# Patient Record
Sex: FEMALE | Race: WHITE | NOT HISPANIC OR LATINO | Employment: OTHER | URBAN - METROPOLITAN AREA
[De-identification: names, ages, dates, MRNs, and addresses within clinical notes are randomized per-mention and may not be internally consistent; named-entity substitution may affect disease eponyms.]

---

## 2017-08-31 ENCOUNTER — TRANSFERRED RECORDS (OUTPATIENT)
Dept: HEALTH INFORMATION MANAGEMENT | Facility: CLINIC | Age: 52
End: 2017-08-31

## 2019-01-17 ENCOUNTER — OFFICE VISIT (OUTPATIENT)
Dept: FAMILY MEDICINE | Facility: CLINIC | Age: 54
End: 2019-01-17
Payer: COMMERCIAL

## 2019-01-17 VITALS
BODY MASS INDEX: 24.96 KG/M2 | OXYGEN SATURATION: 98 % | DIASTOLIC BLOOD PRESSURE: 86 MMHG | SYSTOLIC BLOOD PRESSURE: 152 MMHG | HEART RATE: 77 BPM | HEIGHT: 63 IN | TEMPERATURE: 97.7 F | WEIGHT: 140.87 LBS | RESPIRATION RATE: 16 BRPM

## 2019-01-17 DIAGNOSIS — R03.0 ELEVATED BLOOD PRESSURE READING WITHOUT DIAGNOSIS OF HYPERTENSION: ICD-10-CM

## 2019-01-17 DIAGNOSIS — Z71.6 ENCOUNTER FOR TOBACCO USE CESSATION COUNSELING: ICD-10-CM

## 2019-01-17 DIAGNOSIS — J01.10 ACUTE NON-RECURRENT FRONTAL SINUSITIS: ICD-10-CM

## 2019-01-17 DIAGNOSIS — J45.20 MILD INTERMITTENT ASTHMA WITHOUT COMPLICATION: ICD-10-CM

## 2019-01-17 DIAGNOSIS — R91.8 PULMONARY NODULES: ICD-10-CM

## 2019-01-17 DIAGNOSIS — J45.909 UNCOMPLICATED ASTHMA, UNSPECIFIED ASTHMA SEVERITY, UNSPECIFIED WHETHER PERSISTENT: Primary | ICD-10-CM

## 2019-01-17 DIAGNOSIS — Z72.0 TOBACCO USE: ICD-10-CM

## 2019-01-17 DIAGNOSIS — F33.9 RECURRENT MAJOR DEPRESSIVE DISORDER, REMISSION STATUS UNSPECIFIED (H): ICD-10-CM

## 2019-01-17 PROBLEM — F33.40 RECURRENT MAJOR DEPRESSIVE DISORDER, IN REMISSION (H): Status: ACTIVE | Noted: 2019-01-17

## 2019-01-17 PROCEDURE — 99203 OFFICE O/P NEW LOW 30 MIN: CPT | Performed by: NURSE PRACTITIONER

## 2019-01-17 RX ORDER — AZITHROMYCIN 250 MG/1
TABLET, FILM COATED ORAL
Qty: 6 TABLET | Refills: 0 | Status: SHIPPED | OUTPATIENT
Start: 2019-01-17 | End: 2019-02-14

## 2019-01-17 ASSESSMENT — MIFFLIN-ST. JEOR: SCORE: 1199.14

## 2019-01-17 NOTE — PATIENT INSTRUCTIONS
1. Schedule RN visit to recheck blood pressure             Thank you for choosing [facility].  You may be receiving a survey in the mail from John Palencia regarding your visit today.  Please take a few minutes to complete and return the survey to let us know how we are doing.      If you have questions or concerns, please contact us via [employer] or you can contact your care team at [phone].    Our Clinic hours are:  Monday 6:40 am  to 7:00 pm  Tuesday -Friday 6:40 am to 5:00 pm    The [place] outpatient lab hours are:  Monday - Friday 6:10 am to 4:45 pm  Saturdays 7:00 am to 11:00 am  Appointments are required, call [phone]    If you have clinical questions after hours or would like to schedule an appointment,  call the clinic at [phone].

## 2019-01-17 NOTE — PROGRESS NOTES
SUBJECTIVE:   Carolina Hernandez is a 53 year old female who presents to clinic today for the following health issues:    Medical chart reviewed with patient via care Everywhere- patient is new to clinic. History of asthma- insurance needs pre auth for Advair- patient states has been working well for her. History of tobacco use- smoking 20 yrs. History of depression controlled on medication.     ENT Symptoms             Symptoms: cc Present Absent Comment   Fever/Chills  x  Couple days ago   Fatigue  x     Muscle Aches   x    Eye Irritation  x     Sneezing  x     Nasal Mgaan/Drg  x  green   Sinus Pressure/Pain x x     Loss of smell  x     Dental pain   x    Sore Throat  x     Swollen Glands   x    Ear Pain/Fullness  x  fullness   Cough  x     Wheeze  x     Chest Pain   x    Shortness of breath  x     Rash       Other  x  headache     Symptom duration:  8 days ago   Symptom severity:  varies moderate- severe   Treatments tried:  sudafed, trying a nasal rinse, ibuprofen, drinking lots of water, steaming, benadryl.   Contacts:  no     elevated blood pressure- patient states that she took sudafed today.     -------------------------------------    Problem list and histories reviewed & adjusted, as indicated.  Additional history: as documented    Patient Active Problem List   Diagnosis     CARDIOVASCULAR SCREENING; LDL GOAL LESS THAN 160     Recurrent major depressive disorder, in remission (H)     Encounter for tobacco use cessation counseling     Mild intermittent asthma without complication     History reviewed. No pertinent surgical history.    Social History     Tobacco Use     Smoking status: Current Every Day Smoker     Packs/day: 0.50     Smokeless tobacco: Never Used   Substance Use Topics     Alcohol use: Yes     Comment: beer     History reviewed. No pertinent family history.        Reviewed and updated as needed this visit by clinical staff       Reviewed and updated as needed this visit by Provider      "    ROS:  Constitutional, HEENT, cardiovascular, pulmonary, GI, , musculoskeletal, neuro, skin, endocrine and psych systems are negative, except as otherwise noted.    OBJECTIVE:     /86   Pulse 77   Temp 97.7  F (36.5  C) (Tympanic)   Resp 16   Ht 1.594 m (5' 2.75\")   Wt 63.9 kg (140 lb 13.9 oz)   SpO2 98%   BMI 25.15 kg/m    Body mass index is 25.15 kg/m .  GENERAL: healthy, alert and no distress  HENT: normal cephalic/atraumatic, ear canals and TM's normal, nose and mouth without ulcers or lesions, oropharynx clear, oral mucous membranes moist and sinuses: frontal tenderness on bilateral  NECK: no adenopathy, no asymmetry, masses, or scars and thyroid normal to palpation  RESP: lungs clear to auscultation - no rales, rhonchi or wheezes  CV: regular rate and rhythm, normal S1 S2, no S3 or S4, no murmur, click or rub, no peripheral edema and peripheral pulses strong  MS: no gross musculoskeletal defects noted, no edema    Diagnostic Test Results:  none     ASSESSMENT/PLAN:       1. Uncomplicated asthma, unspecified asthma severity, unspecified whether persistent  - patient states insurance will no longer cover Advair however it is controlling asthma well- can do pre auth.   - fluticasone-salmeterol (ADVAIR DISKUS) 500-50 MCG/DOSE inhaler; Inhale 1 puff into the lungs every 12 hours  Dispense: 1 Inhaler; Refill: 0    2. Tobacco use  Encouraged to quit smoking    3. Recurrent major depressive disorder, in status unspecified.       4. Encounter for tobacco use cessation counseling      5. Elevated blood pressure reading without diagnosis of hypertension  ? Due to smoking and sudafed use  Schedule RN visit to recheck if still high will need patient to follow up in clinic appointment     6. Acute non-recurrent frontal sinusitis    - azithromycin (ZITHROMAX Z-GORDO) 250 MG tablet; Take 2 tablets on day 1 and then 1 tablet on days 2-5.  Dispense: 6 tablet; Refill: 0    7. Pulmonary nodules  - patient states " that history of nodules- had CT scan in the past- stable.    Patient new to clinic- encouraged her to make follow up physical.     VALERIA Akins CNP  [facility]

## 2019-02-14 ENCOUNTER — OFFICE VISIT (OUTPATIENT)
Dept: FAMILY MEDICINE | Facility: CLINIC | Age: 54
End: 2019-02-14
Payer: COMMERCIAL

## 2019-02-14 VITALS
WEIGHT: 143.6 LBS | SYSTOLIC BLOOD PRESSURE: 156 MMHG | DIASTOLIC BLOOD PRESSURE: 90 MMHG | OXYGEN SATURATION: 99 % | BODY MASS INDEX: 25.45 KG/M2 | TEMPERATURE: 97.7 F | HEIGHT: 63 IN | HEART RATE: 70 BPM | RESPIRATION RATE: 16 BRPM

## 2019-02-14 DIAGNOSIS — M25.561 CHRONIC PAIN OF BOTH KNEES: ICD-10-CM

## 2019-02-14 DIAGNOSIS — R68.82 LOW LIBIDO: ICD-10-CM

## 2019-02-14 DIAGNOSIS — Z00.00 ENCOUNTER FOR ROUTINE ADULT HEALTH EXAMINATION WITHOUT ABNORMAL FINDINGS: Primary | ICD-10-CM

## 2019-02-14 DIAGNOSIS — Z12.4 SCREENING FOR CERVICAL CANCER: ICD-10-CM

## 2019-02-14 DIAGNOSIS — G89.29 CHRONIC PAIN OF BOTH KNEES: ICD-10-CM

## 2019-02-14 DIAGNOSIS — R03.0 ELEVATED BLOOD PRESSURE READING WITHOUT DIAGNOSIS OF HYPERTENSION: ICD-10-CM

## 2019-02-14 DIAGNOSIS — K90.9 INTESTINAL MALABSORPTION, UNSPECIFIED TYPE: ICD-10-CM

## 2019-02-14 DIAGNOSIS — M25.562 CHRONIC PAIN OF BOTH KNEES: ICD-10-CM

## 2019-02-14 PROCEDURE — G0145 SCR C/V CYTO,THINLAYER,RESCR: HCPCS | Performed by: FAMILY MEDICINE

## 2019-02-14 PROCEDURE — G0476 HPV COMBO ASSAY CA SCREEN: HCPCS | Performed by: FAMILY MEDICINE

## 2019-02-14 PROCEDURE — 99396 PREV VISIT EST AGE 40-64: CPT | Performed by: FAMILY MEDICINE

## 2019-02-14 PROCEDURE — 99213 OFFICE O/P EST LOW 20 MIN: CPT | Mod: 25 | Performed by: FAMILY MEDICINE

## 2019-02-14 RX ORDER — IBUPROFEN 200 MG
800 TABLET ORAL EVERY 8 HOURS PRN
COMMUNITY
End: 2019-10-05

## 2019-02-14 RX ORDER — FLUOXETINE 40 MG/1
40 CAPSULE ORAL DAILY
COMMUNITY
End: 2019-10-03

## 2019-02-14 ASSESSMENT — ANXIETY QUESTIONNAIRES
1. FEELING NERVOUS, ANXIOUS, OR ON EDGE: SEVERAL DAYS
3. WORRYING TOO MUCH ABOUT DIFFERENT THINGS: SEVERAL DAYS
5. BEING SO RESTLESS THAT IT IS HARD TO SIT STILL: SEVERAL DAYS
IF YOU CHECKED OFF ANY PROBLEMS ON THIS QUESTIONNAIRE, HOW DIFFICULT HAVE THESE PROBLEMS MADE IT FOR YOU TO DO YOUR WORK, TAKE CARE OF THINGS AT HOME, OR GET ALONG WITH OTHER PEOPLE: NOT DIFFICULT AT ALL
6. BECOMING EASILY ANNOYED OR IRRITABLE: SEVERAL DAYS
7. FEELING AFRAID AS IF SOMETHING AWFUL MIGHT HAPPEN: SEVERAL DAYS
2. NOT BEING ABLE TO STOP OR CONTROL WORRYING: SEVERAL DAYS
GAD7 TOTAL SCORE: 8

## 2019-02-14 ASSESSMENT — MIFFLIN-ST. JEOR: SCORE: 1211.53

## 2019-02-14 ASSESSMENT — PATIENT HEALTH QUESTIONNAIRE - PHQ9
SUM OF ALL RESPONSES TO PHQ QUESTIONS 1-9: 11
5. POOR APPETITE OR OVEREATING: MORE THAN HALF THE DAYS

## 2019-02-14 NOTE — LETTER
February 22, 2019    Carolina Hernandez  [address] [place] [id number]    Dear ,  This letter is regarding your recent Pap smear (cervical cancer screening) and Human Papillomavirus (HPV) test.  We are happy to inform you that your Pap smear result is normal. Cervical cancer is closely linked with certain types of HPV. Your results showed no evidence of high-risk HPV.  Therefore we recommend you return in 3 years for your next pap smear.  You will still need to return to the clinic every year for an annual exam and other preventive tests.  If you have additional questions regarding this result, please call our registered nurse, Viridiana at [phone].  Sincerely,    Julia Oneill MD/cb

## 2019-02-14 NOTE — PROGRESS NOTES
SUBJECTIVE:   CC: Carolina Hernandez is an 53 year old woman who presents for preventive health visit.       Patient is a 53 yr old female here for her annual physical . She has a past medical history significant for severe depression. She is taking Prozac for this. She reports that she has been struggling with low sex drive for years and has no interest . She went into menopause at 48 and says even before then she had very low sex drive. She reports that her moods may have something to do with it. She denies any pain with sex.       Patient has not been on hormone replacement before.     Other concerns today is GI symptoms mainly loose stools. She reports that sometimes she will see her food undigested in her stools. She had a colonoscopy last year that showed no abnormalities except diverticula.     Lastly she says she has been told that she has severe arthritis in both knees. She will like to see orthopedics for consideration for joint replacement.      Healthy Habits:    Do you get at least three servings of calcium containing foods daily (dairy, green leafy vegetables, etc.)? yes    Amount of exercise or daily activities, outside of work: physical job, lots of walking and lifting    Problems taking medications regularly No    Medication side effects: No    Have you had an eye exam in the past two years? no    Do you see a dentist twice per year? no    Do you have sleep apnea, excessive snoring or daytime drowsiness?no      Colonoscopy done on this date: 8/31/17 (approximately), by this group: [facility], results were diverticulits.   Mammogram done on this date: 8/24/17 (approximately), by this group: [facility], results were normal.     Arthritis in both knees, would like to have referral to see ortho.  Discuss sex drive.  Has decreased over the past couple of years.    Today's PHQ-2 Score: No flowsheet data found.  Will complete the phq/jil.    Abuse: Current or Past(Physical, Sexual or Emotional)-  No  Do you feel safe in your environment? Yes    Social History     Tobacco Use     Smoking status: Current Every Day Smoker     Packs/day: 0.50     Smokeless tobacco: Never Used   Substance Use Topics     Alcohol use: Yes     Comment: 12 pack weekly     If you drink alcohol do you typically have >3 drinks per day or >7 drinks per week? Yes - AUDIT SCORE:  6  AUDIT - Alcohol Use Disorders Identification Test - Reproduced from the World Health Organization Audit 2001 (Second Edition) 2/14/2019   1.  How often do you have a drink containing alcohol? 4 or more times a week   2.  How many drinks containing alcohol do you have on a typical day when you are drinking? 3 or 4   3.  How often do you have five or more drinks on one occasion? Less than monthly   4.  How often during the last year have you found that you were not able to stop drinking once you had started? Never   5.  How often during the last year have you failed to do what was normally expected of you because of drinking? Never   6.  How often during the last year have you needed a first drink in the morning to get yourself going after a heavy drinking session? Never   7.  How often during the last year have you had a feeling of guilt or remorse after drinking? Never   8.  How often during the last year have you been unable to remember what happened the night before because of your drinking? Never   9.  Have you or someone else been injured because of your drinking? No   10. Has a relative, friend, doctor or other health care worker been concerned about your drinking or suggested you cut down? No   TOTAL SCORE 6                        Reviewed orders with patient.  Reviewed health maintenance and updated orders accordingly - Yes  Labs reviewed in EPIC  BP Readings from Last 3 Encounters:   02/14/19 156/90   01/17/19 152/86   03/10/12 140/79    Wt Readings from Last 3 Encounters:   02/14/19 65.1 kg (143 lb 9.6 oz)   01/17/19 63.9 kg (140 lb 13.9 oz)                   Patient Active Problem List   Diagnosis     CARDIOVASCULAR SCREENING; LDL GOAL LESS THAN 160     Recurrent major depressive disorder, in remission (H)     Encounter for tobacco use cessation counseling     Mild intermittent asthma without complication     History reviewed. No pertinent surgical history.    Social History     Tobacco Use     Smoking status: Current Every Day Smoker     Packs/day: 0.50     Smokeless tobacco: Never Used   Substance Use Topics     Alcohol use: Yes     Comment: 12 pack weekly     Family History   Problem Relation Age of Onset     Glaucoma Father      Coronary Artery Disease Father         stents     Cancer Maternal Grandfather      Coronary Artery Disease Paternal Grandfather      Schizophrenia Daughter      Diabetes Daughter      Cancer Daughter          Current Outpatient Medications   Medication Sig Dispense Refill     ALBUTEROL SULFATE (2.5 MG/3ML) 0.083% IN NEBU 1 NEB BY INHALATION EVERY 4 HOURS       FLUoxetine (PROZAC) 40 MG capsule Take 40 mg by mouth daily       ibuprofen (ADVIL/MOTRIN) 200 MG tablet Take 800 mg by mouth       mometasone-formoterol (DULERA) 200-5 MCG/ACT inhaler Inhale 2 puffs into the lungs 2 times daily 1 Inhaler 1     OMEPRAZOLE 20 MG OR TBEC 1 TABLET ORALLY DAILY       PROAIR  (90 BASE) MCG/ACT IN AERS INHALE 2 PUFFS BY INHALATION 4 TIMES DAILY AS NEEDED       PROZAC 20 MG OR CAPS 1 CAPSULE ORALLY EVERY MORNING       SINGULAIR 10 MG OR TABS 1 TABLET ORALLY EVERY EVENING       TYLENOL 325 MG OR TABS 1-2 TABLETS ORALLY EVERY 4 HOURS AS NEEDED FOR MILD PAIN OR TEMP OVER 101       Allergies   Allergen Reactions     Penicillins        Mammogram Screening: Patient over age 50, mutual decision to screen reflected in health maintenance.    Pertinent mammograms are reviewed under the imaging tab.  History of abnormal Pap smear: NO - age 30- 65 PAP every 3 years recommended     Reviewed and updated as needed this visit by clinical staff  Tobacco   "Allergies  Meds  Med Hx  Surg Hx  Fam Hx  Soc Hx        Reviewed and updated as needed this visit by Provider        History reviewed. No pertinent past medical history.   History reviewed. No pertinent surgical history.    ROS:  CONSTITUTIONAL: NEGATIVE for fever, chills, change in weight  INTEGUMENTARY/SKIN: NEGATIVE for worrisome rashes, moles or lesions  EYES: NEGATIVE for vision changes or irritation  ENT: NEGATIVE for ear, mouth and throat problems  RESP: NEGATIVE for significant cough or SOB  BREAST: NEGATIVE for masses, tenderness or discharge  CV: NEGATIVE for chest pain, palpitations or peripheral edema  GI: NEGATIVE for nausea, abdominal pain, heartburn, or change in bowel habits  : NEGATIVE for unusual urinary or vaginal symptoms. No vaginal bleeding.  MUSCULOSKELETAL: NEGATIVE for significant arthralgias or myalgia  NEURO: NEGATIVE for weakness, dizziness or paresthesias  PSYCHIATRIC: NEGATIVE for changes in mood or affect     OBJECTIVE:   /90   Pulse 70   Temp 97.7  F (36.5  C) (Tympanic)   Resp 16   Ht 1.594 m (5' 2.75\")   Wt 65.1 kg (143 lb 9.6 oz)   SpO2 99%   BMI 25.64 kg/m    EXAM:  GENERAL: healthy, alert and no distress  EYES: Eyes grossly normal to inspection, PERRL and conjunctivae and sclerae normal  HENT: ear canals and TM's normal, nose and mouth without ulcers or lesions  NECK: no adenopathy, no asymmetry, masses, or scars and thyroid normal to palpation  RESP: lungs clear to auscultation - no rales, rhonchi or wheezes  BREAST: normal without masses, tenderness or nipple discharge and no palpable axillary masses or adenopathy  CV: regular rate and rhythm, normal S1 S2, no S3 or S4, no murmur, click or rub, no peripheral edema and peripheral pulses strong  ABDOMEN: soft, nontender, no hepatosplenomegaly, no masses and bowel sounds normal   (female): normal female external genitalia, normal urethral meatus , vaginal mucosa pink, moist, well rugated and normal cervix, " "adnexae, and uterus without masses.pap smear done  MS: no gross musculoskeletal defects noted, no edema  SKIN: no suspicious lesions or rashes  PSYCH: mentation appears normal, affect normal/bright    Diagnostic Test Results:  none     ASSESSMENT/PLAN:   (Z00.00) Encounter for routine adult health examination without abnormal findings  (primary encounter diagnosis)  Comment: patient will be notified of results   Plan: GLUCOSE, ORTHO  REFERRAL            (M25.561,  M25.562,  G89.29) Chronic pain of both knees  Comment: Ortho referral placed  Plan: ORTHO  REFERRAL            (K90.9) Intestinal malabsorption, unspecified type  Comment: Patient referred to GI   Plan: GASTROENTEROLOGY ADULT REF CONSULT ONLY            (Z12.4) Screening for cervical cancer  Comment: .Patient will be notified of results  Plan: Pap imaged thin layer screen with HPV -         recommended age 30 - 65 years (select HPV order        below)        (R03.0) Elevated blood pressure reading without diagnosis of hypertension  Comment: BP is noted to be high , asked that she come back for a recheck , if still high will recommend medication start .  Plan: As above .    (R68.82) Low libido  Comment: discussed at length with patient , since she went into menopause 5 yrs ago I am not sure she will be a good candidate for HRT. She may have to have a consult with Gyn.   Plan:  As above.      COUNSELING:   Reviewed preventive health counseling, as reflected in patient instructions       Regular exercise       Healthy diet/nutrition       Vision screening    BP Readings from Last 1 Encounters:   02/14/19 156/90     Estimated body mass index is 25.64 kg/m  as calculated from the following:    Height as of this encounter: 1.594 m (5' 2.75\").    Weight as of this encounter: 65.1 kg (143 lb 9.6 oz).    BP Screening:   Last 3 BP Readings:    BP Readings from Last 3 Encounters:   02/14/19 156/90   01/17/19 152/86   03/10/12 140/79       The " following was recommended to the patient:  Anti-hypertensive phramacology therapy       reports that she has been smoking.  She has been smoking about 0.50 packs per day. she has never used smokeless tobacco.  Tobacco Cessation Action Plan: Information offered: Patient not interested at this time    Counseling Resources:  ATP IV Guidelines  Pooled Cohorts Equation Calculator  Breast Cancer Risk Calculator  FRAX Risk Assessment  ICSI Preventive Guidelines  Dietary Guidelines for Americans, 2010  USDA's MyPlate  ASA Prophylaxis  Lung CA Screening    Julia Oneill MD  [facility]

## 2019-02-15 ASSESSMENT — ANXIETY QUESTIONNAIRES: GAD7 TOTAL SCORE: 8

## 2019-02-15 ASSESSMENT — ASTHMA QUESTIONNAIRES: ACT_TOTALSCORE: 10

## 2019-02-19 LAB
COPATH REPORT: NORMAL
PAP: NORMAL

## 2019-02-21 ENCOUNTER — ANCILLARY PROCEDURE (OUTPATIENT)
Dept: GENERAL RADIOLOGY | Facility: CLINIC | Age: 54
End: 2019-02-21
Attending: FAMILY MEDICINE
Payer: COMMERCIAL

## 2019-02-21 ENCOUNTER — OFFICE VISIT (OUTPATIENT)
Dept: ORTHOPEDICS | Facility: CLINIC | Age: 54
End: 2019-02-21
Payer: COMMERCIAL

## 2019-02-21 VITALS
DIASTOLIC BLOOD PRESSURE: 88 MMHG | HEIGHT: 63 IN | WEIGHT: 143 LBS | BODY MASS INDEX: 25.34 KG/M2 | SYSTOLIC BLOOD PRESSURE: 136 MMHG

## 2019-02-21 DIAGNOSIS — G89.29 CHRONIC PAIN OF BOTH KNEES: ICD-10-CM

## 2019-02-21 DIAGNOSIS — M17.0 BILATERAL PRIMARY OSTEOARTHRITIS OF KNEE: ICD-10-CM

## 2019-02-21 DIAGNOSIS — M25.562 CHRONIC PAIN OF BOTH KNEES: Primary | ICD-10-CM

## 2019-02-21 DIAGNOSIS — M25.561 CHRONIC PAIN OF BOTH KNEES: ICD-10-CM

## 2019-02-21 DIAGNOSIS — M25.561 CHRONIC PAIN OF BOTH KNEES: Primary | ICD-10-CM

## 2019-02-21 DIAGNOSIS — G89.29 CHRONIC PAIN OF BOTH KNEES: Primary | ICD-10-CM

## 2019-02-21 DIAGNOSIS — M25.562 CHRONIC PAIN OF BOTH KNEES: ICD-10-CM

## 2019-02-21 LAB
FINAL DIAGNOSIS: NORMAL
HPV HR 12 DNA CVX QL NAA+PROBE: NEGATIVE
HPV16 DNA SPEC QL NAA+PROBE: NEGATIVE
HPV18 DNA SPEC QL NAA+PROBE: NEGATIVE
SPECIMEN DESCRIPTION: NORMAL
SPECIMEN SOURCE CVX/VAG CYTO: NORMAL

## 2019-02-21 PROCEDURE — 20611 DRAIN/INJ JOINT/BURSA W/US: CPT | Mod: 50 | Performed by: FAMILY MEDICINE

## 2019-02-21 PROCEDURE — 99243 OFF/OP CNSLTJ NEW/EST LOW 30: CPT | Mod: 25 | Performed by: FAMILY MEDICINE

## 2019-02-21 PROCEDURE — 73562 X-RAY EXAM OF KNEE 3: CPT

## 2019-02-21 RX ORDER — TRIAMCINOLONE ACETONIDE 40 MG/ML
40 INJECTION, SUSPENSION INTRA-ARTICULAR; INTRAMUSCULAR
Status: DISCONTINUED | OUTPATIENT
Start: 2019-02-21 | End: 2019-08-06

## 2019-02-21 RX ORDER — ROPIVACAINE HYDROCHLORIDE 5 MG/ML
3 INJECTION, SOLUTION EPIDURAL; INFILTRATION; PERINEURAL
Status: DISCONTINUED | OUTPATIENT
Start: 2019-02-21 | End: 2019-08-06

## 2019-02-21 RX ADMIN — ROPIVACAINE HYDROCHLORIDE 3 ML: 5 INJECTION, SOLUTION EPIDURAL; INFILTRATION; PERINEURAL at 11:40

## 2019-02-21 RX ADMIN — TRIAMCINOLONE ACETONIDE 40 MG: 40 INJECTION, SUSPENSION INTRA-ARTICULAR; INTRAMUSCULAR at 11:40

## 2019-02-21 ASSESSMENT — MIFFLIN-ST. JEOR: SCORE: 1208.32

## 2019-02-21 NOTE — PROGRESS NOTES
"Carolina Hernandez  :  1965  DOS: 2019  MRN: [id number]    Sports Medicine Clinic Visit    PCP: Sade Hung    Carolina Hernandez is a 53 year old female who is seen in consultation at the request of  Julia Oneill M.D. presenting with chronic bilateral knee pain.    Injury: Gradual onset of chronic bilateral knee pain over last 2+ years, pain not improving over last 6 months after retiring.  Pain located over bilateral deep medial knee, nonradiating.  Additional Features:  Positive: swelling and grinding.  Symptoms are better with Rest and steroid injection.  Symptoms are worse with: descending stairs, kneeling, walking.  Other evaluation and/or treatments so far consists of: Tylenol, Ibuprofen, Rest, PCP.  Recent imaging completed: No recent imaging completed.  Prior History of related problems: Patient was last seen in 2017 by Ortho Surgeon - Dr Cao at [facility].  Per patient she was told she has moderate-severe OA at that time, steroid injection completed provided good relief for ~ 3 months.    Social History: retired    Review of Systems  Musculoskeletal: as above  Remainder of review of systems is negative including constitutional, CV, pulmonary, GI, Skin and Neurologic except as noted in HPI or medical history.    No past medical history on file.  No past surgical history on file.    Objective  /88   Ht 1.593 m (5' 2.72\")   Wt 64.9 kg (143 lb)   BMI 25.56 kg/m      General: healthy, alert and in no distress    HEENT: no scleral icterus or conjunctival erythema   Skin: no suspicious lesions or rash. No jaundice.   CV: regular rhythm by palpation, 2+ distal pulses, no pedal edema    Resp: normal respiratory effort without conversational dyspnea   Psych: normal mood and affect    Gait: antalgic, appropriate coordination and balance   Neuro: normal light touch sensory exam of the extremities. Motor strength as noted below     Bilateral Knee exam    ROM:        Flexion 110 " degrees       Extension -2 degrees       Range of motion limited by pain, mechanical    Inspection:       no visible ecchymosis        effusion noted trace    Skin:       no visible deformities       well perfused       capillary refill brisk    Patellar Motion:        Crepitus noted in the patellofemoral joint    Tender:        medial patellar border <       lateral patellar border,       medial joint line >>       lateral joint line    Non Tender:         remainder of knee area        along MCL        distal IT Band        infrapatellar tendon        tibial tubercle       pes anserine bursa    Special Tests:        neg (-) varus at 0 deg and 30 deg       neg (-) valgus at 0 deg and 30 deg    Evaluation of ipsilateral kinetic chain       B/l decreased quad tone and core deconditioning      Radiology  Recent Results (from the past 744 hour(s))   XR Knee Standing Bilateral 3 Views    Narrative    KNEE STANDING BILATERAL THREE VIEWS  2/21/2019 11:08 AM     HISTORY: Chronic pain of both knees.    COMPARISON: None.      Impression    IMPRESSION:   1. Right knee: Advanced medial joint space narrowing with only minimal  bony spurring. No acute bony abnormality or joint space effusion.  2. Left knee: Advanced medial joint space narrowing with moderate bony  spurring. No acute bony abnormality or joint space effusion.       Large Joint Injection/Arthocentesis: bilateral knee  Date/Time: 2/21/2019 11:40 AM  Performed by: Ab Smith DO  Authorized by: Ab Smith DO     Indications:  Osteoarthritis and pain  Needle Size:  22 G  Guidance: ultrasound    Approach:  Superolateral  Location:  Knee  Laterality:  Bilateral  Site:  Bilateral knee  Medications (Right):  40 mg triamcinolone 40 MG/ML; 3 mL ropivacaine 5 MG/ML  Medications (Left):  40 mg triamcinolone 40 MG/ML; 3 mL ropivacaine 5 MG/ML  Outcome:  Tolerated well, no immediate complications  Procedure discussed: discussed risks, benefits, and  alternatives    Consent Given by:  Patient  Prep: patient was prepped and draped in usual sterile fashion            Assessment:  1. Chronic pain of both knees    2. Bilateral primary osteoarthritis of knee        Plan:  Discussed the assessment with the patient.  Follow up: prn  XR images independently visualized and reviewed with patient today in clinic  Unicompartmental but advanced OA, medial compartment  Reviewed importance of strength and stabilization of knee, hip and core  Reviewed low impact activity options and strategies, as well as PT options if desired  CSI performed today with good initial relief from anesthetic effect  Reviewed wt loss, activity modification and progressive increase in activity as tolerated and guided by pain  Reviewed options for potential steroid vs viscosupplementation injections and the possibility for future orthopedic referral prn  Reviewed safe and appropriate OTC medication choices, try tylenol first  Up to 3000mg daily of tylenol is generally safe, NSAID dosing and duration limitations reviewed  Discussed nature of degenerative arthrosis of the knee.   Discussed symptom treatment with ice or heat, topical treatments, and rest if needed.   May be a candidate for partial knee replacement in the future, will defer that discussion to ortho  Expectations and goals of CSI reviewed  Often 2-3 days for steroid effect, and can take up to two weeks for maximum effect  We discussed modified progressive pain-free activity as tolerated  Do not overuse in first two weeks if feeling better due to concern for vulnerability while steroid is working  Supportive care reviewed  All questions were answered today  Contact us with additional questions or concerns  Signs and sx of concern reviewed    Thanks very much for sending this nice lady to us, I will keep you updated with her progress      Ab Smith DO, CAQ  Primary Care Sports Medicine  [facility]                Disclaimer: This note consists of symbols derived from keyboarding, dictation and/or voice recognition software. As a result, there may be errors in the script that have gone undetected. Please consider this when interpreting information found in this chart.

## 2019-02-21 NOTE — LETTER
"    2019         RE: Carolina Hernandez  [address] [place] [id number]        Dear Colleague,    Thank you for referring your patient, Carolina Hernandez, to the [facility]. Please see a copy of my visit note below.    Carolina Hernandez  :  1965  DOS: 2019  MRN: [id number]    Sports Medicine Clinic Visit    PCP: Sade Hung    Carolina Hernandez is a 53 year old female who is seen in consultation at the request of  Julia Oneill M.D. presenting with chronic bilateral knee pain.    Injury: Gradual onset of chronic bilateral knee pain over last 2+ years, pain not improving over last 6 months after retiring.  Pain located over bilateral deep medial knee, nonradiating.  Additional Features:  Positive: swelling and grinding.  Symptoms are better with Rest and steroid injection.  Symptoms are worse with: descending stairs, kneeling, walking.  Other evaluation and/or treatments so far consists of: Tylenol, Ibuprofen, Rest, PCP.  Recent imaging completed: No recent imaging completed.  Prior History of related problems: Patient was last seen in 2017 by Ortho Surgeon - Dr Cao at [facility].  Per patient she was told she has moderate-severe OA at that time, steroid injection completed provided good relief for ~ 3 months.    Social History: retired    Review of Systems  Musculoskeletal: as above  Remainder of review of systems is negative including constitutional, CV, pulmonary, GI, Skin and Neurologic except as noted in HPI or medical history.    No past medical history on file.  No past surgical history on file.    Objective  /88   Ht 1.593 m (5' 2.72\")   Wt 64.9 kg (143 lb)   BMI 25.56 kg/m       General: healthy, alert and in no distress    HEENT: no scleral icterus or conjunctival erythema   Skin: no suspicious lesions or rash. No jaundice.   CV: regular rhythm by palpation, 2+ distal pulses, no pedal edema    Resp: normal " respiratory effort without conversational dyspnea   Psych: normal mood and affect    Gait: antalgic, appropriate coordination and balance   Neuro: normal light touch sensory exam of the extremities. Motor strength as noted below     Bilateral Knee exam    ROM:        Flexion 110 degrees       Extension -2 degrees       Range of motion limited by pain, mechanical    Inspection:       no visible ecchymosis        effusion noted trace    Skin:       no visible deformities       well perfused       capillary refill brisk    Patellar Motion:        Crepitus noted in the patellofemoral joint    Tender:        medial patellar border <       lateral patellar border,       medial joint line >>       lateral joint line    Non Tender:         remainder of knee area        along MCL        distal IT Band        infrapatellar tendon        tibial tubercle       pes anserine bursa    Special Tests:        neg (-) varus at 0 deg and 30 deg       neg (-) valgus at 0 deg and 30 deg    Evaluation of ipsilateral kinetic chain       B/l decreased quad tone and core deconditioning      Radiology  Recent Results (from the past 744 hour(s))   XR Knee Standing Bilateral 3 Views    Narrative    KNEE STANDING BILATERAL THREE VIEWS  2/21/2019 11:08 AM     HISTORY: Chronic pain of both knees.    COMPARISON: None.      Impression    IMPRESSION:   1. Right knee: Advanced medial joint space narrowing with only minimal  bony spurring. No acute bony abnormality or joint space effusion.  2. Left knee: Advanced medial joint space narrowing with moderate bony  spurring. No acute bony abnormality or joint space effusion.       Large Joint Injection/Arthocentesis: bilateral knee  Date/Time: 2/21/2019 11:40 AM  Performed by: Ab Smith DO  Authorized by: Ab Smith DO     Indications:  Osteoarthritis and pain  Needle Size:  22 G  Guidance: ultrasound    Approach:  Superolateral  Location:  Knee  Laterality:  Bilateral  Site:   Bilateral knee  Medications (Right):  40 mg triamcinolone 40 MG/ML; 3 mL ropivacaine 5 MG/ML  Medications (Left):  40 mg triamcinolone 40 MG/ML; 3 mL ropivacaine 5 MG/ML  Outcome:  Tolerated well, no immediate complications  Procedure discussed: discussed risks, benefits, and alternatives    Consent Given by:  Patient  Prep: patient was prepped and draped in usual sterile fashion            Assessment:  1. Chronic pain of both knees    2. Bilateral primary osteoarthritis of knee        Plan:  Discussed the assessment with the patient.  Follow up: prn  XR images independently visualized and reviewed with patient today in clinic  Unicompartmental but advanced OA, medial compartment  Reviewed importance of strength and stabilization of knee, hip and core  Reviewed low impact activity options and strategies, as well as PT options if desired  CSI performed today with good initial relief from anesthetic effect  Reviewed wt loss, activity modification and progressive increase in activity as tolerated and guided by pain  Reviewed options for potential steroid vs viscosupplementation injections and the possibility for future orthopedic referral prn  Reviewed safe and appropriate OTC medication choices, try tylenol first  Up to 3000mg daily of tylenol is generally safe, NSAID dosing and duration limitations reviewed  Discussed nature of degenerative arthrosis of the knee.   Discussed symptom treatment with ice or heat, topical treatments, and rest if needed.   May be a candidate for partial knee replacement in the future, will defer that discussion to ortho  Expectations and goals of CSI reviewed  Often 2-3 days for steroid effect, and can take up to two weeks for maximum effect  We discussed modified progressive pain-free activity as tolerated  Do not overuse in first two weeks if feeling better due to concern for vulnerability while steroid is working  Supportive care reviewed  All questions were answered today  Contact us  with additional questions or concerns  Signs and sx of concern reviewed    Thanks very much for sending this nice lady to us, I will keep you updated with her progress      Ab Smith DO, SONAL  Primary Care Sports Medicine  [facility]               Disclaimer: This note consists of symbols derived from keyboarding, dictation and/or voice recognition software. As a result, there may be errors in the script that have gone undetected. Please consider this when interpreting information found in this chart.    Again, thank you for allowing me to participate in the care of your patient.        Sincerely,        Ab Smith DO

## 2019-02-21 NOTE — LETTER
February 21, 2019      Carolina was seen in my office today for bilateral knee pain.  She has advanced osteoarthritis of both knees.  This affects her ability to perform her ADL's and exercise to maintain strength.  She is working with me on conservative care options to manage this issue.  I have discussed with her that strength and saf exercise are the best ways to manage knee arthritis pain for her long term, and to help avoid costly major surgery like knee replacement, especially given her age.  I believe that she would benefit tremendously from a low-impact activity routine, as well as a community setting to work on strength and stability of her knees.  Pool exercises, swimming, pilates, stationary or recumbent stationary bike and elliptical machine are some of the options we discussed, which are available at her local [facility].  She would benefit tremendously from access to these resources.        Ab Ramsey DO, CAQ  Primary Care Sports Medicine  [facility]

## 2019-02-22 ENCOUNTER — TELEPHONE (OUTPATIENT)
Dept: FAMILY MEDICINE | Facility: CLINIC | Age: 54
End: 2019-02-22

## 2019-02-22 DIAGNOSIS — J45.20 MILD INTERMITTENT ASTHMA WITHOUT COMPLICATION: Primary | ICD-10-CM

## 2019-02-22 NOTE — TELEPHONE ENCOUNTER
Ok for PA , this will have to go through Sade Castro who helps us with the prior authorization. I faxed in Breo for her . She can try that in the meantime

## 2019-02-22 NOTE — TELEPHONE ENCOUNTER
Patient notified of Breo. And notified that we will begin PA.    Routing PA to pool/Sade to begin.    Thanks,  Zarina HERRERA RN

## 2019-02-22 NOTE — TELEPHONE ENCOUNTER
Reason for Call:  Other prescription    Detailed comments: Pt is calling re her Dulera, starting taking since 2/14/19  And is not working.    Was on Advair for years but due to insurance the Dulera was covered.    Is there another option? Or Medication?   Please call    Phone Number Patient can be reached at: Home number on file [phone] (home)    Best Time: any    Can we leave a detailed message on this number? YES    Call taken on 2/22/2019 at 1:01 PM by Ana Rivas

## 2019-02-22 NOTE — TELEPHONE ENCOUNTER
Dr. Oneill,    Patient states the Dulera is not working for her and she would really like to get a prior auth for the Advair, as that worked for her in the past.    Okay for PA on Advair?     Patient is also asking if there is any other medication you can prescribe her in the mean time for her to try/ get her by until the Advair PA goes through?    Thanks,  Zarina HERRERA RN

## 2019-02-23 NOTE — TELEPHONE ENCOUNTER
Prior Authorization Retail Medication Request    Medication/Dose: Advair  ICD code (if different than what is on RX):    Previously Tried and Failed:  Dulera - is not working for patient; Breo; proair; singulair; albuterol sulfate   Rationale:  Has used Advair in the past with success    Insurance Name:  [facility]  Insurance ID:  [id number]       Pharmacy Information (if different than what is on RX)  Name:    Phone:

## 2019-02-27 ENCOUNTER — TELEPHONE (OUTPATIENT)
Dept: FAMILY MEDICINE | Facility: CLINIC | Age: 54
End: 2019-02-27

## 2019-02-27 DIAGNOSIS — J45.20 MILD INTERMITTENT ASTHMA WITHOUT COMPLICATION: Primary | ICD-10-CM

## 2019-02-27 DIAGNOSIS — F33.40 RECURRENT MAJOR DEPRESSIVE DISORDER, IN REMISSION (H): ICD-10-CM

## 2019-02-27 NOTE — TELEPHONE ENCOUNTER
Last Written Prescription Date:  Singular  historical  Last Fill Quantity: ?,  # refills: ?   Last office visit: 2/14/2019 with prescribing provider:  Nino Allen Office Visit:      Last Written Prescription Date:  Prozac 20 mg  historical  Last Fill Quantity: ?,  # refills: ?   Last office visit: 2/14/2019 with prescribing provider:  Nino Allen Office Visit:      Last Written Prescription Date:  Albuterol Inhaler historical  Last Fill Quantity: ?,  # refills: ?   Last office visit: 2/14/2019 with prescribing provider:  Nino Allen Office Visit:      Last Written Prescription Date:  Albuterol Neb Solution  Last Fill Quantity: ?,  # refills: ?   Last office visit: 2/14/2019 with prescribing provider:  Nino Allen Office Visit:      Due to insurance, patient had to switch MD's. She was seen by Dr. Oneill to whom she wants for her new MD, but it appears they did not talk about medication.  She uses [facility] in [place].  Kourtney Burdick  Clinic Station  Flex

## 2019-02-27 NOTE — TELEPHONE ENCOUNTER
Routing refill request to provider for review/approval because:  Medication is reported/historical    Patient needs refills on these medications. LOV 02/14/19.   Pended refills for 1 year.      TYLOR DiazN, RN

## 2019-02-28 RX ORDER — MONTELUKAST SODIUM 10 MG/1
10 TABLET ORAL AT BEDTIME
Qty: 30 TABLET | Refills: 11 | Status: SHIPPED | OUTPATIENT
Start: 2019-02-28 | End: 2019-10-03

## 2019-02-28 RX ORDER — ALBUTEROL SULFATE 0.83 MG/ML
2.5 SOLUTION RESPIRATORY (INHALATION) EVERY 4 HOURS PRN
Qty: 1 BOX | Refills: 11 | Status: SHIPPED | OUTPATIENT
Start: 2019-02-28 | End: 2019-10-03

## 2019-03-01 NOTE — TELEPHONE ENCOUNTER
Prior Authorization Approval    Authorization Effective Date: 1/30/2019  Authorization Expiration Date: 2/29/2020  Medication: Advair-APPROVED  Approved Dose/Quantity:   Reference #:     Insurance Company: [employer] - Phone [phone] Fax [phone]  Expected CoPay:       CoPay Card Available:      Foundation Assistance Needed:    Which Pharmacy is filling the prescription (Not needed for infusion/clinic administered): [id number] - [place], [place] - [address] [place] [address]  Pharmacy Notified: Yes  Patient Notified: No    Pharmacy will notify patient when medication is ready.

## 2019-03-01 NOTE — TELEPHONE ENCOUNTER
Central Prior Authorization Team   Phone: [phone]      PA Initiation    Medication: Advair-Initiated  Insurance Company: [employer] - Phone [phone] Fax [phone]  Pharmacy Filling the Rx: [id number] - [place], [place] - [address] [place] [address]  Filling Pharmacy Phone: [phone]  Filling Pharmacy Fax:    Start Date: 3/1/2019

## 2019-03-05 ENCOUNTER — TELEPHONE (OUTPATIENT)
Dept: FAMILY MEDICINE | Facility: CLINIC | Age: 54
End: 2019-03-05

## 2019-03-05 DIAGNOSIS — J45.20 MILD INTERMITTENT ASTHMA WITHOUT COMPLICATION: Primary | ICD-10-CM

## 2019-03-05 RX ORDER — ALBUTEROL SULFATE 90 UG/1
2 AEROSOL, METERED RESPIRATORY (INHALATION) EVERY 6 HOURS PRN
Qty: 1 INHALER | Refills: 0 | Status: SHIPPED | OUTPATIENT
Start: 2019-03-05 | End: 2019-04-03

## 2019-03-05 NOTE — PATIENT INSTRUCTIONS
Patient Education     Acute Sinusitis    Acute sinusitis is irritation and swelling of the sinuses. It is usually caused by a viral infection after a common cold. Your doctor can help you find relief.  What is acute sinusitis?  Sinuses are air-filled spaces in the skull behind the face. They are kept moist and clean by a lining of mucosa. Things such as pollen, smoke, and chemical fumes can irritate the mucosa. It can then swell up. As a response to irritation, the mucosa makes more mucus and other fluids. Tiny hairlike cilia cover the mucosa. Cilia help carry mucus toward the opening of the sinus. Too much mucus may cause the cilia to stop working. This blocks the sinus opening. A buildup of fluid in the sinuses then causes pain and pressure. It can also encourage bacteria to grow in the sinuses.  Common symptoms of acute sinusitis  You may have:    Facial soreness pain    Headache    Fever    Fluid draining in the back of the throat (postnasal drip)    Congestion    Drainage that is thick and colored, instead of clear    Cough  Diagnosing acute sinusitis  Your doctor will ask about your symptoms and health history. He or she will look at your ear, nose, and throat. You usually won't need to have X-rays taken.    The doctor may take a sample of mucus to check for bacteria. If you have sinusitis that keeps coming back, you may need imaging tests such as X-rays or CAT scans. This will help your doctor check for a structural problem that may be causing the infection.  Treating acute sinusitis  Treatment is aimed at unblocking the sinus opening and helping the cilia work again. You may need to take antihistamine and decongestant medicine. These can reduce inflammation and decrease the amount of fluid your sinuses make. If you have a bacterial infection, you will need to take antibiotic medicine for 10 to 14 days. Take this medicine until it is gone, even if you feel better.  Date Last Reviewed: 10/1/2016    [id number]  The [employer]. [address] [place] [address], [place] [address]. All rights reserved. This information is not intended as a substitute for professional medical care. Always follow your healthcare professional's instructions.

## 2019-03-05 NOTE — TELEPHONE ENCOUNTER
Reason for Call:  Other med question    Detailed comments: Patient is asking to speak to a nurse. She wants to know what kind of decongestant she can use with high blood pressure.  She is also asking for a refill on Albuterol inhaler.  It looks like she should have plenty of refills as it was just filled on 2/28/19.    Phone Number Patient can be reached at: Home number on file [phone] (home)    Best Time: any    Can we leave a detailed message on this number? YES    Call taken on 3/5/2019 at 1:34 PM by Sruthi Al

## 2019-03-05 NOTE — TELEPHONE ENCOUNTER
Routed to out of office provider pool since Dr Oneill has left for the day.     I called pt to clarify her request since I do not find that we have filled albuterol inhaler for her before.    I do find that we have prescribed albuterol nebs for her.  Pt was last seen 1/17/19 for asthma flare.    Pt explains that sinus symptoms seemed to have begun after being seen for asthma flare 1/17/19.  Sinus congestion is increasing and it is causing her asthma to flare.   Pt has sinus congestion for 2-3 weeks now.    Denies fever.  Denies headache or vision changes.    Pt made appt to evaluate for sinus infection on 3/7.    1.  Pt asks for inhaler in the meantime?  She has an old albuterol inhaler and it is nearly out of doses.  2.  Pt asks for recommendation of a decongestant that she can safely take with hypertension?    Heike Morales RN

## 2019-03-06 NOTE — TELEPHONE ENCOUNTER
Covering for provider- albuterol refilled.  Would recommend trying Coricidin HBP products for congestion, specifically made for people with high blood pressure.  Thanks.    Diony Ochoa MD

## 2019-03-07 ENCOUNTER — OFFICE VISIT (OUTPATIENT)
Dept: FAMILY MEDICINE | Facility: CLINIC | Age: 54
End: 2019-03-07
Payer: COMMERCIAL

## 2019-03-07 VITALS
OXYGEN SATURATION: 97 % | DIASTOLIC BLOOD PRESSURE: 84 MMHG | WEIGHT: 142 LBS | HEART RATE: 82 BPM | BODY MASS INDEX: 25.38 KG/M2 | RESPIRATION RATE: 16 BRPM | SYSTOLIC BLOOD PRESSURE: 152 MMHG | TEMPERATURE: 97.8 F

## 2019-03-07 DIAGNOSIS — J45.20 MILD INTERMITTENT ASTHMA WITHOUT COMPLICATION: Primary | ICD-10-CM

## 2019-03-07 DIAGNOSIS — J40 BRONCHITIS: ICD-10-CM

## 2019-03-07 DIAGNOSIS — R03.0 ELEVATED BLOOD PRESSURE READING WITHOUT DIAGNOSIS OF HYPERTENSION: ICD-10-CM

## 2019-03-07 DIAGNOSIS — Z72.0 TOBACCO USE: ICD-10-CM

## 2019-03-07 DIAGNOSIS — J01.90 ACUTE NON-RECURRENT SINUSITIS, UNSPECIFIED LOCATION: ICD-10-CM

## 2019-03-07 PROCEDURE — 99214 OFFICE O/P EST MOD 30 MIN: CPT | Performed by: NURSE PRACTITIONER

## 2019-03-07 RX ORDER — AZITHROMYCIN 250 MG/1
TABLET, FILM COATED ORAL
Qty: 6 TABLET | Refills: 0 | Status: SHIPPED | OUTPATIENT
Start: 2019-03-07 | End: 2019-07-25

## 2019-03-07 RX ORDER — CODEINE PHOSPHATE/GUAIFENESIN 10-100MG/5
5 LIQUID (ML) ORAL EVERY 4 HOURS PRN
Qty: 180 ML | Refills: 0 | Status: SHIPPED | OUTPATIENT
Start: 2019-03-07 | End: 2019-07-25

## 2019-03-07 NOTE — PATIENT INSTRUCTIONS
Thank you for choosing [facility].  You may be receiving a survey in the mail from John Palencia regarding your visit today.  Please take a few minutes to complete and return the survey to let us know how we are doing.      If you have questions or concerns, please contact us via [employer] or you can contact your care team at [phone].    Our Clinic hours are:  Monday 6:40 am  to 7:00 pm  Tuesday -Friday 6:40 am to 5:00 pm    The [place] outpatient lab hours are:  Monday - Friday 6:10 am to 4:45 pm  Saturdays 7:00 am to 11:00 am  Appointments are required, call [phone]    If you have clinical questions after hours or would like to schedule an appointment,  call the clinic at [phone].

## 2019-03-07 NOTE — NURSING NOTE
"Initial /84   Pulse 82   Temp 97.8  F (36.6  C) (Tympanic)   Resp 16   Wt 64.4 kg (142 lb)   SpO2 97%   BMI 25.38 kg/m   Estimated body mass index is 25.38 kg/m  as calculated from the following:    Height as of 2/21/19: 1.593 m (5' 2.72\").    Weight as of this encounter: 64.4 kg (142 lb). .    Darlene Rodriguez    "

## 2019-03-07 NOTE — PROGRESS NOTES
SUBJECTIVE:   Carolina Hernandez is a 53 year old female who presents to clinic today for the following health issues:      ENT Symptoms             Symptoms: cc Present Absent Comment   Fever/Chills  x  Chills and sweats   Fatigue   x    Muscle Aches   x    Eye Irritation   x    Sneezing   x    Nasal Magan/Drg  x     Sinus Pressure/Pain  x     Loss of smell  x     Dental pain  x     Sore Throat  x  From coughing and drainage   Swollen Glands   x    Ear Pain/Fullness   x    Cough  x  Productive cough   Wheeze  x     Chest Pain   x    Shortness of breath  x     Rash   x    Other   x      Symptom duration:  3 days   Symptom severity:  moderate   Treatments tried:  OTC   Contacts:  none     Patient with history of asthma and tobacco use.   Sinus feel plugged-   Blood pressure noted to be elevated- patient concerned about rising blood pressure.      -------------------------------------    Problem list and histories reviewed & adjusted, as indicated.  Additional history: as documented    Patient Active Problem List   Diagnosis     CARDIOVASCULAR SCREENING; LDL GOAL LESS THAN 160     Recurrent major depressive disorder, in remission (H)     Encounter for tobacco use cessation counseling     Mild intermittent asthma without complication     No past surgical history on file.    Social History     Tobacco Use     Smoking status: Current Every Day Smoker     Packs/day: 0.50     Smokeless tobacco: Never Used   Substance Use Topics     Alcohol use: Yes     Comment: 12 pack weekly     Family History   Problem Relation Age of Onset     Glaucoma Father      Coronary Artery Disease Father         stents     Cancer Maternal Grandfather      Coronary Artery Disease Paternal Grandfather      Schizophrenia Daughter      Diabetes Daughter      Cancer Daughter            Reviewed and updated as needed this visit by clinical staff       Reviewed and updated as needed this visit by Provider         ROS:  Constitutional, HEENT,  cardiovascular, pulmonary, GI, , musculoskeletal, neuro, skin, endocrine and psych systems are negative, except as otherwise noted.    OBJECTIVE:     /84   Pulse 82   Temp 97.8  F (36.6  C) (Tympanic)   Resp 16   Wt 64.4 kg (142 lb)   SpO2 97%   BMI 25.38 kg/m    Body mass index is 25.38 kg/m .  GENERAL: healthy, alert, no distress and fatigued  HENT: normal cephalic/atraumatic, ear canals and TM's normal, nose and mouth without ulcers or lesions, oropharynx clear, oral mucous membranes moist and sinuses: maxillary, frontal tenderness on bilateral  NECK: no adenopathy, no asymmetry, masses, or scars and thyroid normal to palpation  RESP: lungs clear to auscultation - no rales, rhonchi or wheezes  CV: regular rate and rhythm, normal S1 S2, no S3 or S4, no murmur, click or rub, no peripheral edema and peripheral pulses strong  MS: no gross musculoskeletal defects noted, no edema    Diagnostic Test Results:  none     ASSESSMENT/PLAN:       1. Acute non-recurrent sinusitis, unspecified location  - patient is a smoker increasing risk of pulmonary infections  - azithromycin (ZITHROMAX) 250 MG tablet; Two tablets first day, then one tablet daily for four days.  Dispense: 6 tablet; Refill: 0    2. Bronchitis  patient is a smoker increasing risk of pulmonary infections  - guaiFENesin-codeine (GUAIFENESIN AC) 100-10 MG/5ML syrup; Take 5 mLs by mouth every 4 hours as needed for congestion  Dispense: 180 mL; Refill: 0  - azithromycin (ZITHROMAX) 250 MG tablet; Two tablets first day, then one tablet daily for four days.  Dispense: 6 tablet; Refill: 0    3. Mild intermittent asthma without complication  Continue to use Advair and albuterol     4. Tobacco use  Encouraged patient to quit smoking    5. Elevated blood pressure reading without diagnosis of hypertension  Schedule RN visit once you are feeling better to recheck blood pressure - avoid smoking will reduce blood pressure           Christine Kc, APRN  [facility]

## 2019-03-14 ENCOUNTER — TELEPHONE (OUTPATIENT)
Dept: FAMILY MEDICINE | Facility: CLINIC | Age: 54
End: 2019-03-14

## 2019-03-15 NOTE — TELEPHONE ENCOUNTER
[facility] for bronchitis completed and routed to CHANDRIKA Kc for review and signature.

## 2019-04-01 NOTE — TELEPHONE ENCOUNTER
Patient dropped off [employer] RTW and FMLA form.  I have completed Return to work form and attached it to our copy of recently completed FMLA form.  These have been routed to CHANDRIKA Kc for review and signature.

## 2019-04-03 DIAGNOSIS — J45.20 MILD INTERMITTENT ASTHMA WITHOUT COMPLICATION: ICD-10-CM

## 2019-04-03 NOTE — TELEPHONE ENCOUNTER
"Requested Prescriptions   Pending Prescriptions Disp Refills     VENTOLIN  (90 Base) MCG/ACT inhaler [Pharmacy Med Name: Ventolin HFA Inhalation Aerosol Solution 108 (90 Base) MCG/ACT] 18 g 0     Sig: Inhale 2 puffs into the lungs every 6 hours as needed for shortness of breath / dyspnea or wheezing    Asthma Maintenance Inhalers - Anticholinergics Failed - 4/3/2019 11:23 AM       Failed - Asthma control assessment score within normal limits in last 6 months    Please review ACT score.          Passed - Patient is age 12 years or older       Passed - Medication is active on med list       Passed - Recent (6 mo) or future (30 days) visit within the authorizing provider's specialty    Patient had office visit in the last 6 months or has a visit in the next 30 days with authorizing provider or within the authorizing provider's specialty.  See \"Patient Info\" tab in inbasket, or \"Choose Columns\" in Meds & Orders section of the refill encounter.            Last Written Prescription Date:  3/5/19  Last Fill Quantity: 1,  # refills: 0   Last office visit: 3/7/2019 with prescribing provider:  Nino   Future Office Visit:      "

## 2019-04-05 RX ORDER — ALBUTEROL SULFATE 90 UG/1
AEROSOL, METERED RESPIRATORY (INHALATION)
Qty: 18 G | Refills: 0 | Status: SHIPPED | OUTPATIENT
Start: 2019-04-05 | End: 2019-05-07

## 2019-04-08 DIAGNOSIS — J45.20 MILD INTERMITTENT ASTHMA WITHOUT COMPLICATION: Primary | ICD-10-CM

## 2019-04-08 NOTE — TELEPHONE ENCOUNTER
"Requested Prescriptions   Pending Prescriptions Disp Refills     fluticasone-salmeterol (ADVAIR) 500-50 MCG/DOSE inhaler       Sig: Inhale 1 puff into the lungs every 12 hours   Last Written Prescription Date:  historical  Last Fill Quantity: 0,  # refills: 0   Last office visit: 3/7/2019 with prescribing provider:  SYLVIE Kc   Future Office Visit:        Inhaled Steroids Protocol Failed - 4/8/2019 10:04 AM        Failed - Asthma control assessment score within normal limits in last 6 months     Please review ACT score.           Passed - Patient is age 12 or older        Passed - Medication is active on med list        Passed - Recent (6 mo) or future (30 days) visit within the authorizing provider's specialty     Patient had office visit in the last 6 months or has a visit in the next 30 days with authorizing provider or within the authorizing provider's specialty.  See \"Patient Info\" tab in inbasket, or \"Choose Columns\" in Meds & Orders section of the refill encounter.              "

## 2019-04-08 NOTE — TELEPHONE ENCOUNTER
Routing refill request to provider for review/approval because:  Medication is reported/historical    Tamika Waggoner RN

## 2019-04-12 NOTE — TELEPHONE ENCOUNTER
Patient calling stating is needing her Advair refilled, she previously had Dr. Sade Hung prescribing but has now established care with Dr. Oneill and is requesting a refill.   Patient reports she is out and does need it.     Per 3/7/19 dictation from Christine Kc:  3. Mild intermittent asthma without complication  Continue to use Advair and albuterol    Medication pended.    Provider please review and advise. Thank you.

## 2019-05-07 DIAGNOSIS — J45.20 MILD INTERMITTENT ASTHMA WITHOUT COMPLICATION: ICD-10-CM

## 2019-05-07 NOTE — TELEPHONE ENCOUNTER
"Requested Prescriptions   Pending Prescriptions Disp Refills     VENTOLIN  (90 Base) MCG/ACT inhaler [Pharmacy Med Name: Ventolin HFA Inhalation Aerosol Solution 108 (90 Base) MCG/ACT] 18 g 0     Sig: Inhale 2 puffs into the lungs every 6 hours as needed for shortness of breath / dyspnea or wheezing   Last Written Prescription Date:  4/5/19  Last Fill Quantity: 18g,  # refills: 0   Last office visit: 3/7/2019 with prescribing provider:  SYLVIE Kc   Future Office Visit:        Asthma Maintenance Inhalers - Anticholinergics Failed - 5/7/2019  9:59 AM        Failed - Asthma control assessment score within normal limits in last 6 months     Please review ACT score.           Passed - Patient is age 12 years or older        Passed - Medication is active on med list        Passed - Recent (6 mo) or future (30 days) visit within the authorizing provider's specialty     Patient had office visit in the last 6 months or has a visit in the next 30 days with authorizing provider or within the authorizing provider's specialty.  See \"Patient Info\" tab in inbasket, or \"Choose Columns\" in Meds & Orders section of the refill encounter.              "

## 2019-05-08 RX ORDER — ALBUTEROL SULFATE 90 UG/1
AEROSOL, METERED RESPIRATORY (INHALATION)
Qty: 18 G | Refills: 1 | Status: SHIPPED | OUTPATIENT
Start: 2019-05-08 | End: 2019-07-02

## 2019-07-02 DIAGNOSIS — J45.20 MILD INTERMITTENT ASTHMA WITHOUT COMPLICATION: ICD-10-CM

## 2019-07-02 NOTE — TELEPHONE ENCOUNTER
"Requested Prescriptions   Pending Prescriptions Disp Refills     VENTOLIN  (90 Base) MCG/ACT inhaler [Pharmacy Med Name: Ventolin HFA Inhalation Aerosol Solution 108 (90 Base) MCG/ACT] 18 g 0     Sig: Inhale 2 puffs into the lungs every 6 hours as needed for shortness of breath / dyspnea or wheezing.   Last Written Prescription Date:  historical  Last Fill Quantity: 0,  # refills: 0   Last office visit: 3/7/2019 with prescribing provider:  SYLVIE Kc   Future Office Visit:        Asthma Maintenance Inhalers - Anticholinergics Failed - 7/2/2019  9:56 AM        Failed - Asthma control assessment score within normal limits in last 6 months     Please review ACT score.           Passed - Patient is age 12 years or older        Passed - Medication is active on med list        Passed - Recent (6 mo) or future (30 days) visit within the authorizing provider's specialty     Patient had office visit in the last 6 months or has a visit in the next 30 days with authorizing provider or within the authorizing provider's specialty.  See \"Patient Info\" tab in inbasket, or \"Choose Columns\" in Meds & Orders section of the refill encounter.              "

## 2019-07-05 NOTE — TELEPHONE ENCOUNTER
Routing refill request to provider for review/approval because:  ACT not at goal.     ACT Total Scores 2/14/2019   ACT TOTAL SCORE (Goal Greater than or Equal to 20) 10   In the past 12 months, how many times did you visit the emergency room for your asthma without being admitted to the hospital? 0   In the past 12 months, how many times were you hospitalized overnight because of your asthma? 0

## 2019-07-06 RX ORDER — ALBUTEROL SULFATE 90 UG/1
AEROSOL, METERED RESPIRATORY (INHALATION)
Qty: 18 G | Refills: 6 | Status: SHIPPED | OUTPATIENT
Start: 2019-07-06 | End: 2019-10-03

## 2019-07-25 ENCOUNTER — TELEPHONE (OUTPATIENT)
Dept: FAMILY MEDICINE | Facility: CLINIC | Age: 54
End: 2019-07-25

## 2019-07-25 ENCOUNTER — ALLIED HEALTH/NURSE VISIT (OUTPATIENT)
Dept: FAMILY MEDICINE | Facility: CLINIC | Age: 54
End: 2019-07-25
Payer: MEDICAID

## 2019-07-25 VITALS — SYSTOLIC BLOOD PRESSURE: 154 MMHG | DIASTOLIC BLOOD PRESSURE: 88 MMHG | HEART RATE: 80 BPM

## 2019-07-25 DIAGNOSIS — Z01.30 BP CHECK: Primary | ICD-10-CM

## 2019-07-25 PROCEDURE — 99207 ZZC NO CHARGE NURSE ONLY: CPT

## 2019-07-25 NOTE — NURSING NOTE
Carolina Hernandez is a 54 year old year old patient who comes in today for a Blood Pressure check because of ongoing blood pressure monitoring.  Vital Signs as repeated by RN Twice    Current complaints: cough, chronic due to smoking, productive, and headaches for the last 2 weeks, daily but not constant, treating with Ibuprofen 800 mg and will lessen headaches but not resolve headaches.     Vital Signs 7/25/2019 7/25/2019   Systolic 162 154   Diastolic 94 88   Pulse 80        Disposition:  Routed to provider in a telephone encounter today.

## 2019-07-25 NOTE — TELEPHONE ENCOUNTER
S-(situation): BP check, presented today due to headaches x 2 weeks and Hx of HTN    B-(background): last Office visit 3/7/19, BP: 152/84    A-(assessment): Carolina Hernandez is a 54 year old year old patient who comes in today for a Blood Pressure check because of ongoing blood pressure monitoring.  Vital Signs as repeated by RN Twice    Current complaints: cough, chronic patient reports due to smoking, productive, and headaches for the last 2 weeks, daily but not constant, treating with Ibuprofen 800 mg and will lessen headaches but not resolve headaches.     Vital Signs 7/25/2019 7/25/2019   Systolic 162 154   Diastolic 94 88   Pulse 80      [facility]     [phone]    R-(recommendations): Provider please review and advise. Thank you.

## 2019-07-25 NOTE — TELEPHONE ENCOUNTER
Patient informed of message below from provider.  Scheduled Office visit with Dr. Licona tomorrow at 1000 to discuss.

## 2019-07-26 ENCOUNTER — OFFICE VISIT (OUTPATIENT)
Dept: FAMILY MEDICINE | Facility: CLINIC | Age: 54
End: 2019-07-26
Payer: MEDICAID

## 2019-07-26 VITALS
WEIGHT: 146.4 LBS | TEMPERATURE: 97.9 F | BODY MASS INDEX: 26.17 KG/M2 | DIASTOLIC BLOOD PRESSURE: 88 MMHG | SYSTOLIC BLOOD PRESSURE: 156 MMHG | HEART RATE: 80 BPM | RESPIRATION RATE: 16 BRPM

## 2019-07-26 DIAGNOSIS — F33.40 RECURRENT MAJOR DEPRESSIVE DISORDER, IN REMISSION (H): ICD-10-CM

## 2019-07-26 DIAGNOSIS — Z71.6 ENCOUNTER FOR TOBACCO USE CESSATION COUNSELING: ICD-10-CM

## 2019-07-26 DIAGNOSIS — J45.41 MODERATE PERSISTENT ASTHMA WITH EXACERBATION: ICD-10-CM

## 2019-07-26 DIAGNOSIS — I10 ESSENTIAL HYPERTENSION: Primary | ICD-10-CM

## 2019-07-26 DIAGNOSIS — H53.8 BLURRED VISION: ICD-10-CM

## 2019-07-26 LAB
ANION GAP SERPL CALCULATED.3IONS-SCNC: 7 MMOL/L (ref 3–14)
BUN SERPL-MCNC: 17 MG/DL (ref 7–30)
CALCIUM SERPL-MCNC: 9.3 MG/DL (ref 8.5–10.1)
CHLORIDE SERPL-SCNC: 101 MMOL/L (ref 94–109)
CO2 SERPL-SCNC: 25 MMOL/L (ref 20–32)
CREAT SERPL-MCNC: 0.73 MG/DL (ref 0.52–1.04)
ERYTHROCYTE [DISTWIDTH] IN BLOOD BY AUTOMATED COUNT: 13 % (ref 10–15)
GFR SERPL CREATININE-BSD FRML MDRD: >90 ML/MIN/{1.73_M2}
GLUCOSE SERPL-MCNC: 84 MG/DL (ref 70–99)
HCT VFR BLD AUTO: 42.2 % (ref 35–47)
HGB BLD-MCNC: 14.5 G/DL (ref 11.7–15.7)
MCH RBC QN AUTO: 32 PG (ref 26.5–33)
MCHC RBC AUTO-ENTMCNC: 34.4 G/DL (ref 31.5–36.5)
MCV RBC AUTO: 93 FL (ref 78–100)
PLATELET # BLD AUTO: 329 10E9/L (ref 150–450)
POTASSIUM SERPL-SCNC: 3.9 MMOL/L (ref 3.4–5.3)
RBC # BLD AUTO: 4.53 10E12/L (ref 3.8–5.2)
SODIUM SERPL-SCNC: 133 MMOL/L (ref 133–144)
WBC # BLD AUTO: 7.4 10E9/L (ref 4–11)

## 2019-07-26 PROCEDURE — 85027 COMPLETE CBC AUTOMATED: CPT | Performed by: INTERNAL MEDICINE

## 2019-07-26 PROCEDURE — 99214 OFFICE O/P EST MOD 30 MIN: CPT | Performed by: INTERNAL MEDICINE

## 2019-07-26 PROCEDURE — 80048 BASIC METABOLIC PNL TOTAL CA: CPT | Performed by: INTERNAL MEDICINE

## 2019-07-26 PROCEDURE — 36415 COLL VENOUS BLD VENIPUNCTURE: CPT | Performed by: INTERNAL MEDICINE

## 2019-07-26 RX ORDER — LOSARTAN POTASSIUM AND HYDROCHLOROTHIAZIDE 12.5; 5 MG/1; MG/1
1 TABLET ORAL DAILY
Qty: 90 TABLET | Refills: 3 | Status: SHIPPED | OUTPATIENT
Start: 2019-07-26 | End: 2019-08-06

## 2019-07-26 RX ORDER — PREDNISONE 20 MG/1
20 TABLET ORAL DAILY
Qty: 5 TABLET | Refills: 0 | Status: SHIPPED | OUTPATIENT
Start: 2019-07-26 | End: 2019-08-06

## 2019-07-26 ASSESSMENT — ANXIETY QUESTIONNAIRES
7. FEELING AFRAID AS IF SOMETHING AWFUL MIGHT HAPPEN: SEVERAL DAYS
6. BECOMING EASILY ANNOYED OR IRRITABLE: SEVERAL DAYS
1. FEELING NERVOUS, ANXIOUS, OR ON EDGE: SEVERAL DAYS
2. NOT BEING ABLE TO STOP OR CONTROL WORRYING: SEVERAL DAYS
5. BEING SO RESTLESS THAT IT IS HARD TO SIT STILL: SEVERAL DAYS
GAD7 TOTAL SCORE: 7
3. WORRYING TOO MUCH ABOUT DIFFERENT THINGS: SEVERAL DAYS

## 2019-07-26 ASSESSMENT — PAIN SCALES - GENERAL: PAINLEVEL: NO PAIN (0)

## 2019-07-26 ASSESSMENT — PATIENT HEALTH QUESTIONNAIRE - PHQ9
5. POOR APPETITE OR OVEREATING: SEVERAL DAYS
SUM OF ALL RESPONSES TO PHQ QUESTIONS 1-9: 4

## 2019-07-26 NOTE — LETTER
My Asthma Action Plan  Name: Carolina Hernandez   YOB: 1965  Date: 7/26/2019   My doctor: Christine Licona, DO   My clinic: [facility] [place]        My Control Medicine: Advair  My Rescue Medicine: Albuterol   My Asthma Severity: mild intermittent  Avoid your asthma triggers:                GREEN ZONE   Good Control    I feel good    No cough or wheeze    Can work, sleep and play without asthma symptoms       Take your asthma control medicine every day.     1. If exercise triggers your asthma, take your rescue medication    15 minutes before exercise or sports, and    During exercise if you have asthma symptoms  2. Spacer to use with inhaler: If you have a spacer, make sure to use it with your inhaler             YELLOW ZONE Getting Worse  I have ANY of these:    I do not feel good    Cough or wheeze    Chest feels tight    Wake up at night   1. Keep taking your Green Zone medications  2. Start taking your rescue medicine:    every 20 minutes for up to 1 hour. Then every 4 hours for 24-48 hours.  3. If you stay in the Yellow Zone for more than 12-24 hours, contact your doctor.  4. If you do not return to the Green Zone in 12-24 hours or you get worse, start taking your oral steroid medicine if prescribed by your provider.           RED ZONE Medical Alert - Get Help  I have ANY of these:    I feel awful    Medicine is not helping    Breathing getting harder    Trouble walking or talking    Nose opens wide to breathe       1. Take your rescue medicine NOW  2. If your provider has prescribed an oral steroid medicine, start taking it NOW  3. Call your doctor NOW  4. If you are still in the Red Zone after 20 minutes and you have not reached your doctor:    Take your rescue medicine again and    Call 911 or go to the emergency room right away    See your regular doctor within 2 weeks of an Emergency Room or Urgent Care visit for follow-up treatment.          Annual Reminders:   Meet with Asthma Educator,  Flu Shot in the Fall, consider Pneumonia Vaccination for patients with asthma (aged 19 and older).    Pharmacy: [id number] - [place], [place] - [address] [place] [address]                      Asthma Triggers  How To Control Things That Make Your Asthma Worse    Triggers are things that make your asthma worse.  Look at the list below to help you find your triggers and what you can do about them.  You can help prevent asthma flare-ups by staying away from your triggers.      Trigger                                                          What you can do   Cigarette Smoke  Tobacco smoke can make asthma worse. Do not allow smoking in your home, car or around you.  Be sure no one smokes at a child s day care or school.  If you smoke, ask your health care provider for ways to help you quit.  Ask family members to quit too.  Ask your health care provider for a referral to Quit Plan to help you quit smoking, or call 5-886-931-PLAN.     Colds, Flu, Bronchitis  These are common triggers of asthma. Wash your hands often.  Don t touch your eyes, nose or mouth.  Get a flu shot every year.     Dust Mites  These are tiny bugs that live in cloth or carpet. They are too small to see. Wash sheets and blankets in hot water every week.   Encase pillows and mattress in dust mite proof covers.  Avoid having carpet if you can. If you have carpet, vacuum weekly.   Use a dust mask and HEPA vacuum.   Pollen and Outdoor Mold  Some people are allergic to trees, grass, or weed pollen, or molds. Try to keep your windows closed.  Limit time out doors when pollen count is high.   Ask you health care provider about taking medicine during allergy season.     Animal Dander  Some people are allergic to skin flakes, urine or saliva from pets with fur or feathers. Keep pets with fur or feathers out of your home.    If you can t keep the pet outdoors, then keep the pet out of your bedroom.  Keep the bedroom door  closed.  Keep pets off cloth furniture and away from stuffed toys.     Mice, Rats, and Cockroaches  Some people are allergic to the waste from these pests.   Cover food and garbage.  Clean up spills and food crumbs.  Store grease in the refrigerator.   Keep food out of the bedroom.   Indoor Mold  This can be a trigger if your home has high moisture. Fix leaking faucets, pipes, or other sources of water.   Clean moldy surfaces.  Dehumidify basement if it is damp and smelly.   Smoke, Strong Odors, and Sprays  These can reduce air quality. Stay away from strong odors and sprays, such as perfume, powder, hair spray, paints, smoke incense, paint, cleaning products, candles and new carpet.   Exercise or Sports  Some people with asthma have this trigger. Be active!  Ask your doctor about taking medicine before sports or exercise to prevent symptoms.    Warm up for 5-10 minutes before and after sports or exercise.     Other Triggers of Asthma  Cold air:  Cover your nose and mouth with a scarf.  Sometimes laughing or crying can be a trigger.  Some medicines and food can trigger asthma.

## 2019-07-26 NOTE — LETTER
July 30, 2019      Carolina Hernandez  [address] [place] [id number]        Dear ,    We are writing to inform you of your test results.    Kidney function, blood sugar, electrolytes and blood count are normal.     Resulted Orders   Basic metabolic panel   Result Value Ref Range    Sodium 133 133 - 144 mmol/L    Potassium 3.9 3.4 - 5.3 mmol/L    Chloride 101 94 - 109 mmol/L    Carbon Dioxide 25 20 - 32 mmol/L    Anion Gap 7 3 - 14 mmol/L    Glucose 84 70 - 99 mg/dL    Urea Nitrogen 17 7 - 30 mg/dL    Creatinine 0.73 0.52 - 1.04 mg/dL    GFR Estimate >90 >60 mL/min/[1.73_m2]      Comment:      Non  GFR Calc  Starting 12/18/2018, serum creatinine based estimated GFR (eGFR) will be   calculated using the Chronic Kidney Disease Epidemiology Collaboration   (CKD-EPI) equation.      GFR Estimate If Black >90 >60 mL/min/[1.73_m2]      Comment:       GFR Calc  Starting 12/18/2018, serum creatinine based estimated GFR (eGFR) will be   calculated using the Chronic Kidney Disease Epidemiology Collaboration   (CKD-EPI) equation.      Calcium 9.3 8.5 - 10.1 mg/dL   CBC with platelets   Result Value Ref Range    WBC 7.4 4.0 - 11.0 10e9/L    RBC Count 4.53 3.8 - 5.2 10e12/L    Hemoglobin 14.5 11.7 - 15.7 g/dL    Hematocrit 42.2 35.0 - 47.0 %    MCV 93 78 - 100 fl    MCH 32.0 26.5 - 33.0 pg    MCHC 34.4 31.5 - 36.5 g/dL    RDW 13.0 10.0 - 15.0 %    Platelet Count 329 150 - 450 10e9/L       If you have any questions or concerns, please call the clinic at the number listed above.       Sincerely,        Christine Licona, DO

## 2019-07-26 NOTE — LETTER
My Depression Action Plan  Name: Carolina Hernnadez   Date of Birth 1965  Date: 7/26/2019    My doctor: Julia Oneill   [place]: [facility] [place]  [address] [facility] [id number]  [phone]          GREEN    ZONE   Good Control    What it looks like:     Things are going generally well. You have normal up s and down s. You may even feel depressed from time to time, but bad moods usually last less than a day.   What you need to do:  1. Continue to care for yourself (see self care plan)  2. Check your depression survival kit and update it as needed  3. Follow your physician s recommendations including any medication.  4. Do not stop taking medication unless you consult with your physician first.           YELLOW         ZONE Getting Worse    What it looks like:     Depression is starting to interfere with your life.     It may be hard to get out of bed; you may be starting to isolate yourself from others.    Symptoms of depression are starting to last most all day and this has happened for several days.     You may have suicidal thoughts but they are not constant.   What you need to do:     1. Call your care team, your response to treatment will improve if you keep your care team informed of your progress. Yellow periods are signs an adjustment may need to be made.     2. Continue your self-care, even if you have to fake it!    3. Talk to someone in your support network    4. Open up your depression survival kit           RED    ZONE Medical Alert - Get Help    What it looks like:     Depression is seriously interfering with your life.     You may experience these or other symptoms: You can t get out of bed most days, can t work or engage in other necessary activities, you have trouble taking care of basic hygiene, or basic responsibilities, thoughts of suicide or death that will not go away, self-injurious behavior.     What you need to  do:  1. Call your care team and request a same-day appointment. If they are not available (weekends or after hours) call your local crisis line, emergency room or 911.            Depression Self Care Plan / Survival Kit    Self-Care for Depression  Here s the deal. Your body and mind are really not as separate as most people think.  What you do and think affects how you feel and how you feel influences what you do and think. This means if you do things that people who feel good do, it will help you feel better.  Sometimes this is all it takes.  There is also a place for medication and therapy depending on how severe your depression is, so be sure to consult with your medical provider and/ or Behavioral Health Consultant if your symptoms are worsening or not improving.     In order to better manage my stress, I will:    Exercise  Get some form of exercise, every day. This will help reduce pain and release endorphins, the  feel good  chemicals in your brain. This is almost as good as taking antidepressants!  This is not the same as joining a gym and then never going! (they count on that by the way ) It can be as simple as just going for a walk or doing some gardening, anything that will get you moving.      Hygiene   Maintain good hygiene (Get out of bed in the morning, Make your bed, Brush your teeth, Take a shower, and Get dressed like you were going to work, even if you are unemployed).  If your clothes don't fit try to get ones that do.    Diet  I will strive to eat foods that are good for me, drink plenty of water, and avoid excessive sugar, caffeine, alcohol, and other mood-altering substances.  Some foods that are helpful in depression are: complex carbohydrates, B vitamins, flaxseed, fish or fish oil, fresh fruits and vegetables.    Psychotherapy  I agree to participate in Individual Therapy (if recommended).    Medication  If prescribed medications, I agree to take them.  Missing doses can result in serious  side effects.  I understand that drinking alcohol, or other illicit drug use, may cause potential side effects.  I will not stop my medication abruptly without first discussing it with my provider.    Staying Connected With Others  I will stay in touch with my friends, family members, and my primary care provider/team.    Use your imagination  Be creative.  We all have a creative side; it doesn t matter if it s oil painting, sand castles, or mud pies! This will also kick up the endorphins.    Witness Beauty  (AKA stop and smell the roses) Take a look outside, even in mid-winter. Notice colors, textures. Watch the squirrels and birds.     Service to others  Be of service to others.  There is always someone else in need.  By helping others we can  get out of ourselves  and remember the really important things.  This also provides opportunities for practicing all the other parts of the program.    Humor  Laugh and be silly!  Adjust your TV habits for less news and crime-drama and more comedy.    Control your stress  Try breathing deep, massage therapy, biofeedback, and meditation. Find time to relax each day.     My support system    Clinic Contact:  Phone number:    Contact 1:  Phone number:    Contact 2:  Phone number:    Islam/:  Phone number:    Therapist:  Phone number:    Local crisis center:    Phone number:    Other community support:  Phone number:

## 2019-07-26 NOTE — PATIENT INSTRUCTIONS
"1. Recommend eye evaluation  2. Because of the wheezing, continue to use the albuterol nebulizer and inhaler.  If symptoms worsen, or are not nearly gone by early next week, start the prednisone.  3. Reduce caffeine, alcohol, tobacco use.  This will lower blood pressure from 5-10 pts.  Increasing exercise, low salt diet will also help blood pressure.  4. Blood work today  5. RN blood pressure check in 2 weeks along with non-fasting blood work to check electrolytes and kidney function  6. Start losartan once daily in AM.  If any side effects let Dr. Licona or Dr. Oneill know.      I am referring you to [facility] [place] for hypnosis; please make your initial appointment for 1 hour.  The phone number is [phone] or you can schedule at the  on your way out.      How to quit smoking:    Know your reasons for quitting! Remind yourself of these reasons when you struggle with the urge to smoking. Post these reasons in a visible place such as a post-it note on your mirror in the bathroom or on the dash of your car.    Pick a quit date. Set yourself up for success by planning how to be successful. Have a strategy, including having others hold you accountable to your plans to quit.    Tell others you are quitting. Have people hold you accountable -- ask them to challenge you to quit smoking if they see you picking up a cigarette. Don't have them turn a \"blind eye\". The best people to hold you accountable are those who genuinely care about you and are around you frequently.    Remove triggers for smoking. Don't hang out with other smokers, or alternatively band them with you in attempt to quit. If you always smoke while in the car, have a strategy in place to deal with the smoking urge that is likely triggered by being in a car.     Throw away all smoking gear -- cigs, lighters, cristobal trays. Don't put them in the basement for use \"in case you fail to quit smoking\". No -- make " it harder for yourself to start smoking again.    Set up a reward for yourself. This should be a non-food reward. Set aside the money that you are saving by not buying cigarettes, and have fun with it if you have not smoked for 3 months (or whatever your goal is -- it might be longer).     When you feel urge to smoke, set yourself up for success. Remove access to cigarettes -- go for walk without wallet, call a support person, delay acting on urge for 15-30 minutes or more, remind yourself of your motivations for quitting. Ok to use nicotine gum or other nicotine replacement.     Have a replacement activity available for your hands or mouth. You might chew gum (nicotine free or otherwise) or chew toothpick. You might draw or do other activity with hands.     Be gentle with yourself -- if you start smoking, just pick yourself up again and quit again. If you smoke a few cigarettes some day, it doesn't mean the day is a failure (or that you should just smoke all you want on that day). Each cigarette that you don't smoke is a success, and so fight for each success.     If you don't manage to quit smoking on this try, try again!                 Patient Education     Low-Salt Diet  This diet removes foods that are high in salt. It also limits the amount of salt you use when cooking. It is most often used for people with high blood pressure, edema (fluid retention), and kidney, liver, or heart disease.  Table salt contains the mineral sodium. Your body needs sodium to work normally. But too much sodium can make your health problems worse. Your healthcare provider is recommending a low-salt (also called low-sodium) diet for you. Your total daily allowance of salt is 1,500 to 2,300 milligrams (mg). It is less than 1 teaspoon of table salt. This means you can have only about 500 to 700 mg of sodium at each meal. People with certain health problems should limit salt intake to the lower end of the recommended range.    When you  cook, don t add much salt. If you can cook without using salt, even better. Don t add salt to your food at the table.  When shopping, read food labels. Salt is often called sodium on the label. Choose foods that are salt-free, low salt, or very low salt. Note that foods with reduced salt may not lower your salt intake enough.    Beans, potatoes, and pasta  Ok: Dry beans, split peas, lentils, potatoes, rice, macaroni, pasta, spaghetti without added salt  Avoid: Potato chips, tortilla chips, and similar products  Breads and cereals  Ok: Low-sodium breads, rolls, cereals, and cakes; low-salt crackers, matzo crackers  Avoid: Salted crackers, pretzels, popcorn, Chilean toast, pancakes, muffins  Dairy  Ok: Milk, chocolate milk, hot chocolate mix, low-salt cheeses, and yogurt  Avoid: Processed cheese and cheese spreads; Roquefort, Camembert, and cottage cheese; buttermilk, instant breakfast drink  Desserts  Ok: Ice cream, frozen yogurt, juice bars, gelatin, cookies and pies, sugar, honey, jelly, hard candy  Avoid: Most pies, cakes and cookies prepared or processed with salt; instant pudding  Drinks  Ok: Tea, coffee, fizzy (carbonated) drinks, juices  Avoid: Flavored coffees, electrolyte replacement drinks, sports drinks  Meats  Ok: All fresh meat, fish, poultry, low-salt tuna, eggs, egg substitute  Avoid: Smoked, pickled, brine-cured, or salted meats and fish. This includes de souza, chipped beef, corned beef, hot dogs, deli meats, ham, kosher meats, salt pork, sausage, canned tuna, salted codfish, smoked salmon, herring, sardines, or anchovies.  Seasonings and spices  Ok: Most seasonings are okay. Good substitutes for salt include: fresh herb blends, hot sauce, lemon, garlic, velasco, vinegar, dry mustard, parsley, cilantro, horseradish, tomato paste, regular margarine, mayonnaise, unsalted butter, cream cheese, vegetable oil, cream, low-salt salad dressing and gravy.  Avoid: Regular ketchup, relishes, pickles, soy sauce,  poolki sauce, [facility] sauce, BBQ sauce, tartar sauce, meat tenderizer, chili sauce, regular gravy, regular salad dressing, salted butter  Soups  Ok: Low-salt soups and broths made with allowed foods  Avoid: Bouillon cubes, soups with smoked or salted meats, regular soup and broth  Vegetables  Ok: Most vegetables are okay; also low-salt tomato and vegetable juices  Avoid: Sauerkraut and other brine-soaked vegetables; pickles and other pickled vegetables; tomato juice, olives  Date Last Reviewed: 8/1/2016 2000-2018 [employer]. [address] [place] [address], [place] [address]. All rights reserved. This information is not intended as a substitute for professional medical care. Always follow your healthcare professional's instructions.

## 2019-07-26 NOTE — PROGRESS NOTES
Subjective     Carolina Hernandez is a 54 year old female who presents to clinic today for the following health issues:    HPI     Hypertension Follow-up      Do you check your blood pressure regularly outside of the clinic? Yes     Are you following a low salt diet? No    Are your blood pressures ever more than 140 on the top number (systolic) OR more   than 90 on the bottom number (diastolic), for example 140/90? No    Amount of exercise or physical activity: minimal    Problems taking medications regularly: No    Medication side effects: none    Diet: regular (no restrictions)    No formal diagnosis of hypertension.    Is noting changes with vision, no eye exam in 5+ years.  Wearing reading glasses helps some.     No family history of hypertension.     No daily use of NSAID.  Caffeine - 1/2 pot coffee/day.  Smokes 0.5-0.75 ppd, since age 33.  3-4 beers/day    Does not snore    Is not prone to headache.  Having headache for a few weeks.     Was on hydrochlorothiazide in the past.     BP Readings from Last 6 Encounters:   07/26/19 160/90   07/25/19 154/88   03/07/19 152/84   02/21/19 136/88   02/14/19 156/90   01/17/19 152/86               Current Outpatient Medications   Medication Sig Dispense Refill     albuterol (PROVENTIL) (2.5 MG/3ML) 0.083% neb solution Take 1 vial (2.5 mg) by nebulization every 4 hours as needed for shortness of breath / dyspnea or wheezing 1 Box 11     FLUoxetine (PROZAC) 20 MG capsule Take 1 capsule (20 mg) by mouth daily 30 capsule 11     FLUoxetine (PROZAC) 40 MG capsule Take 40 mg by mouth daily       fluticasone-salmeterol (ADVAIR) 500-50 MCG/DOSE inhaler Inhale 1 puff into the lungs every 12 hours 1 Inhaler 3     ibuprofen (ADVIL/MOTRIN) 200 MG tablet Take 800 mg by mouth       montelukast (SINGULAIR) 10 MG tablet Take 1 tablet (10 mg) by mouth At Bedtime 30 tablet 11     OMEPRAZOLE 20 MG OR TBEC 1 TABLET ORALLY DAILY       VENTOLIN  (90 Base) MCG/ACT inhaler Inhale 2 puffs  into the lungs every 6 hours as needed for shortness of breath / dyspnea or wheezing. 18 g 6       Reviewed and updated as needed this visit by Provider  Problems  Fam Hx         Review of Systems   ROS COMP: Constitutional, HEENT, cardiovascular, pulmonary, gi and gu systems are negative, except as otherwise noted.      Objective    /90 (BP Location: Right arm, Patient Position: Sitting, Cuff Size: Adult Regular)   Pulse 80   Temp 97.9  F (36.6  C) (Tympanic)   Resp 16   Wt 66.4 kg (146 lb 6.4 oz)   BMI 26.17 kg/m    Body mass index is 26.17 kg/m .  Physical Exam   GENERAL APPEARANCE: healthy, alert and no distress  RESP: Wheezing in all lung fields  CV: regular rates and rhythm, normal S1 S2, no S3 or S4 and no murmur, click or rub  NEURO: Normal strength and tone, mentation intact, speech normal, DTR symmetrically normal in lower extremities, gait normal including heel/toe/tandem walking and cranial nerves 2-12 intact    Diagnostic Test Results:  Labs reviewed in Epic        Assessment & Plan     1. Essential hypertension -new diagnosis.  Start med as below.  Educated about lifestyle changes, reducing alcohol, tobacco, caffeine.  Discussed low-salt diet and increasing exercise.  Because she is symptomatic, start med.  RN BP check and BMP in 2 weeks.  - Basic metabolic panel  - **Basic metabolic panel FUTURE 14d; Future  - CBC with platelets  - losartan-hydrochlorothiazide (HYZAAR) 50-12.5 MG tablet; Take 1 tablet by mouth daily  Dispense: 90 tablet; Refill: 3    2. Moderate persistent asthma with exacerbation -she reports her asthma symptoms are improving.  Advised to give her days and if symptoms are not improved and certainly if they are worse, start the prednisone  - predniSONE (DELTASONE) 20 MG tablet; Take 1 tablet (20 mg) by mouth daily  Dispense: 5 tablet; Refill: 0    3. Blurred vision -overdue for eye exam.  Expect the blurred vision will improve some with treating her high blood  pressure.  - OPHTHALMOLOGY ADULT REFERRAL    4. Recurrent major depressive disorder, in remission (H)  - DEPRESSION ACTION PLAN (DAP)     5.  Encounter for tobacco use cessation counseling: Gave information for quit plan.  Other tips given and paperwork.  Discussed nicotine replacement or oral medications and she declines at this time.       1. Recommend eye evaluation  2. Because of the wheezing, continue to use the albuterol nebulizer and inhaler.  If symptoms worsen, or are not nearly gone by early next week, start the prednisone.  3. Reduce caffeine, alcohol, tobacco use.  This will lower blood pressure from 5-10 pts.  Increasing exercise, low salt diet will also help blood pressure.  4. Blood work today  5. RN blood pressure check in 2 weeks along with non-fasting blood work to check electrolytes and kidney function  6. Start losartan once daily in AM.  If any side effects let Dr. Licona or Dr. Oneill know.      Return in about 2 weeks (around 8/9/2019) for RN BP Recheck, Lab Work.    Dr. Christine Licona,   [facility]

## 2019-07-27 ASSESSMENT — ASTHMA QUESTIONNAIRES: ACT_TOTALSCORE: 11

## 2019-07-27 ASSESSMENT — ANXIETY QUESTIONNAIRES: GAD7 TOTAL SCORE: 7

## 2019-08-05 ENCOUNTER — NURSE TRIAGE (OUTPATIENT)
Dept: FAMILY MEDICINE | Facility: CLINIC | Age: 54
End: 2019-08-05

## 2019-08-05 NOTE — TELEPHONE ENCOUNTER
S:  Patient calling with status update    B:  Patient had OV 7/26/19  Vital Signs 7/26/2019   Systolic 156   Diastolic 88   Patient started on losartan-hydrochlorothiazide 7/26/19    A:  Patient reports she has had headache for previous 3+ weeks.  Patient states the losartan-hydrochlorothiazide made her sick with nausea, heartburn, dizziness and she stopped taking them 6 days ago.  Patient reports now all symptoms have subsided with exception of the headache which now feels much like it did during the weeks prior to her 7/26/19 appointment.    Denies weakness  Denies tingling/numbness  States she is able to walk around fine  Tracks conversation well    R:  Writer instructed patient to go to  to have her BP assessed and to be evaluated for her headache.    Patient stated understanding and said she would do this when her  got home from work in an hour to two.    No further action necessary.  Encounter closed.    Song Ovalle RN

## 2019-08-05 NOTE — TELEPHONE ENCOUNTER
"  Additional Information    Negative: Difficult to awaken or acting confused (e.g., disoriented, slurred speech)    Negative: Weakness of the face, arm or leg on one side of the body and new onset    Negative: Numbness of the face, arm or leg on one side of the body and new onset    Negative: Loss of speech or garbled speech and new onset    Negative: Passed out (i.e., fainted, collapsed and was not responding)    Negative: Sounds like a life-threatening emergency to the triager    Negative: Followed a head injury within last 3 days    Negative: Traumatic Brain Injury (TBI) is suspected    Negative: Sinus pain of forehead and yellow or green nasal discharge    Negative: Pregnant    Negative: Unable to walk without falling    Negative: Stiff neck (can't touch chin to chest)    Negative: Possibility of carbon monoxide exposure    SEVERE headache, states 'worst headache' of life    Answer Assessment - Initial Assessment Questions  1. LOCATION: \"Where does it hurt?\"       Temples and forehead  2. ONSET: \"When did the headache start?\" (Minutes, hours or days)       Started over three weeks ago  3. PATTERN: \"Does the pain come and go, or has it been constant since it started?\"      Pretty constant, pressure behind eyes too  4. SEVERITY: \"How bad is the pain?\" and \"What does it keep you from doing?\"  (e.g., Scale 1-10; mild, moderate, or severe)    - MILD (1-3): doesn't interfere with normal activities     - MODERATE (4-7): interferes with normal activities or awakens from sleep     - SEVERE (8-10): excruciating pain, unable to do any normal activities         Moderate  5. RECURRENT SYMPTOM: \"Have you ever had headaches before?\" If so, ask: \"When was the last time?\" and \"What happened that time?\"       No  6. CAUSE: \"What do you think is causing the headache?\"      Patient does not know  7. MIGRAINE: \"Have you been diagnosed with migraine headaches?\" If so, ask: \"Is this headache similar?\"       No  8. HEAD INJURY: \"Has " "there been any recent injury to the head?\"       no  9. OTHER SYMPTOMS: \"Do you have any other symptoms?\" (fever, stiff neck, eye pain, sore throat, cold symptoms)      Bad taste in mouth, right now    Protocols used: HEADACHE-A-OH    "

## 2019-08-05 NOTE — TELEPHONE ENCOUNTER
Reason for call:  Patient reporting a symptom    Symptom or request: Patient was put on bp med on 7/26. Took bp med for 6 days. Patient stopped taking blood pressure medication 8/1  due to heartburn, headache, nausea, dizziness and bad taste in mouth Transferred call to Clinic RN as high priority    Duration (how long have symptoms been present):Ongoing     Have you been treated for this before? No      Phone Number patient can be reached at:  Home number on file [phone] (home)    Best Time:  Any    Can we leave a detailed message on this number:  YES    Call taken on 8/5/2019 at 2:19 PM by Kourtney Howard

## 2019-08-06 ENCOUNTER — OFFICE VISIT (OUTPATIENT)
Dept: FAMILY MEDICINE | Facility: CLINIC | Age: 54
End: 2019-08-06
Payer: COMMERCIAL

## 2019-08-06 VITALS
TEMPERATURE: 98.4 F | WEIGHT: 147.8 LBS | BODY MASS INDEX: 26.19 KG/M2 | SYSTOLIC BLOOD PRESSURE: 160 MMHG | RESPIRATION RATE: 12 BRPM | HEART RATE: 71 BPM | HEIGHT: 63 IN | OXYGEN SATURATION: 100 % | DIASTOLIC BLOOD PRESSURE: 92 MMHG

## 2019-08-06 DIAGNOSIS — R51.9 ACUTE NONINTRACTABLE HEADACHE, UNSPECIFIED HEADACHE TYPE: ICD-10-CM

## 2019-08-06 DIAGNOSIS — I10 ESSENTIAL HYPERTENSION: Primary | ICD-10-CM

## 2019-08-06 PROCEDURE — 99214 OFFICE O/P EST MOD 30 MIN: CPT | Performed by: NURSE PRACTITIONER

## 2019-08-06 RX ORDER — HYDROCHLOROTHIAZIDE 12.5 MG/1
12.5 TABLET ORAL DAILY
Qty: 30 TABLET | Refills: 0 | Status: SHIPPED | OUTPATIENT
Start: 2019-08-06 | End: 2019-08-20

## 2019-08-06 ASSESSMENT — ASTHMA QUESTIONNAIRES
QUESTION_1 LAST FOUR WEEKS HOW MUCH OF THE TIME DID YOUR ASTHMA KEEP YOU FROM GETTING AS MUCH DONE AT WORK, SCHOOL OR AT HOME: SOME OF THE TIME
QUESTION_2 LAST FOUR WEEKS HOW OFTEN HAVE YOU HAD SHORTNESS OF BREATH: MORE THAN ONCE A DAY
ACT_TOTALSCORE: 11
QUESTION_3 LAST FOUR WEEKS HOW OFTEN DID YOUR ASTHMA SYMPTOMS (WHEEZING, COUGHING, SHORTNESS OF BREATH, CHEST TIGHTNESS OR PAIN) WAKE YOU UP AT NIGHT OR EARLIER THAN USUAL IN THE MORNING: ONCE A WEEK
QUESTION_5 LAST FOUR WEEKS HOW WOULD YOU RATE YOUR ASTHMA CONTROL: SOMEWHAT CONTROLLED
QUESTION_4 LAST FOUR WEEKS HOW OFTEN HAVE YOU USED YOUR RESCUE INHALER OR NEBULIZER MEDICATION (SUCH AS ALBUTEROL): THREE OR MORE TIMES PER DAY

## 2019-08-06 ASSESSMENT — PAIN SCALES - GENERAL: PAINLEVEL: MILD PAIN (2)

## 2019-08-06 ASSESSMENT — MIFFLIN-ST. JEOR: SCORE: 1225.58

## 2019-08-06 NOTE — PATIENT INSTRUCTIONS
Start hydrochlorothiazide 12.5 mg daily.  Follow up in 2 weeks for blood pressure recheck and lab recheck.        Thank you for choosing [facility].  You may be receiving an email and/or telephone survey request from [facility] [employer] regarding your visit today.  Please take a few minutes to respond to the survey to let us know how we are doing.      If you have questions or concerns, please contact us via [employer] or you can contact your care team at [phone].    Our Clinic hours are:  Monday 6:40 am  to 7:00 pm  Tuesday -Friday 6:40 am to 5:00 pm    The [place] outpatient lab hours are:  Monday - Friday 6:10 am to 4:45 pm  Saturdays 7:00 am to 11:00 am  Appointments are required, call [phone]    If you have clinical questions after hours or would like to schedule an appointment,  call the clinic at [phone].

## 2019-08-07 ASSESSMENT — ASTHMA QUESTIONNAIRES: ACT_TOTALSCORE: 11

## 2019-08-20 ENCOUNTER — OFFICE VISIT (OUTPATIENT)
Dept: FAMILY MEDICINE | Facility: CLINIC | Age: 54
End: 2019-08-20
Payer: COMMERCIAL

## 2019-08-20 VITALS
OXYGEN SATURATION: 98 % | RESPIRATION RATE: 12 BRPM | DIASTOLIC BLOOD PRESSURE: 80 MMHG | HEART RATE: 86 BPM | SYSTOLIC BLOOD PRESSURE: 150 MMHG | TEMPERATURE: 98.6 F | BODY MASS INDEX: 25.89 KG/M2 | WEIGHT: 145 LBS

## 2019-08-20 DIAGNOSIS — I10 ESSENTIAL HYPERTENSION: Primary | ICD-10-CM

## 2019-08-20 LAB
ANION GAP SERPL CALCULATED.3IONS-SCNC: 4 MMOL/L (ref 3–14)
BUN SERPL-MCNC: 17 MG/DL (ref 7–30)
CALCIUM SERPL-MCNC: 9.4 MG/DL (ref 8.5–10.1)
CHLORIDE SERPL-SCNC: 102 MMOL/L (ref 94–109)
CO2 SERPL-SCNC: 29 MMOL/L (ref 20–32)
CREAT SERPL-MCNC: 0.73 MG/DL (ref 0.52–1.04)
GFR SERPL CREATININE-BSD FRML MDRD: >90 ML/MIN/{1.73_M2}
GLUCOSE SERPL-MCNC: 92 MG/DL (ref 70–99)
POTASSIUM SERPL-SCNC: 3.5 MMOL/L (ref 3.4–5.3)
SODIUM SERPL-SCNC: 135 MMOL/L (ref 133–144)

## 2019-08-20 PROCEDURE — 80048 BASIC METABOLIC PNL TOTAL CA: CPT | Performed by: INTERNAL MEDICINE

## 2019-08-20 PROCEDURE — 99214 OFFICE O/P EST MOD 30 MIN: CPT | Performed by: INTERNAL MEDICINE

## 2019-08-20 PROCEDURE — 36415 COLL VENOUS BLD VENIPUNCTURE: CPT | Performed by: INTERNAL MEDICINE

## 2019-08-20 RX ORDER — HYDROCHLOROTHIAZIDE 25 MG/1
25 TABLET ORAL DAILY
Qty: 30 TABLET | Refills: 3 | Status: SHIPPED | OUTPATIENT
Start: 2019-08-20 | End: 2019-10-03

## 2019-08-20 RX ORDER — VIT C/E/ZN/COPPR/LUTEIN/ZEAXAN 60 MG-6 MG
1 CAPSULE ORAL DAILY
COMMUNITY
End: 2021-10-18

## 2019-08-20 NOTE — PROGRESS NOTES
Subjective     Carolina Hernandez is a 54 year old female who presents to clinic today for the following health issues:    HPI   Hypertension Follow-up      Do you check your blood pressure regularly outside of the clinic? No     Are you following a low salt diet? Yes    Are your blood pressures ever more than 140 on the top number (systolic) OR more than 90 on the bottom number (diastolic), for example 140/90? No  YES - 180/100    How many servings of fruits and vegetables do you eat daily?  0-1    On average, how many sweetened beverages do you drink each day (soda, juice, sweet tea, etc)?   0    How many days per week do you miss taking your medication? 0    The losartan/HCTZ caused nausea, dizziness, GERD, headache.    The losartan was stopped and just continued on hydrochlorothiazide.  Is tolerating well so far, but blood pressure is still high.    All the side effects have resolved but the headache is still present.    Did get new glasses today.          Current Outpatient Medications   Medication Sig Dispense Refill     albuterol (PROVENTIL) (2.5 MG/3ML) 0.083% neb solution Take 1 vial (2.5 mg) by nebulization every 4 hours as needed for shortness of breath / dyspnea or wheezing 1 Box 11     FLUoxetine (PROZAC) 20 MG capsule Take 1 capsule (20 mg) by mouth daily 30 capsule 11     FLUoxetine (PROZAC) 40 MG capsule Take 40 mg by mouth daily       fluticasone-salmeterol (ADVAIR) 500-50 MCG/DOSE inhaler Inhale 1 puff into the lungs every 12 hours 1 Inhaler 3     hydrochlorothiazide (HYDRODIURIL) 12.5 MG tablet Take 1 tablet (12.5 mg) by mouth daily 30 tablet 0     ibuprofen (ADVIL/MOTRIN) 200 MG tablet Take 800 mg by mouth every 8 hours as needed for fever or pain        montelukast (SINGULAIR) 10 MG tablet Take 1 tablet (10 mg) by mouth At Bedtime 30 tablet 11     multivitamin  with lutein (OCUVITE WITH LTEIN) CAPS per capsule Take 1 capsule by mouth daily       OMEPRAZOLE 20 MG OR TBEC 1 TABLET ORALLY DAILY        VENTOLIN  (90 Base) MCG/ACT inhaler Inhale 2 puffs into the lungs every 6 hours as needed for shortness of breath / dyspnea or wheezing. 18 g 6         Reviewed and updated as needed this visit by Provider         Review of Systems   ROS COMP: Constitutional, HEENT, cardiovascular, pulmonary, gi and gu systems are negative, except as otherwise noted.      Objective    BP (!) 156/86   Pulse 86   Temp 98.6  F (37  C) (Tympanic)   Resp 12   Wt 65.8 kg (145 lb)   SpO2 98%   Breastfeeding? No   BMI 25.89 kg/m    Body mass index is 25.89 kg/m .  Physical Exam   GENERAL APPEARANCE: healthy, alert and no distress  RESP: lungs clear to auscultation - no rales, rhonchi or wheezes  CV: regular rates and rhythm, normal S1 S2, no S3 or S4 and no murmur, click or rub          Assessment & Plan     1. Essential hypertension -uncontrolled.  Had side effects to losartan.  Increase HCTZ from 12.5-25 mg once daily.  RN BP check in 2 to 4 weeks along with BMP.  BMP today.  - Basic metabolic panel  - **Basic metabolic panel FUTURE 14d; Future  - hydrochlorothiazide (HYDRODIURIL) 25 MG tablet; Take 1 tablet (25 mg) by mouth daily  Dispense: 30 tablet; Refill: 3         Patient Instructions   1. Increase hydrochlorothiazide to 25 mg once daily.  2. RN BP check in 2-4 weeks.    3. If having side effects, follow-up with Dr. Licona in clinic  4. Check potassium now and in 2 weeks - get blood work before RN/Doctor visit in 2 weeks.       Return in about 1 month (around 9/20/2019) for RN BP Recheck, Lab Work.    Dr. Christine Licona,   [facility]

## 2019-08-20 NOTE — PATIENT INSTRUCTIONS
1. Increase hydrochlorothiazide to 25 mg once daily.  2. RN BP check in 2-4 weeks.    3. If having side effects, follow-up with Dr. Licona in clinic  4. Check potassium now and in 2 weeks - get blood work before RN/Doctor visit in 2 weeks.

## 2019-08-20 NOTE — LETTER
August 21, 2019      Carolina Hernandez  [address] [place] [id number]        Dear ,    We are writing to inform you of your test results.    Kidney function, blood sugar, electrolytes are normal.     Resulted Orders   Basic metabolic panel   Result Value Ref Range    Sodium 135 133 - 144 mmol/L    Potassium 3.5 3.4 - 5.3 mmol/L    Chloride 102 94 - 109 mmol/L    Carbon Dioxide 29 20 - 32 mmol/L    Anion Gap 4 3 - 14 mmol/L    Glucose 92 70 - 99 mg/dL      Comment:      Non Fasting    Urea Nitrogen 17 7 - 30 mg/dL    Creatinine 0.73 0.52 - 1.04 mg/dL    GFR Estimate >90 >60 mL/min/[1.73_m2]      Comment:      Non  GFR Calc  Starting 12/18/2018, serum creatinine based estimated GFR (eGFR) will be   calculated using the Chronic Kidney Disease Epidemiology Collaboration   (CKD-EPI) equation.      GFR Estimate If Black >90 >60 mL/min/[1.73_m2]      Comment:       GFR Calc  Starting 12/18/2018, serum creatinine based estimated GFR (eGFR) will be   calculated using the Chronic Kidney Disease Epidemiology Collaboration   (CKD-EPI) equation.      Calcium 9.4 8.5 - 10.1 mg/dL       If you have any questions or concerns, please call the clinic at the number listed above.       Sincerely,        Christine Licona, DO

## 2019-08-21 ASSESSMENT — ASTHMA QUESTIONNAIRES: ACT_TOTALSCORE: 7

## 2019-08-26 DIAGNOSIS — J45.20 MILD INTERMITTENT ASTHMA WITHOUT COMPLICATION: ICD-10-CM

## 2019-08-26 NOTE — TELEPHONE ENCOUNTER
"Requested Prescriptions   Pending Prescriptions Disp Refills     ADVAIR DISKUS 500-50 MCG/DOSE inhaler [Pharmacy Med Name: Advair Diskus Inhalation Aerosol Powder Breath Activated 500-50 MCG/DOSE]  2     Sig: Inhale 1 puff into the lungs every 12 hours       Inhaled Steroids Protocol Failed - 8/26/2019 11:40 AM        Failed - Asthma control assessment score within normal limits in last 6 months     Please review ACT score.           Passed - Patient is age 12 or older        Passed - Medication is active on med list        Passed - Recent (6 mo) or future (30 days) visit within the authorizing provider's specialty     Patient had office visit in the last 6 months or has a visit in the next 30 days with authorizing provider or within the authorizing provider's specialty.  See \"Patient Info\" tab in inbasket, or \"Choose Columns\" in Meds & Orders section of the refill encounter.            Last Written Prescription Date:  4/12/2019  Last Fill Quantity: 1,  # refills: 3   Last office visit: 8/20/2019 with prescribing provider:  Livan    Future Office Visit:   Next 5 appointments (look out 90 days)    Sep 03, 2019 11:00 AM CDT  Nurse Only with DIAMOND HARPER FP/IM RN  [facility] ([facility]) [address] [address] [phone]           "

## 2019-08-27 NOTE — TELEPHONE ENCOUNTER
Dr. Oneill,    I called and updated the ACT for this patient for refill of advair inhaler. Ana ALEXANDER RN  Asthma Control Test 8/27/2019   ACT Total Score (Goal Greater than or Equal to 20) 11

## 2019-08-27 NOTE — TELEPHONE ENCOUNTER
Routing refill request to provider for review/approval because:  Last ACT not at goal.     ACT Total Scores 7/26/2019 8/6/2019 8/20/2019   ACT TOTAL SCORE (Goal Greater than or Equal to 20) 11 11 7   In the past 12 months, how many times did you visit the emergency room for your asthma without being admitted to the hospital? 0 0 0   In the past 12 months, how many times were you hospitalized overnight because of your asthma? 0 0 0      TYLOR DiazN, RN

## 2019-08-28 ASSESSMENT — ASTHMA QUESTIONNAIRES: ACT_TOTALSCORE: 11

## 2019-08-28 NOTE — TELEPHONE ENCOUNTER
Spoke to the patient and told her of refill.  Resp care out patient number [phone] to schedule her education. Ana ALEXANDER RN

## 2019-08-28 NOTE — TELEPHONE ENCOUNTER
Reviewed ACT score. It does not appear that her asthma is under control . Will recommend asthma education . I will place referral . I have faxed the Advair

## 2019-09-03 ENCOUNTER — TELEPHONE (OUTPATIENT)
Dept: FAMILY MEDICINE | Facility: CLINIC | Age: 54
End: 2019-09-03

## 2019-09-03 ENCOUNTER — ALLIED HEALTH/NURSE VISIT (OUTPATIENT)
Dept: FAMILY MEDICINE | Facility: CLINIC | Age: 54
End: 2019-09-03
Payer: COMMERCIAL

## 2019-09-03 VITALS — DIASTOLIC BLOOD PRESSURE: 80 MMHG | HEART RATE: 88 BPM | SYSTOLIC BLOOD PRESSURE: 144 MMHG | OXYGEN SATURATION: 98 %

## 2019-09-03 DIAGNOSIS — I10 BENIGN ESSENTIAL HYPERTENSION: Primary | ICD-10-CM

## 2019-09-03 DIAGNOSIS — I10 ESSENTIAL HYPERTENSION: ICD-10-CM

## 2019-09-03 DIAGNOSIS — I10 ESSENTIAL HYPERTENSION: Primary | ICD-10-CM

## 2019-09-03 LAB
ANION GAP SERPL CALCULATED.3IONS-SCNC: 6 MMOL/L (ref 3–14)
BUN SERPL-MCNC: 21 MG/DL (ref 7–30)
CALCIUM SERPL-MCNC: 9.3 MG/DL (ref 8.5–10.1)
CHLORIDE SERPL-SCNC: 100 MMOL/L (ref 94–109)
CO2 SERPL-SCNC: 28 MMOL/L (ref 20–32)
CREAT SERPL-MCNC: 0.71 MG/DL (ref 0.52–1.04)
GFR SERPL CREATININE-BSD FRML MDRD: >90 ML/MIN/{1.73_M2}
GLUCOSE SERPL-MCNC: 139 MG/DL (ref 70–99)
POTASSIUM SERPL-SCNC: 3.5 MMOL/L (ref 3.4–5.3)
SODIUM SERPL-SCNC: 134 MMOL/L (ref 133–144)

## 2019-09-03 PROCEDURE — 36415 COLL VENOUS BLD VENIPUNCTURE: CPT | Performed by: INTERNAL MEDICINE

## 2019-09-03 PROCEDURE — 80048 BASIC METABOLIC PNL TOTAL CA: CPT | Performed by: INTERNAL MEDICINE

## 2019-09-03 PROCEDURE — 99207 ZZC NO CHARGE NURSE ONLY: CPT

## 2019-09-03 RX ORDER — AMLODIPINE BESYLATE 2.5 MG/1
2.5 TABLET ORAL DAILY
Qty: 90 TABLET | Refills: 3 | Status: SHIPPED | OUTPATIENT
Start: 2019-09-03 | End: 2019-10-03

## 2019-09-03 NOTE — TELEPHONE ENCOUNTER
Pt notified.  She will return in 1-2 weeks for BP recheck due to addition of amlodipine.  Heike Morales RN

## 2019-09-03 NOTE — TELEPHONE ENCOUNTER
"S:  RN visit for BP check    B:  LOV 8/20/19:  \"  Patient Instructions   1. Increase hydrochlorothiazide to 25 mg once daily.  2. RN BP check in 2-4 weeks.    3. If having side effects, follow-up with Dr. Licona in clinic  4. Check potassium now and in 2 weeks - get blood work before RN/Doctor visit in 2 weeks.   Return in about 1 month (around 9/20/2019) for RN BP Recheck, Lab Work.  Dr. Christine Licona, DO\"    A:    Vital Signs 9/3/2019   Systolic 144   Diastolic 80   Pulse 88   Temperature    Respirations    Weight (LB)    Height    BMI (Calculated)    Pain    O2 98     Patient would like to use [facility] in [place]    R:  Copied into tele enc and routed to Dr Livan Ovalle RN    "

## 2019-09-03 NOTE — PROGRESS NOTES
"S:  RN visit for BP check    B:  LOV 8/20/19:  \"  Patient Instructions   1. Increase hydrochlorothiazide to 25 mg once daily.  2. RN BP check in 2-4 weeks.    3. If having side effects, follow-up with Dr. Licona in clinic  4. Check potassium now and in 2 weeks - get blood work before RN/Doctor visit in 2 weeks.   Return in about 1 month (around 9/20/2019) for RN BP Recheck, Lab Work.  Dr. Christine Licona, DO\"    A:    Vital Signs 9/3/2019   Systolic 144   Diastolic 80   Pulse 88   Temperature    Respirations    Weight (LB)    Height    BMI (Calculated)    Pain    O2 98     Patient would like to use [facility] in [place]    R:  Copied into tele enc and routed to Dr Livan Ovalle RN      "

## 2019-09-10 ENCOUNTER — TELEPHONE (OUTPATIENT)
Dept: FAMILY MEDICINE | Facility: CLINIC | Age: 54
End: 2019-09-10

## 2019-09-10 ENCOUNTER — OFFICE VISIT (OUTPATIENT)
Dept: FAMILY MEDICINE | Facility: CLINIC | Age: 54
End: 2019-09-10
Payer: COMMERCIAL

## 2019-09-10 VITALS — HEART RATE: 82 BPM | SYSTOLIC BLOOD PRESSURE: 118 MMHG | OXYGEN SATURATION: 98 % | DIASTOLIC BLOOD PRESSURE: 88 MMHG

## 2019-09-10 DIAGNOSIS — I10 BENIGN ESSENTIAL HYPERTENSION: Primary | ICD-10-CM

## 2019-09-10 PROCEDURE — 99207 ZZC NO CHARGE NURSE ONLY: CPT

## 2019-09-10 RX ORDER — CETIRIZINE HYDROCHLORIDE 10 MG/1
10 TABLET ORAL DAILY
COMMUNITY
Start: 2017-09-10

## 2019-09-10 NOTE — TELEPHONE ENCOUNTER
S:  RN visit for BP check    B:  On 9/3/19 Dr Licona stared patient on 2.5mg amlodipine daily and patient was asked to return for RN visit in 1-2 weeks for BP check    A:  Patient reports this morning 126/88 at patient's home after consuming a cup of coffee and a ciggarette    Today in clinic:    Vital Signs 9/10/2019   Systolic 118   Diastolic 88   Pulse 82   Temperature    Respirations    Weight (LB)    Height    BMI (Calculated)    Pain    O2 98       R:  Copied into tele encounter and routed to Dr Licona    No further action necessary.  Encounter closed.    Song Ovalle RN

## 2019-09-10 NOTE — TELEPHONE ENCOUNTER
Blood pressure much better controlled.  No changes to medication at this time.  The diastolic is still a bit high, so we will monitor this for now

## 2019-09-27 ENCOUNTER — TELEPHONE (OUTPATIENT)
Dept: FAMILY MEDICINE | Facility: CLINIC | Age: 54
End: 2019-09-27

## 2019-09-27 RX ORDER — FLUOXETINE 40 MG/1
40 CAPSULE ORAL DAILY
Status: CANCELLED | OUTPATIENT
Start: 2019-09-27

## 2019-09-27 NOTE — TELEPHONE ENCOUNTER
Reason for Call:  Medication or medication refill:    Do you use a [facility]?  Name of the pharmacy and phone number for the current request:  [facility] [phone]    Name of the medication requested: Prozac    Can we leave a detailed message on this number? YES    Phone number patient can be reached at: Home number on file [phone] (home)    Best Time: any    Call taken on 9/27/2019 at 2:38 PM by Juliann Reyes

## 2019-09-27 NOTE — TELEPHONE ENCOUNTER
Patient reports:  She takes 60 mg Prozac.  She was getting 40 mg from her old provider Dr. Hung, then had 20 mg added to that from Dr. Oneill 2/28/19.  LOV for depression 2/28/19.  Patient is now out of her 40 mg tablets, she needs a new Rx.  Please advise on small refill and follow up in clinic, refills until her annual appt is due in February?    Please advise  LOV 8/20/19    Routing to provider.    Venice WRAY Rn

## 2019-10-01 DIAGNOSIS — F33.40 RECURRENT MAJOR DEPRESSIVE DISORDER, IN REMISSION (H): Primary | ICD-10-CM

## 2019-10-01 RX ORDER — FLUOXETINE 40 MG/1
40 CAPSULE ORAL DAILY
Qty: 90 CAPSULE | Refills: 1 | Status: SHIPPED | OUTPATIENT
Start: 2019-10-01 | End: 2019-10-03

## 2019-10-03 ENCOUNTER — OFFICE VISIT (OUTPATIENT)
Dept: FAMILY MEDICINE | Facility: CLINIC | Age: 54
End: 2019-10-03
Payer: COMMERCIAL

## 2019-10-03 VITALS
BODY MASS INDEX: 26.22 KG/M2 | DIASTOLIC BLOOD PRESSURE: 88 MMHG | SYSTOLIC BLOOD PRESSURE: 146 MMHG | WEIGHT: 148 LBS | HEART RATE: 73 BPM | TEMPERATURE: 98.8 F | RESPIRATION RATE: 16 BRPM | HEIGHT: 63 IN | OXYGEN SATURATION: 96 %

## 2019-10-03 DIAGNOSIS — F33.40 RECURRENT MAJOR DEPRESSIVE DISORDER, IN REMISSION (H): ICD-10-CM

## 2019-10-03 DIAGNOSIS — K21.9 GASTROESOPHAGEAL REFLUX DISEASE WITHOUT ESOPHAGITIS: Primary | ICD-10-CM

## 2019-10-03 DIAGNOSIS — I10 ESSENTIAL HYPERTENSION: ICD-10-CM

## 2019-10-03 DIAGNOSIS — Z23 NEED FOR PROPHYLACTIC VACCINATION AND INOCULATION AGAINST INFLUENZA: ICD-10-CM

## 2019-10-03 DIAGNOSIS — J45.20 MILD INTERMITTENT ASTHMA WITHOUT COMPLICATION: ICD-10-CM

## 2019-10-03 PROCEDURE — 99214 OFFICE O/P EST MOD 30 MIN: CPT | Mod: 25 | Performed by: FAMILY MEDICINE

## 2019-10-03 PROCEDURE — 90686 IIV4 VACC NO PRSV 0.5 ML IM: CPT | Performed by: FAMILY MEDICINE

## 2019-10-03 PROCEDURE — 90471 IMMUNIZATION ADMIN: CPT | Performed by: FAMILY MEDICINE

## 2019-10-03 RX ORDER — FLUOXETINE 40 MG/1
40 CAPSULE ORAL DAILY
Qty: 90 CAPSULE | Refills: 3 | Status: SHIPPED | OUTPATIENT
Start: 2019-10-03 | End: 2020-02-27

## 2019-10-03 RX ORDER — MONTELUKAST SODIUM 10 MG/1
10 TABLET ORAL AT BEDTIME
Qty: 30 TABLET | Refills: 11 | Status: SHIPPED | OUTPATIENT
Start: 2019-10-03 | End: 2020-03-30

## 2019-10-03 RX ORDER — NICOTINE POLACRILEX 4 MG/1
20 GUM, CHEWING ORAL DAILY
Qty: 90 TABLET | Refills: 3 | Status: SHIPPED | OUTPATIENT
Start: 2019-10-03 | End: 2022-10-27

## 2019-10-03 RX ORDER — ALBUTEROL SULFATE 0.83 MG/ML
2.5 SOLUTION RESPIRATORY (INHALATION) EVERY 4 HOURS PRN
Qty: 3 BOX | Refills: 3 | Status: SHIPPED | OUTPATIENT
Start: 2019-10-03 | End: 2020-02-13

## 2019-10-03 RX ORDER — ALBUTEROL SULFATE 90 UG/1
AEROSOL, METERED RESPIRATORY (INHALATION)
Qty: 3 INHALER | Refills: 3 | Status: SHIPPED | OUTPATIENT
Start: 2019-10-03 | End: 2020-02-27

## 2019-10-03 RX ORDER — AMLODIPINE BESYLATE 2.5 MG/1
2.5 TABLET ORAL DAILY
Qty: 90 TABLET | Refills: 3 | Status: SHIPPED | OUTPATIENT
Start: 2019-10-03 | End: 2019-12-19

## 2019-10-03 RX ORDER — CETIRIZINE HYDROCHLORIDE 10 MG/1
10 TABLET ORAL DAILY
Status: CANCELLED | OUTPATIENT
Start: 2019-10-03

## 2019-10-03 RX ORDER — HYDROCHLOROTHIAZIDE 25 MG/1
25 TABLET ORAL DAILY
Qty: 90 TABLET | Refills: 3 | Status: SHIPPED | OUTPATIENT
Start: 2019-10-03 | End: 2019-12-23

## 2019-10-03 ASSESSMENT — ANXIETY QUESTIONNAIRES
1. FEELING NERVOUS, ANXIOUS, OR ON EDGE: SEVERAL DAYS
GAD7 TOTAL SCORE: 6
5. BEING SO RESTLESS THAT IT IS HARD TO SIT STILL: SEVERAL DAYS
3. WORRYING TOO MUCH ABOUT DIFFERENT THINGS: SEVERAL DAYS
2. NOT BEING ABLE TO STOP OR CONTROL WORRYING: NOT AT ALL
6. BECOMING EASILY ANNOYED OR IRRITABLE: SEVERAL DAYS
7. FEELING AFRAID AS IF SOMETHING AWFUL MIGHT HAPPEN: SEVERAL DAYS

## 2019-10-03 ASSESSMENT — PATIENT HEALTH QUESTIONNAIRE - PHQ9
5. POOR APPETITE OR OVEREATING: SEVERAL DAYS
SUM OF ALL RESPONSES TO PHQ QUESTIONS 1-9: 7

## 2019-10-03 ASSESSMENT — MIFFLIN-ST. JEOR: SCORE: 1226.48

## 2019-10-03 NOTE — NURSING NOTE
Nine Rx's: hydrodiuril, albuterol inhaler, omeprazole,fluoxetine,amlodipine, fluoxetine,advair diskus,,proventil, singulair all faxed to [facility] at [phone]

## 2019-10-03 NOTE — PROGRESS NOTES
Subjective     Carolina Hernandez is a 54 year old female who presents to clinic today for the following health issues:     Patient comes in for refills for her blood pressure medication and antidepressant. She reports no side effects and medication has been effective .    HPI   Hypertension Follow-up      Do you check your blood pressure regularly outside of the clinic? No     Are you following a low salt diet? Yes    Are your blood pressures ever more than 140 on the top number (systolic) OR more   than 90 on the bottom number (diastolic), for example 140/90? Yes  Depression Followup    How are you doing with your depression since your last visit? No change    Are you having other symptoms that might be associated with depression? No    Have you had a significant life event?  No     Are you feeling anxious or having panic attacks?   No    Do you have any concerns with your use of alcohol or other drugs? No    Social History     Tobacco Use     Smoking status: Current Every Day Smoker     Packs/day: 0.50     Smokeless tobacco: Never Used   Substance Use Topics     Alcohol use: Yes     Comment: 12 pack weekly     Drug use: No     PHQ 2/14/2019 7/26/2019 10/3/2019   PHQ-9 Total Score 11 4 7   Q9: Thoughts of better off dead/self-harm past 2 weeks Several days Not at all Not at all     JAK-7 SCORE 2/14/2019 7/26/2019 10/3/2019   Total Score 8 7 6     No flowsheet data found.  In the past two weeks have you had thoughts of suicide or self-harm?  No.    Do you have concerns about your personal safety or the safety of others?   No    Suicide Assessment Five-step Evaluation and Treatment (SAFE-T)  Asthma Follow-Up    Was ACT completed today?    Yes    ACT Total Scores 10/3/2019   ACT TOTAL SCORE (Goal Greater than or Equal to 20) 15   In the past 12 months, how many times did you visit the emergency room for your asthma without being admitted to the hospital? 0   In the past 12 months, how many times were you hospitalized  overnight because of your asthma? 0       How many days per week do you miss taking your asthma controller medication?  1    Please describe any recent triggers for your asthma: upper respiratory infections, dust mites, animal dander, mold, strong odors and fumes and occupational exposure    Have you had any Emergency Room Visits, Urgent Care Visits, or Hospital Admissions since your last office visit?  No          How many servings of fruits and vegetables do you eat daily?  2-3    On average, how many sweetened beverages do you drink each day (soda, juice, sweet tea, etc)?   0    How many days per week do you miss taking your medication? 0        Medication Followup of Patient would [facility] she will need a PI on ventolin inhaler     Taking Medication as prescribed: yes    Side Effects:  None    Medication Helping Symptoms:  yes           Patient Active Problem List   Diagnosis     CARDIOVASCULAR SCREENING; LDL GOAL LESS THAN 160     Recurrent major depressive disorder, in remission (H)     Encounter for tobacco use cessation counseling     Mild intermittent asthma without complication     Essential hypertension     No past surgical history on file.    Social History     Tobacco Use     Smoking status: Current Every Day Smoker     Packs/day: 0.50     Smokeless tobacco: Never Used   Substance Use Topics     Alcohol use: Yes     Comment: 12 pack weekly     Family History   Problem Relation Age of Onset     Glaucoma Father      Coronary Artery Disease Father         stents     Cancer Maternal Grandfather      Coronary Artery Disease Paternal Grandfather      Schizophrenia Daughter      Diabetes Daughter      Cancer Daughter          Current Outpatient Medications   Medication Sig Dispense Refill     albuterol (PROVENTIL) (2.5 MG/3ML) 0.083% neb solution Take 1 vial (2.5 mg) by nebulization every 4 hours as needed for shortness of breath / dyspnea or wheezing 3 Box 3      "albuterol (VENTOLIN HFA) 108 (90 Base) MCG/ACT inhaler Inhale 2 puffs into the lungs every 6 hours as needed for shortness of breath / dyspnea or wheezing. 3 Inhaler 3     amLODIPine (NORVASC) 2.5 MG tablet Take 1 tablet (2.5 mg) by mouth daily 90 tablet 3     cetirizine (ZYRTEC) 10 MG tablet Take 10 mg by mouth daily       FLUoxetine (PROZAC) 20 MG capsule Take 1 capsule (20 mg) by mouth daily 90 capsule 3     FLUoxetine (PROZAC) 40 MG capsule Take 1 capsule (40 mg) by mouth daily 90 capsule 3     fluticasone-salmeterol (ADVAIR DISKUS) 500-50 MCG/DOSE inhaler Inhale 1 puff into the lungs every 12 hours 3 Inhaler 3     hydrochlorothiazide (HYDRODIURIL) 25 MG tablet Take 1 tablet (25 mg) by mouth daily 90 tablet 3     montelukast (SINGULAIR) 10 MG tablet Take 1 tablet (10 mg) by mouth At Bedtime 30 tablet 11     multivitamin  with lutein (OCUVITE WITH LTEIN) CAPS per capsule Take 1 capsule by mouth daily       omeprazole 20 MG tablet Take 1 tablet (20 mg) by mouth daily 90 tablet 3     clindamycin (CLEOCIN) 300 MG capsule Take 1 capsule (300 mg) by mouth 4 times daily for 10 days 40 capsule 0     sulfamethoxazole-trimethoprim (BACTRIM DS/SEPTRA DS) 800-160 MG tablet Take 1 tablet by mouth 2 times daily for 10 days 20 tablet 0     Allergies   Allergen Reactions     Penicillins Itching     BP Readings from Last 3 Encounters:   10/05/19 (!) 154/92   10/03/19 (!) 146/88   09/10/19 118/88    Wt Readings from Last 3 Encounters:   10/05/19 67.1 kg (148 lb)   10/03/19 67.1 kg (148 lb)   08/20/19 65.8 kg (145 lb)                    Reviewed and updated as needed this visit by Provider  Meds  Problems         Review of Systems   ROS COMP: Constitutional, HEENT, cardiovascular, pulmonary, gi and gu systems are negative, except as otherwise noted.      Objective    BP (!) 146/88   Pulse 73   Temp 98.8  F (37.1  C) (Tympanic)   Resp 16   Ht 1.594 m (5' 2.75\")   Wt 67.1 kg (148 lb)   SpO2 96%   BMI 26.43 kg/m    Body mass " index is 26.43 kg/m .  Physical Exam   GENERAL: healthy, alert and no distress  EYES: Eyes grossly normal to inspection, PERRL and conjunctivae and sclerae normal  HENT: ear canals and TM's normal, nose and mouth without ulcers or lesions  NECK: no adenopathy, no asymmetry, masses, or scars and thyroid normal to palpation  RESP: lungs clear to auscultation - no rales, rhonchi or wheezes  CV: regular rate and rhythm, normal S1 S2, no S3 or S4, no murmur, click or rub, no peripheral edema and peripheral pulses strong  ABDOMEN: soft, nontender, no hepatosplenomegaly, no masses and bowel sounds normal  MS: no gross musculoskeletal defects noted, no edema    Diagnostic Test Results:  none         Assessment & Plan     1. Mild intermittent asthma without complication  Medication refilled.  - fluticasone-salmeterol (ADVAIR DISKUS) 500-50 MCG/DOSE inhaler; Inhale 1 puff into the lungs every 12 hours  Dispense: 3 Inhaler; Refill: 3  - albuterol (PROVENTIL) (2.5 MG/3ML) 0.083% neb solution; Take 1 vial (2.5 mg) by nebulization every 4 hours as needed for shortness of breath / dyspnea or wheezing  Dispense: 3 Box; Refill: 3  - montelukast (SINGULAIR) 10 MG tablet; Take 1 tablet (10 mg) by mouth At Bedtime  Dispense: 30 tablet; Refill: 11  - albuterol (VENTOLIN HFA) 108 (90 Base) MCG/ACT inhaler; Inhale 2 puffs into the lungs every 6 hours as needed for shortness of breath / dyspnea or wheezing.  Dispense: 3 Inhaler; Refill: 3    2. Essential hypertension  Blood pressure is a bit high . Asked to come back in a couple of weeks for blood pressure recheck.  - amLODIPine (NORVASC) 2.5 MG tablet; Take 1 tablet (2.5 mg) by mouth daily  Dispense: 90 tablet; Refill: 3  - hydrochlorothiazide (HYDRODIURIL) 25 MG tablet; Take 1 tablet (25 mg) by mouth daily  Dispense: 90 tablet; Refill: 3  - Lipid panel reflex to direct LDL Fasting; Future    3. Recurrent major depressive disorder, in remission (H)  Medication is refilled  - FLUoxetine  "(PROZAC) 20 MG capsule; Take 1 capsule (20 mg) by mouth daily  Dispense: 90 capsule; Refill: 3  - FLUoxetine (PROZAC) 40 MG capsule; Take 1 capsule (40 mg) by mouth daily  Dispense: 90 capsule; Refill: 3    4. Gastroesophageal reflux disease without esophagitis  Symptoms stable   - omeprazole 20 MG tablet; Take 1 tablet (20 mg) by mouth daily  Dispense: 90 tablet; Refill: 3    5. Need for prophylactic vaccination and inoculation against influenza  - INFLUENZA VACCINE IM > 6 MONTHS VALENT IIV4 [20448]  - Vaccine Administration, Initial [55178]     Tobacco Cessation:   reports that she has been smoking. She has been smoking about 0.50 packs per day. She has never used smokeless tobacco.  Tobacco Cessation Action Plan: Self help information given to patient      BMI:   Estimated body mass index is 26.43 kg/m  as calculated from the following:    Height as of this encounter: 1.594 m (5' 2.75\").    Weight as of this encounter: 67.1 kg (148 lb).   Weight management plan: Discussed healthy diet and exercise guidelines        FUTURE APPOINTMENTS:       - Follow-up visit in 1-2 weeks for blood pressure recheck or sooner as needed.  There are no Patient Instructions on file for this visit.    No follow-ups on file.    Julia Oneill MD  [facility]      "

## 2019-10-04 ASSESSMENT — ASTHMA QUESTIONNAIRES: ACT_TOTALSCORE: 15

## 2019-10-04 ASSESSMENT — ANXIETY QUESTIONNAIRES: GAD7 TOTAL SCORE: 6

## 2019-10-05 ENCOUNTER — HOSPITAL ENCOUNTER (EMERGENCY)
Facility: CLINIC | Age: 54
Discharge: HOME | End: 2019-10-05
Attending: FAMILY MEDICINE | Admitting: FAMILY MEDICINE
Payer: COMMERCIAL

## 2019-10-05 ENCOUNTER — APPOINTMENT (OUTPATIENT)
Dept: GENERAL RADIOLOGY | Facility: CLINIC | Age: 54
End: 2019-10-05
Attending: FAMILY MEDICINE
Payer: COMMERCIAL

## 2019-10-05 VITALS
OXYGEN SATURATION: 99 % | HEIGHT: 63 IN | SYSTOLIC BLOOD PRESSURE: 154 MMHG | TEMPERATURE: 98.4 F | BODY MASS INDEX: 26.22 KG/M2 | HEART RATE: 83 BPM | DIASTOLIC BLOOD PRESSURE: 92 MMHG | WEIGHT: 148 LBS | RESPIRATION RATE: 18 BRPM

## 2019-10-05 DIAGNOSIS — S81.811A LACERATION OF CALF, RIGHT, INITIAL ENCOUNTER: ICD-10-CM

## 2019-10-05 PROCEDURE — 13121 CMPLX RPR S/A/L 2.6-7.5 CM: CPT | Performed by: FAMILY MEDICINE

## 2019-10-05 PROCEDURE — 99283 EMERGENCY DEPT VISIT LOW MDM: CPT | Mod: 25 | Performed by: FAMILY MEDICINE

## 2019-10-05 PROCEDURE — 13121 CMPLX RPR S/A/L 2.6-7.5 CM: CPT | Mod: Z6 | Performed by: FAMILY MEDICINE

## 2019-10-05 PROCEDURE — 73590 X-RAY EXAM OF LOWER LEG: CPT | Mod: RT

## 2019-10-05 PROCEDURE — 99284 EMERGENCY DEPT VISIT MOD MDM: CPT | Mod: 25 | Performed by: FAMILY MEDICINE

## 2019-10-05 PROCEDURE — 99201 ZZC OFFICE/OUTPT VISIT, NEW, LEVEL I: CPT | Performed by: SURGERY

## 2019-10-05 RX ORDER — SULFAMETHOXAZOLE/TRIMETHOPRIM 800-160 MG
1 TABLET ORAL 2 TIMES DAILY
Qty: 20 TABLET | Refills: 0 | Status: SHIPPED | OUTPATIENT
Start: 2019-10-05 | End: 2019-12-10

## 2019-10-05 RX ORDER — CLINDAMYCIN HCL 300 MG
300 CAPSULE ORAL 4 TIMES DAILY
Qty: 40 CAPSULE | Refills: 0 | Status: SHIPPED | OUTPATIENT
Start: 2019-10-05 | End: 2019-12-10

## 2019-10-05 ASSESSMENT — MIFFLIN-ST. JEOR: SCORE: 1226.48

## 2019-10-05 NOTE — ED NOTES
Tetanus UTD, pt has poorly approximated open wound R calf with exposed adipose tissue. Bruising on calf around injury site with swelling.

## 2019-10-05 NOTE — ED NOTES
Dr. Denis at bedside for surg consult. Plan for lac repair with penrose drain and follow up in clinic with surgery.

## 2019-10-05 NOTE — DISCHARGE INSTRUCTIONS
ICD-10-CM    1. Laceration of calf, right, initial encounter S81.811A     recheck in surgery clinic by wednesday. return immed for fever, redness, drainage, worsening. start clinidamycin, septra.  small gas was seen in the region of the wound. and therefore a penrose drain was sutured in place.  keep clean and dry. sutures out in 14 days.

## 2019-10-05 NOTE — ED PROVIDER NOTES
History     Chief Complaint   Patient presents with     Laceration     complex laceration R posterior calf, pt was punctured by the horns on her pet pigmy goat.     HPI  Carolina Hernandez is a 54 year old female who presents with a history of hypertension, tobacco abuse, asthma mild intermittent who was gored by her go into the right calf that occurred last evening.  This is occurred previously to her by the same goat who will flick his head and various horn into extremities.  She was awake much of the night due to the pain in the region of the injury.  She presents here with a 4 cm laceration/flap to the posterior medial aspect of the right calf.  Tetanus was last in 2012.  She has had no discharge significant bleeding from the area.  No other injuries were sustained.  Able to ambulate.  Notes no obvious weakness or paresthesias sensory changes into the lower legs.  No coolness or pallor to the feet.      Allergies:  Allergies   Allergen Reactions     Penicillins        Problem List:    Patient Active Problem List    Diagnosis Date Noted     Essential hypertension 07/26/2019     Priority: Medium     New diagnosis 7/26/2019         Recurrent major depressive disorder, in remission (H) 01/17/2019     Priority: Medium     Encounter for tobacco use cessation counseling 01/17/2019     Priority: Medium     Mild intermittent asthma without complication 01/17/2019     Priority: Medium     CARDIOVASCULAR SCREENING; LDL GOAL LESS THAN 160 10/31/2010     Priority: Medium        Past Medical History:    No past medical history on file.    Past Surgical History:    No past surgical history on file.    Family History:    Family History   Problem Relation Age of Onset     Glaucoma Father      Coronary Artery Disease Father         stents     Cancer Maternal Grandfather      Coronary Artery Disease Paternal Grandfather      Schizophrenia Daughter      Diabetes Daughter      Cancer Daughter        Social History:  Marital Status:  "  [2]  Social History     Tobacco Use     Smoking status: Current Every Day Smoker     Packs/day: 0.50     Smokeless tobacco: Never Used   Substance Use Topics     Alcohol use: Yes     Comment: 12 pack weekly     Drug use: No        Medications:    albuterol (PROVENTIL) (2.5 MG/3ML) 0.083% neb solution  albuterol (VENTOLIN HFA) 108 (90 Base) MCG/ACT inhaler  amLODIPine (NORVASC) 2.5 MG tablet  cetirizine (ZYRTEC) 10 MG tablet  FLUoxetine (PROZAC) 20 MG capsule  FLUoxetine (PROZAC) 40 MG capsule  fluticasone-salmeterol (ADVAIR DISKUS) 500-50 MCG/DOSE inhaler  hydrochlorothiazide (HYDRODIURIL) 25 MG tablet  ibuprofen (ADVIL/MOTRIN) 200 MG tablet  montelukast (SINGULAIR) 10 MG tablet  multivitamin  with lutein (OCUVITE WITH LTEIN) CAPS per capsule  omeprazole 20 MG tablet          Review of Systems  5 point ROS negative except as noted above in HPI, including Gen., Resp., CV, GI &  system review.    Physical Exam   BP: (!) 154/92  Pulse: 83  Temp: 98.4  F (36.9  C)  Resp: 18  Height: 159.4 cm (5' 2.75\")  Weight: 67.1 kg (148 lb)  SpO2: 99 %      Physical Exam  Constitutional:       General: She is in acute distress.      Appearance: She is not diaphoretic.   Neurological:      Mental Status: She is alert.        Normal distal pulses dorsalis pedis, posterior tibial.  Normal coloration.  Normal motor function distally.  Focal tenderness to palpation.                    ED Course        [facility]    Laceration repair  Date/Time: 10/5/2019 1:21 PM  Performed by: Ab Sol MD  Authorized by: Ab Sol MD     ED EVALUATION:      I have performed an Emergency Department Evaluation including taking a history and physical examination, this evaluation will be documented in the electronic medical record for this ED encounter.      Difficult Airway?: No    ASA Class: Class 1- healthy patient    NPO Status: appropriately NPO for procedure  UNIVERSAL PROTOCOL   Site Marked: " NA  Prior Images Obtained and Reviewed:  NA  Required items: Required blood products, implants, devices and special equipment available    Patient identity confirmed:  Verbally with patient, arm band, provided demographic data and hospital-assigned identification number  Patient was reevaluated immediately before administering moderate or deep sedation or anesthesia  Confirmation Checklist:  Patient's identity using two indicators, relevant allergies, procedure was appropriate and matched the consent or emergent situation and correct equipment/implants were available  Time out: Immediately prior to the procedure a time out was called    Preparation: Patient was prepped and draped in usual sterile fashion      ANESTHESIA (see MAR for exact dosages):     Anesthesia method:  Local infiltration    Local anesthetic:  Lidocaine 1% WITH epi    SEDATION    Patient Sedated: No    LACERATION DETAILS     Location:  Leg    Leg location:  R lower leg    Length (cm):  5    Depth (mm):  10    REPAIR TYPE:     Repair type:  Complex      EXPLORATION:     Wound exploration: entire depth of wound probed and visualized      Wound extent: fascia not violated, no foreign body, no signs of injury, no underlying fracture and no vascular damage      Contaminated: no      TREATMENT:     Area cleansed with:  Hibiclens    Amount of cleaning:  Extensive    Irrigation solution:  Sterile saline    Irrigation volume:  1000    Irrigation method:  Syringe    Debridement:  Minimal    Undermining:  None    Scar revision: no      SKIN REPAIR     Repair method:  Sutures    Suture size:  4-0    Suture material:  Nylon    Suture technique:  Running, retention suture and horizontal mattress    Number of sutures:  8    APPROXIMATION     Approximation:  Close    POST-PROCEDURE DETAILS     Dressing:  Antibiotic ointment and sterile dressing      PROCEDURE   Patient Tolerance:  Patient tolerated the procedure well with no immediate complications  Describe  Procedure: Penrose drain inserted.    Time of Sedation in Minutes by Physician:  0                    Critical Care time:  none               Results for orders placed or performed during the hospital encounter of 10/05/19 (from the past 24 hour(s))   XR Tibia & Fibula Right 2 Views    Narrative    XR TIBIA & FIBULA RT 2 VW 10/5/2019 12:19 PM     HISTORY: gored by goat at posterior right calf with result 4 cm lac  and moderate depth.  Eval for foreign body    COMPARISON: None.      Impression    IMPRESSION: Subcutaneous emphysema superficially in the medial and  posterior calf region. No radiopaque foreign body. No fractures are  evident.    FEROZ VALLEJO MD       Medications - No data to display    Assessments & Plan (with Medical Decision Making)     MDM:  Carolina Hernandez is a 54 year old female presented with a goat injury to the right calf.  She sustained a laceration to this area.  She presents 12 hours after the initial injury.  She has no underlying diabetes immunocompromise state.  She has gas in her tissues around the wound which is likely from the wound itself but cannot exclude formation of infection although I see no overlying erythema no pain out of proportion no drainage from the site.  There is no ascending lymphangitis.  She is afebrile and nontoxic in appearance.  I reviewed the x-rays with Dr. Denis in general surgery who agreed that close interval follow-up was appropriate and they will see her in general surgery this coming week Monday to Wednesday.  He also recommended that a suture and a Penrose drain for the wound to allow for drainage but the wound would not do well late being left open given the large flap size and therefore pen right blue was a compromise discussed with the patient.  Her tetanus is up-to-date.  She will be sent home on Clinda plus Septra.  She is penicillin allergic.  I have given her strict precautions for return including pain out of proportion drainage redness fever  ascending lymphangitis.  Fraser will need to be out approximately 14 days.  The Penrose will be removed at the time of her surgical follow-up.  This Penrose was sutured in with 2 simple interrupted sutures.  Dr. Denis also recommended warm soaks to the area frequently and he relayed this to the patient.    I have reviewed the nursing notes.    I have reviewed the findings, diagnosis, plan and need for follow up with the patient.       New Prescriptions    No medications on file       Final diagnoses:   Laceration of calf, right, initial encounter - recheck in surgery clinic by wednesday. return immed for fever, redness, drainage, worsening. start clinidamycin, septra.  small gas was seen in the region of the wound. and therefore a penrose drain was sutured in place.  keep clean and dry. sutures out in 14 days.       10/5/2019   [facility] EMERGENCY DEPARTMENT     Ab Sol MD  10/05/19 [id number]

## 2019-10-05 NOTE — CONSULTS
Asked by Dr. Sol to see patient regarding her laceration.    54-year-old female was impaled by the horn of a goat last night into her right posterior calf.  Presented to the emergency room complaining of localized pain and discomfort.  The wound was irrigated with normal saline in the emergency department.    Exam:  Stellate laceration of the right posterior calf measuring approximately 5 cm.  Exposed subcutaneous tissue all the way down to muscle.  The fascia of the muscle was compromised.  The wound is clean.  Edges of the skin flap somewhat dusky area      Assessment and plan: Complex dirty puncture and laceration to the right posterior calf.  Recommend loose closure with interrupted nylon sutures over a 1/2 inch Penrose drain.  Advised patient to soak this daily in warm soapy water and then cover with dry gauze allowing it to drain.  Follow-up with general surgery next week.  Dr. Lane has clinic on Mondays and I have clinic on Wednesdays.  We both have openings.  Oral antibiotics of Keflex and clindamycin appropriate.  Pain medication as needed.    David Denis MD

## 2019-10-09 ENCOUNTER — OFFICE VISIT (OUTPATIENT)
Dept: SURGERY | Facility: CLINIC | Age: 54
End: 2019-10-09
Payer: COMMERCIAL

## 2019-10-09 VITALS
WEIGHT: 148 LBS | SYSTOLIC BLOOD PRESSURE: 147 MMHG | HEART RATE: 83 BPM | HEIGHT: 63 IN | BODY MASS INDEX: 26.22 KG/M2 | TEMPERATURE: 99.2 F | DIASTOLIC BLOOD PRESSURE: 90 MMHG

## 2019-10-09 DIAGNOSIS — T14.8XXA PUNCTURE WOUND: Primary | ICD-10-CM

## 2019-10-09 PROCEDURE — 99212 OFFICE O/P EST SF 10 MIN: CPT | Performed by: SURGERY

## 2019-10-09 ASSESSMENT — MIFFLIN-ST. JEOR: SCORE: 1226.48

## 2019-10-09 NOTE — PROGRESS NOTES
54-year-old female here for follow-up from a traumatic wound to her right lower extremity.  Patient reports the pain is minimal.  The wound is draining through the Penrose drain.  She is finishing up her antibiotics.    Exam:  Wound is clean with some serous drainage over the Penrose.  No erythema.  Stitches intact.      Assessment and plan: Puncture wound to right lower extremity.  Penrose drain removed today and stitches left in place for another 7 days.  Patient advised to wash daily with soap and water and cover with dry gauze and tape.    David Denis MD

## 2019-10-09 NOTE — NURSING NOTE
"Initial BP (!) 155/106 (BP Location: Right arm, Patient Position: Sitting, Cuff Size: Adult Regular)   Pulse 84   Temp 99.2  F (37.3  C) (Tympanic)   Ht 1.594 m (5' 2.75\")   Wt 67.1 kg (148 lb)   BMI 26.43 kg/m   Estimated body mass index is 26.43 kg/m  as calculated from the following:    Height as of this encounter: 1.594 m (5' 2.75\").    Weight as of this encounter: 67.1 kg (148 lb). .    Monserrat Pop MA    "

## 2019-10-09 NOTE — LETTER
10/9/2019         RE: Carolina Hernandez  [address] [place] [id number]        Dear Colleague,    Thank you for referring your patient, Carolina Hernandez, to the [facility]. Please see a copy of my visit note below.    54-year-old female here for follow-up from a traumatic wound to her right lower extremity.  Patient reports the pain is minimal.  The wound is draining through the Penrose drain.  She is finishing up her antibiotics.    Exam:  Wound is clean with some serous drainage over the Penrose.  No erythema.  Stitches intact.      Assessment and plan: Puncture wound to right lower extremity.  Penrose drain removed today and stitches left in place for another 7 days.  Patient advised to wash daily with soap and water and cover with dry gauze and tape.    David Denis MD     Again, thank you for allowing me to participate in the care of your patient.        Sincerely,        David Denis MD

## 2019-10-16 ENCOUNTER — OFFICE VISIT (OUTPATIENT)
Dept: SURGERY | Facility: CLINIC | Age: 54
End: 2019-10-16
Payer: COMMERCIAL

## 2019-10-16 VITALS
DIASTOLIC BLOOD PRESSURE: 91 MMHG | BODY MASS INDEX: 26.22 KG/M2 | HEART RATE: 84 BPM | SYSTOLIC BLOOD PRESSURE: 160 MMHG | TEMPERATURE: 98.2 F | WEIGHT: 148 LBS | HEIGHT: 63 IN

## 2019-10-16 DIAGNOSIS — Z09 POSTOP CHECK: Primary | ICD-10-CM

## 2019-10-16 PROCEDURE — 99024 POSTOP FOLLOW-UP VISIT: CPT | Performed by: SURGERY

## 2019-10-16 ASSESSMENT — MIFFLIN-ST. JEOR: SCORE: 1226.48

## 2019-10-16 NOTE — LETTER
10/16/2019         RE: Carolina Hernandez  [address] [place] [id number]        Dear Colleague,    Thank you for referring your patient, Carolina Hernandez, to the [facility]. Please see a copy of my visit note below.    Patient here for suture removal.  Patient reports some small serous drainage from the wound.  She is almost done with her antibiotics.    Exam:    Wound without erythema.  Nylon sutures removed.  The very edge of the flap is slightly necrotic but the flap is holding in place.      Assessment and plan: Status post removal of sutures.  Patient advised to wash wound daily with soap and water.  Small edges of the flap may necrosis and come off.  Dry dressing changes is all that is required.  Return to clinic as necessary.    David Denis MD     Again, thank you for allowing me to participate in the care of your patient.        Sincerely,        David Denis MD

## 2019-10-16 NOTE — NURSING NOTE
"Initial BP (!) 160/91 (BP Location: Right arm, Patient Position: Sitting, Cuff Size: Adult Regular)   Pulse 84   Temp 98.2  F (36.8  C) (Tympanic)   Ht 1.594 m (5' 2.75\")   Wt 67.1 kg (148 lb)   BMI 26.43 kg/m   Estimated body mass index is 26.43 kg/m  as calculated from the following:    Height as of this encounter: 1.594 m (5' 2.75\").    Weight as of this encounter: 67.1 kg (148 lb). .    Monserrat Pop MA    "

## 2019-10-16 NOTE — PROGRESS NOTES
Patient here for suture removal.  Patient reports some small serous drainage from the wound.  She is almost done with her antibiotics.    Exam:    Wound without erythema.  Nylon sutures removed.  The very edge of the flap is slightly necrotic but the flap is holding in place.      Assessment and plan: Status post removal of sutures.  Patient advised to wash wound daily with soap and water.  Small edges of the flap may necrosis and come off.  Dry dressing changes is all that is required.  Return to clinic as necessary.    David Denis MD

## 2019-10-22 ENCOUNTER — TELEPHONE (OUTPATIENT)
Dept: SURGERY | Facility: CLINIC | Age: 54
End: 2019-10-22

## 2019-10-22 ENCOUNTER — HOSPITAL ENCOUNTER (EMERGENCY)
Facility: CLINIC | Age: 54
Discharge: HOME | End: 2019-10-22
Attending: NURSE PRACTITIONER | Admitting: NURSE PRACTITIONER
Payer: COMMERCIAL

## 2019-10-22 VITALS
OXYGEN SATURATION: 98 % | RESPIRATION RATE: 16 BRPM | SYSTOLIC BLOOD PRESSURE: 143 MMHG | HEART RATE: 74 BPM | DIASTOLIC BLOOD PRESSURE: 91 MMHG | TEMPERATURE: 98 F

## 2019-10-22 DIAGNOSIS — R19.7 DIARRHEA: ICD-10-CM

## 2019-10-22 DIAGNOSIS — R68.83 CHILLS: ICD-10-CM

## 2019-10-22 DIAGNOSIS — R51.9 HEADACHE: ICD-10-CM

## 2019-10-22 LAB
ALBUMIN SERPL-MCNC: 3.8 G/DL (ref 3.4–5)
ALP SERPL-CCNC: 62 U/L (ref 40–150)
ALT SERPL W P-5'-P-CCNC: 25 U/L (ref 0–50)
ANION GAP SERPL CALCULATED.3IONS-SCNC: 7 MMOL/L (ref 3–14)
AST SERPL W P-5'-P-CCNC: 20 U/L (ref 0–45)
BASOPHILS # BLD AUTO: 0.1 10E9/L (ref 0–0.2)
BASOPHILS NFR BLD AUTO: 0.4 %
BILIRUB SERPL-MCNC: 0.5 MG/DL (ref 0.2–1.3)
BUN SERPL-MCNC: 14 MG/DL (ref 7–30)
C DIFF TOX B STL QL: POSITIVE
CALCIUM SERPL-MCNC: 9.2 MG/DL (ref 8.5–10.1)
CHLORIDE SERPL-SCNC: 104 MMOL/L (ref 94–109)
CO2 SERPL-SCNC: 25 MMOL/L (ref 20–32)
CREAT SERPL-MCNC: 0.71 MG/DL (ref 0.52–1.04)
DIFFERENTIAL METHOD BLD: ABNORMAL
EOSINOPHIL # BLD AUTO: 0.4 10E9/L (ref 0–0.7)
EOSINOPHIL NFR BLD AUTO: 2.7 %
ERYTHROCYTE [DISTWIDTH] IN BLOOD BY AUTOMATED COUNT: 12.4 % (ref 10–15)
GFR SERPL CREATININE-BSD FRML MDRD: >90 ML/MIN/{1.73_M2}
GLUCOSE SERPL-MCNC: 97 MG/DL (ref 70–99)
HCT VFR BLD AUTO: 40.1 % (ref 35–47)
HGB BLD-MCNC: 13.5 G/DL (ref 11.7–15.7)
IMM GRANULOCYTES # BLD: 0.1 10E9/L (ref 0–0.4)
IMM GRANULOCYTES NFR BLD: 0.4 %
LACTATE BLD-SCNC: 1.3 MMOL/L (ref 0.7–2)
LYMPHOCYTES # BLD AUTO: 2.1 10E9/L (ref 0.8–5.3)
LYMPHOCYTES NFR BLD AUTO: 14.5 %
MCH RBC QN AUTO: 31.3 PG (ref 26.5–33)
MCHC RBC AUTO-ENTMCNC: 33.7 G/DL (ref 31.5–36.5)
MCV RBC AUTO: 93 FL (ref 78–100)
MONOCYTES # BLD AUTO: 0.9 10E9/L (ref 0–1.3)
MONOCYTES NFR BLD AUTO: 6 %
NEUTROPHILS # BLD AUTO: 10.8 10E9/L (ref 1.6–8.3)
NEUTROPHILS NFR BLD AUTO: 76 %
NRBC # BLD AUTO: 0 10*3/UL
NRBC BLD AUTO-RTO: 0 /100
PLATELET # BLD AUTO: 371 10E9/L (ref 150–450)
POTASSIUM SERPL-SCNC: 3.3 MMOL/L (ref 3.4–5.3)
PROT SERPL-MCNC: 7.7 G/DL (ref 6.8–8.8)
RBC # BLD AUTO: 4.32 10E12/L (ref 3.8–5.2)
SODIUM SERPL-SCNC: 136 MMOL/L (ref 133–144)
SPECIMEN SOURCE: ABNORMAL
WBC # BLD AUTO: 14.3 10E9/L (ref 4–11)

## 2019-10-22 PROCEDURE — 87070 CULTURE OTHR SPECIMN AEROBIC: CPT | Performed by: NURSE PRACTITIONER

## 2019-10-22 PROCEDURE — 87493 C DIFF AMPLIFIED PROBE: CPT | Performed by: NURSE PRACTITIONER

## 2019-10-22 PROCEDURE — 96361 HYDRATE IV INFUSION ADD-ON: CPT | Performed by: NURSE PRACTITIONER

## 2019-10-22 PROCEDURE — 99284 EMERGENCY DEPT VISIT MOD MDM: CPT | Mod: 25 | Performed by: NURSE PRACTITIONER

## 2019-10-22 PROCEDURE — 96360 HYDRATION IV INFUSION INIT: CPT | Performed by: NURSE PRACTITIONER

## 2019-10-22 PROCEDURE — 85025 COMPLETE CBC W/AUTO DIFF WBC: CPT | Performed by: NURSE PRACTITIONER

## 2019-10-22 PROCEDURE — 76882 US LMTD JT/FCL EVL NVASC XTR: CPT | Mod: 26 | Performed by: NURSE PRACTITIONER

## 2019-10-22 PROCEDURE — 80053 COMPREHEN METABOLIC PANEL: CPT | Performed by: NURSE PRACTITIONER

## 2019-10-22 PROCEDURE — 25800030 ZZH RX IP 258 OP 636: Performed by: NURSE PRACTITIONER

## 2019-10-22 PROCEDURE — 83605 ASSAY OF LACTIC ACID: CPT | Performed by: NURSE PRACTITIONER

## 2019-10-22 PROCEDURE — 76882 US LMTD JT/FCL EVL NVASC XTR: CPT | Performed by: NURSE PRACTITIONER

## 2019-10-22 RX ADMIN — SODIUM CHLORIDE, POTASSIUM CHLORIDE, SODIUM LACTATE AND CALCIUM CHLORIDE 1000 ML: 600; 310; 30; 20 INJECTION, SOLUTION INTRAVENOUS at 15:41

## 2019-10-22 NOTE — ED AVS SNAPSHOT
[facility]  [address] [address]  Phone:  [phone]  Fax:  [phone]                                    Carolina Hernandez   MRN: [id number]    Department:  [facility]   Date of Visit:  10/22/2019           After Visit Summary Signature Page    I have received my discharge instructions, and my questions have been answered. I have discussed any challenges I see with this plan with the nurse or doctor.    ..........................................................................................................................................  Patient/Patient Representative Signature      ..........................................................................................................................................  Patient Representative Print Name and Relationship to Patient    ..................................................               ................................................  Date                                   Time    ..........................................................................................................................................  Reviewed by Signature/Title    ...................................................              ..............................................  Date                                               Time          22EPIC Rev 08/18

## 2019-10-22 NOTE — TELEPHONE ENCOUNTER
Pt reports that she thinks she may have an infection.  She does not have a fever but has the chills, body aches, headache and stiff neck.  She reports that she was on Bactrim and changed to Cleocin, she admits that she only took the Cleocin twice a day instead for 4 times daily because it gave her a funny taste in her mouth.    Wound looks infected.  Has a large hole with yellowish discharge.  Area is red and warm to the touch and tender.    Pt was advised to be seen in UC/ER for further evaluation.    Pt agrees with this plan and reports that she has had a number of people tell her that this does not look good and she needs to be seen.    Nikky Borja  [facility] RN

## 2019-10-22 NOTE — DISCHARGE INSTRUCTIONS
Continue washing the leg as you currently are doing.  Stop the triple antibiotic ointment and just apply a gauze over the wound.  Follow-up with with Dr. daugherty in 2 days for wound recheck and to follow-up on urine culture and wound culture to determine if antibiotics are necessary and also to follow-up on C. difficile culture.

## 2019-10-22 NOTE — TELEPHONE ENCOUNTER
Reason for Call:  Other call back    Detailed comments: pt calling stating she is experiencing chills, diarrhea, bad headache. She thinks she may have an infection.     Phone Number Patient can be reached at: Home number on file [phone] (home)    Best Time: any     Can we leave a detailed message on this number? YES    Call taken on 10/22/2019 at 1:34 PM by Anisha William

## 2019-10-23 ENCOUNTER — TELEPHONE (OUTPATIENT)
Dept: EMERGENCY MEDICINE | Facility: CLINIC | Age: 54
End: 2019-10-23

## 2019-10-23 DIAGNOSIS — A04.72 COLITIS DUE TO CLOSTRIDIUM DIFFICILE: ICD-10-CM

## 2019-10-23 RX ORDER — VANCOMYCIN HYDROCHLORIDE 125 MG/1
125 CAPSULE ORAL 4 TIMES DAILY
Qty: 56 CAPSULE | Refills: 0 | Status: SHIPPED | OUTPATIENT
Start: 2019-10-23 | End: 2019-11-06

## 2019-10-23 NOTE — TELEPHONE ENCOUNTER
[facility] Emergency Department Lab result notification [Adult-Female]    [place] ED lab result protocol used  Clostridium difficile     Reason for call  Notify of lab results, assess symptoms,  review ED providers recommendations/discharge instructions (if necessary) and advise per ED lab result f/u protocol    Lab Result (including Rx patient on, if applicable)  Final Clostridium difficile toxin B PCR is POSITIVE.  Toxin producing Clostridium difficile target DNA sequences detected, presumed positive for Clostridium difficile toxin B.   Rx (Flagyl or Vancomycin) prescribed upon discharge from the [place] ED/UC:  No  If No Rx, advise and/or treat per [place] ED Lab Result protocol.  Information table from ED Provider visit on 10/22/19  Symptoms reported at ED visit (Chief complaint, HPI) Wound Infection        RT CALF-GORED BY GOAT 2 WEEKS AGO   HA AND SORE NECK   CHILLS AND DIARRHEA TODAY      HPI  Carolina Hernandez is a 54 year old female who presents to ED with neckache, headache with onset of symptoms this past Saturday.  Pt reports onset of chills and diarrhea 10-15 episodes of yellow watery/food particles today.  Pt reports recently gored by a goat 10/5/2019 and was prescribed bactrim and cleocin and did not take as prescribed.  PT denies being exposed to someone with similar illness.  Pt received influenza vaccine around 10/5/2019.  Last dose of antibiotics one week ago.     Significant Medical hx, if applicable (i.e. CKD, diabetes) None   Allergies Allergies   Allergen Reactions     Penicillins Itching      Weight, if applicable Wt Readings from Last 2 Encounters:   10/16/19 67.1 kg (148 lb)   10/09/19 67.1 kg (148 lb)      Coumadin/Warfarin [Yes /No] No   Creatinine Level (mg/dl) Creatinine   Date Value Ref Range Status   10/22/2019 0.71 0.52 - 1.04 mg/dL Final      Creatinine clearance (ml/min), if applicable Serum creatinine: 0.71 mg/dL 10/22/19 1537  Estimated creatinine clearance: 80.9  mL/min   Pregnant (Yes/No/NA) No   Breastfeeding (Yes/No/NA) no   ED providers Impression and Plan (applicable information) Provider note is incomplete   ED diagnosis Headache   Chills      ED provider Christine Rankin APRN CNP         RN Assessment (Patient s current Symptoms), include time called.  [Insert Left message here if message left]  10:17AM: Spoke with patient. She states that she is still having watery yellow diarrhea stools. Has passed 5-6 stools since midnight, no blood noted. Has been taking in fluids well and urinating normal amounts. States that her headache and neck pain are better today. Has no chills, does not think she has a fever. States that her calf wound looks the same.     RN Recommendations/Instructions per [facility] ED lab result protocol  Patient notified of lab result and treatment recommendations.  Rx for Vancomycin (VANCOCIN HCL) 125 MG capsule, Take 1 capsule (125 mg) by mouth 4 times daily for 14 days sent to [Pharmacy - [facility] pharmacy in [place]].  RN reviewed information about C-diff from patient education/instructions and Epic reference.   Verified with Patient that this is her first episode of c-diff.   Advised to follow up with PCP tomorrow as directed by the ED provider.   Patient is comfortable with the plan of care and has no further questions.      Please Contact your PCP clinic or return to the Emergency department if your:    Symptoms worsen or other concerning symptom's.    PCP follow-up Questions asked: YES       [RN Name]  Jannette Goldberg RN  [employer] [facility] RN  Lung Nodule and ED Lab Result RN  Epic pool (ED late result f/u RN): [id number]  [facility] INCIDENTAL RADIOLOGY F/U NURSES: P [id number]  # [phone]    Copy of Lab result   Clostridium difficile toxin B PCR [[id number]] (Order [id number])   Order Requisition     Clostridium difficile toxin B PCR (Order #[id number]) on 10/22/19   Exam Information     Exam Date Exam Time Accession # Results    10/22/19   4:20 PM [id number]    Specimen Information: Feces        Component Value Flag Ref Range Units Status Collected Lab   Specimen Description Feces     Final 10/22/2019  4:20 PM 59   C Diff Toxin B PCR Positive  Abnormal   NEG^Negative  Final 10/22/2019  4:20 PM 51   Comment:   Positive: Toxin producing C. difficile target DNA sequences detected, presumed    positive for C. difficile toxin B. C. difficile (Requires Enteric Isolation).       Clostridium difficile (Requires Enteric Isolation)   FDA approved assay performed using [employer] GeneXpert real-time PCR.

## 2019-10-24 LAB
BACTERIA SPEC CULT: NO GROWTH
Lab: NORMAL
SPECIMEN SOURCE: NORMAL

## 2019-10-24 NOTE — RESULT ENCOUNTER NOTE
Final wound culture report is NEGATIVE.    No treatment or change in treatment per [place] ED Lab Result protocol.

## 2019-10-25 ENCOUNTER — OFFICE VISIT (OUTPATIENT)
Dept: FAMILY MEDICINE | Facility: CLINIC | Age: 54
End: 2019-10-25
Payer: COMMERCIAL

## 2019-10-25 VITALS
BODY MASS INDEX: 26.64 KG/M2 | HEART RATE: 59 BPM | WEIGHT: 149.2 LBS | OXYGEN SATURATION: 98 % | TEMPERATURE: 97.4 F | RESPIRATION RATE: 13 BRPM | DIASTOLIC BLOOD PRESSURE: 82 MMHG | SYSTOLIC BLOOD PRESSURE: 138 MMHG

## 2019-10-25 DIAGNOSIS — A04.72 CLOSTRIDIUM DIFFICILE COLITIS: ICD-10-CM

## 2019-10-25 DIAGNOSIS — Z09 FOLLOW UP: Primary | ICD-10-CM

## 2019-10-25 LAB
ALBUMIN SERPL-MCNC: 3.9 G/DL (ref 3.4–5)
ALP SERPL-CCNC: 64 U/L (ref 40–150)
ALT SERPL W P-5'-P-CCNC: 26 U/L (ref 0–50)
ANION GAP SERPL CALCULATED.3IONS-SCNC: 8 MMOL/L (ref 3–14)
AST SERPL W P-5'-P-CCNC: 21 U/L (ref 0–45)
BASOPHILS # BLD AUTO: 0 10E9/L (ref 0–0.2)
BASOPHILS NFR BLD AUTO: 0.6 %
BILIRUB SERPL-MCNC: 0.3 MG/DL (ref 0.2–1.3)
BUN SERPL-MCNC: 13 MG/DL (ref 7–30)
CALCIUM SERPL-MCNC: 9.9 MG/DL (ref 8.5–10.1)
CHLORIDE SERPL-SCNC: 101 MMOL/L (ref 94–109)
CO2 SERPL-SCNC: 27 MMOL/L (ref 20–32)
CREAT SERPL-MCNC: 0.66 MG/DL (ref 0.52–1.04)
DIFFERENTIAL METHOD BLD: NORMAL
EOSINOPHIL # BLD AUTO: 0.3 10E9/L (ref 0–0.7)
EOSINOPHIL NFR BLD AUTO: 4.9 %
ERYTHROCYTE [DISTWIDTH] IN BLOOD BY AUTOMATED COUNT: 12.2 % (ref 10–15)
GFR SERPL CREATININE-BSD FRML MDRD: >90 ML/MIN/{1.73_M2}
GLUCOSE SERPL-MCNC: 86 MG/DL (ref 70–99)
HCT VFR BLD AUTO: 41.2 % (ref 35–47)
HGB BLD-MCNC: 14 G/DL (ref 11.7–15.7)
LYMPHOCYTES # BLD AUTO: 1.6 10E9/L (ref 0.8–5.3)
LYMPHOCYTES NFR BLD AUTO: 24.6 %
MCH RBC QN AUTO: 31.8 PG (ref 26.5–33)
MCHC RBC AUTO-ENTMCNC: 34 G/DL (ref 31.5–36.5)
MCV RBC AUTO: 94 FL (ref 78–100)
MONOCYTES # BLD AUTO: 0.9 10E9/L (ref 0–1.3)
MONOCYTES NFR BLD AUTO: 13.8 %
NEUTROPHILS # BLD AUTO: 3.5 10E9/L (ref 1.6–8.3)
NEUTROPHILS NFR BLD AUTO: 56.1 %
PLATELET # BLD AUTO: 348 10E9/L (ref 150–450)
POTASSIUM SERPL-SCNC: 3.8 MMOL/L (ref 3.4–5.3)
PROT SERPL-MCNC: 8.2 G/DL (ref 6.8–8.8)
RBC # BLD AUTO: 4.4 10E12/L (ref 3.8–5.2)
SODIUM SERPL-SCNC: 136 MMOL/L (ref 133–144)
WBC # BLD AUTO: 6.3 10E9/L (ref 4–11)

## 2019-10-25 PROCEDURE — 36415 COLL VENOUS BLD VENIPUNCTURE: CPT | Performed by: FAMILY MEDICINE

## 2019-10-25 PROCEDURE — 99214 OFFICE O/P EST MOD 30 MIN: CPT | Performed by: FAMILY MEDICINE

## 2019-10-25 PROCEDURE — 80053 COMPREHEN METABOLIC PANEL: CPT | Performed by: FAMILY MEDICINE

## 2019-10-25 PROCEDURE — 85025 COMPLETE CBC W/AUTO DIFF WBC: CPT | Performed by: FAMILY MEDICINE

## 2019-10-25 NOTE — LETTER
October 28, 2019      Carolina Hernandez  [address] [place] [address]        Dear ,    We are writing to inform you of your test results.    Your test results fall within the expected range(s) or remain unchanged from previous results.  Please continue with current treatment plan.    Resulted Orders   Comprehensive metabolic panel   Result Value Ref Range    Sodium 136 133 - 144 mmol/L    Potassium 3.8 3.4 - 5.3 mmol/L    Chloride 101 94 - 109 mmol/L    Carbon Dioxide 27 20 - 32 mmol/L    Anion Gap 8 3 - 14 mmol/L    Glucose 86 70 - 99 mg/dL    Urea Nitrogen 13 7 - 30 mg/dL    Creatinine 0.66 0.52 - 1.04 mg/dL    GFR Estimate >90 >60 mL/min/[1.73_m2]      Comment:      Non  GFR Calc  Starting 12/18/2018, serum creatinine based estimated GFR (eGFR) will be   calculated using the Chronic Kidney Disease Epidemiology Collaboration   (CKD-EPI) equation.      GFR Estimate If Black >90 >60 mL/min/[1.73_m2]      Comment:       GFR Calc  Starting 12/18/2018, serum creatinine based estimated GFR (eGFR) will be   calculated using the Chronic Kidney Disease Epidemiology Collaboration   (CKD-EPI) equation.      Calcium 9.9 8.5 - 10.1 mg/dL    Bilirubin Total 0.3 0.2 - 1.3 mg/dL    Albumin 3.9 3.4 - 5.0 g/dL    Protein Total 8.2 6.8 - 8.8 g/dL    Alkaline Phosphatase 64 40 - 150 U/L    ALT 26 0 - 50 U/L    AST 21 0 - 45 U/L   CBC with platelets differential   Result Value Ref Range    WBC 6.3 4.0 - 11.0 10e9/L    RBC Count 4.40 3.8 - 5.2 10e12/L    Hemoglobin 14.0 11.7 - 15.7 g/dL    Hematocrit 41.2 35.0 - 47.0 %    MCV 94 78 - 100 fl    MCH 31.8 26.5 - 33.0 pg    MCHC 34.0 31.5 - 36.5 g/dL    RDW 12.2 10.0 - 15.0 %    Platelet Count 348 150 - 450 10e9/L    % Neutrophils 56.1 %    % Lymphocytes 24.6 %    % Monocytes 13.8 %    % Eosinophils 4.9 %    % Basophils 0.6 %    Absolute Neutrophil 3.5 1.6 - 8.3 10e9/L    Absolute Lymphocytes 1.6 0.8 - 5.3 10e9/L    Absolute  Monocytes 0.9 0.0 - 1.3 10e9/L    Absolute Eosinophils 0.3 0.0 - 0.7 10e9/L    Absolute Basophils 0.0 0.0 - 0.2 10e9/L    Diff Method Automated Method        If you have any questions or concerns, please call the clinic at the number listed above.       Sincerely,        Julia Oneill MD/bmc

## 2019-10-25 NOTE — PROGRESS NOTES
Subjective     Carolina Hernandez is a 54 year old female who presents to clinic today for the following health issues:    Patient comes in for a follow up from the ED. She was seen for a cellulitis of her lower leg a couple of weeks ago and was prescribed clindamycin and bactrim. She took the medication irregularly. She subsequently developed diarrhea headaches and chills recently and was seen in the ED. She was diagnosed with c-diff and is presently on vancomycin.she c/o of abdominal cramping today. Stools are still quite loose. Denies any fevers or chills.    HPI   ED/UC Followup:    Facility:  [facility]  Date of visit: 10/22/19  Reason for visit: C diff, headache abdominal pain, open wound right calf  Current Status: still having pain           Patient Active Problem List   Diagnosis     CARDIOVASCULAR SCREENING; LDL GOAL LESS THAN 160     Recurrent major depressive disorder, in remission (H)     Encounter for tobacco use cessation counseling     Mild intermittent asthma without complication     Essential hypertension     No past surgical history on file.    Social History     Tobacco Use     Smoking status: Current Every Day Smoker     Packs/day: 0.50     Types: Cigarettes     Smokeless tobacco: Never Used   Substance Use Topics     Alcohol use: Yes     Comment: 12 pack weekly     Family History   Problem Relation Age of Onset     Glaucoma Father      Coronary Artery Disease Father         stents     Cancer Maternal Grandfather      Coronary Artery Disease Paternal Grandfather      Schizophrenia Daughter      Diabetes Daughter      Cancer Daughter          Current Outpatient Medications   Medication Sig Dispense Refill     albuterol (PROVENTIL) (2.5 MG/3ML) 0.083% neb solution Take 1 vial (2.5 mg) by nebulization every 4 hours as needed for shortness of breath / dyspnea or wheezing 3 Box 3     albuterol (VENTOLIN HFA) 108 (90 Base) MCG/ACT inhaler Inhale 2 puffs into the lungs every 6 hours as needed for shortness  of breath / dyspnea or wheezing. 3 Inhaler 3     amLODIPine (NORVASC) 2.5 MG tablet Take 1 tablet (2.5 mg) by mouth daily 90 tablet 3     cetirizine (ZYRTEC) 10 MG tablet Take 10 mg by mouth daily       FLUoxetine (PROZAC) 20 MG capsule Take 1 capsule (20 mg) by mouth daily 90 capsule 3     FLUoxetine (PROZAC) 40 MG capsule Take 1 capsule (40 mg) by mouth daily 90 capsule 3     fluticasone-salmeterol (ADVAIR DISKUS) 500-50 MCG/DOSE inhaler Inhale 1 puff into the lungs every 12 hours 3 Inhaler 3     hydrochlorothiazide (HYDRODIURIL) 25 MG tablet Take 1 tablet (25 mg) by mouth daily 90 tablet 3     montelukast (SINGULAIR) 10 MG tablet Take 1 tablet (10 mg) by mouth At Bedtime 30 tablet 11     multivitamin  with lutein (OCUVITE WITH LTEIN) CAPS per capsule Take 1 capsule by mouth daily       omeprazole 20 MG tablet Take 1 tablet (20 mg) by mouth daily 90 tablet 3     vancomycin (VANCOCIN) 125 MG capsule Take 1 capsule (125 mg) by mouth 4 times daily for 14 days 56 capsule 0     Allergies   Allergen Reactions     Penicillins Itching     BP Readings from Last 3 Encounters:   10/25/19 138/82   10/22/19 (!) 143/91   10/16/19 (!) 160/91    Wt Readings from Last 3 Encounters:   10/25/19 67.7 kg (149 lb 3.2 oz)   10/16/19 67.1 kg (148 lb)   10/09/19 67.1 kg (148 lb)                      Reviewed and updated as needed this visit by Provider         Review of Systems   ROS COMP: Constitutional, HEENT, cardiovascular, pulmonary, gi and gu systems are negative, except as otherwise noted.      Objective    /82   Pulse 59   Temp 97.4  F (36.3  C) (Tympanic)   Resp 13   Wt 67.7 kg (149 lb 3.2 oz)   SpO2 98%   BMI 26.64 kg/m    Body mass index is 26.64 kg/m .  Physical Exam   GENERAL: healthy, alert and no distress  EYES: Eyes grossly normal to inspection, PERRL and conjunctivae and sclerae normal  HENT: ear canals and TM's normal, nose and mouth without ulcers or lesions  NECK: no adenopathy, no asymmetry, masses, or  scars and thyroid normal to palpation  RESP: lungs clear to auscultation - no rales, rhonchi or wheezes  CV: regular rate and rhythm, normal S1 S2, no S3 or S4, no murmur, click or rub, no peripheral edema and peripheral pulses strong  ABDOMEN: soft, nontender, no hepatosplenomegaly, no masses and bowel sounds normal  MS: no gross musculoskeletal defects noted, no edema    Diagnostic Test Results:  Results for orders placed or performed in visit on 10/25/19   Comprehensive metabolic panel   Result Value Ref Range    Sodium 136 133 - 144 mmol/L    Potassium 3.8 3.4 - 5.3 mmol/L    Chloride 101 94 - 109 mmol/L    Carbon Dioxide 27 20 - 32 mmol/L    Anion Gap 8 3 - 14 mmol/L    Glucose 86 70 - 99 mg/dL    Urea Nitrogen 13 7 - 30 mg/dL    Creatinine 0.66 0.52 - 1.04 mg/dL    GFR Estimate >90 >60 mL/min/[1.73_m2]    GFR Estimate If Black >90 >60 mL/min/[1.73_m2]    Calcium 9.9 8.5 - 10.1 mg/dL    Bilirubin Total 0.3 0.2 - 1.3 mg/dL    Albumin 3.9 3.4 - 5.0 g/dL    Protein Total 8.2 6.8 - 8.8 g/dL    Alkaline Phosphatase 64 40 - 150 U/L    ALT 26 0 - 50 U/L    AST 21 0 - 45 U/L   CBC with platelets differential   Result Value Ref Range    WBC 6.3 4.0 - 11.0 10e9/L    RBC Count 4.40 3.8 - 5.2 10e12/L    Hemoglobin 14.0 11.7 - 15.7 g/dL    Hematocrit 41.2 35.0 - 47.0 %    MCV 94 78 - 100 fl    MCH 31.8 26.5 - 33.0 pg    MCHC 34.0 31.5 - 36.5 g/dL    RDW 12.2 10.0 - 15.0 %    Platelet Count 348 150 - 450 10e9/L    % Neutrophils 56.1 %    % Lymphocytes 24.6 %    % Monocytes 13.8 %    % Eosinophils 4.9 %    % Basophils 0.6 %    Absolute Neutrophil 3.5 1.6 - 8.3 10e9/L    Absolute Lymphocytes 1.6 0.8 - 5.3 10e9/L    Absolute Monocytes 0.9 0.0 - 1.3 10e9/L    Absolute Eosinophils 0.3 0.0 - 0.7 10e9/L    Absolute Basophils 0.0 0.0 - 0.2 10e9/L    Diff Method Automated Method            Assessment & Plan     1. Follow up  Patient is a 54 yr old female here for an ED follow up . She was diagnosed with c-diff. Reviewed her labs from  the ED.Her potassium was low and white count was high. We will recheck those labs today.    2. Clostridium difficile colitis  Patient will be notified of results.Emphasized completion of her medication. She is to let us know if the diarrhea abates after she is done with the antibiotics.  - Comprehensive metabolic panel  - CBC with platelets differential       FUTURE APPOINTMENTS:       - Follow-up visit in one month or sooner as needed    Return in about 4 weeks (around 11/22/2019) for Routine Visit.    Julia Oneill MD  [facility]

## 2019-10-27 NOTE — RESULT ENCOUNTER NOTE
Please inform patient that test result was within normal parameters.   Thank you.     Julia Oneill M.D.

## 2019-12-10 ENCOUNTER — APPOINTMENT (OUTPATIENT)
Dept: CT IMAGING | Facility: CLINIC | Age: 54
End: 2019-12-10
Attending: EMERGENCY MEDICINE
Payer: COMMERCIAL

## 2019-12-10 ENCOUNTER — HOSPITAL ENCOUNTER (EMERGENCY)
Facility: CLINIC | Age: 54
Discharge: ANOTHER HOSPITAL | End: 2019-12-10
Attending: EMERGENCY MEDICINE | Admitting: EMERGENCY MEDICINE
Payer: COMMERCIAL

## 2019-12-10 VITALS
SYSTOLIC BLOOD PRESSURE: 142 MMHG | WEIGHT: 150 LBS | HEIGHT: 63 IN | DIASTOLIC BLOOD PRESSURE: 90 MMHG | TEMPERATURE: 98.4 F | RESPIRATION RATE: 16 BRPM | HEART RATE: 77 BPM | OXYGEN SATURATION: 97 % | BODY MASS INDEX: 26.58 KG/M2

## 2019-12-10 DIAGNOSIS — K11.20 SIALOADENITIS OF SUBMANDIBULAR GLAND: ICD-10-CM

## 2019-12-10 LAB
ANION GAP SERPL CALCULATED.3IONS-SCNC: 3 MMOL/L (ref 3–14)
BASOPHILS # BLD AUTO: 0 10E9/L (ref 0–0.2)
BASOPHILS NFR BLD AUTO: 0.2 %
BUN SERPL-MCNC: 9 MG/DL (ref 7–30)
CALCIUM SERPL-MCNC: 9.5 MG/DL (ref 8.5–10.1)
CHLORIDE SERPL-SCNC: 101 MMOL/L (ref 94–109)
CO2 SERPL-SCNC: 32 MMOL/L (ref 20–32)
CREAT SERPL-MCNC: 0.63 MG/DL (ref 0.52–1.04)
DIFFERENTIAL METHOD BLD: ABNORMAL
EOSINOPHIL # BLD AUTO: 0 10E9/L (ref 0–0.7)
EOSINOPHIL NFR BLD AUTO: 0.2 %
ERYTHROCYTE [DISTWIDTH] IN BLOOD BY AUTOMATED COUNT: 12.8 % (ref 10–15)
GFR SERPL CREATININE-BSD FRML MDRD: >90 ML/MIN/{1.73_M2}
GLUCOSE SERPL-MCNC: 112 MG/DL (ref 70–99)
HCT VFR BLD AUTO: 41.2 % (ref 35–47)
HGB BLD-MCNC: 14.2 G/DL (ref 11.7–15.7)
IMM GRANULOCYTES # BLD: 0.1 10E9/L (ref 0–0.4)
IMM GRANULOCYTES NFR BLD: 0.3 %
LYMPHOCYTES # BLD AUTO: 1.9 10E9/L (ref 0.8–5.3)
LYMPHOCYTES NFR BLD AUTO: 10.8 %
MCH RBC QN AUTO: 31.6 PG (ref 26.5–33)
MCHC RBC AUTO-ENTMCNC: 34.5 G/DL (ref 31.5–36.5)
MCV RBC AUTO: 92 FL (ref 78–100)
MONOCYTES # BLD AUTO: 1.4 10E9/L (ref 0–1.3)
MONOCYTES NFR BLD AUTO: 8 %
NEUTROPHILS # BLD AUTO: 14 10E9/L (ref 1.6–8.3)
NEUTROPHILS NFR BLD AUTO: 80.5 %
NRBC # BLD AUTO: 0 10*3/UL
NRBC BLD AUTO-RTO: 0 /100
PLATELET # BLD AUTO: 384 10E9/L (ref 150–450)
POTASSIUM SERPL-SCNC: 3.3 MMOL/L (ref 3.4–5.3)
RBC # BLD AUTO: 4.5 10E12/L (ref 3.8–5.2)
SODIUM SERPL-SCNC: 136 MMOL/L (ref 133–144)
WBC # BLD AUTO: 17.4 10E9/L (ref 4–11)

## 2019-12-10 PROCEDURE — 25800030 ZZH RX IP 258 OP 636: Performed by: EMERGENCY MEDICINE

## 2019-12-10 PROCEDURE — 85025 COMPLETE CBC W/AUTO DIFF WBC: CPT | Performed by: EMERGENCY MEDICINE

## 2019-12-10 PROCEDURE — 25000125 ZZHC RX 250: Performed by: EMERGENCY MEDICINE

## 2019-12-10 PROCEDURE — 96361 HYDRATE IV INFUSION ADD-ON: CPT

## 2019-12-10 PROCEDURE — 96375 TX/PRO/DX INJ NEW DRUG ADDON: CPT

## 2019-12-10 PROCEDURE — 80048 BASIC METABOLIC PNL TOTAL CA: CPT | Performed by: EMERGENCY MEDICINE

## 2019-12-10 PROCEDURE — 96367 TX/PROPH/DG ADDL SEQ IV INF: CPT

## 2019-12-10 PROCEDURE — 96376 TX/PRO/DX INJ SAME DRUG ADON: CPT

## 2019-12-10 PROCEDURE — 99285 EMERGENCY DEPT VISIT HI MDM: CPT | Mod: 25

## 2019-12-10 PROCEDURE — 99285 EMERGENCY DEPT VISIT HI MDM: CPT | Mod: Z6 | Performed by: EMERGENCY MEDICINE

## 2019-12-10 PROCEDURE — 70491 CT SOFT TISSUE NECK W/DYE: CPT

## 2019-12-10 PROCEDURE — 96365 THER/PROPH/DIAG IV INF INIT: CPT

## 2019-12-10 PROCEDURE — 25000128 H RX IP 250 OP 636: Performed by: EMERGENCY MEDICINE

## 2019-12-10 RX ORDER — METHYLPREDNISOLONE SODIUM SUCCINATE 125 MG/2ML
125 INJECTION, POWDER, LYOPHILIZED, FOR SOLUTION INTRAMUSCULAR; INTRAVENOUS ONCE
Status: COMPLETED | OUTPATIENT
Start: 2019-12-10 | End: 2019-12-10

## 2019-12-10 RX ORDER — HYDROMORPHONE HYDROCHLORIDE 1 MG/ML
0.5 INJECTION, SOLUTION INTRAMUSCULAR; INTRAVENOUS; SUBCUTANEOUS ONCE
Status: COMPLETED | OUTPATIENT
Start: 2019-12-10 | End: 2019-12-10

## 2019-12-10 RX ORDER — SODIUM CHLORIDE 9 MG/ML
1000 INJECTION, SOLUTION INTRAVENOUS CONTINUOUS
Status: DISCONTINUED | OUTPATIENT
Start: 2019-12-10 | End: 2019-12-10 | Stop reason: HOSPADM

## 2019-12-10 RX ORDER — CEFTRIAXONE SODIUM 2 G/50ML
2 INJECTION, SOLUTION INTRAVENOUS ONCE
Status: COMPLETED | OUTPATIENT
Start: 2019-12-10 | End: 2019-12-10

## 2019-12-10 RX ORDER — CLINDAMYCIN PHOSPHATE 900 MG/50ML
900 INJECTION, SOLUTION INTRAVENOUS ONCE
Status: COMPLETED | OUTPATIENT
Start: 2019-12-10 | End: 2019-12-10

## 2019-12-10 RX ORDER — IOPAMIDOL 755 MG/ML
80 INJECTION, SOLUTION INTRAVASCULAR ONCE
Status: COMPLETED | OUTPATIENT
Start: 2019-12-10 | End: 2019-12-10

## 2019-12-10 RX ORDER — DIPHENHYDRAMINE HYDROCHLORIDE 50 MG/ML
25 INJECTION INTRAMUSCULAR; INTRAVENOUS ONCE
Status: COMPLETED | OUTPATIENT
Start: 2019-12-10 | End: 2019-12-10

## 2019-12-10 RX ADMIN — CEFTRIAXONE SODIUM 2 G: 2 INJECTION, SOLUTION INTRAVENOUS at 14:47

## 2019-12-10 RX ADMIN — HYDROMORPHONE HYDROCHLORIDE 0.5 MG: 1 INJECTION, SOLUTION INTRAMUSCULAR; INTRAVENOUS; SUBCUTANEOUS at 12:08

## 2019-12-10 RX ADMIN — SODIUM CHLORIDE 80 ML: 9 INJECTION, SOLUTION INTRAVENOUS at 12:34

## 2019-12-10 RX ADMIN — SODIUM CHLORIDE 1000 ML: 9 INJECTION, SOLUTION INTRAVENOUS at 16:04

## 2019-12-10 RX ADMIN — HYDROMORPHONE HYDROCHLORIDE 0.5 MG: 1 INJECTION, SOLUTION INTRAMUSCULAR; INTRAVENOUS; SUBCUTANEOUS at 15:20

## 2019-12-10 RX ADMIN — METHYLPREDNISOLONE SODIUM SUCCINATE 125 MG: 125 INJECTION, POWDER, FOR SOLUTION INTRAMUSCULAR; INTRAVENOUS at 14:47

## 2019-12-10 RX ADMIN — IOPAMIDOL 80 ML: 755 INJECTION, SOLUTION INTRAVENOUS at 12:34

## 2019-12-10 RX ADMIN — SODIUM CHLORIDE 1000 ML: 9 INJECTION, SOLUTION INTRAVENOUS at 12:52

## 2019-12-10 RX ADMIN — CLINDAMYCIN PHOSPHATE 900 MG: 900 INJECTION, SOLUTION INTRAVENOUS at 12:10

## 2019-12-10 RX ADMIN — DIPHENHYDRAMINE HYDROCHLORIDE 25 MG: 50 INJECTION, SOLUTION INTRAMUSCULAR; INTRAVENOUS at 14:46

## 2019-12-10 RX ADMIN — SODIUM CHLORIDE 1000 ML: 9 INJECTION, SOLUTION INTRAVENOUS at 15:20

## 2019-12-10 ASSESSMENT — MIFFLIN-ST. JEOR: SCORE: 1239.53

## 2019-12-10 NOTE — ED NOTES
"Dental pain since Sunday  Left lower jaw is swollen and tender, hurts to open mouth, chew, and swallow  \"there's a big bubble in my mouth under my tongue\"  Has an appointment with dentist today at 1300 but needs pain control   "

## 2019-12-10 NOTE — ED NOTES
Back from CT, rates pain 8/10 which is better, she remains a/o x 4. monitor ice pack given for comfort

## 2019-12-10 NOTE — ED PROVIDER NOTES
History     Chief Complaint   Patient presents with     Dental Pain     concerned about dental abcess that started Sun; obvious swelling on left side of face     HPI  Carolina Hernandez is a 54 year old female who developed left lower cheek/mandibular and dental pain approximately 2 days ago and this morning awoke with significant left facial and cheek swelling, difficulty opening her mouth fully and difficulty swallowing due to severe neck pain.  She has no abnormal breath sounds or stridorous breath sounds, drooling, voice change or respiratory difficulty.  Made an appointment in dental clinic, this afternoon, but symptoms are too painful and she came to the ED prior to this.  She is felt chilled, but has had no fever.  No prior history of similar symptoms and no recent clear dental issue or problem in this area.    Allergies:  Allergies   Allergen Reactions     Penicillins Itching       Problem List:    Patient Active Problem List    Diagnosis Date Noted     Essential hypertension 07/26/2019     Priority: Medium     New diagnosis 7/26/2019         Recurrent major depressive disorder, in remission (H) 01/17/2019     Priority: Medium     Encounter for tobacco use cessation counseling 01/17/2019     Priority: Medium     Mild intermittent asthma without complication 01/17/2019     Priority: Medium     CARDIOVASCULAR SCREENING; LDL GOAL LESS THAN 160 10/31/2010     Priority: Medium        Past Medical History:    No past medical history on file.    Past Surgical History:    No past surgical history on file.    Family History:    Family History   Problem Relation Age of Onset     Glaucoma Father      Coronary Artery Disease Father         stents     Cancer Maternal Grandfather      Coronary Artery Disease Paternal Grandfather      Schizophrenia Daughter      Diabetes Daughter      Cancer Daughter        Social History:  Marital Status:   [2]  Social History     Tobacco Use     Smoking status: Current Every Day  "Smoker     Packs/day: 0.50     Types: Cigarettes     Smokeless tobacco: Never Used   Substance Use Topics     Alcohol use: Yes     Comment: 12 pack weekly     Drug use: No        Medications:    albuterol (PROVENTIL) (2.5 MG/3ML) 0.083% neb solution  albuterol (VENTOLIN HFA) 108 (90 Base) MCG/ACT inhaler  amLODIPine (NORVASC) 2.5 MG tablet  cetirizine (ZYRTEC) 10 MG tablet  FLUoxetine (PROZAC) 20 MG capsule  FLUoxetine (PROZAC) 40 MG capsule  fluticasone-salmeterol (ADVAIR DISKUS) 500-50 MCG/DOSE inhaler  hydrochlorothiazide (HYDRODIURIL) 25 MG tablet  montelukast (SINGULAIR) 10 MG tablet  multivitamin  with lutein (OCUVITE WITH LTEIN) CAPS per capsule  omeprazole 20 MG tablet      Review of Systems  As mentioned above in the history present illness.  All other systems were reviewed and are negative.      Physical Exam   BP: (!) 145/62  Pulse: 75  Heart Rate: 84  Temp: 98.4  F (36.9  C)  Resp: 16  Height: 160 cm (5' 3\")  Weight: 68 kg (150 lb)  SpO2: 95 %      Physical Exam  Vitals signs and nursing note reviewed.   Constitutional:       General: She is in acute distress.      Appearance: Normal appearance. She is well-developed. She is ill-appearing. She is not diaphoretic.   HENT:      Head: Normocephalic and atraumatic.      Right Ear: External ear normal.      Left Ear: External ear normal.      Nose: Nose normal.      Mouth/Throat:      Mouth: She has moderate trismus due to pain.  Mucous membranes are dry.  Left sublingual angioedematous swelling as photographed.  Tooth #17 is tender, but otherwise appears normal.  No dental abscess or other oropharyngeal swelling.  Uvula posterior pharynx appears normal and without edema.  Eyes:      General: No scleral icterus.     Extraocular Movements: Extraocular movements intact.      Conjunctiva/sclera: Conjunctivae normal.   Neck:   Left submandibular soft tissue swelling with induration and pain, but no fluctuance or crepitus.  Mild warmth but no bright red or hot " violaceous cellulitis.  As photograph.     Musculoskeletal: Normal range of motion and neck supple.      Trachea: No tracheal deviation.   Cardiovascular:      Rate and Rhythm: Normal rate and regular rhythm.      Heart sounds: Normal heart sounds. No murmur. No friction rub. No gallop.    Pulmonary:      Effort: Pulmonary effort is normal. No respiratory distress.      Breath sounds: Normal breath sounds. No wheezing, rhonchi or rales.  No stridor.  Abdominal:      General: There is no distension.      Palpations: Abdomen is soft.      Tenderness: There is no abdominal tenderness.   Musculoskeletal: Normal range of motion.         General: No tenderness.      Right lower leg: No edema.      Left lower leg: No edema.   Skin:     General: Skin is warm and dry.      Coloration: Skin is not pale.      Findings: No erythema or rash.   Neurological:      General: No focal deficit present.      Mental Status: She is alert and oriented to person, place, and time.      Coordination: Coordination normal.   Psychiatric:         Mood and Affect: Anxious affect.        Behavior: Behavior normal.                   ED Course        Procedures                 Results for orders placed or performed during the hospital encounter of 12/10/19 (from the past 24 hour(s))   CBC with platelets differential   Result Value Ref Range    WBC 17.4 (H) 4.0 - 11.0 10e9/L    RBC Count 4.50 3.8 - 5.2 10e12/L    Hemoglobin 14.2 11.7 - 15.7 g/dL    Hematocrit 41.2 35.0 - 47.0 %    MCV 92 78 - 100 fl    MCH 31.6 26.5 - 33.0 pg    MCHC 34.5 31.5 - 36.5 g/dL    RDW 12.8 10.0 - 15.0 %    Platelet Count 384 150 - 450 10e9/L    Diff Method Automated Method     % Neutrophils 80.5 %    % Lymphocytes 10.8 %    % Monocytes 8.0 %    % Eosinophils 0.2 %    % Basophils 0.2 %    % Immature Granulocytes 0.3 %    Nucleated RBCs 0 0 /100    Absolute Neutrophil 14.0 (H) 1.6 - 8.3 10e9/L    Absolute Lymphocytes 1.9 0.8 - 5.3 10e9/L    Absolute Monocytes 1.4 (H) 0.0 -  1.3 10e9/L    Absolute Eosinophils 0.0 0.0 - 0.7 10e9/L    Absolute Basophils 0.0 0.0 - 0.2 10e9/L    Abs Immature Granulocytes 0.1 0 - 0.4 10e9/L    Absolute Nucleated RBC 0.0    Basic metabolic panel   Result Value Ref Range    Sodium 136 133 - 144 mmol/L    Potassium 3.3 (L) 3.4 - 5.3 mmol/L    Chloride 101 94 - 109 mmol/L    Carbon Dioxide 32 20 - 32 mmol/L    Anion Gap 3 3 - 14 mmol/L    Glucose 112 (H) 70 - 99 mg/dL    Urea Nitrogen 9 7 - 30 mg/dL    Creatinine 0.63 0.52 - 1.04 mg/dL    GFR Estimate >90 >60 mL/min/[1.73_m2]    GFR Estimate If Black >90 >60 mL/min/[1.73_m2]    Calcium 9.5 8.5 - 10.1 mg/dL   CT Soft Tissue Neck w Contrast    Narrative    CT SCAN OF THE NECK WITH CONTRAST December 10, 2019 12:44 PM     HISTORY: Left lower posterior molar pain with abscess and  submandibular and sublingual STS.    TECHNIQUE: Axial images and coronal reformations. Radiation dose for  this scan was reduced using automated exposure control, adjustment of  the mA and/or kV according to patient size, or iterative  reconstruction technique. 80mL Isovue-370 IV.      COMPARISON: None.    FINDINGS:  Visualized sinuses, nasopharynx and orbits: Normal.      Oropharynx, tongue and oral cavity: Normal.      Hypopharynx: Normal.      Larynx and trachea: Normal.      Thyroid: Normal.    Submandibular glands: There is marked enlargement of left  submandibular gland with heterogeneous enhancement with surrounding  soft tissue swelling and internal edema indicating acute  sialoadenitis. There is mild dilatation of the left submandibular  gland ductules  but there is no apparent dilatation of [place]'s duct.  No stone is seen. Right submandibular gland appears normal.      Parotid glands: Normal.        Lymph nodes: Inflammatory lymph nodes in the left submandibular region  and in the upper left neck without necrosis.      Vasculature: Normal.      Bones: Cervical spine degenerative changes.      Impression    IMPRESSION: Left  submandibular gland sialoadenitis and ipsilateral  inflammatory adenopathy. Surrounding edema.      SACHIN ROSAS MD       Medications   sodium chloride 0.9% infusion (has no administration in time range)   0.9% sodium chloride BOLUS (1,000 mLs Intravenous New Bag 12/10/19 1520)   0.9% sodium chloride BOLUS (has no administration in time range)   clindamycin (CLEOCIN) infusion 900 mg (0 mg Intravenous Stopped 12/10/19 1251)   0.9% sodium chloride BOLUS (0 mLs Intravenous Stopped 12/10/19 1448)   HYDROmorphone (PF) (DILAUDID) injection 0.5 mg (0.5 mg Intravenous Given 12/10/19 1208)   iopamidol (ISOVUE-370) solution 80 mL (80 mLs Intravenous Given 12/10/19 1234)   sodium chloride 0.9 % bag 500mL for CT scan flush use (80 mLs As instructed Given 12/10/19 1234)   methylPREDNISolone sodium succinate (solu-MEDROL) injection 125 mg (125 mg Intravenous Given 12/10/19 1447)   diphenhydrAMINE (BENADRYL) injection 25 mg (25 mg Intravenous Given 12/10/19 1446)   cefTRIAXone IN D5W (ROCEPHIN) intermittent infusion 2 g (0 g Intravenous Stopped 12/10/19 1519)   HYDROmorphone (PF) (DILAUDID) injection 0.5 mg (0.5 mg Intravenous Given 12/10/19 1520)       1:29 PM - Reviewed case with Dr. Garcia, ENT.  We reviewed her soft tissue neck CT together.  Possible occult ductal stone not seen on CT versus infectious etiology.  He felt she be followed as an outpatient.  However, she is very uncomfortable and will need admission for pain control, monitoring and I will initiate IV antibiotic therapy    Reviewed the case with the patient and she agrees that she feels too ill and uncomfortable to be discharged and she requests transfer [facility] for admission, where inpatient and emergent ENT services are available.  She now reports that she has a history of C. difficile enteritis after antibiotic therapy recently.  She did not report this plan told her that we would be administering IV clindamycin due to penicillin allergy.  Review of  EMR shows that she was diagnosed with C. difficile enteritis in October of this year and this was treated with vancomycin.  We will switch to cephalosporin antibiotic therapy.    Reviewed the case and consulted with the hospitalist on-call at [facility], Dr. Piña.  She accepted care of the patient in transfer there.    Assessments & Plan (with Medical Decision Making)   Left submandibular sialoadenitis with trismus and left sublingual angioedematous swelling.  Possible obstructed salivary duct, but no stone seen on CT imaging.  No abscess seen on CT.  ENT physician on-call was consulted and we discussed differential diagnosis and antibiotic therapy.  She was given clindamycin, switched to cephalosporin/ceftriaxone after she expressed that she had previously had a history of C. difficile colitis/enteritis, after clindamycin in October of this year.  Unfortunately she did not inform me of this until after clindamycin was given.  In addition she was given Solu-Medrol and Benadryl.  Steroid therapy may help, but doubt that this is infectious etiology or that Benadryl offer any benefit.  She has significant pain and required repeated doses of IV Dilaudid, and complains of significant difficulty swallowing which appears secondary to pain.  CT did not show any significant airway involvement or compromise, but due to potential need for emergent ENT service as she will be transferred to [facility] where this is available.    I have reviewed the nursing notes.    I have reviewed the findings, diagnosis, plan and need for follow up with the patient.    New Prescriptions    No medications on file       Final diagnoses:   Sialoadenitis of submandibular gland       12/10/2019   [facility] EMERGENCY DEPARTMENT     Mack Rowe MD  12/10/19 [id number]

## 2019-12-10 NOTE — ED NOTES
"States \" I am not going to make it there because I cant drive there due to pain\" pain started Sunday and has been using tylenol last taken yesterday. She is a/o x 4, denies fever/chills. Also has tried benadryl which helps some  "

## 2019-12-19 ENCOUNTER — OFFICE VISIT (OUTPATIENT)
Dept: FAMILY MEDICINE | Facility: CLINIC | Age: 54
End: 2019-12-19
Payer: COMMERCIAL

## 2019-12-19 VITALS
HEART RATE: 80 BPM | OXYGEN SATURATION: 99 % | BODY MASS INDEX: 26.93 KG/M2 | DIASTOLIC BLOOD PRESSURE: 90 MMHG | SYSTOLIC BLOOD PRESSURE: 150 MMHG | WEIGHT: 152 LBS | RESPIRATION RATE: 18 BRPM | TEMPERATURE: 97.9 F | HEIGHT: 63 IN

## 2019-12-19 DIAGNOSIS — I10 ESSENTIAL HYPERTENSION: ICD-10-CM

## 2019-12-19 DIAGNOSIS — K11.20 SIALOADENITIS OF SUBMANDIBULAR GLAND: ICD-10-CM

## 2019-12-19 DIAGNOSIS — Z09 HOSPITAL DISCHARGE FOLLOW-UP: Primary | ICD-10-CM

## 2019-12-19 PROCEDURE — 99214 OFFICE O/P EST MOD 30 MIN: CPT | Performed by: FAMILY MEDICINE

## 2019-12-19 RX ORDER — AMLODIPINE BESYLATE 2.5 MG/1
5 TABLET ORAL DAILY
Qty: 180 TABLET | Refills: 3 | Status: SHIPPED | OUTPATIENT
Start: 2019-12-19 | End: 2020-11-20

## 2019-12-19 ASSESSMENT — MIFFLIN-ST. JEOR: SCORE: 1248.6

## 2019-12-19 NOTE — PROGRESS NOTES
Subjective     Carolina Hernandez is a 54 year old female who presents to clinic today for the following health issues:     54 yr old female here for a hospital follow up. She was seen for Sialoadenitis , she is still on antibiotics. Says she is much better.     Patient reports that her blood pressure has been elevated. She is taking her medication consistently. She also reports some occipital headaches.No blurry vision or lightheaded feeling.     [facility]       Hospital Follow-up Visit:    Hospital/Nursing Home/ Rehab Facility: [facility]   Date of Admission: 12/10  Date of Discharge: 12/13  Reason(s) for Admission: infection             Problems taking medications regularly:  None       Medication changes since discharge: Patient was sent home with ceftine toradol and flagyl will finish tomorrow        Problems adhering to non-medication therapy:  Patient was to see dentist has appt today     Summary of hospitalization:  [facility] discharge summary reviewed  Diagnostic Tests/Treatments reviewed.  Follow up needed: none  Other Healthcare Providers Involved in Patient s Care:         None  Update since discharge: improved.     Post Discharge Medication Reconciliation: discharge medications reconciled, continue medications without change.  Plan of care communicated with patient     Coding guidelines for this visit:  Type of Medical   Decision Making Face-to-Face Visit       within 7 Days of discharge Face-to-Face Visit        within 14 days of discharge   Moderate Complexity [id number]   High Complexity [id number]                Patient Active Problem List   Diagnosis     CARDIOVASCULAR SCREENING; LDL GOAL LESS THAN 160     Recurrent major depressive disorder, in remission (H)     Encounter for tobacco use cessation counseling     Mild intermittent asthma without complication     Essential hypertension     History reviewed. No pertinent surgical history.    Social History     Tobacco Use     Smoking status:  Current Every Day Smoker     Packs/day: 0.50     Types: Cigarettes     Smokeless tobacco: Never Used   Substance Use Topics     Alcohol use: Yes     Comment: 12 pack weekly     Family History   Problem Relation Age of Onset     Glaucoma Father      Coronary Artery Disease Father         stents     Cancer Maternal Grandfather      Coronary Artery Disease Paternal Grandfather      Schizophrenia Daughter      Diabetes Daughter      Cancer Daughter          Current Outpatient Medications   Medication Sig Dispense Refill     albuterol (PROVENTIL) (2.5 MG/3ML) 0.083% neb solution Take 1 vial (2.5 mg) by nebulization every 4 hours as needed for shortness of breath / dyspnea or wheezing 3 Box 3     albuterol (VENTOLIN HFA) 108 (90 Base) MCG/ACT inhaler Inhale 2 puffs into the lungs every 6 hours as needed for shortness of breath / dyspnea or wheezing. 3 Inhaler 3     amLODIPine (NORVASC) 2.5 MG tablet Take 2 tablets (5 mg) by mouth daily Please fill at patient request 180 tablet 3     cetirizine (ZYRTEC) 10 MG tablet Take 10 mg by mouth daily       FLUoxetine (PROZAC) 20 MG capsule Take 1 capsule (20 mg) by mouth daily 90 capsule 3     FLUoxetine (PROZAC) 40 MG capsule Take 1 capsule (40 mg) by mouth daily 90 capsule 3     fluticasone-salmeterol (ADVAIR DISKUS) 500-50 MCG/DOSE inhaler Inhale 1 puff into the lungs every 12 hours 3 Inhaler 3     hydrochlorothiazide (HYDRODIURIL) 25 MG tablet Take 1 tablet (25 mg) by mouth daily 90 tablet 3     montelukast (SINGULAIR) 10 MG tablet Take 1 tablet (10 mg) by mouth At Bedtime 30 tablet 11     multivitamin  with lutein (OCUVITE WITH LTEIN) CAPS per capsule Take 1 capsule by mouth daily       omeprazole 20 MG tablet Take 1 tablet (20 mg) by mouth daily 90 tablet 3     order for DME Equipment being ordered: Blood pressure monitoring unit 1 Units 0     Allergies   Allergen Reactions     Penicillins Itching     BP Readings from Last 3 Encounters:   12/19/19 (!) 150/90   12/10/19 (!)  "142/90   10/25/19 138/82    Wt Readings from Last 3 Encounters:   12/19/19 68.9 kg (152 lb)   12/10/19 68 kg (150 lb)   10/25/19 67.7 kg (149 lb 3.2 oz)                      Reviewed and updated as needed this visit by Provider  Tobacco  Allergies  Meds  Problems  Med Hx  Surg Hx  Fam Hx         Review of Systems   ROS COMP: Constitutional, HEENT, cardiovascular, pulmonary, gi and gu systems are negative, except as otherwise noted.      Objective    BP (!) 150/90   Pulse 80   Temp 97.9  F (36.6  C) (Tympanic)   Resp 18   Ht 1.6 m (5' 3\")   Wt 68.9 kg (152 lb)   SpO2 99%   BMI 26.93 kg/m    Body mass index is 26.93 kg/m .  Physical Exam   GENERAL: healthy, alert and no distress  EYES: Eyes grossly normal to inspection, PERRL and conjunctivae and sclerae normal  HENT: ear canals and TM's normal, nose and mouth without ulcers or lesions  NECK: no adenopathy, no asymmetry, masses, or scars and thyroid normal to palpation  RESP: lungs clear to auscultation - no rales, rhonchi or wheezes  CV: regular rate and rhythm, normal S1 S2, no S3 or S4, no murmur, click or rub, no peripheral edema and peripheral pulses strong  MS: no gross musculoskeletal defects noted, no edema    Diagnostic Test Results:  none         Assessment & Plan     1. Hospital discharge follow-up  Patient is a 54 yr old female here for a hospital follow up. She is feeling better and still on antibiotics. Encouraged that she complete her antibiotics.     2. Essential hypertension  Blood pressure is still not under good control. I increased her amlodipine and asked that she come back in two -three weeks for a recheck of her Blood pressure  - amLODIPine (NORVASC) 2.5 MG tablet; Take 2 tablets (5 mg) by mouth daily Please fill at patient request  Dispense: 180 tablet; Refill: 3  - order for DME; Equipment being ordered: Blood pressure monitoring unit  Dispense: 1 Units; Refill: 0    3. Sialoadenitis of submandibular gland  Doing better-      "     FUTURE APPOINTMENTS:       - Follow-up visit in 2-3 weeks     Return in about 2 weeks (around 1/2/2020) for Routine Visit.    Julia Oneill MD  [facility]

## 2019-12-19 NOTE — PATIENT INSTRUCTIONS
Thank you for choosing [facility].  You may be receiving an email and/or telephone survey request from [facility] Customer [employer] regarding your visit today.  Please take a few minutes to respond to the survey to let us know how we are doing.      If you have questions or concerns, please contact us via [employer] or you can contact your care team at [phone].    Our Clinic hours are:  Monday 6:40 am  to 7:00 pm  Tuesday -Friday 6:40 am to 5:00 pm    The [place] outpatient lab hours are:  Monday - Friday 6:10 am to 4:45 pm  Saturdays 7:00 am to 11:00 am  Appointments are required, call [phone]    If you have clinical questions after hours or would like to schedule an appointment,  call the clinic at [phone].  Patient Education     Established High Blood Pressure    High blood pressure (hypertension) is a chronic disease. Often, healthcare providers don t know what causes it. But it can be caused by certain health conditions and medicines.  If you have high blood pressure, you may not have any symptoms. If you do have symptoms, they may include headache, dizziness, changes in your vision, chest pain, and shortness of breath. But even without symptoms, high blood pressure that s not treated raises your risk for heart attack, heart failure, and stroke. High blood pressure is a serious health risk and shouldn t be ignored.  Blood pressure measurements are given as 2 numbers. Systolic blood pressure is the upper number. This is the pressure when the heart contracts. Diastolic blood pressure is the lower number. This is the pressure when the heart relaxes between beats. You will see your blood pressure readings written together. For example, a person with a systolic pressure of 118 and a diastolic pressure of 78 will have 118/78 written in the medical record.  Blood pressure is categorized as normal, elevated, or stage 1 or stage 2 high blood pressure:    Normal blood pressure is systolic of  less than 120 and diastolic of less than 80 (120/80)    Elevated blood pressure is systolic of 120 to 129 and diastolic less than 80    Stage 1 high blood pressure is systolic is 130 to 139 or diastolic between 80 to 89    Stage 2 high blood pressure is when systolic is 140 or higher or the diastolic is 90 or higher  Home care  If you have high blood pressure, follow these home care guidelines to help lower your blood pressure. If you are taking medicines for high blood pressure, these methods may reduce or end your need for medicines in the future.    Start a weight-loss program if you are overweight.    Cut back on how much salt you get in your diet. Here s how to do this:  ? Don t eat foods that have a lot of salt. These include olives, pickles, smoked meats, and salted potato chips.  ? Don t add salt to your food at the table.  ? Use only small amounts of salt when cooking.    Start an exercise program. Talk with your healthcare provider about the type of exercise program that would be best for you. It doesn't have to be hard. Even brisk walking for 20 minutes 3 times a week is a good form of exercise.    Don t take medicines that stimulate the heart. This includes many over-the-counter cold and sinus decongestant pills and sprays, as well as diet pills. Check the warnings about high blood pressure on the label. Before buying any over-the-counter medicines or supplements, always ask the pharmacist about the product's potential interaction with your high blood pressure and your high blood pressure medicines.    Stimulants such as amphetamine or cocaine could be deadly for someone with high blood pressure. Never take these.    Limit how much caffeine you get in your diet. Switch to caffeine-free products.    Stop smoking. If you are a long-time smoker, this can be hard. Talk to your healthcare provider about medicines and nicotine replacement options to help you. Also, enroll in a stop-smoking program to make it  more likely that you will quit for good.    Learn how to handle stress. This is an important part of any program to lower blood pressure. Learn about relaxation methods like meditation, yoga, or biofeedback.    If your provider prescribed medicines, take them exactly as directed. Missing doses may cause your blood pressure get out of control.    If you miss a dose or doses, check with your healthcare provider or pharmacist about what to do.    Consider buying an automatic blood pressure machine to check your blood pressure at home. Ask your provider for a recommendation. You can get one of these at most pharmacies.     The American Heart Association recommends the following guidelines for home blood pressure monitoring:    Don't smoke or drink coffee for 30 minutes before taking your blood pressure.    Go to the bathroom before the test.    Relax for 5 minutes before taking the measurement.    Sit with your back supported (don't sit on a couch or soft chair); keep your feet on the floor uncrossed. Place your arm on a solid flat surface (like a table) with the upper part of the arm at heart level. Place the middle of the cuff directly above the bend of the elbow. Check the monitor's instruction manual for an illustration.    Take multiple readings. When you measure, take 2 to 3 readings one minute apart and record all of the results.    Take your blood pressure at the same time every day, or as your healthcare provider recommends.    Record the date, time, and blood pressure reading.    Take the record with you to your next medical appointment. If your blood pressure monitor has a built-in memory, simply take the monitor with you to your next appointment.    Call your provider if you have several high readings. Don't be frightened by a single high blood pressure reading, but if you get several high readings, check in with your healthcare provider.    Note: When blood pressure reaches a systolic (top number) of 180 or  "higher OR diastolic (bottom number) of 110 or higher, seek emergency medical treatment.  Follow-up care  You will need to see your healthcare provider regularly. This is to check your blood pressure and to make changes to your medicines. Make a follow-up appointment as directed. Bring the record of your home blood pressure readings to the appointment.  When to seek medical advice  Call your healthcare provider right away if any of these occur:    Blood pressure reaches a systolic (upper number) of 180 or higher OR a diastolic (bottom number) of 110 or higher    Chest pain or shortness of breath    Severe headache    Throbbing or rushing sound in the ears    Nosebleed    Sudden severe pain in your belly (abdomen)    Extreme drowsiness, confusion, or fainting    Dizziness or spinning sensation (vertigo)    Weakness of an arm or leg or one side of the face    You have problems speaking or seeing   Date Last Reviewed: 12/1/2016 2000-2018 [employer]. [address] [place] [address]. All rights reserved. This information is not intended as a substitute for professional medical care. Always follow your healthcare professional's instructions.           Patient Education     Salivary Gland Infection  Salivary glands make saliva. Saliva is mostly water. It also has minerals and proteins that help break down food and keep the mouth and teeth healthy. There are 3 pairs of salivary glands:    Parotid glands (in front of the ear)    Submandibular glands (below the jaw)    Sublingual glands (below the tongue)  A salivary gland can become infected by bacteria (germs). Things that make this more likely include dehydration and taking medicines that affect saliva flow. Infection is also more likely when the tube (duct) that carries saliva from the gland to the mouth is blocked. It may be blocked by a salivary gland \"stone.\" This is a collection of minerals that forms in the salivary gland.  Signs of " infection include fever, severe pain in the gland, and redness and swelling over the gland. It may hurt to open the mouth. Symptoms may be worse when the flow of saliva is stimulated, such as by the smell of food.   Antibiotics are used to treat the infection. Drainage of the infection with a simple surgery may be needed. If you have a salivary gland stone, a procedure may be done to remove it.  Home Care:    Take antibiotics as directed until they are finished. Do this even if you start to feel better after only a few days.    Unless another medicine was prescribed, take over-the-counter medicines, such as acetaminophen or ibuprofen, to help relieve pain.    Moist heat can also help relieve swelling and pain. Wet a cloth with warm water and put it over the sore gland for 10-15 minutes several times a day.    Gently massage the gland a few times a day.     Suck on lemon or other tart hard candies to cause flow of saliva.  To help prevent stones and infections:    Drink 6-8 glasses of fluid per day (such as water, tea, and clear soup) to keep well hydrated.    If you smoke, ask your healthcare provider for help to quit. Smoking makes salivary gland stones more likely.    Keep good dental hygiene. Brush and floss your teeth daily. See your dentist for regular cleanings.  Follow-up care  Follow up with your healthcare provider or as advised.   When to seek medical advice  Call your healthcare provider if any of the following occur:    Fever over 100.4 F (38 C) after 2 days of taking antibiotics    Symptoms that get worse or don't get better in a few days    Trouble breathing or swallowing  Date Last Reviewed: 10/1/2017    [id number] [employer]. [address] [place] [address], [place] [address]. All rights reserved. This information is not intended as a substitute for professional medical care. Always follow your healthcare professional's instructions.

## 2019-12-23 RX ORDER — HYDROCHLOROTHIAZIDE 25 MG/1
TABLET ORAL
Qty: 90 TABLET | Refills: 1 | Status: SHIPPED | OUTPATIENT
Start: 2019-12-23 | End: 2020-06-18

## 2019-12-23 NOTE — TELEPHONE ENCOUNTER
Prescription approved per [facility] Refill Protocol.    Latrice RODRIGUEZ RN

## 2020-02-10 DIAGNOSIS — J45.20 MILD INTERMITTENT ASTHMA WITHOUT COMPLICATION: ICD-10-CM

## 2020-02-10 NOTE — TELEPHONE ENCOUNTER
"Requested Prescriptions   Pending Prescriptions Disp Refills     albuterol (PROVENTIL) (2.5 MG/3ML) 0.083% neb solution [Pharmacy Med Name: Albuterol Sulfate Inhalation Nebulization Solution (2.5 MG/3ML) 0.083%]  Last Written Prescription Date:  10/3/19  Last Fill Quantity: 3,  # refills: 3   Last Office Visit with [facility] or [facility] prescribing provider:  12/19/19   Future Office Visit:      75 mL 10     Sig: Inhale contents of 1 vial (2.5 mg) by nebulization every 4 hours as needed for shortness of breath / dyspnea or wheezing       Asthma Maintenance Inhalers - Anticholinergics Failed - 2/10/2020 11:27 AM        Failed - Asthma control assessment score within normal limits in last 6 months     Please review ACT score.           Passed - Patient is age 12 years or older        Passed - Medication is active on med list        Passed - Recent (6 mo) or future (30 days) visit within the authorizing provider's specialty     Patient had office visit in the last 6 months or has a visit in the next 30 days with authorizing provider or within the authorizing provider's specialty.  See \"Patient Info\" tab in inbasket, or \"Choose Columns\" in Meds & Orders section of the refill encounter.              "

## 2020-02-13 RX ORDER — ALBUTEROL SULFATE 0.83 MG/ML
SOLUTION RESPIRATORY (INHALATION)
Qty: 75 ML | Refills: 10 | Status: SHIPPED | OUTPATIENT
Start: 2020-02-13 | End: 2020-10-07

## 2020-02-13 NOTE — TELEPHONE ENCOUNTER
Routing refill request to provider for review/approval because:  Labs out of range:  ACT less than 20    ACT Total Scores 8/20/2019 8/27/2019 10/3/2019   ACT TOTAL SCORE (Goal Greater than or Equal to 20) 7 11 15   In the past 12 months, how many times did you visit the emergency room for your asthma without being admitted to the hospital? 0 0 0   In the past 12 months, how many times were you hospitalized overnight because of your asthma? 0 0 0     Zenaida WRAY RN

## 2020-02-13 NOTE — TELEPHONE ENCOUNTER
Reason for Call:  Medication or medication refill:    Do you use a [facility]?  Name of the pharmacy and phone number for the current request:  [facility] [place] [phone]    Name of the medication requested: Albuterol Neb Solution - Pt needs refill TODAY - She is out of med.  Please call patient and advise.      Albuterol Nebs  Last Written Prescription Date:  10/3/19  Last Fill Quantity: 3,  # refills: 3   Last office visit: 12/19/2019 with prescribing provider:     Future Office Visit:      Other request:     Can we leave a detailed message on this number? YES    Phone number patient can be reached at: Home number on file [phone] (home)    Best Time: any    Call taken on 2/13/2020 at 1:13 PM by Yin Triplett

## 2020-02-24 ENCOUNTER — TELEPHONE (OUTPATIENT)
Dept: FAMILY MEDICINE | Facility: CLINIC | Age: 55
End: 2020-02-24

## 2020-02-24 DIAGNOSIS — J45.20 MILD INTERMITTENT ASTHMA WITHOUT COMPLICATION: ICD-10-CM

## 2020-02-24 DIAGNOSIS — F33.40 RECURRENT MAJOR DEPRESSIVE DISORDER, IN REMISSION (H): ICD-10-CM

## 2020-02-24 NOTE — TELEPHONE ENCOUNTER
"Requested Prescriptions   Pending Prescriptions Disp Refills     albuterol (PROAIR HFA/PROVENTIL HFA/VENTOLIN HFA) 108 (90 Base) MCG/ACT inhaler [Pharmacy Med Name: Albuterol Sulfate HFA Inhalation Aerosol Solution 108 (90 Base) MCG/ACT] 18 g 5     Sig: Inhale 2 puffs into the lungs every 6 hours as needed for shortness of breath / dyspnea or wheezing.  Last Written Prescription Date:  10/3/2019  Last Fill Quantity: 3,  # refills: 3   Last office visit: 12/19/2019 with prescribing provider:  Nino Allen Office Visit:         Asthma Maintenance Inhalers - Anticholinergics Failed - 2/24/2020 11:17 AM        Failed - Asthma control assessment score within normal limits in last 6 months     Please review ACT score.   ACT Total Scores 8/20/2019 8/27/2019 10/3/2019   ACT TOTAL SCORE (Goal Greater than or Equal to 20) 7 11 15   In the past 12 months, how many times did you visit the emergency room for your asthma without being admitted to the hospital? 0 0 0   In the past 12 months, how many times were you hospitalized overnight because of your asthma? 0 0 0             Passed - Patient is age 12 years or older        Passed - Medication is active on med list        Passed - Recent (6 mo) or future (30 days) visit within the authorizing provider's specialty     Patient had office visit in the last 6 months or has a visit in the next 30 days with authorizing provider or within the authorizing provider's specialty.  See \"Patient Info\" tab in inbasket, or \"Choose Columns\" in Meds & Orders section of the refill encounter.            FLUoxetine (PROZAC) 20 MG capsule [Pharmacy Med Name: FLUoxetine HCl Oral Capsule 20 MG] 30 capsule 10     Sig: Take 1 capsule (20 mg) by mouth daily  Last Written Prescription Date:  10/3/2019  Last Fill Quantity: 90,  # refills: 3   Last office visit: 12/19/2019 with prescribing provider:  Nino Allen Office Visit:           SSRIs Protocol Failed - 2/24/2020 11:17 AM  JAK-7 SCORE " "2/14/2019 7/26/2019 10/3/2019   Total Score 8 7 6             Failed - PHQ-9 score less than 5 in past 6 months     Please review last PHQ-9 score.   PHQ 2/14/2019 7/26/2019 10/3/2019   PHQ-9 Total Score 11 4 7   Q9: Thoughts of better off dead/self-harm past 2 weeks Several days Not at all Not at all             Passed - Medication is active on med list        Passed - Patient is age 18 or older        Passed - No active pregnancy on record        Passed - No positive pregnancy test in last 12 months        Passed - Recent (6 mo) or future (30 days) visit within the authorizing provider's specialty     Patient had office visit in the last 6 months or has a visit in the next 30 days with authorizing provider or within the authorizing provider's specialty.  See \"Patient Info\" tab in inbasket, or \"Choose Columns\" in Meds & Orders section of the refill encounter.              "

## 2020-02-27 RX ORDER — FLUOXETINE 40 MG/1
40 CAPSULE ORAL DAILY
Qty: 90 CAPSULE | Refills: 2 | Status: SHIPPED | OUTPATIENT
Start: 2020-02-27 | End: 2020-11-20

## 2020-02-27 RX ORDER — ALBUTEROL SULFATE 90 UG/1
AEROSOL, METERED RESPIRATORY (INHALATION)
Qty: 3 INHALER | Refills: 2 | Status: SHIPPED | OUTPATIENT
Start: 2020-02-27 | End: 2020-10-30

## 2020-02-27 NOTE — TELEPHONE ENCOUNTER
Is patient taking prozac 20 mg or 40 mg or both for 60 mg?     Left message for patient to return call to clinic.     Louisa Wills, BSN, RN

## 2020-02-28 ENCOUNTER — OFFICE VISIT (OUTPATIENT)
Dept: FAMILY MEDICINE | Facility: CLINIC | Age: 55
End: 2020-02-28
Payer: COMMERCIAL

## 2020-02-28 VITALS
RESPIRATION RATE: 12 BRPM | BODY MASS INDEX: 30.72 KG/M2 | TEMPERATURE: 98.5 F | SYSTOLIC BLOOD PRESSURE: 144 MMHG | DIASTOLIC BLOOD PRESSURE: 84 MMHG | HEIGHT: 59 IN | OXYGEN SATURATION: 98 % | WEIGHT: 152.4 LBS | HEART RATE: 84 BPM

## 2020-02-28 DIAGNOSIS — J01.01 ACUTE RECURRENT MAXILLARY SINUSITIS: Primary | ICD-10-CM

## 2020-02-28 DIAGNOSIS — I10 ESSENTIAL HYPERTENSION: ICD-10-CM

## 2020-02-28 PROCEDURE — 99214 OFFICE O/P EST MOD 30 MIN: CPT | Performed by: FAMILY MEDICINE

## 2020-02-28 RX ORDER — AZITHROMYCIN 250 MG/1
TABLET, FILM COATED ORAL
Qty: 6 TABLET | Refills: 0 | Status: SHIPPED | OUTPATIENT
Start: 2020-02-28 | End: 2020-03-04

## 2020-02-28 ASSESSMENT — MIFFLIN-ST. JEOR: SCORE: 1189.65

## 2020-02-28 NOTE — PROGRESS NOTES
Subjective     Carolina Hernandez is a 54 year old female who presents to clinic today for the following health issues:  Chief Complaint   Patient presents with     Sinus Problem     x 2 weeks, upper and lower teeth extractions on left side and symptoms have gotten worse since then     out of amlodipine     has not been out of amlodipine for 2 days, has prescription waiting to be picked up. Her BP is usually about 128/93.      HPI   ENT Symptoms             Symptoms: cc Present Absent Comment   Fever/Chills   x    Fatigue  x  Trouble sleeping last night   Muscle Aches   x    Eye Irritation  x     Sneezing  x     Nasal Magan/Drg x x     Sinus Pressure/Pain  x  A lot of pressure behind and above eyes.    Loss of smell  x     Dental pain  x     Sore Throat   x    Swollen Glands   x    Ear Pain/Fullness  x  Left ear has some drainage.    Cough   x    Wheeze   x    Chest Pain   x    Shortness of breath  x     Rash       Other         Symptom duration:  x 2 weeks, but worsening since 3/26/20 w/ teeth extractions.    Symptom severity:  severe   Treatments tried:  extra strength Tylenol, benadryl    Contacts:  none known          Current Outpatient Medications:      albuterol (PROAIR HFA/PROVENTIL HFA/VENTOLIN HFA) 108 (90 Base) MCG/ACT inhaler, Inhale 2 puffs into the lungs every 6 hours as needed for shortness of breath / dyspnea or wheezing., Disp: 3 Inhaler, Rfl: 2     cetirizine (ZYRTEC) 10 MG tablet, Take 10 mg by mouth daily, Disp: , Rfl:      FLUoxetine (PROZAC) 20 MG capsule, Take 1 capsule (20 mg) by mouth daily, Disp: 90 capsule, Rfl: 2     FLUoxetine (PROZAC) 40 MG capsule, Take 1 capsule (40 mg) by mouth daily Take with 20 mg tab for a total of 60 mg daily, Disp: 90 capsule, Rfl: 2     fluticasone-salmeterol (ADVAIR DISKUS) 500-50 MCG/DOSE inhaler, Inhale 1 puff into the lungs every 12 hours, Disp: 3 Inhaler, Rfl: 3     hydrochlorothiazide (HYDRODIURIL) 25 MG tablet, Take 1 tablet (25 mg) by mouth once daily,  "Disp: 90 tablet, Rfl: 1     montelukast (SINGULAIR) 10 MG tablet, Take 1 tablet (10 mg) by mouth At Bedtime, Disp: 30 tablet, Rfl: 11     multivitamin  with lutein (OCUVITE WITH LTEIN) CAPS per capsule, Take 1 capsule by mouth daily, Disp: , Rfl:      omeprazole 20 MG tablet, Take 1 tablet (20 mg) by mouth daily, Disp: 90 tablet, Rfl: 3     albuterol (PROVENTIL) (2.5 MG/3ML) 0.083% neb solution, Inhale contents of 1 vial (2.5 mg) by nebulization every 4 hours as needed for shortness of breath / dyspnea or wheezing, Disp: 75 mL, Rfl: 10     amLODIPine (NORVASC) 2.5 MG tablet, Take 2 tablets (5 mg) by mouth daily Please fill at patient request, Disp: 180 tablet, Rfl: 3     order for DME, Equipment being ordered: Blood pressure monitoring unit, Disp: 1 Units, Rfl: 0    Patient Active Problem List   Diagnosis     CARDIOVASCULAR SCREENING; LDL GOAL LESS THAN 160     Recurrent major depressive disorder, in remission (H)     Encounter for tobacco use cessation counseling     Mild intermittent asthma without complication     Essential hypertension       Blood pressure (!) 144/84, pulse 84, temperature 98.5  F (36.9  C), temperature source Tympanic, resp. rate 12, height 1.503 m (4' 11.17\"), weight 69.1 kg (152 lb 6.4 oz), SpO2 98 %, not currently breastfeeding.    Exam:  GENERAL APPEARANCE: healthy, alert and no distress  EYES: EOMI,  PERRL  HENT: ear canals and TM's normal and maxillary sinus tenderness bilateral  NECK: no adenopathy, no asymmetry, masses, or scars and thyroid normal to palpation  RESP: lungs clear to auscultation - no rales, rhonchi or wheezes  CV: regular rates and rhythm, normal S1 S2, no S3 or S4 and no murmur, click or rub -  PSYCH: mentation appears normal and affect normal/bright    (J01.01) Acute recurrent maxillary sinusitis  (primary encounter diagnosis)  Comment:   Plan: Lipid panel reflex to direct LDL Fasting,         azithromycin (ZITHROMAX) 250 MG tablet        Use the med for 5 days, but " this lasts for 10 days. Use fluids, and mucus thinners and hot packs, and elevate the head of the bed.     (I10) Essential hypertension  Comment:   Plan: For the blood pressure, calibrate the home machine annually. Monitor and record the BP at rest at different times of the day,   And the goal for the average is under 130/80. Use the non drug therapies.     If you are interested in allergy testing for a possible Penicillin allergy, then call [phone] for the allergy appt.       Joe Higuera MD

## 2020-02-28 NOTE — PATIENT INSTRUCTIONS
For the future lab, fast for 8 hours. Call [phone] for the lab appt.   We will call the results.     (J01.01) Acute recurrent maxillary sinusitis  (primary encounter diagnosis)  Comment:   Plan: Lipid panel reflex to direct LDL Fasting,         azithromycin (ZITHROMAX) 250 MG tablet        Use the med for 5 days, but this lasts for 10 days. Use fluids, and mucus thinners and hot packs, and elevate the head of the bed.     (I10) Essential hypertension  Comment:   Plan: For the blood pressure, calibrate the home machine annually. Monitor and record the BP at rest at different times of the day,   And the goal for the average is under 130/80. Use the non drug therapies.     If you are interested in allergy testing for a possible Penicillin allergy, then call [phone] for the allergy appt.

## 2020-03-25 ENCOUNTER — TELEPHONE (OUTPATIENT)
Dept: FAMILY MEDICINE | Facility: CLINIC | Age: 55
End: 2020-03-25

## 2020-03-25 NOTE — LETTER
March 25, 2020      Carolina Hernandez  [address] [place] [id number]        Dear Carolina,     In order to ensure we are providing the best quality care, we have reviewed your chart and see that you are due for:  1.  An update on the asthma and depression questionnaires.  These tools help your provider to monitor and manage your symptoms and treatment plan.  Please complete the enclosed forms and return to the clinic in the provided envelope.    2.  A mammogram for breast cancer screening.  You can call [phone] to schedule this appointment.  If you have had this done at another facility please contact the clinic and we will update our records.    Please call the clinic with any questions or concerns.    Thank you for trusting us with your health care.  Sincerely,    Your [facility] Team/edi

## 2020-03-25 NOTE — TELEPHONE ENCOUNTER
Patient Quality Outreach      Summary:    Patient is due/failing the following:   ACT needed, Breast Cancer Screening - Mammogram and PHQ-9 Needed    Type of outreach:    Sent letter.    Questions for provider review:    None                                                                                   **Start Working phrase here:**       Patient has the following on her problem list/HM:     Depression / Dysthymia review    12 Month Remission: 10-14 month window range: 12/16/19-4/14/20       PHQ-9 SCORE 2/14/2019 7/26/2019 10/3/2019   PHQ-9 Total Score 11 4 7       If PHQ-9 recheck is 5 or more, route to provider for next steps.    Asthma review       ACT Total Scores 10/3/2019   ACT TOTAL SCORE (Goal Greater than or Equal to 20) 15   In the past 12 months, how many times did you visit the emergency room for your asthma without being admitted to the hospital? 0   In the past 12 months, how many times were you hospitalized overnight because of your asthma? 0                                                      ANJANA Castillo MA

## 2020-03-26 ENCOUNTER — TELEPHONE (OUTPATIENT)
Dept: FAMILY MEDICINE | Facility: CLINIC | Age: 55
End: 2020-03-26

## 2020-03-26 NOTE — TELEPHONE ENCOUNTER
Reason for call:  Patient reporting a symptom    Symptom or request: Patient has had sinus pressure, nasal drainage, slight cough, pounding headache for 1 month.  Patient was seen on 02/28 for this and states that it never went away.  She did take her Z-Jefe as instructed.  Pharmacy - [facility] [place]    Have you been treated for this before? Yes - 02/28    Additional comments: Patient states she can not use [employer]    Phone Number patient can be reached at:  Home number on file [phone] (home)    Best Time:  Any    Can we leave a detailed message on this number:  YES    Call taken on 3/26/2020 at 1:25 PM by Gloria Powers

## 2020-03-26 NOTE — TELEPHONE ENCOUNTER
Patient was seen dx end of Feb for sinus inf.  Did course of abx and not any better.  Patient denies coughing although she coughs on the phone with this RN.  I have asked her about [employer] and she states she has a old lap top and can not log on..  I have given her the number to call but she is denying coughing.  Telephone appt scheduled.  Advised quarantine but patient refusing. Ana ALEXANDER RN

## 2020-03-27 ENCOUNTER — VIRTUAL VISIT (OUTPATIENT)
Dept: FAMILY MEDICINE | Facility: CLINIC | Age: 55
End: 2020-03-27
Payer: COMMERCIAL

## 2020-03-27 DIAGNOSIS — J01.01 ACUTE RECURRENT MAXILLARY SINUSITIS: Primary | ICD-10-CM

## 2020-03-27 PROCEDURE — 99441 ZZC PHYSICIAN TELEPHONE EVALUATION 5-10 MIN: CPT | Performed by: FAMILY MEDICINE

## 2020-03-27 RX ORDER — CEFDINIR 300 MG/1
300 CAPSULE ORAL 2 TIMES DAILY
Qty: 20 CAPSULE | Refills: 0 | Status: SHIPPED | OUTPATIENT
Start: 2020-03-27 | End: 2020-04-06

## 2020-03-27 RX ORDER — OXYMETAZOLINE HYDROCHLORIDE 0.05 G/100ML
2 SPRAY NASAL 2 TIMES DAILY
Qty: 1 BOTTLE | Refills: 0 | Status: SHIPPED | OUTPATIENT
Start: 2020-03-27 | End: 2020-09-09

## 2020-03-27 NOTE — PROGRESS NOTES
"Carolina Hernandez is a 55 year old female who is being evaluated via a billable telephone visit.      The patient has been notified of followin yr old female here for  Virtual visit. Patient is experiencing some sinus pressure behind her eyes , temple area and also draining greenish colored from her nose. She was seen in clinic about a month ago and prescribed a Z-sergio. She says it helped mildly. Patient reports that she has had no fevers or chills. She denies any headaches or dizziness. She has tried nasal rinses without much relief. She denies any cough.     \"This telephone visit will be conducted via a call between you and your physician/provider. We have found that certain health care needs can be provided without the need for a physical exam.  This service lets us provide the care you need with a short phone conversation.  If a prescription is necessary we can send it directly to your pharmacy.  If lab work is needed we can place an order for that and you can then stop by our lab to have the test done at a later time.    If during the course of the call the physician/provider feels a telephone visit is not appropriate, you will not be charged for this service.\"     Carolina Hernandez complains of   Chief Complaint   Patient presents with     Sinus Problem       I have reviewed and updated the patient's Past Medical History, Social History, Family History and Medication List.    ALLERGIES  Penicillins    ENT Symptoms- treated in February when she improved and now getting worse again             Symptoms: cc Present Absent Comment   Fever/Chills   x    Fatigue   x    Muscle Aches   x    Eye Irritation   x    Sneezing   x    Nasal Magan/Drg   x    Sinus Pressure/Pain x x     Loss of smell   x    Dental pain   x    Sore Throat   x    Swollen Glands   x    Ear Pain/Fullness   x    Cough   x    Wheeze   x    Chest Pain   x    Shortness of breath   x    Rash   x    Other   x      Symptom duration:  one month, " worsened over 4 days   Symptom severity:  severe   Treatments tried:  took antibiotics in feb   Contacts:  none         Assessment/Plan:  1. Acute recurrent maxillary sinusitis  Patient reassured. Antibiotics faxed, if symptoms persist she is asked to call the clinic.   - cefdinir (OMNICEF) 300 MG capsule; Take 1 capsule (300 mg) by mouth 2 times daily for 10 days  Dispense: 20 capsule; Refill: 0  - oxymetazoline (AFRIN) 0.05 % nasal spray; Spray 2 sprays into both nostrils 2 times daily  Dispense: 1 Bottle; Refill: 0    Phone call duration: 5-10  minutes    Julia Oneill MD

## 2020-04-05 ENCOUNTER — TELEPHONE (OUTPATIENT)
Dept: FAMILY MEDICINE | Facility: CLINIC | Age: 55
End: 2020-04-05

## 2020-04-05 DIAGNOSIS — J45.20 MILD INTERMITTENT ASTHMA WITHOUT COMPLICATION: Primary | ICD-10-CM

## 2020-04-05 NOTE — TELEPHONE ENCOUNTER
Prior Authorization Retail Medication Request    Medication/Dose: flutic-salme  ICD code (if different than what is on RX):    Previously Tried and Failed:  Albuterol; breo ellipta; dulera; singulair; proair; ventolin;   Rationale:  Patient has used since 1/2019    Insurance Name:  [employer] [phone]  Insurance ID:  [id number]      Pharmacy Information (if different than what is on RX)  Name:    Phone:

## 2020-04-06 NOTE — TELEPHONE ENCOUNTER
Central Prior Authorization Team   Phone: [phone]      PA NOT NEEDED    Medication: flutic/salme-PA NOT NEEDED  Insurance Company:    Pharmacy Filling the Rx:    Filling Pharmacy Phone:    Filling Pharmacy Fax:    Start Date: 4/6/2020    Called pharmacy to see if they tried running Wixela, they did not.  When Wixela was ran they got a paid claim.  They have to order the medication in.  Pharmacy will notify patient when medication is ready.

## 2020-04-08 NOTE — TELEPHONE ENCOUNTER
"Notified her. \"I have already tried Dulera and it didn't work for me, \"    Dr Oneill, I asked her and she says she has not tried SYMBICORT, and would be willing to try that one. \"I have dulera here already and it doesn't work, \"        Ina Albrecht RNC    "

## 2020-04-08 NOTE — TELEPHONE ENCOUNTER
Pharmacy is asking for another medication as the Wixela form of Advair is covered, however it is on backorder.      I believe Dulera and Symbicort are covered alternatives on patient's plan.

## 2020-04-09 NOTE — TELEPHONE ENCOUNTER
Reason for call:  Patient reporting a symptom    Symptom or request: Pt calling back again today to see if Dr. Oneill has sent a new Rx to pharmacy for her, in place of the Dulera, which she states does not work.  Please call patient and advise.    Cub [place]    Duration (how long have symptoms been present): ongoing    Have you been treated for this before? Yes    Additional comments:     Phone Number patient can be reached at:  Home number on file [phone] (home)    Best Time:  any    Can we leave a detailed message on this number:  YES    Call taken on 4/9/2020 at 11:12 AM by Yin Triplett

## 2020-04-09 NOTE — TELEPHONE ENCOUNTER
Dr. Oneill,    Patient wanting the generic for advair.  Pharmacies having a hard time getting advair in but was told that she could get the Wixela brand . Ana ALEXANDER RN

## 2020-07-08 ENCOUNTER — OFFICE VISIT (OUTPATIENT)
Dept: FAMILY MEDICINE | Facility: CLINIC | Age: 55
End: 2020-07-08
Payer: COMMERCIAL

## 2020-07-08 VITALS
HEART RATE: 80 BPM | RESPIRATION RATE: 16 BRPM | HEIGHT: 63 IN | SYSTOLIC BLOOD PRESSURE: 130 MMHG | OXYGEN SATURATION: 98 % | TEMPERATURE: 97.7 F | WEIGHT: 143 LBS | DIASTOLIC BLOOD PRESSURE: 80 MMHG | BODY MASS INDEX: 25.34 KG/M2

## 2020-07-08 DIAGNOSIS — J45.20 MILD INTERMITTENT ASTHMA WITHOUT COMPLICATION: ICD-10-CM

## 2020-07-08 DIAGNOSIS — K92.1 HEMATOCHEZIA: Primary | ICD-10-CM

## 2020-07-08 PROCEDURE — 99214 OFFICE O/P EST MOD 30 MIN: CPT | Performed by: FAMILY MEDICINE

## 2020-07-08 RX ORDER — FLUTICASONE PROPIONATE 50 MCG
1 SPRAY, SUSPENSION (ML) NASAL DAILY
COMMUNITY
End: 2021-01-29

## 2020-07-08 ASSESSMENT — MIFFLIN-ST. JEOR: SCORE: 1202.77

## 2020-07-08 NOTE — PROGRESS NOTES
Subjective     Carolina Hernandez is a 55 year old female who presents to clinic today for the following health issues:    HPI   Hemorrhoids  Onset: x off and blood in stool x 3 years - getting worse  - watery diarrhea - yesterday had a stool with only bright red blood    Description:   Pain: YES  Itching: no     Accompanying Signs & Symptoms:  Blood streaked toilet paper: YES  Blood in stool: YES  Changes in stool pattern: YES    History:   Any previous GI studies done:Colonoscopy in August, 2017.   She had diverticula and hemorrhoids. No polyps.     Asthma Follow-Up    Was ACT completed today?    Yes    ACT Total Scores 7/8/2020   ACT TOTAL SCORE (Goal Greater than or Equal to 20) 10   In the past 12 months, how many times did you visit the emergency room for your asthma without being admitted to the hospital? 0   In the past 12 months, how many times were you hospitalized overnight because of your asthma? 0       How many days per week do you miss taking your asthma controller medication?  0    Please describe any recent triggers for your asthma: humidity    Have you had any Emergency Room Visits, Urgent Care Visits, or Hospital Admissions since your last office visit?  No     The hot and humid weather without air conditioning.     Reviewed and updated as needed this visit by Provider       Current Outpatient Medications   Medication Instructions     albuterol (PROAIR HFA/PROVENTIL HFA/VENTOLIN HFA) 108 (90 Base) MCG/ACT inhaler Inhale 2 puffs into the lungs every 6 hours as needed for shortness of breath / dyspnea or wheezing.     albuterol (PROVENTIL) (2.5 MG/3ML) 0.083% neb solution Inhale contents of 1 vial (2.5 mg) by nebulization every 4 hours as needed for shortness of breath / dyspnea or wheezing     amLODIPine (NORVASC) 5 mg, Oral, DAILY, Please fill at patient request     cetirizine (ZYRTEC) 10 mg, Oral, DAILY     FLUoxetine (PROZAC) 20 mg, Oral, DAILY     FLUoxetine (PROZAC) 40 mg, Oral, DAILY, Take  "with 20 mg tab for a total of 60 mg daily     fluticasone (FLONASE) 50 MCG/ACT nasal spray 1 spray, Both Nostrils, DAILY     fluticasone-salmeterol (ADVAIR DISKUS) 500-50 MCG/DOSE inhaler Inhale 1 puff into the lungs every 12 hours     fluticasone-salmeterol (ADVAIR) 500-50 MCG/DOSE inhaler 1 puff, Inhalation, EVERY 12 HOURS, Please prescribe the generic ( Wixela )     hydrochlorothiazide (HYDRODIURIL) 25 MG tablet Take 1 tablet (25 mg) by mouth once daily     mometasone-formoterol (DULERA) 200-5 MCG/ACT inhaler 2 puffs, Inhalation, 2 TIMES DAILY     montelukast (SINGULAIR) 10 mg, Oral, AT BEDTIME     multivitamin  with lutein (OCUVITE WITH LTEIN) CAPS per capsule 1 capsule, Oral, DAILY     omeprazole 20 mg, Oral, DAILY     order for DME Equipment being ordered: Blood pressure monitoring unit     oxymetazoline (AFRIN) 0.05 % nasal spray 2 sprays, Both Nostrils, 2 TIMES DAILY       Patient Active Problem List   Diagnosis     CARDIOVASCULAR SCREENING; LDL GOAL LESS THAN 160     Recurrent major depressive disorder, in remission (H)     Encounter for tobacco use cessation counseling     Mild intermittent asthma without complication     Essential hypertension       Blood pressure 130/80, pulse 80, temperature 97.7  F (36.5  C), temperature source Tympanic, resp. rate 16, height 1.6 m (5' 3\"), weight 64.9 kg (143 lb), SpO2 98 %, not currently breastfeeding.    Exam:  GENERAL APPEARANCE: healthy, alert and no distress  EYES: EOMI,  PERRL  NECK: no adenopathy, no asymmetry, masses, or scars and thyroid normal to palpation  RESP: lungs clear to auscultation - no rales, rhonchi or wheezes  CV: regular rates and rhythm, normal S1 S2, no S3 or S4 and no murmur, click or rub -  ABD: not tender  PSYCH: mentation appears normal and affect normal/bright      (K92.1) Hematochezia  (primary encounter diagnosis)  Comment:   Plan: we reviewed the 2017 colonoscopy. The most likely causes of the blood are diverticula and hemorrhoids. " Avoid straining and heavy lifting and aspirin.   Use Tylenol before Advil, as the latter is a blood thinner. If you do not resolve the blood then call our clinic RN at [phone] and the colonoscopy will be ordered.   Avoid constipation.      (J45.20) Mild intermittent asthma without complication  Comment:   Plan: For the lungs, we discussed the proper use of the inhalers. Stay well hydrated. Avoid contagious exposures, and identify triggers to avoid.   Monitor the Air Quality index (AQI). Get a home peak flow meter. Refills are available.       Joe Higuera MD

## 2020-07-08 NOTE — PATIENT INSTRUCTIONS
(K92.1) Hematochezia  (primary encounter diagnosis)  Comment:   Plan: we reviewed the 2017 colonoscopy. The most likely causes of the blood are diverticula and hemorrhoids. Avoid straining and heavy lifting and aspirin.   Use Tylenol before Advil, as the latter is a blood thinner. If you do not resolve the blood then call our clinic RN at [phone] and the colonoscopy will be ordered.   Avoid constipation.      (J45.20) Mild intermittent asthma without complication  Comment:   Plan: For the lungs, we discussed the proper use of the inhalers. Stay well hydrated. Avoid contagious exposures, and identify triggers to avoid.   Monitor the Air Quality index (AQI). Get a home peak flow meter. Refills are available.

## 2020-07-09 ASSESSMENT — ASTHMA QUESTIONNAIRES: ACT_TOTALSCORE: 10

## 2020-09-08 NOTE — PROGRESS NOTES
"Carolina Hernandez is a 55 year old female who is being evaluated via a billable telephone visit.      55 yr old female here for possible eye infection said it has been ongoing for a few days. Reports redness in both eyes. Says it burns. Denies any blurry vision. Has had some discharge from the eye.    Also has a likely sinus infection. Pain in the face- tooth hurts, greenish drainage from her nose. No fevers or chills. Her asthma has been acting up.     The patient has been notified of following:     \"This telephone visit will be conducted via a call between you and your physician/provider. We have found that certain health care needs can be provided without the need for a physical exam.  This service lets us provide the care you need with a short phone conversation.  If a prescription is necessary we can send it directly to your pharmacy.  If lab work is needed we can place an order for that and you can then stop by our lab to have the test done at a later time.    Telephone visits are billed at different rates depending on your insurance coverage. During this emergency period, for some insurers they may be billed the same as an in-person visit.  Please reach out to your insurance provider with any questions.    If during the course of the call the physician/provider feels a telephone visit is not appropriate, you will not be charged for this service.\"    Patient has given verbal consent for Telephone visit?  Yes        How would you like to obtain your AVS? Mail a copy    Subjective     Carolina Hernandez is a 55 year old female who presents via phone visit today for the following health issues:    HPI     Sinus Symptoms    Symptoms starting Saturday.     States left lower eye lid pain/ swelling. Notes a small bump in eye lid.  Redness in eye and eye lid.  Mucous like discharge oozing from left eye while sleeping.  Mattering noted in eye when waking.  Fluid heard in bilateral ears, left is worse.  Nasal drainage, " green in color.  Sinus pressure headaches.    Has tried sinus rinse bottle, sinus steamer, benadryl. States not helpful.  States that her asthma/wheezing has been worse in the last month. Nebulizer and inhalers not helping despite using them more frequently.    States that she was prescribed cefdinir with last sinus infection, has not yet taken, is afraid she will have a reaction to it.               Review of Systems   Constitutional, HEENT, cardiovascular, pulmonary, gi and gu systems are negative, except as otherwise noted.       Objective          Vitals:  No vitals were obtained today due to virtual visit.    healthy, alert and no distress  PSYCH: Alert and oriented times 3; coherent speech, normal   rate and volume, able to articulate logical thoughts, able   to abstract reason, no tangential thoughts, no hallucinations   or delusions  Her affect is normal  RESP: No cough, no audible wheezing, able to talk in full sentences  Remainder of exam unable to be completed due to telephone visits    No results found for this or any previous visit (from the past 24 hour(s)).        Assessment/Plan:    Assessment & Plan     Acute conjunctivitis of both eyes, unspecified acute conjunctivitis type  Antibiotic eye drops faxed   - tobramycin (TOBREX) 0.3 % ophthalmic solution; Place 1-2 drops into both eyes every 2 hours for 5 days    Moderate persistent asthma with exacerbation  Medication faxed  Has some antibiotics from last visit - asked to take this for possible sinus infection.   - predniSONE (DELTASONE) 20 MG tablet; Take 3 tabs by mouth daily x 3 days, then 2 tabs daily x 3 days, then 1 tab daily x 3 days, then 1/2 tab daily x 3 days.                   FUTURE APPOINTMENTS:       - Follow-up visit in one month or sooner as needed.    Return in about 4 weeks (around 10/7/2020), or if symptoms worsen or fail to improve, for In-Clinic Visit.    Julia Oneill MD  [facility]    Phone call  duration:  7  minutes

## 2020-09-09 ENCOUNTER — VIRTUAL VISIT (OUTPATIENT)
Dept: FAMILY MEDICINE | Facility: CLINIC | Age: 55
End: 2020-09-09
Payer: COMMERCIAL

## 2020-09-09 DIAGNOSIS — J45.41 MODERATE PERSISTENT ASTHMA WITH EXACERBATION: ICD-10-CM

## 2020-09-09 DIAGNOSIS — H10.33 ACUTE CONJUNCTIVITIS OF BOTH EYES, UNSPECIFIED ACUTE CONJUNCTIVITIS TYPE: Primary | ICD-10-CM

## 2020-09-09 PROCEDURE — 99214 OFFICE O/P EST MOD 30 MIN: CPT | Mod: 95 | Performed by: FAMILY MEDICINE

## 2020-09-09 RX ORDER — TOBRAMYCIN 3 MG/ML
1-2 SOLUTION/ DROPS OPHTHALMIC
Qty: 1 BOTTLE | Refills: 1 | Status: SHIPPED | OUTPATIENT
Start: 2020-09-09 | End: 2020-09-14

## 2020-09-09 RX ORDER — PREDNISONE 20 MG/1
TABLET ORAL
Qty: 20 TABLET | Refills: 0 | Status: SHIPPED | OUTPATIENT
Start: 2020-09-09 | End: 2020-11-09

## 2020-09-15 ENCOUNTER — TELEPHONE (OUTPATIENT)
Dept: FAMILY MEDICINE | Facility: CLINIC | Age: 55
End: 2020-09-15

## 2020-09-15 NOTE — TELEPHONE ENCOUNTER
S-(situation): blood in stool    B-(background): started yesterday.  Noted bright red blood. has rectal pain, not abd pain.  Has blood in the stool and with wiping.  Does not have to wear a pad.   Has had diarrhea for a couple years now.  3 yrs ago had a colonoscopy for blood in stool.  Has diverticulosis. Saw Dr. Adair for the bleeding and was told to f/u with PCP.  No stool testing has been done per patient.  Patient states she just had a virtual visit with Dr. Oneill last week.  Did not mention this then to the provider.    A-(assessment): rectal bleeding and diarrhea    R-(recommendations): appt made for 9/17/2020 offered something sooner with someone else but patient only wants to see her PCP. Ana ALEXANDER RN

## 2020-09-15 NOTE — TELEPHONE ENCOUNTER
Reason for call:  Patient reporting a symptom    Symptom or request: Blood in stool sore in rectum     Duration (how long have symptoms been present): 24 hours    Have you been treated for this before? No      Phone Number patient can be reached at:  Home number on file [phone] (home)    Best Time:  any    Can we leave a detailed message on this number:  YES    Call taken on 9/15/2020 at 8:17 AM by Juliann Reyes

## 2020-09-16 NOTE — PROGRESS NOTES
"Subjective     Carolina Hernandez is a 55 year old female who presents to clinic today for the following health issues:    HPI     GI Issues  Patient comes in with intermittent blood in her stool. She says this symptom has been ongoing for years. In the last few days , the bleeding has increased in intensity. She has bright red blood in her stools. She also c/o of rectal pain and it hurts to sit. Her stool caliber varies from firm to soft. She had a colonoscopy in 2017 and was told that she had diverticulosis and also hemorrhoids. She also reports some lower abdominal pain and cramping.        Symptoms 3 years intermittently, worse since this last Sunday.     States that she may have a hemorrhoid, bleeding bright red blood since Sunday, rectal pain making it hard to sit comfortably.    States low abdominal cramping, upper abdominal bloating.    States frequent loose stools (too many in a day to count).    States that she tries not to bare down, but feels like the stool is not making its way out as normal.     States that rectum feels like it is coming out.    Has tried staying away from tomatoes, raspberries (anything with small seeds). States not helpful.    States that she had a tomato on Sunday.    Is taking prednisone for asthma, has 3 more days on this medication.  \"Ana Mei RN 9/15/20 8:28 AM Note      S-(situation): blood in stool     B-(background): started yesterday.  Noted bright red blood. has rectal pain, not abd pain.  Has blood in the stool and with wiping.  Does not have to wear a pad.   Has had diarrhea for a couple years now.  3 yrs ago had a colonoscopy for blood in stool.  Has diverticulosis. Saw Dr. Adair for the bleeding and was told to f/u with PCP.  No stool testing has been done per patient.  Patient states she just had a virtual visit with Dr. Oneill last week.  Did not mention this then to the provider.     A-(assessment): rectal bleeding and diarrhea     R-(recommendations): " "appt made for 9/17/2020 offered something sooner with someone else but patient only wants to see her PCP. Ana ALEXANDER RN\"           Below are notes from 07/08/2020 evaluation with Dr. Higuera.    \"(K92.1) Hematochezia  (primary encounter diagnosis)  Comment:   Plan: we reviewed the 2017 colonoscopy. The most likely causes of the blood are diverticula and hemorrhoids. Avoid straining and heavy lifting and aspirin.   Use Tylenol before Advil, as the latter is a blood thinner. If you do not resolve the blood then call our clinic RN at [phone] and the colonoscopy will be ordered.   Avoid constipation.    Joe Higuera MD\"              Review of Systems   Constitutional, HEENT, cardiovascular, pulmonary, gi and gu systems are negative, except as otherwise noted.      Objective    /80 (BP Location: Left arm)   Pulse 86   Temp 98.2  F (36.8  C) (Tympanic)   Resp 12   Wt 70.1 kg (154 lb 9.6 oz)   SpO2 98%   BMI 27.39 kg/m    Body mass index is 27.39 kg/m .  Physical Exam   GENERAL: healthy, alert and no distress  NECK: no adenopathy, no asymmetry, masses, or scars and thyroid normal to palpation  RESP: lungs clear to auscultation - no rales, rhonchi or wheezes  CV: regular rate and rhythm, normal S1 S2, no S3 or S4, no murmur, click or rub, no peripheral edema and peripheral pulses strong  ABDOMEN: soft, nontender, no hepatosplenomegaly, no masses and bowel sounds normal  RECTAL (female): normal sphincter tone, no rectal masses, external  hemorrhoid and anal fissure   MS: no gross musculoskeletal defects noted, no edema    No results found for this or any previous visit (from the past 24 hour(s)).        Assessment & Plan     Internal and external bleeding hemorrhoids  Referral to general surgery   - GENERAL SURG ADULT REFERRAL; Future         FUTURE APPOINTMENTS:       - Follow-up visit in one month or sooner as needed.    Return in about 4 weeks (around 10/15/2020).    Julia Oneill, " MD  [facility]

## 2020-09-17 ENCOUNTER — OFFICE VISIT (OUTPATIENT)
Dept: FAMILY MEDICINE | Facility: CLINIC | Age: 55
End: 2020-09-17
Payer: COMMERCIAL

## 2020-09-17 VITALS
OXYGEN SATURATION: 98 % | BODY MASS INDEX: 27.39 KG/M2 | WEIGHT: 154.6 LBS | HEART RATE: 86 BPM | TEMPERATURE: 98.2 F | DIASTOLIC BLOOD PRESSURE: 80 MMHG | SYSTOLIC BLOOD PRESSURE: 134 MMHG | RESPIRATION RATE: 12 BRPM

## 2020-09-17 DIAGNOSIS — K64.8 INTERNAL AND EXTERNAL BLEEDING HEMORRHOIDS: Primary | ICD-10-CM

## 2020-09-17 DIAGNOSIS — K64.4 INTERNAL AND EXTERNAL BLEEDING HEMORRHOIDS: Primary | ICD-10-CM

## 2020-09-17 PROCEDURE — 99213 OFFICE O/P EST LOW 20 MIN: CPT | Performed by: FAMILY MEDICINE

## 2020-09-17 NOTE — PATIENT INSTRUCTIONS
Patient Education     Treating Hemorrhoids: Self-Care    Follow your healthcare provider s advice about caring for your hemorrhoids at home. Some treatments help relieve symptoms right away. Others involve making changes in your diet and exercise habits. These can help ease constipation and prevent hemorrhoid symptoms from coming back.  Relieving symptoms  Your healthcare provider may prescribe anti-inflammatory medicine to help ease your symptoms. The following tips will also help relieve pain and swelling.    Take sitz baths. Taking a sitz bath means sitting in a few inches of warm bath water. Soaking for 10 minutes twice a day can provide welcome relief from painful hemorrhoids. It can also help the area stay clean.    Develop good bowel habits. Use the bathroom when you need to. Don t ignore the urge to move your bowels. This can lead to constipation, hard stools, and straining. Also, don t read while on the toilet. Sit only as long as needed. Wipe gently with soft, unscented toilet tissue or baby wipes.    Use ice packs. Placing an ice pack on a thrombosed external hemorrhoid can help relieve pain right away. It will also help reduce the blood clot. Use the ice for 15 to 20 minutes at a time. Keep a cloth between the ice and your skin to prevent skin damage.    Use other measures. Laxatives and enemas can help ease constipation. But use them only on your healthcare provider s advice. For symptom relief, try using cotton pads soaked in witch hazel. These are available at most drugstores. Over-the-counter hemorrhoid ointments and petroleum jelly can also provide relief.  Add fiber to your diet  Adding fiber to your diet can help relieve constipation by making stools softer and easier to pass. To increase your fiber intake, your healthcare provider may recommend a bulking agent, such as psyllium. This is a high-fiber supplement available at most grocery stores and drugstores. Eating more fiber-rich foods will  also help. There are two types of fiber:    Insoluble fiber is the main ingredient in bulking agents. It s also found in foods such as wheat bran, whole-grain breads, fresh fruits, and vegetables.    Soluble fiber is found in foods such as oat bran. Although soluble fiber is good for you, it may not ease constipation as much as foods high in insoluble fiber.  Drink more water  Along with a high-fiber diet, drinking more water can help ease constipation. This is because insoluble fiber absorbs water, making stools soft and bulky. Be sure to drink plenty of water throughout the day. Drinking fruit juices, such as prune juice or apple juice, can also help prevent constipation.  Get more exercise  Regular exercise aids digestion and helps prevent constipation. It s also great for your health. So talk with your healthcare provider about starting an exercise program. Low-impact activities, such as swimming or walking, are good places to start. Take it easy at first. And remember to drink plenty of water when you exercise.  High-fiber foods  High-fiber foods offer many benefits. By making your stools softer, they help heal and prevent swollen hemorrhoids. They may also help reduce the risk of colon and rectal cancer. Best of all, they re usually low in calories and taste great. Here are some examples of fiber-rich foods.    Whole grains, such as wheat bran, corn bran, and brown rice.    Vegetables, especially carrots, broccoli, cabbage, and peas.    Fruits, such as apples, bananas, raisins, peaches, and pears.    Nuts and legumes, especially peanuts, lentils, and kidney beans.  Easy ways to add fiber  The tips below offer some simple ways to add more high-fiber foods to your meals.    Start your day with a high-fiber breakfast. Eat a wheat bran cereal along with a sliced banana. Or, try peanut butter on whole-wheat toast.    Eat carrot sticks for snacks. They re easy to prepare, taste great, and are low in calories.    Use  whole-grain breads instead of white bread for sandwiches.    Eat fruits for treats. Try an apple and some raisins instead of a candy bar.   Date Last Reviewed: 7/1/2016 2000-2019 [employer]. [address] [place] [address], [place] [address]. All rights reserved. This information is not intended as a substitute for professional medical care. Always follow your healthcare professional's instructions.

## 2020-09-17 NOTE — NURSING NOTE
"Initial BP (!) 150/78   Pulse 86   Temp 98.2  F (36.8  C) (Tympanic)   Resp 12   Wt 70.1 kg (154 lb 9.6 oz)   SpO2 98%   BMI 27.39 kg/m   Estimated body mass index is 27.39 kg/m  as calculated from the following:    Height as of 7/8/20: 1.6 m (5' 3\").    Weight as of this encounter: 70.1 kg (154 lb 9.6 oz). .      "

## 2020-09-21 ENCOUNTER — OFFICE VISIT (OUTPATIENT)
Dept: SURGERY | Facility: CLINIC | Age: 55
End: 2020-09-21
Attending: FAMILY MEDICINE
Payer: COMMERCIAL

## 2020-09-21 VITALS
HEART RATE: 85 BPM | TEMPERATURE: 98.6 F | HEIGHT: 63 IN | BODY MASS INDEX: 27.29 KG/M2 | SYSTOLIC BLOOD PRESSURE: 148 MMHG | WEIGHT: 154 LBS | DIASTOLIC BLOOD PRESSURE: 81 MMHG

## 2020-09-21 DIAGNOSIS — K64.4 INTERNAL AND EXTERNAL BLEEDING HEMORRHOIDS: ICD-10-CM

## 2020-09-21 DIAGNOSIS — K64.8 INTERNAL AND EXTERNAL BLEEDING HEMORRHOIDS: ICD-10-CM

## 2020-09-21 PROCEDURE — 99213 OFFICE O/P EST LOW 20 MIN: CPT | Mod: 25 | Performed by: SURGERY

## 2020-09-21 PROCEDURE — 46600 DIAGNOSTIC ANOSCOPY SPX: CPT | Performed by: SURGERY

## 2020-09-21 ASSESSMENT — MIFFLIN-ST. JEOR: SCORE: 1252.67

## 2020-09-21 NOTE — PROGRESS NOTES
PCP:  Julia Oneill    Chief complaint: Hemorrhoids    History of Present Illness: Carolina is a 55-year-old female who presents with trouble with hemorrhoids.  It is difficult for her to remember exactly when this started but it has been and off problem probably for several years.  Her symptoms are sometimes, although it does not hurt to have a bowel.  She feels pressure after she is done having a bowel movement and sometimes feels like something is poking out.  She has had bloating for about 1 to 2 months.  She has not had any surgical procedures done to her hemorrhoids.  She reports that her stools are liquid and she really never has to.*9 she feels like there is something still in her rectum after a bowel movement.    A colonoscopy in 2017 was reportedly remarkable for hemorrhoids and diverticulosis.    She has bleeding intermittently with bowel movements.    Histories:  History reviewed. No pertinent past medical history.    History reviewed. No pertinent surgical history.    Family History   Problem Relation Age of Onset     Glaucoma Father      Coronary Artery Disease Father         stents     Cancer Maternal Grandfather      Coronary Artery Disease Paternal Grandfather      Schizophrenia Daughter      Diabetes Daughter      Cancer Daughter        Social History     Tobacco Use     Smoking status: Current Every Day Smoker     Packs/day: 0.50     Types: Cigarettes     Smokeless tobacco: Never Used   Substance Use Topics     Alcohol use: Yes     Comment: 12 pack weekly       Current Outpatient Medications   Medication Sig Dispense Refill     albuterol (PROAIR HFA/PROVENTIL HFA/VENTOLIN HFA) 108 (90 Base) MCG/ACT inhaler Inhale 2 puffs into the lungs every 6 hours as needed for shortness of breath / dyspnea or wheezing. 3 Inhaler 2     albuterol (PROVENTIL) (2.5 MG/3ML) 0.083% neb solution Inhale contents of 1 vial (2.5 mg) by nebulization every 4 hours as needed for shortness of breath / dyspnea or  "wheezing 75 mL 10     cetirizine (ZYRTEC) 10 MG tablet Take 10 mg by mouth daily       FLUoxetine (PROZAC) 20 MG capsule Take 1 capsule (20 mg) by mouth daily 90 capsule 2     FLUoxetine (PROZAC) 40 MG capsule Take 1 capsule (40 mg) by mouth daily Take with 20 mg tab for a total of 60 mg daily 90 capsule 2     fluticasone (FLONASE) 50 MCG/ACT nasal spray Spray 1 spray into both nostrils daily       fluticasone-salmeterol (ADVAIR) 500-50 MCG/DOSE inhaler Inhale 1 puff into the lungs every 12 hours Please prescribe the generic ( Wixela ) 1 Inhaler 11     hydrochlorothiazide (HYDRODIURIL) 25 MG tablet Take 1 tablet (25 mg) by mouth once daily 90 tablet 3     montelukast (SINGULAIR) 10 MG tablet Take 1 tablet (10 mg) by mouth At Bedtime 30 tablet 10     multivitamin  with lutein (OCUVITE WITH LTEIN) CAPS per capsule Take 1 capsule by mouth daily       omeprazole 20 MG tablet Take 1 tablet (20 mg) by mouth daily 90 tablet 3     predniSONE (DELTASONE) 20 MG tablet Take 3 tabs by mouth daily x 3 days, then 2 tabs daily x 3 days, then 1 tab daily x 3 days, then 1/2 tab daily x 3 days. 20 tablet 0     amLODIPine (NORVASC) 2.5 MG tablet Take 2 tablets (5 mg) by mouth daily Please fill at patient request 180 tablet 3       Allergies   Allergen Reactions     Penicillins Itching       Images:  No results found for this or any previous visit (from the past 744 hour(s)).    Labs:  No results found for any visits on 09/21/20.    ROS:  Problem focused review of systems as above    BP (!) 148/81 (BP Location: Right arm, Patient Position: Sitting, Cuff Size: Adult Regular)   Pulse 85   Temp 98.6  F (37  C) (Tympanic)   Ht 1.6 m (5' 3\")   Wt 69.9 kg (154 lb)   BMI 27.28 kg/m      Exam:  General - Alert and Oriented X4, NAD, well nourished  HEENT - Normocephalic, atraumatic  Lungs -respirations unlabored, chest wall excursion normal   CV - Heart RRR  Abdomen - Soft    Rectal -perianal exam remarkable for just minor skin tags, no " thrombosed hemorrhoids, no skin changes or fistulas  Digital exam while uncomfortable is otherwise unremarkable  Anoscopic exam reveals moderately enlarged hemorrhoids with contact bleeding.  One in particular on the right anterior area I felt was worthy of a rubber band ligation.  I placed 2 rubber bands at the base of the hemorrhoid, and she tolerated this well.    Neuro -intact  Extremities - No cyanosis, clubbing or edema.      Assessment and Plan: I think most of her problems are related to internal hemorrhoids.  I am hoping that by treating the largest MRI today helped her.  I see nothing else to be concerned about.  I recommended that she increase the fiber in her diet.      I have asked her to return in 1 month if she is continues to have problems otherwise as necessary.  Advised her also to call me if there is any excess pain or bleeding.      Joe Lane MD FACS

## 2020-09-21 NOTE — LETTER
9/21/2020         RE: Carolina Hernandez  [address] [place] [id number]        Dear Colleague,    Thank you for referring your patient, Carolina Hernandez, to the [facility]. Please see a copy of my visit note below.    PCP:  Julia Oneill    Chief complaint: Hemorrhoids    History of Present Illness: Carolina is a 55-year-old female who presents with trouble with hemorrhoids.  It is difficult for her to remember exactly when this started but it has been and off problem probably for several years.  Her symptoms are sometimes, although it does not hurt to have a bowel.  She feels pressure after she is done having a bowel movement and sometimes feels like something is poking out.  She has had bloating for about 1 to 2 months.  She has not had any surgical procedures done to her hemorrhoids.  She reports that her stools are liquid and she really never has to.*9 she feels like there is something still in her rectum after a bowel movement.    A colonoscopy in 2017 was reportedly remarkable for hemorrhoids and diverticulosis.    She has bleeding intermittently with bowel movements.    Histories:  History reviewed. No pertinent past medical history.    History reviewed. No pertinent surgical history.    Family History   Problem Relation Age of Onset     Glaucoma Father      Coronary Artery Disease Father         stents     Cancer Maternal Grandfather      Coronary Artery Disease Paternal Grandfather      Schizophrenia Daughter      Diabetes Daughter      Cancer Daughter        Social History     Tobacco Use     Smoking status: Current Every Day Smoker     Packs/day: 0.50     Types: Cigarettes     Smokeless tobacco: Never Used   Substance Use Topics     Alcohol use: Yes     Comment: 12 pack weekly       Current Outpatient Medications   Medication Sig Dispense Refill     albuterol (PROAIR HFA/PROVENTIL HFA/VENTOLIN HFA) 108 (90 Base) MCG/ACT inhaler Inhale 2 puffs into the lungs every  "6 hours as needed for shortness of breath / dyspnea or wheezing. 3 Inhaler 2     albuterol (PROVENTIL) (2.5 MG/3ML) 0.083% neb solution Inhale contents of 1 vial (2.5 mg) by nebulization every 4 hours as needed for shortness of breath / dyspnea or wheezing 75 mL 10     cetirizine (ZYRTEC) 10 MG tablet Take 10 mg by mouth daily       FLUoxetine (PROZAC) 20 MG capsule Take 1 capsule (20 mg) by mouth daily 90 capsule 2     FLUoxetine (PROZAC) 40 MG capsule Take 1 capsule (40 mg) by mouth daily Take with 20 mg tab for a total of 60 mg daily 90 capsule 2     fluticasone (FLONASE) 50 MCG/ACT nasal spray Spray 1 spray into both nostrils daily       fluticasone-salmeterol (ADVAIR) 500-50 MCG/DOSE inhaler Inhale 1 puff into the lungs every 12 hours Please prescribe the generic ( Wixela ) 1 Inhaler 11     hydrochlorothiazide (HYDRODIURIL) 25 MG tablet Take 1 tablet (25 mg) by mouth once daily 90 tablet 3     montelukast (SINGULAIR) 10 MG tablet Take 1 tablet (10 mg) by mouth At Bedtime 30 tablet 10     multivitamin  with lutein (OCUVITE WITH LTEIN) CAPS per capsule Take 1 capsule by mouth daily       omeprazole 20 MG tablet Take 1 tablet (20 mg) by mouth daily 90 tablet 3     predniSONE (DELTASONE) 20 MG tablet Take 3 tabs by mouth daily x 3 days, then 2 tabs daily x 3 days, then 1 tab daily x 3 days, then 1/2 tab daily x 3 days. 20 tablet 0     amLODIPine (NORVASC) 2.5 MG tablet Take 2 tablets (5 mg) by mouth daily Please fill at patient request 180 tablet 3       Allergies   Allergen Reactions     Penicillins Itching       Images:  No results found for this or any previous visit (from the past 744 hour(s)).    Labs:  No results found for any visits on 09/21/20.    ROS:  Problem focused review of systems as above    BP (!) 148/81 (BP Location: Right arm, Patient Position: Sitting, Cuff Size: Adult Regular)   Pulse 85   Temp 98.6  F (37  C) (Tympanic)   Ht 1.6 m (5' 3\")   Wt 69.9 kg (154 lb)   BMI 27.28 kg/m  "     Exam:  General - Alert and Oriented X4, NAD, well nourished  HEENT - Normocephalic, atraumatic  Lungs -respirations unlabored, chest wall excursion normal   CV - Heart RRR  Abdomen - Soft    Rectal -perianal exam remarkable for just minor skin tags, no thrombosed hemorrhoids, no skin changes or fistulas  Digital exam while uncomfortable is otherwise unremarkable  Anoscopic exam reveals moderately enlarged hemorrhoids with contact bleeding.  One in particular on the right anterior area I felt was worthy of a rubber band ligation.  I placed 2 rubber bands at the base of the hemorrhoid, and she tolerated this well.    Neuro -intact  Extremities - No cyanosis, clubbing or edema.      Assessment and Plan: I think most of her problems are related to internal hemorrhoids.  I am hoping that by treating the largest MRI today helped her.  I see nothing else to be concerned about.  I recommended that she increase the fiber in her diet.      I have asked her to return in 1 month if she is continues to have problems otherwise as necessary.  Advised her also to call me if there is any excess pain or bleeding.      Joe Lane MD FACS          Again, thank you for allowing me to participate in the care of your patient.        Sincerely,        Joe Lane MD

## 2020-09-21 NOTE — NURSING NOTE
"Initial BP (!) 148/81 (BP Location: Right arm, Patient Position: Sitting, Cuff Size: Adult Regular)   Pulse 85   Temp 98.6  F (37  C) (Tympanic)   Ht 1.6 m (5' 3\")   Wt 69.9 kg (154 lb)   BMI 27.28 kg/m   Estimated body mass index is 27.28 kg/m  as calculated from the following:    Height as of this encounter: 1.6 m (5' 3\").    Weight as of this encounter: 69.9 kg (154 lb). .    Elaine Sands CMA    "

## 2020-09-21 NOTE — PATIENT INSTRUCTIONS
Per physician instructions      If you have questions or concerns on any instructions given to you by your provider today or if you need to schedule an appointment, you can reach us at [phone].

## 2020-10-05 DIAGNOSIS — J45.20 MILD INTERMITTENT ASTHMA WITHOUT COMPLICATION: ICD-10-CM

## 2020-10-05 NOTE — TELEPHONE ENCOUNTER
"Requested Prescriptions   Pending Prescriptions Disp Refills     albuterol (PROVENTIL) (2.5 MG/3ML) 0.083% neb solution [Pharmacy Med Name: Albuterol Sulfate Inhalation Nebulization Solution (2.5 MG/3ML) 0.083%] 75 mL 0     Sig: Inhale contents of 1 vial (2.5 mg) by nebulization every 4 hours as needed for shortness of breath / dyspnea or wheezing       Asthma Maintenance Inhalers - Anticholinergics Failed - 10/5/2020 10:05 AM        Failed - Asthma control assessment score within normal limits in last 6 months     Please review ACT score.           Passed - Patient is age 12 years or older        Passed - Medication is active on med list        Passed - Recent (6 mo) or future (30 days) visit within the authorizing provider's specialty     Patient had office visit in the last 6 months or has a visit in the next 30 days with authorizing provider or within the authorizing provider's specialty.  See \"Patient Info\" tab in inbasket, or \"Choose Columns\" in Meds & Orders section of the refill encounter.           Short-Acting Beta Agonist Inhalers Protocol  Failed - 10/5/2020 10:05 AM        Failed - Asthma control assessment score within normal limits in last 6 months     Please review ACT score.           Passed - Patient is age 12 or older        Passed - Medication is active on med list        Passed - Recent (6 mo) or future (30 days) visit within the authorizing provider's specialty     Patient had office visit in the last 6 months or has a visit in the next 30 days with authorizing provider or within the authorizing provider's specialty.  See \"Patient Info\" tab in inbasket, or \"Choose Columns\" in Meds & Orders section of the refill encounter.                 "

## 2020-10-06 NOTE — TELEPHONE ENCOUNTER
Routing refill request to provider for review/approval because:  ACT score:  Asthma Control Test (Copyright [employer], 2011; Used with Permission) 7/8/2020   1.  In the past 4 weeks, how much of the time did your asthma keep you from getting as much done at work, school or at home? 3   2.  During the past 4 weeks, how often have you had shortness of breath? 1   3.  During the past 4 weeks, how often did your asthma symptoms (wheezing, coughing, shortness of breath, chest tightness or pain) wake you up at night or earlier than usual in the morning? 2   4.  During the past 4 weeks, how often have you used your rescue inhaler or nebulizer medication (such as albuterol)? 1   5.  How would you rate your asthma control during the past 4 weeks? 3   ACT Total Score (Goal >/= 20) 10   In the past 12 months, how many times did you visit the emergency room for your asthma without being admitted to the hospital? 0   In the past 12 months, how many times were you hospitalized overnight because of your asthma? 0     Barbara Porter RN

## 2020-10-07 RX ORDER — ALBUTEROL SULFATE 0.83 MG/ML
SOLUTION RESPIRATORY (INHALATION)
Qty: 75 ML | Refills: 0 | Status: SHIPPED | OUTPATIENT
Start: 2020-10-07 | End: 2020-10-26

## 2020-10-07 NOTE — TELEPHONE ENCOUNTER
Routing refill request to provider for review/approval because:  Labs out of range:  ACT    ACT Total Scores 8/27/2019 10/3/2019 7/8/2020   ACT TOTAL SCORE (Goal Greater than or Equal to 20) 11 15 10   In the past 12 months, how many times did you visit the emergency room for your asthma without being admitted to the hospital? 0 0 0   In the past 12 months, how many times were you hospitalized overnight because of your asthma? 0 0 0     Zenaida WRAY RN BSN

## 2020-10-07 NOTE — TELEPHONE ENCOUNTER
Patient is out of medication and needs refill asap. Kourtney Howard on 10/7/2020 at 1:37 PM.Kourtney Howard on 10/7/2020 at 1:38 PM

## 2020-10-23 DIAGNOSIS — J45.20 MILD INTERMITTENT ASTHMA WITHOUT COMPLICATION: ICD-10-CM

## 2020-10-23 RX ORDER — ALBUTEROL SULFATE 0.83 MG/ML
SOLUTION RESPIRATORY (INHALATION)
Qty: 75 ML | Refills: 0 | OUTPATIENT
Start: 2020-10-23

## 2020-10-23 NOTE — TELEPHONE ENCOUNTER
"Requested Prescriptions   Pending Prescriptions Disp Refills     albuterol (PROVENTIL) (2.5 MG/3ML) 0.083% neb solution [Pharmacy Med Name: Albuterol Sulfate Inhalation Nebulization Solution (2.5 MG/3ML) 0.083%] 75 mL 0     Sig: Inhale contents of 1 vial (2.5 mg) by nebulization every 4 hours as needed for shortness of breath / dyspnea or wheezing       Asthma Maintenance Inhalers - Anticholinergics Failed - 10/23/2020  9:17 AM        Failed - Asthma control assessment score within normal limits in last 6 months     Please review ACT score.           Passed - Patient is age 12 years or older        Passed - Medication is active on med list        Passed - Recent (6 mo) or future (30 days) visit within the authorizing provider's specialty     Patient had office visit in the last 6 months or has a visit in the next 30 days with authorizing provider or within the authorizing provider's specialty.  See \"Patient Info\" tab in inbasket, or \"Choose Columns\" in Meds & Orders section of the refill encounter.           Short-Acting Beta Agonist Inhalers Protocol  Failed - 10/23/2020  9:17 AM        Failed - Asthma control assessment score within normal limits in last 6 months     Please review ACT score.           Passed - Patient is age 12 or older        Passed - Medication is active on med list        Passed - Recent (6 mo) or future (30 days) visit within the authorizing provider's specialty     Patient had office visit in the last 6 months or has a visit in the next 30 days with authorizing provider or within the authorizing provider's specialty.  See \"Patient Info\" tab in inbasket, or \"Choose Columns\" in Meds & Orders section of the refill encounter.               ACT Total Scores 8/27/2019 10/3/2019 7/8/2020   ACT TOTAL SCORE (Goal Greater than or Equal to 20) 11 15 10   In the past 12 months, how many times did you visit the emergency room for your asthma without being admitted to the hospital? 0 0 0   In the past 12 " months, how many times were you hospitalized overnight because of your asthma? 0 0 0

## 2020-10-26 DIAGNOSIS — J45.20 MILD INTERMITTENT ASTHMA WITHOUT COMPLICATION: ICD-10-CM

## 2020-10-26 NOTE — TELEPHONE ENCOUNTER
.Reason for Call:  Medication or medication refill:    Do you use a [facility]?  Name of the pharmacy and phone number for the current request:  [facility] [phone]    Name of the medication requested: Pending Prescriptions:                       Disp   Refills    albuterol (PROVENTIL) (2.5 MG/3ML) 0.083%*75 mL  0        Patient is asking for a larger prescription.     Can we leave a detailed message on this number? YES    Phone number patient can be reached at: Home number on file [phone] (home)    Best Time: any    Call taken on 10/26/2020 at 10:01 AM by Juliann Reyes

## 2020-10-28 NOTE — TELEPHONE ENCOUNTER
Routing refill request to provider for review/approval because:  Last ACT not at goal.   ACT Total Scores 8/27/2019 10/3/2019 7/8/2020   ACT TOTAL SCORE (Goal Greater than or Equal to 20) 11 15 10   In the past 12 months, how many times did you visit the emergency room for your asthma without being admitted to the hospital? 0 0 0   In the past 12 months, how many times were you hospitalized overnight because of your asthma? 0 0 0      TYLOR DiazN, RN

## 2020-10-30 ENCOUNTER — TELEPHONE (OUTPATIENT)
Dept: FAMILY MEDICINE | Facility: CLINIC | Age: 55
End: 2020-10-30

## 2020-10-30 DIAGNOSIS — J45.20 MILD INTERMITTENT ASTHMA WITHOUT COMPLICATION: ICD-10-CM

## 2020-10-30 RX ORDER — ALBUTEROL SULFATE 90 UG/1
AEROSOL, METERED RESPIRATORY (INHALATION)
Qty: 18 G | Refills: 0 | Status: SHIPPED | OUTPATIENT
Start: 2020-10-30 | End: 2020-11-23

## 2020-10-30 RX ORDER — ALBUTEROL SULFATE 0.83 MG/ML
SOLUTION RESPIRATORY (INHALATION)
Qty: 75 ML | Refills: 0 | Status: SHIPPED | OUTPATIENT
Start: 2020-10-30 | End: 2020-11-23

## 2020-10-30 NOTE — TELEPHONE ENCOUNTER
Called and spoke with her, she will schedule an appt, and one more refill sent in per protocol.   Ina Albrecht RNC

## 2020-10-30 NOTE — TELEPHONE ENCOUNTER
"Requested Prescriptions   Pending Prescriptions Disp Refills     albuterol (PROAIR HFA/PROVENTIL HFA/VENTOLIN HFA) 108 (90 Base) MCG/ACT inhaler [Pharmacy Med Name: Albuterol Sulfate HFA Inhalation Aerosol Solution 108 (90 Base) MCG/ACT] 54 g 0     Sig: Inhale 2 puffs into the lungs every 6 hours as needed for shortness of breath / dyspnea or wheezing.       Asthma Maintenance Inhalers - Anticholinergics Failed - 10/30/2020  8:32 AM        Failed - Asthma control assessment score within normal limits in last 6 months     Please review ACT score.           Passed - Patient is age 12 years or older        Passed - Medication is active on med list        Passed - Recent (6 mo) or future (30 days) visit within the authorizing provider's specialty     Patient had office visit in the last 6 months or has a visit in the next 30 days with authorizing provider or within the authorizing provider's specialty.  See \"Patient Info\" tab in inIRIS-RFIDsket, or \"Choose Columns\" in Meds & Orders section of the refill encounter.           Short-Acting Beta Agonist Inhalers Protocol  Failed - 10/30/2020  8:32 AM        Failed - Asthma control assessment score within normal limits in last 6 months     Please review ACT score.           Passed - Patient is age 12 or older        Passed - Medication is active on med list        Passed - Recent (6 mo) or future (30 days) visit within the authorizing provider's specialty     Patient had office visit in the last 6 months or has a visit in the next 30 days with authorizing provider or within the authorizing provider's specialty.  See \"Patient Info\" tab in inbasket, or \"Choose Columns\" in Meds & Orders section of the refill encounter.                 "

## 2020-10-30 NOTE — TELEPHONE ENCOUNTER
"Called her, advised an appt, and she agreed, \"my asthma has been getting worse the last couple of months, \"  And one more refill sent in per protocol.   Ina Albrecht RNC    "

## 2020-11-09 ENCOUNTER — OFFICE VISIT (OUTPATIENT)
Dept: SURGERY | Facility: CLINIC | Age: 55
End: 2020-11-09
Payer: COMMERCIAL

## 2020-11-09 VITALS
DIASTOLIC BLOOD PRESSURE: 89 MMHG | SYSTOLIC BLOOD PRESSURE: 146 MMHG | WEIGHT: 154 LBS | BODY MASS INDEX: 27.29 KG/M2 | HEIGHT: 63 IN | TEMPERATURE: 98.1 F

## 2020-11-09 DIAGNOSIS — K64.4 RESIDUAL HEMORRHOIDAL SKIN TAGS: Primary | ICD-10-CM

## 2020-11-09 PROCEDURE — 99213 OFFICE O/P EST LOW 20 MIN: CPT | Performed by: SURGERY

## 2020-11-09 ASSESSMENT — MIFFLIN-ST. JEOR: SCORE: 1252.67

## 2020-11-09 NOTE — PATIENT INSTRUCTIONS
Per physician instructions      If you have questions or concerns on any instructions given to you by your provider today or if you need to schedule an appointment, you can reach us at [phone].

## 2020-11-09 NOTE — PROGRESS NOTES
Carolina returns for a recheck.  The last time I saw her she was having bleeding from hemorrhoids.  I placed some rubber bands, and that problem seems to have resolved.  She does not have pain with defecation.  There is no bleeding.  She reports that she has developed a hemorrhoid in the last 10 days or so that has not gone away.  It is nonpainful.    She also has chronic diarrhea which we talked about previously.  This has been going on for 3 years.  A referral had previously been made to gastroenterology from her primary care provider, but was not followed up on.    On exam: Her perianal area looks relatively normal with exception of some skin tags.  There are normal external hemorrhoids noted, no prolapsing internal hemorrhoids, and no thrombosed external hemorrhoids.  Digital exam was not performed today.    Impression: Bleeding internal hemorrhoid-resolved.  Ongoing problems with diarrhea-recommend gastroenterology evaluation.  Her last colonoscopy was in 2007, so she is not necessarily due for that yet.  I think a GI work-up would be important prior to putting her through that again.     Recommendations: As above. No need to follow-up with me, unless she becomes more symptomatic, starts to bleed again, or needs endoscopic procedures, which I am happy to perform.    Joe Lane MD FACS

## 2020-11-09 NOTE — NURSING NOTE
"Initial BP (!) 146/89 (BP Location: Right arm, Patient Position: Sitting, Cuff Size: Adult Regular)   Temp 98.1  F (36.7  C) (Tympanic)   Ht 1.6 m (5' 3\")   Wt 69.9 kg (154 lb)   BMI 27.28 kg/m   Estimated body mass index is 27.28 kg/m  as calculated from the following:    Height as of this encounter: 1.6 m (5' 3\").    Weight as of this encounter: 69.9 kg (154 lb). .    Elaine Sands CMA    "

## 2020-11-09 NOTE — LETTER
11/9/2020         RE: Carolina Hernandez  [address] [place] [phone]        Dear Colleague,    Thank you for referring your patient, Carolina Hernandez, to the [facility]. Please see a copy of my visit note below.    Carolina returns for a recheck.  The last time I saw her she was having bleeding from hemorrhoids.  I placed some rubber bands, and that problem seems to have resolved.  She does not have pain with defecation.  There is no bleeding.  She reports that she has developed a hemorrhoid in the last 10 days or so that has not gone away.  It is nonpainful.    She also has chronic diarrhea which we talked about previously.  This has been going on for 3 years.  A referral had previously been made to gastroenterology from her primary care provider, but was not followed up on.    On exam: Her perianal area looks relatively normal with exception of some skin tags.  There are normal external hemorrhoids noted, no prolapsing internal hemorrhoids, and no thrombosed external hemorrhoids.  Digital exam was not performed today.    Impression: Bleeding internal hemorrhoid-resolved.  Ongoing problems with diarrhea-recommend gastroenterology evaluation.  Her last colonoscopy was in 2007, so she is not necessarily due for that yet.  I think a GI work-up would be important prior to putting her through that again.     Recommendations: As above. No need to follow-up with me, unless she becomes more symptomatic, starts to bleed again, or needs endoscopic procedures, which I am happy to perform.    Joe Lane MD FACS      Again, thank you for allowing me to participate in the care of your patient.        Sincerely,        Joe Lane MD

## 2020-11-20 ENCOUNTER — TELEPHONE (OUTPATIENT)
Dept: SURGERY | Facility: CLINIC | Age: 55
End: 2020-11-20

## 2020-11-20 ENCOUNTER — OFFICE VISIT (OUTPATIENT)
Dept: FAMILY MEDICINE | Facility: CLINIC | Age: 55
End: 2020-11-20
Payer: COMMERCIAL

## 2020-11-20 VITALS
HEART RATE: 75 BPM | SYSTOLIC BLOOD PRESSURE: 128 MMHG | HEIGHT: 63 IN | TEMPERATURE: 98.3 F | RESPIRATION RATE: 14 BRPM | DIASTOLIC BLOOD PRESSURE: 84 MMHG | OXYGEN SATURATION: 97 % | WEIGHT: 151 LBS | BODY MASS INDEX: 26.75 KG/M2

## 2020-11-20 DIAGNOSIS — R19.8 CHANGE IN BOWEL MOVEMENT: ICD-10-CM

## 2020-11-20 DIAGNOSIS — J45.20 MILD INTERMITTENT ASTHMA WITHOUT COMPLICATION: ICD-10-CM

## 2020-11-20 DIAGNOSIS — F33.40 RECURRENT MAJOR DEPRESSIVE DISORDER, IN REMISSION (H): ICD-10-CM

## 2020-11-20 DIAGNOSIS — K64.4 EXTERNAL HEMORRHOIDS: Primary | ICD-10-CM

## 2020-11-20 DIAGNOSIS — Z23 NEED FOR PROPHYLACTIC VACCINATION AND INOCULATION AGAINST INFLUENZA: ICD-10-CM

## 2020-11-20 DIAGNOSIS — I10 ESSENTIAL HYPERTENSION: ICD-10-CM

## 2020-11-20 LAB
ANION GAP SERPL CALCULATED.3IONS-SCNC: 6 MMOL/L (ref 3–14)
BUN SERPL-MCNC: 15 MG/DL (ref 7–30)
CALCIUM SERPL-MCNC: 9.8 MG/DL (ref 8.5–10.1)
CHLORIDE SERPL-SCNC: 98 MMOL/L (ref 94–109)
CHOLEST SERPL-MCNC: 232 MG/DL
CO2 SERPL-SCNC: 28 MMOL/L (ref 20–32)
CREAT SERPL-MCNC: 0.81 MG/DL (ref 0.52–1.04)
GFR SERPL CREATININE-BSD FRML MDRD: 81 ML/MIN/{1.73_M2}
GLUCOSE SERPL-MCNC: 98 MG/DL (ref 70–99)
HDLC SERPL-MCNC: 98 MG/DL
LDLC SERPL CALC-MCNC: 88 MG/DL
NONHDLC SERPL-MCNC: 134 MG/DL
POTASSIUM SERPL-SCNC: 3.3 MMOL/L (ref 3.4–5.3)
SODIUM SERPL-SCNC: 132 MMOL/L (ref 133–144)
TRIGL SERPL-MCNC: 232 MG/DL

## 2020-11-20 PROCEDURE — 36415 COLL VENOUS BLD VENIPUNCTURE: CPT | Performed by: FAMILY MEDICINE

## 2020-11-20 PROCEDURE — 99214 OFFICE O/P EST MOD 30 MIN: CPT | Mod: 25 | Performed by: FAMILY MEDICINE

## 2020-11-20 PROCEDURE — 80061 LIPID PANEL: CPT | Performed by: FAMILY MEDICINE

## 2020-11-20 PROCEDURE — 80048 BASIC METABOLIC PNL TOTAL CA: CPT | Performed by: FAMILY MEDICINE

## 2020-11-20 PROCEDURE — 90682 RIV4 VACC RECOMBINANT DNA IM: CPT | Performed by: FAMILY MEDICINE

## 2020-11-20 PROCEDURE — 90471 IMMUNIZATION ADMIN: CPT | Performed by: FAMILY MEDICINE

## 2020-11-20 RX ORDER — FLUOXETINE 40 MG/1
40 CAPSULE ORAL DAILY
Qty: 90 CAPSULE | Refills: 3 | Status: SHIPPED | OUTPATIENT
Start: 2020-11-20 | End: 2021-11-26

## 2020-11-20 RX ORDER — AMLODIPINE BESYLATE 2.5 MG/1
5 TABLET ORAL DAILY
Qty: 180 TABLET | Refills: 3 | Status: SHIPPED | OUTPATIENT
Start: 2020-11-20 | End: 2021-12-12

## 2020-11-20 ASSESSMENT — ANXIETY QUESTIONNAIRES
3. WORRYING TOO MUCH ABOUT DIFFERENT THINGS: MORE THAN HALF THE DAYS
7. FEELING AFRAID AS IF SOMETHING AWFUL MIGHT HAPPEN: MORE THAN HALF THE DAYS
5. BEING SO RESTLESS THAT IT IS HARD TO SIT STILL: MORE THAN HALF THE DAYS
6. BECOMING EASILY ANNOYED OR IRRITABLE: MORE THAN HALF THE DAYS
2. NOT BEING ABLE TO STOP OR CONTROL WORRYING: MORE THAN HALF THE DAYS
1. FEELING NERVOUS, ANXIOUS, OR ON EDGE: SEVERAL DAYS
GAD7 TOTAL SCORE: 14

## 2020-11-20 ASSESSMENT — PATIENT HEALTH QUESTIONNAIRE - PHQ9
SUM OF ALL RESPONSES TO PHQ QUESTIONS 1-9: 11
5. POOR APPETITE OR OVEREATING: NEARLY EVERY DAY

## 2020-11-20 ASSESSMENT — MIFFLIN-ST. JEOR: SCORE: 1239.06

## 2020-11-20 NOTE — PROGRESS NOTES
Subjective     Carolina Hernandez is a 55 year old female who presents to clinic today for the following health issues:    HPI   Patient is a 55 yr old female here for asthma follow up. Patient reports that since she started using the generic of the Advair she has noticed that her asthma has been more difficult to manage. She reports that she is having daily symptoms and needing to use her rescue inhaler more often . She has not had any ER visits in the last few months neither has she been hospitalized.    Her other request is a referral to Gastroenterologist, she has had a change in bowel movements with loose to soft stools in the last few months. She also has complicated external hemorrhoids.she was seen by general surgery and it was suggested that she see a GI specialist. She also c/o of abdominal bloating.Her last colonoscopy was in 2017.   She is also requesting refills on her blood pressure medication.  Asthma Follow-Up    Chief Complaint   Patient presents with     Asthma     Discuss asthma control, thinking a new controller medication. Using nebulizer a lot more then usual (running out early). Neb and inhalers.     Referral     gastroenterologist due to dairrha. Valerio seeing Dr. Lane for hemmorhiods and receomends she gets a refferral to the gastroenterologist.       Medication Refill     amolodopine and Prozac.     Imm/Inj     Flu Shot     Was ACT completed today?    Yes    ACT Total Scores 11/20/2020   ACT TOTAL SCORE (Goal Greater than or Equal to 20) 7   In the past 12 months, how many times did you visit the emergency room for your asthma without being admitted to the hospital? -   In the past 12 months, how many times were you hospitalized overnight because of your asthma? -         How many days per week do you miss taking your asthma controller medication?  0    Please describe any recent triggers for your asthma: smoke, upper respiratory infections, dust mites, pollens, animal dander, mold,  humidity, strong odors and fumes, exercise or sports and cold air    Have you had any Emergency Room Visits, Urgent Care Visits, or Hospital Admissions since your last office visit?  No      How many servings of fruits and vegetables do you eat daily?  0-1-2    On average, how many sweetened beverages do you drink each day (Examples: soda, juice, sweet tea, etc.  Do NOT count diet or artificially sweetened beverages)?   0    How many days per week do you exercise enough to make your heart beat faster? 7    How many minutes a day do you exercise enough to make your heart beat faster? 60 or more    How many days per week do you miss taking your medication? 0    Hypertension Follow-up      Do you check your blood pressure regularly outside of the clinic? Yes     Are you following a low salt diet? No    Are your blood pressures ever more than 140 on the top number (systolic) OR more   than 90 on the bottom number (diastolic), for example 140/90? Yes    Depression Followup    How are you doing with your depression since your last visit? No change    Are you having other symptoms that might be associated with depression? No  Hard time sleeping due to asthma and due to stress     Have you had a significant life event?  Family issues      Are you feeling anxious or having panic attacks?   Yes:  feeling anxious from living situations    Do you have any concerns with your use of alcohol or other drugs? No    Social History     Tobacco Use     Smoking status: Current Every Day Smoker     Packs/day: 0.25     Types: Cigarettes     Smokeless tobacco: Never Used     Tobacco comment: 6 cig a day    Substance Use Topics     Alcohol use: Yes     Comment: 12 pack weekly     Drug use: No     PHQ 7/26/2019 10/3/2019 11/20/2020   PHQ-9 Total Score 4 7 11   Q9: Thoughts of better off dead/self-harm past 2 weeks Not at all Not at all Several days     JAK-7 SCORE 7/26/2019 10/3/2019 11/20/2020   Total Score 7 6 14     Last PHQ-9 11/20/2020    1.  Little interest or pleasure in doing things 1   2.  Feeling down, depressed, or hopeless 1   3.  Trouble falling or staying asleep, or sleeping too much 3   4.  Feeling tired or having little energy 1   5.  Poor appetite or overeating 1   6.  Feeling bad about yourself 1   7.  Trouble concentrating 1   8.  Moving slowly or restless 1   Q9: Thoughts of better off dead/self-harm past 2 weeks 1   PHQ-9 Total Score 11     JAK-7  11/20/2020   1. Feeling nervous, anxious, or on edge 1   2. Not being able to stop or control worrying 2   3. Worrying too much about different things 2   4. Trouble relaxing 3   5. Being so restless that it is hard to sit still 2   6. Becoming easily annoyed or irritable 2   7. Feeling afraid, as if something awful might happen 2   JAK-7 Total Score 14   If you checked any problems, how difficult have they made it for you to do your work, take care of things at home, or get along with other people? -     In the past two weeks have you had thoughts of suicide or self-harm?  No.    Do you have concerns about your personal safety or the safety of others?   No    Suicide Assessment Five-step Evaluation and Treatment (SAFE-T)      How many servings of fruits and vegetables do you eat daily?  0-1    On average, how many sweetened beverages do you drink each day (Examples: soda, juice, sweet tea, etc.  Do NOT count diet or artificially sweetened beverages)?   2    How many days per week do you exercise enough to make your heart beat faster? 7    How many minutes a day do you exercise enough to make your heart beat faster? 10 - 19    How many days per week do you miss taking your medication? 0      Review of Systems   Constitutional, HEENT, cardiovascular, pulmonary, gi and gu systems are negative, except as otherwise noted.      Objective    /84 (BP Location: Right arm, Patient Position: Sitting, Cuff Size: Adult Large)   Pulse 75   Temp 98.3  F (36.8  C) (Tympanic)   Resp 14   Ht 1.6  "m (5' 3\")   Wt 68.5 kg (151 lb)   SpO2 97%   BMI 26.75 kg/m    Body mass index is 26.75 kg/m .  Physical Exam   GENERAL: healthy, alert and no distress  EYES: Eyes grossly normal to inspection, PERRL and conjunctivae and sclerae normal  HENT: ear canals and TM's normal, nose and mouth without ulcers or lesions  NECK: no adenopathy, no asymmetry, masses, or scars and thyroid normal to palpation  RESP: lungs clear to auscultation - no rales, rhonchi or wheezes  CV: regular rate and rhythm, normal S1 S2, no S3 or S4, no murmur, click or rub, no peripheral edema and peripheral pulses strong  ABDOMEN: soft, nontender, no hepatosplenomegaly, no masses and bowel sounds normal  MS: no gross musculoskeletal defects noted, no edema  PSYCH: mentation appears normal, affect normal/bright    No results found for this or any previous visit (from the past 24 hour(s)).        Assessment & Plan   Carolina was seen today for asthma, referral, medication refill and imm/inj.    Diagnoses and all orders for this visit:    External hemorrhoids  -     Cancel: GASTROENTEROLOGY ADULT REF CONSULT ONLY; Future  -     GASTROENTEROLOGY ADULT REF CONSULT ONLY; Future        -      Referral placed.    Change in bowel movement  -     GASTROENTEROLOGY ADULT REF CONSULT ONLY; Future    Essential hypertension  -     amLODIPine (NORVASC) 2.5 MG tablet; Take 2 tablets (5 mg) by mouth daily Please fill at patient request  -     Lipid panel reflex to direct LDL Fasting  -     Basic metabolic panel  (Ca, Cl, CO2, Creat, Gluc, K, Na, BUN)    Recurrent major depressive disorder, in remission (H)  -     FLUoxetine (PROZAC) 20 MG capsule; Take 1 capsule (20 mg) by mouth daily  -     FLUoxetine (PROZAC) 40 MG capsule; Take 1 capsule (40 mg) by mouth daily Take with 20 mg tab for a total of 60 mg daily    Need for prophylactic vaccination and inoculation against influenza  -     INFLUENZA QUAD, RECOMBINANT, P-FREE (RIV4) (FLUBLOCK) [26686]    Moderate " intermittent asthma without complication  -     fluticasone-salmeterol (ADVAIR) 500-50 MCG/DOSE inhaler; Inhale 1 puff into the lungs every 12 hours Patient has tried the generic and this appears not to be working. May likely need a prior authorization.        - Medication faxed.                    Depression Screening Follow Up    PHQ 11/20/2020   PHQ-9 Total Score 11   Q9: Thoughts of better off dead/self-harm past 2 weeks Several days     Last PHQ-9 11/20/2020   1.  Little interest or pleasure in doing things 1   2.  Feeling down, depressed, or hopeless 1   3.  Trouble falling or staying asleep, or sleeping too much 3   4.  Feeling tired or having little energy 1   5.  Poor appetite or overeating 1   6.  Feeling bad about yourself 1   7.  Trouble concentrating 1   8.  Moving slowly or restless 1   Q9: Thoughts of better off dead/self-harm past 2 weeks 1   PHQ-9 Total Score 11                Follow Up      Follow Up Actions Taken  Crisis resource information provided in the After Visit Summary    Discussed the following ways the patient can remain in a safe environment:  remove alcohol and remove drugs      FUTURE APPOINTMENTS:       - Follow-up visit in one month or sooner as needed.    Return in about 4 weeks (around 12/18/2020).    Julia Oneill MD  [facility] [place]

## 2020-11-20 NOTE — LETTER
"November 23, 2020      Carolina Hernandez  [address] [place] [phone]        Dear ,    We are writing to inform you of your test results.    \"Please inform patient that test result showed high cholesterol   Recommend diet and exercise for control of the cholesterol.  Sodium was a bit low and also potassium. Potassium has fluctuated over the years. Recommend potassium rich diet. For sodium we can recheck this in about a month.   Thank you.     Julia Oneill M.D.\"    Resulted Orders   Lipid panel reflex to direct LDL Fasting   Result Value Ref Range    Cholesterol 232 (H) <200 mg/dL      Comment:      Desirable:       <200 mg/dl    Triglycerides 232 (H) <150 mg/dL      Comment:      Borderline high:  150-199 mg/dl  High:             200-499 mg/dl  Very high:       >499 mg/dl  Fasting specimen      HDL Cholesterol 98 >49 mg/dL    LDL Cholesterol Calculated 88 <100 mg/dL      Comment:      Desirable:       <100 mg/dl    Non HDL Cholesterol 134 (H) <130 mg/dL      Comment:      Above Desirable:  130-159 mg/dl  Borderline high:  160-189 mg/dl  High:             190-219 mg/dl  Very high:       >219 mg/dl     Basic metabolic panel  (Ca, Cl, CO2, Creat, Gluc, K, Na, BUN)   Result Value Ref Range    Sodium 132 (L) 133 - 144 mmol/L    Potassium 3.3 (L) 3.4 - 5.3 mmol/L    Chloride 98 94 - 109 mmol/L    Carbon Dioxide 28 20 - 32 mmol/L    Anion Gap 6 3 - 14 mmol/L    Glucose 98 70 - 99 mg/dL      Comment:      Fasting specimen    Urea Nitrogen 15 7 - 30 mg/dL    Creatinine 0.81 0.52 - 1.04 mg/dL    GFR Estimate 81 >60 mL/min/[1.73_m2]      Comment:      Non  GFR Calc  Starting 12/18/2018, serum creatinine based estimated GFR (eGFR) will be   calculated using the Chronic Kidney Disease Epidemiology Collaboration   (CKD-EPI) equation.      GFR Estimate If Black >90 >60 mL/min/[1.73_m2]      Comment:       GFR Calc  Starting 12/18/2018, serum creatinine based " estimated GFR (eGFR) will be   calculated using the Chronic Kidney Disease Epidemiology Collaboration   (CKD-EPI) equation.      Calcium 9.8 8.5 - 10.1 mg/dL       If you have any questions or concerns, please call the clinic at the number listed above.       Sincerely,        Julia Oneill MD

## 2020-11-20 NOTE — TELEPHONE ENCOUNTER
[facility] Call Center    Phone Message    May a detailed message be left on voicemail: no     Reason for Call: Other: NP referred to [facility] for External hemorrhoids  Change in bowel movement, Referred by Julia Oneill MD  . Needs clinic review per protocols     Action Taken: Message routed to:  [facility]    Travel Screening: Not Applicable                                                                      
95

## 2020-11-20 NOTE — TELEPHONE ENCOUNTER
"Requested Prescriptions   Pending Prescriptions Disp Refills     albuterol (PROVENTIL) (2.5 MG/3ML) 0.083% neb solution [Pharmacy Med Name: Albuterol Sulfate Inhalation Nebulization Solution (2.5 MG/3ML) 0.083%] 75 mL 0     Sig: Inhale contents of 1 vial (2.5 mg) by nebulization every 4 hours as needed for shortness of breath / dyspnea or wheezing       Asthma Maintenance Inhalers - Anticholinergics Failed - 11/20/2020  1:50 PM        Failed - Asthma control assessment score within normal limits in last 6 months     Please review ACT score.           Passed - Patient is age 12 years or older        Passed - Medication is active on med list        Passed - Recent (6 mo) or future (30 days) visit within the authorizing provider's specialty     Patient had office visit in the last 6 months or has a visit in the next 30 days with authorizing provider or within the authorizing provider's specialty.  See \"Patient Info\" tab in inbasket, or \"Choose Columns\" in Meds & Orders section of the refill encounter.           Short-Acting Beta Agonist Inhalers Protocol  Failed - 11/20/2020  1:50 PM        Failed - Asthma control assessment score within normal limits in last 6 months     Please review ACT score.           Passed - Patient is age 12 or older        Passed - Medication is active on med list        Passed - Recent (6 mo) or future (30 days) visit within the authorizing provider's specialty     Patient had office visit in the last 6 months or has a visit in the next 30 days with authorizing provider or within the authorizing provider's specialty.  See \"Patient Info\" tab in inbasket, or \"Choose Columns\" in Meds & Orders section of the refill encounter.                 "

## 2020-11-21 ASSESSMENT — ANXIETY QUESTIONNAIRES: GAD7 TOTAL SCORE: 14

## 2020-11-22 DIAGNOSIS — J45.20 MILD INTERMITTENT ASTHMA WITHOUT COMPLICATION: ICD-10-CM

## 2020-11-23 RX ORDER — ALBUTEROL SULFATE 0.83 MG/ML
SOLUTION RESPIRATORY (INHALATION)
Qty: 75 ML | Refills: 0 | Status: SHIPPED | OUTPATIENT
Start: 2020-11-23 | End: 2020-12-16

## 2020-11-23 RX ORDER — ALBUTEROL SULFATE 90 UG/1
AEROSOL, METERED RESPIRATORY (INHALATION)
Qty: 18 G | Refills: 0 | Status: SHIPPED | OUTPATIENT
Start: 2020-11-23 | End: 2020-12-15

## 2020-11-23 RX ORDER — ALBUTEROL SULFATE 0.83 MG/ML
SOLUTION RESPIRATORY (INHALATION)
Qty: 75 ML | Refills: 0 | OUTPATIENT
Start: 2020-11-23

## 2020-11-23 NOTE — TELEPHONE ENCOUNTER
Pt was seen 11/20 by Dr. Oneill and needs BOTH albuterol inhaler and neb Rxs ASAP - Pt REFUSED to speak to Clinic RN for triage

## 2020-11-23 NOTE — TELEPHONE ENCOUNTER
"Requested Prescriptions   Pending Prescriptions Disp Refills     albuterol (PROAIR HFA/PROVENTIL HFA/VENTOLIN HFA) 108 (90 Base) MCG/ACT inhaler [Pharmacy Med Name: Albuterol Sulfate HFA Inhalation Aerosol Solution 108 (90 Base) MCG/ACT] 18 g 0     Sig: Inhale 2 puffs into the lungs every 6 hours as needed for shortness of breath / dyspnea or wheezing.       Asthma Maintenance Inhalers - Anticholinergics Failed - 11/22/2020  2:30 PM        Failed - Asthma control assessment score within normal limits in last 6 months     Please review ACT score.           Passed - Patient is age 12 years or older        Passed - Medication is active on med list        Passed - Recent (6 mo) or future (30 days) visit within the authorizing provider's specialty     Patient had office visit in the last 6 months or has a visit in the next 30 days with authorizing provider or within the authorizing provider's specialty.  See \"Patient Info\" tab in inPicosunsket, or \"Choose Columns\" in Meds & Orders section of the refill encounter.           Short-Acting Beta Agonist Inhalers Protocol  Failed - 11/22/2020  2:30 PM        Failed - Asthma control assessment score within normal limits in last 6 months     Please review ACT score.           Passed - Patient is age 12 or older        Passed - Medication is active on med list        Passed - Recent (6 mo) or future (30 days) visit within the authorizing provider's specialty     Patient had office visit in the last 6 months or has a visit in the next 30 days with authorizing provider or within the authorizing provider's specialty.  See \"Patient Info\" tab in inbasket, or \"Choose Columns\" in Meds & Orders section of the refill encounter.                 "

## 2020-11-23 NOTE — RESULT ENCOUNTER NOTE
Please inform patient that test result showed high cholesterol  Recommend diet and exercise for control of the cholesterol.  Sodium was a bit low and also potassium. Potassium has fluctuated over the years. Recommend potassium rich diet. For sodium we can recheck this in about a month.  Thank you.     Julia Oneill M.D.

## 2020-12-07 NOTE — TELEPHONE ENCOUNTER
REFERRAL INFORMATION:    Referring Provider:  Dr. Julia Oneill     Referring Clinic:  [facility]     Reason for Visit/Diagnosis: Diarrhea      FUTURE VISIT INFORMATION:    Appointment Date: 12/30/2020    Appointment Time: 10 AM      NOTES STATUS DETAILS   OFFICE NOTE from Referring Provider Internal 11/20/2020, 9/17/2020, 10/25/19 Office visit with Dr. Oneill    OFFICE NOTE from Other Specialist Care Everywhere 8/23/17 Office visit with VALERIA Gotti CNP ([facility])    HOSPITAL DISCHARGE SUMMARY/  ED VISITS Internal 10/22/19 ([facility])    OPERATIVE REPORT N/A    MEDICATION LIST Internal         ENDOSCOPY  N/A    COLONOSCOPY Care Everywhere 8/31/17 ([facility])    ERCP N/A    EUS N/A    STOOL TESTING Internal 10/22/19   PERTINENT LABS Internal    PATHOLOGY REPORTS (RELATED) N/A    IMAGING (CT, MRI, EGD, MRCP, Small Bowel Follow Through/SBT, MR/CT Enterography) N/A

## 2020-12-15 DIAGNOSIS — J45.20 MILD INTERMITTENT ASTHMA WITHOUT COMPLICATION: ICD-10-CM

## 2020-12-15 NOTE — TELEPHONE ENCOUNTER
Patient is calling and needing these 3 medications right away. Not much left on any of these medications. Patient is wondering why she only got a months worth of refills. She would like more, than a months worth.  Kourtney Angel  [facility] [place]

## 2020-12-15 NOTE — TELEPHONE ENCOUNTER
"Requested Prescriptions   Pending Prescriptions Disp Refills     albuterol (PROVENTIL) (2.5 MG/3ML) 0.083% neb solution 75 mL 0     Sig: Inhale contents of 1 vial (2.5 mg) by nebulization every 4 hours as needed for shortness of breath / dyspnea or wheezing       Asthma Maintenance Inhalers - Anticholinergics Failed - 12/15/2020 11:35 AM        Failed - Asthma control assessment score within normal limits in last 6 months     Please review ACT score.           Passed - Patient is age 12 years or older        Passed - Medication is active on med list        Passed - Recent (6 mo) or future (30 days) visit within the authorizing provider's specialty     Patient had office visit in the last 6 months or has a visit in the next 30 days with authorizing provider or within the authorizing provider's specialty.  See \"Patient Info\" tab in inbasket, or \"Choose Columns\" in Meds & Orders section of the refill encounter.           Short-Acting Beta Agonist Inhalers Protocol  Failed - 12/15/2020 11:35 AM        Failed - Asthma control assessment score within normal limits in last 6 months     Please review ACT score.           Passed - Patient is age 12 or older        Passed - Medication is active on med list        Passed - Recent (6 mo) or future (30 days) visit within the authorizing provider's specialty     Patient had office visit in the last 6 months or has a visit in the next 30 days with authorizing provider or within the authorizing provider's specialty.  See \"Patient Info\" tab in inbasket, or \"Choose Columns\" in Meds & Orders section of the refill encounter.               albuterol (PROAIR HFA/PROVENTIL HFA/VENTOLIN HFA) 108 (90 Base) MCG/ACT inhaler 18 g 0       Asthma Maintenance Inhalers - Anticholinergics Failed - 12/15/2020 11:35 AM        Failed - Asthma control assessment score within normal limits in last 6 months     Please review ACT score.           Passed - Patient is age 12 years or older        Passed - " "Medication is active on med list        Passed - Recent (6 mo) or future (30 days) visit within the authorizing provider's specialty     Patient had office visit in the last 6 months or has a visit in the next 30 days with authorizing provider or within the authorizing provider's specialty.  See \"Patient Info\" tab in inbasket, or \"Choose Columns\" in Meds & Orders section of the refill encounter.           Short-Acting Beta Agonist Inhalers Protocol  Failed - 12/15/2020 11:35 AM        Failed - Asthma control assessment score within normal limits in last 6 months     Please review ACT score.           Passed - Patient is age 12 or older        Passed - Medication is active on med list        Passed - Recent (6 mo) or future (30 days) visit within the authorizing provider's specialty     Patient had office visit in the last 6 months or has a visit in the next 30 days with authorizing provider or within the authorizing provider's specialty.  See \"Patient Info\" tab in inbasket, or \"Choose Columns\" in Meds & Orders section of the refill encounter.               fluticasone-salmeterol (ADVAIR) 500-50 MCG/DOSE inhaler 1 Inhaler 11     Sig: Inhale 1 puff into the lungs every 12 hours Please prescribe the generic ( Wixela )       Inhaled Steroids Protocol Failed - 12/15/2020 11:35 AM        Failed - Asthma control assessment score within normal limits in last 6 months     Please review ACT score.           Passed - Patient is age 12 or older        Passed - Medication is active on med list        Passed - Recent (6 mo) or future (30 days) visit within the authorizing provider's specialty     Patient had office visit in the last 6 months or has a visit in the next 30 days with authorizing provider or within the authorizing provider's specialty.  See \"Patient Info\" tab in inbasket, or \"Choose Columns\" in Meds & Orders section of the refill encounter.           Long-Acting Beta Agonist Inhalers Protocol  Failed - 12/15/2020 " "11:35 AM        Failed - Asthma control assessment score within normal limits in last 6 months     Please review ACT score.           Failed - Order for Serevent, Striverdi, or Foradil and pt has steroid inhaler        Passed - Patient is age 12 or older        Passed - Medication is active on med list        Passed - Recent (6 mo) or future (30 days) visit within the authorizing provider's specialty     Patient had office visit in the last 6 months or has a visit in the next 30 days with authorizing provider or within the authorizing provider's specialty.  See \"Patient Info\" tab in inbasket, or \"Choose Columns\" in Meds & Orders section of the refill encounter.               Asthma Control Test (Copyright [employer], 2011; Used with Permission) 11/20/2020   1.  In the past 4 weeks, how much of the time did your asthma keep you from getting as much done at work, school or at home? 2   2.  During the past 4 weeks, how often have you had shortness of breath? 1   3.  During the past 4 weeks, how often did your asthma symptoms (wheezing, coughing, shortness of breath, chest tightness or pain) wake you up at night or earlier than usual in the morning? 1   4.  During the past 4 weeks, how often have you used your rescue inhaler or nebulizer medication (such as albuterol)? 1   5.  How would you rate your asthma control during the past 4 weeks? 2   ACT Total Score (Goal >/= 20) 7   In the past 12 months, how many times did you visit the emergency room for your asthma without being admitted to the hospital?    In the past 12 months, how many times were you hospitalized overnight because of your asthma?        Barbara Porter RN        "

## 2020-12-16 ENCOUNTER — TELEPHONE (OUTPATIENT)
Dept: FAMILY MEDICINE | Facility: CLINIC | Age: 55
End: 2020-12-16

## 2020-12-16 DIAGNOSIS — J45.20 MILD INTERMITTENT ASTHMA WITHOUT COMPLICATION: ICD-10-CM

## 2020-12-16 RX ORDER — ALBUTEROL SULFATE 0.83 MG/ML
SOLUTION RESPIRATORY (INHALATION)
Qty: 75 ML | Refills: 4 | Status: SHIPPED | OUTPATIENT
Start: 2020-12-16 | End: 2021-04-27

## 2020-12-16 RX ORDER — ALBUTEROL SULFATE 0.83 MG/ML
SOLUTION RESPIRATORY (INHALATION)
Qty: 75 ML | Refills: 0 | Status: SHIPPED | OUTPATIENT
Start: 2020-12-16 | End: 2020-12-16

## 2020-12-16 RX ORDER — ALBUTEROL SULFATE 90 UG/1
AEROSOL, METERED RESPIRATORY (INHALATION)
Qty: 18 G | Refills: 3 | Status: SHIPPED | OUTPATIENT
Start: 2020-12-16 | End: 2020-12-17

## 2020-12-16 NOTE — TELEPHONE ENCOUNTER
The directions are missing for the RX for ALBUTEROL SULFATE  BASE AEROSOL SOLUTION, please resend!

## 2020-12-17 ENCOUNTER — DOCUMENTATION ONLY (OUTPATIENT)
Dept: FAMILY MEDICINE | Facility: CLINIC | Age: 55
End: 2020-12-17

## 2020-12-17 RX ORDER — ALBUTEROL SULFATE 90 UG/1
1-2 AEROSOL, METERED RESPIRATORY (INHALATION) EVERY 4 HOURS PRN
Qty: 18 G | Refills: 11 | Status: SHIPPED | OUTPATIENT
Start: 2020-12-17 | End: 2021-10-26

## 2020-12-17 NOTE — TELEPHONE ENCOUNTER
Dr. Oneill,    The albuterol inhaler is missing the sig as well, can you please correct and send?    Thank you!  Zenaida WRAY MSN, RN

## 2020-12-22 DIAGNOSIS — I10 ESSENTIAL HYPERTENSION: Primary | ICD-10-CM

## 2020-12-30 ENCOUNTER — VIRTUAL VISIT (OUTPATIENT)
Dept: GASTROENTEROLOGY | Facility: CLINIC | Age: 55
End: 2020-12-30
Attending: FAMILY MEDICINE
Payer: COMMERCIAL

## 2020-12-30 ENCOUNTER — PRE VISIT (OUTPATIENT)
Dept: GASTROENTEROLOGY | Facility: CLINIC | Age: 55
End: 2020-12-30

## 2020-12-30 VITALS — HEIGHT: 63 IN | BODY MASS INDEX: 26.75 KG/M2 | WEIGHT: 151 LBS

## 2020-12-30 DIAGNOSIS — R19.7 DIARRHEA, UNSPECIFIED TYPE: Primary | ICD-10-CM

## 2020-12-30 DIAGNOSIS — K64.4 EXTERNAL HEMORRHOIDS: ICD-10-CM

## 2020-12-30 DIAGNOSIS — R19.8 CHANGE IN BOWEL MOVEMENT: ICD-10-CM

## 2020-12-30 PROCEDURE — 99203 OFFICE O/P NEW LOW 30 MIN: CPT | Mod: 95 | Performed by: PHYSICIAN ASSISTANT

## 2020-12-30 RX ORDER — LOPERAMIDE HYDROCHLORIDE 2 MG/1
TABLET ORAL
Qty: 90 TABLET | Refills: 3 | Status: SHIPPED | OUTPATIENT
Start: 2020-12-30 | End: 2021-06-23

## 2020-12-30 ASSESSMENT — PAIN SCALES - GENERAL: PAINLEVEL: NO PAIN (0)

## 2020-12-30 ASSESSMENT — MIFFLIN-ST. JEOR: SCORE: 1239.06

## 2020-12-30 NOTE — NURSING NOTE
"Chief Complaint   Patient presents with     New Patient     diarrhea       Vitals:    12/30/20 0921   Weight: 68.5 kg (151 lb)   Height: 1.6 m (5' 3\")       Body mass index is 26.75 kg/m .      Martinez Cisneros LPN                        "

## 2020-12-30 NOTE — PATIENT INSTRUCTIONS
It was a pleasure taking care of you today.  I've included a brief summary of our discussion and care plan from today's visit below.  Please review this information with your primary care provider.  ______________________________________________________________________    My recommendations are summarized as follows:    -- breath testing for lactose intolerance   --Continue fiber on a daily basis  --Labs for celiac disease  --Start Imodium on a daily basis.  You can start with 1 tablet a day.  If you continue to have loose stools, you can increase up to 2 tablets a day.  Maximum dose is 8 tablets a day     Return to GI Clinic in 2 months to review your progress.    ______________________________________________________________________    How do I schedule labs, imaging studies, or procedures that were ordered in clinic today?   Labs: To schedule lab appointment at the [facility], use my chart or call [phone]. If you have a [place] lab closer to home where you are regularly seen you can give them a call.     Procedures: If a colonoscopy, upper endoscopy, breath test, esophageal manometry, or pH impedence was ordered today, our endoscopy team will call you to schedule this. If you have not heard from our endoscopy team within a week, please call [phone] to schedule.     Imaging Studies: If you were scheduled for a CT scan, X-ray, MRI, ultrasound, HIDA scan or other imaging study, please call [phone] to have this scheduled.     Referral: If a referral to another specialty was ordered, expect a phone call or follow instructions above. If you have not heard from anyone regarding your referral in a week, please call our clinic to check the status.     Who do I call with any questions after my visit?  Please be in touch if there are any further questions that arise following today's visit.  There are multiple ways to contact your gastroenterology care team.        During business hours, you  may reach a Gastroenterology nurse at [phone]      To schedule or reschedule an appointment, please call [phone].       You can always send a secure message through [employer].  [employer] messages are answered by your nurse or doctor typically within 24 hours.  Please allow extra time on weekends and holidays.        For urgent/emergent questions after business hours, you may reach the on-call GI Fellow by contacting the [facility]  at [phone].     How will I get the results of any tests ordered?    You will receive all of your results.  If you have signed up for [facility], any tests ordered at your visit will be available to you after your physician reviews them.  Typically this takes 1-2 weeks.  If there are urgent results that require a change in your care plan, your physician or nurse will call you to discuss the next steps.      What is [employer]?  [employer] is a secure way for you to access all of your healthcare records from the [facility].  It is a web based computer program, so you can sign on to it from any location.  It also allows you to send secure messages to your care team.  I recommend signing up for [employer] access if you have not already done so and are comfortable with using a computer.      How to I schedule a follow-up visit?  If you did not schedule a follow-up visit today, please call [phone] to schedule a follow-up office visit.      Sincerely,    Aimee Nava PA-C  Division of Gastroenterology, Hepatology & Nutrition  [facility]

## 2020-12-30 NOTE — LETTER
"  12/30/2020       RE: Carolina Hernandez  [address] [place] [id number]      Dear Colleague,    Thank you for referring your patient, Carolina Hernandez, to the [facility] [place]. Please see a copy of my visit note below.    Carolina Hernandez is a 55 year old female who is being evaluated via a billable video visit.      The patient has been notified of following:     \"This video visit will be conducted via a call between you and your physician/provider. We have found that certain health care needs can be provided without the need for an in-person physical exam.  This service lets us provide the care you need with a video conversation.  If a prescription is necessary we can send it directly to your pharmacy.  If lab work is needed we can place an order for that and you can then stop by our lab to have the test done at a later time.    Video visits are billed at different rates depending on your insurance coverage.  Please reach out to your insurance provider with any questions.    If during the course of the call the physician/provider feels a video visit is not appropriate, you will not be charged for this service.\"    Patient has given verbal consent for Video visit? Yes,  Please send invite to email: [email or web address]  How would you like to obtain your AVS? Mail a copy  If you are dropped from the video visit, the video invite should be resent to: Send to e-mail at: [email or web address] or mobile phone [phone]  Will anyone else be joining your video visit? No      Video-Visit Details    Type of service:  Video Visit    Video Start Time: 10:09 AM  Video End Time: 10:49 AM    Originating Location (pt. Location): Home    Distant Location (provider location):  [facility] (provider's home)     Platform used for Video Visit: [facility]      GI CLINIC VISIT    CC/REFERRING MD:  Julia Oneill  REASON FOR " CONSULTATION: loose stools     ASSESSMENT/PLAN:  Carolina Hernandez is a 55 year old woman with a past medical history of tobacco abuse, asthma, anxiety, depression presenting for evaluation of chronic diarrhea.     1.  Chronic diarrhea  Patient reports about 3 years of diarrhea without any clear trigger.  Has had unremarkable colonoscopy with random biopsies for microscopic colitis.  Has also had negative chronic infectious stool studies, negative C. difficile, unremarkable basic labs.  Has not been tested for celiac disease, order placed at this time.  Patient does report he eats a significant amount of dairy, discussed recommendation to do either 2-week trial off of dairy versus breath test.  Patient would prefer a breath test.  Order placed.  Other possible sources of symptoms include medication side effect (with hydrochlorothiazide, fluoxetine), functional loose stools, small intestinal bacterial overgrowth.  Would recommend continuing fiber supplementation at this time.  Can also try low-dose scheduled Imodium.  Does not have any red flag symptoms to warrant repeat colonoscopy at this time, can consider colonoscopy or upper endoscopy pending clinical picture.  Future considerations include low FODMAP diet or trial of rifaximin for IBS-D.    We also discussed how exercise and tobacco cessation can improve her overall health.  Would recommend that she works on these goals.  -- breath testing for lactose intolerance   --Continue fiber on a daily basis  --Labs for celiac disease  --Start Imodium on a daily basis.  You can start with 1 tablet a day.  If you continue to have loose stools, you can increase up to 2 tablets a day.  Maximum dose is 8 tablets a day     Colorectal cancer screenin    [facility] 2 months    Thank you for this consultation.  It was a pleasure to participate in the care of this patient; please contact us with any further questions.     I spent a total of 40minutes face-to-face (video) with  "Carolina Hernandez during today's office visit.  Over 50% of which was counseling/coordinating care regarding the above delineated issues. Please see note for details.     Note completed using voice recognition software. Some word and grammatical errors may occur.    Aimee Nava PA-C  Division of Gastroenterology, Hepatology & Nutrition  [facility]      LEW Hernandez is a 55 year old woman with a past medical history of tobacco abuse, asthma, anxiety, depression presenting for evaluation of chronic diarrhea. She is new to [facility] and this is my first encounter with the patient.     Today the patient reports a three year history of watery diarrhea. Has not been able to identify any specific triggers for her symptoms.  She describes 10+ bowel movements a day that are clustered the first couple of hours that she is awake.Sometimes will continue throughout the day. Consistency is variable.  Symptoms seem to worsen when she has fluids such as water and coffee. Will have urgency with bowel movements.  Can have blood with hemorrhoids. No nocturnal bowel movements. Has had incontinence with bowel movements. Sometimes abdominal pain (gas pain, rare cramping) but not usually.     Within the last 6 months, she has been having more issues with hemmorhoids. She does sometimes strain to have a bowel movement (when she feels a \"pressure from within\"). Was seen by colorectal surgery by Dr. Lane and was told to take metamucil which has helped but she continues to have symptoms (2 rounded tablespoons a day). Since starting fiber they float and and look like \"algae\". Most of the time it is liquid. She also reports increased gas and bloating with this.     She has tried adding more fiber to her diet with spinach, bananas, whole grains and vegetables. She is allergic to a lot of fresh fruits. Raw onions can worsen symptoms, peas and legumes can worsen symptoms.     Previous workup includes:  2019:  - c " diff negative   2017:   -colonoscopy- unremarkable, negative biopsies for MC  -Stool studies- negative for giardia/crypto, enteric panel, Ova and parasite  -labs: normal TSH, CBC, hepatic panel     ROS:    No fevers or chills  No weight loss (has been gaining weight, heavier than she normally is)  No blurry vision, double vision or change in vision  No sore throat  No lymphadenopathy  No headache, paraesthesias, or weakness in a limb  + shortness of breath or wheezing- asthma has been getting worse in the past 6 months   No chest pain or pressure  + arthralgias or myalgias- osteoarthritis in both of her knees, lower lumbar of back (worsened with gardening)  + BRBPR- with hemorrhoids   No hot/cold intolerance or polyria  + anxiety or depression    PROBLEM LIST  Patient Active Problem List    Diagnosis Date Noted     Essential hypertension 07/26/2019     Priority: Medium     New diagnosis 7/26/2019         Recurrent major depressive disorder, in remission (H) 01/17/2019     Priority: Medium     Encounter for tobacco use cessation counseling 01/17/2019     Priority: Medium     Mild intermittent asthma without complication 01/17/2019     Priority: Medium     CARDIOVASCULAR SCREENING; LDL GOAL LESS THAN 160 10/31/2010     Priority: Medium       PERTINENT PAST MEDICAL HISTORY:  Depression  Anxiety  Osteoarthritis   Asthma     PREVIOUS SURGERIES:  No GI surgery     PREVIOUS ENDOSCOPY:  Colonoscopy in 2017 through care everywhere:  Impression:          - Diverticulosis in the sigmoid colon.                       - Non-bleeding internal hemorrhoids.                       - The examined portion of the ileum was normal.                       - The entire examined colon is normal. Biopsied.                       - The examination was otherwise normal on direct and                        retroflexion views.    Final Pathologic Diagnosis   Colon, random, biopsy -- No significant pathologic change     ALLERGIES:  Allergies    Allergen Reactions     Penicillins Itching       PERTINENT MEDICATIONS:    Current Outpatient Medications:      albuterol (PROAIR HFA/PROVENTIL HFA/VENTOLIN HFA) 108 (90 Base) MCG/ACT inhaler, Inhale 1-2 puffs into the lungs every 4 hours as needed for shortness of breath / dyspnea or wheezing For shortness of breath, Disp: 18 g, Rfl: 11     albuterol (PROVENTIL) (2.5 MG/3ML) 0.083% neb solution, Inhale contents of 1 vial (2.5 mg) by nebulization every 4 hours as needed for shortness of breath / dyspnea or wheezing, Disp: 75 mL, Rfl: 4     amLODIPine (NORVASC) 2.5 MG tablet, Take 2 tablets (5 mg) by mouth daily Please fill at patient request, Disp: 180 tablet, Rfl: 3     cetirizine (ZYRTEC) 10 MG tablet, Take 10 mg by mouth daily, Disp: , Rfl:      FLUoxetine (PROZAC) 20 MG capsule, Take 1 capsule (20 mg) by mouth daily, Disp: 90 capsule, Rfl: 3     FLUoxetine (PROZAC) 40 MG capsule, Take 1 capsule (40 mg) by mouth daily Take with 20 mg tab for a total of 60 mg daily, Disp: 90 capsule, Rfl: 3     fluticasone-salmeterol (ADVAIR) 500-50 MCG/DOSE inhaler, Inhale 1 puff into the lungs every 12 hours Patient has tried the generic and this appears not to be working. May likely need a prior authorization., Disp: 1 Inhaler, Rfl: 0     hydrochlorothiazide (HYDRODIURIL) 25 MG tablet, Take 1 tablet (25 mg) by mouth once daily, Disp: 90 tablet, Rfl: 3     montelukast (SINGULAIR) 10 MG tablet, Take 1 tablet (10 mg) by mouth At Bedtime, Disp: 30 tablet, Rfl: 10     multivitamin  with lutein (OCUVITE WITH LTEIN) CAPS per capsule, Take 1 capsule by mouth daily, Disp: , Rfl:      omeprazole 20 MG tablet, Take 1 tablet (20 mg) by mouth daily, Disp: 90 tablet, Rfl: 3     fluticasone (FLONASE) 50 MCG/ACT nasal spray, Spray 1 spray into both nostrils daily, Disp: , Rfl:      fluticasone-salmeterol (ADVAIR) 500-50 MCG/DOSE inhaler, Inhale 1 puff into the lungs every 12 hours Please prescribe the generic ( Wixela ) (Patient not taking:  Reported on 12/30/2020), Disp: 1 Inhaler, Rfl: 11   No NSAIDs    SOCIAL HISTORY:  Will drink beer (4-5 beers a 3 times a week), symptoms are the same if she avoids drinking   Smokes cigarettes (8-10 a day, has decreased from before)  Social History     Socioeconomic History     Marital status:      Spouse name: Not on file     Number of children: Not on file     Years of education: Not on file     Highest education level: Not on file   Occupational History     Not on file   Social Needs     Financial resource strain: Not on file     Food insecurity     Worry: Not on file     Inability: Not on file     Transportation needs     Medical: Not on file     Non-medical: Not on file   Tobacco Use     Smoking status: Current Every Day Smoker     Packs/day: 0.25     Types: Cigarettes     Smokeless tobacco: Never Used     Tobacco comment: 6 cig a day    Substance and Sexual Activity     Alcohol use: Yes     Comment: 12 pack weekly     Drug use: No     Sexual activity: Not Currently   Lifestyle     Physical activity     Days per week: Not on file     Minutes per session: Not on file     Stress: Not on file   Relationships     Social connections     Talks on phone: Not on file     Gets together: Not on file     Attends Rastafarian service: Not on file     Active member of club or organization: Not on file     Attends meetings of clubs or organizations: Not on file     Relationship status: Not on file     Intimate partner violence     Fear of current or ex partner: Not on file     Emotionally abused: Not on file     Physically abused: Not on file     Forced sexual activity: Not on file   Other Topics Concern     Not on file   Social History Narrative     Not on file       FAMILY HISTORY:  Family History   Problem Relation Age of Onset     Glaucoma Father      Coronary Artery Disease Father         stents     Cancer Maternal Grandfather      Coronary Artery Disease Paternal Grandfather      Schizophrenia Daughter       "Diabetes Daughter      Cancer Daughter      Crohn's Disease No family hx of      Ulcerative Colitis No family hx of      GERD No family hx of      Stomach Cancer No family hx of        Past/family/social history reviewed and no changes    PHYSICAL EXAMINATION:  Vitals reviewed: Ht 1.6 m (5' 3\")   Wt 68.5 kg (151 lb)   BMI 26.75 kg/m    Wt:   Wt Readings from Last 2 Encounters:   12/30/20 68.5 kg (151 lb)   11/20/20 68.5 kg (151 lb)   General appearance: Healthy appearing adult, in no acute distress  Eyes: Sclera anicteric  Ears, nose, mouth and throat: No obvious external lesions of ears and nose, Hearing intact  Neck: Symmetric, No obvious external lesions  Respiratory: Normal respiration, no use of accessory muscles   Skin: No rashes or jaundice   Psychiatric: Oriented to person, place and time, Appropriate mood and affect.    PERTINENT STUDIES:    Office Visit on 11/20/2020   Component Date Value Ref Range Status     Cholesterol 11/20/2020 232* <200 mg/dL Final     Triglycerides 11/20/2020 232* <150 mg/dL Final     HDL Cholesterol 11/20/2020 98  >49 mg/dL Final     LDL Cholesterol Calculated 11/20/2020 88  <100 mg/dL Final     Non HDL Cholesterol 11/20/2020 134* <130 mg/dL Final     Sodium 11/20/2020 132* 133 - 144 mmol/L Final     Potassium 11/20/2020 3.3* 3.4 - 5.3 mmol/L Final     Chloride 11/20/2020 98  94 - 109 mmol/L Final     Carbon Dioxide 11/20/2020 28  20 - 32 mmol/L Final     Anion Gap 11/20/2020 6  3 - 14 mmol/L Final     Glucose 11/20/2020 98  70 - 99 mg/dL Final     Urea Nitrogen 11/20/2020 15  7 - 30 mg/dL Final     Creatinine 11/20/2020 0.81  0.52 - 1.04 mg/dL Final     GFR Estimate 11/20/2020 81  >60 mL/min/[1.73_m2] Final     GFR Estimate If Black 11/20/2020 >90  >60 mL/min/[1.73_m2] Final     Calcium 11/20/2020 9.8  8.5 - 10.1 mg/dL Final       Again, thank you for allowing me to participate in the care of your patient.      Sincerely,    Aimee Nava PA-C    "

## 2020-12-31 ENCOUNTER — TELEPHONE (OUTPATIENT)
Dept: GASTROENTEROLOGY | Facility: CLINIC | Age: 55
End: 2020-12-31

## 2021-01-05 ENCOUNTER — TELEPHONE (OUTPATIENT)
Dept: GASTROENTEROLOGY | Facility: CLINIC | Age: 56
End: 2021-01-05

## 2021-01-05 DIAGNOSIS — Z11.59 ENCOUNTER FOR SCREENING FOR OTHER VIRAL DISEASES: Primary | ICD-10-CM

## 2021-01-05 NOTE — TELEPHONE ENCOUNTER
Patient is scheduled for HBT with GI nursing Staff    Spoke with: Carolina Hernandez    Date of Procedure: 1/29/2021    Location: [facility]    Sedation Type n/s    Pre-op for [facility] MAC n/s    (if yes advise patient they will need a pre-op prior to procedure)      Is patient on blood thinners? -n/s (If yes- inform patient to follow up with PCP or provider for follow up instructions)     Informed patient they will need an adult  n/a    Informed Patient of COVID Test Requirement yes and transferred her to [facility] for Pre Prescription on chart    Confirmed Nurse will call to complete assessment n/a    Additional comments: no

## 2021-01-21 ENCOUNTER — PATIENT OUTREACH (OUTPATIENT)
Dept: GASTROENTEROLOGY | Facility: CLINIC | Age: 56
End: 2021-01-21

## 2021-01-21 NOTE — PROGRESS NOTES
Left message for pt with some instructions regarding upcoming HBT. Left in message that instructions for the test would be mailed to her and to call with any questions.

## 2021-01-26 DIAGNOSIS — Z11.59 ENCOUNTER FOR SCREENING FOR OTHER VIRAL DISEASES: ICD-10-CM

## 2021-01-26 DIAGNOSIS — R19.7 DIARRHEA, UNSPECIFIED TYPE: ICD-10-CM

## 2021-01-26 DIAGNOSIS — I10 ESSENTIAL HYPERTENSION: ICD-10-CM

## 2021-01-26 LAB
ANION GAP SERPL CALCULATED.3IONS-SCNC: 4 MMOL/L (ref 3–14)
BUN SERPL-MCNC: 14 MG/DL (ref 7–30)
CALCIUM SERPL-MCNC: 9.1 MG/DL (ref 8.5–10.1)
CHLORIDE SERPL-SCNC: 104 MMOL/L (ref 94–109)
CO2 SERPL-SCNC: 27 MMOL/L (ref 20–32)
CREAT SERPL-MCNC: 0.72 MG/DL (ref 0.52–1.04)
GFR SERPL CREATININE-BSD FRML MDRD: >90 ML/MIN/{1.73_M2}
GLUCOSE SERPL-MCNC: 119 MG/DL (ref 70–99)
LABORATORY COMMENT REPORT: NORMAL
POTASSIUM SERPL-SCNC: 4.1 MMOL/L (ref 3.4–5.3)
SARS-COV-2 RNA RESP QL NAA+PROBE: NEGATIVE
SARS-COV-2 RNA RESP QL NAA+PROBE: NORMAL
SODIUM SERPL-SCNC: 135 MMOL/L (ref 133–144)
SPECIMEN SOURCE: NORMAL
SPECIMEN SOURCE: NORMAL

## 2021-01-26 PROCEDURE — 83516 IMMUNOASSAY NONANTIBODY: CPT | Performed by: PHYSICIAN ASSISTANT

## 2021-01-26 PROCEDURE — 80048 BASIC METABOLIC PNL TOTAL CA: CPT | Performed by: FAMILY MEDICINE

## 2021-01-26 PROCEDURE — 83516 IMMUNOASSAY NONANTIBODY: CPT | Mod: 59 | Performed by: PHYSICIAN ASSISTANT

## 2021-01-26 PROCEDURE — U0005 INFEC AGEN DETEC AMPLI PROBE: HCPCS | Performed by: PHYSICIAN ASSISTANT

## 2021-01-26 PROCEDURE — 36415 COLL VENOUS BLD VENIPUNCTURE: CPT | Performed by: PHYSICIAN ASSISTANT

## 2021-01-26 PROCEDURE — U0003 INFECTIOUS AGENT DETECTION BY NUCLEIC ACID (DNA OR RNA); SEVERE ACUTE RESPIRATORY SYNDROME CORONAVIRUS 2 (SARS-COV-2) (CORONAVIRUS DISEASE [COVID-19]), AMPLIFIED PROBE TECHNIQUE, MAKING USE OF HIGH THROUGHPUT TECHNOLOGIES AS DESCRIBED BY CMS-2020-01-R: HCPCS | Performed by: PHYSICIAN ASSISTANT

## 2021-01-26 NOTE — LETTER
January 27, 2021      Craolina Hernandez  [address] [place] [id number]        Dear ,    We are writing to inform you of your test results.    Please inform patient that test result was within normal parameters except that the blood glucose was slightly high. Recommend working on diet and exercise.   Thank you.     Julia Oneill M.D.     Resulted Orders   **Basic metabolic panel FUTURE anytime   Result Value Ref Range    Sodium 135 133 - 144 mmol/L    Potassium 4.1 3.4 - 5.3 mmol/L    Chloride 104 94 - 109 mmol/L    Carbon Dioxide 27 20 - 32 mmol/L    Anion Gap 4 3 - 14 mmol/L    Glucose 119 (H) 70 - 99 mg/dL    Urea Nitrogen 14 7 - 30 mg/dL    Creatinine 0.72 0.52 - 1.04 mg/dL    GFR Estimate >90 >60 mL/min/[1.73_m2]      Comment:      Non  GFR Calc  Starting 12/18/2018, serum creatinine based estimated GFR (eGFR) will be   calculated using the Chronic Kidney Disease Epidemiology Collaboration   (CKD-EPI) equation.      GFR Estimate If Black >90 >60 mL/min/[1.73_m2]      Comment:       GFR Calc  Starting 12/18/2018, serum creatinine based estimated GFR (eGFR) will be   calculated using the Chronic Kidney Disease Epidemiology Collaboration   (CKD-EPI) equation.      Calcium 9.1 8.5 - 10.1 mg/dL       If you have any questions or concerns, please call the clinic at the number listed above.       Sincerely,      Julia Oneill MD

## 2021-01-27 LAB
GLIADIN IGA SER-ACNC: 2 U/ML
GLIADIN IGG SER-ACNC: 1 U/ML
TTG IGA SER-ACNC: 1 U/ML
TTG IGG SER-ACNC: <1 U/ML

## 2021-01-27 NOTE — RESULT ENCOUNTER NOTE
Please inform patient that test result was within normal parameters except that the blood glucose was slightly high. Recommend working on diet and exercise.   Thank you.     Julia Oneill M.D.

## 2021-01-29 ENCOUNTER — OFFICE VISIT (OUTPATIENT)
Dept: GASTROENTEROLOGY | Facility: CLINIC | Age: 56
End: 2021-01-29
Payer: COMMERCIAL

## 2021-01-29 VITALS
DIASTOLIC BLOOD PRESSURE: 86 MMHG | HEIGHT: 63 IN | WEIGHT: 157 LBS | SYSTOLIC BLOOD PRESSURE: 135 MMHG | RESPIRATION RATE: 16 BRPM | BODY MASS INDEX: 27.82 KG/M2 | HEART RATE: 70 BPM | TEMPERATURE: 97.7 F | OXYGEN SATURATION: 97 %

## 2021-01-29 DIAGNOSIS — R19.7 DIARRHEA, UNSPECIFIED TYPE: ICD-10-CM

## 2021-01-29 PROCEDURE — 91065 BREATH HYDROGEN/METHANE TEST: CPT

## 2021-01-29 ASSESSMENT — MIFFLIN-ST. JEOR: SCORE: 1266.28

## 2021-01-29 ASSESSMENT — PAIN SCALES - GENERAL: PAINLEVEL: NO PAIN (0)

## 2021-01-29 NOTE — PATIENT INSTRUCTIONS
Hydrogen Breath Test  1. You may experience diarrhea for the next 12-24 hours.  2. Resume a regular diet.  3. Follow-up with your referring doctor in clinic.  4. If you have questions call [phone] from 7:00am-5:00pm.

## 2021-01-29 NOTE — PROGRESS NOTES
"Non-Invasive GI Procedure Visit    Carolina Hernandez is a 55 year old female with history of Diarrhea, unspecified type.   Patient stated reason for procedure: Diarrhea  COVID-19 Test Performed within 48-72hrs of the procedure:  Yes. COVID-19 result was NEGATIVE.    COVID-19 PCR Results    COVID-19 PCR Results 1/26/21 1/26/21    1059 1059   COVID-19 Virus PCR to [facility] - Result Test received-See reflex to IDDL test SARS CoV2 (COVID-19) Virus RT-PCR    COVID-19 Virus PCR to [facility] - Source Nasopharyngeal    SARS-CoV-2 Virus Specimen Source  Nasopharyngeal   SARS-CoV-2 PCR Result  NEGATIVE      Comments are available for some flowsheets but are not being displayed.         COVID-19 Antibody Results, Testing for Immunity    COVID-19 Antibody Results, Testing for Immunity   No data to display.             Pre-Procedure Assessment  Patient presents to clinic today for Hydrogen Breath Test    Referring Provider: Aimee Nava    Previous HBT: No  Is patient a smoker: Yes, When was your last cigarette? 8am    Does patient report having a colonoscopy, barium study, barium enema or taking antibiotics within the last 2 weeks? Yes / No: No.  Does patient report taking a stool softener, fiber supplement, laxative or anti-diarrheal in the last 3 - 4 days? Yes / No: No.  Does the patient report taking a probiotic in the last 1 - 2 days? Yes / No: No.  Does the patient report taking any medications today? No    Does the patient report following the pre-procedure bland diet? Yes  Does patient report being NPO for a minimum of 12 hours before the test? Yes.     Patient reported symptoms:Diarrhea  How long has patient had these symptoms? 3+ years        .    Patient Hx  Patient's history, medications and allergies were reviewed.       Height: 5' 3\"   Weight: 157 lbs 0 oz    Patient Active Problem List    Diagnosis Date Noted     Essential hypertension 07/26/2019     Priority: Medium     New diagnosis 7/26/2019         Recurrent " major depressive disorder, in remission (H) 01/17/2019     Priority: Medium     Encounter for tobacco use cessation counseling 01/17/2019     Priority: Medium     Mild intermittent asthma without complication 01/17/2019     Priority: Medium     CARDIOVASCULAR SCREENING; LDL GOAL LESS THAN 160 10/31/2010     Priority: Medium      Prior to Admission medications    Medication Sig Start Date End Date Taking? Authorizing Provider   albuterol (PROAIR HFA/PROVENTIL HFA/VENTOLIN HFA) 108 (90 Base) MCG/ACT inhaler Inhale 1-2 puffs into the lungs every 4 hours as needed for shortness of breath / dyspnea or wheezing For shortness of breath 12/17/20   Julia Oneill MD   albuterol (PROVENTIL) (2.5 MG/3ML) 0.083% neb solution Inhale contents of 1 vial (2.5 mg) by nebulization every 4 hours as needed for shortness of breath / dyspnea or wheezing 12/16/20   Julia Oneill MD   amLODIPine (NORVASC) 2.5 MG tablet Take 2 tablets (5 mg) by mouth daily Please fill at patient request 11/20/20   Julia Oneill MD   cetirizine (ZYRTEC) 10 MG tablet Take 10 mg by mouth daily 9/10/17   Reported, Patient   FLUoxetine (PROZAC) 20 MG capsule Take 1 capsule (20 mg) by mouth daily 11/20/20   Julia Oneill MD   FLUoxetine (PROZAC) 40 MG capsule Take 1 capsule (40 mg) by mouth daily Take with 20 mg tab for a total of 60 mg daily 11/20/20   Julia Oneill MD   fluticasone-salmeterol (ADVAIR) 500-50 MCG/DOSE inhaler Inhale 1 puff into the lungs every 12 hours Please prescribe the generic ( Wixela )  Patient not taking: Reported on 12/30/2020 12/16/20   Julia Oneill MD   fluticasone-salmeterol (ADVAIR) 500-50 MCG/DOSE inhaler Inhale 1 puff into the lungs every 12 hours Patient has tried the generic and this appears not to be working. May likely need a prior authorization. 11/20/20   Julia Oneill MD   hydrochlorothiazide (HYDRODIURIL) 25 MG tablet Take 1 tablet  (25 mg) by mouth once daily 7/24/20   Julia Oneill MD   loperamide (IMODIUM A-D) 2 MG tablet Start with 1 tabs (2 mg) daily. Increase as needed. Do not use more than  8 tabs (16 mg) per day. 12/30/20   Aimee Nava PA-C   montelukast (SINGULAIR) 10 MG tablet Take 1 tablet (10 mg) by mouth At Bedtime 3/30/20   Julia Oneill MD   multivitamin  with lutein (OCUVITE WITH LTEIN) CAPS per capsule Take 1 capsule by mouth daily    Reported, Patient   omeprazole 20 MG tablet Take 1 tablet (20 mg) by mouth daily 10/3/19   Julia Oneill MD     Allergies   Allergen Reactions     Penicillins Itching     History reviewed. No pertinent past medical history.  History reviewed. No pertinent surgical history.  Family History   Problem Relation Age of Onset     Glaucoma Father      Coronary Artery Disease Father         stents     Cancer Maternal Grandfather      Coronary Artery Disease Paternal Grandfather      Schizophrenia Daughter      Diabetes Daughter      Cancer Daughter      Crohn's Disease No family hx of      Ulcerative Colitis No family hx of      GERD No family hx of      Stomach Cancer No family hx of      Social History     Tobacco Use     Smoking status: Current Every Day Smoker     Packs/day: 0.25     Types: Cigarettes     Smokeless tobacco: Never Used     Tobacco comment: 6 cig a day    Substance Use Topics     Alcohol use: Yes     Comment: 12 pack weekly        Pre-Procedure Education & Consent  Procedure education was provided to: Patient  Teaching method: Explanation  Barriers to learning: No Barrier    Patient indicated understanding of pre-procedure instruction and appropriate consent was obtained and documented.    ____________________________________________________________________    Post-Procedure Documentation: Hydrogen Breath Test     Baseline breath obtained prior to patient drinking Lactose.     Patient tolerated test with no complaints.    HBT  Results    Sample Clock Times Ppm H2 Ppm CH4 CO2% Comments   Baseline 0959 4 0 3.7     Challenge Dose Given 1000 - - - -   #1 1100 4 0 3.7     #2 1200 3 0 3.4     #3 1300 8 0 3.5     #4             #5             #6             #7             #8             #9               #  B   Patient given discharge instructions.    Notification of pending test results sent to provider for interpretation. Please reference scanned document for final interpretation of results. Patient will follow up with referring provider for test results.    Moises Guan RN on 1/29/2021 at 9:52 AM

## 2021-01-31 ENCOUNTER — HEALTH MAINTENANCE LETTER (OUTPATIENT)
Age: 56
End: 2021-01-31

## 2021-02-05 ENCOUNTER — TELEPHONE (OUTPATIENT)
Dept: GASTROENTEROLOGY | Facility: CLINIC | Age: 56
End: 2021-02-05

## 2021-02-05 NOTE — TELEPHONE ENCOUNTER
[facility]    Phone Message    May a detailed message be left on voicemail: yes     Reason for Call: Requesting Results   Name/type of test: Hydrogen Breath Test  Date of test: 1-29  Was test done at a location other than [facility] (Please fill in the location if not [facility])?: No      Action Taken: Message routed to:  [facility] ([facility] GI    Travel Screening: Not Applicable

## 2021-02-08 NOTE — CONFIDENTIAL NOTE
Patient called in response to phone-call. Discussed lactose breath testing was negative, celiac serologies negative.     Imodium is helping- on average is having 2-3 bowel movements a day, if she is having diarrhea 10 times a day.     Discussed that she can increase dose of imodium to prevent loose stools (starting with 2 a day). Can check-in and let us know how she is doing.     Aimee Nava PA-C  Division of Gastroenterology, Hepatology & Nutrition  [facility]

## 2021-03-30 ENCOUNTER — TELEPHONE (OUTPATIENT)
Dept: FAMILY MEDICINE | Facility: CLINIC | Age: 56
End: 2021-03-30

## 2021-03-30 NOTE — TELEPHONE ENCOUNTER
Reason for Call:  Other call back    Detailed comments: Patient is requesting a call back as she believes she is eligible for covid vaccine, I do not see anything on patient's problem list that would qualify her, please advise.    Phone Number Patient can be reached at: Home number on file [phone] (home)    Best Time: any    Can we leave a detailed message on this number? YES    Call taken on 3/30/2021 at 9:36 AM by Susie Cabrera

## 2021-03-30 NOTE — TELEPHONE ENCOUNTER
Patient is called and the decision tree is gone over with patient and she does not qualify at this time.  She is told to ck the MD site, or web site in the future or local  Pharmacies. Ana ALEXANDER RN

## 2021-04-02 ENCOUNTER — VIRTUAL VISIT (OUTPATIENT)
Dept: GASTROENTEROLOGY | Facility: CLINIC | Age: 56
End: 2021-04-02
Payer: COMMERCIAL

## 2021-04-02 VITALS — HEIGHT: 63 IN | WEIGHT: 155 LBS | BODY MASS INDEX: 27.46 KG/M2

## 2021-04-02 DIAGNOSIS — K58.0 IRRITABLE BOWEL SYNDROME WITH DIARRHEA: Primary | ICD-10-CM

## 2021-04-02 PROCEDURE — 99214 OFFICE O/P EST MOD 30 MIN: CPT | Mod: 95 | Performed by: PHYSICIAN ASSISTANT

## 2021-04-02 ASSESSMENT — MIFFLIN-ST. JEOR: SCORE: 1252.21

## 2021-04-02 NOTE — LETTER
4/2/2021         RE: Carolina Hernandez  [address] [place] [id number]        Dear Colleague,    Thank you for referring your patient, Carolina Hernandez, to the [facility] [place]. Please see a copy of my visit note below.    GI CLINIC VISIT    CC/REFERRING MD:  Julia Oneill  REASON FOR CONSULTATION: loose stools     ASSESSMENT/PLAN:  Carolina Hernandez is a 55 year old woman with a past medical history of tobacco abuse, asthma, anxiety, depression presenting for evaluation of chronic diarrhea.     1.  Chronic diarrhea  Patient reports about 3 years of diarrhea without any clear trigger.  Has had unremarkable colonoscopy with random biopsies for microscopic colitis.  She has had C. difficile associated with previous antibiotic use, though repeat stool studies have been negative for C. difficile.  Has also had negative chronic infectious stool studies, unremarkable basic labs, celiac serologies, lactose breath test.      Possible sources of symptoms include medication side effect (with hydrochlorothiazide, fluoxetine), functional loose stools, small intestinal bacterial overgrowth.  Would recommend continuing fiber supplementation at this time.  Does not have any red flag symptoms to warrant repeat colonoscopy at this time, can consider colonoscopy or upper endoscopy pending clinical picture.  Most bothersome symptom today is bloating.  We discussed option of pursuing trial of rifaximin.  We discussed risks and benefits, especially given her previous C. difficile infection.  Given debilitating bloating symptoms and loose stools, patient would like to pursue trial of rifaximin for IBS-D.    We also discussed how exercise and tobacco cessation can improve her overall health.  I encouraged her to try core strengthening as this may help with abdominal bloating.  We discussed potential exercises include yoga.    Future considerations include trial of TCA,  "cholestyramine, meeting with GI dietitian to discuss low FODMAP diet.  --Continue fiber on a daily basis  --Continue Imodium  --If approved by her insurance, start rifaximin 3 times a day for 2 weeks.  Let us know how this goes  --Try simethicone or Gas-X to help with bloating symptoms  --Work on reducing number of cigarettes a day  --Work on core strengthening exercises     Colorectal cancer screenin    RTC 2 months    Thank you for this consultation.  It was a pleasure to participate in the care of this patient; please contact us with any further questions.     38 Minutes was spent on the date of the encounter during chart review, history and exam, documentation, and further activities as noted     Note completed using voice recognition software. Some word and grammatical errors may occur.      Aimee Nava PA-C  Division of Gastroenterology, Hepatology & Nutrition  [facility]  Carolina Hernandez is a 55 year old woman with a past medical history of tobacco abuse, asthma, anxiety, depression presenting for evaluation of chronic diarrhea. She is new to [facility] GI clinic and this is my first encounter with the patient.     Today the patient reports a three year history of watery diarrhea. Has not been able to identify any specific triggers for her symptoms.  She describes 10+ bowel movements a day that are clustered the first couple of hours that she is awake.Sometimes will continue throughout the day. Consistency is variable.  Symptoms seem to worsen when she has fluids such as water and coffee. Will have urgency with bowel movements.  Can have blood with hemorrhoids. No nocturnal bowel movements. Has had incontinence with bowel movements. Sometimes abdominal pain (gas pain, rare cramping) but not usually.     Within the last 6 months, she has been having more issues with hemmorhoids. She does sometimes strain to have a bowel movement (when she feels a \"pressure from within\"). Was " "seen by colorectal surgery by Dr. Lane and was told to take metamucil which has helped but she continues to have symptoms (2 rounded tablespoons a day). Since starting fiber they float and and look like \"algae\". Most of the time it is liquid. She also reports increased gas and bloating with this.     She has tried adding more fiber to her diet with spinach, bananas, whole grains and vegetables. She is allergic to a lot of fresh fruits. Raw onions can worsen symptoms, peas and legumes can worsen symptoms.     Previous workup includes:  2019:  - c diff negative   2017:   -colonoscopy- unremarkable, negative biopsies for MC  -Stool studies- negative for giardia/crypto, enteric panel, Ova and parasite  -labs: normal TSH, CBC, hepatic panel, celiac serologies    -negative lactose breath testing     Interval history 4/2/2021:   Patient reports bloating below her rib cage. She describes that this feels like it is pushing on lungs and worsens asthma. This has been occurring every day, before eating food. Has been passing more gas than normal. Does get visibly distended.     Diarrhea off and on. Is taking 2 imodium and 1 tablespoon of fiber. Is eating more fiber with diet (kale, greens, whole grain breads). She does not eat much fruit because she has an allergy to this. Still having diarrhea every day. The past week or so has been bad. Six bowel movements a day, will be partially solid every day. More morning and early afternoon. She notes that she drinks a lot of coffee and water (this seems to make diarrhea worse).     ROS:    + arthritis has been worsened     PROBLEM LIST  Patient Active Problem List    Diagnosis Date Noted     Essential hypertension 07/26/2019     Priority: Medium     New diagnosis 7/26/2019         Recurrent major depressive disorder, in remission (H) 01/17/2019     Priority: Medium     Encounter for tobacco use cessation counseling 01/17/2019     Priority: Medium     Mild intermittent asthma without " complication 01/17/2019     Priority: Medium     CARDIOVASCULAR SCREENING; LDL GOAL LESS THAN 160 10/31/2010     Priority: Medium       PERTINENT PAST MEDICAL HISTORY:  Depression  Anxiety  Osteoarthritis   Asthma     PREVIOUS SURGERIES:  No GI surgery     PREVIOUS ENDOSCOPY:  Colonoscopy in 2017 through care everywhere:  Impression:          - Diverticulosis in the sigmoid colon.                       - Non-bleeding internal hemorrhoids.                       - The examined portion of the ileum was normal.                       - The entire examined colon is normal. Biopsied.                       - The examination was otherwise normal on direct and                        retroflexion views.    Final Pathologic Diagnosis   Colon, random, biopsy -- No significant pathologic change     ALLERGIES:  Allergies   Allergen Reactions     Penicillins Itching       PERTINENT MEDICATIONS:    Current Outpatient Medications:      albuterol (PROAIR HFA/PROVENTIL HFA/VENTOLIN HFA) 108 (90 Base) MCG/ACT inhaler, Inhale 1-2 puffs into the lungs every 4 hours as needed for shortness of breath / dyspnea or wheezing For shortness of breath, Disp: 18 g, Rfl: 11     albuterol (PROVENTIL) (2.5 MG/3ML) 0.083% neb solution, Inhale contents of 1 vial (2.5 mg) by nebulization every 4 hours as needed for shortness of breath / dyspnea or wheezing, Disp: 75 mL, Rfl: 4     amLODIPine (NORVASC) 2.5 MG tablet, Take 2 tablets (5 mg) by mouth daily Please fill at patient request, Disp: 180 tablet, Rfl: 3     cetirizine (ZYRTEC) 10 MG tablet, Take 10 mg by mouth daily, Disp: , Rfl:      FLUoxetine (PROZAC) 20 MG capsule, Take 1 capsule (20 mg) by mouth daily, Disp: 90 capsule, Rfl: 3     FLUoxetine (PROZAC) 40 MG capsule, Take 1 capsule (40 mg) by mouth daily Take with 20 mg tab for a total of 60 mg daily, Disp: 90 capsule, Rfl: 3     fluticasone-salmeterol (ADVAIR) 500-50 MCG/DOSE inhaler, Inhale 1 puff into the lungs every 12 hours Please prescribe  the generic ( Wixela ) (Patient not taking: Reported on 12/30/2020), Disp: 1 Inhaler, Rfl: 11     fluticasone-salmeterol (ADVAIR) 500-50 MCG/DOSE inhaler, Inhale 1 puff into the lungs every 12 hours Patient has tried the generic and this appears not to be working. May likely need a prior authorization., Disp: 1 Inhaler, Rfl: 0     hydrochlorothiazide (HYDRODIURIL) 25 MG tablet, Take 1 tablet (25 mg) by mouth once daily, Disp: 90 tablet, Rfl: 3     loperamide (IMODIUM A-D) 2 MG tablet, Start with 1 tabs (2 mg) daily. Increase as needed. Do not use more than  8 tabs (16 mg) per day., Disp: 90 tablet, Rfl: 3     montelukast (SINGULAIR) 10 MG tablet, Take 1 tablet (10 mg) by mouth at bedtime., Disp: 90 tablet, Rfl: 1     multivitamin  with lutein (OCUVITE WITH LTEIN) CAPS per capsule, Take 1 capsule by mouth daily, Disp: , Rfl:      omeprazole 20 MG tablet, Take 1 tablet (20 mg) by mouth daily, Disp: 90 tablet, Rfl: 3   No NSAIDs    SOCIAL HISTORY:  Will drink beer (4-5 beers a 3 times a week), symptoms are the same if she avoids drinking   Smokes cigarettes (8-10 a day, has decreased from before)  Social History     Socioeconomic History     Marital status:      Spouse name: Not on file     Number of children: Not on file     Years of education: Not on file     Highest education level: Not on file   Occupational History     Not on file   Social Needs     Financial resource strain: Not on file     Food insecurity     Worry: Not on file     Inability: Not on file     Transportation needs     Medical: Not on file     Non-medical: Not on file   Tobacco Use     Smoking status: Current Every Day Smoker     Packs/day: 0.25     Types: Cigarettes     Smokeless tobacco: Never Used     Tobacco comment: 6 cig a day    Substance and Sexual Activity     Alcohol use: Yes     Comment: 12 pack weekly     Drug use: No     Sexual activity: Not Currently   Lifestyle     Physical activity     Days per week: Not on file     Minutes  per session: Not on file     Stress: Not on file   Relationships     Social connections     Talks on phone: Not on file     Gets together: Not on file     Attends Tenriism service: Not on file     Active member of club or organization: Not on file     Attends meetings of clubs or organizations: Not on file     Relationship status: Not on file     Intimate partner violence     Fear of current or ex partner: Not on file     Emotionally abused: Not on file     Physically abused: Not on file     Forced sexual activity: Not on file   Other Topics Concern     Not on file   Social History Narrative     Not on file       FAMILY HISTORY:  Family History   Problem Relation Age of Onset     Glaucoma Father      Coronary Artery Disease Father         stents     Cancer Maternal Grandfather      Coronary Artery Disease Paternal Grandfather      Schizophrenia Daughter      Diabetes Daughter      Cancer Daughter      Crohn's Disease No family hx of      Ulcerative Colitis No family hx of      GERD No family hx of      Stomach Cancer No family hx of        Past/family/social history reviewed and no changes    PHYSICAL EXAMINATION:  Vitals reviewed: There were no vitals taken for this visit.  Wt:   Wt Readings from Last 2 Encounters:   01/29/21 71.2 kg (157 lb)   12/30/20 68.5 kg (151 lb)   General appearance: Healthy appearing adult, in no acute distress  Eyes: Sclera anicteric  Ears, nose, mouth and throat: No obvious external lesions of ears and nose, Hearing intact  Neck: Symmetric, No obvious external lesions  Respiratory: Normal respiration, no use of accessory muscles   Skin: No rashes or jaundice   Psychiatric: Oriented to person, place and time, Appropriate mood and affect.    PERTINENT STUDIES:    Office Visit on 11/20/2020   Component Date Value Ref Range Status     Cholesterol 11/20/2020 232* <200 mg/dL Final     Triglycerides 11/20/2020 232* <150 mg/dL Final     HDL Cholesterol 11/20/2020 98  >49 mg/dL Final     LDL  "Cholesterol Calculated 11/20/2020 88  <100 mg/dL Final     Non HDL Cholesterol 11/20/2020 134* <130 mg/dL Final     Sodium 11/20/2020 132* 133 - 144 mmol/L Final     Potassium 11/20/2020 3.3* 3.4 - 5.3 mmol/L Final     Chloride 11/20/2020 98  94 - 109 mmol/L Final     Carbon Dioxide 11/20/2020 28  20 - 32 mmol/L Final     Anion Gap 11/20/2020 6  3 - 14 mmol/L Final     Glucose 11/20/2020 98  70 - 99 mg/dL Final     Urea Nitrogen 11/20/2020 15  7 - 30 mg/dL Final     Creatinine 11/20/2020 0.81  0.52 - 1.04 mg/dL Final     GFR Estimate 11/20/2020 81  >60 mL/min/[1.73_m2] Final     GFR Estimate If Black 11/20/2020 >90  >60 mL/min/[1.73_m2] Final     Calcium 11/20/2020 9.8  8.5 - 10.1 mg/dL Final           Carolina Hernandez is a 56 year old female who is being evaluated via a billable video visit.      Please send invite link to email:  [email or web address]    The patient has been notified of following:     \"This video visit will be conducted via a call between you and your physician/provider. We have found that certain health care needs can be provided without the need for an in-person physical exam.  This service lets us provide the care you need with a video conversation.  If a prescription is necessary we can send it directly to your pharmacy.  If lab work is needed we can place an order for that and you can then stop by our lab to have the test done at a later time.    If during the course of the call the physician/provider feels a video visit is not appropriate, you will not be charged for this service.\"     Patient confirmed that they are in [place] for today's visit yes.    Video-Visit Details  Type of service:  Video Visit    Video Start Time: 1:13 PM  Video End Time:  1:40 PM    Originating Location (pt. Location): Home    Distant Location (provider location):  [facility] GASTROENTEROLOGY [facility]     Platform used: Doximity      Again, thank you for allowing me to participate in the " care of your patient.      Sincerely,    Aimee Nava PA-C

## 2021-04-02 NOTE — NURSING NOTE
"Chief Complaint   Patient presents with     Follow Up       Vitals:    04/02/21 1231   Weight: 70.3 kg (155 lb)   Height: 1.6 m (5' 3\")       Body mass index is 27.46 kg/m .    Tamra Sherman CMA    "

## 2021-04-02 NOTE — PROGRESS NOTES
GI CLINIC VISIT    CC/REFERRING MD:  Julia Oneill  REASON FOR CONSULTATION: loose stools     ASSESSMENT/PLAN:  Carolina Hernandez is a 55 year old woman with a past medical history of tobacco abuse, asthma, anxiety, depression presenting for evaluation of chronic diarrhea.     1.  Chronic diarrhea  Patient reports about 3 years of diarrhea without any clear trigger.  Has had unremarkable colonoscopy with random biopsies for microscopic colitis.  She has had C. difficile associated with previous antibiotic use, though repeat stool studies have been negative for C. difficile.  Has also had negative chronic infectious stool studies, unremarkable basic labs, celiac serologies, lactose breath test.      Possible sources of symptoms include medication side effect (with hydrochlorothiazide, fluoxetine), functional loose stools, small intestinal bacterial overgrowth.  Would recommend continuing fiber supplementation at this time.  Does not have any red flag symptoms to warrant repeat colonoscopy at this time, can consider colonoscopy or upper endoscopy pending clinical picture.  Most bothersome symptom today is bloating.  We discussed option of pursuing trial of rifaximin.  We discussed risks and benefits, especially given her previous C. difficile infection.  Given debilitating bloating symptoms and loose stools, patient would like to pursue trial of rifaximin for IBS-D.    We also discussed how exercise and tobacco cessation can improve her overall health.  I encouraged her to try core strengthening as this may help with abdominal bloating.  We discussed potential exercises include yoga.    Future considerations include trial of TCA, cholestyramine, meeting with GI dietitian to discuss low FODMAP diet.  --Continue fiber on a daily basis  --Continue Imodium  --If approved by her insurance, start rifaximin 3 times a day for 2 weeks.  Let us know how this goes  --Try simethicone or Gas-X to help with bloating  "symptoms  --Work on reducing number of cigarettes a day  --Work on core strengthening exercises     Colorectal cancer screenin    RTC 2 months    Thank you for this consultation.  It was a pleasure to participate in the care of this patient; please contact us with any further questions.     38 Minutes was spent on the date of the encounter during chart review, history and exam, documentation, and further activities as noted     Note completed using voice recognition software. Some word and grammatical errors may occur.      Aimee Nava PA-C  Division of Gastroenterology, Hepatology & Nutrition  [facility]  Carolinarani Hernandez is a 55 year old woman with a past medical history of tobacco abuse, asthma, anxiety, depression presenting for evaluation of chronic diarrhea. She is new to [facility] and this is my first encounter with the patient.     Today the patient reports a three year history of watery diarrhea. Has not been able to identify any specific triggers for her symptoms.  She describes 10+ bowel movements a day that are clustered the first couple of hours that she is awake.Sometimes will continue throughout the day. Consistency is variable.  Symptoms seem to worsen when she has fluids such as water and coffee. Will have urgency with bowel movements.  Can have blood with hemorrhoids. No nocturnal bowel movements. Has had incontinence with bowel movements. Sometimes abdominal pain (gas pain, rare cramping) but not usually.     Within the last 6 months, she has been having more issues with hemmorhoids. She does sometimes strain to have a bowel movement (when she feels a \"pressure from within\"). Was seen by colorectal surgery by Dr. Lane and was told to take metamucil which has helped but she continues to have symptoms (2 rounded tablespoons a day). Since starting fiber they float and and look like \"algae\". Most of the time it is liquid. She also reports increased gas and " bloating with this.     She has tried adding more fiber to her diet with spinach, bananas, whole grains and vegetables. She is allergic to a lot of fresh fruits. Raw onions can worsen symptoms, peas and legumes can worsen symptoms.     Previous workup includes:  2019:  - c diff negative   2017:   -colonoscopy- unremarkable, negative biopsies for MC  -Stool studies- negative for giardia/crypto, enteric panel, Ova and parasite  -labs: normal TSH, CBC, hepatic panel, celiac serologies    -negative lactose breath testing     Interval history 4/2/2021:   Patient reports bloating below her rib cage. She describes that this feels like it is pushing on lungs and worsens asthma. This has been occurring every day, before eating food. Has been passing more gas than normal. Does get visibly distended.     Diarrhea off and on. Is taking 2 imodium and 1 tablespoon of fiber. Is eating more fiber with diet (kale, greens, whole grain breads). She does not eat much fruit because she has an allergy to this. Still having diarrhea every day. The past week or so has been bad. Six bowel movements a day, will be partially solid every day. More morning and early afternoon. She notes that she drinks a lot of coffee and water (this seems to make diarrhea worse).     ROS:    + arthritis has been worsened     PROBLEM LIST  Patient Active Problem List    Diagnosis Date Noted     Essential hypertension 07/26/2019     Priority: Medium     New diagnosis 7/26/2019         Recurrent major depressive disorder, in remission (H) 01/17/2019     Priority: Medium     Encounter for tobacco use cessation counseling 01/17/2019     Priority: Medium     Mild intermittent asthma without complication 01/17/2019     Priority: Medium     CARDIOVASCULAR SCREENING; LDL GOAL LESS THAN 160 10/31/2010     Priority: Medium       PERTINENT PAST MEDICAL HISTORY:  Depression  Anxiety  Osteoarthritis   Asthma     PREVIOUS SURGERIES:  No GI surgery     PREVIOUS  ENDOSCOPY:  Colonoscopy in 2017 through care everywhere:  Impression:          - Diverticulosis in the sigmoid colon.                       - Non-bleeding internal hemorrhoids.                       - The examined portion of the ileum was normal.                       - The entire examined colon is normal. Biopsied.                       - The examination was otherwise normal on direct and                        retroflexion views.    Final Pathologic Diagnosis   Colon, random, biopsy -- No significant pathologic change     ALLERGIES:  Allergies   Allergen Reactions     Penicillins Itching       PERTINENT MEDICATIONS:    Current Outpatient Medications:      albuterol (PROAIR HFA/PROVENTIL HFA/VENTOLIN HFA) 108 (90 Base) MCG/ACT inhaler, Inhale 1-2 puffs into the lungs every 4 hours as needed for shortness of breath / dyspnea or wheezing For shortness of breath, Disp: 18 g, Rfl: 11     albuterol (PROVENTIL) (2.5 MG/3ML) 0.083% neb solution, Inhale contents of 1 vial (2.5 mg) by nebulization every 4 hours as needed for shortness of breath / dyspnea or wheezing, Disp: 75 mL, Rfl: 4     amLODIPine (NORVASC) 2.5 MG tablet, Take 2 tablets (5 mg) by mouth daily Please fill at patient request, Disp: 180 tablet, Rfl: 3     cetirizine (ZYRTEC) 10 MG tablet, Take 10 mg by mouth daily, Disp: , Rfl:      FLUoxetine (PROZAC) 20 MG capsule, Take 1 capsule (20 mg) by mouth daily, Disp: 90 capsule, Rfl: 3     FLUoxetine (PROZAC) 40 MG capsule, Take 1 capsule (40 mg) by mouth daily Take with 20 mg tab for a total of 60 mg daily, Disp: 90 capsule, Rfl: 3     fluticasone-salmeterol (ADVAIR) 500-50 MCG/DOSE inhaler, Inhale 1 puff into the lungs every 12 hours Please prescribe the generic ( Wixela ) (Patient not taking: Reported on 12/30/2020), Disp: 1 Inhaler, Rfl: 11     fluticasone-salmeterol (ADVAIR) 500-50 MCG/DOSE inhaler, Inhale 1 puff into the lungs every 12 hours Patient has tried the generic and this appears not to be working.  May likely need a prior authorization., Disp: 1 Inhaler, Rfl: 0     hydrochlorothiazide (HYDRODIURIL) 25 MG tablet, Take 1 tablet (25 mg) by mouth once daily, Disp: 90 tablet, Rfl: 3     loperamide (IMODIUM A-D) 2 MG tablet, Start with 1 tabs (2 mg) daily. Increase as needed. Do not use more than  8 tabs (16 mg) per day., Disp: 90 tablet, Rfl: 3     montelukast (SINGULAIR) 10 MG tablet, Take 1 tablet (10 mg) by mouth at bedtime., Disp: 90 tablet, Rfl: 1     multivitamin  with lutein (OCUVITE WITH LTEIN) CAPS per capsule, Take 1 capsule by mouth daily, Disp: , Rfl:      omeprazole 20 MG tablet, Take 1 tablet (20 mg) by mouth daily, Disp: 90 tablet, Rfl: 3   No NSAIDs    SOCIAL HISTORY:  Will drink beer (4-5 beers a 3 times a week), symptoms are the same if she avoids drinking   Smokes cigarettes (8-10 a day, has decreased from before)  Social History     Socioeconomic History     Marital status:      Spouse name: Not on file     Number of children: Not on file     Years of education: Not on file     Highest education level: Not on file   Occupational History     Not on file   Social Needs     Financial resource strain: Not on file     Food insecurity     Worry: Not on file     Inability: Not on file     Transportation needs     Medical: Not on file     Non-medical: Not on file   Tobacco Use     Smoking status: Current Every Day Smoker     Packs/day: 0.25     Types: Cigarettes     Smokeless tobacco: Never Used     Tobacco comment: 6 cig a day    Substance and Sexual Activity     Alcohol use: Yes     Comment: 12 pack weekly     Drug use: No     Sexual activity: Not Currently   Lifestyle     Physical activity     Days per week: Not on file     Minutes per session: Not on file     Stress: Not on file   Relationships     Social connections     Talks on phone: Not on file     Gets together: Not on file     Attends [employer] service: Not on file     Active member of club or organization: Not on file     Attends  meetings of clubs or organizations: Not on file     Relationship status: Not on file     Intimate partner violence     Fear of current or ex partner: Not on file     Emotionally abused: Not on file     Physically abused: Not on file     Forced sexual activity: Not on file   Other Topics Concern     Not on file   Social History Narrative     Not on file       FAMILY HISTORY:  Family History   Problem Relation Age of Onset     Glaucoma Father      Coronary Artery Disease Father         stents     Cancer Maternal Grandfather      Coronary Artery Disease Paternal Grandfather      Schizophrenia Daughter      Diabetes Daughter      Cancer Daughter      Crohn's Disease No family hx of      Ulcerative Colitis No family hx of      GERD No family hx of      Stomach Cancer No family hx of        Past/family/social history reviewed and no changes    PHYSICAL EXAMINATION:  Vitals reviewed: There were no vitals taken for this visit.  Wt:   Wt Readings from Last 2 Encounters:   01/29/21 71.2 kg (157 lb)   12/30/20 68.5 kg (151 lb)   General appearance: Healthy appearing adult, in no acute distress  Eyes: Sclera anicteric  Ears, nose, mouth and throat: No obvious external lesions of ears and nose, Hearing intact  Neck: Symmetric, No obvious external lesions  Respiratory: Normal respiration, no use of accessory muscles   Skin: No rashes or jaundice   Psychiatric: Oriented to person, place and time, Appropriate mood and affect.    PERTINENT STUDIES:    Office Visit on 11/20/2020   Component Date Value Ref Range Status     Cholesterol 11/20/2020 232* <200 mg/dL Final     Triglycerides 11/20/2020 232* <150 mg/dL Final     HDL Cholesterol 11/20/2020 98  >49 mg/dL Final     LDL Cholesterol Calculated 11/20/2020 88  <100 mg/dL Final     Non HDL Cholesterol 11/20/2020 134* <130 mg/dL Final     Sodium 11/20/2020 132* 133 - 144 mmol/L Final     Potassium 11/20/2020 3.3* 3.4 - 5.3 mmol/L Final     Chloride 11/20/2020 98  94 - 109 mmol/L Final      Carbon Dioxide 11/20/2020 28  20 - 32 mmol/L Final     Anion Gap 11/20/2020 6  3 - 14 mmol/L Final     Glucose 11/20/2020 98  70 - 99 mg/dL Final     Urea Nitrogen 11/20/2020 15  7 - 30 mg/dL Final     Creatinine 11/20/2020 0.81  0.52 - 1.04 mg/dL Final     GFR Estimate 11/20/2020 81  >60 mL/min/[1.73_m2] Final     GFR Estimate If Black 11/20/2020 >90  >60 mL/min/[1.73_m2] Final     Calcium 11/20/2020 9.8  8.5 - 10.1 mg/dL Final

## 2021-04-02 NOTE — PATIENT INSTRUCTIONS
It was a pleasure taking care of you today.  I've included a brief summary of our discussion and care plan from today's visit below.  Please review this information with your primary care provider.  ______________________________________________________________________    My recommendations are summarized as follows:    --Continue fiber on a daily basis  --Continue Imodium  --If approved by her insurance, start rifaximin 3 times a day for 2 weeks.  Let us know how this goes  --Try simethicone or Gas-X to help with bloating symptoms  --Work on reducing number of cigarettes a day  --Work on core strengthening exercises     Return to GI Clinic in 2 months to review your progress.    ______________________________________________________________________    How do I schedule labs, imaging studies, or procedures that were ordered in clinic today?   Labs: To schedule lab appointment at the [facility], use my chart or call [phone]. If you have a [facility] lab closer to home where you are regularly seen you can give them a call.     Procedures: If a colonoscopy, upper endoscopy, breath test, esophageal manometry, or pH impedence was ordered today, our endoscopy team will call you to schedule this. If you have not heard from our endoscopy team within a week, please call [phone] to schedule.     Imaging Studies: If you were scheduled for a CT scan, X-ray, MRI, ultrasound, HIDA scan or other imaging study, please call [phone] to have this scheduled.     Referral: If a referral to another specialty was ordered, expect a phone call or follow instructions above. If you have not heard from anyone regarding your referral in a week, please call our clinic to check the status.     Who do I call with any questions after my visit?  Please be in touch if there are any further questions that arise following today's visit.  There are multiple ways to contact your gastroenterology care team.        During  business hours, you may reach a Gastroenterology nurse at [phone]      To schedule or reschedule an appointment, please call [phone].       You can always send a secure message through [employer].  [employer] messages are answered by your nurse or doctor typically within 24 hours.  Please allow extra time on weekends and holidays.        For urgent/emergent questions after business hours, you may reach the on-call GI Fellow by contacting the [facility] at [phone].     How will I get the results of any tests ordered?    You will receive all of your results.  If you have signed up for [employer], any tests ordered at your visit will be available to you after your physician reviews them.  Typically this takes 1-2 weeks.  If there are urgent results that require a change in your care plan, your physician or nurse will call you to discuss the next steps.      What is [employer]?  [employer] is a secure way for you to access all of your healthcare records from the [facility].  It is a web based computer program, so you can sign on to it from any location.  It also allows you to send secure messages to your care team.  I recommend signing up for [employer] access if you have not already done so and are comfortable with using a computer.      How to I schedule a follow-up visit?  If you did not schedule a follow-up visit today, please call [phone] to schedule a follow-up office visit.      Sincerely,    Aimee Nava PA-C  Division of Gastroenterology, Hepatology & Nutrition  [facility]

## 2021-04-02 NOTE — PROGRESS NOTES
"Carolina Hernandez is a 56 year old female who is being evaluated via a billable video visit.      Please send invite link to email:  [email or web address]    The patient has been notified of following:     \"This video visit will be conducted via a call between you and your physician/provider. We have found that certain health care needs can be provided without the need for an in-person physical exam.  This service lets us provide the care you need with a video conversation.  If a prescription is necessary we can send it directly to your pharmacy.  If lab work is needed we can place an order for that and you can then stop by our lab to have the test done at a later time.    If during the course of the call the physician/provider feels a video visit is not appropriate, you will not be charged for this service.\"     Patient confirmed that they are in [place] for today's visit yes.    Video-Visit Details  Type of service:  Video Visit    Video Start Time: 1:13 PM  Video End Time:  1:40 PM    Originating Location (pt. Location): Home    Distant Location (provider location):  [facility] [place]     Platform used: "[employer]maria isabel            "

## 2021-04-22 ENCOUNTER — IMMUNIZATION (OUTPATIENT)
Dept: FAMILY MEDICINE | Facility: CLINIC | Age: 56
End: 2021-04-22
Payer: COMMERCIAL

## 2021-04-22 PROCEDURE — 0011A PR COVID VAC MODERNA 100 MCG/0.5 ML IM: CPT

## 2021-04-22 PROCEDURE — 91301 PR COVID VAC MODERNA 100 MCG/0.5 ML IM: CPT

## 2021-04-24 DIAGNOSIS — J45.20 MILD INTERMITTENT ASTHMA WITHOUT COMPLICATION: ICD-10-CM

## 2021-04-26 NOTE — TELEPHONE ENCOUNTER
Patient is calling stating that she is using her nebulizer everyday 2-3 times a day. She said she is asking for a bigger Qty. With refills.    Pending Prescriptions:                       Disp   Refills    albuterol (PROVENTIL) (2.5 MG/3ML) 0.083%*75 mL  0            Sig: Inhale contents of 1 vial (2.5 mg) by           nebulization every 4 hours as needed for           shortness of breath / dyspnea or wheezing.      [id number] - [place], [place] - [address]  [address] [address]  Phone: [phone] Fax: [phone]

## 2021-04-26 NOTE — TELEPHONE ENCOUNTER
Routing refill request to provider for review/approval because: Needs Asthma Control Assessment Score.  Also patient requesting larger quantities/refills.

## 2021-04-27 RX ORDER — ALBUTEROL SULFATE 0.83 MG/ML
SOLUTION RESPIRATORY (INHALATION)
Qty: 75 ML | Refills: 0 | Status: SHIPPED | OUTPATIENT
Start: 2021-04-27 | End: 2021-05-04

## 2021-04-28 ENCOUNTER — PATIENT OUTREACH (OUTPATIENT)
Dept: GASTROENTEROLOGY | Facility: CLINIC | Age: 56
End: 2021-04-28

## 2021-04-28 NOTE — PROGRESS NOTES
Prior Authorization Retail Medication Request    Medication/Dose:   rifaximin (XIFAXAN) 550 MG TABS tablet 42 tablet 0 4/2/2021 4/16/2021 --   Sig - Route: Take 1 tablet (550 mg) by mouth 3 times daily for 14 days - Oral       ICD code (if different than what is on RX):    Previously Tried and Failed:    Rationale:      Insurance Name:    Insurance ID:        Pharmacy Information (if different than what is on RX)  Name:    Phone:

## 2021-04-29 ENCOUNTER — TELEPHONE (OUTPATIENT)
Dept: GASTROENTEROLOGY | Facility: CLINIC | Age: 56
End: 2021-04-29

## 2021-04-29 NOTE — TELEPHONE ENCOUNTER
Central Prior Authorization Team   Phone: [phone]    Prior Authorization Approval    Authorization Effective Date: 3/30/2021  Authorization Expiration Date: 4/29/2022  Medication: rifaximin (XIFAXAN) 550 MG TABS tablet  Approved Dose/Quantity:   Reference #:     Insurance Company: [employer] - Phone [phone] Fax [phone]  Expected CoPay:       CoPay Card Available:      Foundation Assistance Needed:    Which Pharmacy is filling the prescription (Not needed for infusion/clinic administered): [id number] - [place], [place] - [address] [place] [address]  Pharmacy Notified: Yes-Pharmacy will notify patient when ready.  Patient Notified: No

## 2021-04-29 NOTE — TELEPHONE ENCOUNTER
Prior Authorization Retail Medication Request    Medication/Dose: rifaximin (XIFAXAN) 550 MG TABS tablet  ICD code (if different than what is on RX):    Previously Tried and Failed:    Rationale:      Insurance Name:  [employer]  Insurance ID:  [id number]      Pharmacy Information (if different than what is on RX)  Name:  [id number] - [place], [place] - [address] [place] [address]  Phone:  [phone]

## 2021-05-04 ENCOUNTER — E-CONSULT (OUTPATIENT)
Dept: PULMONOLOGY | Facility: CLINIC | Age: 56
End: 2021-05-04

## 2021-05-04 ENCOUNTER — OFFICE VISIT (OUTPATIENT)
Dept: FAMILY MEDICINE | Facility: CLINIC | Age: 56
End: 2021-05-04
Payer: COMMERCIAL

## 2021-05-04 ENCOUNTER — TELEPHONE (OUTPATIENT)
Dept: FAMILY MEDICINE | Facility: CLINIC | Age: 56
End: 2021-05-04

## 2021-05-04 VITALS
BODY MASS INDEX: 28.66 KG/M2 | OXYGEN SATURATION: 98 % | RESPIRATION RATE: 18 BRPM | SYSTOLIC BLOOD PRESSURE: 130 MMHG | HEART RATE: 85 BPM | TEMPERATURE: 98.3 F | WEIGHT: 161.8 LBS | DIASTOLIC BLOOD PRESSURE: 82 MMHG

## 2021-05-04 DIAGNOSIS — J45.40 UNCONTROLLED MODERATE PERSISTENT ASTHMA: ICD-10-CM

## 2021-05-04 DIAGNOSIS — B30.9 ACUTE VIRAL CONJUNCTIVITIS OF BOTH EYES: ICD-10-CM

## 2021-05-04 DIAGNOSIS — F33.40 RECURRENT MAJOR DEPRESSIVE DISORDER, IN REMISSION (H): ICD-10-CM

## 2021-05-04 DIAGNOSIS — H10.13 ALLERGIC CONJUNCTIVITIS OF BOTH EYES: Primary | ICD-10-CM

## 2021-05-04 PROCEDURE — 99214 OFFICE O/P EST MOD 30 MIN: CPT | Performed by: FAMILY MEDICINE

## 2021-05-04 RX ORDER — ALBUTEROL SULFATE 0.83 MG/ML
2.5 SOLUTION RESPIRATORY (INHALATION) EVERY 4 HOURS PRN
Qty: 75 ML | Refills: 11 | Status: SHIPPED | OUTPATIENT
Start: 2021-05-04 | End: 2022-01-20

## 2021-05-04 RX ORDER — OLOPATADINE HYDROCHLORIDE 1 MG/ML
1 SOLUTION/ DROPS OPHTHALMIC 2 TIMES DAILY
Qty: 20 ML | Refills: 1 | Status: SHIPPED | OUTPATIENT
Start: 2021-05-04 | End: 2021-05-09

## 2021-05-04 NOTE — PROGRESS NOTES
Assessment & Plan     Allergic conjunctivitis of both eyes  There is an element of allergy in patient's eye symptoms. Eye drops faxed.  - olopatadine (PATANOL) 0.1 % ophthalmic solution; Place 1 drop into both eyes 2 times daily for 5 days    Uncontrolled moderate persistent asthma  Patient with uncontrolled asthma despite medication adherence.   - E-CONSULT TO PULMONOLOGY (ADULT OUTPT PROVIDER TO SPECIALIST WRITTEN QUESTION & RESPONSE)  - albuterol (PROVENTIL) (2.5 MG/3ML) 0.083% neb solution; Take 1 vial (2.5 mg) by nebulization every 4 hours as needed for shortness of breath / dyspnea or wheezing    Acute viral conjunctivitis of both eyes  May need steroid eye drops. Recommend seeing eye specialist.  - EYE ADULT REFERRAL; Future      Recurrent major depressive disorder, in remission (H)  Stable no new events.    [id number]}     0956}  FUTURE APPOINTMENTS:       - Follow-up visit in one month or sooner as needed.    Return in about 4 weeks (around 6/1/2021) for Follow up.    Julia Oneill MD  [facility]    Thor Figueroa is a 56 year old who presents for the following health issues  accompanied by her :    HPI     Patient is a 56-year-old female presenting today for eye symptoms that have been ongoing for about 6 weeks.  She reports that both eyes have been red and puffy and she has had clear jelly like discharge from it.  She reports no mattering or yellowish or greenish discharge.  She has had a of tearing with mild itching, nothing excessive.  She does suffer from seasonal allergies and she thinks she may be having a flare of her allergies right now.        Other concerns today is her asthma.  She says she has had very uncontrolled asthma the last few months.  She is going through 2 to 3 boxes of albuterol in a month.  She is also on Advair which she claims she is taking diligently.  We talked about a  Pulmonology consult. She may benefit from a pulmonology consult which she  is open to.  Denies any other acute complaints today.  No chest pain.  No palpitations.    Asthma Follow-Up    Was ACT completed today?    Yes    ACT Total Scores 5/4/2021   ACT TOTAL SCORE (Goal Greater than or Equal to 20) 6   In the past 12 months, how many times did you visit the emergency room for your asthma without being admitted to the hospital? 0   In the past 12 months, how many times were you hospitalized overnight because of your asthma? 0         How many days per week do you miss taking your asthma controller medication?  0    Please describe any recent triggers for your asthma: upper respiratory infections, dust mites, pollens, animal dander and strong odors and fumes    Have you had any Emergency Room Visits, Urgent Care Visits, or Hospital Admissions since your last office visit?  No      How many servings of fruits and vegetables do you eat daily?  2-3    On average, how many sweetened beverages do you drink each day (Examples: soda, juice, sweet tea, etc.  Do NOT count diet or artificially sweetened beverages)?   0    How many days per week do you exercise enough to make your heart beat faster? 3 or less    How many minutes a day do you exercise enough to make your heart beat faster? 9 or less    How many days per week do you miss taking your medication? 0    Eye(s) Problem  Onset/Duration: off/on since Sept 2020 ad had a refill of Tobramycin which she has used up and is still having some redness, irritation and drainage  Description:   Location: bilateral elyes  Pain: no  Redness: YES  Accompanying Signs & Symptoms:  Discharge/mattering: YES  Swelling: YES  Visual changes: no  Fever: no  Nasal Congestion: has allergies  Bothered by bright lights: no  History:  Trauma: no  Foreign body exposure: no  Wearing contacts: no  Precipitating or alleviating factors:   Therapies tried and outcome:         Review of Systems   Constitutional, HEENT, cardiovascular, pulmonary, gi and gu systems are negative,  except as otherwise noted.      Objective    /82   Pulse 85   Temp 98.3  F (36.8  C) (Tympanic)   Resp 18   Wt 73.4 kg (161 lb 12.8 oz)   SpO2 98%   BMI 28.66 kg/m    Body mass index is 28.66 kg/m .  Physical Exam   GENERAL: healthy, alert and no distress  EYES: Eyes grossly normal to inspection and conjunctiva/corneas- conjunctival injection marika  HENT: ear canals and TM's normal, nose and mouth without ulcers or lesions  NECK: no adenopathy, no asymmetry, masses, or scars and thyroid normal to palpation  RESP: lungs clear to auscultation - no rales, rhonchi or wheezes  CV: regular rate and rhythm, normal S1 S2, no S3 or S4, no murmur, click or rub, no peripheral edema and peripheral pulses strong  ABDOMEN: soft, nontender, no hepatosplenomegaly, no masses and bowel sounds normal  MS: no gross musculoskeletal defects noted, no edema

## 2021-05-04 NOTE — PROGRESS NOTES
"5/4/2021     E-Consult has been denied due to: Complexity of question, needs in-person referral.    Interprofessional consultation requested by:  Julia Oneill MD      Clinical Question/Purpose: Evaluate uncontrolled asthma     Patient assessment and information reviewed: PCP chart note from 5/4/21.  It appears that the provider wishes patient to have an evaluation in Pulmonary Med/Asthma clinic.     Recommendations: Please enter order for  \"pulmonary referral\" instead of eConsult. Thank you.      The recommendations provided in this E-Consult are based on the clinical data available to me at this time, and are furnished without the benefit of a comprehensive in-person or virtual patient evaluation, Any new clinical issues or changes in patient status since the filing of this E-Consult will need to be taken into account when assessing these recommendations. Please contact me if you have further questions.    My total time spent reviewing clinical information and formulating assessment was 5 minutes.    Report sent automatically to requesting provider once signed.         Twin Guzman MD  "

## 2021-05-04 NOTE — TELEPHONE ENCOUNTER
Reason for Call:  Patient is calling she was seen by Dr. Oneill today and was told she needs a E-consult Asthma Pulmonary referral. Stated its not showing in her MyChart she wants to know where the referral is and the phone number.      Phone Number Patient can be reached at: Home number on file [phone] (home)    Best Time: any    Can we leave a detailed message on this number? YES    Call taken on 5/4/2021 at 1:35 PM by Hilda Singer

## 2021-05-05 ASSESSMENT — ASTHMA QUESTIONNAIRES: ACT_TOTALSCORE: 6

## 2021-05-05 NOTE — TELEPHONE ENCOUNTER
"E-Consult has been denied due to: Complexity of question, needs in-person referral.     Interprofessional consultation requested by:  Julia Oneill MD      Clinical Question/Purpose: Evaluate uncontrolled asthma      Patient assessment and information reviewed: PCP chart note from 5/4/21.  It appears that the provider wishes patient to have an evaluation in Pulmonary Med/Asthma clinic.      Recommendations: Please enter order for  \"pulmonary referral\" instead of eConsult. Thank you.   Order placed and patient has spoke to them.   "

## 2021-05-07 ENCOUNTER — TELEPHONE (OUTPATIENT)
Dept: PULMONOLOGY | Facility: CLINIC | Age: 56
End: 2021-05-07

## 2021-05-07 NOTE — TELEPHONE ENCOUNTER
RANIM with direct call back to discuss pt's availability to schedule a CXR and FPFT at [place], per her request prior to appt with Dr. Turner.

## 2021-05-11 ENCOUNTER — TELEPHONE (OUTPATIENT)
Dept: PULMONOLOGY | Facility: CLINIC | Age: 56
End: 2021-05-11

## 2021-05-11 DIAGNOSIS — J45.909 ASTHMA: Primary | ICD-10-CM

## 2021-05-11 NOTE — TELEPHONE ENCOUNTER
Pt returned call and provided her availability. Informed her I would call her back as soon as I heard back from [place] and was able to schedule both FPFT and CXR over there. Patient verbalized understanding.

## 2021-05-11 NOTE — TELEPHONE ENCOUNTER
RECORDS RECEIVED FROM: internal    DATE RECEIVED: 6.14.21    NOTES STATUS DETAILS   OFFICE NOTE from referring provider internal  Julia Oneill   OFFICE NOTE from other specialist na    DISCHARGE SUMMARY from hospital na    DISCHARGE REPORT from the ER na    MEDICATION LIST internal     IMAGING  (NEED IMAGES AND REPORTS)     CT SCAN In process     CHEST XRAY (CXR) In process     TESTS     PULMONARY FUNCTION TESTING (PFT) In process         Action 5.11.21 sv    Action Taken Message sent to CC pool to help schedule pt CXR and PFT orders

## 2021-05-12 ENCOUNTER — TELEPHONE (OUTPATIENT)
Dept: PULMONOLOGY | Facility: CLINIC | Age: 56
End: 2021-05-12

## 2021-05-12 NOTE — TELEPHONE ENCOUNTER
LVM with direct call back to inform pt that I was able to arrange a CXR and FPFT for next Monday at [place]. Details were provided and informed her to call back if she had questions. Will send [employer] message with appt details.

## 2021-05-17 ENCOUNTER — HOSPITAL ENCOUNTER (OUTPATIENT)
Dept: GENERAL RADIOLOGY | Facility: CLINIC | Age: 56
End: 2021-05-17
Payer: COMMERCIAL

## 2021-05-17 ENCOUNTER — HOSPITAL ENCOUNTER (OUTPATIENT)
Dept: RESPIRATORY THERAPY | Facility: CLINIC | Age: 56
End: 2021-05-17
Attending: INTERNAL MEDICINE
Payer: COMMERCIAL

## 2021-05-17 DIAGNOSIS — J45.909 ASTHMA: ICD-10-CM

## 2021-05-17 PROCEDURE — 94729 DIFFUSING CAPACITY: CPT | Mod: 26 | Performed by: INTERNAL MEDICINE

## 2021-05-17 PROCEDURE — 94060 EVALUATION OF WHEEZING: CPT

## 2021-05-17 PROCEDURE — 94060 EVALUATION OF WHEEZING: CPT | Mod: 26 | Performed by: INTERNAL MEDICINE

## 2021-05-17 PROCEDURE — 71046 X-RAY EXAM CHEST 2 VIEWS: CPT

## 2021-05-17 PROCEDURE — 94726 PLETHYSMOGRAPHY LUNG VOLUMES: CPT

## 2021-05-17 PROCEDURE — 94640 AIRWAY INHALATION TREATMENT: CPT

## 2021-05-17 PROCEDURE — 94726 PLETHYSMOGRAPHY LUNG VOLUMES: CPT | Mod: 26 | Performed by: INTERNAL MEDICINE

## 2021-05-17 PROCEDURE — 94729 DIFFUSING CAPACITY: CPT

## 2021-05-17 PROCEDURE — 250N000009 HC RX 250

## 2021-05-17 PROCEDURE — 999N000105 HC STATISTIC NO DOCUMENTATION TO SUPPORT CHARGE

## 2021-05-17 RX ORDER — ALBUTEROL SULFATE 0.83 MG/ML
2.5 SOLUTION RESPIRATORY (INHALATION) ONCE
Status: COMPLETED | OUTPATIENT
Start: 2021-05-17 | End: 2021-05-17

## 2021-05-17 RX ADMIN — ALBUTEROL SULFATE 2.5 MG: 2.5 SOLUTION RESPIRATORY (INHALATION) at 11:30

## 2021-05-18 LAB
DLCOUNC-%PRED-PRE: 94 %
DLCOUNC-PRE: 18.64 ML/MIN/MMHG
DLCOUNC-PRED: 19.7 ML/MIN/MMHG
ERV-%PRED-PRE: 70 %
ERV-PRE: 0.47 L
ERV-PRED: 0.67 L
EXPTIME-PRE: 9.9 SEC
FEF2575-%PRED-POST: 30 %
FEF2575-%PRED-PRE: 18 %
FEF2575-POST: 0.7 L/SEC
FEF2575-PRE: 0.44 L/SEC
FEF2575-PRED: 2.31 L/SEC
FEFMAX-%PRED-PRE: 53 %
FEFMAX-PRE: 3.37 L/SEC
FEFMAX-PRED: 6.25 L/SEC
FEV1-%PRED-PRE: 48 %
FEV1-PRE: 1.21 L
FEV1FEV6-PRE: 56 %
FEV1FEV6-PRED: 81 %
FEV1FVC-PRE: 50 %
FEV1FVC-PRED: 80 %
FEV1SVC-PRE: 44 %
FEV1SVC-PRED: 78 %
FIFMAX-PRE: 2.48 L/SEC
FRCPLETH-%PRED-PRE: 165 %
FRCPLETH-PRE: 4.38 L
FRCPLETH-PRED: 2.64 L
FVC-%PRED-PRE: 78 %
FVC-PRE: 2.43 L
FVC-PRED: 3.12 L
IC-%PRED-PRE: 88 %
IC-PRE: 2.22 L
IC-PRED: 2.51 L
RVPLETH-%PRED-PRE: 210 %
RVPLETH-PRE: 3.83 L
RVPLETH-PRED: 1.82 L
TLCPLETH-%PRED-PRE: 138 %
TLCPLETH-PRE: 6.6 L
TLCPLETH-PRED: 4.77 L
VA-%PRED-PRE: 105 %
VA-PRE: 4.87 L
VC-%PRED-PRE: 86 %
VC-PRE: 2.77 L
VC-PRED: 3.19 L

## 2021-05-20 ENCOUNTER — IMMUNIZATION (OUTPATIENT)
Dept: FAMILY MEDICINE | Facility: CLINIC | Age: 56
End: 2021-05-20
Attending: FAMILY MEDICINE
Payer: COMMERCIAL

## 2021-05-20 PROCEDURE — 91301 PR COVID VAC MODERNA 100 MCG/0.5 ML IM: CPT

## 2021-05-20 PROCEDURE — 0012A PR COVID VAC MODERNA 100 MCG/0.5 ML IM: CPT

## 2021-06-14 ENCOUNTER — VIRTUAL VISIT (OUTPATIENT)
Dept: PULMONOLOGY | Facility: CLINIC | Age: 56
End: 2021-06-14
Attending: FAMILY MEDICINE
Payer: COMMERCIAL

## 2021-06-14 ENCOUNTER — PRE VISIT (OUTPATIENT)
Dept: PULMONOLOGY | Facility: CLINIC | Age: 56
End: 2021-06-14

## 2021-06-14 DIAGNOSIS — J44.9 STAGE 2 MODERATE COPD BY GOLD CLASSIFICATION (H): ICD-10-CM

## 2021-06-14 DIAGNOSIS — R06.02 SHORTNESS OF BREATH: Primary | ICD-10-CM

## 2021-06-14 DIAGNOSIS — R91.1 LUNG NODULE: ICD-10-CM

## 2021-06-14 DIAGNOSIS — F17.200 SMOKING: ICD-10-CM

## 2021-06-14 DIAGNOSIS — J45.50 SEVERE PERSISTENT ASTHMA WITHOUT COMPLICATION (H): ICD-10-CM

## 2021-06-14 PROCEDURE — 99205 OFFICE O/P NEW HI 60 MIN: CPT | Mod: 95

## 2021-06-14 RX ORDER — NICOTINE 21 MG/24HR
1 PATCH, TRANSDERMAL 24 HOURS TRANSDERMAL EVERY 24 HOURS
Qty: 30 PATCH | Refills: 3 | Status: SHIPPED | OUTPATIENT
Start: 2021-06-14 | End: 2021-07-13

## 2021-06-14 NOTE — LETTER
6/14/2021      RE: Carolina Hernandez  [address] [place] [id number]       Carolina is a 56 year old who is being evaluated via a billable video visit.      How would you like to obtain your AVS? Spencer  If the video visit is dropped, the invitation should be resent by: Other e-mail: [email or web address]  Will anyone else be joining your video visit? No      Video Start Time: 2:04 pm  Video-Visit Details    Type of service:  Video Visit    Video End Time:2:45    Originating Location (pt. Location): Home    Distant Location (provider location):  [facility]     Platform used for Video Visit: [facility]  Pulmonary Medicine  Visit Clinic Note  June 14, 2021         ASSESSMENT & PLAN       Moderate COPD  Severe persistent asthma  Shortness of breath  Current smoker  Pulmonary nodule    Most likely her symptoms are related to a combination of asthma and COPD in the setting of current smoking. However, she does have potential for alternative diagnoses given her exposure history. Namely, hypersensitivity pneumonitis is on my mind. She gets acute shortness of breath every time she is in the barn around hay. She also has a lot of sinus symptoms which raise the possibility of eosinophilic granulomatosis with polyangiitis. I will get a chest CT scan looking for any evidence of hypersensitivity pneumonitis. I will also check blood work including an HP panel, ANCA, CBC with differential and IgE level. The chest CT scan will also be used to monitor the right upper lobe nodule that was noted on her chest x-ray and had been previously present on CT scan in 2017 and 2018. If there is no evidence for an alternative diagnosis, then we can focus on treating her asthma and COPD. She may benefit from a long-acting muscarinic antagonist, and possibly asthma biologic medication. Clearly, she needs to quit smoking and we talked about this at length  today. She has a lot of barriers to smoking including stress and the fact that her  is an active smoker. She says she is ready to quit and wants to try nicotine patches again, so I prescribed her 21 mg patches.      I will follow up with her after I receive all of her blood work and chest CT scan result. I will call her by phone.    Johnny Turner MD          Today's visit note:     Chief Complaint: Carolina Hernandez is a 56 year old year old female who is being seen for shortness of breath    HISTORY OF PRESENT ILLNESS:    She has a long history of asthma, and states that this was under good control until March of last year. All of a sudden she noticed that she need her nebulizer more frequently. This has gradually become worse and worse. Now she finds her self running to get her nebulizer to help out with her shortness of breath. 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 When she is in the barn, and around a lot of hay she has worsening of her symptoms. Does not necessarily seem to be related to the animals, but more so the hay. She also has sinus congestion, which has been for years. She uses Flonase to help out with that. Currently, she is taking Advair twice a day as well as albuterol inhalers and nebulizers. She has a wheeze that is relatively frequent, and has a productive cough usually of yellow or white sputum. Other than hay, other things bother her asthma are strong smells, cold weather, pollen, dust, mold. She takes Zyrtec and Singulair every day.    She lives in the basement of her in-laws house. It is kind of a stressful situation for her at the moment. She moved in 4 years ago to help take care of her father-in-law, who passed away in July of last year. Now both she and her  do not have another place to go and live, and they are currently trying to figure out future living situations. 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 She is waiting until she is 57-1/2 years old to start receiving her long-term benefits.    She currently smokes a half a pack of cigarettes a day. Last attempted to quit about 10 years ago when she was hospitalized. She use the nicotine patch at that time, and temporarily quit for a couple months. Her  smokes cigarettes, and so this makes it difficult for her to quit even though she wants to at the moment.             Past Medical and Surgical History:     Past Medical History:   Diagnosis Date     Asthma      C. difficile colitis      Depression      Osteoarthritis      Sinusitis, chronic      No past surgical history on file.        Family History:     Family History   Problem Relation Age of Onset     Glaucoma Father      Coronary Artery Disease Father         stents     Cancer Maternal Grandfather      Coronary Artery Disease Paternal Grandfather      Schizophrenia Daughter      Diabetes Daughter      Cancer Daughter      Crohn's Disease No family hx of      Ulcerative Colitis No family hx of      GERD No family hx of      Stomach Cancer No family hx of               Social History:     Social History     Socioeconomic History     Marital status:      Spouse name: Not on file     Number of children: Not on file     Years of education: Not on file     Highest education level: Not on file   Occupational History     Not on file   Social Needs     Financial resource strain: Not on file     Food insecurity     Worry: Not on file     Inability: Not on file     Transportation needs     Medical: Not on file     Non-medical: Not on file   Tobacco Use     Smoking status: Current Every Day Smoker     Packs/day: 0.25     Types: Cigarettes     Smokeless tobacco: Never Used     Tobacco comment: 6 cig a day    Substance and Sexual Activity     Alcohol use: Yes     Comment: 12 pack weekly     Drug use: No     Sexual activity: Not Currently   Lifestyle     Physical  activity     Days per week: Not on file     Minutes per session: Not on file     Stress: Not on file   Relationships     Social connections     Talks on phone: Not on file     Gets together: Not on file     Attends [employer] service: Not on file     Active member of club or organization: Not on file     Attends meetings of clubs or organizations: Not on file     Relationship status: Not on file     Intimate partner violence     Fear of current or ex partner: Not on file     Emotionally abused: Not on file     Physically abused: Not on file     Forced sexual activity: Not on file   Other Topics Concern     Not on file   Social History Narrative     Not on file            Medications:     Current Outpatient Medications   Medication     albuterol (PROAIR HFA/PROVENTIL HFA/VENTOLIN HFA) 108 (90 Base) MCG/ACT inhaler     albuterol (PROVENTIL) (2.5 MG/3ML) 0.083% neb solution     amLODIPine (NORVASC) 2.5 MG tablet     cetirizine (ZYRTEC) 10 MG tablet     FLUoxetine (PROZAC) 20 MG capsule     FLUoxetine (PROZAC) 40 MG capsule     fluticasone-salmeterol (ADVAIR) 500-50 MCG/DOSE inhaler     fluticasone-salmeterol (ADVAIR) 500-50 MCG/DOSE inhaler     hydrochlorothiazide (HYDRODIURIL) 25 MG tablet     loperamide (IMODIUM A-D) 2 MG tablet     montelukast (SINGULAIR) 10 MG tablet     multivitamin  with lutein (OCUVITE WITH LTEIN) CAPS per capsule     omeprazole 20 MG tablet     rifaximin (XIFAXAN) 550 MG TABS tablet     No current facility-administered medications for this visit.             Review of Systems:       A complete review of systems was otherwise negative except as noted in the HPI.      PHYSICAL EXAM:  There were no vitals taken for this visit.     Video visit, so no exam.         Data:   All laboratory and imaging data reviewed.      PFT:   FEV1/FVC ratio 0.50. FVC is 2.43 L which is 78% predicted and improves to 2.61 L which is 83% predicted after administration of albuterol. FEV1 is 1.21 L which is 48% predicted  and improves to 1.37 L which is 55% predicted after administration of albuterol this is a 13% increase. Total lung capacity is 138% predicted and the residual volume is 210% predicted. Diffusion capacity is 94% predicted.       PFT Interpretation:  Moderate airflow obstruction. There is a bronchodilator response. There is gas trapping and hyperinflation.  Valid Maneuver    CXR: I have reviewed the chest x-ray images and agree with the radiologist interpretation below:  IMPRESSION: 2 views show a 9 mm irregular nodular opacity projecting  laterally in the right apical area. Chest CT is recommended in further  assessment. Heart size and mediastinal contours are normal.     Chest CT: Chest CT scan report from [facility] everywhere reviewed. 2018  1.  Stellate lesion in the right upper lobe as well as all the remaining nodules are unchanged since 08/24/2017.  LUNGS AND PLEURA: There is a focal nodule in the right upper lobe on series 3: Image 36-37. The nodular component measures 1.1 x 0.7 cm, previously 1.4 x 0.8 cm and slightly decreased in size. There is extension to the pleura laterally. More superiorly there is an additional nodule which measures 9 mm x 6 mm. This is in the location of a prior air cyst and is likely related to fluid and/or debris accumulation in this cyst. Several small micronodules are seen just inferior to the larger nodule density, unchanged.    There is a second nodule at the most inferior portion of this process measuring 9 mm x 5 mm on series 3: Image 50, unchanged.    3 mm nodule in the right upper lobe along the fissure on series 3: Image 84. 3 mm nodule in the right middle lobe on series 3: Image 90. These are both unchanged. Scattered calcified granulomas are visualized.    3 mm x 2 mm nodule in the left lower lobe on image 86 is unchanged. 2.5 mm nodule in the left lower lobe on image 122 is unchanged.    No pleural effusion.    No results found for this or any previous visit (from the past  168 hour(s)).             Maximino Turner MD

## 2021-06-14 NOTE — PROGRESS NOTES
Carolina is a 56 year old who is being evaluated via a billable video visit.      How would you like to obtain your AVS? Spencer  If the video visit is dropped, the invitation should be resent by: Other e-mail: [email or web address]  Will anyone else be joining your video visit? No      Video Start Time: 2:04 pm  Video-Visit Details    Type of service:  Video Visit    Video End Time:2:45    Originating Location (pt. Location): Home    Distant Location (provider location):  [facility]     Platform used for Video Visit: [facility]  Pulmonary Medicine  Visit Clinic Note  June 14, 2021         ASSESSMENT & PLAN       Moderate COPD  Severe persistent asthma  Shortness of breath  Current smoker  Pulmonary nodule    Most likely her symptoms are related to a combination of asthma and COPD in the setting of current smoking. However, she does have potential for alternative diagnoses given her exposure history. Namely, hypersensitivity pneumonitis is on my mind. She gets acute shortness of breath every time she is in the barn around hay. She also has a lot of sinus symptoms which raise the possibility of eosinophilic granulomatosis with polyangiitis. I will get a chest CT scan looking for any evidence of hypersensitivity pneumonitis. I will also check blood work including an HP panel, ANCA, CBC with differential and IgE level. The chest CT scan will also be used to monitor the right upper lobe nodule that was noted on her chest x-ray and had been previously present on CT scan in 2017 and 2018. If there is no evidence for an alternative diagnosis, then we can focus on treating her asthma and COPD. She may benefit from a long-acting muscarinic antagonist, and possibly asthma biologic medication. Clearly, she needs to quit smoking and we talked about this at length today. She has a lot of barriers to smoking including stress and the fact that her  is  an active smoker. She says she is ready to quit and wants to try nicotine patches again, so I prescribed her 21 mg patches.      I will follow up with her after I receive all of her blood work and chest CT scan result. I will call her by phone.    Johnny Turner MD          Today's visit note:     Chief Complaint: Carolina Hernandez is a 56 year old year old female who is being seen for shortness of breath    HISTORY OF PRESENT ILLNESS:    She has a long history of asthma, and states that this was under good control until March of last year. All of a sudden she noticed that she need her nebulizer more frequently. This has gradually become worse and worse. Now she finds her self running to get her nebulizer to help out with her shortness of breath. 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 When she is in the barn, and around a lot of hay she has worsening of her symptoms. Does not necessarily seem to be related to the animals, but more so the hay. She also has sinus congestion, which has been for years. She uses Flonase to help out with that. Currently, she is taking Advair twice a day as well as albuterol inhalers and nebulizers. She has a wheeze that is relatively frequent, and has a productive cough usually of yellow or white sputum. Other than hay, other things bother her asthma are strong smells, cold weather, pollen, dust, mold. She takes Zyrtec and Singulair every day.    She lives in the basement of her in-laws house. It is kind of a stressful situation for her at the moment. She moved in 4 years ago to help take care of her father-in-law, who passed away in July of last year. Now both she and her  do not have another place to go and live, and they are currently trying to figure out future living situations. 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    She currently smokes a half a pack of cigarettes a day. Last attempted to quit about 10 years ago when she was hospitalized. She use the nicotine patch at that time, and temporarily quit for a couple months. Her  smokes cigarettes, and so this makes it difficult for her to quit even though she wants to at the moment.             Past Medical and Surgical History:     Past Medical History:   Diagnosis Date     Asthma      C. difficile colitis      Depression      Osteoarthritis      Sinusitis, chronic      No past surgical history on file.        Family History:     Family History   Problem Relation Age of Onset     Glaucoma Father      Coronary Artery Disease Father         stents     Cancer Maternal Grandfather      Coronary Artery Disease Paternal Grandfather      Schizophrenia Daughter      Diabetes Daughter      Cancer Daughter      Crohn's Disease No family hx of      Ulcerative Colitis No family hx of      GERD No family hx of      Stomach Cancer No family hx of               Social History:     Social History     Socioeconomic History     Marital status:      Spouse name: Not on file     Number of children: Not on file     Years of education: Not on file     Highest education level: Not on file   Occupational History     Not on file   Social Needs     Financial resource strain: Not on file     Food insecurity     Worry: Not on file     Inability: Not on file     Transportation needs     Medical: Not on file     Non-medical: Not on file   Tobacco Use     Smoking status: Current Every Day Smoker     Packs/day: 0.25     Types: Cigarettes     Smokeless tobacco: Never Used     Tobacco comment: 6 cig a day    Substance and Sexual Activity     Alcohol use: Yes     Comment: 12 pack weekly     Drug use: No     Sexual activity: Not Currently   Lifestyle     Physical activity     Days per week: Not on file     Minutes per session: Not on file     Stress: Not  on file   Relationships     Social connections     Talks on phone: Not on file     Gets together: Not on file     Attends Denominational service: Not on file     Active member of club or organization: Not on file     Attends meetings of clubs or organizations: Not on file     Relationship status: Not on file     Intimate partner violence     Fear of current or ex partner: Not on file     Emotionally abused: Not on file     Physically abused: Not on file     Forced sexual activity: Not on file   Other Topics Concern     Not on file   Social History Narrative     Not on file            Medications:     Current Outpatient Medications   Medication     albuterol (PROAIR HFA/PROVENTIL HFA/VENTOLIN HFA) 108 (90 Base) MCG/ACT inhaler     albuterol (PROVENTIL) (2.5 MG/3ML) 0.083% neb solution     amLODIPine (NORVASC) 2.5 MG tablet     cetirizine (ZYRTEC) 10 MG tablet     FLUoxetine (PROZAC) 20 MG capsule     FLUoxetine (PROZAC) 40 MG capsule     fluticasone-salmeterol (ADVAIR) 500-50 MCG/DOSE inhaler     fluticasone-salmeterol (ADVAIR) 500-50 MCG/DOSE inhaler     hydrochlorothiazide (HYDRODIURIL) 25 MG tablet     loperamide (IMODIUM A-D) 2 MG tablet     montelukast (SINGULAIR) 10 MG tablet     multivitamin  with lutein (OCUVITE WITH LTEIN) CAPS per capsule     omeprazole 20 MG tablet     rifaximin (XIFAXAN) 550 MG TABS tablet     No current facility-administered medications for this visit.             Review of Systems:       A complete review of systems was otherwise negative except as noted in the HPI.      PHYSICAL EXAM:  There were no vitals taken for this visit.     Video visit, so no exam.         Data:   All laboratory and imaging data reviewed.      PFT:   FEV1/FVC ratio 0.50. FVC is 2.43 L which is 78% predicted and improves to 2.61 L which is 83% predicted after administration of albuterol. FEV1 is 1.21 L which is 48% predicted and improves to 1.37 L which is 55% predicted after administration of albuterol this is a 13%  increase. Total lung capacity is 138% predicted and the residual volume is 210% predicted. Diffusion capacity is 94% predicted.       PFT Interpretation:  Moderate airflow obstruction. There is a bronchodilator response. There is gas trapping and hyperinflation.  Valid Maneuver    CXR: I have reviewed the chest x-ray images and agree with the radiologist interpretation below:  IMPRESSION: 2 views show a 9 mm irregular nodular opacity projecting  laterally in the right apical area. Chest CT is recommended in further  assessment. Heart size and mediastinal contours are normal.     Chest CT: Chest CT scan report from care everywhere reviewed. 2018  1.  Stellate lesion in the right upper lobe as well as all the remaining nodules are unchanged since 08/24/2017.  LUNGS AND PLEURA: There is a focal nodule in the right upper lobe on series 3: Image 36-37. The nodular component measures 1.1 x 0.7 cm, previously 1.4 x 0.8 cm and slightly decreased in size. There is extension to the pleura laterally. More superiorly there is an additional nodule which measures 9 mm x 6 mm. This is in the location of a prior air cyst and is likely related to fluid and/or debris accumulation in this cyst. Several small micronodules are seen just inferior to the larger nodule density, unchanged.    There is a second nodule at the most inferior portion of this process measuring 9 mm x 5 mm on series 3: Image 50, unchanged.    3 mm nodule in the right upper lobe along the fissure on series 3: Image 84. 3 mm nodule in the right middle lobe on series 3: Image 90. These are both unchanged. Scattered calcified granulomas are visualized.    3 mm x 2 mm nodule in the left lower lobe on image 86 is unchanged. 2.5 mm nodule in the left lower lobe on image 122 is unchanged.    No pleural effusion.    No results found for this or any previous visit (from the past 168 hour(s)).

## 2021-06-14 NOTE — LETTER
6/14/2021         RE: Carolina Hernandez  [address] [place] [id number]        Dear Colleague,    Thank you for referring your patient, Carolina Hernandez, to the [facility]. Please see a copy of my visit note below.    Carolina is a 56 year old who is being evaluated via a billable video visit.      How would you like to obtain your AVS? [email or web address]  If the video visit is dropped, the invitation should be resent by: Other e-mail: [email or web address]  Will anyone else be joining your video visit? No      Video Start Time: 2:04 pm  Video-Visit Details    Type of service:  Video Visit    Video End Time:2:45    Originating Location (pt. Location): Home    Distant Location (provider location):  [facility]     Platform used for Video Visit: [facility]  Pulmonary Medicine  Visit Clinic Note  June 14, 2021         ASSESSMENT & PLAN       Moderate COPD  Severe persistent asthma  Shortness of breath  Current smoker  Pulmonary nodule    Most likely her symptoms are related to a combination of asthma and COPD in the setting of current smoking. However, she does have potential for alternative diagnoses given her exposure history. Namely, hypersensitivity pneumonitis is on my mind. She gets acute shortness of breath every time she is in the barn around hay. She also has a lot of sinus symptoms which raise the possibility of eosinophilic granulomatosis with polyangiitis. I will get a chest CT scan looking for any evidence of hypersensitivity pneumonitis. I will also check blood work including an HP panel, ANCA, CBC with differential and IgE level. The chest CT scan will also be used to monitor the right upper lobe nodule that was noted on her chest x-ray and had been previously present on CT scan in 2017 and 2018. If there is no evidence for an alternative diagnosis, then we can  focus on treating her asthma and COPD. She may benefit from a long-acting muscarinic antagonist, and possibly asthma biologic medication. Clearly, she needs to quit smoking and we talked about this at length today. She has a lot of barriers to smoking including stress and the fact that her  is an active smoker. She says she is ready to quit and wants to try nicotine patches again, so I prescribed her 21 mg patches.      I will follow up with her after I receive all of her blood work and chest CT scan result. I will call her by phone.    Johnny Turner MD          Today's visit note:     Chief Complaint: Carolina Hernandez is a 56 year old year old female who is being seen for shortness of breath    HISTORY OF PRESENT ILLNESS:    She has a long history of asthma, and states that this was under good control until March of last year. All of a sudden she noticed that she need her nebulizer more frequently. This has gradually become worse and worse. Now she finds her self running to get her nebulizer to help out with her shortness of breath. 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 When she is in the barn, and around a lot of hay she has worsening of her symptoms. Does not necessarily seem to be related to the animals, but more so the hay. She also has sinus congestion, which has been for years. She uses Flonase to help out with that. Currently, she is taking Advair twice a day as well as albuterol inhalers and nebulizers. She has a wheeze that is relatively frequent, and has a productive cough usually of yellow or white sputum. Other than hay, other things bother her asthma are strong smells, cold weather, pollen, dust, mold. She takes Zyrtec and Singulair every day.    She lives in the basement of her in-laws house. It is kind of a stressful situation for her at the moment. She moved in 4 years ago to help take care of her father-in-law, who passed away in July of last year. Now both  she and her  do not have another place to go and live, and they are currently trying to figure out future living situations. 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 She is waiting until she is 57-1/2 years old to start receiving her group home benefits.    She currently smokes a half a pack of cigarettes a day. Last attempted to quit about 10 years ago when she was hospitalized. She use the nicotine patch at that time, and temporarily quit for a couple months. Her  smokes cigarettes, and so this makes it difficult for her to quit even though she wants to at the moment.             Past Medical and Surgical History:     Past Medical History:   Diagnosis Date     Asthma      C. difficile colitis      Depression      Osteoarthritis      Sinusitis, chronic      No past surgical history on file.        Family History:     Family History   Problem Relation Age of Onset     Glaucoma Father      Coronary Artery Disease Father         stents     Cancer Maternal Grandfather      Coronary Artery Disease Paternal Grandfather      Schizophrenia Daughter      Diabetes Daughter      Cancer Daughter      Crohn's Disease No family hx of      Ulcerative Colitis No family hx of      GERD No family hx of      Stomach Cancer No family hx of               Social History:     Social History     Socioeconomic History     Marital status:      Spouse name: Not on file     Number of children: Not on file     Years of education: Not on file     Highest education level: Not on file   Occupational History     Not on file   Social Needs     Financial resource strain: Not on file     Food insecurity     Worry: Not on file     Inability: Not on file     Transportation needs     Medical: Not on file     Non-medical: Not on file   Tobacco Use     Smoking status: Current Every Day Smoker     Packs/day: 0.25     Types: Cigarettes     Smokeless  tobacco: Never Used     Tobacco comment: 6 cig a day    Substance and Sexual Activity     Alcohol use: Yes     Comment: 12 pack weekly     Drug use: No     Sexual activity: Not Currently   Lifestyle     Physical activity     Days per week: Not on file     Minutes per session: Not on file     Stress: Not on file   Relationships     Social connections     Talks on phone: Not on file     Gets together: Not on file     Attends [employer] service: Not on file     Active member of club or organization: Not on file     Attends meetings of clubs or organizations: Not on file     Relationship status: Not on file     Intimate partner violence     Fear of current or ex partner: Not on file     Emotionally abused: Not on file     Physically abused: Not on file     Forced sexual activity: Not on file   Other Topics Concern     Not on file   Social History Narrative     Not on file            Medications:     Current Outpatient Medications   Medication     albuterol (PROAIR HFA/PROVENTIL HFA/VENTOLIN HFA) 108 (90 Base) MCG/ACT inhaler     albuterol (PROVENTIL) (2.5 MG/3ML) 0.083% neb solution     amLODIPine (NORVASC) 2.5 MG tablet     cetirizine (ZYRTEC) 10 MG tablet     FLUoxetine (PROZAC) 20 MG capsule     FLUoxetine (PROZAC) 40 MG capsule     fluticasone-salmeterol (ADVAIR) 500-50 MCG/DOSE inhaler     fluticasone-salmeterol (ADVAIR) 500-50 MCG/DOSE inhaler     hydrochlorothiazide (HYDRODIURIL) 25 MG tablet     loperamide (IMODIUM A-D) 2 MG tablet     montelukast (SINGULAIR) 10 MG tablet     multivitamin  with lutein (OCUVITE WITH LTEIN) CAPS per capsule     omeprazole 20 MG tablet     rifaximin (XIFAXAN) 550 MG TABS tablet     No current facility-administered medications for this visit.             Review of Systems:       A complete review of systems was otherwise negative except as noted in the HPI.      PHYSICAL EXAM:  There were no vitals taken for this visit.     Video visit, so no exam.         Data:   All laboratory and  imaging data reviewed.      PFT:   FEV1/FVC ratio 0.50. FVC is 2.43 L which is 78% predicted and improves to 2.61 L which is 83% predicted after administration of albuterol. FEV1 is 1.21 L which is 48% predicted and improves to 1.37 L which is 55% predicted after administration of albuterol this is a 13% increase. Total lung capacity is 138% predicted and the residual volume is 210% predicted. Diffusion capacity is 94% predicted.       PFT Interpretation:  Moderate airflow obstruction. There is a bronchodilator response. There is gas trapping and hyperinflation.  Valid Maneuver    CXR: I have reviewed the chest x-ray images and agree with the radiologist interpretation below:  IMPRESSION: 2 views show a 9 mm irregular nodular opacity projecting  laterally in the right apical area. Chest CT is recommended in further  assessment. Heart size and mediastinal contours are normal.     Chest CT: Chest CT scan report from care everywhere reviewed. 2018  1.  Stellate lesion in the right upper lobe as well as all the remaining nodules are unchanged since 08/24/2017.  LUNGS AND PLEURA: There is a focal nodule in the right upper lobe on series 3: Image 36-37. The nodular component measures 1.1 x 0.7 cm, previously 1.4 x 0.8 cm and slightly decreased in size. There is extension to the pleura laterally. More superiorly there is an additional nodule which measures 9 mm x 6 mm. This is in the location of a prior air cyst and is likely related to fluid and/or debris accumulation in this cyst. Several small micronodules are seen just inferior to the larger nodule density, unchanged.    There is a second nodule at the most inferior portion of this process measuring 9 mm x 5 mm on series 3: Image 50, unchanged.    3 mm nodule in the right upper lobe along the fissure on series 3: Image 84. 3 mm nodule in the right middle lobe on series 3: Image 90. These are both unchanged. Scattered calcified granulomas are visualized.    3 mm x 2 mm  nodule in the left lower lobe on image 86 is unchanged. 2.5 mm nodule in the left lower lobe on image 122 is unchanged.    No pleural effusion.    No results found for this or any previous visit (from the past 168 hour(s)).                                          Again, thank you for allowing me to participate in the care of your patient.        Sincerely,        Maximino Turner MD

## 2021-06-22 DIAGNOSIS — R19.7 DIARRHEA, UNSPECIFIED TYPE: ICD-10-CM

## 2021-06-23 RX ORDER — LOPERAMIDE HYDROCHLORIDE 2 MG/1
TABLET ORAL
Qty: 90 TABLET | Refills: 1 | Status: SHIPPED | OUTPATIENT
Start: 2021-06-23 | End: 2021-08-24

## 2021-07-05 ENCOUNTER — ANCILLARY PROCEDURE (OUTPATIENT)
Dept: CT IMAGING | Facility: CLINIC | Age: 56
End: 2021-07-05
Payer: COMMERCIAL

## 2021-07-05 DIAGNOSIS — J45.50 SEVERE PERSISTENT ASTHMA WITHOUT COMPLICATION (H): ICD-10-CM

## 2021-07-05 DIAGNOSIS — J44.9 STAGE 2 MODERATE COPD BY GOLD CLASSIFICATION (H): ICD-10-CM

## 2021-07-05 DIAGNOSIS — R06.02 SHORTNESS OF BREATH: ICD-10-CM

## 2021-07-05 LAB
BASOPHILS # BLD AUTO: 0.1 10E9/L (ref 0–0.2)
BASOPHILS NFR BLD AUTO: 0.7 %
DIFFERENTIAL METHOD BLD: NORMAL
EOSINOPHIL # BLD AUTO: 0.1 10E9/L (ref 0–0.7)
EOSINOPHIL NFR BLD AUTO: 1.7 %
ERYTHROCYTE [DISTWIDTH] IN BLOOD BY AUTOMATED COUNT: 12.5 % (ref 10–15)
HCT VFR BLD AUTO: 38.1 % (ref 35–47)
HGB BLD-MCNC: 13.3 G/DL (ref 11.7–15.7)
IMM GRANULOCYTES # BLD: 0 10E9/L (ref 0–0.4)
IMM GRANULOCYTES NFR BLD: 0.4 %
LYMPHOCYTES # BLD AUTO: 2.2 10E9/L (ref 0.8–5.3)
LYMPHOCYTES NFR BLD AUTO: 26.8 %
MCH RBC QN AUTO: 30.8 PG (ref 26.5–33)
MCHC RBC AUTO-ENTMCNC: 34.9 G/DL (ref 31.5–36.5)
MCV RBC AUTO: 88 FL (ref 78–100)
MONOCYTES # BLD AUTO: 0.9 10E9/L (ref 0–1.3)
MONOCYTES NFR BLD AUTO: 11.2 %
NEUTROPHILS # BLD AUTO: 4.9 10E9/L (ref 1.6–8.3)
NEUTROPHILS NFR BLD AUTO: 59.2 %
NRBC # BLD AUTO: 0 10*3/UL
NRBC BLD AUTO-RTO: 0 /100
PLATELET # BLD AUTO: 349 10E9/L (ref 150–450)
RBC # BLD AUTO: 4.32 10E12/L (ref 3.8–5.2)
WBC # BLD AUTO: 8.3 10E9/L (ref 4–11)

## 2021-07-05 PROCEDURE — 86331 IMMUNODIFFUSION OUCHTERLONY: CPT | Mod: 90 | Performed by: PATHOLOGY

## 2021-07-05 PROCEDURE — 86606 ASPERGILLUS ANTIBODY: CPT | Mod: 90 | Performed by: PATHOLOGY

## 2021-07-05 PROCEDURE — 85025 COMPLETE CBC W/AUTO DIFF WBC: CPT | Performed by: PATHOLOGY

## 2021-07-05 PROCEDURE — 86255 FLUORESCENT ANTIBODY SCREEN: CPT | Mod: 90 | Performed by: PATHOLOGY

## 2021-07-05 PROCEDURE — 36415 COLL VENOUS BLD VENIPUNCTURE: CPT | Performed by: PATHOLOGY

## 2021-07-05 PROCEDURE — 71250 CT THORAX DX C-: CPT | Mod: GC | Performed by: RADIOLOGY

## 2021-07-05 PROCEDURE — 99000 SPECIMEN HANDLING OFFICE-LAB: CPT | Performed by: PATHOLOGY

## 2021-07-05 PROCEDURE — 82785 ASSAY OF IGE: CPT | Mod: 90 | Performed by: PATHOLOGY

## 2021-07-05 PROCEDURE — 86038 ANTINUCLEAR ANTIBODIES: CPT | Mod: 90 | Performed by: PATHOLOGY

## 2021-07-06 LAB
ANA SER QL IF: NEGATIVE
ANCA AB PATTERN SER IF-IMP: NORMAL
C-ANCA TITR SER IF: NORMAL {TITER}
IGE SERPL-ACNC: 130 KIU/L (ref 0–114)

## 2021-07-10 LAB
A FLAVUS AB SER QL ID: NORMAL
A FUMIGATUS1 AB SER QL ID: NORMAL
A FUMIGATUS2 AB SER QL ID: NORMAL
A FUMIGATUS3 AB SER QL ID: NORMAL
A FUMIGATUS6 AB SER QL ID: NORMAL
A PULLULANS AB SER QL ID: NORMAL
PIGEON DROP IGE QN: NORMAL
S RECTIVIRGULA AB SER QL ID: NORMAL
S VIRIDIS AB SER QL ID: NORMAL
T CANDIDUS AB SER QL: NORMAL
T VULGARIS AB SER QL ID: NORMAL

## 2021-07-13 ENCOUNTER — TELEPHONE (OUTPATIENT)
Dept: PULMONOLOGY | Facility: CLINIC | Age: 56
End: 2021-07-13

## 2021-07-13 ENCOUNTER — TELEPHONE (OUTPATIENT)
Facility: CLINIC | Age: 56
End: 2021-07-13

## 2021-07-13 DIAGNOSIS — Z71.6 ENCOUNTER FOR TOBACCO USE CESSATION COUNSELING: ICD-10-CM

## 2021-07-13 DIAGNOSIS — R91.8 PULMONARY NODULES: Primary | ICD-10-CM

## 2021-07-13 NOTE — TELEPHONE ENCOUNTER
I reviewed Ms Hernandez's blood work and chest CT with her over the phone.      I think her lung disease is a combination of asthma and COPD.      She is currently on high dose inhaled steroids, which is appropriate.      She continues to smoke 3 - 6 cigarettes a day.  The 21 mg patch was giving her nausea. I will prescribe her a 7 mg patch.      Her chest CT showed a 3 mm nodule in the LLL.  She needs a follow up in 6 months.  She can follow up with me at that time as well.     Johnny Turner MD

## 2021-07-13 NOTE — TELEPHONE ENCOUNTER
Spoke with pt about scheduling repeat CT scan in 6 months and follow up with Dr. Turner. Pt states she is currently busy and requests a call back tomorrow.

## 2021-07-14 ENCOUNTER — VIRTUAL VISIT (OUTPATIENT)
Dept: FAMILY MEDICINE | Facility: CLINIC | Age: 56
End: 2021-07-14
Payer: COMMERCIAL

## 2021-07-14 ENCOUNTER — TELEPHONE (OUTPATIENT)
Dept: PULMONOLOGY | Facility: CLINIC | Age: 56
End: 2021-07-14

## 2021-07-14 DIAGNOSIS — F32.A DEPRESSION, UNSPECIFIED DEPRESSION TYPE: ICD-10-CM

## 2021-07-14 DIAGNOSIS — J44.1 COPD EXACERBATION (H): Primary | ICD-10-CM

## 2021-07-14 PROCEDURE — 99214 OFFICE O/P EST MOD 30 MIN: CPT | Mod: 95 | Performed by: FAMILY MEDICINE

## 2021-07-14 RX ORDER — DOXYCYCLINE HYCLATE 100 MG
100 TABLET ORAL 2 TIMES DAILY
Qty: 20 TABLET | Refills: 0 | Status: SHIPPED | OUTPATIENT
Start: 2021-07-14 | End: 2021-08-20

## 2021-07-14 RX ORDER — PREDNISONE 20 MG/1
TABLET ORAL
Qty: 20 TABLET | Refills: 0 | Status: SHIPPED | OUTPATIENT
Start: 2021-07-14 | End: 2021-08-20

## 2021-07-14 ASSESSMENT — ANXIETY QUESTIONNAIRES
7. FEELING AFRAID AS IF SOMETHING AWFUL MIGHT HAPPEN: NEARLY EVERY DAY
5. BEING SO RESTLESS THAT IT IS HARD TO SIT STILL: NEARLY EVERY DAY
2. NOT BEING ABLE TO STOP OR CONTROL WORRYING: NEARLY EVERY DAY
1. FEELING NERVOUS, ANXIOUS, OR ON EDGE: NEARLY EVERY DAY
6. BECOMING EASILY ANNOYED OR IRRITABLE: NEARLY EVERY DAY
3. WORRYING TOO MUCH ABOUT DIFFERENT THINGS: NEARLY EVERY DAY
GAD7 TOTAL SCORE: 21

## 2021-07-14 ASSESSMENT — PATIENT HEALTH QUESTIONNAIRE - PHQ9
SUM OF ALL RESPONSES TO PHQ QUESTIONS 1-9: 16
5. POOR APPETITE OR OVEREATING: NEARLY EVERY DAY

## 2021-07-14 NOTE — TELEPHONE ENCOUNTER
Spoke with pt about scheduling repeat CT and appt with Dr. Turner in 6 months (jan 2022). Appt scheduled and details confirmed with pt.

## 2021-07-14 NOTE — PATIENT INSTRUCTIONS
Thank you for choosing [facility].  You may be receiving an email and/or telephone survey request from [facility] [employer] regarding your visit today.  Please take a few minutes to respond to the survey to let us know how we are doing.      If you have questions or concerns, please contact us via [employer] or you can contact your care team at [phone] option [phone].    Our Clinic hours are:  Monday - Thursday 7am-6pm  Friday 7am-5pm    The [place] outpatient lab hours are:  Monday - Friday 7am-4:30pm    Appointments are required, call [phone]    If you have clinical questions after hours or would like to schedule an appointment,  call the clinic at [phone].

## 2021-07-14 NOTE — PROGRESS NOTES
Carolina is a 56 year old who is being evaluated via a billable telephone visit.      What phone number would you like to be contacted at? [phone].  Cell service starts out poor but gets better as the phone call goes on.  How would you like to obtain your AVS? MyChart    Assessment & Plan     COPD exacerbation (H)  Medication faxed. Recommend using albuterol nebs 3-4 times a day.  - doxycycline hyclate (VIBRA-TABS) 100 MG tablet; Take 1 tablet (100 mg) by mouth 2 times daily  - predniSONE (DELTASONE) 20 MG tablet; Take 3 tabs by mouth daily x 3 days, then 2 tabs daily x 3 days, then 1 tab daily x 3 days, then 1/2 tab daily x 3 days.      Depression, unspecified depression type  More depressed but says it is circumstances around her that her making her more depressed. She has suicidal ideations but says she does not have any plans to carry this out . She is on antidepressants.   [id number]}     Tobacco Cessation:   reports that she has been smoking cigarettes. She has a 11.50 pack-year smoking history. She has never used smokeless tobacco.  Tobacco Cessation Action Plan: Self help information given to patient      Depression Screening Follow Up    PHQ 7/14/2021   PHQ-9 Total Score 16   Q9: Thoughts of better off dead/self-harm past 2 weeks More than half the days     Last PHQ-9 7/14/2021   1.  Little interest or pleasure in doing things 0   2.  Feeling down, depressed, or hopeless 2   3.  Trouble falling or staying asleep, or sleeping too much 3   4.  Feeling tired or having little energy 2   5.  Poor appetite or overeating 0   6.  Feeling bad about yourself 3   7.  Trouble concentrating 2   8.  Moving slowly or restless 2   Q9: Thoughts of better off dead/self-harm past 2 weeks 2   PHQ-9 Total Score 16         No flowsheet data found.      Follow Up      Follow Up Actions Taken  Crisis resource information provided in the After Visit Summary    Discussed the following ways the patient can remain in a safe environment:   remove alcohol, remove drugs and be around others  FUTURE APPOINTMENTS:       - Follow-up visit in one week or sooner as needed.    Return in about 1 week (around 7/21/2021) for Follow up.    Julia Oneill MD  [facility] is a 56 year old who presents for the following health issues  accompanied by herself:    HPI     Acute Illness  Acute illness concerns: Sinus infection symptoms.  Onset/Duration: 5 days ago.  Symptoms:  Fever: no  Chills/Sweats: no  Headache (location?): Headaches-severe pressure  Sinus Pressure: YES- Bilateral.  Conjunctivitis:  YES- Watery and mattery eyes.  Ear Pain: YES- Ears have felt plugged up-bilateral.  Rhinorrhea: Yes-yellow and green nasal drainage.  Congestion: YES  Sore Throat: YES  Cough: YES-productive of green sputum  Wheeze: YES  Decreased Appetite: YES  Nausea: YES  Vomiting: no  Diarrhea: YES- Sometimes. Has been taking 3 Imodium daily.  Dysuria/Freq.: no  Dysuria or Hematuria: no  Fatigue/Achiness: YES- Very achy.  Took a 2 hour nap today.  Sick/Strep Exposure: no  Therapies tried and outcome: Benadryl at night.  Zyrtec during the day.  Heat.    SMOKING CESSATION:  Today is her 2nd day of not smoking due to her illness.    PHQ-9 and JAK-7 completed today:  PHQ-9 ([employer]) 7/14/2021   1.  Little interest or pleasure in doing things 0   2.  Feeling down, depressed, or hopeless 2   3.  Trouble falling or staying asleep, or sleeping too much 3   4.  Feeling tired or having little energy 2   5.  Poor appetite or overeating 0   6.  Feeling bad about yourself 3   7.  Trouble concentrating 2   8.  Moving slowly or restless 2   9.  Suicidal or self-harm thoughts 2   PHQ-9 Total Score 16     JAK-7   [employer], 2002; Used with Permission) 7/14/2021   1. Feeling nervous, anxious, or on edge 3   2. Not being able to stop or control worrying 3   3. Worrying too much about different things 3   4. Trouble relaxing 3   5. Being so restless  that it is hard to sit still 3   6. Becoming easily annoyed or irritable 3   7. Feeling afraid, as if something awful might happen 3   JAK-7 Total Score 21           Review of Systems   Constitutional, HEENT, cardiovascular, pulmonary, gi and gu systems are negative, except as otherwise noted.      Objective           Vitals:  No vitals were obtained today due to virtual visit.    Physical Exam   alert and mild distress  PSYCH: Alert and oriented times 3; coherent speech, normal   rate and volume, able to articulate logical thoughts, able   to abstract reason, no tangential thoughts, no hallucinations   or delusions  Her affect is normal  RESP: No cough,audible wheezing, able to talk in full sentences  Remainder of exam unable to be completed due to telephone visits            Phone call duration: 5 minutes

## 2021-07-15 ASSESSMENT — ANXIETY QUESTIONNAIRES: GAD7 TOTAL SCORE: 21

## 2021-08-02 ENCOUNTER — VIRTUAL VISIT (OUTPATIENT)
Dept: GASTROENTEROLOGY | Facility: CLINIC | Age: 56
End: 2021-08-02
Payer: COMMERCIAL

## 2021-08-02 VITALS — WEIGHT: 157 LBS | BODY MASS INDEX: 27.81 KG/M2

## 2021-08-02 DIAGNOSIS — R14.0 ABDOMINAL BLOATING: Primary | ICD-10-CM

## 2021-08-02 PROCEDURE — 99214 OFFICE O/P EST MOD 30 MIN: CPT | Mod: 95 | Performed by: PHYSICIAN ASSISTANT

## 2021-08-02 NOTE — PROGRESS NOTES
GI CLINIC VISIT    CC/REFERRING MD:  Julia Oneill  REASON FOR CONSULTATION: loose stools     ASSESSMENT/PLAN:  Carolina Hernandez is a 55 year old woman with a past medical history of tobacco abuse, asthma, anxiety, depression presenting for evaluation of chronic diarrhea.     1.  Chronic diarrhea, chronic bloating  Patient reports about 3 years of diarrhea without any clear trigger.  Has had unremarkable colonoscopy with random biopsies for microscopic colitis.  She has had C. difficile associated with previous antibiotic use, though repeat stool studies have been negative for C. difficile.  Has also had negative chronic infectious stool studies, unremarkable basic labs, celiac serologies, lactose breath test.      Possible sources of symptoms include medication side effect (with hydrochlorothiazide, fluoxetine), functional loose stools, small intestinal bacterial overgrowth (less likely as she had no improvement with rifaximin).  Would recommend continuing fiber supplementation at this time.  Does not have any red flag symptoms to warrant repeat colonoscopy at this time, can consider colonoscopy or upper endoscopy pending clinical picture.  Most bothersome symptom today is bloating- discussed plan for her to meet with GI dietitian to discuss low FODMAP diet. We also discussed option of TCA- she reports she has tried this in the past for other reasons with significant drowsiness, will avoid at this time.     Bloating Lifestyle modifications  --avoid artifical sweeteners including sorbital, truvia, maltitol, mannitol, xylitol, glycerol, lactilol   -- pay attention to possible food triggers   -- follow a diet with a wide variety of fruits and vegetables, minimize animal fats, avoid processed foods and make sure you get food containing vitamin D  -- meet with GI dietitian   -- try peppermint- you can use peppermint tea or try a supplement called IBGard (you can order this online)  -- re-start low dose  "fiber      Colorectal cancer screenin    [facility] 3-4 months    Thank you for this consultation.  It was a pleasure to participate in the care of this patient; please contact us with any further questions.     34 Minutes was spent on the date of the encounter during chart review, history and exam, documentation, and further activities as noted     Note completed using voice recognition software. Some word and grammatical errors may occur.      Aimee Nava PA-C  Division of Gastroenterology, Hepatology & Nutrition  [facility]  Carolina Hernandez is a 55 year old woman with a past medical history of tobacco abuse, asthma, anxiety, depression presenting for evaluation of chronic diarrhea. She is new to [facility] clinic and this is my first encounter with the patient.     Today the patient reports a three year history of watery diarrhea. Has not been able to identify any specific triggers for her symptoms.  She describes 10+ bowel movements a day that are clustered the first couple of hours that she is awake.Sometimes will continue throughout the day. Consistency is variable.  Symptoms seem to worsen when she has fluids such as water and coffee. Will have urgency with bowel movements.  Can have blood with hemorrhoids. No nocturnal bowel movements. Has had incontinence with bowel movements. Sometimes abdominal pain (gas pain, rare cramping) but not usually.     Within the last 6 months, she has been having more issues with hemmorhoids. She does sometimes strain to have a bowel movement (when she feels a \"pressure from within\"). Was seen by colorectal surgery by Dr. Lane and was told to take metamucil which has helped but she continues to have symptoms (2 rounded tablespoons a day). Since starting fiber they float and and look like \"algae\". Most of the time it is liquid. She also reports increased gas and bloating with this.     She has tried adding more fiber to her diet with spinach, " bananas, whole grains and vegetables. She is allergic to a lot of fresh fruits. Raw onions can worsen symptoms, peas and legumes can worsen symptoms.     Previous workup includes:  2019:  - c diff negative   2017:   -colonoscopy- unremarkable, negative biopsies for MC  -Stool studies- negative for giardia/crypto, enteric panel, Ova and parasite  -labs: normal TSH, CBC, hepatic panel, celiac serologies    -negative lactose breath testing     Interval history 4/2/2021:   Patient reports bloating below her rib cage. She describes that this feels like it is pushing on lungs and worsens asthma. This has been occurring every day, before eating food. Has been passing more gas than normal. Does get visibly distended.     Diarrhea off and on. Is taking 2 imodium and 1 tablespoon of fiber. Is eating more fiber with diet (kale, greens, whole grain breads). She does not eat much fruit because she has an allergy to this. Still having diarrhea every day. The past week or so has been bad. Six bowel movements a day, will be partially solid every day. More morning and early afternoon. She notes that she drinks a lot of coffee and water (this seems to make diarrhea worse).     Interval History 8/2/2021:  Has stopped smoking in April. She also had a sinus infection which she took doxycyline and prednisone. She stopped taking fiber for the last couple of weeks. She continues to take imodium with three tablets a day. Gas seems to have gotten worse. Does not matter what she eats. She does eat candy after meals (milky way, carmellos) instead of smoking.  Mostly passing gas from below.     Rifaximin did not help at all.     Three bowel movements a day, soft and mushy.     PROBLEM LIST  Patient Active Problem List    Diagnosis Date Noted     Essential hypertension 07/26/2019     Priority: Medium     New diagnosis 7/26/2019         Recurrent major depressive disorder, in remission (H) 01/17/2019     Priority: Medium     Encounter for tobacco  use cessation counseling 01/17/2019     Priority: Medium     Mild intermittent asthma without complication 01/17/2019     Priority: Medium     CARDIOVASCULAR SCREENING; LDL GOAL LESS THAN 160 10/31/2010     Priority: Medium       PERTINENT PAST MEDICAL HISTORY:  Depression  Anxiety  Osteoarthritis   Asthma     PREVIOUS SURGERIES:  No GI surgery     PREVIOUS ENDOSCOPY:  Colonoscopy in 2017 through care everywhere:  Impression:          - Diverticulosis in the sigmoid colon.                       - Non-bleeding internal hemorrhoids.                       - The examined portion of the ileum was normal.                       - The entire examined colon is normal. Biopsied.                       - The examination was otherwise normal on direct and                        retroflexion views.    Final Pathologic Diagnosis   Colon, random, biopsy -- No significant pathologic change     ALLERGIES:  Allergies   Allergen Reactions     Penicillins Itching       PERTINENT MEDICATIONS:    Current Outpatient Medications:      albuterol (PROAIR HFA/PROVENTIL HFA/VENTOLIN HFA) 108 (90 Base) MCG/ACT inhaler, Inhale 1-2 puffs into the lungs every 4 hours as needed for shortness of breath / dyspnea or wheezing For shortness of breath, Disp: 18 g, Rfl: 11     albuterol (PROVENTIL) (2.5 MG/3ML) 0.083% neb solution, Take 1 vial (2.5 mg) by nebulization every 4 hours as needed for shortness of breath / dyspnea or wheezing, Disp: 75 mL, Rfl: 11     amLODIPine (NORVASC) 2.5 MG tablet, Take 2 tablets (5 mg) by mouth daily Please fill at patient request, Disp: 180 tablet, Rfl: 3     cetirizine (ZYRTEC) 10 MG tablet, Take 10 mg by mouth daily, Disp: , Rfl:      doxycycline hyclate (VIBRA-TABS) 100 MG tablet, Take 1 tablet (100 mg) by mouth 2 times daily, Disp: 20 tablet, Rfl: 0     FLUoxetine (PROZAC) 20 MG capsule, Take 1 capsule (20 mg) by mouth daily, Disp: 90 capsule, Rfl: 3     FLUoxetine (PROZAC) 40 MG capsule, Take 1 capsule (40 mg) by  mouth daily Take with 20 mg tab for a total of 60 mg daily, Disp: 90 capsule, Rfl: 3     fluticasone-salmeterol (ADVAIR) 500-50 MCG/DOSE inhaler, Inhale 1 puff into the lungs every 12 hours Please prescribe the generic ( Wixela ), Disp: 1 Inhaler, Rfl: 11     hydrochlorothiazide (HYDRODIURIL) 25 MG tablet, Take 1 tablet (25 mg) by mouth once daily, Disp: 90 tablet, Rfl: 3     loperamide (IMODIUM A-D) 2 MG tablet, Start with 1 tabs (2 mg) daily. Increase as needed. Do not use more than  8 tabs (16 mg) per day. For additional refills, please schedule a follow-up appointment at [phone], Disp: 90 tablet, Rfl: 1     montelukast (SINGULAIR) 10 MG tablet, Take 1 tablet (10 mg) by mouth at bedtime., Disp: 90 tablet, Rfl: 1     multivitamin  with lutein (OCUVITE WITH LTEIN) CAPS per capsule, Take 1 capsule by mouth daily (Patient not taking: Reported on 7/14/2021), Disp: , Rfl:      nicotine (NICODERM CQ) 7 MG/24HR 24 hr patch, Place 1 patch onto the skin every 24 hours (Patient not taking: Reported on 7/14/2021), Disp: 30 patch, Rfl: 3     omeprazole 20 MG tablet, Take 1 tablet (20 mg) by mouth daily, Disp: 90 tablet, Rfl: 3     predniSONE (DELTASONE) 20 MG tablet, Take 3 tabs by mouth daily x 3 days, then 2 tabs daily x 3 days, then 1 tab daily x 3 days, then 1/2 tab daily x 3 days., Disp: 20 tablet, Rfl: 0     rifaximin (XIFAXAN) 550 MG TABS tablet, Take 200 mg by mouth 2 times daily (Patient not taking: Reported on 7/14/2021), Disp: , Rfl:    No NSAIDs    SOCIAL HISTORY:  Will drink beer (4-5 beers a 3 times a week), symptoms are the same if she avoids drinking   Smokes cigarettes (8-10 a day, has decreased from before)  Social History     Socioeconomic History     Marital status:      Spouse name: Not on file     Number of children: Not on file     Years of education: Not on file     Highest education level: Not on file   Occupational History     Not on file   Tobacco Use     Smoking status: Current Every  Day Smoker     Packs/day: 0.50     Years: 23.00     Pack years: 11.50     Types: Cigarettes     Smokeless tobacco: Never Used     Tobacco comment: 6 cig a day    Vaping Use     Vaping Use: Never used   Substance and Sexual Activity     Alcohol use: Yes     Comment: 12 pack weekly     Drug use: No     Sexual activity: Not Currently   Other Topics Concern     Not on file   Social History Narrative     Not on file     Social Determinants of Health     Financial Resource Strain:      Difficulty of Paying Living Expenses:    Food Insecurity:      Worried About Running Out of Food in the Last Year:      Ran Out of Food in the Last Year:    Transportation Needs:      Lack of Transportation (Medical):      Lack of Transportation (Non-Medical):    Physical Activity:      Days of Exercise per Week:      Minutes of Exercise per Session:    Stress:      Feeling of Stress :    Social Connections:      Frequency of Communication with Friends and Family:      Frequency of Social Gatherings with Friends and Family:      Attends [employer] Services:      Active Member of Clubs or Organizations:      Attends Club or Organization Meetings:      Marital Status:    Intimate Partner Violence:      Fear of Current or Ex-Partner:      Emotionally Abused:      Physically Abused:      Sexually Abused:        FAMILY HISTORY:  Family History   Problem Relation Age of Onset     Glaucoma Father      Coronary Artery Disease Father         stents     Cancer Maternal Grandfather      Coronary Artery Disease Paternal Grandfather      Schizophrenia Daughter      Diabetes Daughter      Cancer Daughter      Crohn's Disease No family hx of      Ulcerative Colitis No family hx of      GERD No family hx of      Stomach Cancer No family hx of        Past/family/social history reviewed and no changes    PHYSICAL EXAMINATION:  Vitals reviewed: There were no vitals taken for this visit.  Wt:   Wt Readings from Last 2 Encounters:   05/04/21 73.4 kg (161 lb 12.8  oz)   04/02/21 70.3 kg (155 lb)   General appearance: Healthy appearing adult, in no acute distress  Eyes: Sclera anicteric  Ears, nose, mouth and throat: No obvious external lesions of ears and nose, Hearing intact  Neck: Symmetric, No obvious external lesions  Respiratory: Normal respiration, no use of accessory muscles   Skin: No rashes or jaundice   Psychiatric: Oriented to person, place and time, Appropriate mood and affect.    PERTINENT STUDIES:    Office Visit on 11/20/2020   Component Date Value Ref Range Status     Cholesterol 11/20/2020 232* <200 mg/dL Final     Triglycerides 11/20/2020 232* <150 mg/dL Final     HDL Cholesterol 11/20/2020 98  >49 mg/dL Final     LDL Cholesterol Calculated 11/20/2020 88  <100 mg/dL Final     Non HDL Cholesterol 11/20/2020 134* <130 mg/dL Final     Sodium 11/20/2020 132* 133 - 144 mmol/L Final     Potassium 11/20/2020 3.3* 3.4 - 5.3 mmol/L Final     Chloride 11/20/2020 98  94 - 109 mmol/L Final     Carbon Dioxide 11/20/2020 28  20 - 32 mmol/L Final     Anion Gap 11/20/2020 6  3 - 14 mmol/L Final     Glucose 11/20/2020 98  70 - 99 mg/dL Final     Urea Nitrogen 11/20/2020 15  7 - 30 mg/dL Final     Creatinine 11/20/2020 0.81  0.52 - 1.04 mg/dL Final     GFR Estimate 11/20/2020 81  >60 mL/min/[1.73_m2] Final     GFR Estimate If Black 11/20/2020 >90  >60 mL/min/[1.73_m2] Final     Calcium 11/20/2020 9.8  8.5 - 10.1 mg/dL Final

## 2021-08-02 NOTE — PATIENT INSTRUCTIONS
It was a pleasure taking care of you today.  I've included a brief summary of our discussion and care plan from today's visit below.  Please review this information with your primary care provider.  ______________________________________________________________________    My recommendations are summarized as follows:    Bloating Lifestyle modifications  --avoid artifical sweeteners including sorbital, truvia, maltitol, mannitol, xylitol, glycerol, lactilol   -- pay attention to possible food triggers   -- follow a diet with a wide variety of fruits and vegetables, minimize animal fats, avoid processed foods and make sure you get food containing vitamin D  -- meet with GI dietitian   -- try peppermint- you can use peppermint tea or try a supplement called IBGard (you can order this online)  -- re-start low dose fiber   -- please see scheduling information provided below     Return to GI Clinic in 3-4 months to review your progress.    ______________________________________________________________________    How do I schedule labs, imaging studies, or procedures that were ordered in clinic today?     Labs: To schedule lab appointment at the [facility], use my chart or call [phone]. If you have a [facility] lab closer to home where you are regularly seen you can give them a call.     Procedures: If a colonoscopy, upper endoscopy, breath test, esophageal manometry, or pH impedence was ordered today, our endoscopy team will call you to schedule this. If you have not heard from our endoscopy team within a week, please call [phone] to schedule.     Imaging Studies: If you were scheduled for a CT scan, X-ray, MRI, ultrasound, HIDA scan or other imaging study, please call [phone] to have this scheduled.     Referral: If a referral to another specialty was ordered, expect a phone call or follow instructions above. If you have not heard from anyone regarding your referral in a week, please call  our clinic to check the status.     Who do I call with any questions after my visit?  Please be in touch if there are any further questions that arise following today's visit.  There are multiple ways to contact your gastroenterology care team.        During business hours, you may reach a Gastroenterology nurse at [phone]      To schedule or reschedule an appointment, please call [phone].       You can always send a secure message through [employer].  [employer] messages are answered by your nurse or doctor typically within 24 hours.  Please allow extra time on weekends and holidays.        For urgent/emergent questions after business hours, you may reach the on-call GI Fellow by contacting the [facility]  at [phone].     How will I get the results of any tests ordered?    You will receive all of your results.  If you have signed up for [employer], any tests ordered at your visit will be available to you after your physician reviews them.  Typically this takes 1-2 weeks.  If there are urgent results that require a change in your care plan, your physician or nurse will call you to discuss the next steps.      What is [employer]?  [employer] is a secure way for you to access all of your healthcare records from the [facility].  It is a web based computer program, so you can sign on to it from any location.  It also allows you to send secure messages to your care team.  I recommend signing up for [employer] access if you have not already done so and are comfortable with using a computer.      How to I schedule a follow-up visit?  If you did not schedule a follow-up visit today, please call [phone] to schedule a follow-up office visit.      Sincerely,    Aimee Nava PA-C  Division of Gastroenterology, Hepatology & Nutrition  [facility]

## 2021-08-02 NOTE — PROGRESS NOTES
"Carolina Hernandez is a 56 year old female who is being evaluated via a billable video visit.      The patient has been notified of following:     \"This video visit will be conducted via a call between you and your physician/provider. We have found that certain health care needs can be provided without the need for an in-person physical exam.  This service lets us provide the care you need with a video conversation.  If a prescription is necessary we can send it directly to your pharmacy.  If lab work is needed we can place an order for that and you can then stop by our lab to have the test done at a later time.    If during the course of the call the physician/provider feels a video visit is not appropriate, you will not be charged for this service.\"     Patient confirmed that they are in [place] for today's visit yes.    Video-Visit Details  Type of service:  Video Visit    Video Start Time: 1:05 PM  Video End Time:  1:33 PM    Originating Location (pt. Location): Home    Distant Location (provider location):  [facility] [place]     Platform used: Ro            "

## 2021-08-02 NOTE — NURSING NOTE
Chief Complaint   Patient presents with     Follow Up       Vitals:    08/02/21 1238   Weight: 71.2 kg (157 lb)       Body mass index is 27.81 kg/m .    Tamra Sherman CMA

## 2021-08-18 ENCOUNTER — VIRTUAL VISIT (OUTPATIENT)
Dept: GASTROENTEROLOGY | Facility: CLINIC | Age: 56
End: 2021-08-18
Payer: COMMERCIAL

## 2021-08-18 DIAGNOSIS — R14.0 ABDOMINAL BLOATING: ICD-10-CM

## 2021-08-18 DIAGNOSIS — K58.0 IRRITABLE BOWEL SYNDROME WITH DIARRHEA: Primary | ICD-10-CM

## 2021-08-18 DIAGNOSIS — R19.7 DIARRHEA, UNSPECIFIED TYPE: ICD-10-CM

## 2021-08-18 PROCEDURE — 97802 MEDICAL NUTRITION INDIV IN: CPT | Mod: 95 | Performed by: DIETITIAN, REGISTERED

## 2021-08-18 NOTE — PATIENT INSTRUCTIONS
It was nice meeting you today. Below are the nutrition recommendations we discussed at your visit.    Please let me know if you have any additional questions.    Nutrition Recommendations    1. Try a lactose free/lower lactose diet for at least 3 weeks to see if any improvement in bloating.  --For lactose free diet:   -Avoid milk (unless lactose free), ice cream, yogurt, milk chocolate.   -Switch your milk that you drink with meals to a lactose free option such as Fairlife or lactaid milk.   -Limit to 1 oz or less per day of hard, aged cheese or 1/2 cup cottage cheese per day.    2. Aim for eating 4-6 smaller meals per day versus fewer larger meals.    3. From day to day, aim for eating meals and snacks on a regular and consistent eating pattern.    4. Keep daily food and beverage journals and write down any symptoms you experiences such as bloating, stomach pressure/pain, diarrhea for example.    5. Continue eating slowly and take at least 20 minutes to eat a meal.    6. Chew food very well before swallowing.    Your follow-up appointment is scheduled for 9/22/2021 at 1:30 PM. If you need to make any changes to your appointment, please call [phone].    Mayra Metzger, MS, RD, LD

## 2021-08-18 NOTE — LETTER
8/18/2021         RE: Carolina Hernandez  [address] [place] [id number]        Dear Colleague,    Thank you for referring your patient, Carolina Hernandez, to the [facility] [place]. Please see a copy of my visit note below.    [facility] Outpatient Medical Nutrition Therapy      Time Spent:  51 minutes  Session Type:  Initial   Referring Physician:  Aimee Nava PA-C  Reason for RD Visit:   Abdominal bloating, chronic diarrhea.     Nutrition Assessment:  Patient is a 56 year old female with history of IBS-D, depression, asthma and reported a recent dx of COPD. She also quit smoking in April 2021.   She reported feeling bloated/pressure all of the time in upper abdomen no matter what she eats but eating does make it worse. She cannot eat too much at once time otherwise noticed worsening bloating. When eating small amounts she tends to feel better with eating/less pain/bloating. She stated that her breathing and shortness of breath has been worse recently as well due to COPD and asthma so getting less exercise. When taking a fiber supp and imodium, she has better formed stools but still has chronic bloating.  She typically eats lunch and a later dinner meal, sometimes has breakfast then may skips lunch. She drinks about 8 oz of 2% milk, 3 times per day (1 c with meds in am, then a cup with each meal). She also sometimes snacks on fun size candy bars that she keeps in her refrigerator for the [employer]. She will drink 2-4 beers about 1-2 times per week. She also stated that her  is a picky eater. They are both retired. He does not like a lot of vegetables. She has several food allergies/intolerances (see below). She went out of town over the past weekend, so her eating schedule was off. She also ran out of imodium and fiber while away, so had significant symptoms away from home. Now that she is back home, trying to get back on track with meals,  "imodium and fiber.    Patient Active Problem List   Diagnosis     CARDIOVASCULAR SCREENING; LDL GOAL LESS THAN 160     Recurrent major depressive disorder, in remission (H)     Encounter for tobacco use cessation counseling     Mild intermittent asthma without complication     Essential hypertension     Height:   Ht Readings from Last 1 Encounters:   04/02/21 1.6 m (5' 3\")     Weight: can't seem to lose any weight  Wt Readings from Last 10 Encounters:   08/02/21 71.2 kg (157 lb)   05/04/21 73.4 kg (161 lb 12.8 oz)   04/02/21 70.3 kg (155 lb)   01/29/21 71.2 kg (157 lb)   12/30/20 68.5 kg (151 lb)   11/20/20 68.5 kg (151 lb)   11/09/20 69.9 kg (154 lb)   09/21/20 69.9 kg (154 lb)   09/17/20 70.1 kg (154 lb 9.6 oz)   07/08/20 64.9 kg (143 lb)        BMI: Estimated body mass index is 27.81 kg/m  as calculated from the following:    Height as of 4/2/21: 1.6 m (5' 3\").    Weight as of 8/2/21: 71.2 kg (157 lb).    Diet Recall:  (some usual/recent meals/snacks/beverages):   Meal Food    Breakfast Skips or sometimes will have this for either B or L english muffin, egg, sausage romulo and cheese sandwich   Lunch turkey sandwich on 12 grain bread with L,T and spinach and tomato, aguilera    Dinner Eats dinner late: pasta primavera with shrimp (heavy whip cream, parmesan cheese and a lot of veg)   Snacks Some days none or other days may have a snacks such as occasional fun size candy bar (frozen caramello or Milky way or [employer] PB cup or butterfinger or almond vanessa) or cucumber slices, cottage cheese, cheese and crackers or homemade venison summer sausage or store bought summer sausage, cheetos   Beverages 3 x 8 oz 2% milk, all day long drinks water, ~(8 x 16 oz glasses of water), 2 cups coffee with creamer and sugar sometimes green tea with lemon and honey, sometimes 100% unsweetened cherry juice with fiber supp or will have Emergen C with fiber and water   Alcohol Intake Beer. 1-2 times per week has 2-4 beers.      Labs:    Last " Comprehensive Metabolic Panel:  Sodium   Date Value Ref Range Status   01/26/2021 135 133 - 144 mmol/L Final     Potassium   Date Value Ref Range Status   01/26/2021 4.1 3.4 - 5.3 mmol/L Final     Chloride   Date Value Ref Range Status   01/26/2021 104 94 - 109 mmol/L Final     Carbon Dioxide   Date Value Ref Range Status   01/26/2021 27 20 - 32 mmol/L Final     Anion Gap   Date Value Ref Range Status   01/26/2021 4 3 - 14 mmol/L Final     Glucose   Date Value Ref Range Status   01/26/2021 119 (H) 70 - 99 mg/dL Final     Urea Nitrogen   Date Value Ref Range Status   01/26/2021 14 7 - 30 mg/dL Final     Creatinine   Date Value Ref Range Status   01/26/2021 0.72 0.52 - 1.04 mg/dL Final     GFR Estimate   Date Value Ref Range Status   01/26/2021 >90 >60 mL/min/[1.73_m2] Final     Comment:     Non  GFR Calc  Starting 12/18/2018, serum creatinine based estimated GFR (eGFR) will be   calculated using the Chronic Kidney Disease Epidemiology Collaboration   (CKD-EPI) equation.       Calcium   Date Value Ref Range Status   01/26/2021 9.1 8.5 - 10.1 mg/dL Final     CBC RESULTS:   Recent Labs   Lab Test 07/05/21  1343   WBC 8.3   RBC 4.32   HGB 13.3   HCT 38.1   MCV 88   MCH 30.8   MCHC 34.9   RDW 12.5          Pertinent Medications/vitamin and mineral supplements:      Current Outpatient Medications   Medication     albuterol (PROAIR HFA/PROVENTIL HFA/VENTOLIN HFA) 108 (90 Base) MCG/ACT inhaler     albuterol (PROVENTIL) (2.5 MG/3ML) 0.083% neb solution     amLODIPine (NORVASC) 2.5 MG tablet     cetirizine (ZYRTEC) 10 MG tablet     doxycycline hyclate (VIBRA-TABS) 100 MG tablet     FLUoxetine (PROZAC) 20 MG capsule     FLUoxetine (PROZAC) 40 MG capsule     fluticasone-salmeterol (ADVAIR) 500-50 MCG/DOSE inhaler     hydrochlorothiazide (HYDRODIURIL) 25 MG tablet     loperamide (IMODIUM A-D) 2 MG tablet     montelukast (SINGULAIR) 10 MG tablet     multivitamin  with lutein (OCUVITE WITH LTEIN) CAPS per  capsule     nicotine (NICODERM CQ) 7 MG/24HR 24 hr patch     omeprazole 20 MG tablet     predniSONE (DELTASONE) 20 MG tablet     rifaximin (XIFAXAN) 550 MG TABS tablet     No current facility-administered medications for this visit.       Food Allergies/intolerances: raw onions (causes gas), peas and legumes cause gas. Allergy to a lot of fresh fruit (fresh pineapple (but canned pineapple is OK)., nectarines, peaches, fresh cherries (okay if in jam or canned), apples usually but recently can now eat some apples) and raw nuts/especially raw almonds (but can eat them if cooked or nut butters)some fresh vegetables since she had her 3rd daughter 32 years ago.    MALNUTRITION:  % Weight Loss:  None noted  % Intake:  No decreased intake noted  Subcutaneous Fat Loss:  None observed  Muscle Loss:  None observed  Fluid Retention:  None noted    Malnutrition Diagnosis: Patient does not meet two of the above criteria necessary for diagnosing malnutrition  In Context of:  Acute illness or injury  Chronic illness or disease    Nutrition Diagnosis:      Food and nutrition related knowledge deficit related to lack of previous diet education/lack of complete recall of previous diet education as evidenced by pt report and interest in diet education/review.     Nutrition Prescription: trial lactose free/low lactose diet for at least 3 weeks, 4-6 smaller meals on a regular eating pattern.    Nutrition Intervention:    Nutrition Education/Counseling:  Provided diet education for diarrhea and abdominal bloating. Discussed some potential triggers including added sugar especially HFCS, honey, alcohol, carbonation, caffeine, lactose. Recommended smaller meals such as 4-6 versus fewer larger meals. Gave pt specific tips and suggestions based on her usual eating pattern and foods choices. Reviewed low FODMAp diet with pt but at visit today, decided will try some other goals/intervention first to see if any improvement in symptoms before  doing a strict elimination diet/fodmap process. Therefore she will try a low lactose diet x 3 weeks, smaller meals spread out throughout the day and try eating these smaller meals on a consistent eating pattern.    Patient verbalized understanding of education provided. See Goals below.     Goals:    1. Try a lactose free/lower lactose diet for at least 3 weeks to see if any improvement in bloating.  --For lactose free diet:   -Avoid milk (unless lactose free), ice cream, yogurt, milk chocolate.   -Switch your milk that you drink with meals to a lactose free option such as Fairlife or lactaid milk.   -Limit to 1 oz or less per day of hard, aged cheese or 1/2 cup cottage cheese per day.    2. Aim for eating 4-6 smaller meals per day versus fewer larger meals.    3. From day to day, aim for eating meals and snacks on a regular and consistent eating pattern.    4. Keep daily food and beverage journals and write down any symptoms you experiences such as bloating, stomach pressure/pain, diarrhea for example.    5. Continue eating slowly and take at least 20 minutes to eat a meal.    6. Chew food very well before swallowing.    Nutrition Monitoring and Evaluation: Will monitor adherence to nutrition recommendations at future RD visits.     Further Medical Nutrition Therapy:  Follow-up scheduled 9/22/21 at 1:30 PM.    Patient was encouraged to call/contact RD with any further questions.    Mayra Metzger, MS, RD, LD          Again, thank you for allowing me to participate in the care of your patient.      Sincerely,    Mayra Metzger RD

## 2021-08-18 NOTE — PROGRESS NOTES
"Carolina Hernandez 56 year old female who is being evaluated via a billable video visit.      The patient has been notified of following:     \"This video visit will be conducted via a call between you and your physician/provider. We have found that certain health care needs can be provided without the need for an in-person physical exam.  This service lets us provide the care you need with a video conversation.  If a prescription is necessary we can send it directly to your pharmacy.  If lab work is needed we can place an order for that and you can then stop by our lab to have the test done at a later time.    Video visits are billed at different rates depending on your insurance coverage.  Please reach out to your insurance provider with any questions.    If during the course of the call the physician/provider feels a video visit is not appropriate, you will not be charged for this service.\"    Patient has given verbal consent for Video visit? Yes    How would you like to obtain your AVS? MyChart  If you are dropped from the video visit, the video invite should be resent to: Other e-mail: my chart   Will anyone else be joining your video visit? No      Video-Visit Details    Type of service:  Video Visit    Video Start Time: 11:40 AM   Video End Time: 12:31 PM    Originating Location (pt. Location): Home    Distant Location (provider location):  [facility] [place]     Platform used for Video Visit: [employer]    During this virtual visit the patient is located in [place], patient verifies this as the location during the entirety of this visit.     [facility] Outpatient Medical Nutrition Therapy      Time Spent:  51 minutes  Session Type:  Initial   Referring Physician:  Aimee Nava PA-C  Reason for RD Visit:   Abdominal bloating, chronic diarrhea.     Nutrition Assessment:  Patient is a 56 year old female with history of IBS-D, depression, asthma and reported a recent dx of " "COPD. She also quit smoking in April 2021.   She reported feeling bloated/pressure all of the time in upper abdomen no matter what she eats but eating does make it worse. She cannot eat too much at once time otherwise noticed worsening bloating. When eating small amounts she tends to feel better with eating/less pain/bloating. She stated that her breathing and shortness of breath has been worse recently as well due to COPD and asthma so getting less exercise. When taking a fiber supp and imodium, she has better formed stools but still has chronic bloating.  She typically eats lunch and a later dinner meal, sometimes has breakfast then may skips lunch. She drinks about 8 oz of 2% milk, 3 times per day (1 c with meds in am, then a cup with each meal). She also sometimes snacks on fun size candy bars that she keeps in her refrigerator for the [employer]. She will drink 2-4 beers about 1-2 times per week. She also stated that her  is a picky eater. They are both retired. He does not like a lot of vegetables. She has several food allergies/intolerances (see below). She went out of town over the past weekend, so her eating schedule was off. She also ran out of imodium and fiber while away, so had significant symptoms away from home. Now that she is back home, trying to get back on track with meals, imodium and fiber.    Patient Active Problem List   Diagnosis     CARDIOVASCULAR SCREENING; LDL GOAL LESS THAN 160     Recurrent major depressive disorder, in remission (H)     Encounter for tobacco use cessation counseling     Mild intermittent asthma without complication     Essential hypertension     Height:   Ht Readings from Last 1 Encounters:   04/02/21 1.6 m (5' 3\")     Weight: can't seem to lose any weight  Wt Readings from Last 10 Encounters:   08/02/21 71.2 kg (157 lb)   05/04/21 73.4 kg (161 lb 12.8 oz)   04/02/21 70.3 kg (155 lb)   01/29/21 71.2 kg (157 lb)   12/30/20 68.5 kg (151 lb)   11/20/20 68.5 kg (151 " "lb)   11/09/20 69.9 kg (154 lb)   09/21/20 69.9 kg (154 lb)   09/17/20 70.1 kg (154 lb 9.6 oz)   07/08/20 64.9 kg (143 lb)        BMI: Estimated body mass index is 27.81 kg/m  as calculated from the following:    Height as of 4/2/21: 1.6 m (5' 3\").    Weight as of 8/2/21: 71.2 kg (157 lb).    Diet Recall:  (some usual/recent meals/snacks/beverages):   Meal Food    Breakfast Skips or sometimes will have this for either B or L english muffin, egg, sausage romulo and cheese sandwich   Lunch turkey sandwich on 12 grain bread with L,T and spinach and tomato, aguilera    Dinner Eats dinner late: pasta primavera with shrimp (heavy whip cream, parmesan cheese and a lot of veg)   Snacks Some days none or other days may have a snacks such as occasional fun size candy bar (frozen caramello or Milky way or [employer] PB cup or butterfinger or almond vanessa) or cucumber slices, cottage cheese, cheese and crackers or homemade venison summer sausage or store bought summer sausage, cheetos   Beverages 3 x 8 oz 2% milk, all day long drinks water, ~(8 x 16 oz glasses of water), 2 cups coffee with creamer and sugar sometimes green tea with lemon and honey, sometimes 100% unsweetened cherry juice with fiber supp or will have Emergen C with fiber and water   Alcohol Intake Beer. 1-2 times per week has 2-4 beers.      Labs:    Last Comprehensive Metabolic Panel:  Sodium   Date Value Ref Range Status   01/26/2021 135 133 - 144 mmol/L Final     Potassium   Date Value Ref Range Status   01/26/2021 4.1 3.4 - 5.3 mmol/L Final     Chloride   Date Value Ref Range Status   01/26/2021 104 94 - 109 mmol/L Final     Carbon Dioxide   Date Value Ref Range Status   01/26/2021 27 20 - 32 mmol/L Final     Anion Gap   Date Value Ref Range Status   01/26/2021 4 3 - 14 mmol/L Final     Glucose   Date Value Ref Range Status   01/26/2021 119 (H) 70 - 99 mg/dL Final     Urea Nitrogen   Date Value Ref Range Status   01/26/2021 14 7 - 30 mg/dL Final     Creatinine   Date " Value Ref Range Status   01/26/2021 0.72 0.52 - 1.04 mg/dL Final     GFR Estimate   Date Value Ref Range Status   01/26/2021 >90 >60 mL/min/[1.73_m2] Final     Comment:     Non  GFR Calc  Starting 12/18/2018, serum creatinine based estimated GFR (eGFR) will be   calculated using the Chronic Kidney Disease Epidemiology Collaboration   (CKD-EPI) equation.       Calcium   Date Value Ref Range Status   01/26/2021 9.1 8.5 - 10.1 mg/dL Final     CBC RESULTS:   Recent Labs   Lab Test 07/05/21  1343   WBC 8.3   RBC 4.32   HGB 13.3   HCT 38.1   MCV 88   MCH 30.8   MCHC 34.9   RDW 12.5          Pertinent Medications/vitamin and mineral supplements:      Current Outpatient Medications   Medication     albuterol (PROAIR HFA/PROVENTIL HFA/VENTOLIN HFA) 108 (90 Base) MCG/ACT inhaler     albuterol (PROVENTIL) (2.5 MG/3ML) 0.083% neb solution     amLODIPine (NORVASC) 2.5 MG tablet     cetirizine (ZYRTEC) 10 MG tablet     doxycycline hyclate (VIBRA-TABS) 100 MG tablet     FLUoxetine (PROZAC) 20 MG capsule     FLUoxetine (PROZAC) 40 MG capsule     fluticasone-salmeterol (ADVAIR) 500-50 MCG/DOSE inhaler     hydrochlorothiazide (HYDRODIURIL) 25 MG tablet     loperamide (IMODIUM A-D) 2 MG tablet     montelukast (SINGULAIR) 10 MG tablet     multivitamin  with lutein (OCUVITE WITH LTEIN) CAPS per capsule     nicotine (NICODERM CQ) 7 MG/24HR 24 hr patch     omeprazole 20 MG tablet     predniSONE (DELTASONE) 20 MG tablet     rifaximin (XIFAXAN) 550 MG TABS tablet     No current facility-administered medications for this visit.       Food Allergies/intolerances: raw onions (causes gas), peas and legumes cause gas. Allergy to a lot of fresh fruit (fresh pineapple (but canned pineapple is OK)., nectarines, peaches, fresh cherries (okay if in jam or canned), apples usually but recently can now eat some apples) and raw nuts/especially raw almonds (but can eat them if cooked or nut butters)some fresh vegetables since she  had her 3rd daughter 32 years ago.    MALNUTRITION:  % Weight Loss:  None noted  % Intake:  No decreased intake noted  Subcutaneous Fat Loss:  None observed  Muscle Loss:  None observed  Fluid Retention:  None noted    Malnutrition Diagnosis: Patient does not meet two of the above criteria necessary for diagnosing malnutrition  In Context of:  Acute illness or injury  Chronic illness or disease    Nutrition Diagnosis:      Food and nutrition related knowledge deficit related to lack of previous diet education/lack of complete recall of previous diet education as evidenced by pt report and interest in diet education/review.     Nutrition Prescription: trial lactose free/low lactose diet for at least 3 weeks, 4-6 smaller meals on a regular eating pattern.    Nutrition Intervention:    Nutrition Education/Counseling:  Provided diet education for diarrhea and abdominal bloating. Discussed some potential triggers including added sugar especially HFCS, honey, alcohol, carbonation, caffeine, lactose. Recommended smaller meals such as 4-6 versus fewer larger meals. Gave pt specific tips and suggestions based on her usual eating pattern and foods choices. Reviewed low FODMAp diet with pt but at visit today, decided will try some other goals/intervention first to see if any improvement in symptoms before doing a strict elimination diet/fodmap process. Therefore she will try a low lactose diet x 3 weeks, smaller meals spread out throughout the day and try eating these smaller meals on a consistent eating pattern.    Patient verbalized understanding of education provided. See Goals below.     Goals:    1. Try a lactose free/lower lactose diet for at least 3 weeks to see if any improvement in bloating.  --For lactose free diet:   -Avoid milk (unless lactose free), ice cream, yogurt, milk chocolate.   -Switch your milk that you drink with meals to a lactose free option such as Fairlife or lactaid milk.   -Limit to 1 oz or less  per day of hard, aged cheese or 1/2 cup cottage cheese per day.    2. Aim for eating 4-6 smaller meals per day versus fewer larger meals.    3. From day to day, aim for eating meals and snacks on a regular and consistent eating pattern.    4. Keep daily food and beverage journals and write down any symptoms you experiences such as bloating, stomach pressure/pain, diarrhea for example.    5. Continue eating slowly and take at least 20 minutes to eat a meal.    6. Chew food very well before swallowing.    Nutrition Monitoring and Evaluation: Will monitor adherence to nutrition recommendations at future RD visits.     Further Medical Nutrition Therapy:  Follow-up scheduled 9/22/21 at 1:30 PM.    Patient was encouraged to call/contact RD with any further questions.    Mayra Metzger MS, RD, LD

## 2021-08-19 RX ORDER — LOPERAMIDE HYDROCHLORIDE 2 MG/1
TABLET ORAL
Qty: 90 TABLET | Status: CANCELLED | OUTPATIENT
Start: 2021-08-19

## 2021-08-19 NOTE — TELEPHONE ENCOUNTER
loperamide (IMODIUM A-D) 2 MG tablet  Last Written Prescription Date:  6/23/2021  Last Fill Quantity: 90,   # refills: 1  Last Office Visit : 8/18/2021  Future Office visit:  9/22/2021    Routing refill request to provider for review/approval because:  Drug not on the [facility], [facility] or [facility] refill protocol or controlled substance      Faiht Delvalle RN  Central Triage Red Flags/Med Refills

## 2021-08-20 ENCOUNTER — OFFICE VISIT (OUTPATIENT)
Dept: ORTHOPEDICS | Facility: CLINIC | Age: 56
End: 2021-08-20
Payer: COMMERCIAL

## 2021-08-20 ENCOUNTER — ANCILLARY PROCEDURE (OUTPATIENT)
Dept: GENERAL RADIOLOGY | Facility: CLINIC | Age: 56
End: 2021-08-20
Attending: PEDIATRICS
Payer: COMMERCIAL

## 2021-08-20 VITALS
WEIGHT: 157 LBS | HEIGHT: 63 IN | BODY MASS INDEX: 27.82 KG/M2 | SYSTOLIC BLOOD PRESSURE: 161 MMHG | DIASTOLIC BLOOD PRESSURE: 97 MMHG

## 2021-08-20 DIAGNOSIS — M25.562 CHRONIC PAIN OF BOTH KNEES: Primary | ICD-10-CM

## 2021-08-20 DIAGNOSIS — M25.561 CHRONIC PAIN OF BOTH KNEES: Primary | ICD-10-CM

## 2021-08-20 DIAGNOSIS — M17.0 ARTHRITIS OF BOTH KNEES: ICD-10-CM

## 2021-08-20 DIAGNOSIS — M25.561 BILATERAL KNEE PAIN: ICD-10-CM

## 2021-08-20 DIAGNOSIS — M25.562 BILATERAL KNEE PAIN: ICD-10-CM

## 2021-08-20 DIAGNOSIS — G89.29 CHRONIC PAIN OF BOTH KNEES: Primary | ICD-10-CM

## 2021-08-20 PROCEDURE — 99213 OFFICE O/P EST LOW 20 MIN: CPT | Mod: 25 | Performed by: PEDIATRICS

## 2021-08-20 PROCEDURE — 73562 X-RAY EXAM OF KNEE 3: CPT | Mod: LT | Performed by: RADIOLOGY

## 2021-08-20 PROCEDURE — 20610 DRAIN/INJ JOINT/BURSA W/O US: CPT | Mod: 50 | Performed by: PEDIATRICS

## 2021-08-20 RX ORDER — TRIAMCINOLONE ACETONIDE 40 MG/ML
40 INJECTION, SUSPENSION INTRA-ARTICULAR; INTRAMUSCULAR
Status: DISCONTINUED | OUTPATIENT
Start: 2021-08-20 | End: 2021-08-30

## 2021-08-20 RX ORDER — LIDOCAINE HYDROCHLORIDE 10 MG/ML
2 INJECTION, SOLUTION INFILTRATION; PERINEURAL
Status: DISCONTINUED | OUTPATIENT
Start: 2021-08-20 | End: 2021-08-30

## 2021-08-20 RX ADMIN — TRIAMCINOLONE ACETONIDE 40 MG: 40 INJECTION, SUSPENSION INTRA-ARTICULAR; INTRAMUSCULAR at 14:37

## 2021-08-20 RX ADMIN — LIDOCAINE HYDROCHLORIDE 2 ML: 10 INJECTION, SOLUTION INFILTRATION; PERINEURAL at 14:37

## 2021-08-20 ASSESSMENT — MIFFLIN-ST. JEOR: SCORE: 1261.28

## 2021-08-20 NOTE — LETTER
8/20/2021         RE: Carolina Hernandez  [address] [place] [id number]        Dear Colleague,    Thank you for referring your patient, Carolina Hernandez, to the [facility]. Please see a copy of my visit note below.    ASSESSMENT & PLAN    Carolina was seen today for pain and pain.    Diagnoses and all orders for this visit:    Chronic pain of both knees  -     XR Knee Bilateral 3 Views; Future  -     Orthopedic  Referral; Future    Arthritis of both knees  -     Orthopedic  Referral; Future    Other orders  -     Large Joint Injection/Arthocentesis: bilateral knee      This issue is chronic and Unchanged.    Discussed nature of degenerative arthrosis of the knee. Discussed symptom treatment with over-the-counter medications, ice or heat, topical treatments, and rest if needed. Discussed use of sleeve or wrap for comfort. Discussed benefits of exercise and physical therapy. Discussed injection therapy. Also briefly discussed referral to orthopedic surgery for further evaluation and discussion of arthroplasty.  - Patient would like to consider surgery, will refer to orthopedic surgery.  - Patient would also like repeat corticosteroid injections. I discussed this may delay any knee replacement surgery 3-6 months given post surgical infection risk. Patient understood and wants to proceed with injections prior to her surgical consultation.      Plan:  - Today's Plan of Care:  Referral to an Orthopedic Surgeon  Discussed activity considerations and other supportive care including Ice/Heat, OTC and other topical medications as needed.    -We also discussed other future treatment options:  Referral to Physical Therapy  Hyaluronic Acid injections    Follow Up: as needed    Concerning signs and symptoms were reviewed.  The patient expressed understanding of this management plan and all questions were answered at this time.    Shawanda Gaspar MD [facility]  Sports  "Medicine Physician  [facility]      -----  Chief Complaint   Patient presents with     Right Knee - Pain     Left Knee - Pain       SUBJECTIVE  Carolina Hernandez is a/an 56 year old female who is seen as a self referral for evaluation of bilateral knee pain.  Her left knee is more bothersome than her right.  Her knees have been bothering her for years.   She is experiencing more pain than usual in the last couple months.  Her left knee has been hurting a lot more and is starting to affect her ADLs.    The patient is seen by themselves.    Onset: few years(s) ago. Reports insidious onset without acute precipitating event.  Location of Pain: left, bilateral knee pain, \"all around the joint\"  Worsened by: walking, prolonged sitting, prolonged standing  Better with: nothing really makes it feel better  Treatments tried: rest/activity avoidance, elevation, ice, Tylenol, previous imaging (xray of bilateral knee 2019), corticosteroid injection (most recent date: 2/14/19) that provided  6-8 month(s) of relief and casting/splinting/bracing  Associated symptoms: swelling, weakness of knee, feeling of instability and pain that goes down below the knee    Orthopedic/Surgical history: YES - Date: chronic knee pain  Social History/Occupation: retired    No family history pertinent to patient's problem today.    REVIEW OF SYSTEMS:  Review of Systems  Skin: no bruising, mild left knee swelling  Musculoskeletal: as above  Neurologic: no numbness, paresthesias  Remainder of review of systems is negative including constitutional, CV, pulmonary, GI, except as noted in HPI or medical history.    OBJECTIVE:  BP (!) 161/97   Ht 1.6 m (5' 3\")   Wt 71.2 kg (157 lb)   BMI 27.81 kg/m     General: healthy, alert and in no distress  HEENT: no scleral icterus or conjunctival erythema  Skin: no suspicious lesions or rash. No jaundice.  CV: distal perfusion intact  Resp: normal respiratory effort without conversational dyspnea "   Psych: normal mood and affect  Gait: normal steady gait with appropriate coordination and balance  Neuro: Normal light sensory exam of lower extremity    Bilateral Knee exam    Inspection:      mild effusion left    Patella:      Crepitus noted in the patellofemoral joint bilateral    Tender:      medial joint line bilateral    Non Tender:      remainder of knee area bilateral    Knee ROM:      Flexion 110 degrees bilateral       Extension 5 degrees left, 0 right    Hip ROM:     Full active and passive ROM bilateral    Strength:      5/5 with knee extension bilateral    Special Tests:     neg (-) Rea bilateral       neg (-) varus at 0 deg and 30 deg bilateral       neg (-) valgus at 0 deg and 30 deg bilateral    Gait:      normal    Neurovascular:      2+ peripheral pulses bilaterally and brisk capillary refill       sensation grossly intact    RADIOLOGY:  I independently ordered, visualized and reviewed these images with the patient  3 XR views of bilateral knees reviewed: no acute bony abnormality, severe degenerative changes bilaterally, worse in the medial compartment  - will follow official read    Review of the result(s) of each unique test - XR    Large Joint Injection/Arthocentesis: bilateral knee    Date/Time: 8/20/2021 2:37 PM  Performed by: Shawanda Gaspar MD  Authorized by: Shawanda Gaspar MD     Indications:  Pain  Needle Size:  25 G  Guidance: landmark guided    Approach:  Anterolateral  Location:  Knee  Laterality:  Bilateral      Medications (Right):  40 mg triamcinolone 40 MG/ML; 2 mL lidocaine 1 %  Medications (Left):  40 mg triamcinolone 40 MG/ML; 2 mL lidocaine 1 %  Consent Given by:  Patient  Prep: patient was prepped and draped in usual sterile fashion     The risks, benefits and complications of steroid injection were discussed with the patient (including but not limited to: bleeding, infection, pain, scar, damage to adjacent structures, atrophy or necrosis of soft tissue, skin  blanching, failure to relieve symptoms, worsening of symptoms, allergic reaction). After this discussion all questions were addressed and answered and the patient elected to proceed. The patient tolerated the procedure well without complications.  Also discussed that if diabetic, recommend close monitoring of blood sugars over the next week as cortisone injections can temporarily elevate blood sugars.      6}         Again, thank you for allowing me to participate in the care of your patient.        Sincerely,        Shawanda Gaspar MD

## 2021-08-20 NOTE — PROGRESS NOTES
ASSESSMENT & PLAN    Carolina was seen today for pain and pain.    Diagnoses and all orders for this visit:    Chronic pain of both knees  -     XR Knee Bilateral 3 Views; Future  -     Orthopedic  Referral; Future    Arthritis of both knees  -     Orthopedic  Referral; Future    Other orders  -     Large Joint Injection/Arthocentesis: bilateral knee      This issue is chronic and Unchanged.    Discussed nature of degenerative arthrosis of the knee. Discussed symptom treatment with over-the-counter medications, ice or heat, topical treatments, and rest if needed. Discussed use of sleeve or wrap for comfort. Discussed benefits of exercise and physical therapy. Discussed injection therapy. Also briefly discussed referral to orthopedic surgery for further evaluation and discussion of arthroplasty.  - Patient would like to consider surgery, will refer to orthopedic surgery.  - Patient would also like repeat corticosteroid injections. I discussed this may delay any knee replacement surgery 3-6 months given post surgical infection risk. Patient understood and wants to proceed with injections prior to her surgical consultation.      Plan:  - Today's Plan of Care:  Referral to an Orthopedic Surgeon  Discussed activity considerations and other supportive care including Ice/Heat, OTC and other topical medications as needed.    -We also discussed other future treatment options:  Referral to Physical Therapy  Hyaluronic Acid injections    Follow Up: as needed    Concerning signs and symptoms were reviewed.  The patient expressed understanding of this management plan and all questions were answered at this time.    Shawanda Gaspar MD [facility]  Sports Medicine Physician  [facility]      -----  Chief Complaint   Patient presents with     Right Knee - Pain     Left Knee - Pain       SUBJECTIVE  Carolina Hernandez is a/an 56 year old female who is seen as a self referral for evaluation of bilateral knee  "pain.  Her left knee is more bothersome than her right.  Her knees have been bothering her for years.   She is experiencing more pain than usual in the last couple months.  Her left knee has been hurting a lot more and is starting to affect her ADLs.    The patient is seen by themselves.    Onset: few years(s) ago. Reports insidious onset without acute precipitating event.  Location of Pain: left, bilateral knee pain, \"all around the joint\"  Worsened by: walking, prolonged sitting, prolonged standing  Better with: nothing really makes it feel better  Treatments tried: rest/activity avoidance, elevation, ice, Tylenol, previous imaging (xray of bilateral knee 2019), corticosteroid injection (most recent date: 2/14/19) that provided  6-8 month(s) of relief and casting/splinting/bracing  Associated symptoms: swelling, weakness of knee, feeling of instability and pain that goes down below the knee    Orthopedic/Surgical history: YES - Date: chronic knee pain  Social History/Occupation: retired    No family history pertinent to patient's problem today.    REVIEW OF SYSTEMS:  Review of Systems  Skin: no bruising, mild left knee swelling  Musculoskeletal: as above  Neurologic: no numbness, paresthesias  Remainder of review of systems is negative including constitutional, CV, pulmonary, GI, except as noted in HPI or medical history.    OBJECTIVE:  BP (!) 161/97   Ht 1.6 m (5' 3\")   Wt 71.2 kg (157 lb)   BMI 27.81 kg/m     General: healthy, alert and in no distress  HEENT: no scleral icterus or conjunctival erythema  Skin: no suspicious lesions or rash. No jaundice.  CV: distal perfusion intact  Resp: normal respiratory effort without conversational dyspnea   Psych: normal mood and affect  Gait: normal steady gait with appropriate coordination and balance  Neuro: Normal light sensory exam of lower extremity    Bilateral Knee exam    Inspection:      mild effusion left    Patella:      Crepitus noted in the patellofemoral " joint bilateral    Tender:      medial joint line bilateral    Non Tender:      remainder of knee area bilateral    Knee ROM:      Flexion 110 degrees bilateral       Extension 5 degrees left, 0 right    Hip ROM:     Full active and passive ROM bilateral    Strength:      5/5 with knee extension bilateral    Special Tests:     neg (-) Rea bilateral       neg (-) varus at 0 deg and 30 deg bilateral       neg (-) valgus at 0 deg and 30 deg bilateral    Gait:      normal    Neurovascular:      2+ peripheral pulses bilaterally and brisk capillary refill       sensation grossly intact    RADIOLOGY:  I independently ordered, visualized and reviewed these images with the patient  3 XR views of bilateral knees reviewed: no acute bony abnormality, severe degenerative changes bilaterally, worse in the medial compartment  - will follow official read    Review of the result(s) of each unique test - XR    Large Joint Injection/Arthocentesis: bilateral knee    Date/Time: 8/20/2021 2:37 PM  Performed by: Shawanda Gaspar MD  Authorized by: Shawanda Gaspar MD     Indications:  Pain  Needle Size:  25 G  Guidance: landmark guided    Approach:  Anterolateral  Location:  Knee  Laterality:  Bilateral      Medications (Right):  40 mg triamcinolone 40 MG/ML; 2 mL lidocaine 1 %  Medications (Left):  40 mg triamcinolone 40 MG/ML; 2 mL lidocaine 1 %  Consent Given by:  Patient  Prep: patient was prepped and draped in usual sterile fashion     The risks, benefits and complications of steroid injection were discussed with the patient (including but not limited to: bleeding, infection, pain, scar, damage to adjacent structures, atrophy or necrosis of soft tissue, skin blanching, failure to relieve symptoms, worsening of symptoms, allergic reaction). After this discussion all questions were addressed and answered and the patient elected to proceed. The patient tolerated the procedure well without complications.  Also discussed that if  diabetic, recommend close monitoring of blood sugars over the next week as cortisone injections can temporarily elevate blood sugars.      6}

## 2021-08-20 NOTE — PATIENT INSTRUCTIONS
Discussed nature of degenerative arthrosis of the knee. Discussed symptom treatment with over-the-counter medications, ice or heat, topical treatments, and rest if needed. Discussed use of sleeve or wrap for comfort. Discussed benefits of exercise and physical therapy. Discussed injection therapy. Also briefly discussed referral to orthopedic surgery for further evaluation and discussion of arthroplasty.  - Patient would like to consider surgery, will refer to orthopedic surgery.  - Patient would also like repeat corticosteroid injections. I discussed this may delay any knee replacement surgery 3-6 months given infection risk. Patient understood and wants to proceed prior to her surgical consultation.      Plan:  - Today's Plan of Care:  Referral to an Orthopedic Surgeon  Discussed activity considerations and other supportive care including Ice/Heat, OTC and other topical medications as needed.    -We also discussed other future treatment options:  Referral to Physical Therapy  Hyaluronic Acid injections    Follow Up: as needed    If you have any further questions for your physician or physician s care team you can call [phone] and use option 3 to leave a voice message. Calls received during business hours will be returned same day.    After the Injection     After the injection, strenuous and repetitive activity should be minimized for approximately 48 hours.   Ice should be applied to the injected area at least for the next 48 hours.   Apply ice to the injected area at least 3 - 4 times a day for 20 minutes each time for the next 48 hours. This can reduce the painful  flare  reaction that can follow an injection the next day. This reaction can cause the area that was injected to hurt more the next day just from the injection. This will resolve within a day if it does occur.     Use over-the-counter pain medications such as Tylenol to help with the pain if necessary.     After 48 hours, icing the area may be  continued if you find it beneficial.     The lidocaine or marcaine (commonly called Novocain) is an anesthetic agent that is injected with the steroid will typically relieve your pain for a few hours following the injection. If the  Novocain  and steroid are injected near a nerve, you may experience local numbness or weakness from the nerve block until it wears off. After this wears off your pain may return until the steroid takes effect.   The steroid may be effective immediately after the injection. Do not be concerned if the injection is not effective in relieving your symptoms immediately. In some cases, it may take up to two weeks for the steroid to work.   If you are diabetic, the corticosteroid may cause your blood sugar to become elevated for several days following the injection. This response usually lasts about 2-4 days before it returns to your normal level.   You should report any adverse reaction to you doctor. Call if there are any questions.

## 2021-08-20 NOTE — ED PROVIDER NOTES
History     Chief Complaint   Patient presents with     Wound Infection     RT CALF-GORED BY GOAT 2 WEEKS AGO   HA AND SORE NECK   CHILLS AND DIARRHEA TODAY     HPI  Carolina Hernandez is a 54 year old female who presents to ED with neckache, headache with onset of symptoms this past Saturday.  Pt reports onset of chills and diarrhea 10-15 episodes of yellow watery/food particles today.  Pt reports recently gored by a goat 10/5/2019 and was prescribed bactrim and cleocin and did not take as prescribed.  Last dose of antibiotics that was treatment for the leg injury was one week ago.  PT denies being exposed to someone with similar illness.  Pt received influenza vaccine around 10/5/2019.    Allergies:  Allergies   Allergen Reactions     Penicillins Itching       Problem List:    Patient Active Problem List    Diagnosis Date Noted     Essential hypertension 07/26/2019     Priority: Medium     New diagnosis 7/26/2019         Recurrent major depressive disorder, in remission (H) 01/17/2019     Priority: Medium     Encounter for tobacco use cessation counseling 01/17/2019     Priority: Medium     Mild intermittent asthma without complication 01/17/2019     Priority: Medium     CARDIOVASCULAR SCREENING; LDL GOAL LESS THAN 160 10/31/2010     Priority: Medium        Past Medical History:    No past medical history on file.    Past Surgical History:    No past surgical history on file.    Family History:    Family History   Problem Relation Age of Onset     Glaucoma Father      Coronary Artery Disease Father         stents     Cancer Maternal Grandfather      Coronary Artery Disease Paternal Grandfather      Schizophrenia Daughter      Diabetes Daughter      Cancer Daughter        Social History:  Marital Status:   [2]  Social History     Tobacco Use     Smoking status: Current Every Day Smoker     Packs/day: 0.50     Types: Cigarettes     Smokeless tobacco: Never Used   Substance Use Topics     Alcohol use: Yes      [facility] led a psychotherapy group that focused on helping the patient increase insight and understanding appropriate communication methods, healthy vs unhealthy coping skills and recognizing when to ask for help. The main therapeutic techniques used involved the exploration of the patterns of certain behaviors and thinking patterns. Therapeutic efforts also included aiding the Patient in identifying the precipitants of unproductive feelings, thinking and behaviors.    Patient's affect was appropriate, full range, and congruent with his mood. Patient appropriately and respectfully participated in the group. He was able to ask clarifying questions when necessary and engage in conversations with other group members. Patient remained in group for the entire group session (1hr)   Comment: 12 pack weekly     Drug use: No        Medications:    albuterol (PROVENTIL) (2.5 MG/3ML) 0.083% neb solution  albuterol (VENTOLIN HFA) 108 (90 Base) MCG/ACT inhaler  amLODIPine (NORVASC) 2.5 MG tablet  cetirizine (ZYRTEC) 10 MG tablet  FLUoxetine (PROZAC) 20 MG capsule  FLUoxetine (PROZAC) 40 MG capsule  fluticasone-salmeterol (ADVAIR DISKUS) 500-50 MCG/DOSE inhaler  hydrochlorothiazide (HYDRODIURIL) 25 MG tablet  montelukast (SINGULAIR) 10 MG tablet  multivitamin  with lutein (OCUVITE WITH LTEIN) CAPS per capsule  omeprazole 20 MG tablet  vancomycin (VANCOCIN) 125 MG capsule          Review of Systems    Physical Exam   BP: (!) 151/88  Pulse: 84  Heart Rate: 94  Temp: 98  F (36.7  C)  Resp: 16  SpO2: 97 %      Physical Exam  Vitals signs and nursing note reviewed.   Constitutional:       General: She is not in acute distress.     Appearance: Normal appearance. She is well-developed and normal weight. She is ill-appearing. She is not toxic-appearing or diaphoretic.   HENT:      Head: Normocephalic and atraumatic.      Right Ear: Tympanic membrane, ear canal and external ear normal.      Left Ear: Tympanic membrane, ear canal and external ear normal.      Nose: Nose normal.      Mouth/Throat:      Mouth: Mucous membranes are moist.   Eyes:      General:         Right eye: No discharge.         Left eye: No discharge.      Extraocular Movements: Extraocular movements intact.      Conjunctiva/sclera: Conjunctivae normal.      Pupils: Pupils are equal, round, and reactive to light.   Cardiovascular:      Rate and Rhythm: Normal rate and regular rhythm.      Heart sounds: Normal heart sounds. No murmur. No friction rub.   Pulmonary:      Effort: Pulmonary effort is normal. No respiratory distress.      Breath sounds: Normal breath sounds. No stridor. No wheezing or rales.   Chest:      Chest wall: No tenderness.   Abdominal:      General: Abdomen is flat. Bowel sounds are normal.      Tenderness: There is no  tenderness. There is no guarding or rebound.   Skin:     General: Skin is warm and dry.      Capillary Refill: Capillary refill takes less than 2 seconds.      Coloration: Skin is not pale.      Findings: Erythema (left lower leg wound, erythematous around the edges with some purulent drainage noted; tender firmness noted 1 cm distal to wound without erythema) present. No rash.   Neurological:      General: No focal deficit present.      Mental Status: She is alert and oriented to person, place, and time.      Cranial Nerves: No cranial nerve deficit.      Motor: No weakness.      Coordination: Coordination normal.      Gait: Gait normal.   Psychiatric:         Mood and Affect: Mood normal.             ED Course        Procedures    Results for orders placed during the hospital encounter of 10/22/19   POC US SOFT TISSUE    Narrative Limited Soft Tissue Ultrasound, performed and interpreted by me.    Indication:  Skin redness. Evaluate for cellulitis vs abscess.     Body location: left lower extremity    Findings:  There is no cobblestoning suggestive of cellulitis in the evaluated area. There is no fluid collection to suggest abscess. No foreign body identified    IMPRESSION: No cellulitis or abscess.             Results for orders placed or performed during the hospital encounter of 10/22/19 (from the past 24 hour(s))   CBC with platelets differential   Result Value Ref Range    WBC 14.3 (H) 4.0 - 11.0 10e9/L    RBC Count 4.32 3.8 - 5.2 10e12/L    Hemoglobin 13.5 11.7 - 15.7 g/dL    Hematocrit 40.1 35.0 - 47.0 %    MCV 93 78 - 100 fl    MCH 31.3 26.5 - 33.0 pg    MCHC 33.7 31.5 - 36.5 g/dL    RDW 12.4 10.0 - 15.0 %    Platelet Count 371 150 - 450 10e9/L    Diff Method Automated Method     % Neutrophils 76.0 %    % Lymphocytes 14.5 %    % Monocytes 6.0 %    % Eosinophils 2.7 %    % Basophils 0.4 %    % Immature Granulocytes 0.4 %    Nucleated RBCs 0 0 /100    Absolute Neutrophil 10.8 (H) 1.6 - 8.3 10e9/L    Absolute  Lymphocytes 2.1 0.8 - 5.3 10e9/L    Absolute Monocytes 0.9 0.0 - 1.3 10e9/L    Absolute Eosinophils 0.4 0.0 - 0.7 10e9/L    Absolute Basophils 0.1 0.0 - 0.2 10e9/L    Abs Immature Granulocytes 0.1 0 - 0.4 10e9/L    Absolute Nucleated RBC 0.0    Comprehensive metabolic panel   Result Value Ref Range    Sodium 136 133 - 144 mmol/L    Potassium 3.3 (L) 3.4 - 5.3 mmol/L    Chloride 104 94 - 109 mmol/L    Carbon Dioxide 25 20 - 32 mmol/L    Anion Gap 7 3 - 14 mmol/L    Glucose 97 70 - 99 mg/dL    Urea Nitrogen 14 7 - 30 mg/dL    Creatinine 0.71 0.52 - 1.04 mg/dL    GFR Estimate >90 >60 mL/min/[1.73_m2]    GFR Estimate If Black >90 >60 mL/min/[1.73_m2]    Calcium 9.2 8.5 - 10.1 mg/dL    Bilirubin Total 0.5 0.2 - 1.3 mg/dL    Albumin 3.8 3.4 - 5.0 g/dL    Protein Total 7.7 6.8 - 8.8 g/dL    Alkaline Phosphatase 62 40 - 150 U/L    ALT 25 0 - 50 U/L    AST 20 0 - 45 U/L   Lactic acid whole blood   Result Value Ref Range    Lactic Acid 1.3 0.7 - 2.0 mmol/L   Wound Culture Aerobic Bacterial   Result Value Ref Range    Specimen Description Right Calf Wound     Special Requests Specimen collected in eSwab transport (white cap)     Culture Micro PENDING    POC US SOFT TISSUE    Narrative    Limited Soft Tissue Ultrasound, performed and interpreted by me.    Indication:  Skin redness. Evaluate for cellulitis vs abscess.     Body location: left lower extremity    Findings:  There is no cobblestoning suggestive of cellulitis in the evaluated area. There is no fluid collection to suggest abscess. No foreign body identified    IMPRESSION: No cellulitis or abscess.         Clostridium difficile toxin B PCR   Result Value Ref Range    Specimen Description Feces     C Diff Toxin B PCR Positive (A) NEG^Negative       Medications   lactated ringers BOLUS 1,000 mL (0 mLs Intravenous Stopped 10/22/19 1808)       Assessments & Plan (with Medical Decision Making)  Carolina Hernandez is a 54 year old female who presents to ED with neckache,  headache with onset of symptoms this past Saturday.  Pt reports onset of chills and diarrhea 10-15 episodes of yellow watery/food particles today.  Pt reports recently gored by a goat 10/5/2019 and was prescribed bactrim and cleocin and did not take as prescribed.  Last dose of antibiotics that was treatment for the leg injury was one week ago.  On exam patient has a wound that appears to have partially dehisced but appears to be healing well there is mild erythema around the edge but no acute erythema or warmth there is a firmness distal to the wound that could possibly be concerning for abscess.  This area was ultrasounded and no evidence of abscess or cellulitis noted anywhere around the wound.  CBC completed to be evaluated for acute infectious process and slightly elevated white blood cell count is noted.  Conference of metabolic panel obtained and this is unremarkable.  Lactic acid was complained and this was normal.  Wound culture was obtained also to rule out any wound infection.  Due to the diarrhea C. difficile stool culture was obtained.  With all of the findings above discussed with patient that the concern would be possible C. difficile or viral gastritis.  At this point in time no indication for antibiotics as needed.  Consulted with Dr. Moustapha Graham who is in agreement with assessment and plan.  Patient discharged in stable condition.       I have reviewed the nursing notes.    I have reviewed the findings, diagnosis, plan and need for follow up with the patient.    Discharge Medication List as of 10/22/2019  6:08 PM          Final diagnoses:   Headache   Chills   Diarrhea       10/22/2019   [facility] EMERGENCY DEPARTMENT     Christine Rankin, VALERIA CNP  10/23/19 1155

## 2021-08-24 DIAGNOSIS — R19.7 DIARRHEA, UNSPECIFIED TYPE: ICD-10-CM

## 2021-08-24 RX ORDER — LOPERAMIDE HYDROCHLORIDE 2 MG/1
TABLET ORAL
Qty: 90 TABLET | Refills: 4 | Status: SHIPPED | OUTPATIENT
Start: 2021-08-24 | End: 2022-03-29

## 2021-08-30 ENCOUNTER — OFFICE VISIT (OUTPATIENT)
Dept: ORTHOPEDICS | Facility: CLINIC | Age: 56
End: 2021-08-30
Payer: COMMERCIAL

## 2021-08-30 VITALS
HEART RATE: 86 BPM | WEIGHT: 159 LBS | DIASTOLIC BLOOD PRESSURE: 89 MMHG | SYSTOLIC BLOOD PRESSURE: 146 MMHG | HEIGHT: 63 IN | BODY MASS INDEX: 28.17 KG/M2

## 2021-08-30 DIAGNOSIS — M17.0 PRIMARY OSTEOARTHRITIS OF BOTH KNEES: Primary | ICD-10-CM

## 2021-08-30 DIAGNOSIS — M25.561 CHRONIC PAIN OF BOTH KNEES: ICD-10-CM

## 2021-08-30 DIAGNOSIS — M25.562 CHRONIC PAIN OF BOTH KNEES: ICD-10-CM

## 2021-08-30 DIAGNOSIS — G89.29 CHRONIC PAIN OF BOTH KNEES: ICD-10-CM

## 2021-08-30 PROCEDURE — 99243 OFF/OP CNSLTJ NEW/EST LOW 30: CPT | Performed by: ORTHOPAEDIC SURGERY

## 2021-08-30 ASSESSMENT — PAIN SCALES - GENERAL: PAINLEVEL: MILD PAIN (3)

## 2021-08-30 ASSESSMENT — MIFFLIN-ST. JEOR: SCORE: 1270.35

## 2021-08-30 NOTE — PROGRESS NOTES
CHIEF COMPLAINT:   Chief Complaint   Patient presents with     Knee Pain     Bilateral knee pain. Last injection with Dr. Gaspar: 8/20/21. Onset: 20 years but has gotten much worse in the last month in left knee. Pain would radiate down the leg to the ankle. Left knee is worse than right. The injections helped a lot. Pain is anterior. She has tried cortisone before as well which has helped.      Carolina Hernandez is seen today in the [facility] for evaluation of bilateral knee pain at the request of Dr. Shawanda Gaspar             HISTORY OF PRESENT ILLNESS    Carolina Hernandez is a 56 year old female seen for evaluation of ongoing bilateral knee pain with no known injury, left more than right.   Pain has been present for 20 years, worse the past month left knee. Locates pain along the inner aspect and front of the knees, and can radiate down the leg to the ankle. Pain is worse with work, lifting/carrying things, gardening. Treatment has been occasional use of over the counter pain medications without relief, Had injections with cortisone 8/20/2021 (previously 2/2019), seemed to help, but can still feel some achiness.    Pain at rest, pain at night better with injections. Sleeps with pillow between knees. Has tried tylenol arthritis and ibuprofen for pain but doesn't seem to really help.    Left knee injury at 10 years old.    Has chronic low back pain, denies numbness and tingling.    Present symptoms: pain medially  and anteriorly, pain dull/achy , mild pain.    Pain severity: 3/10  Frequency of symptoms: are constant  Exacerbating Factors: weight bearing, carrying and lifting things, gardening, kneeling, stairs  Relieving Factors: rest, ice  Night Pain: Yes  Pain while at rest: Yes   Numbness or tingling: No   Patient has tried:     NSAIDS: Yes , not recently in the past couple of years.     Physical Therapy: No      Activity modification: Yes      Bracing: No      Injections:  Yes 8/20/2021.     Ice: Yes      Assistive device:  No     Other: tylenol.      Other PMH:  has a past medical history of Asthma, C. difficile colitis, Depression, Osteoarthritis, and Sinusitis, chronic.  Patient Active Problem List   Diagnosis     CARDIOVASCULAR SCREENING; LDL GOAL LESS THAN 160     Recurrent major depressive disorder, in remission (H)     Encounter for tobacco use cessation counseling     Mild intermittent asthma without complication     Essential hypertension       Surgical Hx:  has no past surgical history on file.    Medications:   Current Outpatient Medications:      albuterol (PROAIR HFA/PROVENTIL HFA/VENTOLIN HFA) 108 (90 Base) MCG/ACT inhaler, Inhale 1-2 puffs into the lungs every 4 hours as needed for shortness of breath / dyspnea or wheezing For shortness of breath, Disp: 18 g, Rfl: 11     albuterol (PROVENTIL) (2.5 MG/3ML) 0.083% neb solution, Take 1 vial (2.5 mg) by nebulization every 4 hours as needed for shortness of breath / dyspnea or wheezing, Disp: 75 mL, Rfl: 11     amLODIPine (NORVASC) 2.5 MG tablet, Take 2 tablets (5 mg) by mouth daily Please fill at patient request, Disp: 180 tablet, Rfl: 3     cetirizine (ZYRTEC) 10 MG tablet, Take 10 mg by mouth daily, Disp: , Rfl:      FLUoxetine (PROZAC) 20 MG capsule, Take 1 capsule (20 mg) by mouth daily, Disp: 90 capsule, Rfl: 3     FLUoxetine (PROZAC) 40 MG capsule, Take 1 capsule (40 mg) by mouth daily Take with 20 mg tab for a total of 60 mg daily, Disp: 90 capsule, Rfl: 3     fluticasone-salmeterol (ADVAIR) 500-50 MCG/DOSE inhaler, Inhale 1 puff into the lungs every 12 hours Please prescribe the generic ( Wixela ), Disp: 1 Inhaler, Rfl: 11     hydrochlorothiazide (HYDRODIURIL) 25 MG tablet, Take 1 tablet (25 mg) by mouth once daily, Disp: 90 tablet, Rfl: 3     loperamide (IMODIUM A-D) 2 MG tablet, Take as needed for loose stools. Increase as needed. Do not use more than  8 tabs (16 mg) per day., Disp: 90 tablet, Rfl: 4      montelukast (SINGULAIR) 10 MG tablet, Take 1 tablet (10 mg) by mouth at bedtime., Disp: 90 tablet, Rfl: 1     multivitamin  with lutein (OCUVITE WITH LTEIN) CAPS per capsule, Take 1 capsule by mouth daily (Patient not taking: Reported on 7/14/2021), Disp: , Rfl:      nicotine (NICODERM CQ) 7 MG/24HR 24 hr patch, Place 1 patch onto the skin every 24 hours (Patient not taking: Reported on 7/14/2021), Disp: 30 patch, Rfl: 3     omeprazole 20 MG tablet, Take 1 tablet (20 mg) by mouth daily, Disp: 90 tablet, Rfl: 3     rifaximin (XIFAXAN) 550 MG TABS tablet, Take 200 mg by mouth 2 times daily (Patient not taking: Reported on 7/14/2021), Disp: , Rfl:     Current Facility-Administered Medications:      lidocaine 1 % injection 2 mL, 2 mL, , , Shawanda Gaspar MD, 2 mL at 08/20/21 1437     lidocaine 1 % injection 2 mL, 2 mL, , , hSawanda Gaspar MD, 2 mL at 08/20/21 1437     triamcinolone (KENALOG-40) injection 40 mg, 40 mg, , , Shawanda Gaspar MD, 40 mg at 08/20/21 1437     triamcinolone (KENALOG-40) injection 40 mg, 40 mg, , , Shawanda Gaspar MD, 40 mg at 08/20/21 1437    Allergies:   Allergies   Allergen Reactions     Penicillins Itching       Social Hx: retired ().   reports that she quit smoking about 6 weeks ago. Her smoking use included cigarettes. She has a 11.50 pack-year smoking history. She has never used smokeless tobacco. She reports current alcohol use. She reports that she does not use drugs.    Family Hx: family history includes Cancer in her daughter and maternal grandfather; Coronary Artery Disease in her father and paternal grandfather; Diabetes in her daughter; Glaucoma in her father; Schizophrenia in her daughter.    REVIEW OF SYSTEMS: 10 point ROS neg other than the symptoms noted above in the HPI and PMH. Notables include  CONSTITUTIONAL:NEGATIVE for fever, chills, change in weight  INTEGUMENTARY/SKIN: NEGATIVE for worrisome rashes, moles or lesions  MUSCULOSKELETAL:See HPI  "above  NEURO: NEGATIVE for weakness, dizziness or paresthesias    PHYSICAL EXAM:  BP (!) 146/89   Pulse 86   Ht 1.6 m (5' 3\")   Wt 72.1 kg (159 lb)   BMI 28.17 kg/m     GENERAL APPEARANCE: healthy, alert, no distress  SKIN: no suspicious lesions or rashes  NEURO: Normal strength and tone, mentation intact and speech normal  PSYCH:  mentation appears normal and affect normal, not anxious  RESPIRATORY: No increased work of breathing.  HANDS: no clubbing, nail pitting  LYMPH: no palpable popliteal lymphadenopathy.    BILATERAL LOWER EXTREMITIES:  Gait: normal  Alignment: varus  No gross deformities or masses.  No Quad atrophy, strength normal.  Intact sensation deep peroneal nerve, superficial peroneal nerve, med/lat tibial nerve, sural nerve, saphenous nerve  Intact EHL, EDL, TA, FHL, GS, quadriceps hamstrings and hip flexors  Toes warm and well perfused, brisk capillary refill. Palpable 2+ dp pulses.  Bilateral calf soft and nttp or squeeze.  DTRs: achilles 2+, patella 2+.  Edema: trace    LEFT KNEE EXAM:    Skin: intact, no ecchymosis or erythema  Squat: 100 %, not limited by pain.     ROM: full extension to 125 flexion  Tight hamstrings on straight leg raise.  Effusion: trace  Tender: medial joint line, pes  NTTP lat joint line, anterior or posterior knee  McMurrays: negative    MCL: stable, and non-painful at both 0 and 30 degrees knee flexion  Varus stress: stable, and non-painful at both 0 and 30 degrees knee flexion  Lachmans: neg, firm endpoint  Posterior Drawer stable  Patellofemoral joint:                Apprehension: negative              Crepitations: mild   Grind: positive.    RIGHT KNEE EXAM:    Skin: intact, no ecchymosis or erythema  Squat: 100 %, not limited by pain.     ROM: full extension to 125+ flexion  Tight hamstrings on straight leg raise.  Effusion: none  Tender: medial joint line  NTTP lat joint line, anterior or posterior knee  McMurrays: negative    MCL: stable, and non-painful at both " "0 and 30 degrees knee flexion  Varus stress: stable, and non-painful at both 0 and 30 degrees knee flexion  Lachmans: neg, firm endpoint  Posterior Drawer stable  Patellofemoral joint:                Apprehension: negative              Crepitations: mild   Grind: positive.    X-RAY:  3 views bilateral knee from 8/20/2021 were reviewed in clinic today. On my review, no obvious fractures or dislocations.  Bilateral medial compartment severe joint space narrowing, bone bone bone with articular flattening, subcondral sclerosis. Bilateral patellofemoral lateral facet mild joint space narrowing with medial  patello-femoral osteophytes.           ASSESSMENT/PLAN: Carolina Hernandez is a 56 year old female with chronic bilateral knee pain, advanced primary osteoarthritis.     * reviewed imaging studies with patient, showing arthritic changes, or wearing of the cartilage in the knee. This can be caused by normal \"wear and tear\" over the years or following prior injury to the knee.    Treatment typically starts nonsurgically. Surgical indication for total knee arthroplasty  when nonsurgical management is no longer effective.    Non-surgical treatment for knee arthritis includes:    * rest, sitting  * Activity modification - avoid impact activities or activities that aggravate symptoms.  * NSAIDS (non-steroidal anti-inflammatory medications; e.g. Aleve, advil, motrin, ibuprofen) - regular use for inflammation ( twice daily or three times daily), with food, as long as no contra-indications Please discuss with primary care doctor if needed  * ice, 15-20 minutes at a time several times a day or as needed.  * Strengthening of quadriceps muscles  * Physical Therapy for strengthening, stretching and range of motion exercises of legs  * Tylenol as needed for pain, consider Tylenol arthritis or similar  * Weight loss: Weight loss:  Body mass index is 28.17 kg/m .. weight loss benefits, not only for the current pain symptoms, but also " "overall health. Recommend a good diet plan that works for the patient, with the assistance of a dietician or primary care doctor as needed. Also, a good, low-impact exercise program for at least 20 minutes per day, 3 times per week, such as exercise bike, elliptical , or pool.  * Exercise: low impact such as stationary bike, elliptical, pool.  * Injections: cortisone versus viscosupplementation (hyaluronic acid, \"rooster comb\", \"gel shots\"); risks and perceived benefits discussed today. Given recent injection, will hold off at this time. Will place pre-authorization for hyaluronic acid injections today.  * Bracing: bracing the knee may offer some relief of symptoms when worn and provide some stability.  * over the counter supplements such as glucosamine and chondroitin sulfate may help with joint pain.  * topical ointments may help as well    * return to clinic as needed.          Giovanny Chong M.D., M.S.  Dept. of Orthopaedic Surgery  [facility]      "

## 2021-08-30 NOTE — LETTER
8/30/2021         RE: Carolina Hernandez  [address] [place] [id number]        Dear Colleague,    Thank you for referring your patient, Carolina Hernandez, to the [facility]. Please see a copy of my visit note below.    CHIEF COMPLAINT:   Chief Complaint   Patient presents with     Knee Pain     Bilateral knee pain. Last injection with Dr. Gaspar: 8/20/21. Onset: 20 years but has gotten much worse in the last month in left knee. Pain would radiate down the leg to the ankle. Left knee is worse than right. The injections helped a lot. Pain is anterior. She has tried cortisone before as well which has helped.      Carolina Hernandez is seen today in the [facility] for evaluation of bilateral knee pain at the request of Dr. Shawanda Gaspar             HISTORY OF PRESENT ILLNESS    Carolina Hernandez is a 56 year old female seen for evaluation of ongoing bilateral knee pain with no known injury, left more than right.   Pain has been present for 20 years, worse the past month left knee. Locates pain along the inner aspect and front of the knees, and can radiate down the leg to the ankle. Pain is worse with work, lifting/carrying things, gardening. Treatment has been occasional use of over the counter pain medications without relief, Had injections with cortisone 8/20/2021 (previously 2/2019), seemed to help, but can still feel some achiness.    Pain at rest, pain at night better with injections. Sleeps with pillow between knees. Has tried tylenol arthritis and ibuprofen for pain but doesn't seem to really help.    Left knee injury at 10 years old.    Has chronic low back pain, denies numbness and tingling.    Present symptoms: pain medially  and anteriorly, pain dull/achy , mild pain.    Pain severity: 3/10  Frequency of symptoms: are constant  Exacerbating Factors: weight bearing, carrying and lifting things, gardening, kneeling,  stairs  Relieving Factors: rest, ice  Night Pain: Yes  Pain while at rest: Yes   Numbness or tingling: No   Patient has tried:     NSAIDS: Yes , not recently in the past couple of years.     Physical Therapy: No      Activity modification: Yes      Bracing: No      Injections: Yes 8/20/2021.     Ice: Yes      Assistive device:  No     Other: tylenol.      Other PMH:  has a past medical history of Asthma, C. difficile colitis, Depression, Osteoarthritis, and Sinusitis, chronic.  Patient Active Problem List   Diagnosis     CARDIOVASCULAR SCREENING; LDL GOAL LESS THAN 160     Recurrent major depressive disorder, in remission (H)     Encounter for tobacco use cessation counseling     Mild intermittent asthma without complication     Essential hypertension       Surgical Hx:  has no past surgical history on file.    Medications:   Current Outpatient Medications:      albuterol (PROAIR HFA/PROVENTIL HFA/VENTOLIN HFA) 108 (90 Base) MCG/ACT inhaler, Inhale 1-2 puffs into the lungs every 4 hours as needed for shortness of breath / dyspnea or wheezing For shortness of breath, Disp: 18 g, Rfl: 11     albuterol (PROVENTIL) (2.5 MG/3ML) 0.083% neb solution, Take 1 vial (2.5 mg) by nebulization every 4 hours as needed for shortness of breath / dyspnea or wheezing, Disp: 75 mL, Rfl: 11     amLODIPine (NORVASC) 2.5 MG tablet, Take 2 tablets (5 mg) by mouth daily Please fill at patient request, Disp: 180 tablet, Rfl: 3     cetirizine (ZYRTEC) 10 MG tablet, Take 10 mg by mouth daily, Disp: , Rfl:      FLUoxetine (PROZAC) 20 MG capsule, Take 1 capsule (20 mg) by mouth daily, Disp: 90 capsule, Rfl: 3     FLUoxetine (PROZAC) 40 MG capsule, Take 1 capsule (40 mg) by mouth daily Take with 20 mg tab for a total of 60 mg daily, Disp: 90 capsule, Rfl: 3     fluticasone-salmeterol (ADVAIR) 500-50 MCG/DOSE inhaler, Inhale 1 puff into the lungs every 12 hours Please prescribe the generic ( Wixela ), Disp: 1 Inhaler, Rfl: 11      hydrochlorothiazide (HYDRODIURIL) 25 MG tablet, Take 1 tablet (25 mg) by mouth once daily, Disp: 90 tablet, Rfl: 3     loperamide (IMODIUM A-D) 2 MG tablet, Take as needed for loose stools. Increase as needed. Do not use more than  8 tabs (16 mg) per day., Disp: 90 tablet, Rfl: 4     montelukast (SINGULAIR) 10 MG tablet, Take 1 tablet (10 mg) by mouth at bedtime., Disp: 90 tablet, Rfl: 1     multivitamin  with lutein (OCUVITE WITH LTEIN) CAPS per capsule, Take 1 capsule by mouth daily (Patient not taking: Reported on 7/14/2021), Disp: , Rfl:      nicotine (NICODERM CQ) 7 MG/24HR 24 hr patch, Place 1 patch onto the skin every 24 hours (Patient not taking: Reported on 7/14/2021), Disp: 30 patch, Rfl: 3     omeprazole 20 MG tablet, Take 1 tablet (20 mg) by mouth daily, Disp: 90 tablet, Rfl: 3     rifaximin (XIFAXAN) 550 MG TABS tablet, Take 200 mg by mouth 2 times daily (Patient not taking: Reported on 7/14/2021), Disp: , Rfl:     Current Facility-Administered Medications:      lidocaine 1 % injection 2 mL, 2 mL, , , Shawanda Gaspar MD, 2 mL at 08/20/21 1437     lidocaine 1 % injection 2 mL, 2 mL, , , Shawanda Gaspar MD, 2 mL at 08/20/21 1437     triamcinolone (KENALOG-40) injection 40 mg, 40 mg, , , Shawanda Gaspar MD, 40 mg at 08/20/21 1437     triamcinolone (KENALOG-40) injection 40 mg, 40 mg, , , Shawanda Gaspar MD, 40 mg at 08/20/21 1437    Allergies:   Allergies   Allergen Reactions     Penicillins Itching       Social Hx: retired ().   reports that she quit smoking about 6 weeks ago. Her smoking use included cigarettes. She has a 11.50 pack-year smoking history. She has never used smokeless tobacco. She reports current alcohol use. She reports that she does not use drugs.    Family Hx: family history includes Cancer in her daughter and maternal grandfather; Coronary Artery Disease in her father and paternal grandfather; Diabetes in her daughter; Glaucoma in her father; Schizophrenia in her  "daughter.    REVIEW OF SYSTEMS: 10 point ROS neg other than the symptoms noted above in the HPI and PMH. Notables include  CONSTITUTIONAL:NEGATIVE for fever, chills, change in weight  INTEGUMENTARY/SKIN: NEGATIVE for worrisome rashes, moles or lesions  MUSCULOSKELETAL:See HPI above  NEURO: NEGATIVE for weakness, dizziness or paresthesias    PHYSICAL EXAM:  BP (!) 146/89   Pulse 86   Ht 1.6 m (5' 3\")   Wt 72.1 kg (159 lb)   BMI 28.17 kg/m     GENERAL APPEARANCE: healthy, alert, no distress  SKIN: no suspicious lesions or rashes  NEURO: Normal strength and tone, mentation intact and speech normal  PSYCH:  mentation appears normal and affect normal, not anxious  RESPIRATORY: No increased work of breathing.  HANDS: no clubbing, nail pitting  LYMPH: no palpable popliteal lymphadenopathy.    BILATERAL LOWER EXTREMITIES:  Gait: normal  Alignment: varus  No gross deformities or masses.  No Quad atrophy, strength normal.  Intact sensation deep peroneal nerve, superficial peroneal nerve, med/lat tibial nerve, sural nerve, saphenous nerve  Intact EHL, EDL, TA, FHL, GS, quadriceps hamstrings and hip flexors  Toes warm and well perfused, brisk capillary refill. Palpable 2+ dp pulses.  Bilateral calf soft and nttp or squeeze.  DTRs: achilles 2+, patella 2+.  Edema: trace    LEFT KNEE EXAM:    Skin: intact, no ecchymosis or erythema  Squat: 100 %, not limited by pain.     ROM: full extension to 125 flexion  Tight hamstrings on straight leg raise.  Effusion: trace  Tender: medial joint line, pes  NTTP lat joint line, anterior or posterior knee  McMurrays: negative    MCL: stable, and non-painful at both 0 and 30 degrees knee flexion  Varus stress: stable, and non-painful at both 0 and 30 degrees knee flexion  Lachmans: neg, firm endpoint  Posterior Drawer stable  Patellofemoral joint:                Apprehension: negative              Crepitations: mild   Grind: positive.    RIGHT KNEE EXAM:    Skin: intact, no ecchymosis or " "erythema  Squat: 100 %, not limited by pain.     ROM: full extension to 125+ flexion  Tight hamstrings on straight leg raise.  Effusion: none  Tender: medial joint line  NTTP lat joint line, anterior or posterior knee  McMurrays: negative    MCL: stable, and non-painful at both 0 and 30 degrees knee flexion  Varus stress: stable, and non-painful at both 0 and 30 degrees knee flexion  Lachmans: neg, firm endpoint  Posterior Drawer stable  Patellofemoral joint:                Apprehension: negative              Crepitations: mild   Grind: positive.    X-RAY:  3 views bilateral knee from 8/20/2021 were reviewed in clinic today. On my review, no obvious fractures or dislocations.  Bilateral medial compartment severe joint space narrowing, bone bone bone with articular flattening, subcondral sclerosis. Bilateral patellofemoral lateral facet mild joint space narrowing with medial  patello-femoral osteophytes.           ASSESSMENT/PLAN: Carolina Hernandez is a 56 year old female with chronic bilateral knee pain, advanced primary osteoarthritis.     * reviewed imaging studies with patient, showing arthritic changes, or wearing of the cartilage in the knee. This can be caused by normal \"wear and tear\" over the years or following prior injury to the knee.    Treatment typically starts nonsurgically. Surgical indication for total knee arthroplasty  when nonsurgical management is no longer effective.    Non-surgical treatment for knee arthritis includes:    * rest, sitting  * Activity modification - avoid impact activities or activities that aggravate symptoms.  * NSAIDS (non-steroidal anti-inflammatory medications; e.g. Aleve, advil, motrin, ibuprofen) - regular use for inflammation ( twice daily or three times daily), with food, as long as no contra-indications Please discuss with primary care doctor if needed  * ice, 15-20 minutes at a time several times a day or as needed.  * Strengthening of quadriceps muscles  * Physical " "Therapy for strengthening, stretching and range of motion exercises of legs  * Tylenol as needed for pain, consider Tylenol arthritis or similar  * Weight loss: Weight loss:  Body mass index is 28.17 kg/m .. weight loss benefits, not only for the current pain symptoms, but also overall health. Recommend a good diet plan that works for the patient, with the assistance of a dietician or primary care doctor as needed. Also, a good, low-impact exercise program for at least 20 minutes per day, 3 times per week, such as exercise bike, elliptical , or pool.  * Exercise: low impact such as stationary bike, elliptical, pool.  * Injections: cortisone versus viscosupplementation (hyaluronic acid, \"rooster comb\", \"gel shots\"); risks and perceived benefits discussed today. Given recent injection, will hold off at this time. Will place pre-authorization for hyaluronic acid injections today.  * Bracing: bracing the knee may offer some relief of symptoms when worn and provide some stability.  * over the counter supplements such as glucosamine and chondroitin sulfate may help with joint pain.  * topical ointments may help as well    * return to clinic as needed.          Giovanny Chong M.D., M.S.  Dept. of Orthopaedic Surgery  [facility]          Again, thank you for allowing me to participate in the care of your patient.        Sincerely,        Giovanny Chong MD    "

## 2021-09-01 DIAGNOSIS — J45.20 MILD INTERMITTENT ASTHMA WITHOUT COMPLICATION: ICD-10-CM

## 2021-09-01 NOTE — TELEPHONE ENCOUNTER
"Requested Prescriptions   Pending Prescriptions Disp Refills     montelukast (SINGULAIR) 10 MG tablet [Pharmacy Med Name: Montelukast Sodium Oral Tablet 10 MG] 90 tablet 0     Sig: Take 1 tablet (10 mg) by mouth at bedtime.       Leukotriene Inhibitors Protocol Failed - 9/1/2021 11:52 AM        Failed - Asthma control assessment score within normal limits in last 6 months     Please review ACT score.           Passed - Patient is age 12 or older     If patient is under 16, ok to refill using age based dosing.           Passed - Medication is active on med list        Passed - Recent (6 mo) or future (30 days) visit within the authorizing provider's specialty     Patient had office visit in the last 6 months or has a visit in the next 30 days with authorizing provider or within the authorizing provider's specialty.  See \"Patient Info\" tab in inbasket, or \"Choose Columns\" in Meds & Orders section of the refill encounter.                 "

## 2021-09-02 RX ORDER — MONTELUKAST SODIUM 10 MG/1
10 TABLET ORAL AT BEDTIME
Qty: 90 TABLET | Refills: 0 | Status: SHIPPED | OUTPATIENT
Start: 2021-09-02 | End: 2021-12-12

## 2021-09-11 ENCOUNTER — HEALTH MAINTENANCE LETTER (OUTPATIENT)
Age: 56
End: 2021-09-11

## 2021-09-16 ENCOUNTER — THERAPY VISIT (OUTPATIENT)
Dept: PHYSICAL THERAPY | Facility: CLINIC | Age: 56
End: 2021-09-16
Attending: ORTHOPAEDIC SURGERY
Payer: COMMERCIAL

## 2021-09-16 DIAGNOSIS — M17.0 PRIMARY OSTEOARTHRITIS OF BOTH KNEES: ICD-10-CM

## 2021-09-16 PROCEDURE — 97161 PT EVAL LOW COMPLEX 20 MIN: CPT | Mod: GP | Performed by: PHYSICAL THERAPIST

## 2021-09-16 PROCEDURE — 97110 THERAPEUTIC EXERCISES: CPT | Mod: GP | Performed by: PHYSICAL THERAPIST

## 2021-09-16 ASSESSMENT — ACTIVITIES OF DAILY LIVING (ADL)
AS_A_RESULT_OF_YOUR_KNEE_INJURY,_HOW_WOULD_YOU_RATE_YOUR_CURRENT_LEVEL_OF_DAILY_ACTIVITY?: SEVERELY ABNORMAL
GO UP STAIRS: ACTIVITY IS VERY DIFFICULT
KNEE_ACTIVITY_OF_DAILY_LIVING_SUM: 32
KNEE_ACTIVITY_OF_DAILY_LIVING_SCORE: 45.71
WEAKNESS: THE SYMPTOM AFFECTS MY ACTIVITY SEVERELY
STIFFNESS: THE SYMPTOM AFFECTS MY ACTIVITY MODERATELY
LIMPING: I HAVE THE SYMPTOM BUT IT DOES NOT AFFECT MY ACTIVITY
KNEEL ON THE FRONT OF YOUR KNEE: ACTIVITY IS VERY DIFFICULT
STAND: ACTIVITY IS FAIRLY DIFFICULT
SWELLING: THE SYMPTOM AFFECTS MY ACTIVITY SLIGHTLY
SIT WITH YOUR KNEE BENT: ACTIVITY IS NOT DIFFICULT
HOW_WOULD_YOU_RATE_THE_OVERALL_FUNCTION_OF_YOUR_KNEE_DURING_YOUR_USUAL_DAILY_ACTIVITIES?: SEVERELY ABNORMAL
PAIN: THE SYMPTOM AFFECTS MY ACTIVITY SLIGHTLY
GIVING WAY, BUCKLING OR SHIFTING OF KNEE: THE SYMPTOM AFFECTS MY ACTIVITY SEVERELY
WALK: ACTIVITY IS NOT DIFFICULT
HOW_WOULD_YOU_RATE_THE_CURRENT_FUNCTION_OF_YOUR_KNEE_DURING_YOUR_USUAL_DAILY_ACTIVITIES_ON_A_SCALE_FROM_0_TO_100_WITH_100_BEING_YOUR_LEVEL_OF_KNEE_FUNCTION_PRIOR_TO_YOUR_INJURY_AND_0_BEING_THE_INABILITY_TO_PERFORM_ANY_OF_YOUR_USUAL_DAILY_ACTIVITIES?: 30
SQUAT: I AM UNABLE TO DO THE ACTIVITY
RAW_SCORE: 32
RISE FROM A CHAIR: ACTIVITY IS SOMEWHAT DIFFICULT
GO DOWN STAIRS: ACTIVITY IS VERY DIFFICULT

## 2021-09-16 NOTE — PROGRESS NOTES
Physical Therapy Initial Evaluation  Subjective:  The history is provided by the patient.   Patient Health History  Carolina Hernandez being seen for Knee pain.     Problem began: 8/6/2021.   Problem occurred: Trying to do things, like lifting,  and carrying things that are too heavy for me to lift   Pain is reported as 3/10 on pain scale.  General health as reported by patient is poor.  Pertinent medical history includes: asthma.     Medical allergies: none.   Surgeries include:  None.    Current medications:  Anti-depressants and high blood pressure medication.    Current occupation is Retired.   Primary job tasks include:  Driving, lifting/carrying and other.   Other job/home tasks details: Cleaning, laundry,gardening,  taking care of animals, yard maintenance.                Therapist Generated HPI Evaluation  Problem details: L > R knee pain.  Sx have been getting worse over the years..         Type of problem:  Bilateral knees.    This is a chronic condition.  Condition occurred with:  Degenerative joint disease.  Where condition occurred: for unknown reasons.  Patient reports pain:  Sub patellar, in the joint and anterior.  Pain is described as aching and sharp and is constant.  Pain is worse during the day.  Progression since onset: improved with injections slightly.  Associated symptoms:  Loss of motion/stiffness, loss of strength and catching. Symptoms are exacerbated by bending/squatting, ascending stairs, descending stairs, standing and walking  and relieved by rest and ice.  Special tests included:  X-ray (severe medial joint space narrowing).  Past treatment: injection to bilat knee. There was mild improvement following previous treatment.                          Objective:  Standing Alignment:              Knee:  Normal      Gait:    Gait Type:  Normal                                                           Knee Evaluation:  ROM:  Prom wnl knee: slight crepitus bilat.    AROM      Extension:  Left:  0    Right:  0  Flexion: Left: 122    Right: 128        Strength:     Extension:  Left: 4+/5   Pain:      Right: 4+/5   Pain:  Flexion:  Left: 5/5   Pain:      Right: 5/5   Pain:    Quad Set Left: Good    Pain:   Quad Set Right: Good    Pain:  Ligament Testing:  Normal                Special Tests:   Left knee positive for the following special tests:  Patellar Compression  Left knee negative for the following special tests:  Plica and Meninscal  Right knee positive for the following tests:  Patellar Compression  Right knee negative for the following special tests:  Plica and Meniscal  Palpation:  Palpation of knee: denies tenderness.      Edema:  Normal      Functional Testing:          Quad:    Single Leg Squat:  Left:       Moderate loss of control  Right:       Moderate loss of control  Bilateral Leg Squat:   Mild loss of control              General     ROS    Assessment/Plan:    Patient is a 56 year old female with both sides knee complaints.    Patient has the following significant findings with corresponding treatment plan.                Diagnosis 1:  bilat knee pain with medial joint space narrowing Pain -  self management, education, directional preference exercise and home program  Decreased strength - therapeutic exercise and therapeutic activities  Impaired muscle performance - neuro re-education  Decreased function - therapeutic activities    Therapy Evaluation Codes:   1) History comprised of:   Personal factors that impact the plan of care:      None.    Comorbidity factors that impact the plan of care are:      Asthma.     Medications impacting care: Anti-depressant and High blood pressure.  2) Examination of Body Systems comprised of:   Body structures and functions that impact the plan of care:      Knee.   Activity limitations that impact the plan of care are:      Squatting/kneeling, Stairs and Walking.  3) Clinical presentation characteristics  are:   Stable/Uncomplicated.  4) Decision-Making    Low complexity using standardized patient assessment instrument and/or measureable assessment of functional outcome.  Cumulative Therapy Evaluation is: Low complexity.    Previous and current functional limitations:  (See Goal Flow Sheet for this information)    Short term and Long term goals: (See Goal Flow Sheet for this information)     Communication ability:  Patient appears to be able to clearly communicate and understand verbal and written communication and follow directions correctly.  Treatment Explanation - The following has been discussed with the patient:   RX ordered/plan of care  Anticipated outcomes  Possible risks and side effects  This patient would benefit from PT intervention to resume normal activities.   Rehab potential is good.    Frequency:  1 X week, once daily  Duration:  for 6 weeks  Discharge Plan:  Achieve all LTG.  Independent in home treatment program.  Reach maximal therapeutic benefit.    Please refer to the daily flowsheet for treatment today, total treatment time and time spent performing 1:1 timed codes.

## 2021-09-22 DIAGNOSIS — I10 ESSENTIAL HYPERTENSION: ICD-10-CM

## 2021-09-23 ENCOUNTER — THERAPY VISIT (OUTPATIENT)
Dept: PHYSICAL THERAPY | Facility: CLINIC | Age: 56
End: 2021-09-23
Payer: COMMERCIAL

## 2021-09-23 DIAGNOSIS — M17.0 PRIMARY OSTEOARTHRITIS OF BOTH KNEES: ICD-10-CM

## 2021-09-23 PROCEDURE — 97530 THERAPEUTIC ACTIVITIES: CPT | Mod: GP | Performed by: PHYSICAL THERAPIST

## 2021-09-23 PROCEDURE — 97110 THERAPEUTIC EXERCISES: CPT | Mod: GP | Performed by: PHYSICAL THERAPIST

## 2021-09-23 NOTE — TELEPHONE ENCOUNTER
"Requested Prescriptions   Pending Prescriptions Disp Refills     hydrochlorothiazide (HYDRODIURIL) 25 MG tablet [Pharmacy Med Name: hydroCHLOROthiazide Oral Tablet 25 MG] 90 tablet 0     Sig: Take 1 tablet (25 mg) by mouth once daily       Diuretics (Including Combos) Protocol Failed - 9/22/2021 11:56 AM        Failed - Blood pressure under 140/90 in past 12 months     BP Readings from Last 3 Encounters:   08/30/21 (!) 146/89   08/20/21 (!) 161/97   05/04/21 130/82                 Passed - Recent (12 mo) or future (30 days) visit within the authorizing provider's specialty     Patient has had an office visit with the authorizing provider or a provider within the authorizing providers department within the previous 12 mos or has a future within next 30 days. See \"Patient Info\" tab in inbasket, or \"Choose Columns\" in Meds & Orders section of the refill encounter.              Passed - Medication is active on med list        Passed - Patient is age 18 or older        Passed - No active pregancy on record        Passed - Normal serum creatinine on file in past 12 months     Recent Labs   Lab Test 01/26/21  1104   CR 0.72              Passed - Normal serum potassium on file in past 12 months     Recent Labs   Lab Test 01/26/21  1104   POTASSIUM 4.1                    Passed - Normal serum sodium on file in past 12 months     Recent Labs   Lab Test 01/26/21  1104                 Passed - No positive pregnancy test in past 12 months             "

## 2021-09-24 RX ORDER — HYDROCHLOROTHIAZIDE 25 MG/1
TABLET ORAL
Qty: 90 TABLET | Refills: 0 | Status: SHIPPED | OUTPATIENT
Start: 2021-09-24 | End: 2021-12-21

## 2021-09-30 ENCOUNTER — THERAPY VISIT (OUTPATIENT)
Dept: PHYSICAL THERAPY | Facility: CLINIC | Age: 56
End: 2021-09-30
Payer: COMMERCIAL

## 2021-09-30 DIAGNOSIS — M17.0 PRIMARY OSTEOARTHRITIS OF BOTH KNEES: ICD-10-CM

## 2021-09-30 PROCEDURE — 97110 THERAPEUTIC EXERCISES: CPT | Mod: GP | Performed by: PHYSICAL THERAPIST

## 2021-09-30 PROCEDURE — 97530 THERAPEUTIC ACTIVITIES: CPT | Mod: GP | Performed by: PHYSICAL THERAPIST

## 2021-09-30 NOTE — PROGRESS NOTES
SUBJECTIVE  Subjective changes as noted by pt: Patient reports her back has been very painful since over a week ago, getting worse. Hasn't been able to do the knee exercises as much as she'd like because the back is so sore. Taping was helpful, would like to pursue learning how to use and buying her own for home. Pt has been doing double knees to chest stretch and crunches and those seem to feel good for her back. Back is worsening over time. Intermittent episodes of LBP for most of her life, this episode started about 2 weeks ago.   Current pain level: 2/10 (6/10 low back)   Changes in function: none   Adverse reaction to treatment or activity:  None    OBJECTIVE  Changes in objective findings: No change in knees. Tolerates taping well with significant improvement in knee pain with movement.     ASSESSMENT  Carolina continues to require intervention to meet STG and LTG's: PT  Patient is not progressing as expected.  Response to therapy has shown lack of progress in  pain level and strength  Progress made towards STG/LTG?  None    PLAN  Continue current treatment plan until patient demonstrates readiness to progress to higher level exercises.    PTA/ATC plan:  N/A    Please refer to the daily flowsheet for treatment today, total treatment time and time spent performing 1:1 timed codes.

## 2021-10-07 ENCOUNTER — THERAPY VISIT (OUTPATIENT)
Dept: PHYSICAL THERAPY | Facility: CLINIC | Age: 56
End: 2021-10-07
Payer: COMMERCIAL

## 2021-10-07 DIAGNOSIS — M17.0 PRIMARY OSTEOARTHRITIS OF BOTH KNEES: ICD-10-CM

## 2021-10-07 PROCEDURE — 97110 THERAPEUTIC EXERCISES: CPT | Mod: GP

## 2021-10-13 ENCOUNTER — TELEPHONE (OUTPATIENT)
Dept: ORTHOPEDICS | Facility: CLINIC | Age: 56
End: 2021-10-13

## 2021-10-13 NOTE — TELEPHONE ENCOUNTER
Called patient and LM. Per Dr. Morgan, it is reccommended that the patient addresses the chronic back pain with primary care who can help aid her in the right direction; pain management, spine referral, etc.

## 2021-10-13 NOTE — TELEPHONE ENCOUNTER
Patient called in stating that she's having back pain since she started physical therapy. Would like to know next steps. Wants a call back today.     [phone]    Ignacia Villafana

## 2021-10-14 ENCOUNTER — TELEPHONE (OUTPATIENT)
Dept: FAMILY MEDICINE | Facility: CLINIC | Age: 56
End: 2021-10-14

## 2021-10-14 ENCOUNTER — THERAPY VISIT (OUTPATIENT)
Dept: PHYSICAL THERAPY | Facility: CLINIC | Age: 56
End: 2021-10-14
Payer: COMMERCIAL

## 2021-10-14 DIAGNOSIS — M17.0 PRIMARY OSTEOARTHRITIS OF BOTH KNEES: ICD-10-CM

## 2021-10-14 PROCEDURE — 97530 THERAPEUTIC ACTIVITIES: CPT | Mod: GP | Performed by: PHYSICAL THERAPIST

## 2021-10-14 NOTE — TELEPHONE ENCOUNTER
Pt is going to PT for knees and would like a referral for her new onset low back pain for additional pt. Pt had no injury. States just has started hurting over last couple weeks. Sometimes radiates down buttocks. No weakness or bladder issues. No past injury or pain. Does not want to wait until appt 10/25. No other openings except virtually. Scheduled visit 10-18. Pt also wants flu shot. Please cancel 10-25 appt and place on flu schedule if pt does not need after virtual appt.       Chun Gandara RN

## 2021-10-14 NOTE — PROGRESS NOTES
PROGRESS  REPORT    Progress reporting period is from 9/16/21 to 10/14/21.       SUBJECTIVE  Subjective changes noted by patient: Patient reports increased low back pain which has limited her function and ability to perform PT exercises. She is planning to see her PCP 10/25. Pain in central low back with pain down bilateral LEs. Denies numbness/tingling. Has not been able to do any exercises related to her knees due to significant back pain.    Current pain level is 5/10 (low back).     Previous pain level was 3/10.   Changes in function:  None  Adverse reaction to treatment or activity: activity - unknown - onset of LBP about 2.5 weeks ago without incident    OBJECTIVE  Changes noted in objective findings:  Patient showing significant difficulty/pain with movement, reports sharp/shooting pains with transitions. Unable to perform prescribed knee exercises d/t difficulty/pain moving.     ASSESSMENT/PLAN  Updated problem list and treatment plan: Diagnosis 1:  bilat knee pain with medial joint space narrowing   Pain -  self management, education, directional preference exercise, taping, and home program  Decreased strength - therapeutic exercise and therapeutic activities  Impaired muscle performance - neuro re-education  Decreased function - therapeutic activities  STG/LTGs have been met or progress has been made towards goals:  Yes, exacerbation  Assessment of Progress: patient experiencing new onset of back pain, limiting adherence/participation in PT for knees  Self Management Plans:  Patient has been instructed in a home treatment program.  I have re-evaluated this patient and find that the nature, scope, duration and intensity of the therapy is appropriate for the medical condition of the patient.  Carolina continues to require the following intervention to meet STG and LTG's:  PT    Recommendations:  This patient would benefit from further evaluation. She will be seeing her primary care MD on 10/18 for evaluation  of her back. We would be happy to see her for PT treatment of her back if it is deemed appropriate.   Recommend continuing previous plan for treatment of knees, as appropriate.    Please refer to the daily flowsheet for treatment today, total treatment time and time spent performing 1:1 timed codes.

## 2021-10-18 ENCOUNTER — OFFICE VISIT (OUTPATIENT)
Dept: FAMILY MEDICINE | Facility: CLINIC | Age: 56
End: 2021-10-18
Payer: COMMERCIAL

## 2021-10-18 VITALS
HEART RATE: 76 BPM | DIASTOLIC BLOOD PRESSURE: 78 MMHG | SYSTOLIC BLOOD PRESSURE: 128 MMHG | RESPIRATION RATE: 18 BRPM | OXYGEN SATURATION: 96 % | BODY MASS INDEX: 29.41 KG/M2 | WEIGHT: 166 LBS | TEMPERATURE: 98.7 F

## 2021-10-18 DIAGNOSIS — M54.41 LOW BACK PAIN DUE TO BILATERAL SCIATICA: Primary | ICD-10-CM

## 2021-10-18 DIAGNOSIS — Z23 NEED FOR PROPHYLACTIC VACCINATION AND INOCULATION AGAINST INFLUENZA: ICD-10-CM

## 2021-10-18 DIAGNOSIS — M54.42 LOW BACK PAIN DUE TO BILATERAL SCIATICA: Primary | ICD-10-CM

## 2021-10-18 PROCEDURE — 90471 IMMUNIZATION ADMIN: CPT | Performed by: FAMILY MEDICINE

## 2021-10-18 PROCEDURE — 90682 RIV4 VACC RECOMBINANT DNA IM: CPT | Performed by: FAMILY MEDICINE

## 2021-10-18 PROCEDURE — 99213 OFFICE O/P EST LOW 20 MIN: CPT | Mod: 25 | Performed by: FAMILY MEDICINE

## 2021-10-18 RX ORDER — AMOXICILLIN 500 MG
1200 CAPSULE ORAL DAILY
COMMUNITY
End: 2022-10-27

## 2021-10-18 RX ORDER — CYCLOBENZAPRINE HCL 5 MG
5 TABLET ORAL 3 TIMES DAILY PRN
Qty: 42 TABLET | Refills: 0 | Status: SHIPPED | OUTPATIENT
Start: 2021-10-18 | End: 2021-12-29

## 2021-10-18 RX ORDER — METHYLPREDNISOLONE 4 MG
TABLET, DOSE PACK ORAL
Qty: 21 TABLET | Refills: 0 | Status: SHIPPED | OUTPATIENT
Start: 2021-10-18 | End: 2021-12-29

## 2021-10-18 NOTE — PROGRESS NOTES
Assessment & Plan     Low back pain due to bilateral sciatica  Patient prescribed some medrol pack and also muscle relaxants. Physical therapy also ordered. Likely a back sprain.   - Physical Therapy Referral; Future  - methylPREDNISolone (MEDROL DOSEPAK) 4 MG tablet therapy pack; Follow Package Directions  - cyclobenzaprine (FLEXERIL) 5 MG tablet; Take 1 tablet (5 mg) by mouth 3 times daily as needed for muscle spasms    Need for prophylactic vaccination and inoculation against influenza  - INFLUENZA QUAD, RECOMBINANT, P-FREE (RIV4) (FLUBLOK)      FUTURE APPOINTMENTS:       - Follow-up visit in one month or sooner as needed.    Return in about 4 weeks (around 11/15/2021) for Follow up.    Julia Oneill MD  [facility] is a 56 year old who presents for the following health issues     HPI 56 yr old female here for low back pain. Ongoing for a few weeks. She does not recall any trauma. She did mention that she had been gardening around the time she started experiencing the pain. She reports that it can be very sharp and debilitating. She has tried some exercises which have helped. She is seeing a physical therapist for her knees and she was asked to obtain a referral for the back as well. She reports that it does not radiate into her legs.    Would like to discuss possible physical therapy for back.  Back Pain  Onset/Duration: about 3 weeks  Description:   Location of pain: low back bilateral and gluteus bilateral  Character of pain: stabbing and intermittent  Pain radiation: radiates into the right buttocks, radiates into the right leg, radiates into the left buttocks and radiates into the left leg  New numbness or weakness in legs, not attributed to pain: no   Intensity: Currently 3/10, At its worst 10/10  Progression of Symptoms: worsening and intermittent  History:   Specific cause: none  Pain interferes with job: YES and interferes with daily  activities  History of back problems: no prior back problems  Any previous MRI or X-rays: None  Sees a specialist for back pain: No  Alleviating factors:   Improved by: lidocaine patches, acetaminophen (Tylenol), heat and rest    Precipitating factors:  Worsened by: Lifting, Bending, Lying Flat and getting down on the ground  Therapies tried and outcome: acetaminophen (Tylenol), heat and rest    Accompanying Signs & Symptoms:  Risk of Fracture: None  Risk of Cauda Equina: None  Risk of Infection: None  Risk of Cancer: None  Risk of Ankylosing Spondylitis: Onset at age <35, male, AND morning back stiffness  no             Review of Systems   Constitutional, HEENT, cardiovascular, pulmonary, gi and gu systems are negative, except as otherwise noted.      Objective    /78 (BP Location: Left arm, Patient Position: Sitting, Cuff Size: Adult Regular)   Pulse 76   Temp 98.7  F (37.1  C) (Tympanic)   Resp 18   Wt 75.3 kg (166 lb)   SpO2 96%   Breastfeeding No   BMI 29.41 kg/m    Body mass index is 29.41 kg/m .  Physical Exam  Constitutional:       Appearance: Normal appearance. She is normal weight.   Cardiovascular:      Rate and Rhythm: Normal rate and regular rhythm.      Pulses: Normal pulses.      Heart sounds: Normal heart sounds.   Pulmonary:      Effort: Pulmonary effort is normal.      Breath sounds: Normal breath sounds.   Musculoskeletal:      Lumbar back: Spasms present. No tenderness. Decreased range of motion. Negative right straight leg raise test and negative left straight leg raise test.   Skin:     General: Skin is warm and dry.   Neurological:      Mental Status: She is alert and oriented to person, place, and time.

## 2021-10-18 NOTE — PATIENT INSTRUCTIONS
When You Have Low Back Pain    Caring for Your Back  You are not alone.    Low back pain is very common. Nearly half of all adults have low back pain in any given year. The good news is that back pain is rarely a danger to your health. Most people can manage their back pain on their own and about half of them start feeling better within 2 weeks. In 9 out of 10 cases, low back pain goes away or no longer limits daily activity within 6 weeks.     Your outlook is good!    Your symptoms tell us that your low back pain is most likely not a danger to you. Most of the time we do not know the exact cause of low back pain, even if you see a doctor or have an MRI. However, treatment can still work without knowing the cause of the pain. Less than 1 in 100 people need surgery for their back pain.     What can I do about my low back pain?     There are three things you can do to ease low back pain and help it go away.    Use heat or cold packs.    Take medicine as directed.    Use positions, movements and exercises.     Using heat or cold packs    Try cold packs or gentle heat to ease your pain. Use whichever gives you the most relief. Apply the cold pack or heat for 15 minutes at a time, as often as needed.    Taking medicine      If your doctor has prescribed medicine, be sure to follow the directions.    If you take over-the-counter medicine, read and follow the directions.    Talk to your doctor if you have any questions.     Using positions, movements and exercises    Research tells us that moving your joints and muscles can help you recover from back pain. Such activity should be simple and gentle. Use the positions in the photos as well as walking to help relieve your pain. Try taking a short walk every 3 to 4 hours during the day. Walk for a few minutes inside your home or take longer walks outside, on a treadmill or at a mall. Slowly increase the amount of time you walk. Expect discomfort when you begin, but it should  lessen as your back starts to heal. When your back feels better, walk daily to keep your back and body healthy.    Finding a comfortable position    When your back pain is new, certain positions will ease your pain. Gently try each of the positions below until you find one that is helpful. Once you find a position of comfort, use it as often as you like when you are resting. You will recover faster if you combine rest with activity.         Lie on your back with your legs bent. You can do this by placing a pillow under your knees. Or you may lie on the floor and rest your lower legs on the seat of a chair.       Lie on your side with your knees bent, and place a pillow between your knees.       Lie on your stomach over pillows.      When should I call my doctor?    Your back pain should improve over the first couple of weeks. As it improves, you should be able to return to your normal activities. But call your doctor if:    You have a sudden change in your ability to control your bladder or bowels.    You feel tingling in your groin or legs.    The pain spreads down your leg and into your foot.    Your toes, feet or leg muscles feel weak.    You feel generally unwell or sick.    Your pain does not get better or gets worse.      If you are deaf or hard of hearing, please let us know. We provide many free services including sign language interpreters,oral interpreters, TTYs, telephone amplifiers, note takers and written materials.    For informational purposes only. Not to replace the advice of your health care provider. Copyright   2013 [facility]. All rights reserved. Kaseya [id number] - Rev 06/14.

## 2021-10-20 ENCOUNTER — TELEPHONE (OUTPATIENT)
Dept: FAMILY MEDICINE | Facility: CLINIC | Age: 56
End: 2021-10-20

## 2021-10-21 NOTE — TELEPHONE ENCOUNTER
Prior Authorization Retail Medication Request    Medication/Dose: albuterol  ICD code (if different than what is on RX):    Previously Tried and Failed:  Advair; proair; proventil;ventolin; albuterol sulfate; breo ellipta; dulera; singulair;   Rationale:  Patient received temporary fill letter    Insurance Name:  [facility]  Insurance ID:  [id number]      Pharmacy Information (if different than what is on RX)  Name:    Phone:

## 2021-10-21 NOTE — TELEPHONE ENCOUNTER
Prior Authorization Retail Medication Request    Medication/Dose: fluticasone  ICD code (if different than what is on RX):    Previously Tried and Failed:  Advair; proair; proventil;ventolin; albuterol sulfate; breo ellipta; dulera; singulair;   Rationale:  Patient received temporary fill letter    Insurance Name:  [facility]  Insurance ID:  [id number]      Pharmacy Information (if different than what is on RX)  Name:    Phone:

## 2021-10-22 DIAGNOSIS — J45.20 MILD INTERMITTENT ASTHMA WITHOUT COMPLICATION: ICD-10-CM

## 2021-10-22 NOTE — TELEPHONE ENCOUNTER
Prior Authorization Not Needed per Insurance    Medication: albuterol (PROAIR HFA/PROVENTIL HFA/VENTOLIN HFA) 108 (90 Base) MCG/ACT inhaler- VENTOLIN IS COVERED  Insurance Company: [employer] - Phone [phone] Fax [phone]  Expected CoPay:      Pharmacy Filling the Rx:    Pharmacy Notified: Yes, they did not send a PA request  Patient Notified: Yes, no answer    Spoke with Susie at [employer] [phone]. Ventolin is covered without a PA. If the PA was not for Ventolin please specify what medication needs a PA. The request just said albuterol.    Central Prior Authorization Team  Phone: [phone]

## 2021-10-22 NOTE — TELEPHONE ENCOUNTER
PA Initiation    Medication: fluticasone-salmeterol (ADVAIR) 500-50 MCG/DOSE inhaler- INITIATED  Insurance Company: [employer] - Phone [phone] Fax [phone]  Pharmacy Filling the Rx: [id number] - [place], [place] - [address] [place] [address]  Filling Pharmacy Phone: [phone]  Filling Pharmacy Fax:    Start Date: 10/22/2021

## 2021-10-22 NOTE — TELEPHONE ENCOUNTER
Which albuterol RX, the neb solution or inhaler? The pharmacy did not know and the patient did not answer when I tried calling.    Please advise,     Central Prior Authorization Team  Phone: [phone]

## 2021-10-22 NOTE — TELEPHONE ENCOUNTER
Prior Authorization Not Needed per Insurance    Medication: fluticasone-salmeterol (ADVAIR) 500-50 MCG/DOSE inhaler- WIXELA IS COVERED  Insurance Company: [employer] - Phone [phone] Fax [phone]  Expected CoPay:      Pharmacy Filling the Rx: [id number] [place], [place] - [address] [place] [address]  Pharmacy Notified: Yes, they did not send a PA request  Patient Notified: Yes, no answer    Wixela is covered per Susie at [employer] [phone].     Central Prior Authorization Team  Phone: [phone]

## 2021-10-25 ENCOUNTER — THERAPY VISIT (OUTPATIENT)
Dept: PHYSICAL THERAPY | Facility: CLINIC | Age: 56
End: 2021-10-25
Payer: COMMERCIAL

## 2021-10-25 DIAGNOSIS — M54.42 LOW BACK PAIN DUE TO BILATERAL SCIATICA: ICD-10-CM

## 2021-10-25 DIAGNOSIS — M54.41 LOW BACK PAIN DUE TO BILATERAL SCIATICA: ICD-10-CM

## 2021-10-25 PROCEDURE — 97014 ELECTRIC STIMULATION THERAPY: CPT | Mod: GP | Performed by: PHYSICAL THERAPIST

## 2021-10-25 PROCEDURE — 97010 HOT OR COLD PACKS THERAPY: CPT | Mod: GP | Performed by: PHYSICAL THERAPIST

## 2021-10-25 PROCEDURE — 97162 PT EVAL MOD COMPLEX 30 MIN: CPT | Mod: GP | Performed by: PHYSICAL THERAPIST

## 2021-10-25 NOTE — PROGRESS NOTES
[facility] Physical Therapy Initial Evaluation  10/25/2021      Precautions/Restrictions/MD instructions: evaluate and treat low back pain     Therapist Assessment: Carolina Hernandez is a 56 year old female patient presenting to Physical Therapy with acute low back pain. Patient demonstrates impaired posture, decreased ROM, decreased strength, soft neurologic signs. Signs and symptoms are consistent with acute LBP. These impairments limit their ability to sit, stand, walk, bend, dress, drive. Skilled PT services are necessary in order to reduce impairments and improve independent function.     SUBJECTIVE     Chief Complaint: low back pain  Associated symptoms: stiffness, nausea the last 2 days; increased urge incontinence, increased frequency of incontinence   Paresthesia (yes/no):  no  Onset date: on/around 9/20/21  Symptoms commenced as a result of: unknown, maybe d/t increased bending/gardening around the same time   Condition occurred in the following environment: home/community   Pain severity: 7/10 at rest; 7/10 current, 10/10 worst  Location of pain: bilateral low back, extends into bilateral buttocks and tops of thighs  Quality of Pain: constant, acute, aching, sharp, catching   Better: very little benefit from the following - lidocaine patches, acetaminophen (Tylenol), heat and rest, positional changes   Worse:  Lifting, Bending, Lying Flat and getting down on the ground, kneeling, sitting, transitions - getting in/out of truck, moving in bed, cough/sneeze/strain  Time of day: PM - worse the last few days  Progression of Symptoms since onset:  Since onset, these symptoms are getting worse  Previous treatment: has included steroid pack started on 10/18/21, mm relaxant; effect was no change/no relief  Current Functional Status: impaired  Previous Functional Status:  fully functional prior to pain onset/injury.  Functional disability score (ELIZABETH/STarT Back):  ELIZABETH 70%     General health as reported by  patient: poor.     PMH: asthma   Surgical history/trauma: None. She denies any significant current illness or recent hospital admissions. She denies any regional implanted devices.  Imaging: none  Medications: anti-depressants     Occupation: retired Job duties: Cleaning, laundry,gardening,  taking care of animals, yard maintenance.           Current exercise regimen/Recreational activities: none  Barriers to treatment: none     Red Flags: (Bold when present) - reviewed the following and denies  Cauda equina: progressive motor or sensory loss, urinary retention or overflow incontinence, new fecal incontinence, saddle anesthesia, significant motor deficits encompassing multiple nerve roots  Fracture: Significant trauma, prolonged corticosteroid use, osteoporosis, age >70  Infection: spinal procedure in the last 12 months, IV drug use, immunosuppression, fever, wound in spinal region, generally unwell  Malignancy: history of metastatic cancer, unexplained weight loss                            Patient's Goal(s): get rid of pain, be able to move again     OBJECTIVE     Posture:   Sitting (good/fair/poor): fair, appears uncomfortable - changes positions frequently, moves stiffly  Standing (good/fair/poor): fair  Lordosis (red/acc/normal): acc  Correction of posture (better/worse/no effect): NE     Lateral Shift (right/left/nil): nil  Relevant (yes/no):  NA  Other Observations: little to no trunk rotation during gait, decreased jonelle     Neurological:     Motor deficit: hip flex limited by pain bilat 3+/5, all others 4/5 bilat                  Reflexes:  3+ L patellar, 2+ R patellar; absent but symmetrical bilateral achilles;   Sensory deficit:  none               Dural signs:  slump - bilat   Babinski: negative   Clonus: 3 beats bilaterally  Movement Loss:    Kt Mod Min Nil Pain   Flexion       X  On return to stand   Extension X      End range   Side Gliding R    X     End range   Side Gliding L    X    End range       Test Movements:   During: produces, abolishes, increases, decreases, no effect, centralizing, peripheralizing   After: better, worse, no better, no worse, no effect, centralized, peripheralized     Pretest symptoms standing: bilateral LBP    Symptoms During Symptoms After ROM increased ROM decreased No Effect   FISit No Effect    No Effect            Rep FISit No Effect No Effect         EIS Increases    No Worse         Rep EIS Decreases No Better     X - inc extension, NE pain with walking         Other Tests:   SIJ Tests Positive (+)/Negative (-)/painful but not provocative of patient complaint (+/-)   Thigh thrust     Sacral thrust     Distraction     Compression     Galen's        Hip screen: lacking 5 degrees hip extension bilat with LBP on testing; hip MMT abd 3+/5 bilat, hip ext 4/5 bilat     Provisional Classification:  Inconclusive/Other - Mechanically Inconclusive     Principle of Management:  Education:  upright sitting/posture support, stay moving within pain tolerance                                 Equipment provided:  none  Mechanical therapy (Y/N):  Y           Extension principle:  standing or prone ext X 10 every few hours                   Lateral Principle:  no  Flexion principle:  no                        Other:  modalities for pain     ASSESSMENT/PLAN  Patient is a 56 year old female with lumbar complaints.    Patient has the following significant findings with corresponding treatment plan.                Diagnosis 1:  acute low back pain    Pain -  hot/cold therapy, electric stimulation, manual therapy, education and home program  Decreased ROM/flexibility - manual therapy, therapeutic exercise and home program  Decreased joint mobility - manual therapy, therapeutic exercise and home program  Decreased strength - therapeutic exercise, therapeutic activities and home program  Impaired muscle performance - neuro re-education and home program  Impaired posture - neuro re-education and  home program     Therapy Evaluation Codes:   1. History comprised of:              Personal factors that impact the plan of care:                            Coping style, Overall behavior pattern and Past/current experiences.               Comorbidity factors that impact the plan of care are:                            Asthma, Depression and High blood pressure.                Medications impacting care: Anti-depressant and High blood pressure.  2. Examination of Body Systems comprised of:              Body structures and functions that impact the plan of care:                            Lumbar spine.              Activity limitations that impact the plan of care are:                            Bending, Driving, Dressing, Lifting, Reading/Computer work, Sitting, Standing, Walking, Sleeping and Laying down.  3. Clinical presentation characteristics are:              Evolving/Changing.  4. Decision-Making                          Moderate complexity using standardized patient assessment instrument and/or measureable assessment of functional outcome.  Cumulative Therapy Evaluation is: Moderate complexity.     Previous and current functional limitations:  (See Goal Flow Sheet for this information)    Short term and Long term goals: (See Goal Flow Sheet for this information)      Communication ability:  Patient appears to be able to clearly communicate and understand verbal and written communication and follow directions correctly.  Treatment Explanation - The following has been discussed with the patient:   RX ordered/plan of care  Anticipated outcomes  Possible risks and side effects  This patient would benefit from PT intervention to resume normal activities.   Rehab potential is fair.     Frequency:  2 X week, once daily  Duration:  for 1 weeks tapering to 1 X a week over 4 weeks  Discharge Plan:  Achieve all LTG.  Independent in home treatment program.  Reach maximal therapeutic benefit.     Please refer to the  daily flowsheet for treatment today, total treatment time and time spent performing 1:1 timed codes.

## 2021-10-25 NOTE — TELEPHONE ENCOUNTER
"Routed to provider.    Pt was seen for low back pain and sciatica on 10/18/21; one week ago.    She updates that she completed Medrol Dose Pack 3 days ago.  It only relieved pt's pain \"a little bit.\"  Also, it was very hard on her stomach and aggravated her reflux.    Pt asks for something else for pain relief?  [facility] pharmacy, [place].    Pt reports that she has used ibuprofen and Tylenol the past with minimal relief too.    Pt attended PT also.    Heike Morales RN    "

## 2021-10-26 RX ORDER — ALBUTEROL SULFATE 90 UG/1
1-2 AEROSOL, METERED RESPIRATORY (INHALATION) EVERY 4 HOURS PRN
Qty: 18 G | Refills: 0 | Status: SHIPPED | OUTPATIENT
Start: 2021-10-26 | End: 2021-11-19

## 2021-10-26 NOTE — TELEPHONE ENCOUNTER
I prescribed some muscle relaxants. She can take up to a 1000 mg of Tylenol three times a day. I am sorry no pain medications.

## 2021-10-26 NOTE — TELEPHONE ENCOUNTER
"Requested Prescriptions   Pending Prescriptions Disp Refills     albuterol (PROAIR HFA/PROVENTIL HFA/VENTOLIN HFA) 108 (90 Base) MCG/ACT inhaler [Pharmacy Med Name: Albuterol Sulfate HFA Inhalation Aerosol Solution 108 (90 Base) MCG/ACT] 18 g 0     Sig: Inhale 1-2 puffs into the lungs every 4 hours as needed for shortness of breath / dyspnea or wheezing For shortness of breath       Asthma Maintenance Inhalers - Anticholinergics Failed - 10/22/2021 11:02 AM        Failed - Asthma control assessment score within normal limits in last 6 months     Please review ACT score.           Passed - Patient is age 12 years or older        Passed - Medication is active on med list        Passed - Recent (6 mo) or future (30 days) visit within the authorizing provider's specialty     Patient had office visit in the last 6 months or has a visit in the next 30 days with authorizing provider or within the authorizing provider's specialty.  See \"Patient Info\" tab in inbasket, or \"Choose Columns\" in Meds & Orders section of the refill encounter.           Short-Acting Beta Agonist Inhalers Protocol  Failed - 10/22/2021 11:02 AM        Failed - Asthma control assessment score within normal limits in last 6 months     Please review ACT score.           Passed - Patient is age 12 or older        Passed - Medication is active on med list        Passed - Recent (6 mo) or future (30 days) visit within the authorizing provider's specialty     Patient had office visit in the last 6 months or has a visit in the next 30 days with authorizing provider or within the authorizing provider's specialty.  See \"Patient Info\" tab in [employer], or \"Choose Columns\" in Meds & Orders section of the refill encounter.                 "

## 2021-10-26 NOTE — TELEPHONE ENCOUNTER
Attempted to reach pt but no answer.    Pt is active in [employer].  Message sent.    Heike Morales RN

## 2021-10-27 ENCOUNTER — THERAPY VISIT (OUTPATIENT)
Dept: PHYSICAL THERAPY | Facility: CLINIC | Age: 56
End: 2021-10-27
Payer: COMMERCIAL

## 2021-10-27 DIAGNOSIS — M54.41 LOW BACK PAIN DUE TO BILATERAL SCIATICA: Primary | ICD-10-CM

## 2021-10-27 DIAGNOSIS — M54.42 LOW BACK PAIN DUE TO BILATERAL SCIATICA: Primary | ICD-10-CM

## 2021-10-27 PROCEDURE — 97010 HOT OR COLD PACKS THERAPY: CPT | Mod: GP | Performed by: PHYSICAL THERAPIST

## 2021-10-27 PROCEDURE — 97530 THERAPEUTIC ACTIVITIES: CPT | Mod: GP | Performed by: PHYSICAL THERAPIST

## 2021-10-27 PROCEDURE — 97110 THERAPEUTIC EXERCISES: CPT | Mod: GP | Performed by: PHYSICAL THERAPIST

## 2021-10-27 PROCEDURE — 97014 ELECTRIC STIMULATION THERAPY: CPT | Mod: GP | Performed by: PHYSICAL THERAPIST

## 2021-11-02 ENCOUNTER — THERAPY VISIT (OUTPATIENT)
Dept: PHYSICAL THERAPY | Facility: CLINIC | Age: 56
End: 2021-11-02
Payer: COMMERCIAL

## 2021-11-02 DIAGNOSIS — M54.42 LOW BACK PAIN DUE TO BILATERAL SCIATICA: ICD-10-CM

## 2021-11-02 DIAGNOSIS — M54.41 LOW BACK PAIN DUE TO BILATERAL SCIATICA: ICD-10-CM

## 2021-11-02 PROCEDURE — 97110 THERAPEUTIC EXERCISES: CPT | Mod: GP | Performed by: PHYSICAL THERAPIST

## 2021-11-02 PROCEDURE — 97530 THERAPEUTIC ACTIVITIES: CPT | Mod: GP | Performed by: PHYSICAL THERAPIST

## 2021-11-17 DIAGNOSIS — J45.20 MILD INTERMITTENT ASTHMA WITHOUT COMPLICATION: ICD-10-CM

## 2021-11-19 RX ORDER — ALBUTEROL SULFATE 90 UG/1
AEROSOL, METERED RESPIRATORY (INHALATION)
Qty: 18 G | Refills: 0 | Status: SHIPPED | OUTPATIENT
Start: 2021-11-19 | End: 2021-12-22

## 2021-11-19 NOTE — TELEPHONE ENCOUNTER
Pending Prescriptions:                       Disp   Refills    VENTOLIN  (90 Base) MCG/ACT inhaler*18 g   0        Sig: INHALE 1-2 PUFFS INTO THE LUNGS EVERY 4 HOURS AS           NEEDED FOR SHORTNESS OF BREATH/DYSPNEA OR           WHEEZING .        Routing refill request to provider for review/approval because:   Asthma Maintenance Inhalers - Anticholinergics Failed 11/17/2021 07:31 AM   Protocol Details  Asthma control assessment score within normal limits in last 6 months      Last ACT was 5/4/21 with score of 6.  [facility] for patient to complete.       Last Written Prescription Date:  10/26/21  Last Fill Quantity: 18g,  # refills: 0   Last office visit: 10/18/2021 with prescribing provider.          Kassi Ovalle RN

## 2021-11-24 DIAGNOSIS — F33.40 RECURRENT MAJOR DEPRESSIVE DISORDER, IN REMISSION (H): ICD-10-CM

## 2021-11-26 RX ORDER — FLUOXETINE 40 MG/1
40 CAPSULE ORAL DAILY
Qty: 90 CAPSULE | Refills: 0 | Status: SHIPPED | OUTPATIENT
Start: 2021-11-26 | End: 2021-12-29

## 2021-11-26 NOTE — TELEPHONE ENCOUNTER
Patient made appointment for 12/13/21 with Dr. Oneill, is out of medication, and has been for a day. Would like enough medication to get her to 12/13/21. Kourtney Howard on 11/26/2021 at 2:35 PM

## 2021-11-26 NOTE — TELEPHONE ENCOUNTER
Pending Prescriptions:                       Disp   Refills    FLUoxetine (PROZAC) 20 MG capsule [Pharma*90 cap*0            Sig: Take 1 capsule (20 mg) by mouth daily    FLUoxetine (PROZAC) 40 MG capsule [Pharma*90 cap*0            Sig: Take 1 capsule (40 mg) by mouth daily. Take with           20 mg tab for a total of 60 mg daily.      Routing refill request to provider for review/approval because:  Patient needs to be seen because:  Pt last seen in May.  She is due for 6 month follow up.    Also, PHQ 9 is too high to pass protocol at 16.    Heike Morales RN

## 2021-12-04 ASSESSMENT — ENCOUNTER SYMPTOMS
NERVOUS/ANXIOUS: 1
HEARTBURN: 0
ARTHRALGIAS: 1
DECREASED CONCENTRATION: 1
VOMITING: 0
JAUNDICE: 0
BLOOD IN STOOL: 0
NECK PAIN: 0
MYALGIAS: 0
DIARRHEA: 0
STIFFNESS: 1
PANIC: 0
JOINT SWELLING: 1
CONSTIPATION: 0
BACK PAIN: 1
BOWEL INCONTINENCE: 0
DEPRESSION: 1
MUSCLE CRAMPS: 1
BLOATING: 1
RECTAL PAIN: 0
INSOMNIA: 0
DECREASED LIBIDO: 1
ABDOMINAL PAIN: 0
NAUSEA: 0
HOT FLASHES: 0
MUSCLE WEAKNESS: 1

## 2021-12-06 ENCOUNTER — VIRTUAL VISIT (OUTPATIENT)
Dept: GASTROENTEROLOGY | Facility: CLINIC | Age: 56
End: 2021-12-06
Payer: COMMERCIAL

## 2021-12-06 DIAGNOSIS — R14.0 ABDOMINAL BLOATING: Primary | ICD-10-CM

## 2021-12-06 DIAGNOSIS — R19.5 LOOSE STOOLS: ICD-10-CM

## 2021-12-06 PROCEDURE — 99214 OFFICE O/P EST MOD 30 MIN: CPT | Mod: 95 | Performed by: PHYSICIAN ASSISTANT

## 2021-12-06 NOTE — PATIENT INSTRUCTIONS
It was a pleasure taking care of you today.  I've included a brief summary of our discussion and care plan from today's visit below.  Please review this information with your primary care provider.  ______________________________________________________________________    My recommendations are summarized as follows:    --avoid artifical sweeteners including sorbital, truvia, maltitol, mannitol, xylitol, glycerol, lactilol   -- continue fiber   -- CT abdomen and pelvis  -- fecal elastase stool study   -- please see scheduling information provided below     Return to GI Clinic in 3-4 months to review your progress.    ______________________________________________________________________    How do I schedule labs, imaging studies, or procedures that were ordered in clinic today?     Labs: To schedule lab appointment at the [facility], use my chart or call [phone]. If you have a [facility] closer to home where you are regularly seen you can give them a call.     Procedures: If a colonoscopy, upper endoscopy, breath test, esophageal manometry, or pH impedence was ordered today, our endoscopy team will call you to schedule this. If you have not heard from our endoscopy team within a week, please call [phone] to schedule.     Imaging Studies: If you were scheduled for a CT scan, X-ray, MRI, ultrasound, HIDA scan or other imaging study, please call [phone] to have this scheduled.     Referral: If a referral to another specialty was ordered, expect a phone call or follow instructions above. If you have not heard from anyone regarding your referral in a week, please call our clinic to check the status.     Who do I call with any questions after my visit?  Please be in touch if there are any further questions that arise following today's visit.  There are multiple ways to contact your gastroenterology care team.        During business hours, you may reach a Gastroenterology nurse at  [phone]      To schedule or reschedule an appointment, please call [phone].       You can always send a secure message through Mobile Captain.  Mobile Captain messages are answered by your nurse or doctor typically within 24 hours.  Please allow extra time on weekends and holidays.        For urgent/emergent questions after business hours, you may reach the on-call GI Fellow by contacting the [facility]  at [phone].     How will I get the results of any tests ordered?    You will receive all of your results.  If you have signed up for [employer]Bint, any tests ordered at your visit will be available to you after your physician reviews them.  Typically this takes 1-2 weeks.  If there are urgent results that require a change in your care plan, your physician or nurse will call you to discuss the next steps.      What is Mobile Captain?  Mobile Captain is a secure way for you to access all of your healthcare records from the [facility].  It is a web based computer program, so you can sign on to it from any location.  It also allows you to send secure messages to your care team.  I recommend signing up for Mobile Captain access if you have not already done so and are comfortable with using a computer.      How to I schedule a follow-up visit?  If you did not schedule a follow-up visit today, please call [phone] to schedule a follow-up office visit.      Sincerely,    Aimee Nava PA-C  Division of Gastroenterology, Hepatology & Nutrition  [facility]

## 2021-12-06 NOTE — LETTER
12/6/2021         RE: Carolina Hernandez  [address] [place] [id number]        Dear Colleague,    Thank you for referring your patient, Carolina Hernandez, to the [facility] [place]. Please see a copy of my visit note below.    GI CLINIC VISIT    CC/REFERRING MD:  Julia Oneill  REASON FOR CONSULTATION: loose stools     ASSESSMENT/PLAN:  Carolina Hernandez is a 56 year old woman with a past medical history of tobacco abuse, asthma, anxiety, depression presenting for evaluation of chronic diarrhea.     1.  Chronic diarrhea, chronic bloating  Patient reports about 3 years of diarrhea without any clear trigger.  Has had unremarkable colonoscopy with random biopsies for microscopic colitis.  She has had C. difficile associated with previous antibiotic use, though repeat stool studies have been negative for C. difficile.  Has also had negative chronic infectious stool studies, unremarkable basic labs, celiac serologies, lactose breath test. Empiric treatment with rifaximin was not helpful.     Possible sources of symptoms include medication side effect (with hydrochlorothiazide, fluoxetine), functional loose stools, small intestinal bacterial overgrowth (less likely as she had no improvement with rifaximin).  Overall symptoms have improved with increased fiber and imodium. Should continue at this time. Will also check pancreatic elastase.      Does not have any red flag symptoms to warrant repeat colonoscopy at this time, can consider colonoscopy or upper endoscopy pending clinical picture.      Most bothersome symptom today is bloating- she has met with our GI dietitian without improvement in symptoms. Bloating is persistent throughout the day, did report distension at earlier visits. Given continued bloating despite improvement in stool consistency and SIBO treatment, would recommend CT AP to evaluate for space occupying lesion.     --avoid artifical  "sweeteners including sorbital, truvia, maltitol, mannitol, xylitol, glycerol, lactilol   -- continue fiber   -- CT abdomen and pelvis  -- fecal elastase      Colorectal cancer screenin    [facility] 3-4 months    Thank you for this consultation.  It was a pleasure to participate in the care of this patient; please contact us with any further questions.     35 Minutes was spent on the date of the encounter during chart review, history and exam, documentation, and further activities as noted     Note completed using voice recognition software. Some word and grammatical errors may occur.      Aimee Nava PA-C  Division of Gastroenterology, Hepatology & Nutrition  [facility]        HPI  Carolina Hernandez is a 56 year old woman with a past medical history of tobacco abuse, asthma, anxiety, depression presenting for follow-up for chronic diarrhea and abdominal bloating.    Today the patient reports a three year history of watery diarrhea. Has not been able to identify any specific triggers for her symptoms.  She describes 10+ bowel movements a day that are clustered the first couple of hours that she is awake.Sometimes will continue throughout the day. Consistency is variable.  Symptoms seem to worsen when she has fluids such as water and coffee. Will have urgency with bowel movements.  Can have blood with hemorrhoids. No nocturnal bowel movements. Has had incontinence with bowel movements. Sometimes abdominal pain (gas pain, rare cramping) but not usually.     Within the last 6 months, she has been having more issues with hemmorhoids. She does sometimes strain to have a bowel movement (when she feels a \"pressure from within\"). Was seen by colorectal surgery by Dr. Lane and was told to take metamucil which has helped but she continues to have symptoms (2 rounded tablespoons a day). Since starting fiber they float and and look like \"algae\". Most of the time it is liquid. She also reports increased gas " and bloating with this.     She has tried adding more fiber to her diet with spinach, bananas, whole grains and vegetables. She is allergic to a lot of fresh fruits. Raw onions can worsen symptoms, peas and legumes can worsen symptoms.     Previous workup includes:  2019:  - c diff negative   2017:   -colonoscopy- unremarkable, negative biopsies for MC  -Stool studies- negative for giardia/crypto, enteric panel, Ova and parasite  -labs: normal TSH, CBC, hepatic panel, celiac serologies    -negative lactose breath testing   -trial of rifaximin with continued symptoms     Interval History 12/6/2021:  She reports increase bloating in her upper chest. She states that pressure in her chest can affect asthma. It does not seem to matter what she eats. She notes that her depression and sciatica have affected her desire to exercise. She has gained some weight because of this is uncomfortable with her body. She reports some pressure in her upper abdomen at the level of her rib cage. She has tried to avoid metamucil but had persistent symptoms. Low lactose diet did not help. Bloating seems to be about the same all the time. Changing position does not affect bloating.     Bowel movements are overall better: stools are healthy appearing, less frequency. Is taking 2 tablespoons of metamucil a day, 3 imodium a day. She does report increased gas. 3 times a day, solid/floaty appearing.     No GERD- on omeprazole 20 mg a day. No nausea or vomiting.  No dysphagia.     PROBLEM LIST  Patient Active Problem List    Diagnosis Date Noted     Low back pain due to bilateral sciatica 10/27/2021     Priority: Medium     Primary osteoarthritis of both knees 09/23/2021     Priority: Medium     Essential hypertension 07/26/2019     Priority: Medium     New diagnosis 7/26/2019         Recurrent major depressive disorder, in remission (H) 01/17/2019     Priority: Medium     Encounter for tobacco use cessation counseling 01/17/2019     Priority:  Medium     Mild intermittent asthma without complication 01/17/2019     Priority: Medium     CARDIOVASCULAR SCREENING; LDL GOAL LESS THAN 160 10/31/2010     Priority: Medium       PERTINENT PAST MEDICAL HISTORY:  Depression  Anxiety  Osteoarthritis   Asthma     PREVIOUS SURGERIES:  No GI surgery     PREVIOUS ENDOSCOPY:  Colonoscopy in 2017 through care everywhere:  Impression:          - Diverticulosis in the sigmoid colon.                       - Non-bleeding internal hemorrhoids.                       - The examined portion of the ileum was normal.                       - The entire examined colon is normal. Biopsied.                       - The examination was otherwise normal on direct and                        retroflexion views.    Final Pathologic Diagnosis   Colon, random, biopsy -- No significant pathologic change     ALLERGIES:  Allergies   Allergen Reactions     Penicillins Itching       PERTINENT MEDICATIONS:    Current Outpatient Medications:      albuterol (PROVENTIL) (2.5 MG/3ML) 0.083% neb solution, Take 1 vial (2.5 mg) by nebulization every 4 hours as needed for shortness of breath / dyspnea or wheezing, Disp: 75 mL, Rfl: 11     amLODIPine (NORVASC) 2.5 MG tablet, Take 2 tablets (5 mg) by mouth daily Please fill at patient request, Disp: 180 tablet, Rfl: 3     cetirizine (ZYRTEC) 10 MG tablet, Take 10 mg by mouth daily, Disp: , Rfl:      cyclobenzaprine (FLEXERIL) 5 MG tablet, Take 1 tablet (5 mg) by mouth 3 times daily as needed for muscle spasms, Disp: 42 tablet, Rfl: 0     FLUoxetine (PROZAC) 20 MG capsule, Take 1 capsule (20 mg) by mouth daily, Disp: 90 capsule, Rfl: 0     FLUoxetine (PROZAC) 40 MG capsule, Take 1 capsule (40 mg) by mouth daily. Take with 20 mg tab for a total of 60 mg daily., Disp: 90 capsule, Rfl: 0     fluticasone-salmeterol (ADVAIR) 500-50 MCG/DOSE inhaler, Inhale 1 puff into the lungs every 12 hours Please prescribe the generic ( Wixela ), Disp: 1 Inhaler, Rfl: 11      hydrochlorothiazide (HYDRODIURIL) 25 MG tablet, Take 1 tablet (25 mg) by mouth once daily, Disp: 90 tablet, Rfl: 0     loperamide (IMODIUM A-D) 2 MG tablet, Take as needed for loose stools. Increase as needed. Do not use more than  8 tabs (16 mg) per day., Disp: 90 tablet, Rfl: 4     methylPREDNISolone (MEDROL DOSEPAK) 4 MG tablet therapy pack, Follow Package Directions, Disp: 21 tablet, Rfl: 0     montelukast (SINGULAIR) 10 MG tablet, Take 1 tablet (10 mg) by mouth at bedtime., Disp: 90 tablet, Rfl: 0     Omega-3 Fatty Acids (FISH OIL) 1200 MG capsule, Take 2,800 mg by mouth daily, Disp: , Rfl:      omeprazole 20 MG tablet, Take 1 tablet (20 mg) by mouth daily, Disp: 90 tablet, Rfl: 3     VENTOLIN  (90 Base) MCG/ACT inhaler, INHALE 1-2 PUFFS INTO THE LUNGS EVERY 4 HOURS AS NEEDED FOR SHORTNESS OF BREATH/DYSPNEA OR WHEEZING ., Disp: 18 g, Rfl: 0   No NSAIDs    SOCIAL HISTORY:  Will drink beer (4-5 beers a 3 times a week), symptoms are the same if she avoids drinking   Smokes cigarettes (8-10 a day, has decreased from before)  Social History     Socioeconomic History     Marital status:      Spouse name: Not on file     Number of children: Not on file     Years of education: Not on file     Highest education level: Not on file   Occupational History     Not on file   Tobacco Use     Smoking status: Former Smoker     Packs/day: 0.50     Years: 23.00     Pack years: 11.50     Types: Cigarettes     Quit date: 2021     Years since quittin.4     Smokeless tobacco: Never Used     Tobacco comment: 6 cig a day    Vaping Use     Vaping Use: Never used   Substance and Sexual Activity     Alcohol use: Yes     Comment: 12 pack weekly     Drug use: No     Sexual activity: Not Currently   Other Topics Concern     Not on file   Social History Narrative     Not on file     Social Determinants of Health     Financial Resource Strain: Not on file   Food Insecurity: Not on file   Transportation Needs: Not on file    Physical Activity: Not on file   Stress: Not on file   Social Connections: Not on file   Intimate Partner Violence: Not on file   Housing Stability: Not on file       FAMILY HISTORY:  Family History   Problem Relation Age of Onset     Glaucoma Father      Coronary Artery Disease Father         stents     Cancer Maternal Grandfather      Coronary Artery Disease Paternal Grandfather      Schizophrenia Daughter      Diabetes Daughter      Cancer Daughter      Crohn's Disease No family hx of      Ulcerative Colitis No family hx of      GERD No family hx of      Stomach Cancer No family hx of        Past/family/social history reviewed and no changes    PHYSICAL EXAMINATION:  Vitals reviewed: There were no vitals taken for this visit.  Wt:   Wt Readings from Last 2 Encounters:   10/18/21 75.3 kg (166 lb)   08/30/21 72.1 kg (159 lb)   General appearance: Healthy appearing adult, in no acute distress  Eyes: Sclera anicteric  Ears, nose, mouth and throat: No obvious external lesions of ears and nose, Hearing intact  Neck: Symmetric, No obvious external lesions  Respiratory: Normal respiration, no use of accessory muscles   Skin: No rashes or jaundice   Psychiatric: Oriented to person, place and time, Appropriate mood and affect.    PERTINENT STUDIES:    Office Visit on 11/20/2020   Component Date Value Ref Range Status     Cholesterol 11/20/2020 232* <200 mg/dL Final     Triglycerides 11/20/2020 232* <150 mg/dL Final     HDL Cholesterol 11/20/2020 98  >49 mg/dL Final     LDL Cholesterol Calculated 11/20/2020 88  <100 mg/dL Final     Non HDL Cholesterol 11/20/2020 134* <130 mg/dL Final     Sodium 11/20/2020 132* 133 - 144 mmol/L Final     Potassium 11/20/2020 3.3* 3.4 - 5.3 mmol/L Final     Chloride 11/20/2020 98  94 - 109 mmol/L Final     Carbon Dioxide 11/20/2020 28  20 - 32 mmol/L Final     Anion Gap 11/20/2020 6  3 - 14 mmol/L Final     Glucose 11/20/2020 98  70 - 99 mg/dL Final     Urea Nitrogen 11/20/2020 15  7 - 30  mg/dL Final     Creatinine 11/20/2020 0.81  0.52 - 1.04 mg/dL Final     GFR Estimate 11/20/2020 81  >60 mL/min/[1.73_m2] Final     GFR Estimate If Black 11/20/2020 >90  >60 mL/min/[1.73_m2] Final     Calcium 11/20/2020 9.8  8.5 - 10.1 mg/dL Final         Answers for HPI/ROS submitted by the patient on 12/4/2021  General Symptoms: No  Skin Symptoms: No  HENT Symptoms: No  EYE SYMPTOMS: No  HEART SYMPTOMS: No  LUNG SYMPTOMS: No  INTESTINAL SYMPTOMS: Yes  URINARY SYMPTOMS: No  GYNECOLOGIC SYMPTOMS: Yes  BREAST SYMPTOMS: No  SKELETAL SYMPTOMS: Yes  BLOOD SYMPTOMS: No  NERVOUS SYSTEM SYMPTOMS: No  MENTAL HEALTH SYMPTOMS: Yes  Heart burn or indigestion: No  Nausea: No  Vomiting: No  Abdominal pain: No  Bloating: Yes  Constipation: No  Diarrhea: No  Blood in stool: No  Black stools: No  Rectal or Anal pain: No  Fecal incontinence: No  Yellowing of skin or eyes: No  Vomit with blood: No  Change in stools: No  Bleeding or spotting between periods: No  Heavy or painful periods: No  Irregular periods: No  Vaginal discharge: No  Hot flashes: No  Vaginal dryness: Yes  Genital ulcers: No  Reduced libido: Yes  Painful intercourse: Yes  Difficulty with sexual arousal: Yes  Post-menopausal bleeding: No  Back pain: Yes  Muscle aches: No  Neck pain: No  Swollen joints: Yes  Joint pain: Yes  Bone pain: No  Muscle cramps: Yes  Muscle weakness: Yes  Joint stiffness: Yes  Bone fracture: No  Nervous or Anxious: Yes  Depression: Yes  Trouble sleeping: No  Trouble thinking or concentrating: Yes  Mood changes: No  Panic attacks: No          Again, thank you for allowing me to participate in the care of your patient.      Sincerely,    Aimee Nava PA-C

## 2021-12-06 NOTE — PROGRESS NOTES
Carolina is a 56 year old who is being evaluated via a billable video visit.      How would you like to obtain your AVS? MyChart  If the video visit is dropped, the invitation should be resent by: Text to cell phone: [phone]  Will anyone else be joining your video visit? No      Video Start Time: 1:07 PM  Video-Visit Details    Type of service:  Video Visit    Video End Time:1:31 PM    Originating Location (pt. Location): Home    Distant Location (provider location):  [facility] [place]     Platform used for Video Visit: Well    GI CLINIC VISIT    CC/REFERRING MD:  Julia Oneill  REASON FOR CONSULTATION: loose stools     ASSESSMENT/PLAN:  Carolina Hernandez is a 56 year old woman with a past medical history of tobacco abuse, asthma, anxiety, depression presenting for evaluation of chronic diarrhea.     1.  Chronic diarrhea, chronic bloating  Patient reports about 3 years of diarrhea without any clear trigger.  Has had unremarkable colonoscopy with random biopsies for microscopic colitis.  She has had C. difficile associated with previous antibiotic use, though repeat stool studies have been negative for C. difficile.  Has also had negative chronic infectious stool studies, unremarkable basic labs, celiac serologies, lactose breath test. Empiric treatment with rifaximin was not helpful.     Possible sources of symptoms include medication side effect (with hydrochlorothiazide, fluoxetine), functional loose stools, small intestinal bacterial overgrowth (less likely as she had no improvement with rifaximin).  Overall symptoms have improved with increased fiber and imodium. Should continue at this time. Will also check pancreatic elastase.      Does not have any red flag symptoms to warrant repeat colonoscopy at this time, can consider colonoscopy or upper endoscopy pending clinical picture.      Most bothersome symptom today is bloating- she has met with our GI dietitian  "without improvement in symptoms. Bloating is persistent throughout the day, did report distension at earlier visits. Given continued bloating despite improvement in stool consistency and SIBO treatment, would recommend CT AP to evaluate for space occupying lesion.     --avoid artifical sweeteners including sorbital, truvia, maltitol, mannitol, xylitol, glycerol, lactilol   -- continue fiber   -- CT abdomen and pelvis  -- fecal elastase      Colorectal cancer screenin    [facility] 3-4 months    Thank you for this consultation.  It was a pleasure to participate in the care of this patient; please contact us with any further questions.     35 Minutes was spent on the date of the encounter during chart review, history and exam, documentation, and further activities as noted     Note completed using voice recognition software. Some word and grammatical errors may occur.      Aimee Nava PA-C  Division of Gastroenterology, Hepatology & Nutrition  [facility]  Carolina Hernandez is a 56 year old woman with a past medical history of tobacco abuse, asthma, anxiety, depression presenting for follow-up for chronic diarrhea and abdominal bloating.    Today the patient reports a three year history of watery diarrhea. Has not been able to identify any specific triggers for her symptoms.  She describes 10+ bowel movements a day that are clustered the first couple of hours that she is awake.Sometimes will continue throughout the day. Consistency is variable.  Symptoms seem to worsen when she has fluids such as water and coffee. Will have urgency with bowel movements.  Can have blood with hemorrhoids. No nocturnal bowel movements. Has had incontinence with bowel movements. Sometimes abdominal pain (gas pain, rare cramping) but not usually.     Within the last 6 months, she has been having more issues with hemmorhoids. She does sometimes strain to have a bowel movement (when she feels a \"pressure from " "within\"). Was seen by colorectal surgery by Dr. Lane and was told to take metamucil which has helped but she continues to have symptoms (2 rounded tablespoons a day). Since starting fiber they float and and look like \"algae\". Most of the time it is liquid. She also reports increased gas and bloating with this.     She has tried adding more fiber to her diet with spinach, bananas, whole grains and vegetables. She is allergic to a lot of fresh fruits. Raw onions can worsen symptoms, peas and legumes can worsen symptoms.     Previous workup includes:  2019:  - c diff negative   2017:   -colonoscopy- unremarkable, negative biopsies for MC  -Stool studies- negative for giardia/crypto, enteric panel, Ova and parasite  -labs: normal TSH, CBC, hepatic panel, celiac serologies    -negative lactose breath testing   -trial of rifaximin with continued symptoms     Interval History 12/6/2021:  She reports increase bloating in her upper chest. She states that pressure in her chest can affect asthma. It does not seem to matter what she eats. She notes that her depression and sciatica have affected her desire to exercise. She has gained some weight because of this is uncomfortable with her body. She reports some pressure in her upper abdomen at the level of her rib cage. She has tried to avoid metamucil but had persistent symptoms. Low lactose diet did not help. Bloating seems to be about the same all the time. Changing position does not affect bloating.     Bowel movements are overall better: stools are healthy appearing, less frequency. Is taking 2 tablespoons of metamucil a day, 3 imodium a day. She does report increased gas. 3 times a day, solid/floaty appearing.     No GERD- on omeprazole 20 mg a day. No nausea or vomiting.  No dysphagia.     PROBLEM LIST  Patient Active Problem List    Diagnosis Date Noted     Low back pain due to bilateral sciatica 10/27/2021     Priority: Medium     Primary osteoarthritis of both knees " 09/23/2021     Priority: Medium     Essential hypertension 07/26/2019     Priority: Medium     New diagnosis 7/26/2019         Recurrent major depressive disorder, in remission (H) 01/17/2019     Priority: Medium     Encounter for tobacco use cessation counseling 01/17/2019     Priority: Medium     Mild intermittent asthma without complication 01/17/2019     Priority: Medium     CARDIOVASCULAR SCREENING; LDL GOAL LESS THAN 160 10/31/2010     Priority: Medium       PERTINENT PAST MEDICAL HISTORY:  Depression  Anxiety  Osteoarthritis   Asthma     PREVIOUS SURGERIES:  No GI surgery     PREVIOUS ENDOSCOPY:  Colonoscopy in 2017 through care everywhere:  Impression:          - Diverticulosis in the sigmoid colon.                       - Non-bleeding internal hemorrhoids.                       - The examined portion of the ileum was normal.                       - The entire examined colon is normal. Biopsied.                       - The examination was otherwise normal on direct and                        retroflexion views.    Final Pathologic Diagnosis   Colon, random, biopsy -- No significant pathologic change     ALLERGIES:  Allergies   Allergen Reactions     Penicillins Itching       PERTINENT MEDICATIONS:    Current Outpatient Medications:      albuterol (PROVENTIL) (2.5 MG/3ML) 0.083% neb solution, Take 1 vial (2.5 mg) by nebulization every 4 hours as needed for shortness of breath / dyspnea or wheezing, Disp: 75 mL, Rfl: 11     amLODIPine (NORVASC) 2.5 MG tablet, Take 2 tablets (5 mg) by mouth daily Please fill at patient request, Disp: 180 tablet, Rfl: 3     cetirizine (ZYRTEC) 10 MG tablet, Take 10 mg by mouth daily, Disp: , Rfl:      cyclobenzaprine (FLEXERIL) 5 MG tablet, Take 1 tablet (5 mg) by mouth 3 times daily as needed for muscle spasms, Disp: 42 tablet, Rfl: 0     FLUoxetine (PROZAC) 20 MG capsule, Take 1 capsule (20 mg) by mouth daily, Disp: 90 capsule, Rfl: 0     FLUoxetine (PROZAC) 40 MG capsule,  Take 1 capsule (40 mg) by mouth daily. Take with 20 mg tab for a total of 60 mg daily., Disp: 90 capsule, Rfl: 0     fluticasone-salmeterol (ADVAIR) 500-50 MCG/DOSE inhaler, Inhale 1 puff into the lungs every 12 hours Please prescribe the generic ( Wixela ), Disp: 1 Inhaler, Rfl: 11     hydrochlorothiazide (HYDRODIURIL) 25 MG tablet, Take 1 tablet (25 mg) by mouth once daily, Disp: 90 tablet, Rfl: 0     loperamide (IMODIUM A-D) 2 MG tablet, Take as needed for loose stools. Increase as needed. Do not use more than  8 tabs (16 mg) per day., Disp: 90 tablet, Rfl: 4     methylPREDNISolone (MEDROL DOSEPAK) 4 MG tablet therapy pack, Follow Package Directions, Disp: 21 tablet, Rfl: 0     montelukast (SINGULAIR) 10 MG tablet, Take 1 tablet (10 mg) by mouth at bedtime., Disp: 90 tablet, Rfl: 0     Omega-3 Fatty Acids (FISH OIL) 1200 MG capsule, Take 2,800 mg by mouth daily, Disp: , Rfl:      omeprazole 20 MG tablet, Take 1 tablet (20 mg) by mouth daily, Disp: 90 tablet, Rfl: 3     VENTOLIN  (90 Base) MCG/ACT inhaler, INHALE 1-2 PUFFS INTO THE LUNGS EVERY 4 HOURS AS NEEDED FOR SHORTNESS OF BREATH/DYSPNEA OR WHEEZING ., Disp: 18 g, Rfl: 0   No NSAIDs    SOCIAL HISTORY:  Will drink beer (4-5 beers a 3 times a week), symptoms are the same if she avoids drinking   Smokes cigarettes (8-10 a day, has decreased from before)  Social History     Socioeconomic History     Marital status:      Spouse name: Not on file     Number of children: Not on file     Years of education: Not on file     Highest education level: Not on file   Occupational History     Not on file   Tobacco Use     Smoking status: Former Smoker     Packs/day: 0.50     Years: 23.00     Pack years: 11.50     Types: Cigarettes     Quit date: 2021     Years since quittin.4     Smokeless tobacco: Never Used     Tobacco comment: 6 cig a day    Vaping Use     Vaping Use: Never used   Substance and Sexual Activity     Alcohol use: Yes     Comment:   pack weekly     Drug use: No     Sexual activity: Not Currently   Other Topics Concern     Not on file   Social History Narrative     Not on file     Social Determinants of Health     Financial Resource Strain: Not on file   Food Insecurity: Not on file   Transportation Needs: Not on file   Physical Activity: Not on file   Stress: Not on file   Social Connections: Not on file   Intimate Partner Violence: Not on file   Housing Stability: Not on file       FAMILY HISTORY:  Family History   Problem Relation Age of Onset     Glaucoma Father      Coronary Artery Disease Father         stents     Cancer Maternal Grandfather      Coronary Artery Disease Paternal Grandfather      Schizophrenia Daughter      Diabetes Daughter      Cancer Daughter      Crohn's Disease No family hx of      Ulcerative Colitis No family hx of      GERD No family hx of      Stomach Cancer No family hx of        Past/family/social history reviewed and no changes    PHYSICAL EXAMINATION:  Vitals reviewed: There were no vitals taken for this visit.  Wt:   Wt Readings from Last 2 Encounters:   10/18/21 75.3 kg (166 lb)   08/30/21 72.1 kg (159 lb)   General appearance: Healthy appearing adult, in no acute distress  Eyes: Sclera anicteric  Ears, nose, mouth and throat: No obvious external lesions of ears and nose, Hearing intact  Neck: Symmetric, No obvious external lesions  Respiratory: Normal respiration, no use of accessory muscles   Skin: No rashes or jaundice   Psychiatric: Oriented to person, place and time, Appropriate mood and affect.    PERTINENT STUDIES:    Office Visit on 11/20/2020   Component Date Value Ref Range Status     Cholesterol 11/20/2020 232* <200 mg/dL Final     Triglycerides 11/20/2020 232* <150 mg/dL Final     HDL Cholesterol 11/20/2020 98  >49 mg/dL Final     LDL Cholesterol Calculated 11/20/2020 88  <100 mg/dL Final     Non HDL Cholesterol 11/20/2020 134* <130 mg/dL Final     Sodium 11/20/2020 132* 133 - 144 mmol/L Final      Potassium 11/20/2020 3.3* 3.4 - 5.3 mmol/L Final     Chloride 11/20/2020 98  94 - 109 mmol/L Final     Carbon Dioxide 11/20/2020 28  20 - 32 mmol/L Final     Anion Gap 11/20/2020 6  3 - 14 mmol/L Final     Glucose 11/20/2020 98  70 - 99 mg/dL Final     Urea Nitrogen 11/20/2020 15  7 - 30 mg/dL Final     Creatinine 11/20/2020 0.81  0.52 - 1.04 mg/dL Final     GFR Estimate 11/20/2020 81  >60 mL/min/[1.73_m2] Final     GFR Estimate If Black 11/20/2020 >90  >60 mL/min/[1.73_m2] Final     Calcium 11/20/2020 9.8  8.5 - 10.1 mg/dL Final         Answers for HPI/ROS submitted by the patient on 12/4/2021  General Symptoms: No  Skin Symptoms: No  HENT Symptoms: No  EYE SYMPTOMS: No  HEART SYMPTOMS: No  LUNG SYMPTOMS: No  INTESTINAL SYMPTOMS: Yes  URINARY SYMPTOMS: No  GYNECOLOGIC SYMPTOMS: Yes  BREAST SYMPTOMS: No  SKELETAL SYMPTOMS: Yes  BLOOD SYMPTOMS: No  NERVOUS SYSTEM SYMPTOMS: No  MENTAL HEALTH SYMPTOMS: Yes  Heart burn or indigestion: No  Nausea: No  Vomiting: No  Abdominal pain: No  Bloating: Yes  Constipation: No  Diarrhea: No  Blood in stool: No  Black stools: No  Rectal or Anal pain: No  Fecal incontinence: No  Yellowing of skin or eyes: No  Vomit with blood: No  Change in stools: No  Bleeding or spotting between periods: No  Heavy or painful periods: No  Irregular periods: No  Vaginal discharge: No  Hot flashes: No  Vaginal dryness: Yes  Genital ulcers: No  Reduced libido: Yes  Painful intercourse: Yes  Difficulty with sexual arousal: Yes  Post-menopausal bleeding: No  Back pain: Yes  Muscle aches: No  Neck pain: No  Swollen joints: Yes  Joint pain: Yes  Bone pain: No  Muscle cramps: Yes  Muscle weakness: Yes  Joint stiffness: Yes  Bone fracture: No  Nervous or Anxious: Yes  Depression: Yes  Trouble sleeping: No  Trouble thinking or concentrating: Yes  Mood changes: No  Panic attacks: No

## 2021-12-07 DIAGNOSIS — I10 ESSENTIAL HYPERTENSION: ICD-10-CM

## 2021-12-07 DIAGNOSIS — J45.20 MILD INTERMITTENT ASTHMA WITHOUT COMPLICATION: ICD-10-CM

## 2021-12-08 NOTE — TELEPHONE ENCOUNTER
"Has appointment scheduled for 12/10/21    Requested Prescriptions   Pending Prescriptions Disp Refills     amLODIPine (NORVASC) 2.5 MG tablet [Pharmacy Med Name: amLODIPine Besylate Oral Tablet 2.5 MG] 180 tablet 0     Sig: Take 2 tablets (5 mg) by mouth daily.       Calcium Channel Blockers Protocol  Passed - 12/7/2021 11:54 AM        Passed - Blood pressure under 140/90 in past 12 months     BP Readings from Last 3 Encounters:   10/18/21 128/78   08/30/21 (!) 146/89   08/20/21 (!) 161/97                 Passed - Recent (12 mo) or future (30 days) visit within the authorizing provider's specialty     Patient has had an office visit with the authorizing provider or a provider within the authorizing providers department within the previous 12 mos or has a future within next 30 days. See \"Patient Info\" tab in inbasket, or \"Choose Columns\" in Meds & Orders section of the refill encounter.              Passed - Medication is active on med list        Passed - Patient is age 18 or older        Passed - No active pregnancy on record        Passed - Normal serum creatinine on file in past 12 months     Recent Labs   Lab Test 01/26/21  1104   CR 0.72       Ok to refill medication if creatinine is low          Passed - No positive pregnancy test in past 12 months           montelukast (SINGULAIR) 10 MG tablet [Pharmacy Med Name: Montelukast Sodium Oral Tablet 10 MG] 90 tablet 0     Sig: Take 1 tablet (10 mg) by mouth at bedtime.       Leukotriene Inhibitors Protocol Failed - 12/7/2021 11:54 AM        Failed - Asthma control assessment score within normal limits in last 6 months     Please review ACT score.           Passed - Patient is age 12 or older     If patient is under 16, ok to refill using age based dosing.           Passed - Medication is active on med list        Passed - Recent (6 mo) or future (30 days) visit within the authorizing provider's specialty     Patient had office visit in the last 6 months or has a " "visit in the next 30 days with authorizing provider or within the authorizing provider's specialty.  See \"Patient Info\" tab in inbasket, or \"Choose Columns\" in Meds & Orders section of the refill encounter.                 "

## 2021-12-12 RX ORDER — AMLODIPINE BESYLATE 2.5 MG/1
TABLET ORAL
Qty: 180 TABLET | Refills: 0 | Status: SHIPPED | OUTPATIENT
Start: 2021-12-12 | End: 2022-03-13

## 2021-12-12 RX ORDER — MONTELUKAST SODIUM 10 MG/1
10 TABLET ORAL AT BEDTIME
Qty: 90 TABLET | Refills: 0 | Status: SHIPPED | OUTPATIENT
Start: 2021-12-12 | End: 2021-12-29

## 2021-12-20 DIAGNOSIS — I10 ESSENTIAL HYPERTENSION: ICD-10-CM

## 2021-12-21 RX ORDER — HYDROCHLOROTHIAZIDE 25 MG/1
TABLET ORAL
Qty: 90 TABLET | Refills: 0 | Status: SHIPPED | OUTPATIENT
Start: 2021-12-21 | End: 2021-12-29

## 2021-12-22 DIAGNOSIS — J45.20 MILD INTERMITTENT ASTHMA WITHOUT COMPLICATION: ICD-10-CM

## 2021-12-22 RX ORDER — ALBUTEROL SULFATE 90 UG/1
AEROSOL, METERED RESPIRATORY (INHALATION)
Qty: 18 G | Refills: 0 | Status: SHIPPED | OUTPATIENT
Start: 2021-12-22 | End: 2021-12-29

## 2021-12-22 NOTE — TELEPHONE ENCOUNTER
Routing refill request to provider for review/approval because:  Last ACT was 15 on 11/22/21.  Last seen 10/18/21.  Ventolin last written 18g + 0 refills on 11/19/21.  Advise.  Anika

## 2021-12-29 ENCOUNTER — OFFICE VISIT (OUTPATIENT)
Dept: FAMILY MEDICINE | Facility: CLINIC | Age: 56
End: 2021-12-29
Payer: COMMERCIAL

## 2021-12-29 VITALS
BODY MASS INDEX: 30.51 KG/M2 | HEART RATE: 89 BPM | WEIGHT: 172.2 LBS | OXYGEN SATURATION: 98 % | DIASTOLIC BLOOD PRESSURE: 88 MMHG | HEIGHT: 63 IN | TEMPERATURE: 97.7 F | SYSTOLIC BLOOD PRESSURE: 138 MMHG

## 2021-12-29 DIAGNOSIS — F33.40 RECURRENT MAJOR DEPRESSIVE DISORDER, IN REMISSION (H): Primary | ICD-10-CM

## 2021-12-29 DIAGNOSIS — J45.20 MILD INTERMITTENT ASTHMA WITHOUT COMPLICATION: ICD-10-CM

## 2021-12-29 DIAGNOSIS — Z23 HIGH PRIORITY FOR 2019-NCOV VACCINE: ICD-10-CM

## 2021-12-29 DIAGNOSIS — I10 ESSENTIAL HYPERTENSION: ICD-10-CM

## 2021-12-29 LAB
ANION GAP SERPL CALCULATED.3IONS-SCNC: 7 MMOL/L (ref 3–14)
BUN SERPL-MCNC: 15 MG/DL (ref 7–30)
CALCIUM SERPL-MCNC: 9.8 MG/DL (ref 8.5–10.1)
CHLORIDE BLD-SCNC: 97 MMOL/L (ref 94–109)
CO2 SERPL-SCNC: 28 MMOL/L (ref 20–32)
CREAT SERPL-MCNC: 0.8 MG/DL (ref 0.52–1.04)
GFR SERPL CREATININE-BSD FRML MDRD: 85 ML/MIN/1.73M2
GLUCOSE BLD-MCNC: 103 MG/DL (ref 70–99)
POTASSIUM BLD-SCNC: 3.5 MMOL/L (ref 3.4–5.3)
SODIUM SERPL-SCNC: 132 MMOL/L (ref 133–144)

## 2021-12-29 PROCEDURE — 91306 COVID-19,PF,MODERNA (18+ YRS BOOSTER .25ML): CPT | Performed by: FAMILY MEDICINE

## 2021-12-29 PROCEDURE — 0064A COVID-19,PF,MODERNA (18+ YRS BOOSTER .25ML): CPT | Performed by: FAMILY MEDICINE

## 2021-12-29 PROCEDURE — 36415 COLL VENOUS BLD VENIPUNCTURE: CPT | Performed by: FAMILY MEDICINE

## 2021-12-29 PROCEDURE — 99214 OFFICE O/P EST MOD 30 MIN: CPT | Performed by: FAMILY MEDICINE

## 2021-12-29 PROCEDURE — 80048 BASIC METABOLIC PNL TOTAL CA: CPT | Performed by: FAMILY MEDICINE

## 2021-12-29 RX ORDER — ALBUTEROL SULFATE 90 UG/1
AEROSOL, METERED RESPIRATORY (INHALATION)
Qty: 18 G | Refills: 11 | Status: SHIPPED | OUTPATIENT
Start: 2021-12-29 | End: 2023-01-16

## 2021-12-29 RX ORDER — FLUOXETINE 40 MG/1
40 CAPSULE ORAL DAILY
Qty: 90 CAPSULE | Refills: 3 | Status: SHIPPED | OUTPATIENT
Start: 2021-12-29 | End: 2023-02-24

## 2021-12-29 RX ORDER — OLOPATADINE HYDROCHLORIDE 1 MG/ML
SOLUTION/ DROPS OPHTHALMIC
Status: ON HOLD | COMMUNITY
Start: 2021-10-11 | End: 2022-10-14

## 2021-12-29 RX ORDER — MONTELUKAST SODIUM 10 MG/1
10 TABLET ORAL AT BEDTIME
Qty: 90 TABLET | Refills: 3 | Status: SHIPPED | OUTPATIENT
Start: 2021-12-29 | End: 2022-09-26

## 2021-12-29 RX ORDER — TOBRAMYCIN 3 MG/ML
SOLUTION/ DROPS OPHTHALMIC
COMMUNITY
Start: 2021-04-03 | End: 2022-01-12

## 2021-12-29 RX ORDER — HYDROCHLOROTHIAZIDE 25 MG/1
TABLET ORAL
Qty: 90 TABLET | Refills: 3 | Status: SHIPPED | OUTPATIENT
Start: 2021-12-29 | End: 2022-06-08

## 2021-12-29 ASSESSMENT — ANXIETY QUESTIONNAIRES
3. WORRYING TOO MUCH ABOUT DIFFERENT THINGS: NEARLY EVERY DAY
6. BECOMING EASILY ANNOYED OR IRRITABLE: SEVERAL DAYS
GAD7 TOTAL SCORE: 17
7. FEELING AFRAID AS IF SOMETHING AWFUL MIGHT HAPPEN: NEARLY EVERY DAY
GAD7 TOTAL SCORE: 17
1. FEELING NERVOUS, ANXIOUS, OR ON EDGE: SEVERAL DAYS
4. TROUBLE RELAXING: NEARLY EVERY DAY
GAD7 TOTAL SCORE: 17
5. BEING SO RESTLESS THAT IT IS HARD TO SIT STILL: NEARLY EVERY DAY
2. NOT BEING ABLE TO STOP OR CONTROL WORRYING: NEARLY EVERY DAY
7. FEELING AFRAID AS IF SOMETHING AWFUL MIGHT HAPPEN: NEARLY EVERY DAY

## 2021-12-29 ASSESSMENT — PATIENT HEALTH QUESTIONNAIRE - PHQ9
SUM OF ALL RESPONSES TO PHQ QUESTIONS 1-9: 9
10. IF YOU CHECKED OFF ANY PROBLEMS, HOW DIFFICULT HAVE THESE PROBLEMS MADE IT FOR YOU TO DO YOUR WORK, TAKE CARE OF THINGS AT HOME, OR GET ALONG WITH OTHER PEOPLE: EXTREMELY DIFFICULT
SUM OF ALL RESPONSES TO PHQ QUESTIONS 1-9: 9

## 2021-12-29 ASSESSMENT — ENCOUNTER SYMPTOMS: NERVOUS/ANXIOUS: 1

## 2021-12-29 ASSESSMENT — MIFFLIN-ST. JEOR: SCORE: 1330.22

## 2021-12-29 NOTE — PROGRESS NOTES
Answers for HPI/ROS submitted by the patient on 12/29/2021  If you checked off any problems, how difficult have these problems made it for you to do your work, take care of things at home, or get along with other people?: Extremely difficult  PHQ9 TOTAL SCORE: 9  JAK 7 TOTAL SCORE: 17      Assessment & Plan     Recurrent major depressive disorder, in remission (H)  Medication refilled.  - FLUoxetine (PROZAC) 20 MG capsule; Take 1 capsule (20 mg) by mouth daily  - FLUoxetine (PROZAC) 40 MG capsule; Take 1 capsule (40 mg) by mouth daily Take with 20 mg tab for a total of 60 mg daily  - Talked to patient about resources for alcohol dependence including AA groups.    High priority for 2019-nCoV vaccine  - COVID-19,PF,MODERNA (18+ Yrs BOOSTER .25mL)    Mild intermittent asthma without complication  Medication faxed.  - montelukast (SINGULAIR) 10 MG tablet; Take 1 tablet (10 mg) by mouth At Bedtime  - albuterol (VENTOLIN HFA) 108 (90 Base) MCG/ACT inhaler; INHALE 1-2 PUFFS INTO THE LUNGS EVERY 4 HOURS AS NEEDED FOR SHORTNESS OF BREATH/DYSPNEA OR WHEEZING .  - fluticasone-salmeterol (ADVAIR) 500-50 MCG/DOSE inhaler; Inhale 1 puff into the lungs every 12 hours Please prescribe the generic ( Wixela )    Essential hypertension  Blood pressure is within normal limits.  - hydrochlorothiazide (HYDRODIURIL) 25 MG tablet; Take 1 tablet (25 mg) by mouth once daily  - Basic metabolic panel  (Ca, Cl, CO2, Creat, Gluc, K, Na, BUN)        Depression Screening Follow Up    PHQ 12/29/2021   PHQ-9 Total Score 9   Q9: Thoughts of better off dead/self-harm past 2 weeks Several days   F/U: Thoughts of suicide or self-harm No   F/U: Safety concerns No     Last PHQ-9 12/29/2021   1.  Little interest or pleasure in doing things 1   2.  Feeling down, depressed, or hopeless 1   3.  Trouble falling or staying asleep, or sleeping too much 1   4.  Feeling tired or having little energy 1   5.  Poor appetite or overeating 1   6.  Feeling bad about  yourself 1   7.  Trouble concentrating 1   8.  Moving slowly or restless 1   Q9: Thoughts of better off dead/self-harm past 2 weeks 1   PHQ-9 Total Score 9   In the past two weeks have you had thoughts of suicide or self harm? No   Do you have concerns about your personal safety or the safety of others? No         No flowsheet data found.      Follow Up      Follow Up Actions Taken  Crisis resource information provided in the After Visit Summary    Discussed the following ways the patient can remain in a safe environment:  remove alcohol, remove drugs and be around others  FUTURE APPOINTMENTS:       - Follow-up visit in one month    Return in about 4 weeks (around 1/26/2022) for Follow up.    Julia Oneill MD  [facility] is a 56 year old who presents for the following health issues  accompanied by her none    Chief Complaint   Patient presents with     Depression     Anxiety     Imm/Inj     COVID-19 VACCINE       Patient comes in for recheck of depression and anxiety.    She reports that her depression has worsened over time. She says she has been struggling with some relationship issues with some members of her family. Patient reports that she has been depending on alcohol for relief. She admits that she has been taking an excessive amount of alcohol and she admits that she needs help with this. She reports that she has suicidal ideation but has no intention of carrying it out.  Discussed resources with patient. Recommended AA and therapy for alcohol dependence.              Anxiety    Imm/Inj    History of Present Illness     Asthma:  She presents for follow up of asthma.  She has no cough, some wheezing, and some shortness of breath. She is using a relief medication 2-3 times per day. She does not miss any doses of her controller medication throughout the week.Patient is aware of the following triggers: none. The patient has not had a visit to the Emergency  Room, Urgent Care or Hospital due to asthma since the last clinic visit.     COPD:  She presents for follow up of COPD.  Overall, COPD symptoms are stable since last visit. She has same as usual fatigue or shortness of breath with exertion and same as usual shortness of breath at rest.  She often coughs and does not have change in sputum. No recent fever. She can walk 2-5 blocks without stopping to rest. She can walk 3 or more flights of stairs without resting.The patient has had no ED, urgent care, or hospital admissions because of COPD since the last visit.     Mental Health Follow-up:  Patient presents to follow-up on Depression & Anxiety.Patient's depression since last visit has been:  Worse  The patient is having other symptoms associated with depression.  Patient's anxiety since last visit has been:  Worse  The patient is having other symptoms associated with anxiety.  Any significant life events: relationship concerns  Patient is not feeling anxious or having panic attacks.  Patient has concerns about alcohol or drug use.     Social History  Tobacco Use    Smoking status: Former Smoker      Packs/day: 0.00      Years: 23.00      Pack years: 0      Types: Cigarettes      Quit date: 2021      Years since quittin.4    Smokeless tobacco: Never Used    Tobacco comment: 6 cig a day   Vaping Use    Vaping Use: Never used  Alcohol use: Yes    Comment: 12 pack weekly  Drug use: No      Today's PHQ-9         PHQ-9 Total Score:     (P) 9   PHQ-9 Q9 Thoughts of better off dead/self-harm past 2 weeks :   (P) Several days   Thoughts of suicide or self harm:  (P) No   Self-harm Plan:        Self-harm Action:          Safety concerns for self or others: (P) No        She is taking medications regularly.           Hypertension Follow-up      Do you check your blood pressure regularly outside of the clinic? No     Are you following a low salt diet? Yes    Are your blood pressures ever more than 140 on the top number  (systolic) OR more   than 90 on the bottom number (diastolic), for example 140/90? No        Depression and Anxiety Follow-Up    How are you doing with your depression since your last visit? Worsened     How are you doing with your anxiety since your last visit?  Worsened     Are you having other symptoms that might be associated with depression or anxiety? Yes:   States her dad is not getting along with anybody anywhere    Have you had a significant life event? Relationship Concerns     Do you have any concerns with your use of alcohol or other drugs? Yes:  She has been drinking more.     Social History     Tobacco Use     Smoking status: Former Smoker     Packs/day: 0.00     Years: 23.00     Pack years: 0.00     Types: Cigarettes     Quit date: 2021     Years since quittin.4     Smokeless tobacco: Never Used     Tobacco comment: 6 cig a day    Vaping Use     Vaping Use: Never used   Substance Use Topics     Alcohol use: Yes     Comment: 6 to 8 beers 4 times weekly      Drug use: No     PHQ 2020   PHQ-9 Total Score 11 16 9   Q9: Thoughts of better off dead/self-harm past 2 weeks Several days More than half the days Several days   F/U: Thoughts of suicide or self-harm - - No   F/U: Safety concerns - - No     JAK-7 SCORE 2020   Total Score - - 17 (severe anxiety)   Total Score 14 21 17     Last PHQ-9 2021   1.  Little interest or pleasure in doing things 1   2.  Feeling down, depressed, or hopeless 1   3.  Trouble falling or staying asleep, or sleeping too much 1   4.  Feeling tired or having little energy 1   5.  Poor appetite or overeating 1   6.  Feeling bad about yourself 1   7.  Trouble concentrating 1   8.  Moving slowly or restless 1   Q9: Thoughts of better off dead/self-harm past 2 weeks 1   PHQ-9 Total Score 9   In the past two weeks have you had thoughts of suicide or self harm? No   Do you have concerns about your personal safety or the  "safety of others? No     JAK-7  12/29/2021   1. Feeling nervous, anxious, or on edge 1   2. Not being able to stop or control worrying 3   3. Worrying too much about different things 3   4. Trouble relaxing 3   5. Being so restless that it is hard to sit still 3   6. Becoming easily annoyed or irritable 1   7. Feeling afraid, as if something awful might happen 3   JAK-7 Total Score 17   If you checked any problems, how difficult have they made it for you to do your work, take care of things at home, or get along with other people? -     1350    Follow Up Actions Taken  Crisis resource information provided in the After Visit Summary     Discussed the following ways the patient can remain in a safe environment:  remove alcohol, remove drugs and be around others  Suicide Assessment Five-step Evaluation and Treatment (SAFE-T)            Review of Systems   Psychiatric/Behavioral: The patient is nervous/anxious.       CONSTITUTIONAL: NEGATIVE for fever, chills, change in weight  ENT/MOUTH: NEGATIVE for ear, mouth and throat problems  RESP: NEGATIVE for significant cough or SOB  CV: NEGATIVE for chest pain, palpitations or peripheral edema  GI: NEGATIVE for nausea, abdominal pain, heartburn, or change in bowel habits  MUSCULOSKELETAL: NEGATIVE for significant arthralgias or myalgia  HEME/ALLERGY/IMMUNE: NEGATIVE for bleeding problems  PSYCHIATRIC: POSITIVE for concentration difficulty, depressed mood and panic attack      Objective    /88 (BP Location: Right arm, Patient Position: Sitting, Cuff Size: Adult Large)   Pulse 89   Temp 97.7  F (36.5  C) (Tympanic)   Ht 1.6 m (5' 3\")   Wt 78.1 kg (172 lb 3.2 oz)   SpO2 98%   BMI 30.50 kg/m    Body mass index is 30.5 kg/m .  Physical Exam   GENERAL: healthy, alert and no distress  EYES: Eyes grossly normal to inspection, PERRL and conjunctivae and sclerae normal  HENT: ear canals and TM's normal, nose and mouth without ulcers or lesions  NECK: no adenopathy, no " asymmetry, masses, or scars and thyroid normal to palpation  RESP: lungs clear to auscultation - no rales, rhonchi or wheezes  CV: regular rate and rhythm, normal S1 S2, no S3 or S4, no murmur, click or rub, no peripheral edema and peripheral pulses strong  ABDOMEN: soft, nontender, no hepatosplenomegaly, no masses and bowel sounds normal  MS: no gross musculoskeletal defects noted, no edema  SKIN: no suspicious lesions or rashes  PSYCH: mentation appears normal, affect flat, judgement and insight intact and appearance well groomed

## 2021-12-30 ASSESSMENT — PATIENT HEALTH QUESTIONNAIRE - PHQ9: SUM OF ALL RESPONSES TO PHQ QUESTIONS 1-9: 9

## 2021-12-30 ASSESSMENT — ANXIETY QUESTIONNAIRES: GAD7 TOTAL SCORE: 17

## 2021-12-31 NOTE — RESULT ENCOUNTER NOTE
Please inform patient that test result was within normal parameters except that her sodium was a bit low. Recommend that she hydrate.   Thank you.     Julia Oneill M.D.

## 2022-01-12 ENCOUNTER — VIRTUAL VISIT (OUTPATIENT)
Dept: FAMILY MEDICINE | Facility: CLINIC | Age: 57
End: 2022-01-12
Payer: COMMERCIAL

## 2022-01-12 DIAGNOSIS — U07.1 INFECTION DUE TO 2019 NOVEL CORONAVIRUS: ICD-10-CM

## 2022-01-12 DIAGNOSIS — K04.7 TOOTH ABSCESS: Primary | ICD-10-CM

## 2022-01-12 PROCEDURE — 99213 OFFICE O/P EST LOW 20 MIN: CPT | Mod: 95 | Performed by: FAMILY MEDICINE

## 2022-01-12 RX ORDER — AMOXICILLIN 500 MG/1
500 CAPSULE ORAL 2 TIMES DAILY
Qty: 20 CAPSULE | Refills: 0 | Status: SHIPPED | OUTPATIENT
Start: 2022-01-12 | End: 2022-01-22

## 2022-01-12 NOTE — PATIENT INSTRUCTIONS
Thank you for choosing [facility].  You may be receiving an email and/or telephone survey request from [facility] Customer Experience regarding your visit today.  Please take a few minutes to respond to the survey to let us know how we are doing.      If you have questions or concerns, please contact us via [employer] or you can contact your care team at [phone] option 2.    Our Clinic hours are:  Monday - Thursday 7am-6pm  Friday 7am-5pm    The [place] outpatient lab hours are:  Monday - Friday 7am-4:30pm    Appointments are required, call [phone]    If you have clinical questions after hours or would like to schedule an appointment,  call the clinic at [phone].

## 2022-01-12 NOTE — PROGRESS NOTES
Carolina is a 56 year old who is being evaluated via a billable telephone visit.      What phone number would you like to be contacted at? [phone].  Phone connection was hard at times during the phone call.  How would you like to obtain your AVS? MyChart    Assessment & Plan     Tooth abscess  Antibiotics faxed. Recommend f/u with dentist  - amoxicillin (AMOXIL) 500 MG capsule; Take 1 capsule (500 mg) by mouth 2 times daily for 10 days    Infection due to 2019 novel coronavirus  Recommend that she continue to monitor her oxygen saturations. She is asked to increase fluids.           FUTURE APPOINTMENTS:       - Follow-up visit in 1-2 weeks or sooner as needed.    Return in about 2 weeks (around 1/26/2022) for Follow up.    Julia Oneill MD  [facility]    Thor Figueroa is a 56 year old who presents for the following health issues  accompanied by her self.    HPI Patient is a 56-year-old female presenting with positive COVID test.  She said she was tested at home and was positive presenting with cough and chest tightness with some rhinorrhea and fatigue. Her  is also positive for COVID.      She also reports that her cough is productive of yellowish-green sputum and she thinks she may have a tooth abscess as she has had some tooth pain and drainage from her gums.  She is supposed to see a dentist but has not gotten around to doing this.  She is a smoker.  She also has a history of COPD.  No fevers or chills.  She has been extremely fatigued since the onset of the COVID diagnosis.    Acute Illness  Tested positive for Covid on Saturday.  At home test.  She is wanting to know if and when she should repeat another at home Covid test.  Acute illness concerns:  Cough and sinus symptoms.  Onset/Duration: Started on Friday.  Symptoms:  Fever: no  Chills/Sweats: YES- chills.  Headache (location?): YES- Forehead, left side of the head.  Sinus Pressure: YES- More on the left side.   Has a tooth on the upper left side that feels inflamed.   Conjunctivitis:  no  Ear Pain: no  Rhinorrhea: YES  Congestion: YES- this is getting a little better.  It still present.  Sore Throat: Had a scratchy throat on Friday-Monday.  This is better today.  Cough: YES-productive of green sputum  Wheeze: YES- at times.  Decreased Appetite: YES- has been trying to eat warm foods, soups.  Nausea: no  Vomiting: no  Diarrhea: no  Dysuria/Freq.: no  Dysuria or Hematuria: no  Fatigue/Achiness: YES  Sick/Strep Exposure: YES- Grandson exposed to Covid at .  They couldn't go so she babysat him.  Therapies tried and outcome: Ibuprofen 800 mg once to twice per day for the sinus headache pain.  Benadryl at night to help with sleeping due to the nasal congestion.  Sinus rinse.        Review of Systems   Constitutional: Positive for activity change and fatigue.   HENT: Positive for congestion, postnasal drip, rhinorrhea and sinus pain.    Respiratory: Positive for cough, shortness of breath and wheezing.    Cardiovascular: Negative.    Gastrointestinal: Negative.    Breasts:  negative.    Genitourinary: Negative.    Musculoskeletal: Negative.    Skin: Negative.    Neurological: Negative.    Hematological: Negative.    Psychiatric/Behavioral: Negative.             Objective           Vitals:  No vitals were obtained today due to virtual visit.    Physical Exam   alert and no distress  PSYCH: Alert and oriented times 3; coherent speech, normal   rate and volume, able to articulate logical thoughts, able   to abstract reason, no tangential thoughts, no hallucinations   or delusions  Her affect is normal  RESP: No cough, no audible wheezing, able to talk in full sentences  Remainder of exam unable to be completed due to telephone visits            Phone call duration: 7 minutes

## 2022-01-13 ENCOUNTER — TELEPHONE (OUTPATIENT)
Dept: FAMILY MEDICINE | Facility: CLINIC | Age: 57
End: 2022-01-13
Payer: COMMERCIAL

## 2022-01-13 ASSESSMENT — ENCOUNTER SYMPTOMS
HEMATOLOGIC/LYMPHATIC NEGATIVE: 1
SHORTNESS OF BREATH: 1
GASTROINTESTINAL NEGATIVE: 1
FATIGUE: 1
CARDIOVASCULAR NEGATIVE: 1
COUGH: 1
SINUS PAIN: 1
PSYCHIATRIC NEGATIVE: 1
WHEEZING: 1
NEUROLOGICAL NEGATIVE: 1
ACTIVITY CHANGE: 1
RHINORRHEA: 1
MUSCULOSKELETAL NEGATIVE: 1

## 2022-01-13 NOTE — TELEPHONE ENCOUNTER
Left message on answering machine to return call. Direct line provided. Need to find out why requesting antibiotic.  Natasha Caceres RN

## 2022-01-17 DIAGNOSIS — J45.40 UNCONTROLLED MODERATE PERSISTENT ASTHMA: ICD-10-CM

## 2022-01-18 NOTE — TELEPHONE ENCOUNTER
Patient is out for neb solution she is upset cause she though all her meds where refills in December when she was seen.     Juliann Saldaña PSC on 1/18/2022 at 3:48 PM

## 2022-01-20 RX ORDER — ALBUTEROL SULFATE 0.83 MG/ML
2.5 SOLUTION RESPIRATORY (INHALATION) EVERY 4 HOURS PRN
Qty: 75 ML | Refills: 0 | Status: SHIPPED | OUTPATIENT
Start: 2022-01-20 | End: 2022-02-16

## 2022-01-20 NOTE — TELEPHONE ENCOUNTER
Dr. Oneill,      Patient has been out for 3 days, last ACT was 15 so RN can not fill, please advise today.    MILIND Tafoya

## 2022-01-31 ENCOUNTER — HOSPITAL ENCOUNTER (EMERGENCY)
Facility: CLINIC | Age: 57
Discharge: HOME | End: 2022-01-31
Attending: PHYSICIAN ASSISTANT | Admitting: PHYSICIAN ASSISTANT
Payer: COMMERCIAL

## 2022-01-31 ENCOUNTER — NURSE TRIAGE (OUTPATIENT)
Dept: FAMILY MEDICINE | Facility: CLINIC | Age: 57
End: 2022-01-31
Payer: COMMERCIAL

## 2022-01-31 VITALS
BODY MASS INDEX: 30.12 KG/M2 | WEIGHT: 170 LBS | HEIGHT: 63 IN | RESPIRATION RATE: 18 BRPM | TEMPERATURE: 98.9 F | SYSTOLIC BLOOD PRESSURE: 126 MMHG | DIASTOLIC BLOOD PRESSURE: 84 MMHG | OXYGEN SATURATION: 99 % | HEART RATE: 76 BPM

## 2022-01-31 DIAGNOSIS — R19.7 DIARRHEA, UNSPECIFIED TYPE: ICD-10-CM

## 2022-01-31 DIAGNOSIS — K04.7 DENTAL ABSCESS: ICD-10-CM

## 2022-01-31 PROCEDURE — 41800 DRAINAGE OF GUM LESION: CPT | Performed by: PHYSICIAN ASSISTANT

## 2022-01-31 PROCEDURE — 99284 EMERGENCY DEPT VISIT MOD MDM: CPT | Mod: 25 | Performed by: PHYSICIAN ASSISTANT

## 2022-01-31 PROCEDURE — 250N000013 HC RX MED GY IP 250 OP 250 PS 637: Performed by: PHYSICIAN ASSISTANT

## 2022-01-31 PROCEDURE — 99283 EMERGENCY DEPT VISIT LOW MDM: CPT | Mod: 25 | Performed by: PHYSICIAN ASSISTANT

## 2022-01-31 RX ORDER — IBUPROFEN 400 MG/1
800 TABLET, FILM COATED ORAL ONCE
Status: COMPLETED | OUTPATIENT
Start: 2022-01-31 | End: 2022-01-31

## 2022-01-31 RX ADMIN — IBUPROFEN 800 MG: 400 TABLET ORAL at 15:38

## 2022-01-31 ASSESSMENT — MIFFLIN-ST. JEOR: SCORE: 1320.24

## 2022-01-31 NOTE — TELEPHONE ENCOUNTER
Reason for Call:  Medication or medication refill:    Do you use a [facility]?  Name of the pharmacy and phone number for the current request:  [facility] [phone]    Name of the medication requested: Amoxicillin  Other request: Amoxicillin, for continuing dental abcess, has dental appt tomorrow, 2/1/22 and they will not prescribe meds.Patient last had Amoxicillin on 1/12/22    Can we leave a detailed message on this number? YES    Phone number patient can be reached at: Home number on file [phone] (home)    Best Time: any    Call taken on 1/31/2022 at 9:43 AM by Kourtney Howard

## 2022-01-31 NOTE — ED PROVIDER NOTES
History     Chief Complaint   Patient presents with     Dental Pain     has an abscessed tooth and has been on abx but missed her dental appointment and got worse again. now has an appointment with her dentist tomorrow am. pain is now going up into her left eye     HPI  Carolina Hernandez is a 56 year old female who presents to the urgent care with concern over dental abscess/concern for potential IV antibiotics.  Patient reports that she has had ongoing left upper dental pain which been present for several weeks to months.  She was initially evaluated diagnosed with dental abscess through virtual visit with PCP 1/12/22.  She was placed on amoxicillin 500 BID which she finished approximately 9 days ago.  She states that in the last 3-4 days she has had recurrence of her dental pain as well as  left-sided facial and gum swelling.  She is also noted some discharge from her left eye, intermittent blurred vision when discharge is present.  Once discharge is removed vision returns to baseline.  She has not had any fever, chills, allergies, sore throat, cough, dyspnea, wheezing, vomiting, diarrhea or abdominal complaints.      Allergies:  Allergies   Allergen Reactions     Penicillins Itching       Problem List:    Patient Active Problem List    Diagnosis Date Noted     Low back pain due to bilateral sciatica 10/27/2021     Priority: Medium     Primary osteoarthritis of both knees 09/23/2021     Priority: Medium     Essential hypertension 07/26/2019     Priority: Medium     New diagnosis 7/26/2019         Recurrent major depressive disorder, in remission (H) 01/17/2019     Priority: Medium     Encounter for tobacco use cessation counseling 01/17/2019     Priority: Medium     Mild intermittent asthma without complication 01/17/2019     Priority: Medium     CARDIOVASCULAR SCREENING; LDL GOAL LESS THAN 160 10/31/2010     Priority: Medium        Past Medical History:    Past Medical History:   Diagnosis Date     Asthma       C. difficile colitis      Depression      Depressive disorder Have had it most of my life     Osteoarthritis      Sinusitis, chronic        Past Surgical History:    Past Surgical History:   Procedure Laterality Date     COLONOSCOPY  2017       Family History:    Family History   Problem Relation Age of Onset     Glaucoma Father      Coronary Artery Disease Father         stents     Cancer Maternal Grandfather      Coronary Artery Disease Paternal Grandfather      Schizophrenia Daughter      Diabetes Daughter      Cancer Daughter      Crohn's Disease No family hx of      Ulcerative Colitis No family hx of      GERD No family hx of      Stomach Cancer No family hx of        Social History:  Marital Status:   [2]  Social History     Tobacco Use     Smoking status: Former Smoker     Packs/day: 0.00     Years: 23.00     Pack years: 0.00     Types: Cigarettes     Quit date: 2021     Years since quittin.5     Smokeless tobacco: Never Used     Tobacco comment: 6 cig a day    Vaping Use     Vaping Use: Never used   Substance Use Topics     Alcohol use: Not Currently     Comment: 6 to 8 beers 4 times weekly      Drug use: No        Medications:    amoxicillin-clavulanate (AUGMENTIN) 875-125 MG tablet  albuterol (PROVENTIL) (2.5 MG/3ML) 0.083% neb solution  albuterol (VENTOLIN HFA) 108 (90 Base) MCG/ACT inhaler  amLODIPine (NORVASC) 2.5 MG tablet  cetirizine (ZYRTEC) 10 MG tablet  FLUoxetine (PROZAC) 20 MG capsule  FLUoxetine (PROZAC) 40 MG capsule  fluticasone-salmeterol (ADVAIR) 500-50 MCG/DOSE inhaler  hydrochlorothiazide (HYDRODIURIL) 25 MG tablet  loperamide (IMODIUM A-D) 2 MG tablet  montelukast (SINGULAIR) 10 MG tablet  olopatadine (PATANOL) 0.1 % ophthalmic solution  Omega-3 Fatty Acids (FISH OIL) 1200 MG capsule  omeprazole 20 MG tablet      Review of Systems  CONSTITUTIONAL:NEGATIVE for fever, chills, change in weight  INTEGUMENTARY/SKIN: NEGATIVE for worrisome rashes, moles or lesions  EYES:  "POSITIVE for left eye discharge, eyelid swelling, intermittent blurred vision and NEGATIVE for eye pain, photophobia, pain with EOM.    ENT/MOUTH: POSITIVE for left upper dental pain, facial swelling and NEGATIVE for nasal congestion, sore throat  RESP:POSITIVE for cough and NEGATIVE for SOB/dyspnea and wheezing  GI: NEGATIVE for nausea, vomiting, diarrhea or abdominal pain   Physical Exam   BP: 131/86  Pulse: 78  Temp: 98.9  F (37.2  C)  Resp: 16  Height: 160 cm (5' 3\")  Weight: 77.1 kg (170 lb) (stated)  SpO2: 97 %  Physical Exam  GENERAL APPEARANCE:alert, cooperative, patient appears in mild discomfort   EYES: EOMI,  PERRL, conjunctiva clear, there is discharge near the medial canthus of the left eye. Tissue below lower eyelid is edematous, no erythema, warmth  HENT: ear canals and TM's normal.  Posterior pharynx nonerythematous without exudate.  Patient does have some dental decay.  There is tenderness palpation of tooth #19 which has associated fluctuance/absence along lateral gum line  NECK: supple, nontender, left cervical lymphadenopathy   RESP: lungs clear to auscultation - no rales, rhonchi or wheezes  CV: regular rates and rhythm, normal S1 S2, no murmur noted  SKIN: no suspicious lesions or rashes  ED Course           Procedures       Critical Care time:  none           After discussing versus benefits dental abscess was drained with 18-gauge needle, moderate amount of bloody and purulent fluid was obtained.    No results found for this or any previous visit (from the past 24 hour(s)).    Medications   ibuprofen (ADVIL/MOTRIN) tablet 800 mg (800 mg Oral Given 1/31/22 1538)     Assessments & Plan (with Medical Decision Making)     I have reviewed the nursing notes.    I have reviewed the findings, diagnosis, plan and need for follow up with the patient.       New Prescriptions    AMOXICILLIN-CLAVULANATE (AUGMENTIN) 875-125 MG TABLET    Take 1 tablet by mouth 2 times daily for 10 days     Final diagnoses: "   Dental abscess     48-year-old female with history of recent dental abscess recently finished antibiotics presents to the emergency department with concern over recurrence of increasing dental pain, left-sided facial swelling with intermittent left eye discharge/blurred vision.  She had stable vital signs upon arrival, physical exam findings are consistent with dental abscess with facial cellulitis.  I do not suspect conjunctivitis, periorbital or orbital cellulitis.  Discussed versus benefits patient agreed to proceed with drainage of her abscess was did write some minimal temporary  improvement of her pain.  She was discharged home stable with prescription for oral Augmentin twice daily for 10 days.  Follow-up with dentist as soon as possible for definitive management.  Worrisome reasons to return to ER or seek medical care sooner discussed.      Disclaimer: This note consists of symbols derived from keyboarding, dictation, and/or voice recognition software. As a result, there may be errors in the script that have gone undetected.  Please consider this when interpreting information found in the chart.    1/31/2022   [facility] EMERGENCY DEPT    Luciana Billings PA-C  02/04/22 [id number]

## 2022-01-31 NOTE — DISCHARGE INSTRUCTIONS
Many of these clinics offer a sliding fee option for patients that qualify, and see patients on a walk-in or same day basis. Please call each clinic directly. As services, hours, fees and policies vary greatly.          [facility], \[facility] [place] [facility]\""  [phone]  Sees no insurance  [facility], [place]  [phone]  Preventive services only  [facility] (mult loc) [phone]  [facility]    ([facility]), \[facility] [place] [facility]\""  [phone]  [facility], [place]       [phone]  Preventive services only  [facility]  [phone]  Accepts MA & sees no ins  Critical access hospital [facility],      Accepts MA & sees no ins   [place]   [phone]; [phone]  Critical access hospital [facility]   Accepts MA & sees no ins       [phone]  [facility], \[facility] [place] [facility]\""  [phone]   Accepts MA emergencies  [facility] [place] [phone]  [facility],     Accepts MA   [place]   [phone]    [facility] [phone]  Accepts MA & sees no ins   [facility]   Dental Clinic    [phone]  [facility], \[facility] [place] [facility]\""  [phone]   [facility] [phone]  [facility]  Preventive services only   [place]   [phone]  [facility]) [phone]  [facility], [place]  [phone]  Same day [facility] [phone]  Same day [facility],      Same day [facility]   [phone]    [facility], \[facility] [place] [facility]\"" [phone]  Free [facility], walk-in only  [facility] (multiple locations) [phone]      [facility] , \[facility] [place] [facility]\"" [phone]    [facility] [phone]  Free clinic, walk-in only  [facility]  [phone]  [facility] [phone] (adults)       [phone]  (children)  [facility] [phone]    Also, referral service for low cost dental and healthcare: [phone]  And [phone]

## 2022-01-31 NOTE — TELEPHONE ENCOUNTER
"  Reason for Disposition    Patient sounds very sick or weak to the triager     Pt completed oral antibiotic on 1/22/22.  Symptoms started recurring 1/26.  Pt states she has facial swelling and swelling into her left eye.  Her vision is affected.  Pt is having trouble seeing out of her left eye now.    Face is swollen    Additional Information    Negative: Pale cold skin and very weak (can't stand)    Negative: Similar pain previously and it was from 'heart attack'    Negative: Similar pain previously and it was from 'angina' and not relieved by nitroglycerin    Negative: Sounds like a life-threatening emergency to the triager    Negative: Chest pain    Negative: Toothache followed tooth injury    Answer Assessment - Initial Assessment Questions  1. LOCATION: \"Which tooth is hurting?\"  (e.g., right-side/left-side, upper/lower, front/back)      Left side  2. ONSET: \"When did the toothache start?\"  (e.g., hours, days)       months  3. SEVERITY: \"How bad is the toothache?\"  (Scale 1-10; mild, moderate or severe)    - MILD (1-3): doesn't interfere with chewing     - MODERATE (4-7): interferes with chewing, interferes with normal activities, awakens from sleep      - SEVERE (8-10): unable to eat, unable to do any normal activities, excruciating pain         severe  4. SWELLING: \"Is there any visible swelling of your face?\"      Yes, moving into left eye area and affecting vision  5. OTHER SYMPTOMS: \"Do you have any other symptoms?\" (e.g., fever)      Denies fever.   Lots of pain  Pt states \"It's really bad now.\"    Pt sounds muffled when she tried to speak.  Pt attributes this to the facial swelling.      6. PREGNANCY: \"Is there any chance you are pregnant?\" \"When was your last menstrual period?\"      NA    Protocols used: TOOTHACHE-A-OH    "

## 2022-01-31 NOTE — ED NOTES
Left upper molar pain with cracked tooth Has been going on for two weeks. She has pain of 8/10. Sh has not had any tylenol or Ibuprofen for 12 hrs. She has noted redness in her left upper cheek and towards left eye 2 weeks ago. Left eye watering. No fevers. Given ice pack.

## 2022-02-03 RX ORDER — LOPERAMIDE HYDROCHLORIDE 2 MG/1
TABLET ORAL
Qty: 90 TABLET | Refills: 6 | OUTPATIENT
Start: 2022-02-03

## 2022-02-03 NOTE — TELEPHONE ENCOUNTER
loperamide (IMODIUM A-D) 2 MG tablet Take as needed for loose stools. Increase as needed. Do not use more than  8 tabs (16 mg) per day.  Last Written Prescription Date:  8/24/21  Last Fill Quantity: 90,   # refills: 4  Last Office Visit : 12/6/2021  Future Office visit:  3/22/22    Routing refill request to provider for review/approval because:   Loperamide not on Counts include 234 beds at the [facility] refill protocol

## 2022-02-10 DIAGNOSIS — R19.7 DIARRHEA, UNSPECIFIED TYPE: ICD-10-CM

## 2022-02-10 RX ORDER — LOPERAMIDE HYDROCHLORIDE 2 MG/1
TABLET ORAL
Qty: 90 TABLET | Refills: 4 | OUTPATIENT
Start: 2022-02-10

## 2022-02-10 NOTE — TELEPHONE ENCOUNTER
loperamide (IMODIUM A-D) 2 MG tablet   Sig: Take as needed for loose stools. Increase as needed. Do not use more than  8 tabs (16 mg) per day.  Last Written Prescription Date:  8-24-21  Last Fill Quantity: 90,   # refills: 4  Last Office Visit : 12-6-21  Future Office visit:  3-22-22    Routing refill request to provider for review/approval because:  Med not on protocol  Pharm called no refills left,  LAST RX  8-24-21

## 2022-02-10 NOTE — TELEPHONE ENCOUNTER
Health Call Center    Phone Message    May a detailed message be left on voicemail: yes     Reason for Call: Medication Question or concern regarding medication   Prescription Clarification  Name of Medication: loperamide (IMODIUM A-D) 2 MG tablet  Prescribing Provider: Aimee Nava   Pharmacy: [id number]   What on the order needs clarification? Pharmacy called to get status on refill RX, as they have not received RX as of yet.  Please follow up with pharmacy.  Thank you.    Action Taken: Message routed to:  [facility] Med Refills Team    Travel Screening: Not Applicable

## 2022-02-12 DIAGNOSIS — J45.40 UNCONTROLLED MODERATE PERSISTENT ASTHMA: ICD-10-CM

## 2022-02-14 NOTE — TELEPHONE ENCOUNTER
"Requested Prescriptions   Pending Prescriptions Disp Refills     albuterol (PROVENTIL) (2.5 MG/3ML) 0.083% neb solution [Pharmacy Med Name: Albuterol Sulfate Inhalation Nebulization Solution (2.5 MG/3ML) 0.083%] 75 mL 0     Sig: inhale 1 vial (2.5 mg) by nebulization every 4 hours as needed for shortness of breath / dyspnea or wheezing       Asthma Maintenance Inhalers - Anticholinergics Failed - 2/12/2022 12:29 PM        Failed - Asthma control assessment score within normal limits in last 6 months     Please review ACT score.           Passed - Patient is age 12 years or older        Passed - Medication is active on med list        Passed - Recent (6 mo) or future (30 days) visit within the authorizing provider's specialty     Patient had office visit in the last 6 months or has a visit in the next 30 days with authorizing provider or within the authorizing provider's specialty.  See \"Patient Info\" tab in inbasket, or \"Choose Columns\" in Meds & Orders section of the refill encounter.           Short-Acting Beta Agonist Inhalers Protocol  Failed - 2/12/2022 12:29 PM        Failed - Asthma control assessment score within normal limits in last 6 months     Please review ACT score.           Passed - Patient is age 12 or older        Passed - Medication is active on med list        Passed - Recent (6 mo) or future (30 days) visit within the authorizing provider's specialty     Patient had office visit in the last 6 months or has a visit in the next 30 days with authorizing provider or within the authorizing provider's specialty.  See \"Patient Info\" tab in [facility], or \"Choose Columns\" in Meds & Orders section of the refill encounter.                 "

## 2022-02-16 RX ORDER — ALBUTEROL SULFATE 0.83 MG/ML
SOLUTION RESPIRATORY (INHALATION)
Qty: 75 ML | Refills: 0 | Status: SHIPPED | OUTPATIENT
Start: 2022-02-16 | End: 2022-03-10

## 2022-02-25 ENCOUNTER — TELEPHONE (OUTPATIENT)
Dept: GASTROENTEROLOGY | Facility: CLINIC | Age: 57
End: 2022-02-25
Payer: COMMERCIAL

## 2022-02-25 DIAGNOSIS — R19.5 LOOSE STOOLS: Primary | ICD-10-CM

## 2022-02-25 RX ORDER — LOPERAMIDE HYDROCHLORIDE 2 MG/1
2 TABLET ORAL 4 TIMES DAILY PRN
Qty: 90 TABLET | Refills: 2 | Status: SHIPPED | OUTPATIENT
Start: 2022-02-25 | End: 2022-07-25

## 2022-02-26 ENCOUNTER — HEALTH MAINTENANCE LETTER (OUTPATIENT)
Age: 57
End: 2022-02-26

## 2022-03-03 ENCOUNTER — HOSPITAL ENCOUNTER (OUTPATIENT)
Dept: CT IMAGING | Facility: CLINIC | Age: 57
Discharge: HOME | End: 2022-03-03
Attending: PHYSICIAN ASSISTANT | Admitting: PHYSICIAN ASSISTANT
Payer: COMMERCIAL

## 2022-03-03 DIAGNOSIS — R14.0 ABDOMINAL BLOATING: ICD-10-CM

## 2022-03-03 PROCEDURE — 74177 CT ABD & PELVIS W/CONTRAST: CPT

## 2022-03-03 PROCEDURE — 250N000011 HC RX IP 250 OP 636: Performed by: PHYSICIAN ASSISTANT

## 2022-03-03 PROCEDURE — 250N000009 HC RX 250: Performed by: PHYSICIAN ASSISTANT

## 2022-03-03 RX ORDER — IOPAMIDOL 755 MG/ML
83 INJECTION, SOLUTION INTRAVASCULAR ONCE
Status: COMPLETED | OUTPATIENT
Start: 2022-03-03 | End: 2022-03-03

## 2022-03-03 RX ADMIN — IOPAMIDOL 83 ML: 755 INJECTION, SOLUTION INTRAVENOUS at 10:47

## 2022-03-03 RX ADMIN — SODIUM CHLORIDE 61 ML: 9 INJECTION, SOLUTION INTRAVENOUS at 10:47

## 2022-03-04 ENCOUNTER — LAB (OUTPATIENT)
Dept: LAB | Facility: CLINIC | Age: 57
End: 2022-03-04
Payer: COMMERCIAL

## 2022-03-04 DIAGNOSIS — R14.0 ABDOMINAL BLOATING: ICD-10-CM

## 2022-03-04 PROCEDURE — 82653 EL-1 FECAL QUANTITATIVE: CPT

## 2022-03-07 LAB — ELASTASE PANC STL-MCNT: 210 UG/G

## 2022-03-09 DIAGNOSIS — J45.40 UNCONTROLLED MODERATE PERSISTENT ASTHMA: ICD-10-CM

## 2022-03-09 NOTE — TELEPHONE ENCOUNTER
Pt calling because she needs this prescription refilled, and she is also wondering if this can have refills put in for it so she doesn't have to call in every month. She has discuss this with her provider before.    Mayra Ramirez Patient

## 2022-03-10 RX ORDER — ALBUTEROL SULFATE 0.83 MG/ML
SOLUTION RESPIRATORY (INHALATION)
Qty: 75 ML | Refills: 0 | Status: SHIPPED | OUTPATIENT
Start: 2022-03-10 | End: 2022-03-13

## 2022-03-10 NOTE — TELEPHONE ENCOUNTER
Refilled once.  Covering for pcp.  Refill question needs to be addressed by pcp.  Tyrone Hernandez MD  Family Medicine

## 2022-03-11 DIAGNOSIS — I10 ESSENTIAL HYPERTENSION: ICD-10-CM

## 2022-03-11 DIAGNOSIS — J45.40 UNCONTROLLED MODERATE PERSISTENT ASTHMA: ICD-10-CM

## 2022-03-11 NOTE — TELEPHONE ENCOUNTER
"Requested Prescriptions   Pending Prescriptions Disp Refills     albuterol (PROVENTIL) (2.5 MG/3ML) 0.083% neb solution [Pharmacy Med Name: Albuterol Sulfate Inhalation Nebulization Solution (2.5 MG/3ML) 0.083%] 75 mL 0     Sig: inhale 1 vial (2.5 mg) by nebulization every 4 hours as needed for shortness of breath / dyspnea or wheezing       Asthma Maintenance Inhalers - Anticholinergics Failed - 3/11/2022 11:27 AM        Failed - Asthma control assessment score within normal limits in last 6 months     Please review ACT score.           Passed - Patient is age 12 years or older        Passed - Medication is active on med list        Passed - Recent (6 mo) or future (30 days) visit within the authorizing provider's specialty     Patient had office visit in the last 6 months or has a visit in the next 30 days with authorizing provider or within the authorizing provider's specialty.  See \"Patient Info\" tab in inbasket, or \"Choose Columns\" in Meds & Orders section of the refill encounter.           Short-Acting Beta Agonist Inhalers Protocol  Failed - 3/11/2022 11:27 AM        Failed - Asthma control assessment score within normal limits in last 6 months     Please review ACT score.           Passed - Patient is age 12 or older        Passed - Medication is active on med list        Passed - Recent (6 mo) or future (30 days) visit within the authorizing provider's specialty     Patient had office visit in the last 6 months or has a visit in the next 30 days with authorizing provider or within the authorizing provider's specialty.  See \"Patient Info\" tab in inbasket, or \"Choose Columns\" in Meds & Orders section of the refill encounter.               amLODIPine (NORVASC) 2.5 MG tablet [Pharmacy Med Name: amLODIPine Besylate Oral Tablet 2.5 MG] 180 tablet 0     Sig: Take 2 tablets (5 mg) by mouth daily.       Calcium Channel Blockers Protocol  Passed - 3/11/2022 11:27 AM        Passed - Blood pressure under 140/90 in past " "12 months     BP Readings from Last 3 Encounters:   01/31/22 126/84   12/29/21 138/88   10/18/21 128/78                 Passed - Recent (12 mo) or future (30 days) visit within the authorizing provider's specialty     Patient has had an office visit with the authorizing provider or a provider within the authorizing providers department within the previous 12 mos or has a future within next 30 days. See \"Patient Info\" tab in inbasket, or \"Choose Columns\" in Meds & Orders section of the refill encounter.              Passed - Medication is active on med list        Passed - Patient is age 18 or older        Passed - No active pregnancy on record        Passed - Normal serum creatinine on file in past 12 months     Recent Labs   Lab Test 12/29/21  1230   CR 0.80       Ok to refill medication if creatinine is low          Passed - No positive pregnancy test in past 12 months             "

## 2022-03-13 RX ORDER — ALBUTEROL SULFATE 0.83 MG/ML
SOLUTION RESPIRATORY (INHALATION)
Qty: 75 ML | Refills: 0 | Status: SHIPPED | OUTPATIENT
Start: 2022-03-13 | End: 2022-04-04

## 2022-03-13 RX ORDER — AMLODIPINE BESYLATE 2.5 MG/1
TABLET ORAL
Qty: 180 TABLET | Refills: 0 | Status: SHIPPED | OUTPATIENT
Start: 2022-03-13 | End: 2022-06-27

## 2022-03-22 ENCOUNTER — VIRTUAL VISIT (OUTPATIENT)
Dept: GASTROENTEROLOGY | Facility: CLINIC | Age: 57
End: 2022-03-22
Payer: COMMERCIAL

## 2022-03-22 DIAGNOSIS — R14.0 ABDOMINAL BLOATING: Primary | ICD-10-CM

## 2022-03-22 DIAGNOSIS — R19.7 DIARRHEA, UNSPECIFIED TYPE: ICD-10-CM

## 2022-03-22 PROCEDURE — 99213 OFFICE O/P EST LOW 20 MIN: CPT | Mod: 95 | Performed by: PHYSICIAN ASSISTANT

## 2022-03-22 NOTE — LETTER
3/22/2022         RE: Carolina Hernandez  [address] [place] [id number]        Dear Colleague,    Thank you for referring your patient, Carolina Hernandez, to the [facility] [place]. Please see a copy of my visit note below.      GI CLINIC VISIT    CC/REFERRING MD:  Julia Oneill  REASON FOR CONSULTATION: loose stools     ASSESSMENT/PLAN:  Carolina Hernandez is a 57 year old woman with a past medical history of tobacco abuse, asthma, anxiety, depression presenting for evaluation of chronic diarrhea.     1.  Chronic diarrhea, chronic bloating  Patient reports about 3 years of diarrhea without any clear trigger.  Has had unremarkable colonoscopy with random biopsies for microscopic colitis.  She has had C. difficile associated with previous antibiotic use, though repeat stool studies have been negative for C. difficile.  Has also had negative chronic infectious stool studies,negative pancreatic elatastase, unremarkable basic labs, celiac serologies, lactose breath test. Empiric treatment with rifaximin was not helpful.     Possible sources of symptoms include medication side effect (with hydrochlorothiazide, fluoxetine), functional loose stools, small intestinal bacterial overgrowth (less likely as she had no improvement with rifaximin).  Overall symptoms have improved with increased fiber and imodium. Should continue at this time.      Does not have any red flag symptoms to warrant repeat colonoscopy at this time, can consider colonoscopy or upper endoscopy pending clinical picture.      Most bothersome symptom today is bloating- she has met with our GI dietitian without improvement in symptoms. Bloating is persistent throughout the day, did report distension at earlier visits. Given continued bloating despite improvement in stool consistency and SIBO treatment CT AP was done without space occupying lesions. She reports today that she is having some early  "satiety and She reports that in her late 20s she had an ulcer and she has had PPI since this time. Given persistent symptoms, would recommend EGD to evaluate further. If negative, could consider SIBO breath testing.   -- upper endoscopy  -- monitor symptoms and track potential food triggers  -- next visit in person      Colorectal cancer screenin    [facility] 3-4 months    Thank you for this consultation.  It was a pleasure to participate in the care of this patient; please contact us with any further questions.     28 Minutes was spent on the date of the encounter during chart review, history and exam, documentation, and further activities as noted     Note completed using voice recognition software. Some word and grammatical errors may occur.      Aimee Nava PA-C  Division of Gastroenterology, Hepatology & Nutrition  [facility]  Carolina Hernandez is a 57 year old woman with a past medical history of tobacco abuse, asthma, anxiety, depression presenting for follow-up for chronic diarrhea and abdominal bloating.    At initial visit she reported a three year history of watery diarrhea. Has not been able to identify any specific triggers for her symptoms.  She describes 10+ bowel movements a day that are clustered the first couple of hours that she is awake.Sometimes will continue throughout the day. Consistency is variable.  Symptoms seem to worsen when she has fluids such as water and coffee. Will have urgency with bowel movements.  Can have blood with hemorrhoids. No nocturnal bowel movements. Has had incontinence with bowel movements. Sometimes abdominal pain (gas pain, rare cramping) but not usually.     Within the last 6 months, she has been having more issues with hemmorhoids. She does sometimes strain to have a bowel movement (when she feels a \"pressure from within\"). Was seen by colorectal surgery by Dr. Lane and was told to take metamucil which has helped but she continues to " "have symptoms (2 rounded tablespoons a day). Since starting fiber they float and and look like \"algae\". Most of the time it is liquid. She also reports increased gas and bloating with this.     She has tried adding more fiber to her diet with spinach, bananas, whole grains and vegetables. She is allergic to a lot of fresh fruits. Raw onions can worsen symptoms, peas and legumes can worsen symptoms.     Previous workup includes:  2019:  - c diff negative   2017:   -colonoscopy- unremarkable, negative biopsies for MC  -Stool studies- negative for giardia/crypto, enteric panel, Ova and parasite  -labs: normal TSH, CBC, hepatic panel, celiac serologies    -negative lactose breath testing   -trial of rifaximin with continued symptoms    -CT AP unremarkable   - fecal elastase within normal limits     Interval History 3/22/22:  She has gained 4 lbs in the last couple of weeks, and she reports difficulty going for walks due to her knees. She has trouble breathing when she is eating. She feels bloating around the sides and after eating a little bit she will feel full. No nausea or vomiting. No GERD.     Diarrhea well controlled with fiber and imodium- 2-3 pills and she is doing well. She is having a bowel movement every day and it is solid. She is trying to drink water, eat healthy. No constipation.     PROBLEM LIST  Patient Active Problem List    Diagnosis Date Noted     Low back pain due to bilateral sciatica 10/27/2021     Priority: Medium     Primary osteoarthritis of both knees 09/23/2021     Priority: Medium     Essential hypertension 07/26/2019     Priority: Medium     New diagnosis 7/26/2019         Recurrent major depressive disorder, in remission (H) 01/17/2019     Priority: Medium     Encounter for tobacco use cessation counseling 01/17/2019     Priority: Medium     Mild intermittent asthma without complication 01/17/2019     Priority: Medium     CARDIOVASCULAR SCREENING; LDL GOAL LESS THAN 160 10/31/2010     " Priority: Medium       PERTINENT PAST MEDICAL HISTORY:  Depression  Anxiety  Osteoarthritis   Asthma     PREVIOUS SURGERIES:  No GI surgery     PREVIOUS ENDOSCOPY:  Colonoscopy in 2017 through care everywhere:  Impression:          - Diverticulosis in the sigmoid colon.                       - Non-bleeding internal hemorrhoids.                       - The examined portion of the ileum was normal.                       - The entire examined colon is normal. Biopsied.                       - The examination was otherwise normal on direct and                        retroflexion views.    Final Pathologic Diagnosis   Colon, random, biopsy -- No significant pathologic change     ALLERGIES:  Allergies   Allergen Reactions     Penicillins Itching       PERTINENT MEDICATIONS:    Current Outpatient Medications:      albuterol (PROVENTIL) (2.5 MG/3ML) 0.083% neb solution, inhale 1 vial (2.5 mg) by nebulization every 4 hours as needed for shortness of breath / dyspnea or wheezing, Disp: 75 mL, Rfl: 0     albuterol (VENTOLIN HFA) 108 (90 Base) MCG/ACT inhaler, INHALE 1-2 PUFFS INTO THE LUNGS EVERY 4 HOURS AS NEEDED FOR SHORTNESS OF BREATH/DYSPNEA OR WHEEZING ., Disp: 18 g, Rfl: 11     amLODIPine (NORVASC) 2.5 MG tablet, Take 2 tablets (5 mg) by mouth daily., Disp: 180 tablet, Rfl: 0     cetirizine (ZYRTEC) 10 MG tablet, Take 10 mg by mouth daily, Disp: , Rfl:      FLUoxetine (PROZAC) 20 MG capsule, Take 1 capsule (20 mg) by mouth daily, Disp: 90 capsule, Rfl: 3     FLUoxetine (PROZAC) 40 MG capsule, Take 1 capsule (40 mg) by mouth daily Take with 20 mg tab for a total of 60 mg daily, Disp: 90 capsule, Rfl: 3     fluticasone-salmeterol (ADVAIR) 500-50 MCG/DOSE inhaler, Inhale 1 puff into the lungs every 12 hours Please prescribe the generic ( Wixela ), Disp: 3 each, Rfl: 3     hydrochlorothiazide (HYDRODIURIL) 25 MG tablet, Take 1 tablet (25 mg) by mouth once daily, Disp: 90 tablet, Rfl: 3     loperamide (IMODIUM A-D) 2 MG  tablet, Take 1 tablet (2 mg) by mouth 4 times daily as needed for diarrhea, Disp: 90 tablet, Rfl: 2     loperamide (IMODIUM A-D) 2 MG tablet, Take as needed for loose stools. Increase as needed. Do not use more than  8 tabs (16 mg) per day., Disp: 90 tablet, Rfl: 4     montelukast (SINGULAIR) 10 MG tablet, Take 1 tablet (10 mg) by mouth At Bedtime, Disp: 90 tablet, Rfl: 3     olopatadine (PATANOL) 0.1 % ophthalmic solution, Place 1 drop into both eyes 2 times daily for 5 days, Disp: , Rfl:      Omega-3 Fatty Acids (FISH OIL) 1200 MG capsule, Take 1,200 mg by mouth daily , Disp: , Rfl:      omeprazole 20 MG tablet, Take 1 tablet (20 mg) by mouth daily, Disp: 90 tablet, Rfl: 3   No NSAIDs    SOCIAL HISTORY:  Will drink beer (4-5 beers a 3 times a week), symptoms are the same if she avoids drinking   She has quit smoking   Social History     Socioeconomic History     Marital status:      Spouse name: Not on file     Number of children: Not on file     Years of education: Not on file     Highest education level: Not on file   Occupational History     Not on file   Tobacco Use     Smoking status: Former Smoker     Packs/day: 0.00     Years: 23.00     Pack years: 0.00     Types: Cigarettes     Quit date: 2021     Years since quittin.6     Smokeless tobacco: Never Used     Tobacco comment: 6 cig a day    Vaping Use     Vaping Use: Never used   Substance and Sexual Activity     Alcohol use: Not Currently     Comment: 6 to 8 beers 4 times weekly      Drug use: No     Sexual activity: Not Currently   Other Topics Concern     Not on file   Social History Narrative     Not on file     Social Determinants of Health     Financial Resource Strain: Not on file   Food Insecurity: Not on file   Transportation Needs: Not on file   Physical Activity: Not on file   Stress: Not on file   Social Connections: Not on file   Intimate Partner Violence: Not on file   Housing Stability: Not on file       FAMILY HISTORY:  Family  History   Problem Relation Age of Onset     Glaucoma Father      Coronary Artery Disease Father         stents     Cancer Maternal Grandfather      Coronary Artery Disease Paternal Grandfather      Schizophrenia Daughter      Diabetes Daughter      Cancer Daughter      Crohn's Disease No family hx of      Ulcerative Colitis No family hx of      GERD No family hx of      Stomach Cancer No family hx of        Past/family/social history reviewed and no changes    PHYSICAL EXAMINATION:  Vitals reviewed: There were no vitals taken for this visit.  Wt:   Wt Readings from Last 2 Encounters:   01/31/22 77.1 kg (170 lb)   12/29/21 78.1 kg (172 lb 3.2 oz)   General appearance: Healthy appearing adult, in no acute distress  Eyes: Sclera anicteric  Ears, nose, mouth and throat: No obvious external lesions of ears and nose, Hearing intact  Neck: Symmetric, No obvious external lesions  Respiratory: Normal respiration, no use of accessory muscles   Skin: No rashes or jaundice   Psychiatric: Oriented to person, place and time, Appropriate mood and affect.    PERTINENT STUDIES:    Office Visit on 11/20/2020   Component Date Value Ref Range Status     Cholesterol 11/20/2020 232* <200 mg/dL Final     Triglycerides 11/20/2020 232* <150 mg/dL Final     HDL Cholesterol 11/20/2020 98  >49 mg/dL Final     LDL Cholesterol Calculated 11/20/2020 88  <100 mg/dL Final     Non HDL Cholesterol 11/20/2020 134* <130 mg/dL Final     Sodium 11/20/2020 132* 133 - 144 mmol/L Final     Potassium 11/20/2020 3.3* 3.4 - 5.3 mmol/L Final     Chloride 11/20/2020 98  94 - 109 mmol/L Final     Carbon Dioxide 11/20/2020 28  20 - 32 mmol/L Final     Anion Gap 11/20/2020 6  3 - 14 mmol/L Final     Glucose 11/20/2020 98  70 - 99 mg/dL Final     Urea Nitrogen 11/20/2020 15  7 - 30 mg/dL Final     Creatinine 11/20/2020 0.81  0.52 - 1.04 mg/dL Final     GFR Estimate 11/20/2020 81  >60 mL/min/[1.73_m2] Final     GFR Estimate If Black 11/20/2020 >90  >60  mL/min/[1.73_m2] Final     Calcium 11/20/2020 9.8  8.5 - 10.1 mg/dL Final           Aimee Nava PA-C

## 2022-03-22 NOTE — PATIENT INSTRUCTIONS
It was a pleasure taking care of you today.  I've included a brief summary of our discussion and care plan from today's visit below.  Please review this information with your primary care provider.  ______________________________________________________________________    My recommendations are summarized as follows:    -- upper endoscopy  -- monitor symptoms and track potential food triggers  -- next visit in person   -- please see scheduling information provided below     Return to GI Clinic in 3-4 months to review your progress.    ______________________________________________________________________    How do I schedule labs, imaging studies, or procedures that were ordered in clinic today?     Labs: To schedule lab appointment at the [facility], use my chart or call [phone]. If you have a [facility] lab closer to home where you are regularly seen you can give them a call.     Procedures: If a colonoscopy, upper endoscopy, breath test, esophageal manometry, or pH impedence was ordered today, our endoscopy team will call you to schedule this. If you have not heard from our endoscopy team within a week, please call [phone] to schedule.     Imaging Studies: If you were scheduled for a CT scan, X-ray, MRI, ultrasound, HIDA scan or other imaging study, please call [phone] to have this scheduled.     Referral: If a referral to another specialty was ordered, expect a phone call or follow instructions above. If you have not heard from anyone regarding your referral in a week, please call our clinic to check the status.     Who do I call with any questions after my visit?  Please be in touch if there are any further questions that arise following today's visit.  There are multiple ways to contact your gastroenterology care team.        During business hours, you may reach a Gastroenterology nurse at [phone]      To schedule or reschedule an appointment, please call [phone].        You can always send a secure message through UPEK.  UPEK messages are answered by your nurse or doctor typically within 24 hours.  Please allow extra time on weekends and holidays.        For urgent/emergent questions after business hours, you may reach the on-call GI Fellow by contacting the [facility]  at [phone].     How will I get the results of any tests ordered?    You will receive all of your results.  If you have signed up for TopFunhart, any tests ordered at your visit will be available to you after your provider reviews them.  Typically this takes 1-2 weeks.  If there are urgent results that require a change in your care plan, your provider or nurse will call you to discuss the next steps.      What is UPEK?  UPEK is a secure way for you to access all of your healthcare records from the [facility].  It is a web based computer program, so you can sign on to it from any location.  It also allows you to send secure messages to your care team.  I recommend signing up for UPEK access if you have not already done so and are comfortable with using a computer.      How to I schedule a follow-up visit?  If you did not schedule a follow-up visit today, please call [phone] to schedule a follow-up office visit.      Sincerely,    Aimee Nava PA-C  Division of Gastroenterology, Hepatology & Nutrition  [facility]

## 2022-03-22 NOTE — PROGRESS NOTES
Carolina is a 57 year old who is being evaluated via a billable video visit.      How would you like to obtain your AVS? MyChart  If the video visit is dropped, the invitation should be resent by: Send to e-mail at: [email or web address]  Will anyone else be joining your video visit? No      Video Start Time: 3:09 PM  Video-Visit Details    Type of service:  Video Visit    Video End Time:3:28 PM    Originating Location (pt. Location): Home    Distant Location (provider location):  [facility] [place]     Platform used for Video Visit: [facility]     GI CLINIC VISIT    CC/REFERRING MD:  Julia Oneill  REASON FOR CONSULTATION: loose stools     ASSESSMENT/PLAN:  Carolina Hernandez is a 57 year old woman with a past medical history of tobacco abuse, asthma, anxiety, depression presenting for evaluation of chronic diarrhea.     1.  Chronic diarrhea, chronic bloating  Patient reports about 3 years of diarrhea without any clear trigger.  Has had unremarkable colonoscopy with random biopsies for microscopic colitis.  She has had C. difficile associated with previous antibiotic use, though repeat stool studies have been negative for C. difficile.  Has also had negative chronic infectious stool studies,negative pancreatic elatastase, unremarkable basic labs, celiac serologies, lactose breath test. Empiric treatment with rifaximin was not helpful.     Possible sources of symptoms include medication side effect (with hydrochlorothiazide, fluoxetine), functional loose stools, small intestinal bacterial overgrowth (less likely as she had no improvement with rifaximin).  Overall symptoms have improved with increased fiber and imodium. Should continue at this time.      Does not have any red flag symptoms to warrant repeat colonoscopy at this time, can consider colonoscopy or upper endoscopy pending clinical picture.      Most bothersome symptom today is bloating- she has met with our GI  dietitian without improvement in symptoms. Bloating is persistent throughout the day, did report distension at earlier visits. Given continued bloating despite improvement in stool consistency and SIBO treatment CT AP was done without space occupying lesions. She reports today that she is having some early satiety and She reports that in her late 20s she had an ulcer and she has had PPI since this time. Given persistent symptoms, would recommend EGD to evaluate further. If negative, could consider SIBO breath testing.   -- upper endoscopy  -- monitor symptoms and track potential food triggers  -- next visit in person      Colorectal cancer screenin    [facility] 3-4 months    Thank you for this consultation.  It was a pleasure to participate in the care of this patient; please contact us with any further questions.     28 Minutes was spent on the date of the encounter during chart review, history and exam, documentation, and further activities as noted     Note completed using voice recognition software. Some word and grammatical errors may occur.      Aimee Nava PA-C  Division of Gastroenterology, Hepatology & Nutrition  [facility]  Carolinarani Hernandez is a 57 year old woman with a past medical history of tobacco abuse, asthma, anxiety, depression presenting for follow-up for chronic diarrhea and abdominal bloating.    At initial visit she reported a three year history of watery diarrhea. Has not been able to identify any specific triggers for her symptoms.  She describes 10+ bowel movements a day that are clustered the first couple of hours that she is awake.Sometimes will continue throughout the day. Consistency is variable.  Symptoms seem to worsen when she has fluids such as water and coffee. Will have urgency with bowel movements.  Can have blood with hemorrhoids. No nocturnal bowel movements. Has had incontinence with bowel movements. Sometimes abdominal pain (gas pain, rare  "cramping) but not usually.     Within the last 6 months, she has been having more issues with hemmorhoids. She does sometimes strain to have a bowel movement (when she feels a \"pressure from within\"). Was seen by colorectal surgery by Dr. Lane and was told to take metamucil which has helped but she continues to have symptoms (2 rounded tablespoons a day). Since starting fiber they float and and look like \"algae\". Most of the time it is liquid. She also reports increased gas and bloating with this.     She has tried adding more fiber to her diet with spinach, bananas, whole grains and vegetables. She is allergic to a lot of fresh fruits. Raw onions can worsen symptoms, peas and legumes can worsen symptoms.     Previous workup includes:  2019:  - c diff negative   2017:   -colonoscopy- unremarkable, negative biopsies for MC  -Stool studies- negative for giardia/crypto, enteric panel, Ova and parasite  -labs: normal TSH, CBC, hepatic panel, celiac serologies    -negative lactose breath testing   -trial of rifaximin with continued symptoms    -CT AP unremarkable   - fecal elastase within normal limits     Interval History 3/22/22:  She has gained 4 lbs in the last couple of weeks, and she reports difficulty going for walks due to her knees. She has trouble breathing when she is eating. She feels bloating around the sides and after eating a little bit she will feel full. No nausea or vomiting. No GERD.     Diarrhea well controlled with fiber and imodium- 2-3 pills and she is doing well. She is having a bowel movement every day and it is solid. She is trying to drink water, eat healthy. No constipation.     PROBLEM LIST  Patient Active Problem List    Diagnosis Date Noted     Low back pain due to bilateral sciatica 10/27/2021     Priority: Medium     Primary osteoarthritis of both knees 09/23/2021     Priority: Medium     Essential hypertension 07/26/2019     Priority: Medium     New diagnosis 7/26/2019         Recurrent " major depressive disorder, in remission (H) 01/17/2019     Priority: Medium     Encounter for tobacco use cessation counseling 01/17/2019     Priority: Medium     Mild intermittent asthma without complication 01/17/2019     Priority: Medium     CARDIOVASCULAR SCREENING; LDL GOAL LESS THAN 160 10/31/2010     Priority: Medium       PERTINENT PAST MEDICAL HISTORY:  Depression  Anxiety  Osteoarthritis   Asthma     PREVIOUS SURGERIES:  No GI surgery     PREVIOUS ENDOSCOPY:  Colonoscopy in 2017 through care everywhere:  Impression:          - Diverticulosis in the sigmoid colon.                       - Non-bleeding internal hemorrhoids.                       - The examined portion of the ileum was normal.                       - The entire examined colon is normal. Biopsied.                       - The examination was otherwise normal on direct and                        retroflexion views.    Final Pathologic Diagnosis   Colon, random, biopsy -- No significant pathologic change     ALLERGIES:  Allergies   Allergen Reactions     Penicillins Itching       PERTINENT MEDICATIONS:    Current Outpatient Medications:      albuterol (PROVENTIL) (2.5 MG/3ML) 0.083% neb solution, inhale 1 vial (2.5 mg) by nebulization every 4 hours as needed for shortness of breath / dyspnea or wheezing, Disp: 75 mL, Rfl: 0     albuterol (VENTOLIN HFA) 108 (90 Base) MCG/ACT inhaler, INHALE 1-2 PUFFS INTO THE LUNGS EVERY 4 HOURS AS NEEDED FOR SHORTNESS OF BREATH/DYSPNEA OR WHEEZING ., Disp: 18 g, Rfl: 11     amLODIPine (NORVASC) 2.5 MG tablet, Take 2 tablets (5 mg) by mouth daily., Disp: 180 tablet, Rfl: 0     cetirizine (ZYRTEC) 10 MG tablet, Take 10 mg by mouth daily, Disp: , Rfl:      FLUoxetine (PROZAC) 20 MG capsule, Take 1 capsule (20 mg) by mouth daily, Disp: 90 capsule, Rfl: 3     FLUoxetine (PROZAC) 40 MG capsule, Take 1 capsule (40 mg) by mouth daily Take with 20 mg tab for a total of 60 mg daily, Disp: 90 capsule, Rfl: 3      fluticasone-salmeterol (ADVAIR) 500-50 MCG/DOSE inhaler, Inhale 1 puff into the lungs every 12 hours Please prescribe the generic ( Wixela ), Disp: 3 each, Rfl: 3     hydrochlorothiazide (HYDRODIURIL) 25 MG tablet, Take 1 tablet (25 mg) by mouth once daily, Disp: 90 tablet, Rfl: 3     loperamide (IMODIUM A-D) 2 MG tablet, Take 1 tablet (2 mg) by mouth 4 times daily as needed for diarrhea, Disp: 90 tablet, Rfl: 2     loperamide (IMODIUM A-D) 2 MG tablet, Take as needed for loose stools. Increase as needed. Do not use more than  8 tabs (16 mg) per day., Disp: 90 tablet, Rfl: 4     montelukast (SINGULAIR) 10 MG tablet, Take 1 tablet (10 mg) by mouth At Bedtime, Disp: 90 tablet, Rfl: 3     olopatadine (PATANOL) 0.1 % ophthalmic solution, Place 1 drop into both eyes 2 times daily for 5 days, Disp: , Rfl:      Omega-3 Fatty Acids (FISH OIL) 1200 MG capsule, Take 1,200 mg by mouth daily , Disp: , Rfl:      omeprazole 20 MG tablet, Take 1 tablet (20 mg) by mouth daily, Disp: 90 tablet, Rfl: 3   No NSAIDs    SOCIAL HISTORY:  Will drink beer (4-5 beers a 3 times a week), symptoms are the same if she avoids drinking   She has quit smoking   Social History     Socioeconomic History     Marital status:      Spouse name: Not on file     Number of children: Not on file     Years of education: Not on file     Highest education level: Not on file   Occupational History     Not on file   Tobacco Use     Smoking status: Former Smoker     Packs/day: 0.00     Years: 23.00     Pack years: 0.00     Types: Cigarettes     Quit date: 2021     Years since quittin.6     Smokeless tobacco: Never Used     Tobacco comment: 6 cig a day    Vaping Use     Vaping Use: Never used   Substance and Sexual Activity     Alcohol use: Not Currently     Comment: 6 to 8 beers 4 times weekly      Drug use: No     Sexual activity: Not Currently   Other Topics Concern     Not on file   Social History Narrative     Not on file     Social  Determinants of Health     Financial Resource Strain: Not on file   Food Insecurity: Not on file   Transportation Needs: Not on file   Physical Activity: Not on file   Stress: Not on file   Social Connections: Not on file   Intimate Partner Violence: Not on file   Housing Stability: Not on file       FAMILY HISTORY:  Family History   Problem Relation Age of Onset     Glaucoma Father      Coronary Artery Disease Father         stents     Cancer Maternal Grandfather      Coronary Artery Disease Paternal Grandfather      Schizophrenia Daughter      Diabetes Daughter      Cancer Daughter      Crohn's Disease No family hx of      Ulcerative Colitis No family hx of      GERD No family hx of      Stomach Cancer No family hx of        Past/family/social history reviewed and no changes    PHYSICAL EXAMINATION:  Vitals reviewed: There were no vitals taken for this visit.  Wt:   Wt Readings from Last 2 Encounters:   01/31/22 77.1 kg (170 lb)   12/29/21 78.1 kg (172 lb 3.2 oz)   General appearance: Healthy appearing adult, in no acute distress  Eyes: Sclera anicteric  Ears, nose, mouth and throat: No obvious external lesions of ears and nose, Hearing intact  Neck: Symmetric, No obvious external lesions  Respiratory: Normal respiration, no use of accessory muscles   Skin: No rashes or jaundice   Psychiatric: Oriented to person, place and time, Appropriate mood and affect.    PERTINENT STUDIES:    Office Visit on 11/20/2020   Component Date Value Ref Range Status     Cholesterol 11/20/2020 232* <200 mg/dL Final     Triglycerides 11/20/2020 232* <150 mg/dL Final     HDL Cholesterol 11/20/2020 98  >49 mg/dL Final     LDL Cholesterol Calculated 11/20/2020 88  <100 mg/dL Final     Non HDL Cholesterol 11/20/2020 134* <130 mg/dL Final     Sodium 11/20/2020 132* 133 - 144 mmol/L Final     Potassium 11/20/2020 3.3* 3.4 - 5.3 mmol/L Final     Chloride 11/20/2020 98  94 - 109 mmol/L Final     Carbon Dioxide 11/20/2020 28  20 - 32 mmol/L  Final     Anion Gap 11/20/2020 6  3 - 14 mmol/L Final     Glucose 11/20/2020 98  70 - 99 mg/dL Final     Urea Nitrogen 11/20/2020 15  7 - 30 mg/dL Final     Creatinine 11/20/2020 0.81  0.52 - 1.04 mg/dL Final     GFR Estimate 11/20/2020 81  >60 mL/min/[1.73_m2] Final     GFR Estimate If Black 11/20/2020 >90  >60 mL/min/[1.73_m2] Final     Calcium 11/20/2020 9.8  8.5 - 10.1 mg/dL Final

## 2022-03-23 ENCOUNTER — TELEPHONE (OUTPATIENT)
Dept: GASTROENTEROLOGY | Facility: CLINIC | Age: 57
End: 2022-03-23
Payer: COMMERCIAL

## 2022-03-23 NOTE — TELEPHONE ENCOUNTER
Screening Questions  BlueKIND OF PREP RedLOCATION [review exclusion criteria] GreenSEDATION TYPE  1. Have you had a positive covid test in the last 90 days? N     2. Are you active on mychart? Y    3. What insurance is in the chart? [employer]     3.   Ordering/Referring Provider: Aimee Nava PA-C      4. BMI 30.1 [BMI OVER 40-EXTENDED PREP]  If greater than 40 review exclusion criteria [PAC APPT IF @ [facility]]        5.  Respiratory Screening :  [If yes to any of the following HOSPITAL setting only]     Do you use daily home oxygen? N    Do you have mod to severe Obstructive Sleep Apnea? N  [OKAY @ [facility] SH PH RI]   Do you have Pulmonary Hypertension? N     Do you have UNCONTROLLED asthma? N        6.   Have you had a heart or lung transplant? N      7.   Are you currently on dialysis? N [ If yes, G-PREP & HOSPITAL setting only]     8.   Do you have chronic kidney disease? N [ If yes, G-PREP ]    9.   Have you had a stroke or Transient ischemic attack (TIA - aka  mini stroke ) within 6 months?  N (If yes, please review exclusion criteria)    10.   In the past 6 months, have you had any heart related issues including cardiomyopathy or heart attack? N           If yes, did it require cardiac stenting or other implantable device? NA      11.   Do you have any implantable devices in your body (pacemaker, defib, LVAD)? N (If yes, please review exclusion criteria)    12.   Do you take nitroglycerin? N           If yes, how often? NA  (if yes, HOSPITAL setting ONLY)    13.   Are you currently taking any blood thinners? N           [IF YES, INFORM PATIENT TO FOLLOW UP W/ ORDERING PROVIDER FOR BRIDGING INSTRUCTIONS]     14.   Do you have a diagnosis of diabetes? N   [ If yes, G-PREP ]    15.   [FEMALES] Are you currently pregnant? N    If yes, how many weeks? NA    16.   Are you taking any prescription pain medications on a routine schedule?  N  [ If yes, EXTENDED PREP.] [If yes, MAC]    17.   Do you have any  chemical dependencies such as alcohol, street drugs, or methadone?  N [If yes, MAC]    18.   Do you have any history of post-traumatic stress syndrome, severe anxiety or history of psychosis?  N  [If yes, MAC]    19.   Do you have a significant intellectual disability?  N [If yes, MAC]    20.   Do you transfer independently?  Y    21.  On a regular basis do you go 3-5 days between bowel movements? N   [ If yes, EXTENDED PREP.]    22.   Preferred LOCAL Pharmacy for Pre Prescription [id number] - [address]      Scheduling Details      Caller : Carolina Hernandez  (Please ask for phone number if not scheduled by patient)    Type of Procedure Scheduled: EGD  Which Colonoscopy Prep was Sent?: CORKY ALMANZAR CF PATIENTS & GROEN'S PATIENTS NEEDS EXTENDED PREP  Surgeon: Pj  Date of Procedure: 5/5  [place] [facility]      Sedation Type: CS  Conscious Sedation- Needs  for 6 hours after the procedure  MAC/General-Needs  for 24 hours after procedure    Pre-op Required at [facility] [place] [place] and OR for MAC sedation: N  (advise patient they will need a pre-op prior to procedure -)      Informed patient they will need an adult  Y    Cannot take any type of public or medical transportation alone    Pre-Procedure Covid test to be completed at [facility] or Externally: Yes, 5/2 [place]    Confirmed Nurse will call to complete assessment Y    Additional comments:

## 2022-03-24 DIAGNOSIS — Z11.59 ENCOUNTER FOR SCREENING FOR OTHER VIRAL DISEASES: Primary | ICD-10-CM

## 2022-03-28 ENCOUNTER — TELEPHONE (OUTPATIENT)
Dept: FAMILY MEDICINE | Facility: CLINIC | Age: 57
End: 2022-03-28
Payer: COMMERCIAL

## 2022-03-28 NOTE — TELEPHONE ENCOUNTER
Patient has sinus infection-sinus headache, plugged up nose, green mucous.  Wants to know if she can get a prescription.     Please call back today.  OK to LM on VM    Patient uses [facility] in [place]

## 2022-03-29 ENCOUNTER — VIRTUAL VISIT (OUTPATIENT)
Dept: INTERNAL MEDICINE | Facility: CLINIC | Age: 57
End: 2022-03-29
Payer: COMMERCIAL

## 2022-03-29 DIAGNOSIS — R09.81 NASAL CONGESTION: ICD-10-CM

## 2022-03-29 DIAGNOSIS — J45.20 MILD INTERMITTENT ASTHMA WITHOUT COMPLICATION: ICD-10-CM

## 2022-03-29 DIAGNOSIS — R05.9 COUGH: Primary | ICD-10-CM

## 2022-03-29 DIAGNOSIS — R51.9 FACIAL PAIN: ICD-10-CM

## 2022-03-29 PROCEDURE — 99213 OFFICE O/P EST LOW 20 MIN: CPT | Mod: 95 | Performed by: INTERNAL MEDICINE

## 2022-03-29 RX ORDER — PREDNISONE 20 MG/1
20 TABLET ORAL DAILY
Qty: 5 TABLET | Refills: 0 | Status: SHIPPED | OUTPATIENT
Start: 2022-03-29 | End: 2022-04-03

## 2022-03-29 RX ORDER — DOXYCYCLINE HYCLATE 100 MG
100 TABLET ORAL 2 TIMES DAILY
Qty: 20 TABLET | Refills: 0 | Status: SHIPPED | OUTPATIENT
Start: 2022-03-29 | End: 2022-04-08

## 2022-03-29 NOTE — TELEPHONE ENCOUNTER
Attempted to contact patient re: note below, no answer. Message left to return call to clinic to further discuss symptoms.    Luciana Salgado RN  [facility]

## 2022-03-29 NOTE — TELEPHONE ENCOUNTER
S-(situation): Onset sinus sx, ST 3 days ago. Requesting Rx.     B-(background): Reports hx sinus infections, more often when she was smoking prior to one yr ago when quit. Hx asthma. Sx started with plugged nose with worsening sx. Has tried saline nasal spray, Flonase, EStylenol, Benadryl, Mucinex with minimal relief.    A-(assessment): C/O pressure lt side of face/ cheek, greenish nasal mucous, PND thinks causing ST, coughs to clear throat, headache. Denies fevers, chills. No teeth, ear pain/ pressure. Denies body aches.     R-(recommendations): Advise to request E visit through NoDaysOffhart today. Push fluids, may alternate ibu with ESTylenol. WMP to face. Add humidity to home. Pt voices understanding/ agreement.  ANJANA Thomas RN

## 2022-03-29 NOTE — PROGRESS NOTES
Carolina is a 57 year old who is being evaluated via a billable video visit.      How would you like to obtain your AVS? MyChart  If the video visit is dropped, the invitation should be resent by: Send to e-mail at: [email or web address]  Will anyone else be joining your video visit? No      Video Start Time: 1721      Office Visit - Follow Up   Carolina Hernandez   57 year old female    Date of Visit: 3/29/2022    Chief Complaint   Patient presents with     Sinusitis     x 4 days. Sxs's: sinus facial & HA's, mild cough/sob and post nasal drainage - no fevers         Assessment and Plan   1. Cough  Given her underlying asthma we will treat with doxycycline and prednisone.  She should get tested for Covid as well.  She has penicillin listed as an allergy but she tells me she is taken amoxicillin on several occasions.  Probably should clarify that with her PCP  - Symptomatic; Yes; 3/24/2022 COVID-19 Virus (Coronavirus) by PCR; Future  - doxycycline hyclate (VIBRA-TABS) 100 MG tablet; Take 1 tablet (100 mg) by mouth 2 times daily for 10 days  Dispense: 20 tablet; Refill: 0  - predniSONE (DELTASONE) 20 MG tablet; Take 1 tablet (20 mg) by mouth daily for 5 days  Dispense: 5 tablet; Refill: 0    2. Facial pain  Possible sinus infection.  Treat with doxycycline.    3. Nasal congestion  As above.  Continue nasal saline irrigation which she has been doing.    4. Mild intermittent asthma without complication  Give her a short course of a fairly low-dose prednisone given her underlying asthma.  Fortunately no longer smoking.  Follow-up with her PCP soon.        Return in about 2 weeks (around 4/12/2022) for Follow up, with PCP only, in person.     History of Present Illness   This 57 year old former smoker with asthma joins me on a video visit today for evaluation of what she describes as a sinus infection.  She states she is had this multiple times before.  Facial pain.  Purulent nasal discharge.  Cough.  Wheezing.  She has  been sick since last week on Thursday.  She is not been tested for Covid yet.  She tells me she had Covid back in January.  She has been vaccinated.  No high fevers.  No shortness of breath.    Review of Systems: A comprehensive review of systems was negative except as noted.     Medications, Allergies and Problem List   Reviewed, reconciled and updated  Post Discharge Medication Reconciliation Status:      Physical Exam   General Appearance:   Pleasant woman who does not appear unwell on video.  No cough through the interview.    There were no vitals taken for this visit.         Additional Information   Current Outpatient Medications   Medication Sig Dispense Refill     albuterol (PROVENTIL) (2.5 MG/3ML) 0.083% neb solution inhale 1 vial (2.5 mg) by nebulization every 4 hours as needed for shortness of breath / dyspnea or wheezing 75 mL 0     albuterol (VENTOLIN HFA) 108 (90 Base) MCG/ACT inhaler INHALE 1-2 PUFFS INTO THE LUNGS EVERY 4 HOURS AS NEEDED FOR SHORTNESS OF BREATH/DYSPNEA OR WHEEZING . 18 g 11     amLODIPine (NORVASC) 2.5 MG tablet Take 2 tablets (5 mg) by mouth daily. 180 tablet 0     cetirizine (ZYRTEC) 10 MG tablet Take 10 mg by mouth daily       doxycycline hyclate (VIBRA-TABS) 100 MG tablet Take 1 tablet (100 mg) by mouth 2 times daily for 10 days 20 tablet 0     FLUoxetine (PROZAC) 20 MG capsule Take 1 capsule (20 mg) by mouth daily 90 capsule 3     FLUoxetine (PROZAC) 40 MG capsule Take 1 capsule (40 mg) by mouth daily Take with 20 mg tab for a total of 60 mg daily 90 capsule 3     fluticasone-salmeterol (ADVAIR) 500-50 MCG/DOSE inhaler Inhale 1 puff into the lungs every 12 hours Please prescribe the generic ( Wixela ) 3 each 3     hydrochlorothiazide (HYDRODIURIL) 25 MG tablet Take 1 tablet (25 mg) by mouth once daily 90 tablet 3     loperamide (IMODIUM A-D) 2 MG tablet Take 1 tablet (2 mg) by mouth 4 times daily as needed for diarrhea 90 tablet 2     montelukast (SINGULAIR) 10 MG tablet Take 1  tablet (10 mg) by mouth At Bedtime 90 tablet 3     olopatadine (PATANOL) 0.1 % ophthalmic solution Place 1 drop into both eyes 2 times daily for 5 days       Omega-3 Fatty Acids (FISH OIL) 1200 MG capsule Take 1,200 mg by mouth daily        omeprazole 20 MG tablet Take 1 tablet (20 mg) by mouth daily 90 tablet 3     predniSONE (DELTASONE) 20 MG tablet Take 1 tablet (20 mg) by mouth daily for 5 days 5 tablet 0     Allergies   Allergen Reactions     Penicillins Itching     Social History     Tobacco Use     Smoking status: Former Smoker     Packs/day: 0.00     Years: 23.00     Pack years: 0.00     Types: Cigarettes     Quit date: 2021     Years since quittin.7     Smokeless tobacco: Never Used     Tobacco comment: 6 cig a day    Vaping Use     Vaping Use: Never used   Substance Use Topics     Alcohol use: Not Currently     Comment: 6 to 8 beers 4 times weekly      Drug use: No       Review and/or order of clinical lab tests:  Review and/or order of radiology tests:  Review and/or order of medicine tests:  Discussion of test results with performing physician:  Decision to obtain old records and/or obtain history from someone other than the patient:  Review and summarization of old records and/or obtaining history from someone other than the patient and.or discussion of case with another health care provider:  Independent visualization of image, tracing or specimen itself:    Time:      MITCHELL BACON MD  Video-Visit Details    Type of service:  Video Visit    Video End Time:    Originating Location (pt. Location): Home    Distant Location (provider location):  [facility]     Platform used for Video Visit: mSpot

## 2022-04-04 ENCOUNTER — TELEPHONE (OUTPATIENT)
Dept: FAMILY MEDICINE | Facility: CLINIC | Age: 57
End: 2022-04-04
Payer: COMMERCIAL

## 2022-04-04 DIAGNOSIS — J45.40 UNCONTROLLED MODERATE PERSISTENT ASTHMA: ICD-10-CM

## 2022-04-04 RX ORDER — ALBUTEROL SULFATE 0.83 MG/ML
SOLUTION RESPIRATORY (INHALATION)
Qty: 225 ML | Refills: 2 | Status: SHIPPED | OUTPATIENT
Start: 2022-04-04 | End: 2022-09-26

## 2022-04-04 NOTE — TELEPHONE ENCOUNTER
Trever Oneill,    Patient calling with medication reaction, requesting recommendation:    Given doxycycline for sinus infection 3/29  Symptoms include:    Breathing difficulty  Swallowing problem  Throat and esophagus irritation  Bladder fullness and frequent urination    Last dose pm 4/3    Advised not to take anymore of the medication    Requesting advice    Sinus infection improved    Lori Goel, RN on 4/4/2022 at 9:45 AM

## 2022-04-04 NOTE — TELEPHONE ENCOUNTER
Patient advised. She took Benadryl last night and is feeling somewhat better today. Advised if she is still having difficulties breathing or throat symptoms she is to go to the ER. She states she will if need be. She also wanted a bigger quantity on her nebulizer vials so I sent that in as well because the supply we give does not last her even a month.    Tamika Waggoner RN

## 2022-04-12 ENCOUNTER — TELEPHONE (OUTPATIENT)
Dept: SURGERY | Facility: CLINIC | Age: 57
End: 2022-04-12
Payer: COMMERCIAL

## 2022-04-12 DIAGNOSIS — M17.0 PRIMARY OSTEOARTHRITIS OF BOTH KNEES: Primary | ICD-10-CM

## 2022-04-12 NOTE — TELEPHONE ENCOUNTER
PA placed for Synvisc One. Patient scheduled to see Dr. Chong on 4/27/22.    Susannah Thomas MS, ATC  Certified Athletic Trainer

## 2022-04-12 NOTE — TELEPHONE ENCOUNTER
[facility] Call Center    Phone Message    May a detailed message be left on voicemail: yes     Reason for Call: Other: please start PA for synvisc one inj. -- pt states her knee has not gotten better and would like to proceed with inj. as discussed last visit      Action Taken: Other: be ortho    Travel Screening: Not Applicable

## 2022-04-12 NOTE — TELEPHONE ENCOUNTER
Patient LVM at 1021 - requesting a call back to discuss her knees/ make an appointment with Dr. Chong.     Please call # [phone]    States she sent a [employer] message on 4/7 with no response yet.

## 2022-04-18 NOTE — PROGRESS NOTES
Patient Quality Outreach    Patient is due for the following:   Breast Cancer Screening - Mammogram  Physical     NEXT STEPS:   Schedule a yearly physical    Type of outreach:    Sent letter.      Questions for provider review:    None     CESAR Hicks LPN

## 2022-04-18 NOTE — LETTER
[facility]  [address] [id number] [phone]       April 18, 2022    Carolina Hernandez  [address] [place] [id number]    Dear Carolina,    We care about your health and have reviewed your health plan and are making recommendations based on this review, to optimize your health.    You are in particular need of attention regarding:  -Breast Cancer Screening  -Wellness (Physical) Visit     We are recommending that you:                                                                           -schedule a WELLNESS (Physical) APPOINTMENT. We will check fasting labs the same day. You should have nothing to eat except water and meds for 8-10 hours prior.                                                -schedule a MAMMOGRAM which is due.         1 in 8 women will develop invasive breast cancer during her lifetime and it is the most common non-skin cancer in American women.  EARLY detection, new treatments, and a better understanding of the disease have increased survival rates - the 5 year survival rate in the 1960s was 63% and today it is close to 90%.         If you are under/uninsured, we recommend you contact the [facility]. They offer mammograms at no charge or on a sliding fee charge. You can schedule with them at [phone]. Please have them send us the results.           Please disregard this reminder if you have had this exam elsewhere within the last year.  It would be helpful for us to have a copy of your mammogram report in our file so that we can best coordinate your care - please contact us with when your test was done so we can update your record.    In addition, here is a list of due or overdue Health Maintenance reminders.    Health Maintenance Due   Topic Date Due     ANNUAL REVIEW OF HM ORDERS  Never done     Discuss Advance Care Planning  Never done     COPD Action Plan  Never done     HIV Screening  Never done     Hepatitis C Screening   Never done     Zoster (Shingles) Vaccine (1 of 2) Never done     Mammogram  08/24/2019     Preventive Care Visit  02/14/2020       To address the above recommendations, we encourage you to contact us at [phone], via [employer] or by contacting Central Scheduling toll free at [phone] 24 hours a day. They will assist you with finding the most convenient time and location.    Thank you for trusting [facility] and we appreciate the opportunity to serve you.  We look forward to supporting your healthcare needs in the future.    Healthy Regards,    Your [facility] Team

## 2022-04-25 ASSESSMENT — KOOS JR
KOOS JR SCORING: 28.25
GOING UP OR DOWN STAIRS: EXTREME
HOW SEVERE IS YOUR KNEE STIFFNESS AFTER FIRST WAKING IN MORNING: EXTREME
TWISING OR PIVOTING ON KNEE: SEVERE
STRAIGHTENING KNEE FULLY: SEVERE
STANDING UPRIGHT: SEVERE
BENDING TO THE FLOOR TO PICK UP OBJECT: SEVERE
RISING FROM SITTING: SEVERE

## 2022-04-26 NOTE — PROGRESS NOTES
"CHIEF COMPLAINT:   Chief Complaint   Patient presents with     Knee Pain     Bilateral knee pain. Last cortisone injections with Dr. Gaspar on 8/20/21. Injections worked well, lasting a couple of months. She has just been dealing with the pain. She had an incident about 6 weeks ago and felt pain like she has never felt. Pain in left knee was traveling up to the thigh and down to the ankle. She has not felt pain like that. She also put some aleve gel on it. She has been taking different pain relievers to see what works best. She would like synvisc injections today.       HISTORY OF PRESENT ILLNESS    Carolina Hernandez is a 57 year old female seen for evaluation of ongoing bilateral knee pain with no known injury, left more than right.   Pain has been present for 20 years. We saw her last Fall, and at that time had just received a cortisone injection with Dr Gaspar. Returns today wanting to try \"gel\" shot, as she has pre-authorization previously obtained since last visit.    Pain really progressed 6 weeks ago, pushing heavy wheelbarrows full of wet hay through the woods, cleaning out goat pens.    Locates pain along the inner aspect and front of the knees, and can radiate down the leg to the ankle, up the thigh Pain is worse with work, lifting/carrying things, gardening. Treatment has been occasional use of over the counter pain medications without relief, Had injections with cortisone 8/20/2021 (previously 2/2019), seemed to help, but can still feel some achiness. They help for a few months.     Pain at rest, pain at night better with injections. Sleeps with pillow between knees. Has tried tylenol arthritis and ibuprofen for pain but doesn't seem to really help.    Left knee injury at 10 years old.    Has chronic low back pain, denies numbness and tingling.    Present symptoms: pain medially  and anteriorly, pain dull/achy , mild pain.    Pain severity: 4/10  Frequency of symptoms: are constant  Exacerbating " Factors: weight bearing, carrying and lifting things, gardening, kneeling, stairs  Relieving Factors: rest, ice  Night Pain: Yes  Pain while at rest: Yes   Numbness or tingling: No   Patient has tried:     NSAIDS: Yes , not recently in the past couple of years.     Physical Therapy: No      Activity modification: Yes      Bracing: No      Injections: Yes 8/20/2021.     Ice: Yes      Assistive device:  No     Other: tylenol. Topical ointments.      Other PMH:  has a past medical history of Asthma, C. difficile colitis, Depression, Depressive disorder (Have had it most of my life), Osteoarthritis, and Sinusitis, chronic.  Patient Active Problem List   Diagnosis     CARDIOVASCULAR SCREENING; LDL GOAL LESS THAN 160     Recurrent major depressive disorder, in remission (H)     Encounter for tobacco use cessation counseling     Mild intermittent asthma without complication     Essential hypertension     Primary osteoarthritis of both knees     Low back pain due to bilateral sciatica       Surgical Hx:  has a past surgical history that includes colonoscopy (2017).    Medications:   Current Outpatient Medications:      albuterol (PROVENTIL) (2.5 MG/3ML) 0.083% neb solution, inhale 1 vial (2.5 mg) by nebulization every 4 hours as needed for shortness of breath / dyspnea or wheezing, Disp: 225 mL, Rfl: 2     albuterol (VENTOLIN HFA) 108 (90 Base) MCG/ACT inhaler, INHALE 1-2 PUFFS INTO THE LUNGS EVERY 4 HOURS AS NEEDED FOR SHORTNESS OF BREATH/DYSPNEA OR WHEEZING ., Disp: 18 g, Rfl: 11     amLODIPine (NORVASC) 2.5 MG tablet, Take 2 tablets (5 mg) by mouth daily., Disp: 180 tablet, Rfl: 0     cetirizine (ZYRTEC) 10 MG tablet, Take 10 mg by mouth daily, Disp: , Rfl:      FLUoxetine (PROZAC) 20 MG capsule, Take 1 capsule (20 mg) by mouth daily, Disp: 90 capsule, Rfl: 3     FLUoxetine (PROZAC) 40 MG capsule, Take 1 capsule (40 mg) by mouth daily Take with 20 mg tab for a total of 60 mg daily, Disp: 90 capsule, Rfl: 3      "fluticasone-salmeterol (ADVAIR) 500-50 MCG/DOSE inhaler, Inhale 1 puff into the lungs every 12 hours Please prescribe the generic ( Wixela ), Disp: 3 each, Rfl: 3     hydrochlorothiazide (HYDRODIURIL) 25 MG tablet, Take 1 tablet (25 mg) by mouth once daily, Disp: 90 tablet, Rfl: 3     loperamide (IMODIUM A-D) 2 MG tablet, Take 1 tablet (2 mg) by mouth 4 times daily as needed for diarrhea, Disp: 90 tablet, Rfl: 2     montelukast (SINGULAIR) 10 MG tablet, Take 1 tablet (10 mg) by mouth At Bedtime, Disp: 90 tablet, Rfl: 3     olopatadine (PATANOL) 0.1 % ophthalmic solution, Place 1 drop into both eyes 2 times daily for 5 days, Disp: , Rfl:      Omega-3 Fatty Acids (FISH OIL) 1200 MG capsule, Take 1,200 mg by mouth daily , Disp: , Rfl:      omeprazole 20 MG tablet, Take 1 tablet (20 mg) by mouth daily, Disp: 90 tablet, Rfl: 3    Allergies:   Allergies   Allergen Reactions     Doxycycline Difficulty breathing     Penicillins Itching       Social Hx: retired ().   reports that she quit smoking about 9 months ago. Her smoking use included cigarettes. She smoked 0.00 packs per day for 23.00 years. She has never used smokeless tobacco. She reports previous alcohol use. She reports that she does not use drugs.    Family Hx: family history includes Cancer in her daughter and maternal grandfather; Coronary Artery Disease in her father and paternal grandfather; Diabetes in her daughter; Glaucoma in her father; Schizophrenia in her daughter.    REVIEW OF SYSTEMS:   CONSTITUTIONAL:NEGATIVE for fever, chills, change in weight  INTEGUMENTARY/SKIN: NEGATIVE for worrisome rashes, moles or lesions  MUSCULOSKELETAL:See HPI above  NEURO: NEGATIVE for weakness, dizziness or paresthesias    PHYSICAL EXAM:  BP (!) 162/97   Pulse 82   Ht 1.6 m (5' 3\")   Wt 81.3 kg (179 lb 3.2 oz)   BMI 31.74 kg/m     GENERAL APPEARANCE: healthy, alert, no distress  SKIN: no suspicious lesions or rashes  NEURO: Normal strength and tone, " mentation intact and speech normal  PSYCH:  mentation appears normal and affect normal, not anxious  RESPIRATORY: No increased work of breathing.      BILATERAL LOWER EXTREMITIES:  Gait: favors the left.  Alignment: varus  Intact sensation deep peroneal nerve, superficial peroneal nerve, med/lat tibial nerve, sural nerve, saphenous nerve  Intact EHL, EDL, TA, FHL, GS, quadriceps hamstrings and hip flexors  Bilateral calf soft and nttp or squeeze.  Edema: trace    LEFT KNEE EXAM:    Skin: intact, no ecchymosis or erythema  Squat: 100 %, not limited by pain.     ROM: full extension to 125 flexion, discomfort with range of motion.  Tight hamstrings on straight leg raise.  Effusion: trace  Tender: medial joint line, pes  NTTP lat joint line, anterior or posterior knee  McMurrays: negative    MCL: stable, and non-painful at both 0 and 30 degrees knee flexion  Varus stress: stable, and non-painful at both 0 and 30 degrees knee flexion  Lachmans: neg, firm endpoint  Posterior Drawer stable  Patellofemoral joint:                Apprehension: negative              Crepitations: mild   Grind: positive.    RIGHT KNEE EXAM:    Skin: intact, no ecchymosis or erythema  Squat: 100 %, not limited by pain.     ROM: full extension to 125+ flexion  Tight hamstrings on straight leg raise.  Effusion: none  Tender: medial joint line  NTTP lat joint line, anterior or posterior knee  McMurrays: negative    MCL: stable, and non-painful at both 0 and 30 degrees knee flexion  Varus stress: stable, and non-painful at both 0 and 30 degrees knee flexion  Lachmans: neg, firm endpoint  Posterior Drawer stable  Patellofemoral joint:                Apprehension: negative              Crepitations: mild   Grind: positive.    X-RAY: no new images today.    3 views bilateral knee from 8/20/2021 -- Bilateral medial compartment severe joint space narrowing, bone bone bone with articular flattening, subcondral sclerosis. Bilateral patellofemoral lateral  "facet mild joint space narrowing with medial  patello-femoral osteophytes.           ASSESSMENT/PLAN: Carolina Hernandez is a 57 year old female with chronic bilateral knee pain, advanced primary osteoarthritis.     * reviewed imaging studies with patient, showing arthritic changes, or wearing of the cartilage in the knee. This can be caused by normal \"wear and tear\" over the years or following prior injury to the knee.    Treatment typically starts nonsurgically. Surgical indication for total knee arthroplasty  when nonsurgical management is no longer effective.    Non-surgical treatment for knee arthritis includes:    * rest, sitting  * Activity modification - avoid impact activities or activities that aggravate symptoms.  * NSAIDS (non-steroidal anti-inflammatory medications; e.g. Aleve, advil, motrin, ibuprofen) - regular use for inflammation ( twice daily or three times daily), with food, as long as no contra-indications Please discuss with primary care doctor if needed  * ice, 15-20 minutes at a time several times a day or as needed.  * Strengthening of quadriceps muscles  * Physical Therapy for strengthening, stretching and range of motion exercises of legs  * Tylenol as needed for pain, consider Tylenol arthritis or similar  * Weight loss: Weight loss:  Body mass index is 31.74 kg/m .. weight loss benefits, not only for the current pain symptoms, but also overall health. Recommend a good diet plan that works for the patient, with the assistance of a dietician or primary care doctor as needed. Also, a good, low-impact exercise program for at least 20 minutes per day, 3 times per week, such as exercise bike, elliptical , or pool.  * Exercise: low impact such as stationary bike, elliptical, pool.  * Injections: cortisone versus viscosupplementation (hyaluronic acid, \"rooster comb\", \"gel shots\"); risks and perceived benefits discussed today. We will proceed with bilateral visco injections today.    * " Bracing: bracing the knee may offer some relief of symptoms when worn and provide some stability.  * over the counter supplements such as glucosamine and chondroitin sulfate may help with joint pain.  * topical ointments may help as well    * return to clinic as needed.      * All questions were addressed and answered prior to discharge from clinic today. The patient acknowledges an understanding of and agreement with the plan set forth during today's visit. Patient was advised to call our office or MyChart us if any further questions arise upon leaving our office today.    Giovanny Chong M.D., M.S.  Dept. of Orthopaedic Surgery  [facility]    Large Joint Injection/Arthocentesis: bilateral knee    Date/Time: 4/27/2022 2:28 PM  Performed by: Acosta Eng PA  Authorized by: Giovanny Chong MD     Indications:  Pain and osteoarthritis  Needle Size:  20 G  Guidance: landmark guided    Approach:  Anteromedial  Location:  Knee  Laterality:  Bilateral      Medications (Right):  48 mg hylan 48 MG/6ML; 4 mL bupivacaine 0.25 %  Medications (Left):  48 mg hylan 48 MG/6ML; 4 mL bupivacaine 0.25 %  Outcome:  Tolerated well, no immediate complications  Consent Given by:  Patient  Prep: patient was prepped and draped in usual sterile fashion

## 2022-04-27 ENCOUNTER — OFFICE VISIT (OUTPATIENT)
Dept: ORTHOPEDICS | Facility: CLINIC | Age: 57
End: 2022-04-27
Payer: COMMERCIAL

## 2022-04-27 ENCOUNTER — TELEPHONE (OUTPATIENT)
Dept: GASTROENTEROLOGY | Facility: CLINIC | Age: 57
End: 2022-04-27

## 2022-04-27 VITALS
BODY MASS INDEX: 31.75 KG/M2 | DIASTOLIC BLOOD PRESSURE: 97 MMHG | WEIGHT: 179.2 LBS | SYSTOLIC BLOOD PRESSURE: 162 MMHG | HEART RATE: 82 BPM | HEIGHT: 63 IN

## 2022-04-27 DIAGNOSIS — M17.0 PRIMARY OSTEOARTHRITIS OF BOTH KNEES: Primary | ICD-10-CM

## 2022-04-27 PROCEDURE — 20610 DRAIN/INJ JOINT/BURSA W/O US: CPT | Mod: 50 | Performed by: ORTHOPAEDIC SURGERY

## 2022-04-27 RX ORDER — BUPIVACAINE HYDROCHLORIDE 2.5 MG/ML
4 INJECTION, SOLUTION INFILTRATION; PERINEURAL
Status: DISCONTINUED | OUTPATIENT
Start: 2022-04-27 | End: 2022-10-14

## 2022-04-27 RX ADMIN — BUPIVACAINE HYDROCHLORIDE 4 ML: 2.5 INJECTION, SOLUTION INFILTRATION; PERINEURAL at 14:28

## 2022-04-27 ASSESSMENT — PAIN SCALES - GENERAL: PAINLEVEL: MODERATE PAIN (4)

## 2022-04-27 NOTE — TELEPHONE ENCOUNTER
Patient scheduled for EGD on 5/5/22.     Covid test scheduled: 5/2/22    Arrival time: 0820    Facility location: [facility]     Sedation type: CS     Indication for procedure: abdominal bloating    Anticoagulations? no     Pre visit planning completed.    Viridiana Marie RN

## 2022-04-27 NOTE — LETTER
"    4/27/2022         RE: Carolina Hernandez  [address] [place] [id number]        Dear Colleague,    Thank you for referring your patient, Carolina Hernandez, to the [facility]. Please see a copy of my visit note below.    CHIEF COMPLAINT:   Chief Complaint   Patient presents with     Knee Pain     Bilateral knee pain. Last cortisone injections with Dr. Gaspar on 8/20/21. Injections worked well, lasting a couple of months. She has just been dealing with the pain. She had an incident about 6 weeks ago and felt pain like she has never felt. Pain in left knee was traveling up to the thigh and down to the ankle. She has not felt pain like that. She also put some aleve gel on it. She has been taking different pain relievers to see what works best. She would like synvisc injections today.       HISTORY OF PRESENT ILLNESS    Carolina Hernandez is a 57 year old female seen for evaluation of ongoing bilateral knee pain with no known injury, left more than right.   Pain has been present for 20 years. We saw her last Fall, and at that time had just received a cortisone injection with Dr Gaspar. Returns today wanting to try \"gel\" shot, as she has pre-authorization previously obtained since last visit.    Pain really progressed 6 weeks ago, pushing heavy wheelbarrows full of wet hay through the woods, cleaning out goat pens.    Locates pain along the inner aspect and front of the knees, and can radiate down the leg to the ankle, up the thigh Pain is worse with work, lifting/carrying things, gardening. Treatment has been occasional use of over the counter pain medications without relief, Had injections with cortisone 8/20/2021 (previously 2/2019), seemed to help, but can still feel some achiness. They help for a few months.     Pain at rest, pain at night better with injections. Sleeps with pillow between knees. Has tried tylenol arthritis and ibuprofen for pain but doesn't seem to " really help.    Left knee injury at 10 years old.    Has chronic low back pain, denies numbness and tingling.    Present symptoms: pain medially  and anteriorly, pain dull/achy , mild pain.    Pain severity: 4/10  Frequency of symptoms: are constant  Exacerbating Factors: weight bearing, carrying and lifting things, gardening, kneeling, stairs  Relieving Factors: rest, ice  Night Pain: Yes  Pain while at rest: Yes   Numbness or tingling: No   Patient has tried:     NSAIDS: Yes , not recently in the past couple of years.     Physical Therapy: No      Activity modification: Yes      Bracing: No      Injections: Yes 8/20/2021.     Ice: Yes      Assistive device:  No     Other: tylenol. Topical ointments.      Other PMH:  has a past medical history of Asthma, C. difficile colitis, Depression, Depressive disorder (Have had it most of my life), Osteoarthritis, and Sinusitis, chronic.  Patient Active Problem List   Diagnosis     CARDIOVASCULAR SCREENING; LDL GOAL LESS THAN 160     Recurrent major depressive disorder, in remission (H)     Encounter for tobacco use cessation counseling     Mild intermittent asthma without complication     Essential hypertension     Primary osteoarthritis of both knees     Low back pain due to bilateral sciatica       Surgical Hx:  has a past surgical history that includes colonoscopy (2017).    Medications:   Current Outpatient Medications:      albuterol (PROVENTIL) (2.5 MG/3ML) 0.083% neb solution, inhale 1 vial (2.5 mg) by nebulization every 4 hours as needed for shortness of breath / dyspnea or wheezing, Disp: 225 mL, Rfl: 2     albuterol (VENTOLIN HFA) 108 (90 Base) MCG/ACT inhaler, INHALE 1-2 PUFFS INTO THE LUNGS EVERY 4 HOURS AS NEEDED FOR SHORTNESS OF BREATH/DYSPNEA OR WHEEZING ., Disp: 18 g, Rfl: 11     amLODIPine (NORVASC) 2.5 MG tablet, Take 2 tablets (5 mg) by mouth daily., Disp: 180 tablet, Rfl: 0     cetirizine (ZYRTEC) 10 MG tablet, Take 10 mg by mouth daily, Disp: , Rfl:       FLUoxetine (PROZAC) 20 MG capsule, Take 1 capsule (20 mg) by mouth daily, Disp: 90 capsule, Rfl: 3     FLUoxetine (PROZAC) 40 MG capsule, Take 1 capsule (40 mg) by mouth daily Take with 20 mg tab for a total of 60 mg daily, Disp: 90 capsule, Rfl: 3     fluticasone-salmeterol (ADVAIR) 500-50 MCG/DOSE inhaler, Inhale 1 puff into the lungs every 12 hours Please prescribe the generic ( Wixela ), Disp: 3 each, Rfl: 3     hydrochlorothiazide (HYDRODIURIL) 25 MG tablet, Take 1 tablet (25 mg) by mouth once daily, Disp: 90 tablet, Rfl: 3     loperamide (IMODIUM A-D) 2 MG tablet, Take 1 tablet (2 mg) by mouth 4 times daily as needed for diarrhea, Disp: 90 tablet, Rfl: 2     montelukast (SINGULAIR) 10 MG tablet, Take 1 tablet (10 mg) by mouth At Bedtime, Disp: 90 tablet, Rfl: 3     olopatadine (PATANOL) 0.1 % ophthalmic solution, Place 1 drop into both eyes 2 times daily for 5 days, Disp: , Rfl:      Omega-3 Fatty Acids (FISH OIL) 1200 MG capsule, Take 1,200 mg by mouth daily , Disp: , Rfl:      omeprazole 20 MG tablet, Take 1 tablet (20 mg) by mouth daily, Disp: 90 tablet, Rfl: 3    Allergies:   Allergies   Allergen Reactions     Doxycycline Difficulty breathing     Penicillins Itching       Social Hx: retired ().   reports that she quit smoking about 9 months ago. Her smoking use included cigarettes. She smoked 0.00 packs per day for 23.00 years. She has never used smokeless tobacco. She reports previous alcohol use. She reports that she does not use drugs.    Family Hx: family history includes Cancer in her daughter and maternal grandfather; Coronary Artery Disease in her father and paternal grandfather; Diabetes in her daughter; Glaucoma in her father; Schizophrenia in her daughter.    REVIEW OF SYSTEMS:   CONSTITUTIONAL:NEGATIVE for fever, chills, change in weight  INTEGUMENTARY/SKIN: NEGATIVE for worrisome rashes, moles or lesions  MUSCULOSKELETAL:See HPI above  NEURO: NEGATIVE for weakness, dizziness or  "paresthesias    PHYSICAL EXAM:  BP (!) 162/97   Pulse 82   Ht 1.6 m (5' 3\")   Wt 81.3 kg (179 lb 3.2 oz)   BMI 31.74 kg/m     GENERAL APPEARANCE: healthy, alert, no distress  SKIN: no suspicious lesions or rashes  NEURO: Normal strength and tone, mentation intact and speech normal  PSYCH:  mentation appears normal and affect normal, not anxious  RESPIRATORY: No increased work of breathing.      BILATERAL LOWER EXTREMITIES:  Gait: favors the left.  Alignment: varus  Intact sensation deep peroneal nerve, superficial peroneal nerve, med/lat tibial nerve, sural nerve, saphenous nerve  Intact EHL, EDL, TA, FHL, GS, quadriceps hamstrings and hip flexors  Bilateral calf soft and nttp or squeeze.  Edema: trace    LEFT KNEE EXAM:    Skin: intact, no ecchymosis or erythema  Squat: 100 %, not limited by pain.     ROM: full extension to 125 flexion, discomfort with range of motion.  Tight hamstrings on straight leg raise.  Effusion: trace  Tender: medial joint line, pes  NTTP lat joint line, anterior or posterior knee  McMurrays: negative    MCL: stable, and non-painful at both 0 and 30 degrees knee flexion  Varus stress: stable, and non-painful at both 0 and 30 degrees knee flexion  Lachmans: neg, firm endpoint  Posterior Drawer stable  Patellofemoral joint:                Apprehension: negative              Crepitations: mild   Grind: positive.    RIGHT KNEE EXAM:    Skin: intact, no ecchymosis or erythema  Squat: 100 %, not limited by pain.     ROM: full extension to 125+ flexion  Tight hamstrings on straight leg raise.  Effusion: none  Tender: medial joint line  NTTP lat joint line, anterior or posterior knee  McMurrays: negative    MCL: stable, and non-painful at both 0 and 30 degrees knee flexion  Varus stress: stable, and non-painful at both 0 and 30 degrees knee flexion  Lachmans: neg, firm endpoint  Posterior Drawer stable  Patellofemoral joint:                Apprehension: negative              Crepitations: " "mild   Grind: positive.    X-RAY: no new images today.    3 views bilateral knee from 8/20/2021 -- Bilateral medial compartment severe joint space narrowing, bone bone bone with articular flattening, subcondral sclerosis. Bilateral patellofemoral lateral facet mild joint space narrowing with medial  patello-femoral osteophytes.           ASSESSMENT/PLAN: Carolina Hernandez is a 57 year old female with chronic bilateral knee pain, advanced primary osteoarthritis.     * reviewed imaging studies with patient, showing arthritic changes, or wearing of the cartilage in the knee. This can be caused by normal \"wear and tear\" over the years or following prior injury to the knee.    Treatment typically starts nonsurgically. Surgical indication for total knee arthroplasty  when nonsurgical management is no longer effective.    Non-surgical treatment for knee arthritis includes:    * rest, sitting  * Activity modification - avoid impact activities or activities that aggravate symptoms.  * NSAIDS (non-steroidal anti-inflammatory medications; e.g. Aleve, advil, motrin, ibuprofen) - regular use for inflammation ( twice daily or three times daily), with food, as long as no contra-indications Please discuss with primary care doctor if needed  * ice, 15-20 minutes at a time several times a day or as needed.  * Strengthening of quadriceps muscles  * Physical Therapy for strengthening, stretching and range of motion exercises of legs  * Tylenol as needed for pain, consider Tylenol arthritis or similar  * Weight loss: Weight loss:  Body mass index is 31.74 kg/m .. weight loss benefits, not only for the current pain symptoms, but also overall health. Recommend a good diet plan that works for the patient, with the assistance of a dietician or primary care doctor as needed. Also, a good, low-impact exercise program for at least 20 minutes per day, 3 times per week, such as exercise bike, elliptical , or pool.  * Exercise: low impact " "such as stationary bike, elliptical, pool.  * Injections: cortisone versus viscosupplementation (hyaluronic acid, \"rooster comb\", \"gel shots\"); risks and perceived benefits discussed today. We will proceed with bilateral visco injections today.    * Bracing: bracing the knee may offer some relief of symptoms when worn and provide some stability.  * over the counter supplements such as glucosamine and chondroitin sulfate may help with joint pain.  * topical ointments may help as well    * return to clinic as needed.      * All questions were addressed and answered prior to discharge from clinic today. The patient acknowledges an understanding of and agreement with the plan set forth during today's visit. Patient was advised to call our office or MyChart us if any further questions arise upon leaving our office today.    Giovanny Chong M.D., M.S.  Dept. of Orthopaedic Surgery  [facility]    Large Joint Injection/Arthocentesis: bilateral knee    Date/Time: 4/27/2022 2:28 PM  Performed by: Acosta Eng PA  Authorized by: Giovanny Chong MD     Indications:  Pain and osteoarthritis  Needle Size:  20 G  Guidance: landmark guided    Approach:  Anteromedial  Location:  Knee  Laterality:  Bilateral      Medications (Right):  48 mg hylan 48 MG/6ML; 4 mL bupivacaine 0.25 %  Medications (Left):  48 mg hylan 48 MG/6ML; 4 mL bupivacaine 0.25 %  Outcome:  Tolerated well, no immediate complications  Consent Given by:  Patient  Prep: patient was prepped and draped in usual sterile fashion              Again, thank you for allowing me to participate in the care of your patient.        Sincerely,        Giovanny Chong MD    "

## 2022-04-27 NOTE — TELEPHONE ENCOUNTER
Attempted to contact patient regarding upcoming EGD procedure on 5.5.2022 for pre assessment questions.     Pt is unable to talk at this time.  Pt's phone continued to cutout.  RN attempted to contact twice.    Jennifer Yan RN

## 2022-05-02 ENCOUNTER — LAB (OUTPATIENT)
Dept: LAB | Facility: CLINIC | Age: 57
End: 2022-05-02
Payer: COMMERCIAL

## 2022-05-02 DIAGNOSIS — Z11.59 ENCOUNTER FOR SCREENING FOR OTHER VIRAL DISEASES: ICD-10-CM

## 2022-05-02 PROCEDURE — U0005 INFEC AGEN DETEC AMPLI PROBE: HCPCS

## 2022-05-02 PROCEDURE — U0003 INFECTIOUS AGENT DETECTION BY NUCLEIC ACID (DNA OR RNA); SEVERE ACUTE RESPIRATORY SYNDROME CORONAVIRUS 2 (SARS-COV-2) (CORONAVIRUS DISEASE [COVID-19]), AMPLIFIED PROBE TECHNIQUE, MAKING USE OF HIGH THROUGHPUT TECHNOLOGIES AS DESCRIBED BY CMS-2020-01-R: HCPCS

## 2022-05-02 NOTE — TELEPHONE ENCOUNTER
Second attempt for pre-assessment prior to upcoming EGD.    No answer.  Left message to return call [phone] #2    Jennifer Yan RN

## 2022-05-03 LAB — SARS-COV-2 RNA RESP QL NAA+PROBE: NEGATIVE

## 2022-05-03 NOTE — TELEPHONE ENCOUNTER
Pre assessment questions completed for upcoming EGD procedure scheduled on 5/5/22    COVID test scheduled 5/2/22 -- in process.     Reviewed procedural [place] time 8:20am and [place] [facility] [place]ated  policy reviewed. Instructed to have someone stay 6 hours post procedure.     Procedure indication: ABD bloating     Prep instructions sent via [employer].    Reviewed EGD prep instructions with patient.     Anticoagulation/blood thinners? None    Electronic implanted devices? None    Patient verbalized understanding and had no questions or concerns at this time.    Silvia Yuen RN

## 2022-05-04 ENCOUNTER — ANESTHESIA EVENT (OUTPATIENT)
Dept: SURGERY | Facility: AMBULATORY SURGERY CENTER | Age: 57
End: 2022-05-04
Payer: COMMERCIAL

## 2022-05-04 NOTE — ANESTHESIA PREPROCEDURE EVALUATION
Anesthesia Pre-Procedure Evaluation    Patient: Carolina Hernandez   MRN: [id number] : 1965        Procedure : Procedure(s):  ESOPHAGOGASTRODUODENOSCOPY (EGD)          Past Medical History:   Diagnosis Date     Asthma      C. difficile colitis      Depression      Depressive disorder Have had it most of my life     Osteoarthritis      Sinusitis, chronic       Past Surgical History:   Procedure Laterality Date     COLONOSCOPY  2017      Allergies   Allergen Reactions     Doxycycline Difficulty breathing     Penicillins Itching      Social History     Tobacco Use     Smoking status: Former Smoker     Packs/day: 0.00     Years: 23.00     Pack years: 0.00     Types: Cigarettes     Quit date: 2021     Years since quittin.8     Smokeless tobacco: Never Used     Tobacco comment: 6 cig a day    Substance Use Topics     Alcohol use: Not Currently     Comment: 6 to 8 beers 4 times weekly       Wt Readings from Last 1 Encounters:   22 81.3 kg (179 lb 3.2 oz)           Physical Exam    Airway        Mallampati: II   TM distance: > 3 FB   Neck ROM: full   Mouth opening: > 3 cm    Respiratory Devices and Support         Dental  no notable dental history         Cardiovascular   cardiovascular exam normal          Pulmonary   pulmonary exam normal                OUTSIDE LABS:  CBC:   Lab Results   Component Value Date    WBC 8.3 2021    WBC 7.1 2019    HGB 13.3 2021    HGB 12.4 2019    HCT 38.1 2021    HCT 37.3 2019     2021     2019     BMP:   Lab Results   Component Value Date     (L) 2021     2021    POTASSIUM 3.5 2021    POTASSIUM 4.1 2021    CHLORIDE 97 2021    CHLORIDE 104 2021    CO2 28 2021    CO2 27 2021    BUN 15 2021    BUN 14 2021    CR 0.80 2021    CR 0.72 2021     (H) 2021     (H) 2021     COAGS:   Lab Results   Component  Value Date    INR 1.09 12/10/2019     POC: No results found for: BGM, HCG, HCGS  HEPATIC:   Lab Results   Component Value Date    ALBUMIN 3.8 12/10/2019    PROTTOTAL 7.4 12/10/2019    ALT 15 12/10/2019    AST 16 12/10/2019    ALKPHOS 62 12/10/2019    BILITOTAL 0.7 12/10/2019     OTHER:   Lab Results   Component Value Date    LACT 1.3 10/22/2019    REILLY 9.8 12/29/2021    TSH 0.29 (L) 09/22/2005    T4 1.07 09/22/2005       Anesthesia Plan    ASA Status:  2   NPO Status:  NPO Appropriate    Anesthesia Type: MAC.     - Reason for MAC: straight local not clinically adequate   Induction: Intravenous, Propofol.   Maintenance: TIVA.        Consents    Anesthesia Plan(s) and associated risks, benefits, and realistic alternatives discussed. Questions answered and patient/representative(s) expressed understanding.    - Discussed:     - Discussed with:  Patient      - Extended Intubation/Ventilatory Support Discussed: No.      - Patient is DNR/DNI Status: No    Use of blood products discussed: No .     Postoperative Care       PONV prophylaxis: Ondansetron (or other 5HT-3), Background Propofol Infusion     Comments:                Daniel Justice MD

## 2022-05-05 ENCOUNTER — HOSPITAL ENCOUNTER (OUTPATIENT)
Facility: AMBULATORY SURGERY CENTER | Age: 57
Discharge: HOME | End: 2022-05-05
Attending: INTERNAL MEDICINE
Payer: COMMERCIAL

## 2022-05-05 ENCOUNTER — ANESTHESIA (OUTPATIENT)
Dept: SURGERY | Facility: AMBULATORY SURGERY CENTER | Age: 57
End: 2022-05-05
Payer: COMMERCIAL

## 2022-05-05 VITALS
TEMPERATURE: 98.9 F | SYSTOLIC BLOOD PRESSURE: 132 MMHG | DIASTOLIC BLOOD PRESSURE: 75 MMHG | OXYGEN SATURATION: 97 % | HEART RATE: 82 BPM | RESPIRATION RATE: 17 BRPM

## 2022-05-05 VITALS — HEART RATE: 95 BPM

## 2022-05-05 LAB — UPPER GI ENDOSCOPY: NORMAL

## 2022-05-05 PROCEDURE — 88305 TISSUE EXAM BY PATHOLOGIST: CPT | Mod: TC | Performed by: INTERNAL MEDICINE

## 2022-05-05 PROCEDURE — 43239 EGD BIOPSY SINGLE/MULTIPLE: CPT

## 2022-05-05 PROCEDURE — 88305 TISSUE EXAM BY PATHOLOGIST: CPT | Mod: 26 | Performed by: PATHOLOGY

## 2022-05-05 RX ORDER — NALOXONE HYDROCHLORIDE 0.4 MG/ML
0.4 INJECTION, SOLUTION INTRAMUSCULAR; INTRAVENOUS; SUBCUTANEOUS
Status: DISCONTINUED | OUTPATIENT
Start: 2022-05-05 | End: 2022-05-06 | Stop reason: HOSPADM

## 2022-05-05 RX ORDER — ONDANSETRON 2 MG/ML
4 INJECTION INTRAMUSCULAR; INTRAVENOUS EVERY 6 HOURS PRN
Status: DISCONTINUED | OUTPATIENT
Start: 2022-05-05 | End: 2022-05-06 | Stop reason: HOSPADM

## 2022-05-05 RX ORDER — LIDOCAINE 40 MG/G
CREAM TOPICAL
Status: DISCONTINUED | OUTPATIENT
Start: 2022-05-05 | End: 2022-05-05 | Stop reason: HOSPADM

## 2022-05-05 RX ORDER — SIMETHICONE
LIQUID (ML) MISCELLANEOUS PRN
Status: DISCONTINUED | OUTPATIENT
Start: 2022-05-05 | End: 2022-05-05 | Stop reason: HOSPADM

## 2022-05-05 RX ORDER — LIDOCAINE HYDROCHLORIDE 20 MG/ML
INJECTION, SOLUTION INFILTRATION; PERINEURAL PRN
Status: DISCONTINUED | OUTPATIENT
Start: 2022-05-05 | End: 2022-05-05

## 2022-05-05 RX ORDER — NALOXONE HYDROCHLORIDE 0.4 MG/ML
0.2 INJECTION, SOLUTION INTRAMUSCULAR; INTRAVENOUS; SUBCUTANEOUS
Status: DISCONTINUED | OUTPATIENT
Start: 2022-05-05 | End: 2022-05-06 | Stop reason: HOSPADM

## 2022-05-05 RX ORDER — PROCHLORPERAZINE MALEATE 10 MG
10 TABLET ORAL EVERY 6 HOURS PRN
Status: DISCONTINUED | OUTPATIENT
Start: 2022-05-05 | End: 2022-05-06 | Stop reason: HOSPADM

## 2022-05-05 RX ORDER — PROPOFOL 10 MG/ML
INJECTION, EMULSION INTRAVENOUS CONTINUOUS PRN
Status: DISCONTINUED | OUTPATIENT
Start: 2022-05-05 | End: 2022-05-05

## 2022-05-05 RX ORDER — PROPOFOL 10 MG/ML
INJECTION, EMULSION INTRAVENOUS PRN
Status: DISCONTINUED | OUTPATIENT
Start: 2022-05-05 | End: 2022-05-05

## 2022-05-05 RX ORDER — FLUMAZENIL 0.1 MG/ML
0.2 INJECTION, SOLUTION INTRAVENOUS
Status: ACTIVE | OUTPATIENT
Start: 2022-05-05 | End: 2022-05-05

## 2022-05-05 RX ORDER — ONDANSETRON 4 MG/1
4 TABLET, ORALLY DISINTEGRATING ORAL EVERY 6 HOURS PRN
Status: DISCONTINUED | OUTPATIENT
Start: 2022-05-05 | End: 2022-05-06 | Stop reason: HOSPADM

## 2022-05-05 RX ORDER — ONDANSETRON 2 MG/ML
4 INJECTION INTRAMUSCULAR; INTRAVENOUS
Status: COMPLETED | OUTPATIENT
Start: 2022-05-05 | End: 2022-05-05

## 2022-05-05 RX ORDER — SODIUM CHLORIDE, SODIUM LACTATE, POTASSIUM CHLORIDE, CALCIUM CHLORIDE 600; 310; 30; 20 MG/100ML; MG/100ML; MG/100ML; MG/100ML
INJECTION, SOLUTION INTRAVENOUS CONTINUOUS PRN
Status: DISCONTINUED | OUTPATIENT
Start: 2022-05-05 | End: 2022-05-05

## 2022-05-05 RX ORDER — KETAMINE HYDROCHLORIDE 10 MG/ML
INJECTION INTRAMUSCULAR; INTRAVENOUS PRN
Status: DISCONTINUED | OUTPATIENT
Start: 2022-05-05 | End: 2022-05-05

## 2022-05-05 RX ORDER — GLYCOPYRROLATE 0.2 MG/ML
INJECTION, SOLUTION INTRAMUSCULAR; INTRAVENOUS PRN
Status: DISCONTINUED | OUTPATIENT
Start: 2022-05-05 | End: 2022-05-05

## 2022-05-05 RX ADMIN — PROPOFOL 100 MG: 10 INJECTION, EMULSION INTRAVENOUS at 09:44

## 2022-05-05 RX ADMIN — LIDOCAINE HYDROCHLORIDE 40 MG: 20 INJECTION, SOLUTION INFILTRATION; PERINEURAL at 09:44

## 2022-05-05 RX ADMIN — GLYCOPYRROLATE 0.2 MG: 0.2 INJECTION, SOLUTION INTRAMUSCULAR; INTRAVENOUS at 09:44

## 2022-05-05 RX ADMIN — KETAMINE HYDROCHLORIDE 20 MG: 10 INJECTION INTRAMUSCULAR; INTRAVENOUS at 09:44

## 2022-05-05 RX ADMIN — PROPOFOL 200 MCG/KG/MIN: 10 INJECTION, EMULSION INTRAVENOUS at 09:44

## 2022-05-05 RX ADMIN — SODIUM CHLORIDE, SODIUM LACTATE, POTASSIUM CHLORIDE, CALCIUM CHLORIDE: 600; 310; 30; 20 INJECTION, SOLUTION INTRAVENOUS at 09:39

## 2022-05-05 RX ADMIN — ONDANSETRON 4 MG: 2 INJECTION INTRAMUSCULAR; INTRAVENOUS at 09:44

## 2022-05-05 NOTE — ANESTHESIA CARE TRANSFER NOTE
Patient: Carolina Hernandez    Procedure: Procedure(s):  ESOPHAGOGASTRODUODENOSCOPY, WITH BIOPSY       Diagnosis: Abdominal bloating [R14.0]  Diagnosis Additional Information: No value filed.    Anesthesia Type:   MAC     Note:    Oropharynx: oropharynx clear of all foreign objects and spontaneously breathing  Level of Consciousness: awake  Oxygen Supplementation: room air    Independent Airway: airway patency satisfactory and stable  Dentition: dentition unchanged  Vital Signs Stable: post-procedure vital signs reviewed and stable  Report to RN Given: handoff report given  Patient transferred to: Phase II    Handoff Report: Identifed the Patient, Identified the Reponsible Provider, Reviewed the pertinent medical history, Discussed the surgical course, Reviewed Intra-OP anesthesia mangement and issues during anesthesia, Set expectations for post-procedure period and Allowed opportunity for questions and acknowledgement of understanding      Vitals:  Vitals Value Taken Time   BP     Temp     Pulse     Resp     SpO2         Electronically Signed By: VALERIA Hooks CRNA  May 5, 2022  10:02 AM

## 2022-05-05 NOTE — ANESTHESIA POSTPROCEDURE EVALUATION
Patient: Carolina Hernandez    Procedure: Procedure(s):  ESOPHAGOGASTRODUODENOSCOPY, WITH BIOPSY       Anesthesia Type:  MAC    Note:  Disposition: Outpatient   Postop Pain Control: Uneventful            Sign Out: Well controlled pain   PONV:    Neuro/Psych: Uneventful            Sign Out: Acceptable/Baseline neuro status   Airway/Respiratory: Uneventful            Sign Out: Acceptable/Baseline resp. status   CV/Hemodynamics: Uneventful            Sign Out: Acceptable CV status; No obvious hypovolemia; No obvious fluid overload   Other NRE:    DID A NON-ROUTINE EVENT OCCUR?            Last vitals:  Vitals Value Taken Time   /76 05/05/22 1004   Temp     Pulse 82 05/05/22 1004   Resp     SpO2         Electronically Signed By: Daniel Justice MD  May 5, 2022  10:25 AM

## 2022-05-05 NOTE — H&P
Carolina Hernandez  [id number]  female  57 year old      Reason for procedure/surgery: EGD, abdominal pain and bloating    Patient Active Problem List   Diagnosis     CARDIOVASCULAR SCREENING; LDL GOAL LESS THAN 160     Recurrent major depressive disorder, in remission (H)     Encounter for tobacco use cessation counseling     Mild intermittent asthma without complication     Essential hypertension     Primary osteoarthritis of both knees     Low back pain due to bilateral sciatica       Past Surgical History:    Past Surgical History:   Procedure Laterality Date     COLONOSCOPY         Past Medical History:   Past Medical History:   Diagnosis Date     Asthma      C. difficile colitis      Depression      Depressive disorder Have had it most of my life     Osteoarthritis      Sinusitis, chronic        Social History:   Social History     Tobacco Use     Smoking status: Former Smoker     Packs/day: 0.00     Years: 23.00     Pack years: 0.00     Types: Cigarettes     Quit date: 2021     Years since quittin.8     Smokeless tobacco: Never Used     Tobacco comment: 6 cig a day    Substance Use Topics     Alcohol use: Not Currently     Comment: 6 to 8 beers 4 times weekly        Family History:   Family History   Problem Relation Age of Onset     Glaucoma Father      Coronary Artery Disease Father         stents     Cancer Maternal Grandfather      Coronary Artery Disease Paternal Grandfather      Schizophrenia Daughter      Diabetes Daughter      Cancer Daughter      Crohn's Disease No family hx of      Ulcerative Colitis No family hx of      GERD No family hx of      Stomach Cancer No family hx of        Allergies:   Allergies   Allergen Reactions     Doxycycline Difficulty breathing     Penicillins Itching       Active Medications:   Current Outpatient Medications   Medication Sig Dispense Refill     albuterol (PROVENTIL) (2.5 MG/3ML) 0.083% neb solution inhale 1 vial (2.5 mg) by nebulization every 4 hours  as needed for shortness of breath / dyspnea or wheezing 225 mL 2     albuterol (VENTOLIN HFA) 108 (90 Base) MCG/ACT inhaler INHALE 1-2 PUFFS INTO THE LUNGS EVERY 4 HOURS AS NEEDED FOR SHORTNESS OF BREATH/DYSPNEA OR WHEEZING . 18 g 11     amLODIPine (NORVASC) 2.5 MG tablet Take 2 tablets (5 mg) by mouth daily. 180 tablet 0     cetirizine (ZYRTEC) 10 MG tablet Take 10 mg by mouth daily       FLUoxetine (PROZAC) 20 MG capsule Take 1 capsule (20 mg) by mouth daily 90 capsule 3     FLUoxetine (PROZAC) 40 MG capsule Take 1 capsule (40 mg) by mouth daily Take with 20 mg tab for a total of 60 mg daily 90 capsule 3     fluticasone-salmeterol (ADVAIR) 500-50 MCG/DOSE inhaler Inhale 1 puff into the lungs every 12 hours Please prescribe the generic ( Wixela ) 3 each 3     hydrochlorothiazide (HYDRODIURIL) 25 MG tablet Take 1 tablet (25 mg) by mouth once daily 90 tablet 3     loperamide (IMODIUM A-D) 2 MG tablet Take 1 tablet (2 mg) by mouth 4 times daily as needed for diarrhea 90 tablet 2     montelukast (SINGULAIR) 10 MG tablet Take 1 tablet (10 mg) by mouth At Bedtime 90 tablet 3     Omega-3 Fatty Acids (FISH OIL) 1200 MG capsule Take 1,200 mg by mouth daily        omeprazole 20 MG tablet Take 1 tablet (20 mg) by mouth daily 90 tablet 3     olopatadine (PATANOL) 0.1 % ophthalmic solution Place 1 drop into both eyes 2 times daily for 5 days         Systemic Review:   CONSTITUTIONAL: NEGATIVE for fever, chills, change in weight  ENT/MOUTH: NEGATIVE for ear, mouth and throat problems  RESP: NEGATIVE for significant cough or SOB  CV: NEGATIVE for chest pain, palpitations or peripheral edema    Physical Examination:   Vital Signs: /79   Temp 99  F (37.2  C) (Temporal)   Resp 16   SpO2 98%   GENERAL: healthy, alert and no distress  NECK: no adenopathy, no asymmetry, masses, or scars  RESP: lungs clear to auscultation - no rales, rhonchi or wheezes  CV: regular rate and rhythm, normal S1 S2, no S3 or S4, no murmur, click or  rub, no peripheral edema and peripheral pulses strong  ABDOMEN: soft, nontender, no hepatosplenomegaly, no masses and bowel sounds normal  MS: no gross musculoskeletal defects noted, no edema    Plan: Appropriate to proceed as scheduled.      Timothy Oliva DO  5/5/2022    PCP:  Julia Oneill

## 2022-05-06 LAB
PATH REPORT.COMMENTS IMP SPEC: NORMAL
PATH REPORT.COMMENTS IMP SPEC: NORMAL
PATH REPORT.FINAL DX SPEC: NORMAL
PATH REPORT.GROSS SPEC: NORMAL
PATH REPORT.MICROSCOPIC SPEC OTHER STN: NORMAL
PATH REPORT.RELEVANT HX SPEC: NORMAL
PHOTO IMAGE: NORMAL

## 2022-05-24 ENCOUNTER — APPOINTMENT (OUTPATIENT)
Dept: GENERAL RADIOLOGY | Facility: CLINIC | Age: 57
End: 2022-05-24
Attending: FAMILY MEDICINE
Payer: COMMERCIAL

## 2022-05-24 ENCOUNTER — HOSPITAL ENCOUNTER (EMERGENCY)
Facility: CLINIC | Age: 57
Discharge: HOME | End: 2022-05-24
Attending: FAMILY MEDICINE | Admitting: FAMILY MEDICINE
Payer: COMMERCIAL

## 2022-05-24 VITALS
TEMPERATURE: 99.2 F | BODY MASS INDEX: 30.12 KG/M2 | HEART RATE: 92 BPM | WEIGHT: 170 LBS | HEIGHT: 63 IN | OXYGEN SATURATION: 97 % | SYSTOLIC BLOOD PRESSURE: 145 MMHG | RESPIRATION RATE: 20 BRPM | DIASTOLIC BLOOD PRESSURE: 82 MMHG

## 2022-05-24 DIAGNOSIS — J18.9 COMMUNITY ACQUIRED PNEUMONIA OF LEFT LUNG, UNSPECIFIED PART OF LUNG: ICD-10-CM

## 2022-05-24 LAB
FLUAV RNA SPEC QL NAA+PROBE: NEGATIVE
FLUBV RNA RESP QL NAA+PROBE: NEGATIVE
SARS-COV-2 RNA RESP QL NAA+PROBE: NEGATIVE

## 2022-05-24 PROCEDURE — 71046 X-RAY EXAM CHEST 2 VIEWS: CPT

## 2022-05-24 PROCEDURE — C9803 HOPD COVID-19 SPEC COLLECT: HCPCS | Performed by: FAMILY MEDICINE

## 2022-05-24 PROCEDURE — 250N000013 HC RX MED GY IP 250 OP 250 PS 637: Performed by: FAMILY MEDICINE

## 2022-05-24 PROCEDURE — 87636 SARSCOV2 & INF A&B AMP PRB: CPT | Performed by: FAMILY MEDICINE

## 2022-05-24 PROCEDURE — 99284 EMERGENCY DEPT VISIT MOD MDM: CPT | Mod: CS,25 | Performed by: FAMILY MEDICINE

## 2022-05-24 PROCEDURE — 99284 EMERGENCY DEPT VISIT MOD MDM: CPT | Mod: CS | Performed by: FAMILY MEDICINE

## 2022-05-24 PROCEDURE — 250N000012 HC RX MED GY IP 250 OP 636 PS 637: Performed by: FAMILY MEDICINE

## 2022-05-24 RX ORDER — PREDNISONE 20 MG/1
40 TABLET ORAL ONCE
Status: COMPLETED | OUTPATIENT
Start: 2022-05-24 | End: 2022-05-24

## 2022-05-24 RX ORDER — PREDNISONE 20 MG/1
20 TABLET ORAL 2 TIMES DAILY
Qty: 10 TABLET | Refills: 0 | Status: SHIPPED | OUTPATIENT
Start: 2022-05-24 | End: 2022-05-29

## 2022-05-24 RX ORDER — LEVOFLOXACIN 750 MG/1
750 TABLET, FILM COATED ORAL DAILY
Qty: 9 TABLET | Refills: 0 | Status: SHIPPED | OUTPATIENT
Start: 2022-05-24 | End: 2022-06-02

## 2022-05-24 RX ORDER — HYDROCODONE BITARTRATE AND ACETAMINOPHEN 5; 325 MG/1; MG/1
1-2 TABLET ORAL EVERY 8 HOURS PRN
Qty: 12 TABLET | Refills: 0 | Status: SHIPPED | OUTPATIENT
Start: 2022-05-24 | End: 2022-06-08

## 2022-05-24 RX ADMIN — PREDNISONE 40 MG: 20 TABLET ORAL at 13:35

## 2022-05-24 RX ADMIN — LEVOFLOXACIN 750 MG: 500 TABLET, FILM COATED ORAL at 13:35

## 2022-05-24 NOTE — DISCHARGE INSTRUCTIONS
RETURN TO THE EMERGENCY ROOM FOR THE FOLLOWING:    Severely worsened breathing, vomiting and dehydration, fainting, or at anytime for any concern.    FOLLOW UP:    Follow-up with your primary clinic in 2 weeks for reevaluation.  He will need a follow-up x-ray.    TREATMENT RECOMMENDATIONS:    Levaquin 750 mg once daily for 10 days.  Your first dose was given in the ED today.  You can start the prescription tomorrow.  Prednisone 20 mg twice a day for 5 days.  You were given a dose of 40 mg in the ED today.  You can start the prescription tomorrow.  Ibuprofen 600 mg every 6 hours as needed for pain.  Consider Norco 1 to 2 tablets every 8 hours as needed for pain if not relieved by the above.    NURSE ADVICE LINE:  [phone] or [phone]

## 2022-05-24 NOTE — ED TRIAGE NOTES
S/s started 2 nights ago, is concerned she has pneumonia.      Triage Assessment     Row Name 05/24/22 1056       Triage Assessment (Adult)    Airway WDL WDL       Respiratory WDL    Respiratory WDL cough    Cough Frequency frequent    Cough Type good;productive       Skin Circulation/Temperature WDL    Skin Circulation/Temperature WDL WDL       Cardiac WDL    Cardiac WDL WDL       Peripheral/Neurovascular WDL    Peripheral Neurovascular WDL WDL       Cognitive/Neuro/Behavioral WDL    Cognitive/Neuro/Behavioral WDL WDL

## 2022-05-24 NOTE — ED PROVIDER NOTES
"  HPI   The patient is a 57-year-old female presenting with cough and fatigue.  Symptoms began on Sunday, 2 days ago.  She says, \"I felt like I got hit by a truck.\"  She has had a cough with congestion since that time.  She has had chills and then sweats.  She has myalgia.  She has a headache.  No nausea or vomiting.  No diarrhea.  She feels mildly short of breath.  No chest pain.  No leg pain or swelling.  No reflux.  No other known sick contacts.  No recent travel.        Allergies:  Allergies   Allergen Reactions     Doxycycline Difficulty breathing     Penicillins Itching     Problem List:    Patient Active Problem List    Diagnosis Date Noted     Low back pain due to bilateral sciatica 10/27/2021     Priority: Medium     Primary osteoarthritis of both knees 09/23/2021     Priority: Medium     Essential hypertension 07/26/2019     Priority: Medium     New diagnosis 7/26/2019         Recurrent major depressive disorder, in remission (H) 01/17/2019     Priority: Medium     Encounter for tobacco use cessation counseling 01/17/2019     Priority: Medium     Mild intermittent asthma without complication 01/17/2019     Priority: Medium     CARDIOVASCULAR SCREENING; LDL GOAL LESS THAN 160 10/31/2010     Priority: Medium      Past Medical History:    Past Medical History:   Diagnosis Date     Asthma      C. difficile colitis      Depression      Depressive disorder Have had it most of my life     Osteoarthritis      Sinusitis, chronic      Past Surgical History:    Past Surgical History:   Procedure Laterality Date     COLONOSCOPY  2017     ESOPHAGOSCOPY, GASTROSCOPY, DUODENOSCOPY (EGD), COMBINED N/A 5/5/2022    Procedure: ESOPHAGOGASTRODUODENOSCOPY, WITH BIOPSY;  Surgeon: Timothy Oliva DO;  [place] [facility] [place]     Family History:    Family History   Problem Relation Age of Onset     Glaucoma Father      Coronary Artery Disease Father         stents     Cancer Maternal Grandfather      Coronary Artery Disease Paternal " "Grandfather      Schizophrenia Daughter      Diabetes Daughter      Cancer Daughter      Crohn's Disease No family hx of      Ulcerative Colitis No family hx of      GERD No family hx of      Stomach Cancer No family hx of      Social History:  Marital Status:   [2]  Social History     Tobacco Use     Smoking status: Former Smoker     Packs/day: 0.00     Years: 23.00     Pack years: 0.00     Types: Cigarettes     Quit date: 2021     Years since quittin.8     Smokeless tobacco: Never Used     Tobacco comment: 6 cig a day    Vaping Use     Vaping Use: Never used   Substance Use Topics     Alcohol use: Not Currently     Comment: 6 to 8 beers 4 times weekly      Drug use: No      Medications:    HYDROcodone-acetaminophen (NORCO) 5-325 MG tablet  levofloxacin (LEVAQUIN) 750 MG tablet  predniSONE (DELTASONE) 20 MG tablet  albuterol (PROVENTIL) (2.5 MG/3ML) 0.083% neb solution  albuterol (VENTOLIN HFA) 108 (90 Base) MCG/ACT inhaler  amLODIPine (NORVASC) 2.5 MG tablet  cetirizine (ZYRTEC) 10 MG tablet  FLUoxetine (PROZAC) 20 MG capsule  FLUoxetine (PROZAC) 40 MG capsule  fluticasone-salmeterol (ADVAIR) 500-50 MCG/DOSE inhaler  hydrochlorothiazide (HYDRODIURIL) 25 MG tablet  loperamide (IMODIUM A-D) 2 MG tablet  montelukast (SINGULAIR) 10 MG tablet  olopatadine (PATANOL) 0.1 % ophthalmic solution  Omega-3 Fatty Acids (FISH OIL) 1200 MG capsule  omeprazole 20 MG tablet      Review of Systems   All other systems reviewed and are negative.      PE   BP: 132/80  Pulse: 89  Temp: 99.2  F (37.3  C)  Resp: 20  Height: 160 cm (5' 3\")  Weight: 77.1 kg (170 lb)  SpO2: 95 %  Physical Exam  Vitals reviewed.   Constitutional:       General: She is not in acute distress.     Appearance: She is well-developed.      Comments: Tired appearing.  Uncomfortable.   HENT:      Head: Normocephalic and atraumatic.      Right Ear: External ear normal.      Left Ear: External ear normal.      Nose: Nose normal.      Mouth/Throat:      " Mouth: Mucous membranes are moist.      Pharynx: Oropharynx is clear.   Eyes:      Extraocular Movements: Extraocular movements intact.      Conjunctiva/sclera: Conjunctivae normal.      Pupils: Pupils are equal, round, and reactive to light.   Cardiovascular:      Rate and Rhythm: Normal rate and regular rhythm.   Pulmonary:      Effort: Pulmonary effort is normal.   Abdominal:      Palpations: Abdomen is soft.      Tenderness: There is no abdominal tenderness.   Musculoskeletal:         General: Normal range of motion.      Cervical back: Normal range of motion.   Skin:     General: Skin is warm and dry.   Neurological:      Mental Status: She is alert and oriented to person, place, and time.   Psychiatric:         Behavior: Behavior normal.         ED COURSE and MDM   1120.  The patient has a cough with congestion for 3 days.  She has a headache.  She has myalgia and fatigue.  Influenza and COVID pending.  Portable chest x-ray.    1315.  Evidence of pneumonia seen on x-ray.  No hypoxemia here.  No tachycardia.  Blood pressure within normal limits.  No emergent need for hospitalization.  Patient will be discharged home and follow-up.  Return here for worsening.  Levaquin and prednisone will be given here in the ED, risks outweigh benefits of any tendon injury.  A prescription for Norco will also be given.  Start prescriptions for Levaquin and prednisone tomorrow.  Patient agrees with the plan and would like to be discharged home.    LABS  Labs Ordered and Resulted from Time of ED Arrival to Time of ED Departure   INFLUENZA A/B & SARS-COV2 PCR MULTIPLEX - Normal       Result Value    Influenza A PCR Negative      Influenza B PCR Negative      SARS CoV2 PCR Negative         IMAGING  Images reviewed by me.  Radiology report also reviewed.  Chest XR,  PA & LAT   Final Result   IMPRESSION: Opacity in the left upper lobe and lingula laterally,   likely due to pneumonia. A follow-up radiograph after treatment is    recommended to ensure resolution. No pleural effusion or pneumothorax.   Normal heart size.      ROS PURCELL MD            SYSTEM ID:  [id number]          Procedures    Medications   predniSONE (DELTASONE) tablet 40 mg (has no administration in time range)   levofloxacin (LEVAQUIN) tablet 750 mg (has no administration in time range)         IMPRESSION       ICD-10-CM    1. Community acquired pneumonia of left lung, unspecified part of lung  J18.9             Medication List      Started    HYDROcodone-acetaminophen 5-325 MG tablet  Commonly known as: NORCO  1-2 tablets, Oral, EVERY 8 HOURS PRN     levofloxacin 750 MG tablet  Commonly known as: Levaquin  750 mg, Oral, DAILY     predniSONE 20 MG tablet  Commonly known as: DELTASONE  20 mg, Oral, 2 TIMES DAILY                        Cuate Ling MD  05/24/22 1310

## 2022-05-24 NOTE — ED NOTES
Pt reports on Sunday night felt chills, no fever detected at that time, reports associated generalized pain and weakness. Pt reports Monday she developed a productive cough with some sob. O2 96% on room air. Pt reports it hurts to midsternal region when taking in a deep breath. Pt has a hx of COPD

## 2022-05-25 ENCOUNTER — PATIENT OUTREACH (OUTPATIENT)
Dept: CARE COORDINATION | Facility: CLINIC | Age: 57
End: 2022-05-25
Payer: COMMERCIAL

## 2022-05-25 DIAGNOSIS — Z71.89 OTHER SPECIFIED COUNSELING: ICD-10-CM

## 2022-05-25 NOTE — PROGRESS NOTES
Clinic Care Coordination Contact  [facility]/Voicemail       Clinical Data: Care Coordinator Outreach  Outreach attempted x 1.  Left message on patient's voicemail with call back information and requested return call.  Plan: Care Coordinator will try to reach patient again in 1-2 business days.    Juliann Browne, [facility]  [phone]  [facility]

## 2022-05-26 NOTE — PROGRESS NOTES
"Clinic Care Coordination Contact  [facility]: Post-Discharge Note  SITUATION                                                      Admission:    Admission Date: 05/24/22   Reason for Admission: Community acquired pneumonia of left lung, unspecified part of lung  Discharge:   Discharge Date: 05/24/22  Discharge Diagnosis: Community acquired pneumonia of left lung, unspecified part of lung    BACKGROUND                                                      Per hospital discharge summary and inpatient provider notes:    The patient is a 57-year-old female presenting with cough and fatigue.  Symptoms began on Sunday, 2 days ago.  She says, \"I felt like I got hit by a truck.\"  She has had a cough with congestion since that time.  She has had chills and then sweats.  She has myalgia.  She has a headache.  No nausea or vomiting.  No diarrhea.  She feels mildly short of breath.  No chest pain.  No leg pain or swelling.  No reflux.  No other known sick contacts.  No recent travel.     ASSESSMENT      Enrollment  Primary Care Care Coordination Status: Declined    Discharge Assessment  How are you doing now that you are home?: \"Doing a lot better\"  How are your symptoms? (Red Flag symptoms escalate to triage hotline per guidelines): Improved  Do you feel your condition is stable enough to be safe at home until your provider visit?: Yes  Does the patient have their discharge instructions? : Yes  Does the patient have questions regarding their discharge instructions? : No  Were you started on any new medications or were there changes to any of your previous medications? : Yes  Does the patient have all of their medications?: Yes  Do you have questions regarding any of your medications? : No  Discharge follow-up appointment scheduled within 14 calendar days? : No  Is patient agreeable to assistance with scheduling? : No    Post-op (W CTA Only)  If the patient had a surgery or procedure, do they have any questions for a " nurse?: No    PLAN                                                      Outpatient Plan:    RETURN TO THE EMERGENCY ROOM FOR THE FOLLOWING:  Severely worsened breathing, vomiting and dehydration, fainting, or at  anytime for any concern.  FOLLOW UP:  Follow-up with your primary clinic in 2 weeks for reevaluation. He will  need a follow-up x-ray.  TREATMENT RECOMMENDATIONS:  Levaquin 750 mg once daily for 10 days. Your first dose was given in  the ED today. You can start the prescription tomorrow.  Prednisone 20 mg twice a day for 5 days. You were given a dose of 40  mg in the ED today. You can start the prescription tomorrow.  Ibuprofen 600 mg every 6 hours as needed for pain. Consider Norco 1  to 2 tablets every 8 hours as needed for pain if not relieved by the  above.  NURSE ADVICE LINE:  [phone] or [phone]    Future Appointments   Date Time Provider Department Center   8/8/2022  1:00 PM Aimee Nava PA-C [facility]         For any urgent concerns, please contact our 24 hour nurse triage line: [phone] ([phone])         AZALIA Sapp  [phone]  [facility]

## 2022-06-08 ENCOUNTER — OFFICE VISIT (OUTPATIENT)
Dept: FAMILY MEDICINE | Facility: CLINIC | Age: 57
End: 2022-06-08
Payer: COMMERCIAL

## 2022-06-08 VITALS
HEIGHT: 63 IN | SYSTOLIC BLOOD PRESSURE: 128 MMHG | WEIGHT: 165 LBS | BODY MASS INDEX: 29.23 KG/M2 | OXYGEN SATURATION: 97 % | TEMPERATURE: 97.9 F | DIASTOLIC BLOOD PRESSURE: 80 MMHG | HEART RATE: 81 BPM | RESPIRATION RATE: 16 BRPM

## 2022-06-08 DIAGNOSIS — F33.40 RECURRENT MAJOR DEPRESSIVE DISORDER, IN REMISSION (H): ICD-10-CM

## 2022-06-08 DIAGNOSIS — J44.1 COPD EXACERBATION (H): ICD-10-CM

## 2022-06-08 DIAGNOSIS — Z09 FOLLOW UP: Primary | ICD-10-CM

## 2022-06-08 DIAGNOSIS — Z12.31 VISIT FOR SCREENING MAMMOGRAM: ICD-10-CM

## 2022-06-08 DIAGNOSIS — I10 BENIGN ESSENTIAL HYPERTENSION: ICD-10-CM

## 2022-06-08 PROCEDURE — 99214 OFFICE O/P EST MOD 30 MIN: CPT | Performed by: FAMILY MEDICINE

## 2022-06-08 RX ORDER — NEOMYCIN SULFATE, POLYMYXIN B SULFATE AND DEXAMETHASONE 3.5; 10000; 1 MG/ML; [USP'U]/ML; MG/ML
1 SUSPENSION/ DROPS OPHTHALMIC 3 TIMES DAILY
Status: ON HOLD | COMMUNITY
Start: 2022-05-20 | End: 2022-10-14

## 2022-06-08 RX ORDER — MULTIVITAMIN
1 TABLET ORAL DAILY
COMMUNITY
Start: 2022-06-08 | End: 2022-10-27

## 2022-06-08 ASSESSMENT — ENCOUNTER SYMPTOMS
PSYCHIATRIC NEGATIVE: 1
HEMATOLOGIC/LYMPHATIC NEGATIVE: 1
ENDOCRINE NEGATIVE: 1
GASTROINTESTINAL NEGATIVE: 1
NEUROLOGICAL NEGATIVE: 1
CARDIOVASCULAR NEGATIVE: 1
EYES NEGATIVE: 1
COUGH: 1
CONSTITUTIONAL NEGATIVE: 1
MUSCULOSKELETAL NEGATIVE: 1
ALLERGIC/IMMUNOLOGIC NEGATIVE: 1

## 2022-06-08 ASSESSMENT — PATIENT HEALTH QUESTIONNAIRE - PHQ9
SUM OF ALL RESPONSES TO PHQ QUESTIONS 1-9: 5
SUM OF ALL RESPONSES TO PHQ QUESTIONS 1-9: 5
10. IF YOU CHECKED OFF ANY PROBLEMS, HOW DIFFICULT HAVE THESE PROBLEMS MADE IT FOR YOU TO DO YOUR WORK, TAKE CARE OF THINGS AT HOME, OR GET ALONG WITH OTHER PEOPLE: SOMEWHAT DIFFICULT

## 2022-06-08 NOTE — PROGRESS NOTES
Assessment & Plan     Follow up  57 yr old female here for an ER follow up.   Patient was diagnosed with pneumonia. She is doing much better. She has completed her antibiotics.     Visit for screening mammogram  Mammogram ordered.  - MA SCREENING DIGITAL BILAT - Future  (s+30)    COPD exacerbation (H)  resolved    Recurrent major depressive disorder, in remission (H)  Still struggling - her PHQ 9 scores high and she answered yes to suicidal thoughts. I discussed this with her and she says she is not suicidal. She is on Fluoxetine. Her depression is more situational.    Benign essential hypertension  Patient says she stopped her medication for two weeks and during that two week period she was not taking her blood pressure and her blood pressure was within normal limits. I discontinued the hydrochlorothiazide and asked that she continue to monitor the blood pressure.           Depression Screening Follow Up    PHQ 6/8/2022   PHQ-9 Total Score 5   Q9: Thoughts of better off dead/self-harm past 2 weeks Several days   F/U: Thoughts of suicide or self-harm No   F/U: Safety concerns No     Last PHQ-9 6/8/2022   1.  Little interest or pleasure in doing things 0   2.  Feeling down, depressed, or hopeless 0   3.  Trouble falling or staying asleep, or sleeping too much 0   4.  Feeling tired or having little energy 1   5.  Poor appetite or overeating 0   6.  Feeling bad about yourself 1   7.  Trouble concentrating 1   8.  Moving slowly or restless 1   Q9: Thoughts of better off dead/self-harm past 2 weeks 1   PHQ-9 Total Score 5   In the past two weeks have you had thoughts of suicide or self harm? No   Do you have concerns about your personal safety or the safety of others? No         No flowsheet data found.      Follow Up      Follow Up Actions Taken  Crisis resource information provided in the After Visit Summary    Discussed the following ways the patient can remain in a safe environment:  remove alcohol, remove drugs  "and be around others  FUTURE APPOINTMENTS:       - Follow-up visit in one month    Return in about 4 weeks (around 7/6/2022).    Julia Oneill MD  [facility]ueroa is a 57 year old who presents for the following health issues  accompanied by her self.    HPI   Patient is a 57-year-old female here for ER follow-up.  She was seen for shortness of breath cough fatigue and diagnosed with left upper lobe pneumonia.  She reports she is feeling better cough is minimal.  Denies any fevers or chills.  She has completed antibiotics.  She wanted to discuss her medications and wants to know If she can go off some of her medications. She states that she was without her blood pressure medication for a few weeks and she noticed her blood pressure was staying stable. She is presently on hydrochlorothiazide and amlodipine.  [id number]}  Post Discharge Outreach 5/26/2022   Admission Date 5/24/2022   Reason for Admission Community acquired pneumonia of left lung, unspecified part of lung   Discharge Date 5/24/2022   Discharge Diagnosis Community acquired pneumonia of left lung, unspecified part of lung   How are you doing now that you are home? \"Doing a lot better\"   How are your symptoms? (Red Flag symptoms escalate to triage hotline per guidelines) Improved   Do you feel your condition is stable enough to be safe at home until your provider visit? Yes   Does the patient have their discharge instructions?  Yes   Does the patient have questions regarding their discharge instructions?  No   Were you started on any new medications or were there changes to any of your previous medications?  Yes   Does the patient have all of their medications? Yes   Do you have questions regarding any of your medications?  No   Discharge follow-up appointment scheduled within 14 calendar days?  No     Chief Complaint   Patient presents with     ER F/U     ER follow up-5-24-22.     Ear Drainage     She has " "noticed clear drainage from the left ear off and on for the past 3 years.  This has been since she starting sleeping on the left side.  Has noticed the ear canal is smaller than the right side.  Not sure why that is like that.     Medication Question     She is wanting to see if she can stop any of her medications if they are no longer needed.       Health Maintenance     Mammogram order is placed.       ED/UC Followup:    Facility:  [facility]  Date of visit: 5-24-22  Reason for visit: ER NOTE IS COPIED BELOW:  Community acquired pneumonia, left lung.  HPI   The patient is a 57-year-old female presenting with cough and fatigue.  Symptoms began on Sunday, 2 days ago.  She says, \"I felt like I got hit by a truck.\"  She has had a cough with congestion since that time.  She has had chills and then sweats.  She has myalgia.  She has a headache.  No nausea or vomiting.  No diarrhea.  She feels mildly short of breath.  No chest pain.  No leg pain or swelling.  No reflux.  No other known sick contacts.  No recent travel.        Current Status: She was prescribed Levofloxacin 750 mg daily.  Prednisone 20 mg BID and Hydrocodone as needed.  That helped her with the sleeping.    She is feeling better.  A little bit of phlegm at times that is now clear.  Not much for a cough.  She did stop taking all of her medications for 10 days as she didn't want them to interfere with the antibiotic.   States she is to have a chest x-ray today.            Review of Systems   Constitutional: Negative.    HENT: Negative.    Eyes: Negative.    Respiratory: Positive for cough.    Cardiovascular: Negative.    Gastrointestinal: Negative.    Endocrine: Negative.    Breasts:  negative.    Genitourinary: Negative.    Musculoskeletal: Negative.    Allergic/Immunologic: Negative.    Neurological: Negative.    Hematological: Negative.    Psychiatric/Behavioral: Negative.             Objective    /80   Pulse 81   Temp 97.9  F " "(36.6  C) (Tympanic)   Resp 16   Ht 1.6 m (5' 3\")   Wt 74.8 kg (165 lb)   SpO2 97%   BMI 29.23 kg/m    Body mass index is 29.23 kg/m .  Physical Exam  Constitutional:       Appearance: Normal appearance.   HENT:      Head: Normocephalic and atraumatic.      Right Ear: Tympanic membrane normal.      Left Ear: Tympanic membrane normal.      Mouth/Throat:      Mouth: Mucous membranes are moist.   Eyes:      Pupils: Pupils are equal, round, and reactive to light.   Cardiovascular:      Rate and Rhythm: Normal rate and regular rhythm.   Musculoskeletal:      Cervical back: Normal range of motion.   Skin:     General: Skin is warm and dry.   Neurological:      Mental Status: She is alert and oriented to person, place, and time.   Psychiatric:         Mood and Affect: Mood normal.         Behavior: Behavior normal.                  Answers for HPI/ROS submitted by the patient on 6/8/2022  If you checked off any problems, how difficult have these problems made it for you to do your work, take care of things at home, or get along with other people?: Somewhat difficult  PHQ9 TOTAL SCORE: 5      "

## 2022-06-21 ENCOUNTER — HOSPITAL ENCOUNTER (OUTPATIENT)
Dept: MAMMOGRAPHY | Facility: CLINIC | Age: 57
Discharge: HOME | End: 2022-06-21
Attending: FAMILY MEDICINE | Admitting: FAMILY MEDICINE
Payer: COMMERCIAL

## 2022-06-21 DIAGNOSIS — Z12.31 VISIT FOR SCREENING MAMMOGRAM: ICD-10-CM

## 2022-06-21 PROCEDURE — 77067 SCR MAMMO BI INCL CAD: CPT

## 2022-07-21 ENCOUNTER — TELEPHONE (OUTPATIENT)
Dept: GASTROENTEROLOGY | Facility: CLINIC | Age: 57
End: 2022-07-21

## 2022-07-21 DIAGNOSIS — R19.5 LOOSE STOOLS: ICD-10-CM

## 2022-07-21 NOTE — TELEPHONE ENCOUNTER
LVM to german appt with Elijah on 8/8    Schedule pt with Sameera Flaherty PA-C in person only return pt next avail     Left L pod #

## 2022-07-25 NOTE — TELEPHONE ENCOUNTER
loperamide (IMODIUM A-D) 2 MG tablet      Last Written Prescription Date:  2/25/2022  Last Fill Quantity: 90,   # refills: 2  Last Office Visit : 3/22/2022  Future Office visit:  None *8/8/2022 appointment being rescheduled by clinic    Routing refill request to provider for review/approval because:  Medication not on the refill protocol.

## 2022-07-26 RX ORDER — LOPERAMIDE HYDROCHLORIDE 2 MG/1
2 TABLET ORAL 4 TIMES DAILY PRN
Qty: 90 TABLET | Refills: 0 | Status: SHIPPED | OUTPATIENT
Start: 2022-07-26 | End: 2022-08-30

## 2022-08-09 ENCOUNTER — OFFICE VISIT (OUTPATIENT)
Dept: GASTROENTEROLOGY | Facility: CLINIC | Age: 57
End: 2022-08-09
Payer: COMMERCIAL

## 2022-08-09 VITALS
WEIGHT: 162.9 LBS | BODY MASS INDEX: 28.86 KG/M2 | OXYGEN SATURATION: 98 % | SYSTOLIC BLOOD PRESSURE: 141 MMHG | DIASTOLIC BLOOD PRESSURE: 84 MMHG | HEART RATE: 81 BPM

## 2022-08-09 DIAGNOSIS — K58.0 IRRITABLE BOWEL SYNDROME WITH DIARRHEA: Primary | ICD-10-CM

## 2022-08-09 DIAGNOSIS — R14.0 BLOATING: ICD-10-CM

## 2022-08-09 PROCEDURE — 99215 OFFICE O/P EST HI 40 MIN: CPT | Performed by: DIETITIAN, REGISTERED

## 2022-08-09 NOTE — LETTER
8/9/2022         RE: Carolina Hernandez  [address] [place] [id number]        Dear Colleague,    Thank you for referring your patient, Carolina Hernandez, to the [facility] [place]. Please see a copy of my visit note below.    GI CLINIC VISIT    CC/REFERRING MD:  Julia Oneill  REASON FOR CONSULTATION: loose stools     ASSESSMENT/PLAN:  Carolina Hernandez is a 57 year old woman with a past medical history of tobacco abuse, asthma, anxiety, depression presenting for evaluation of chronic diarrhea.     1.  Chronic diarrhea, chronic bloating  Patient reports about 3 years of diarrhea without any clear trigger.  Has had unremarkable colonoscopy with random biopsies for microscopic colitis.  She has had C. difficile associated with previous antibiotic use, though repeat stool studies have been negative for C. difficile.  Has also had negative chronic infectious stool studies, negative pancreatic elatastase, unremarkable basic labs, celiac serologies, lactose breath test. Empiric treatment with rifaximin was not helpful. Given continued bloating despite improvement in stool consistency and empiric rifaxamin, a CT AP was done without space occupying lesions. EGD 5/5/22 with 3 cm hiatal hernia and reactive gastropathy on histology, otherwise unremarkable. Biopsy negative for celiac disease and H. Pylori.     She reports that in her late 20s she had an ulcer and she has had PPI since this time.     Possible sources of symptoms include medication side effect (with hydrochlorothiazide, fluoxetine), functional loose stools, small intestinal bacterial overgrowth (less likely as she had no improvement with rifaximin).  Overall symptoms have significantly improved with increased fiber and imodium, feels she has found a good regimen with this. Should continue at this time. Her only concern today is flatus with this regimen, feels fiber has caused this but she would like  to continue her current brand/amount. Discussed adding probiotics via yogurt and kefir. Has tried simethicone without improvement.    Does not have any red flag symptoms to warrant repeat colonoscopy at this time, can consider colonoscopy or upper endoscopy pending clinical picture.      Most bothersome symptom today is bloating- she has met with our GI dietitian without improvement in symptoms. Future considerations include SIBO and fructose breath testing. Could also consider meeting with GI Health Psychology, Valerie Torres.  Discussed options with patient. She feels like she has a good regimen now to control symptoms. Would like to continue this for now and if symptoms worsen consider breath testing.  -- Continue two tabs Imodium each morning  -- Continue 2 Tbs of psyllium daily  -- Keep track of any foods that may cause you to have more symptoms.  -- Try kefir and yogurt and probiotics  -- Avoid NSAIDS    Colorectal cancer screenin    [facility] 6 months    Thank you for this consultation.  It was a pleasure to participate in the care of this patient; please contact us with any further questions.     60 Minutes was spent on the date of the encounter during chart review, history and exam, documentation, and further activities as noted     Note completed using voice recognition software. Some word and grammatical errors may occur.      Sameera Flaherty PA-C  Division of Gastroenterology, Hepatology & Nutrition  [facility]      HPI  Carolina Hernandez is a 57 year old woman with a past medical history of tobacco abuse, asthma, anxiety, depression presenting for follow-up for chronic diarrhea and abdominal bloating. She was previously seen by Aimee Nava PA-C. Her last visit to GI clinic was 3/22/22.    At initial visit she reported a three year history of watery diarrhea. Has not been able to identify any specific triggers for her symptoms.  She describes 10+ bowel movements a day that are clustered  "the first couple of hours that she is awake.Sometimes will continue throughout the day. Consistency is variable.  Symptoms seem to worsen when she has fluids such as water and coffee. Will have urgency with bowel movements.  Can have blood with hemorrhoids. No nocturnal bowel movements. Has had incontinence with bowel movements. Sometimes abdominal pain (gas pain, rare cramping) but not usually.     Within the last 6 months, she has been having more issues with hemmorhoids. She does sometimes strain to have a bowel movement (when she feels a \"pressure from within\"). Was seen by colorectal surgery by Dr. Lane and was told to take metamucil which has helped but she continues to have symptoms (2 rounded tablespoons a day). Since starting fiber they float and and look like \"algae\". Most of the time it is liquid. She also reports increased gas and bloating with this.     She has tried adding more fiber to her diet with spinach, bananas, whole grains and vegetables. She is allergic to a lot of fresh fruits. Raw onions can worsen symptoms, peas and legumes can worsen symptoms.     Previous workup includes:  2019:  - c diff negative   2017:   -colonoscopy- unremarkable, negative biopsies for MC  -Stool studies- negative for giardia/crypto, enteric panel, Ova and parasite  -labs: normal TSH, CBC, hepatic panel, celiac serologies    -negative lactose breath testing   -trial of rifaximin with continued symptoms    -CT AP unremarkable   - fecal elastase within normal limits   - EGD 5/5/22 with 3 cm hiatal hernia and reactive gastropathy on histology, otherwise unremarkable. Biopsy negative for celiac disease and H. Pylori.    Interval History 3/22/22:  She has gained 4 lbs in the last couple of weeks, and she reports difficulty going for walks due to her knees. She has trouble breathing when she is eating. She feels bloating around the sides and after eating a little bit she will feel full. No nausea or vomiting. No GERD. "     Diarrhea well controlled with fiber and imodium- 2-3 pills and she is doing well. She is having a bowel movement every day and it is solid. She is trying to drink water, eat healthy. No constipation.     Interval History 8/9/22:  Pt reports she continues to take psyllium husk (generic brand with natural sugar) 2 Tbs every morning. She also takes 2 Immodium tabs every morning. She reports she tried taking 3 Immodium but that was too much - feels she has found a good balance with current regimen.    She is having 2 stools in the morning, will occasionally have another in evening. Typically Sandoval 4, occasionally Sandoval 6.  No urgency, no incontinence, no night-time awakening.  She had one day of blood in stools after taking 4 tabs of Ibuprophen every 6 hours for 2 days (d/t severe knee pain). She stopped Ibuprophen and this stopped. Now taking tylenol as needed.     She continues to have some bloating, but reports this is better. She has flatus which is sometimes bothersome when she is out with friends. Feels flatus is related to fiber. No early satiety. No abdominal pain. No GERD.    She has gained 20 lb in past year due to not being able to be as active d/t knees. Has surgeon at [facility] and plans to pursue surgery soon.    Diet - tried eliminated lactose, didn't help. No artificial sweeteners. Trouble with teeth and chewing.  Breakfast - english muffin with PB and carnation instant breakfast, breakfast burrito  Lunch - sandwich with oat nut bread, turkey breast, aguilera orTaco salad. Glass of milk with each meal.  Dinner - Steak with steamed broccoli, red potatoes  Drinking - water, milk    PROBLEM LIST  Patient Active Problem List    Diagnosis Date Noted     COPD exacerbation (H) 06/08/2022     Priority: Medium     Low back pain due to bilateral sciatica 10/27/2021     Priority: Medium     Primary osteoarthritis of both knees 09/23/2021     Priority: Medium     Essential hypertension 07/26/2019     Priority: Medium      New diagnosis 7/26/2019         Recurrent major depressive disorder, in remission (H) 01/17/2019     Priority: Medium     Encounter for tobacco use cessation counseling 01/17/2019     Priority: Medium     Mild intermittent asthma without complication 01/17/2019     Priority: Medium     CARDIOVASCULAR SCREENING; LDL GOAL LESS THAN 160 10/31/2010     Priority: Medium       PERTINENT PAST MEDICAL HISTORY:  Depression  Anxiety  Osteoarthritis   Asthma     PREVIOUS SURGERIES:  No GI surgery     PREVIOUS ENDOSCOPY:  Colonoscopy in 2017 through care everywhere:  Impression  - Diverticulosis in the sigmoid colon.  - Non-bleeding internal hemorrhoids.  - The examined portion of the ileum was normal.  - The entire examined colon is normal. Biopsied.  - The examination was otherwise normal on direct and retroflexion views.    Final Pathologic Diagnosis   Colon, random, biopsy -- No significant pathologic change     EGD 5/5/22  Impression:    - Normal esophagus.    - Z-line regular, 36 cm from the incisors.    - 3 cm hiatal hernia.   - Erythematous mucosa in the antrum. Biopsied.    - Normal duodenal bulb, first portion of the duodenum,  second portion of the duodenum and examined duodenum.  Biopsied.  A(1). DUODENUM, BIOPSY:  - Unremarkable duodenal mucosa  - No evidence of celiac disease    B(2). GASTRIC, ANTRUM, INCSIURA, BIOPSY:  - Reactive gastropathy  - No H. pylori-like organisms identified on routine staining  - No intestinal metaplasia identified    C(3). GASTRIC, BODY, BIOPSY:  - Unremarkable body-type mucosa  - No H. pylori-like organisms identified on routine staining  - No intestinal metaplasia identified    ALLERGIES:  Allergies   Allergen Reactions     Doxycycline Difficulty breathing     Penicillins Itching       PERTINENT MEDICATIONS:    Current Outpatient Medications:      albuterol (PROVENTIL) (2.5 MG/3ML) 0.083% neb solution, inhale 1 vial (2.5 mg) by nebulization every 4 hours as needed for shortness of  breath / dyspnea or wheezing, Disp: 225 mL, Rfl: 2     albuterol (VENTOLIN HFA) 108 (90 Base) MCG/ACT inhaler, INHALE 1-2 PUFFS INTO THE LUNGS EVERY 4 HOURS AS NEEDED FOR SHORTNESS OF BREATH/DYSPNEA OR WHEEZING ., Disp: 18 g, Rfl: 11     amLODIPine (NORVASC) 2.5 MG tablet, Take 2 tablets (5 mg) by mouth daily., Disp: 180 tablet, Rfl: 0     cetirizine (ZYRTEC) 10 MG tablet, Take 10 mg by mouth daily, Disp: , Rfl:      FLUoxetine (PROZAC) 20 MG capsule, Take 1 capsule (20 mg) by mouth daily, Disp: 90 capsule, Rfl: 3     FLUoxetine (PROZAC) 40 MG capsule, Take 1 capsule (40 mg) by mouth daily Take with 20 mg tab for a total of 60 mg daily, Disp: 90 capsule, Rfl: 3     fluticasone-salmeterol (ADVAIR) 500-50 MCG/DOSE inhaler, Inhale 1 puff into the lungs every 12 hours Please prescribe the generic ( Wixela ), Disp: 3 each, Rfl: 3     loperamide (IMODIUM A-D) 2 MG tablet, Take 1 tablet (2 mg) by mouth 4 times daily as needed for diarrhea, Disp: 90 tablet, Rfl: 0     montelukast (SINGULAIR) 10 MG tablet, Take 1 tablet (10 mg) by mouth At Bedtime, Disp: 90 tablet, Rfl: 3     multivitamin (ONE-DAILY) tablet, Take 1 tablet by mouth daily, Disp: , Rfl:      neomycin-polymyxin-dexamethasone (MAXITROL) 3.5-34206-2.1 SUSP ophthalmic susp, Place 1 drop into both eyes 3 times daily, Disp: , Rfl:      olopatadine (PATANOL) 0.1 % ophthalmic solution, Place 1 drop into both eyes 2 times daily for 5 days, Disp: , Rfl:      Omega-3 Fatty Acids (FISH OIL) 1200 MG capsule, Take 1,200 mg by mouth daily , Disp: , Rfl:      omeprazole 20 MG tablet, Take 1 tablet (20 mg) by mouth daily, Disp: 90 tablet, Rfl: 3     Psyllium Husk 100 % POWD, Taking daily., Disp: , Rfl:     Current Facility-Administered Medications:      bupivacaine (MARCAINE) 0.25 % injection 4 mL, 4 mL, , , Castillo, Giovanny Venancio, MD, 4 mL at 04/27/22 1428     bupivacaine (MARCAINE) 0.25 % injection 4 mL, 4 mL, , , Giovanny Chong MD, 4 mL at 04/27/22 1428     hylan (SYNVISC  ONE) injection 48 mg, 48 mg, , , Giovanny Chong MD, 48 mg at 22 1428     hylan (SYNVISC ONE) injection 48 mg, 48 mg, , , Giovanny Chong MD, 48 mg at 22 1428   No NSAIDs    SOCIAL HISTORY:  Will drink beer (4-5 beers a 3 times a week), symptoms are the same if she avoids drinking   She has quit smoking   Social History     Socioeconomic History     Marital status:      Spouse name: Not on file     Number of children: Not on file     Years of education: Not on file     Highest education level: Not on file   Occupational History     Not on file   Tobacco Use     Smoking status: Former Smoker     Packs/day: 0.00     Years: 23.00     Pack years: 0.00     Types: Cigarettes     Quit date: 2021     Years since quittin.0     Smokeless tobacco: Never Used     Tobacco comment: 6 cig a day    Vaping Use     Vaping Use: Never used   Substance and Sexual Activity     Alcohol use: Not Currently     Comment: 6 to 8 beers 4 times weekly      Drug use: No     Sexual activity: Not Currently   Other Topics Concern     Not on file   Social History Narrative     Not on file     Social Determinants of Health     Financial Resource Strain: Not on file   Food Insecurity: Not on file   Transportation Needs: Not on file   Physical Activity: Not on file   Stress: Not on file   Social Connections: Not on file   Intimate Partner Violence: Not on file   Housing Stability: Not on file       FAMILY HISTORY:  Family History   Problem Relation Age of Onset     Glaucoma Father      Coronary Artery Disease Father         stents     Cancer Maternal Grandfather      Coronary Artery Disease Paternal Grandfather      Schizophrenia Daughter      Diabetes Daughter      Cancer Daughter      Crohn's Disease No family hx of      Ulcerative Colitis No family hx of      GERD No family hx of      Stomach Cancer No family hx of        Past/family/social history reviewed and no changes    PHYSICAL EXAMINATION:  Vitals reviewed:  There were no vitals taken for this visit.  Wt:   Wt Readings from Last 2 Encounters:   06/08/22 74.8 kg (165 lb)   05/24/22 77.1 kg (170 lb)   General appearance: Healthy appearing adult, in no acute distress  Eyes: Sclera anicteric  Ears, nose, mouth and throat: No obvious external lesions of ears and nose, Hearing intact  Neck: Symmetric, No obvious external lesions  Respiratory: Normal respiration, no use of accessory muscles   Skin: No rashes or jaundice   Psychiatric: Oriented to person, place and time, Appropriate mood and affect.    PERTINENT STUDIES:    Office Visit on 11/20/2020   Component Date Value Ref Range Status     Cholesterol 11/20/2020 232* <200 mg/dL Final     Triglycerides 11/20/2020 232* <150 mg/dL Final     HDL Cholesterol 11/20/2020 98  >49 mg/dL Final     LDL Cholesterol Calculated 11/20/2020 88  <100 mg/dL Final     Non HDL Cholesterol 11/20/2020 134* <130 mg/dL Final     Sodium 11/20/2020 132* 133 - 144 mmol/L Final     Potassium 11/20/2020 3.3* 3.4 - 5.3 mmol/L Final     Chloride 11/20/2020 98  94 - 109 mmol/L Final     Carbon Dioxide 11/20/2020 28  20 - 32 mmol/L Final     Anion Gap 11/20/2020 6  3 - 14 mmol/L Final     Glucose 11/20/2020 98  70 - 99 mg/dL Final     Urea Nitrogen 11/20/2020 15  7 - 30 mg/dL Final     Creatinine 11/20/2020 0.81  0.52 - 1.04 mg/dL Final     GFR Estimate 11/20/2020 81  >60 mL/min/[1.73_m2] Final     GFR Estimate If Black 11/20/2020 >90  >60 mL/min/[1.73_m2] Final     Calcium 11/20/2020 9.8  8.5 - 10.1 mg/dL Final           Sincerely,    Sameera Flaherty PA-C

## 2022-08-09 NOTE — PATIENT INSTRUCTIONS
It was a pleasure taking care of you today.  I've included a brief summary of our discussion and care plan from today's visit below.  Please review this information with your primary care provider.  ______________________________________________________________________    My recommendations are summarized as follows:  -- Continue two tabs Imodium each morning  -- Continue 2 Tbs of psyllium daily  -- Keep track of any foods that may cause you to have more symptoms.  -- Try kefir and yogurt and probiotics  -- Avoid NSAIDs like Ibuprophen    -- please see scheduling information provided below     Return to GI Clinic in 6 months to review your progress.    ______________________________________________________________________    How do I schedule labs, imaging studies, or procedures that were ordered in clinic today?     Labs: To schedule lab appointment at the [facility], use my chart or call [phone]. If you have a [facility] lab closer to home where you are regularly seen you can give them a call.     Procedures: If a colonoscopy, upper endoscopy, breath test, esophageal manometry, or pH impedence was ordered today, our endoscopy team will call you to schedule this. If you have not heard from our endoscopy team within a week, please call [phone] to schedule.     Imaging Studies: If you were scheduled for a CT scan, X-ray, MRI, ultrasound, HIDA scan or other imaging study, please call [phone] to have this scheduled.     Referral: If a referral to another specialty was ordered, expect a phone call or follow instructions above. If you have not heard from anyone regarding your referral in a week, please call our clinic to check the status.     Who do I call with any questions after my visit?  Please be in touch if there are any further questions that arise following today's visit.  There are multiple ways to contact your gastroenterology care team.      During business hours, you may reach a  Gastroenterology nurse at [phone]    To schedule or reschedule an appointment, please call [phone].     You can always send a secure message through Call Loop.  Call Loop messages are answered by your nurse or doctor typically within 24 hours.  Please allow extra time on weekends and holidays.      For urgent/emergent questions after business hours, you may reach the on-call GI Fellow by contacting the [facility] at [phone].     How will I get the results of any tests ordered?    You will receive all of your results.  If you have signed up for [email or web address], any tests ordered at your visit will be available to you after your provider reviews them.  Typically this takes 1-2 weeks.  If there are urgent results that require a change in your care plan, your provider or nurse will call you to discuss the next steps.      What is Call Loop?  Call Loop is a secure way for you to access all of your healthcare records from the [facility].  It is a web based computer program, so you can sign on to it from any location.  It also allows you to send secure messages to your care team.  I recommend signing up for Call Loop access if you have not already done so and are comfortable with using a computer.      How to I schedule a follow-up visit?  If you did not schedule a follow-up visit today, please call [phone] to schedule a follow-up office visit.      Sincerely,    Sameera Flaherty PA-C  Division of Gastroenterology, Hepatology & Nutrition  [facility]

## 2022-08-09 NOTE — NURSING NOTE
Chief Complaint   Patient presents with     Follow Up       Vitals:    08/09/22 0844   BP: (!) 141/84   Pulse: 81   SpO2: 98%   Weight: 73.9 kg (162 lb 14.4 oz)       Body mass index is 28.86 kg/m .    Tamra Sherman CMA

## 2022-08-09 NOTE — PROGRESS NOTES
GI CLINIC VISIT    CC/REFERRING MD:  Julia Oneill  REASON FOR CONSULTATION: loose stools     ASSESSMENT/PLAN:  Carolina Hernandez is a 57 year old woman with a past medical history of tobacco abuse, asthma, anxiety, depression presenting for evaluation of chronic diarrhea.     1.  Chronic diarrhea, chronic bloating  Patient reports about 3 years of diarrhea without any clear trigger.  Has had unremarkable colonoscopy with random biopsies for microscopic colitis.  She has had C. difficile associated with previous antibiotic use, though repeat stool studies have been negative for C. difficile.  Has also had negative chronic infectious stool studies, negative pancreatic elatastase, unremarkable basic labs, celiac serologies, lactose breath test. Empiric treatment with rifaximin was not helpful. Given continued bloating despite improvement in stool consistency and empiric rifaxamin, a CT AP was done without space occupying lesions. EGD 5/5/22 with 3 cm hiatal hernia and reactive gastropathy on histology, otherwise unremarkable. Biopsy negative for celiac disease and H. Pylori.     She reports that in her late 20s she had an ulcer and she has had PPI since this time.     Possible sources of symptoms include medication side effect (with hydrochlorothiazide, fluoxetine), functional loose stools, small intestinal bacterial overgrowth (less likely as she had no improvement with rifaximin).  Overall symptoms have significantly improved with increased fiber and imodium, feels she has found a good regimen with this. Should continue at this time. Her only concern today is flatus with this regimen, feels fiber has caused this but she would like to continue her current brand/amount. Discussed adding probiotics via yogurt and kefir. Has tried simethicone without improvement.    Does not have any red flag symptoms to warrant repeat colonoscopy at this time, can consider colonoscopy or upper endoscopy pending clinical  picture.      Most bothersome symptom today is bloating- she has met with our GI dietitian without improvement in symptoms. Future considerations include SIBO and fructose breath testing. Could also consider meeting with GI Health Psychology, Valerie Torres.  Discussed options with patient. She feels like she has a good regimen now to control symptoms. Would like to continue this for now and if symptoms worsen consider breath testing.  -- Continue two tabs Imodium each morning  -- Continue 2 Tbs of psyllium daily  -- Keep track of any foods that may cause you to have more symptoms.  -- Try kefir and yogurt and probiotics  -- Avoid NSAIDS    Colorectal cancer screenin    [facility] 6 months    Thank you for this consultation.  It was a pleasure to participate in the care of this patient; please contact us with any further questions.     60 Minutes was spent on the date of the encounter during chart review, history and exam, documentation, and further activities as noted     Note completed using voice recognition software. Some word and grammatical errors may occur.      Sameera Flaherty PA-C  Division of Gastroenterology, Hepatology & Nutrition  [facility]  Carolina Hernandez is a 57 year old woman with a past medical history of tobacco abuse, asthma, anxiety, depression presenting for follow-up for chronic diarrhea and abdominal bloating. She was previously seen by Aimee Nava PA-C. Her last visit to GI clinic was 3/22/22.    At initial visit she reported a three year history of watery diarrhea. Has not been able to identify any specific triggers for her symptoms.  She describes 10+ bowel movements a day that are clustered the first couple of hours that she is awake.Sometimes will continue throughout the day. Consistency is variable.  Symptoms seem to worsen when she has fluids such as water and coffee. Will have urgency with bowel movements.  Can have blood with hemorrhoids. No nocturnal bowel  "movements. Has had incontinence with bowel movements. Sometimes abdominal pain (gas pain, rare cramping) but not usually.     Within the last 6 months, she has been having more issues with hemmorhoids. She does sometimes strain to have a bowel movement (when she feels a \"pressure from within\"). Was seen by colorectal surgery by Dr. Lane and was told to take metamucil which has helped but she continues to have symptoms (2 rounded tablespoons a day). Since starting fiber they float and and look like \"algae\". Most of the time it is liquid. She also reports increased gas and bloating with this.     She has tried adding more fiber to her diet with spinach, bananas, whole grains and vegetables. She is allergic to a lot of fresh fruits. Raw onions can worsen symptoms, peas and legumes can worsen symptoms.     Previous workup includes:  2019:  - c diff negative   2017:   -colonoscopy- unremarkable, negative biopsies for MC  -Stool studies- negative for giardia/crypto, enteric panel, Ova and parasite  -labs: normal TSH, CBC, hepatic panel, celiac serologies    -negative lactose breath testing   -trial of rifaximin with continued symptoms    -CT AP unremarkable   - fecal elastase within normal limits   - EGD 5/5/22 with 3 cm hiatal hernia and reactive gastropathy on histology, otherwise unremarkable. Biopsy negative for celiac disease and H. Pylori.    Interval History 3/22/22:  She has gained 4 lbs in the last couple of weeks, and she reports difficulty going for walks due to her knees. She has trouble breathing when she is eating. She feels bloating around the sides and after eating a little bit she will feel full. No nausea or vomiting. No GERD.     Diarrhea well controlled with fiber and imodium- 2-3 pills and she is doing well. She is having a bowel movement every day and it is solid. She is trying to drink water, eat healthy. No constipation.     Interval History 8/9/22:  Pt reports she continues to take psyllium husk " (generic brand with natural sugar) 2 Tbs every morning. She also takes 2 Immodium tabs every morning. She reports she tried taking 3 Immodium but that was too much - feels she has found a good balance with current regimen.    She is having 2 stools in the morning, will occasionally have another in evening. Typically Appling 4, occasionally Appling 6.  No urgency, no incontinence, no night-time awakening.  She had one day of blood in stools after taking 4 tabs of Ibuprophen every 6 hours for 2 days (d/t severe knee pain). She stopped Ibuprophen and this stopped. Now taking tylenol as needed.     She continues to have some bloating, but reports this is better. She has flatus which is sometimes bothersome when she is out with friends. Feels flatus is related to fiber. No early satiety. No abdominal pain. No GERD.    She has gained 20 lb in past year due to not being able to be as active d/t knees. Has surgeon at [facility] and plans to pursue surgery soon.    Diet - tried eliminated lactose, didn't help. No artificial sweeteners. Trouble with teeth and chewing.  Breakfast - english muffin with PB and carnation instant breakfast, breakfast burrito  Lunch - sandwich with oat nut bread, turkey breast, aguilera orTaco salad. Glass of milk with each meal.  Dinner - Steak with steamed broccoli, red potatoes  Drinking - water, milk    PROBLEM LIST  Patient Active Problem List    Diagnosis Date Noted     COPD exacerbation (H) 06/08/2022     Priority: Medium     Low back pain due to bilateral sciatica 10/27/2021     Priority: Medium     Primary osteoarthritis of both knees 09/23/2021     Priority: Medium     Essential hypertension 07/26/2019     Priority: Medium     New diagnosis 7/26/2019         Recurrent major depressive disorder, in remission (H) 01/17/2019     Priority: Medium     Encounter for tobacco use cessation counseling 01/17/2019     Priority: Medium     Mild intermittent asthma without complication 01/17/2019     Priority:  Medium     CARDIOVASCULAR SCREENING; LDL GOAL LESS THAN 160 10/31/2010     Priority: Medium       PERTINENT PAST MEDICAL HISTORY:  Depression  Anxiety  Osteoarthritis   Asthma     PREVIOUS SURGERIES:  No GI surgery     PREVIOUS ENDOSCOPY:  Colonoscopy in 2017 through care everywhere:  Impression  - Diverticulosis in the sigmoid colon.  - Non-bleeding internal hemorrhoids.  - The examined portion of the ileum was normal.  - The entire examined colon is normal. Biopsied.  - The examination was otherwise normal on direct and retroflexion views.    Final Pathologic Diagnosis   Colon, random, biopsy -- No significant pathologic change     EGD 5/5/22  Impression:    - Normal esophagus.    - Z-line regular, 36 cm from the incisors.    - 3 cm hiatal hernia.   - Erythematous mucosa in the antrum. Biopsied.    - Normal duodenal bulb, first portion of the duodenum,  second portion of the duodenum and examined duodenum.  Biopsied.  A(1). DUODENUM, BIOPSY:  - Unremarkable duodenal mucosa  - No evidence of celiac disease    B(2). GASTRIC, ANTRUM, INCSIURA, BIOPSY:  - Reactive gastropathy  - No H. pylori-like organisms identified on routine staining  - No intestinal metaplasia identified    C(3). GASTRIC, BODY, BIOPSY:  - Unremarkable body-type mucosa  - No H. pylori-like organisms identified on routine staining  - No intestinal metaplasia identified    ALLERGIES:  Allergies   Allergen Reactions     Doxycycline Difficulty breathing     Penicillins Itching       PERTINENT MEDICATIONS:    Current Outpatient Medications:      albuterol (PROVENTIL) (2.5 MG/3ML) 0.083% neb solution, inhale 1 vial (2.5 mg) by nebulization every 4 hours as needed for shortness of breath / dyspnea or wheezing, Disp: 225 mL, Rfl: 2     albuterol (VENTOLIN HFA) 108 (90 Base) MCG/ACT inhaler, INHALE 1-2 PUFFS INTO THE LUNGS EVERY 4 HOURS AS NEEDED FOR SHORTNESS OF BREATH/DYSPNEA OR WHEEZING ., Disp: 18 g, Rfl: 11     amLODIPine (NORVASC) 2.5 MG tablet, Take  2 tablets (5 mg) by mouth daily., Disp: 180 tablet, Rfl: 0     cetirizine (ZYRTEC) 10 MG tablet, Take 10 mg by mouth daily, Disp: , Rfl:      FLUoxetine (PROZAC) 20 MG capsule, Take 1 capsule (20 mg) by mouth daily, Disp: 90 capsule, Rfl: 3     FLUoxetine (PROZAC) 40 MG capsule, Take 1 capsule (40 mg) by mouth daily Take with 20 mg tab for a total of 60 mg daily, Disp: 90 capsule, Rfl: 3     fluticasone-salmeterol (ADVAIR) 500-50 MCG/DOSE inhaler, Inhale 1 puff into the lungs every 12 hours Please prescribe the generic ( Wixela ), Disp: 3 each, Rfl: 3     loperamide (IMODIUM A-D) 2 MG tablet, Take 1 tablet (2 mg) by mouth 4 times daily as needed for diarrhea, Disp: 90 tablet, Rfl: 0     montelukast (SINGULAIR) 10 MG tablet, Take 1 tablet (10 mg) by mouth At Bedtime, Disp: 90 tablet, Rfl: 3     multivitamin (ONE-DAILY) tablet, Take 1 tablet by mouth daily, Disp: , Rfl:      neomycin-polymyxin-dexamethasone (MAXITROL) 3.5-38893-2.1 SUSP ophthalmic susp, Place 1 drop into both eyes 3 times daily, Disp: , Rfl:      olopatadine (PATANOL) 0.1 % ophthalmic solution, Place 1 drop into both eyes 2 times daily for 5 days, Disp: , Rfl:      Omega-3 Fatty Acids (FISH OIL) 1200 MG capsule, Take 1,200 mg by mouth daily , Disp: , Rfl:      omeprazole 20 MG tablet, Take 1 tablet (20 mg) by mouth daily, Disp: 90 tablet, Rfl: 3     Psyllium Husk 100 % POWD, Taking daily., Disp: , Rfl:     Current Facility-Administered Medications:      bupivacaine (MARCAINE) 0.25 % injection 4 mL, 4 mL, , , Giovanny Chong MD, 4 mL at 04/27/22 1428     bupivacaine (MARCAINE) 0.25 % injection 4 mL, 4 mL, , , Giovanny Chong MD, 4 mL at 04/27/22 1428     hylan (SYNVISC ONE) injection 48 mg, 48 mg, , , Giovanny Chong MD, 48 mg at 04/27/22 1428     hylan (SYNVISC ONE) injection 48 mg, 48 mg, , , Giovanny Chong MD, 48 mg at 04/27/22 1428   No NSAIDs    SOCIAL HISTORY:  Will drink beer (4-5 beers a 3 times a week), symptoms are the same if  she avoids drinking   She has quit smoking   Social History     Socioeconomic History     Marital status:      Spouse name: Not on file     Number of children: Not on file     Years of education: Not on file     Highest education level: Not on file   Occupational History     Not on file   Tobacco Use     Smoking status: Former Smoker     Packs/day: 0.00     Years: 23.00     Pack years: 0.00     Types: Cigarettes     Quit date: 2021     Years since quittin.0     Smokeless tobacco: Never Used     Tobacco comment: 6 cig a day    Vaping Use     Vaping Use: Never used   Substance and Sexual Activity     Alcohol use: Not Currently     Comment: 6 to 8 beers 4 times weekly      Drug use: No     Sexual activity: Not Currently   Other Topics Concern     Not on file   Social History Narrative     Not on file     Social Determinants of Health     Financial Resource Strain: Not on file   Food Insecurity: Not on file   Transportation Needs: Not on file   Physical Activity: Not on file   Stress: Not on file   Social Connections: Not on file   Intimate Partner Violence: Not on file   Housing Stability: Not on file       FAMILY HISTORY:  Family History   Problem Relation Age of Onset     Glaucoma Father      Coronary Artery Disease Father         stents     Cancer Maternal Grandfather      Coronary Artery Disease Paternal Grandfather      Schizophrenia Daughter      Diabetes Daughter      Cancer Daughter      Crohn's Disease No family hx of      Ulcerative Colitis No family hx of      GERD No family hx of      Stomach Cancer No family hx of        Past/family/social history reviewed and no changes    PHYSICAL EXAMINATION:  Vitals reviewed: There were no vitals taken for this visit.  Wt:   Wt Readings from Last 2 Encounters:   22 74.8 kg (165 lb)   22 77.1 kg (170 lb)   General appearance: Healthy appearing adult, in no acute distress  Eyes: Sclera anicteric  Ears, nose, mouth and throat: No obvious  external lesions of ears and nose, Hearing intact  Neck: Symmetric, No obvious external lesions  Respiratory: Normal respiration, no use of accessory muscles   Skin: No rashes or jaundice   Psychiatric: Oriented to person, place and time, Appropriate mood and affect.    PERTINENT STUDIES:    Office Visit on 11/20/2020   Component Date Value Ref Range Status     Cholesterol 11/20/2020 232* <200 mg/dL Final     Triglycerides 11/20/2020 232* <150 mg/dL Final     HDL Cholesterol 11/20/2020 98  >49 mg/dL Final     LDL Cholesterol Calculated 11/20/2020 88  <100 mg/dL Final     Non HDL Cholesterol 11/20/2020 134* <130 mg/dL Final     Sodium 11/20/2020 132* 133 - 144 mmol/L Final     Potassium 11/20/2020 3.3* 3.4 - 5.3 mmol/L Final     Chloride 11/20/2020 98  94 - 109 mmol/L Final     Carbon Dioxide 11/20/2020 28  20 - 32 mmol/L Final     Anion Gap 11/20/2020 6  3 - 14 mmol/L Final     Glucose 11/20/2020 98  70 - 99 mg/dL Final     Urea Nitrogen 11/20/2020 15  7 - 30 mg/dL Final     Creatinine 11/20/2020 0.81  0.52 - 1.04 mg/dL Final     GFR Estimate 11/20/2020 81  >60 mL/min/[1.73_m2] Final     GFR Estimate If Black 11/20/2020 >90  >60 mL/min/[1.73_m2] Final     Calcium 11/20/2020 9.8  8.5 - 10.1 mg/dL Final

## 2022-08-14 NOTE — PROGRESS NOTES
"CHIEF COMPLAINT:   Chief Complaint   Patient presents with     Left Knee - Pain     Right Knee - Pain     Pain is unbearable       HISTORY OF PRESENT ILLNESS    Carolina Hernandez is a 57 year old female seen for evaluation of ongoing bilateral knee pain with no known injury, right more than left, previously has been left more than right.   Pain has been present for 20 years. We saw her 4/27/2022 and received bilateral visco injections (synviscOne). She had previously received a cortisone injection with Dr Gaspar 8/2021 which helped to some extent for a few months. Returns today stating the visco injections didn't help at all and the pain is \"unbearable\".    Pain really progressed several months ago, pushing heavy wheelbarrows full of wet hay through the woods, cleaning out goat pens.    Locates pain along the inner aspect and front of the knees, and can radiate down the leg to the ankle, up the thigh.  Pain is worse with work, lifting/carrying things, gardening. Treatment has been occasional use of over the counter pain medications without relief, Had injections with cortisone 8/20/2021 (previously 2/2019), seemed to help, but can still feel some achiness. They help for a few months.     Pain at rest, pain at night better with injections. Sleeps with pillow between knees. Has tried tylenol arthritis and ibuprofen for pain but doesn't seem to really help.    Left knee injury at 10 years old.    Has chronic low back pain, denies numbness and tingling.    Present symptoms: pain medially  and anteriorly, pain dull/achy , mild pain.    Pain severity: 6/10  Frequency of symptoms: are constant  Exacerbating Factors: weight bearing, carrying and lifting things, gardening, kneeling, stairs  Relieving Factors: rest, ice  Night Pain: Yes  Pain while at rest: Yes   Numbness or tingling: No   Patient has tried:     NSAIDS: Yes , not recently in the past couple of years.     Physical Therapy: No      Activity modification: Yes "      Bracing: No      Injections: Yes 8/20/2021 cortisone, 4/27/2022 with SynviscOne.     Ice: Yes      Assistive device:  No     Other: tylenol. Topical ointments.      Other PMH:  has a past medical history of Asthma, C. difficile colitis, Depression, Depressive disorder (Have had it most of my life), Osteoarthritis, and Sinusitis, chronic.    She has no past medical history of Breast cancer (H), Malignant neoplasm of ovary (H), or Need for prophylactic hormone replacement therapy (postmenopausal).  Patient Active Problem List   Diagnosis     CARDIOVASCULAR SCREENING; LDL GOAL LESS THAN 160     Recurrent major depressive disorder, in remission (H)     Encounter for tobacco use cessation counseling     Mild intermittent asthma without complication     Essential hypertension     Primary osteoarthritis of both knees     Low back pain due to bilateral sciatica     COPD exacerbation (H)       Surgical Hx:  has a past surgical history that includes colonoscopy (2017) and Esophagoscopy, gastroscopy, duodenoscopy (EGD), combined (N/A, 05/05/2022).    Medications:   Current Outpatient Medications:      albuterol (PROVENTIL) (2.5 MG/3ML) 0.083% neb solution, inhale 1 vial (2.5 mg) by nebulization every 4 hours as needed for shortness of breath / dyspnea or wheezing, Disp: 225 mL, Rfl: 2     albuterol (VENTOLIN HFA) 108 (90 Base) MCG/ACT inhaler, INHALE 1-2 PUFFS INTO THE LUNGS EVERY 4 HOURS AS NEEDED FOR SHORTNESS OF BREATH/DYSPNEA OR WHEEZING ., Disp: 18 g, Rfl: 11     amLODIPine (NORVASC) 2.5 MG tablet, Take 2 tablets (5 mg) by mouth daily., Disp: 180 tablet, Rfl: 0     cetirizine (ZYRTEC) 10 MG tablet, Take 10 mg by mouth daily, Disp: , Rfl:      FLUoxetine (PROZAC) 20 MG capsule, Take 1 capsule (20 mg) by mouth daily, Disp: 90 capsule, Rfl: 3     FLUoxetine (PROZAC) 40 MG capsule, Take 1 capsule (40 mg) by mouth daily Take with 20 mg tab for a total of 60 mg daily, Disp: 90 capsule, Rfl: 3     fluticasone-salmeterol  (ADVAIR) 500-50 MCG/DOSE inhaler, Inhale 1 puff into the lungs every 12 hours Please prescribe the generic ( Wixela ), Disp: 3 each, Rfl: 3     loperamide (IMODIUM A-D) 2 MG tablet, Take 1 tablet (2 mg) by mouth 4 times daily as needed for diarrhea, Disp: 90 tablet, Rfl: 0     montelukast (SINGULAIR) 10 MG tablet, Take 1 tablet (10 mg) by mouth At Bedtime, Disp: 90 tablet, Rfl: 3     multivitamin (ONE-DAILY) tablet, Take 1 tablet by mouth daily, Disp: , Rfl:      neomycin-polymyxin-dexamethasone (MAXITROL) 3.5-22797-8.1 SUSP ophthalmic susp, Place 1 drop into both eyes 3 times daily, Disp: , Rfl:      olopatadine (PATANOL) 0.1 % ophthalmic solution, Place 1 drop into both eyes 2 times daily for 5 days, Disp: , Rfl:      Omega-3 Fatty Acids (FISH OIL) 1200 MG capsule, Take 1,200 mg by mouth daily , Disp: , Rfl:      omeprazole 20 MG tablet, Take 1 tablet (20 mg) by mouth daily, Disp: 90 tablet, Rfl: 3     Psyllium Husk 100 % POWD, Taking daily., Disp: , Rfl:     Current Facility-Administered Medications:      bupivacaine (MARCAINE) 0.25 % injection 4 mL, 4 mL, , , Giovanny Chong MD, 4 mL at 04/27/22 1428     bupivacaine (MARCAINE) 0.25 % injection 4 mL, 4 mL, , , Giovanny Chong MD, 4 mL at 04/27/22 1428     hylan (SYNVISC ONE) injection 48 mg, 48 mg, , , Giovanny Chong MD, 48 mg at 04/27/22 1428     hylan (SYNVISC ONE) injection 48 mg, 48 mg, , , Giovanny Chong MD, 48 mg at 04/27/22 1428    Allergies:   Allergies   Allergen Reactions     Doxycycline Difficulty breathing     Penicillins Itching       Social Hx: retired ().   reports that she quit smoking about 12 months ago. Her smoking use included cigarettes. She smoked 0.00 packs per day for 23.00 years. She has never used smokeless tobacco. She reports previous alcohol use. She reports that she does not use drugs.    Family Hx: family history includes Cancer in her daughter and maternal grandfather; Coronary Artery Disease in her  "father and paternal grandfather; Diabetes in her daughter; Glaucoma in her father; Schizophrenia in her daughter.    REVIEW OF SYSTEMS:   CONSTITUTIONAL:NEGATIVE for fever, chills, change in weight  INTEGUMENTARY/SKIN: NEGATIVE for worrisome rashes, moles or lesions  MUSCULOSKELETAL:See HPI above  NEURO: NEGATIVE for weakness, dizziness or paresthesias    PHYSICAL EXAM:  BP (!) 139/90   Pulse 82   Ht 1.6 m (5' 3\")   Wt 73 kg (161 lb)   BMI 28.52 kg/m     GENERAL APPEARANCE: healthy, alert, no distress  SKIN: no suspicious lesions or rashes  NEURO: Normal strength and tone, mentation intact and speech normal  PSYCH:  mentation appears normal and affect normal, not anxious  RESPIRATORY: No increased work of breathing.      BILATERAL LOWER EXTREMITIES:  Gait: favors the right   Alignment: varus  Intact sensation deep peroneal nerve, superficial peroneal nerve, med/lat tibial nerve, sural nerve, saphenous nerve  Intact EHL, EDL, TA, FHL, GS, quadriceps hamstrings and hip flexors  Bilateral calf soft and nttp or squeeze.  Edema: trace    LEFT KNEE EXAM:    Skin: intact, no ecchymosis or erythema  ROM: full extension to 125 flexion, discomfort with range of motion.  Tight hamstrings on straight leg raise.  Effusion: trace  Tender: medial joint line, pes, lateral joint line.    MCL: stable, and non-painful at both 0 and 30 degrees knee flexion  Varus stress: stable, and non-painful at both 0 and 30 degrees knee flexion  Lachmans: neg, firm endpoint  Posterior Drawer stable  Patellofemoral joint:                Apprehension: negative              Crepitations: mild   Grind: positive.    RIGHT KNEE EXAM:    Skin: intact, no ecchymosis or erythema  ROM: full extension to 125+ flexion  Tight hamstrings on straight leg raise.  Effusion: none  Tender: medial joint line, lateral joint line.    MCL: stable, and non-painful at both 0 and 30 degrees knee flexion  Varus stress: stable, and non-painful at both 0 and 30 degrees " "knee flexion  Lachmans: neg, firm endpoint  Posterior Drawer stable  Patellofemoral joint:                Apprehension: negative              Crepitations: mild   Grind: positive.    X-RAY: no new images today.    3 views bilateral knee from 8/20/2021 -- Bilateral medial compartment severe joint space narrowing, bone bone bone with articular flattening, subcondral sclerosis. Bilateral patellofemoral lateral facet mild joint space narrowing with medial  patello-femoral osteophytes.           ASSESSMENT/PLAN: Carolina Hernandez is a 57 year old female with chronic bilateral knee pain, advanced primary osteoarthritis.     * reviewed imaging studies with patient, showing arthritic changes, or wearing of the cartilage in the knee. This can be caused by normal \"wear and tear\" over the years or following prior injury to the knee.    Treatment typically starts nonsurgically. Surgical indication for total knee arthroplasty  when nonsurgical management is no longer effective.    At this point, we discussed either trying repeat cortisone injections, otherwise surgical total knee arthroplasty.    At this time, she'd like to try cortisone again as she has things going on right now with gardening, her goats, etc.    Non-surgical treatment for knee arthritis includes:    * rest, sitting  * Activity modification - avoid impact activities or activities that aggravate symptoms.  * NSAIDS (non-steroidal anti-inflammatory medications; e.g. Aleve, advil, motrin, ibuprofen) - regular use for inflammation ( twice daily or three times daily), with food, as long as no contra-indications Please discuss with primary care doctor if needed  * ice, 15-20 minutes at a time several times a day or as needed.  * Strengthening of quadriceps muscles  * Physical Therapy for strengthening, stretching and range of motion exercises of legs  * Tylenol as needed for pain, consider Tylenol arthritis or similar  * Weight loss: Weight loss:  Body mass index is " "28.52 kg/m .. weight loss benefits, not only for the current pain symptoms, but also overall health. Recommend a good diet plan that works for the patient, with the assistance of a dietician or primary care doctor as needed. Also, a good, low-impact exercise program for at least 20 minutes per day, 3 times per week, such as exercise bike, elliptical , or pool.  * Exercise: low impact such as stationary bike, elliptical, pool.  * Injections: cortisone versus viscosupplementation (hyaluronic acid, \"rooster comb\", \"gel shots\"); risks and perceived benefits discussed today. We will proceed with bilateral knee cortisone injections today.    * Bracing: bracing the knee may offer some relief of symptoms when worn and provide some stability.  * over the counter supplements such as glucosamine and chondroitin sulfate may help with joint pain.  * topical ointments may help as well    * return to clinic as needed.      * All questions were addressed and answered prior to discharge from clinic today. The patient acknowledges an understanding of and agreement with the plan set forth during today's visit. Patient was advised to call our office or MyChart us if any further questions arise upon leaving our office today.    Giovanny Chong M.D., M.S.  Dept. of Orthopaedic Surgery  [facility]    Large Joint Injection/Arthocentesis: bilateral knee    Date/Time: 8/15/2022 8:30 AM  Performed by: Giovanny Chong MD  Authorized by: Giovanny Chong MD     Indications:  Pain  Needle Size:  22 G  Guidance: landmark guided    Approach:  Anteromedial  Location:  Knee  Laterality:  Bilateral      Medications (Right):  80 mg methylPREDNISolone 80 MG/ML  Medications (Left):  80 mg methylPREDNISolone 80 MG/ML  Outcome:  Tolerated well, no immediate complications  Procedure discussed: discussed risks, benefits, and alternatives    Consent Given by:  Patient  Timeout: timeout called immediately prior to procedure    Prep: " patient was prepped and draped in usual sterile fashion     In each knee  4cc of 0.25% Sensorcaine-MPF NDC 32740-381-05, LOT [id number],

## 2022-08-15 ENCOUNTER — OFFICE VISIT (OUTPATIENT)
Dept: ORTHOPEDICS | Facility: CLINIC | Age: 57
End: 2022-08-15
Payer: COMMERCIAL

## 2022-08-15 VITALS
HEART RATE: 82 BPM | BODY MASS INDEX: 28.53 KG/M2 | SYSTOLIC BLOOD PRESSURE: 139 MMHG | WEIGHT: 161 LBS | HEIGHT: 63 IN | DIASTOLIC BLOOD PRESSURE: 90 MMHG

## 2022-08-15 DIAGNOSIS — M17.0 PRIMARY OSTEOARTHRITIS OF BOTH KNEES: Primary | ICD-10-CM

## 2022-08-15 PROCEDURE — 20610 DRAIN/INJ JOINT/BURSA W/O US: CPT | Mod: 50 | Performed by: ORTHOPAEDIC SURGERY

## 2022-08-15 PROCEDURE — 99213 OFFICE O/P EST LOW 20 MIN: CPT | Mod: 25 | Performed by: ORTHOPAEDIC SURGERY

## 2022-08-15 RX ORDER — METHYLPREDNISOLONE ACETATE 80 MG/ML
80 INJECTION, SUSPENSION INTRA-ARTICULAR; INTRALESIONAL; INTRAMUSCULAR; SOFT TISSUE
Status: DISCONTINUED | OUTPATIENT
Start: 2022-08-15 | End: 2022-10-14

## 2022-08-15 RX ADMIN — METHYLPREDNISOLONE ACETATE 80 MG: 80 INJECTION, SUSPENSION INTRA-ARTICULAR; INTRALESIONAL; INTRAMUSCULAR; SOFT TISSUE at 08:30

## 2022-08-15 ASSESSMENT — PAIN SCALES - GENERAL: PAINLEVEL: SEVERE PAIN (6)

## 2022-08-15 NOTE — LETTER
"    8/15/2022         RE: Carolina Hernandez  [address] [place] [id number]        Dear Colleague,    Thank you for referring your patient, Carolina Hernandez, to the [facility]. Please see a copy of my visit note below.    CHIEF COMPLAINT:   Chief Complaint   Patient presents with     Left Knee - Pain     Right Knee - Pain     Pain is unbearable       HISTORY OF PRESENT ILLNESS    Carolina Hernandez is a 57 year old female seen for evaluation of ongoing bilateral knee pain with no known injury, right more than left, previously has been left more than right.   Pain has been present for 20 years. We saw her 4/27/2022 and received bilateral visco injections (synviscOne). She had previously received a cortisone injection with Dr Gaspar 8/2021 which helped to some extent for a few months. Returns today stating the visco injections didn't help at all and the pain is \"unbearable\".    Pain really progressed several months ago, pushing heavy wheelbarrows full of wet hay through the woods, cleaning out goat pens.    Locates pain along the inner aspect and front of the knees, and can radiate down the leg to the ankle, up the thigh.  Pain is worse with work, lifting/carrying things, gardening. Treatment has been occasional use of over the counter pain medications without relief, Had injections with cortisone 8/20/2021 (previously 2/2019), seemed to help, but can still feel some achiness. They help for a few months.     Pain at rest, pain at night better with injections. Sleeps with pillow between knees. Has tried tylenol arthritis and ibuprofen for pain but doesn't seem to really help.    Left knee injury at 10 years old.    Has chronic low back pain, denies numbness and tingling.    Present symptoms: pain medially  and anteriorly, pain dull/achy , mild pain.    Pain severity: 6/10  Frequency of symptoms: are constant  Exacerbating Factors: weight bearing, carrying and lifting " things, gardening, kneeling, stairs  Relieving Factors: rest, ice  Night Pain: Yes  Pain while at rest: Yes   Numbness or tingling: No   Patient has tried:     NSAIDS: Yes , not recently in the past couple of years.     Physical Therapy: No      Activity modification: Yes      Bracing: No      Injections: Yes 8/20/2021 cortisone, 4/27/2022 with SynviscOne.     Ice: Yes      Assistive device:  No     Other: tylenol. Topical ointments.      Other PMH:  has a past medical history of Asthma, C. difficile colitis, Depression, Depressive disorder (Have had it most of my life), Osteoarthritis, and Sinusitis, chronic.    She has no past medical history of Breast cancer (H), Malignant neoplasm of ovary (H), or Need for prophylactic hormone replacement therapy (postmenopausal).  Patient Active Problem List   Diagnosis     CARDIOVASCULAR SCREENING; LDL GOAL LESS THAN 160     Recurrent major depressive disorder, in remission (H)     Encounter for tobacco use cessation counseling     Mild intermittent asthma without complication     Essential hypertension     Primary osteoarthritis of both knees     Low back pain due to bilateral sciatica     COPD exacerbation (H)       Surgical Hx:  has a past surgical history that includes colonoscopy (2017) and Esophagoscopy, gastroscopy, duodenoscopy (EGD), combined (N/A, 05/05/2022).    Medications:   Current Outpatient Medications:      albuterol (PROVENTIL) (2.5 MG/3ML) 0.083% neb solution, inhale 1 vial (2.5 mg) by nebulization every 4 hours as needed for shortness of breath / dyspnea or wheezing, Disp: 225 mL, Rfl: 2     albuterol (VENTOLIN HFA) 108 (90 Base) MCG/ACT inhaler, INHALE 1-2 PUFFS INTO THE LUNGS EVERY 4 HOURS AS NEEDED FOR SHORTNESS OF BREATH/DYSPNEA OR WHEEZING ., Disp: 18 g, Rfl: 11     amLODIPine (NORVASC) 2.5 MG tablet, Take 2 tablets (5 mg) by mouth daily., Disp: 180 tablet, Rfl: 0     cetirizine (ZYRTEC) 10 MG tablet, Take 10 mg by mouth daily, Disp: , Rfl:       FLUoxetine (PROZAC) 20 MG capsule, Take 1 capsule (20 mg) by mouth daily, Disp: 90 capsule, Rfl: 3     FLUoxetine (PROZAC) 40 MG capsule, Take 1 capsule (40 mg) by mouth daily Take with 20 mg tab for a total of 60 mg daily, Disp: 90 capsule, Rfl: 3     fluticasone-salmeterol (ADVAIR) 500-50 MCG/DOSE inhaler, Inhale 1 puff into the lungs every 12 hours Please prescribe the generic ( Wixela ), Disp: 3 each, Rfl: 3     loperamide (IMODIUM A-D) 2 MG tablet, Take 1 tablet (2 mg) by mouth 4 times daily as needed for diarrhea, Disp: 90 tablet, Rfl: 0     montelukast (SINGULAIR) 10 MG tablet, Take 1 tablet (10 mg) by mouth At Bedtime, Disp: 90 tablet, Rfl: 3     multivitamin (ONE-DAILY) tablet, Take 1 tablet by mouth daily, Disp: , Rfl:      neomycin-polymyxin-dexamethasone (MAXITROL) 3.5-48367-2.1 SUSP ophthalmic susp, Place 1 drop into both eyes 3 times daily, Disp: , Rfl:      olopatadine (PATANOL) 0.1 % ophthalmic solution, Place 1 drop into both eyes 2 times daily for 5 days, Disp: , Rfl:      Omega-3 Fatty Acids (FISH OIL) 1200 MG capsule, Take 1,200 mg by mouth daily , Disp: , Rfl:      omeprazole 20 MG tablet, Take 1 tablet (20 mg) by mouth daily, Disp: 90 tablet, Rfl: 3     Psyllium Husk 100 % POWD, Taking daily., Disp: , Rfl:     Current Facility-Administered Medications:      bupivacaine (MARCAINE) 0.25 % injection 4 mL, 4 mL, , , Giovanny Chong MD, 4 mL at 04/27/22 1428     bupivacaine (MARCAINE) 0.25 % injection 4 mL, 4 mL, , , Giovanny Chong MD, 4 mL at 04/27/22 1428     hylan (SYNVISC ONE) injection 48 mg, 48 mg, , , Giovanny Chong MD, 48 mg at 04/27/22 1428     hylan (SYNVISC ONE) injection 48 mg, 48 mg, , , Giovanny Chong MD, 48 mg at 04/27/22 9408    Allergies:   Allergies   Allergen Reactions     Doxycycline Difficulty breathing     Penicillins Itching       Social Hx: retired ().   reports that she quit smoking about 12 months ago. Her smoking use included cigarettes. She  "smoked 0.00 packs per day for 23.00 years. She has never used smokeless tobacco. She reports previous alcohol use. She reports that she does not use drugs.    Family Hx: family history includes Cancer in her daughter and maternal grandfather; Coronary Artery Disease in her father and paternal grandfather; Diabetes in her daughter; Glaucoma in her father; Schizophrenia in her daughter.    REVIEW OF SYSTEMS:   CONSTITUTIONAL:NEGATIVE for fever, chills, change in weight  INTEGUMENTARY/SKIN: NEGATIVE for worrisome rashes, moles or lesions  MUSCULOSKELETAL:See HPI above  NEURO: NEGATIVE for weakness, dizziness or paresthesias    PHYSICAL EXAM:  BP (!) 139/90   Pulse 82   Ht 1.6 m (5' 3\")   Wt 73 kg (161 lb)   BMI 28.52 kg/m     GENERAL APPEARANCE: healthy, alert, no distress  SKIN: no suspicious lesions or rashes  NEURO: Normal strength and tone, mentation intact and speech normal  PSYCH:  mentation appears normal and affect normal, not anxious  RESPIRATORY: No increased work of breathing.      BILATERAL LOWER EXTREMITIES:  Gait: favors the right   Alignment: varus  Intact sensation deep peroneal nerve, superficial peroneal nerve, med/lat tibial nerve, sural nerve, saphenous nerve  Intact EHL, EDL, TA, FHL, GS, quadriceps hamstrings and hip flexors  Bilateral calf soft and nttp or squeeze.  Edema: trace    LEFT KNEE EXAM:    Skin: intact, no ecchymosis or erythema  ROM: full extension to 125 flexion, discomfort with range of motion.  Tight hamstrings on straight leg raise.  Effusion: trace  Tender: medial joint line, pes, lateral joint line.    MCL: stable, and non-painful at both 0 and 30 degrees knee flexion  Varus stress: stable, and non-painful at both 0 and 30 degrees knee flexion  Lachmans: neg, firm endpoint  Posterior Drawer stable  Patellofemoral joint:                Apprehension: negative              Crepitations: mild   Grind: positive.    RIGHT KNEE EXAM:    Skin: intact, no ecchymosis or erythema  ROM: " "full extension to 125+ flexion  Tight hamstrings on straight leg raise.  Effusion: none  Tender: medial joint line, lateral joint line.    MCL: stable, and non-painful at both 0 and 30 degrees knee flexion  Varus stress: stable, and non-painful at both 0 and 30 degrees knee flexion  Lachmans: neg, firm endpoint  Posterior Drawer stable  Patellofemoral joint:                Apprehension: negative              Crepitations: mild   Grind: positive.    X-RAY: no new images today.    3 views bilateral knee from 8/20/2021 -- Bilateral medial compartment severe joint space narrowing, bone bone bone with articular flattening, subcondral sclerosis. Bilateral patellofemoral lateral facet mild joint space narrowing with medial  patello-femoral osteophytes.           ASSESSMENT/PLAN: Carolina Hernandez is a 57 year old female with chronic bilateral knee pain, advanced primary osteoarthritis.     * reviewed imaging studies with patient, showing arthritic changes, or wearing of the cartilage in the knee. This can be caused by normal \"wear and tear\" over the years or following prior injury to the knee.    Treatment typically starts nonsurgically. Surgical indication for total knee arthroplasty  when nonsurgical management is no longer effective.    At this point, we discussed either trying repeat cortisone injections, otherwise surgical total knee arthroplasty.    At this time, she'd like to try cortisone again as she has things going on right now with gardening, her goats, etc.    Non-surgical treatment for knee arthritis includes:    * rest, sitting  * Activity modification - avoid impact activities or activities that aggravate symptoms.  * NSAIDS (non-steroidal anti-inflammatory medications; e.g. Aleve, advil, motrin, ibuprofen) - regular use for inflammation ( twice daily or three times daily), with food, as long as no contra-indications Please discuss with primary care doctor if needed  * ice, 15-20 minutes at a time several " "times a day or as needed.  * Strengthening of quadriceps muscles  * Physical Therapy for strengthening, stretching and range of motion exercises of legs  * Tylenol as needed for pain, consider Tylenol arthritis or similar  * Weight loss: Weight loss:  Body mass index is 28.52 kg/m .. weight loss benefits, not only for the current pain symptoms, but also overall health. Recommend a good diet plan that works for the patient, with the assistance of a dietician or primary care doctor as needed. Also, a good, low-impact exercise program for at least 20 minutes per day, 3 times per week, such as exercise bike, elliptical , or pool.  * Exercise: low impact such as stationary bike, elliptical, pool.  * Injections: cortisone versus viscosupplementation (hyaluronic acid, \"rooster comb\", \"gel shots\"); risks and perceived benefits discussed today. We will proceed with bilateral knee cortisone injections today.    * Bracing: bracing the knee may offer some relief of symptoms when worn and provide some stability.  * over the counter supplements such as glucosamine and chondroitin sulfate may help with joint pain.  * topical ointments may help as well    * return to clinic as needed.      * All questions were addressed and answered prior to discharge from clinic today. The patient acknowledges an understanding of and agreement with the plan set forth during today's visit. Patient was advised to call our office or MyChart us if any further questions arise upon leaving our office today.    Giovanny Chong M.D., M.S.  Dept. of Orthopaedic Surgery  [facility]    Large Joint Injection/Arthocentesis: bilateral knee    Date/Time: 8/15/2022 8:30 AM  Performed by: Giovanny Chong MD  Authorized by: Giovanny Chong MD     Indications:  Pain  Needle Size:  22 G  Guidance: landmark guided    Approach:  Anteromedial  Location:  Knee  Laterality:  Bilateral      Medications (Right):  80 mg methylPREDNISolone 80 " MG/ML  Medications (Left):  80 mg methylPREDNISolone 80 MG/ML  Outcome:  Tolerated well, no immediate complications  Procedure discussed: discussed risks, benefits, and alternatives    Consent Given by:  Patient  Timeout: timeout called immediately prior to procedure    Prep: patient was prepped and draped in usual sterile fashion     In each knee  4cc of 0.25% Sensorcaine-MPF NDC 42627-616-00, LOT [id number],              Again, thank you for allowing me to participate in the care of your patient.        Sincerely,        Giovanny Chong MD

## 2022-08-26 DIAGNOSIS — R19.5 LOOSE STOOLS: ICD-10-CM

## 2022-08-30 NOTE — TELEPHONE ENCOUNTER
Anti-Diarrheal Oral Tablet 2 MG  Last Written Prescription Date:   7/26/2022  Last Fill Quantity: 90,   # refills: 0  Last Office Visit :  8/9/2022  Future Office visit:   2/14/2023    Routing refill request to provider for review/approval because:  Drug not on the [facility], [facility] or [facility] refill protocol or controlled substance      Faith Delvalle RN  Central Triage Red Flags/Med Refills

## 2022-08-31 RX ORDER — LOPERAMIDE HYDROCHLORIDE 2 MG/1
2 TABLET ORAL 4 TIMES DAILY PRN
Qty: 90 TABLET | Refills: 6 | Status: ON HOLD | OUTPATIENT
Start: 2022-08-31 | End: 2022-11-01

## 2022-09-21 DIAGNOSIS — I10 ESSENTIAL HYPERTENSION: ICD-10-CM

## 2022-09-21 RX ORDER — AMLODIPINE BESYLATE 2.5 MG/1
TABLET ORAL
Qty: 180 TABLET | Refills: 0 | Status: SHIPPED | OUTPATIENT
Start: 2022-09-21 | End: 2022-12-26

## 2022-09-21 NOTE — TELEPHONE ENCOUNTER
"Requested Prescriptions   Pending Prescriptions Disp Refills    amLODIPine (NORVASC) 2.5 MG tablet [Pharmacy Med Name: amLODIPine Besylate Oral Tablet 2.5 MG] 180 tablet 0     Sig: Take 2 tablets (5 mg) by mouth once daily.        Calcium Channel Blockers Protocol  Failed - 9/21/2022 11:12 AM        Failed - Blood pressure under 140/90 in past 12 months       BP Readings from Last 3 Encounters:   08/15/22 (!) 139/90   08/09/22 (!) 141/84   06/08/22 128/80                 Passed - Recent (12 mo) or future (30 days) visit within the authorizing provider's specialty     Patient has had an office visit with the authorizing provider or a provider within the authorizing providers department within the previous 12 mos or has a future within next 30 days. See \"Patient Info\" tab in inbasket, or \"Choose Columns\" in Meds & Orders section of the refill encounter.              Passed - Medication is active on med list        Passed - Patient is age 18 or older        Passed - No active pregnancy on record        Passed - Normal serum creatinine on file in past 12 months     Recent Labs   Lab Test 12/29/21  1230   CR 0.80       Ok to refill medication if creatinine is low          Passed - No positive pregnancy test in past 12 months              "

## 2022-09-26 ENCOUNTER — OFFICE VISIT (OUTPATIENT)
Dept: PULMONOLOGY | Facility: CLINIC | Age: 57
End: 2022-09-26
Payer: COMMERCIAL

## 2022-09-26 VITALS — DIASTOLIC BLOOD PRESSURE: 97 MMHG | HEART RATE: 72 BPM | SYSTOLIC BLOOD PRESSURE: 147 MMHG | OXYGEN SATURATION: 96 %

## 2022-09-26 DIAGNOSIS — J44.9 STAGE 2 MODERATE COPD BY GOLD CLASSIFICATION (H): Primary | ICD-10-CM

## 2022-09-26 DIAGNOSIS — R07.89 OTHER CHEST PAIN: ICD-10-CM

## 2022-09-26 DIAGNOSIS — R06.02 SHORTNESS OF BREATH: ICD-10-CM

## 2022-09-26 DIAGNOSIS — J45.40 UNCONTROLLED MODERATE PERSISTENT ASTHMA: ICD-10-CM

## 2022-09-26 DIAGNOSIS — J45.20 MILD INTERMITTENT ASTHMA WITHOUT COMPLICATION: ICD-10-CM

## 2022-09-26 PROCEDURE — G0463 HOSPITAL OUTPT CLINIC VISIT: HCPCS | Mod: 25

## 2022-09-26 PROCEDURE — 99214 OFFICE O/P EST MOD 30 MIN: CPT | Mod: GC

## 2022-09-26 RX ORDER — FLUTICASONE PROPIONATE AND SALMETEROL 500; 50 UG/1; UG/1
1 POWDER RESPIRATORY (INHALATION) EVERY 12 HOURS
Qty: 1 EACH | Refills: 11 | Status: SHIPPED | OUTPATIENT
Start: 2022-09-26 | End: 2023-04-24

## 2022-09-26 RX ORDER — ALBUTEROL SULFATE 0.83 MG/ML
SOLUTION RESPIRATORY (INHALATION)
Qty: 225 ML | Refills: 11 | Status: SHIPPED | OUTPATIENT
Start: 2022-09-26 | End: 2023-10-13

## 2022-09-26 RX ORDER — TIOTROPIUM BROMIDE 18 UG/1
18 CAPSULE ORAL; RESPIRATORY (INHALATION) DAILY
Qty: 30 CAPSULE | Refills: 11 | Status: SHIPPED | OUTPATIENT
Start: 2022-09-26 | End: 2023-10-02

## 2022-09-26 RX ORDER — MONTELUKAST SODIUM 10 MG/1
10 TABLET ORAL AT BEDTIME
Qty: 90 TABLET | Refills: 3 | Status: SHIPPED | OUTPATIENT
Start: 2022-09-26 | End: 2023-10-05

## 2022-09-26 ASSESSMENT — PAIN SCALES - GENERAL: PAINLEVEL: NO PAIN (0)

## 2022-09-26 NOTE — PROGRESS NOTES
[facility]  Pulmonary Medicine  Visit Clinic Note  September 26, 2022         ASSESSMENT & PLAN     Carolina Hernandez is a 57 year old year old female with Severe COPD and severe persistent asthma who is being seen for follow up visit for COPD.      Moderate COPD  Severe persistent asthma  Shortness of breath  Pt reporting ESCALANTE, but overall improvement in her breathing since her last visit in 6/2021. Since that time, patient has stopped smoking. However, she does have significant use of her albuterol nebs and inhaler despite regularly using Advair. Currently not on triple therapy. Given need for short acting Beta agonist, will add LAMA to her regimen. She does endorse some occasional chest pain as well. Would consider potential Ischemic heart disease as etiology of her ESCALANTE given her age, post-menopausal state, smoking hx.  - Continue Advair BID. Start Spiriva once daily.   --Discussed side effects and inhaler technique[  - Ordered A1AT lab test and CBC with diff  - Ordered Cardiology referral       Pulmonary nodule  Had CT scan in 2019 which showed multiple pulmonary nodules. Repeat CT chest in 7/2021 showed multiple adjacent irregular partly calcified pulmonary nodules in the right upper lobe are unchanged from 12/10/2019 CT neck. Given nodules stable over 18 months, will not continue to follow.     RTC in 6 months with PFTs at that time.     Patient seen and examined with attending, Dr. Turner, who agrees with the above plan.     Lilia Wang MD  Internal Medicine-PGY2  [facility]    Physician Attestation   I, Maximino Turner MD, saw this patient and agree with the findings and plan of care as documented in the note.      Items personally reviewed/procedural attestation: vitals, labs, imaging and agree with the interpretation documented in the note and spirometry report and agree with the interpretation documented in the note.    Asthma  COPD  Pulmonary  nodules    Still symptomatic.  On ICS/LABA.  Will add LAMA.  Has numerous risk factors for ischemic heart disease and she has been experiencing some chest pain with exertion.  Will refer to cardiology.  Check Alpha 1 antitrypsin.    Maximino Turner MD           Today's visit note:     Chief Complaint: Follow Up (6 month follow up )      HISTORY OF PRESENT ILLNESS:    Carolina Hernandez is a 57 year old year old female with Severe COPD and severe persistent asthma who is being seen for follow up visit for COPD.     Her first in-person visit with Dr Turner. Last visit was a virtual visit. She stopped smoking in June 2021 after she was diagnosed with COPD. Breathing is doing okay. She has a nebulizer and uses it a lot, 3-4 times per day. Doesn't always complete the full neb tx. Nebs do provide relief. Thinks she may need a new nebulizer machine. She uses the albuterol inhaler a couple times over day when she is away from home. Also uses Advair twice daily and that helps twice a bit. She feels like her breathing has improved since starting her inhalers last year.  She does have chest tightness and soreness in the posterior part of her lungs over the past few months. Stretching improves it. Denies wheezing, chest pain, hemoptysis.     Also  Endorses a cough since she had Covid in January 2022. Cough is productive with yellow/white phlegm. She also has some chronic sinusitis. Has had fewer sinus infections since she quit smoking. She does sinus rinse bottles and also uses Flonaise. Also has seasonal allergies and uses Zyrtec daily which helps her. Also uses Omeprazole. Only has excess acid every once in a while due to the medication.     Denies fatigue,  fevers, chills, CP, SOB, abd pain, N/V/D and BLE edema           Past Medical and Surgical History:     Past Medical History:   Diagnosis Date     Asthma      C. difficile colitis      Depression      Depressive disorder Have had it most of my life      Osteoarthritis      Sinusitis, chronic      Past Surgical History:   Procedure Laterality Date     COLONOSCOPY  2017     ESOPHAGOSCOPY, GASTROSCOPY, DUODENOSCOPY (EGD), COMBINED N/A 2022    Procedure: ESOPHAGOGASTRODUODENOSCOPY, WITH BIOPSY;  Surgeon: Timothy Oliva DO;  Location: [facility] [place]           Family History:     Family History   Problem Relation Age of Onset     Glaucoma Father      Coronary Artery Disease Father         stents     Cancer Maternal Grandfather      Coronary Artery Disease Paternal Grandfather      Schizophrenia Daughter      Diabetes Daughter      Cancer Daughter      Crohn's Disease No family hx of      Ulcerative Colitis No family hx of      GERD No family hx of      Stomach Cancer No family hx of               Social History:     Social History     Socioeconomic History     Marital status:      Spouse name: Not on file     Number of children: Not on file     Years of education: Not on file     Highest education level: Not on file   Occupational History     Not on file   Tobacco Use     Smoking status: Former Smoker     Packs/day: 0.00     Years: 23.00     Pack years: 0.00     Types: Cigarettes     Quit date: 2021     Years since quittin.1     Smokeless tobacco: Never Used     Tobacco comment: 6 cig a day    Vaping Use     Vaping Use: Never used   Substance and Sexual Activity     Alcohol use: Not Currently     Comment: 6 to 8 beers 4 times weekly      Drug use: No     Sexual activity: Not Currently   Other Topics Concern     Not on file   Social History Narrative     Not on file     Social Determinants of Health     Financial Resource Strain: Not on file   Food Insecurity: Not on file   Transportation Needs: Not on file   Physical Activity: Not on file   Stress: Not on file   Social Connections: Not on file   Intimate Partner Violence: Not on file   Housing Stability: Not on file            Medications:     Current Outpatient Medications   Medication     albuterol  (PROVENTIL) (2.5 MG/3ML) 0.083% neb solution     albuterol (VENTOLIN HFA) 108 (90 Base) MCG/ACT inhaler     amLODIPine (NORVASC) 2.5 MG tablet     cetirizine (ZYRTEC) 10 MG tablet     FLUoxetine (PROZAC) 20 MG capsule     FLUoxetine (PROZAC) 40 MG capsule     fluticasone-salmeterol (ADVAIR) 500-50 MCG/DOSE inhaler     loperamide (ANTI-DIARRHEAL) 2 MG tablet     montelukast (SINGULAIR) 10 MG tablet     multivitamin (ONE-DAILY) tablet     Omega-3 Fatty Acids (FISH OIL) 1200 MG capsule     omeprazole 20 MG tablet     Psyllium Husk 100 % POWD     neomycin-polymyxin-dexamethasone (MAXITROL) 3.5-73609-3.1 SUSP ophthalmic susp     olopatadine (PATANOL) 0.1 % ophthalmic solution     Current Facility-Administered Medications   Medication     bupivacaine (MARCAINE) 0.25 % injection 4 mL     bupivacaine (MARCAINE) 0.25 % injection 4 mL     hylan (SYNVISC ONE) injection 48 mg     hylan (SYNVISC ONE) injection 48 mg     methylPREDNISolone (DEPO-MEDROL) injection 80 mg     methylPREDNISolone (DEPO-MEDROL) injection 80 mg            Review of Systems:       A complete review of systems was otherwise negative except as noted in the HPI.      PHYSICAL EXAM:  BP (!) 147/97 (BP Location: Right arm, Patient Position: Chair, Cuff Size: Adult Regular)   Pulse 72   SpO2 96%      General: Comfortable, No apparent distress  Eyes: Anicteric  Nose: Nasal mucosa with no edema or hyperemia.  No polyps  Ears: Hearing grossly normal  Mouth: Oral mucosa is moist, without any lesions. No oropharyngeal exudate.  Neck: supple, no thyromegaly  Lymphatics: No cervical or supraclavicular nodes  Respiratory: Good air movement. No crackles. No rhonchi. No wheezes  Cardiac: RRR, normal S1, S2. No murmurs. No JVD  Abdomen: Soft, NT/ND  Musculoskeletal: Extremities normal. No clubbing. No cyanosis. No edema.  Skin: No rash on limited exam  Neuro: Normal mentation. Normal speech.  Psych:Normal affect           Data:   All laboratory and imaging data  reviewed.      No labs. PFTs or imaging during this visit.       No results found for this or any previous visit (from the past 168 hour(s)).                                            Answers for HPI/ROS submitted by the patient on 9/23/2022  General Symptoms: No  Skin Symptoms: No  HENT Symptoms: No  EYE SYMPTOMS: No  HEART SYMPTOMS: No  LUNG SYMPTOMS: No  INTESTINAL SYMPTOMS: No  URINARY SYMPTOMS: No  GYNECOLOGIC SYMPTOMS: No  BREAST SYMPTOMS: No  SKELETAL SYMPTOMS: No  BLOOD SYMPTOMS: No  NERVOUS SYSTEM SYMPTOMS: No  MENTAL HEALTH SYMPTOMS: No

## 2022-09-26 NOTE — LETTER
9/26/2022         RE: Carolina Hernandez  [address] [place] [id number]        Dear Colleague,    Thank you for referring your patient, Carolina Hernandez, to the [facility] [place]. Please see a copy of my visit note below.          [facility]  Pulmonary Medicine  Visit Clinic Note  September 26, 2022         ASSESSMENT & PLAN     Carolina Hernandez is a 57 year old year old female with Severe COPD and severe persistent asthma who is being seen for follow up visit for COPD.      Moderate COPD  Severe persistent asthma  Shortness of breath  Pt reporting ESCALANTE, but overall improvement in her breathing since her last visit in 6/2021. Since that time, patient has stopped smoking. However, she does have significant use of her albuterol nebs and inhaler despite regularly using Advair. Currently not on triple therapy. Given need for short acting Beta agonist, will add LAMA to her regimen. She does endorse some occasional chest pain as well. Would consider potential Ischemic heart disease as etiology of her ESCALANTE given her age, post-menopausal state, smoking hx.  - Continue Advair BID. Start Spiriva once daily.   --Discussed side effects and inhaler technique[  - Ordered A1AT lab test and CBC with diff  - Ordered Cardiology referral       Pulmonary nodule  Had CT scan in 2019 which showed multiple pulmonary nodules. Repeat CT chest in 7/2021 showed multiple adjacent irregular partly calcified pulmonary nodules in the right upper lobe are unchanged from 12/10/2019 CT neck. Given nodules stable over 18 months, will not continue to follow.     RTC in 6 months with PFTs at that time.     Patient seen and examined with attending, Dr. Turner, who agrees with the above plan.     Lilia Wang MD  Internal Medicine-PGY2  [facility]    Physician Attestation   I, Maximino Turner MD, saw this patient and agree with the findings  and plan of care as documented in the note.      Items personally reviewed/procedural attestation: vitals, labs, imaging and agree with the interpretation documented in the note and spirometry report and agree with the interpretation documented in the note.    Asthma  COPD  Pulmonary nodules    Still symptomatic.  On ICS/LABA.  Will add LAMA.  Has numerous risk factors for ischemic heart disease and she has been experiencing some chest pain with exertion.  Will refer to cardiology.  Check Alpha 1 antitrypsin.    Maximino Turner MD           Today's visit note:     Chief Complaint: Follow Up (6 month follow up )      HISTORY OF PRESENT ILLNESS:    Carolina Hernandez is a 57 year old year old female with Severe COPD and severe persistent asthma who is being seen for follow up visit for COPD.     Her first in-person visit with Dr Turner. Last visit was a virtual visit. She stopped smoking in June 2021 after she was diagnosed with COPD. Breathing is doing okay. She has a nebulizer and uses it a lot, 3-4 times per day. Doesn't always complete the full neb tx. Nebs do provide relief. Thinks she may need a new nebulizer machine. She uses the albuterol inhaler a couple times over day when she is away from home. Also uses Advair twice daily and that helps twice a bit. She feels like her breathing has improved since starting her inhalers last year.  She does have chest tightness and soreness in the posterior part of her lungs over the past few months. Stretching improves it. Denies wheezing, chest pain, hemoptysis.     Also  Endorses a cough since she had Covid in January 2022. Cough is productive with yellow/white phlegm. She also has some chronic sinusitis. Has had fewer sinus infections since she quit smoking. She does sinus rinse bottles and also uses Flonaise. Also has seasonal allergies and uses Zyrtec daily which helps her. Also uses Omeprazole. Only has excess acid every once in a while due to the medication.      Denies fatigue,  fevers, chills, CP, SOB, abd pain, N/V/D and BLE edema           Past Medical and Surgical History:     Past Medical History:   Diagnosis Date     Asthma      C. difficile colitis      Depression      Depressive disorder Have had it most of my life     Osteoarthritis      Sinusitis, chronic      Past Surgical History:   Procedure Laterality Date     COLONOSCOPY  2017     ESOPHAGOSCOPY, GASTROSCOPY, DUODENOSCOPY (EGD), COMBINED N/A 2022    Procedure: ESOPHAGOGASTRODUODENOSCOPY, WITH BIOPSY;  Surgeon: Timothy Oliva DO;  Location: [facility] [place]           Family History:     Family History   Problem Relation Age of Onset     Glaucoma Father      Coronary Artery Disease Father         stents     Cancer Maternal Grandfather      Coronary Artery Disease Paternal Grandfather      Schizophrenia Daughter      Diabetes Daughter      Cancer Daughter      Crohn's Disease No family hx of      Ulcerative Colitis No family hx of      GERD No family hx of      Stomach Cancer No family hx of               Social History:     Social History     Socioeconomic History     Marital status:      Spouse name: Not on file     Number of children: Not on file     Years of education: Not on file     Highest education level: Not on file   Occupational History     Not on file   Tobacco Use     Smoking status: Former Smoker     Packs/day: 0.00     Years: 23.00     Pack years: 0.00     Types: Cigarettes     Quit date: 2021     Years since quittin.1     Smokeless tobacco: Never Used     Tobacco comment: 6 cig a day    Vaping Use     Vaping Use: Never used   Substance and Sexual Activity     Alcohol use: Not Currently     Comment: 6 to 8 beers 4 times weekly      Drug use: No     Sexual activity: Not Currently   Other Topics Concern     Not on file   Social History Narrative     Not on file     Social Determinants of Health     Financial Resource Strain: Not on file   Food Insecurity: Not on file   Transportation  Needs: Not on file   Physical Activity: Not on file   Stress: Not on file   Social Connections: Not on file   Intimate Partner Violence: Not on file   Housing Stability: Not on file            Medications:     Current Outpatient Medications   Medication     albuterol (PROVENTIL) (2.5 MG/3ML) 0.083% neb solution     albuterol (VENTOLIN HFA) 108 (90 Base) MCG/ACT inhaler     amLODIPine (NORVASC) 2.5 MG tablet     cetirizine (ZYRTEC) 10 MG tablet     FLUoxetine (PROZAC) 20 MG capsule     FLUoxetine (PROZAC) 40 MG capsule     fluticasone-salmeterol (ADVAIR) 500-50 MCG/DOSE inhaler     loperamide (ANTI-DIARRHEAL) 2 MG tablet     montelukast (SINGULAIR) 10 MG tablet     multivitamin (ONE-DAILY) tablet     Omega-3 Fatty Acids (FISH OIL) 1200 MG capsule     omeprazole 20 MG tablet     Psyllium Husk 100 % POWD     neomycin-polymyxin-dexamethasone (MAXITROL) 3.5-97954-1.1 SUSP ophthalmic susp     olopatadine (PATANOL) 0.1 % ophthalmic solution     Current Facility-Administered Medications   Medication     bupivacaine (MARCAINE) 0.25 % injection 4 mL     bupivacaine (MARCAINE) 0.25 % injection 4 mL     hylan (SYNVISC ONE) injection 48 mg     hylan (SYNVISC ONE) injection 48 mg     methylPREDNISolone (DEPO-MEDROL) injection 80 mg     methylPREDNISolone (DEPO-MEDROL) injection 80 mg            Review of Systems:       A complete review of systems was otherwise negative except as noted in the HPI.      PHYSICAL EXAM:  BP (!) 147/97 (BP Location: Right arm, Patient Position: Chair, Cuff Size: Adult Regular)   Pulse 72   SpO2 96%      General: Comfortable, No apparent distress  Eyes: Anicteric  Nose: Nasal mucosa with no edema or hyperemia.  No polyps  Ears: Hearing grossly normal  Mouth: Oral mucosa is moist, without any lesions. No oropharyngeal exudate.  Neck: supple, no thyromegaly  Lymphatics: No cervical or supraclavicular nodes  Respiratory: Good air movement. No crackles. No rhonchi. No wheezes  Cardiac: RRR, normal S1,  S2. No murmurs. No JVD  Abdomen: Soft, NT/ND  Musculoskeletal: Extremities normal. No clubbing. No cyanosis. No edema.  Skin: No rash on limited exam  Neuro: Normal mentation. Normal speech.  Psych:Normal affect           Data:   All laboratory and imaging data reviewed.      No labs. PFTs or imaging during this visit.       No results found for this or any previous visit (from the past 168 hour(s)).        Again, thank you for allowing me to participate in the care of your patient.        Sincerely,        Maximino Turner MD

## 2022-09-26 NOTE — NURSING NOTE
Chief Complaint   Patient presents with     Follow Up     6 month follow up      Vitals were taken and medications were reconciled.    Irene Lainez RMA  10:37 AM

## 2022-09-29 ENCOUNTER — OFFICE VISIT (OUTPATIENT)
Dept: CARDIOLOGY | Facility: CLINIC | Age: 57
End: 2022-09-29

## 2022-09-29 ENCOUNTER — CARE COORDINATION (OUTPATIENT)
Dept: CARDIOLOGY | Facility: CLINIC | Age: 57
End: 2022-09-29

## 2022-09-29 ENCOUNTER — HOSPITAL ENCOUNTER (OUTPATIENT)
Dept: CARDIOLOGY | Facility: CLINIC | Age: 57
Discharge: HOME | End: 2022-09-29
Attending: INTERNAL MEDICINE | Admitting: INTERNAL MEDICINE
Payer: COMMERCIAL

## 2022-09-29 VITALS
DIASTOLIC BLOOD PRESSURE: 89 MMHG | RESPIRATION RATE: 20 BRPM | HEART RATE: 85 BPM | WEIGHT: 162.8 LBS | SYSTOLIC BLOOD PRESSURE: 136 MMHG | BODY MASS INDEX: 28.84 KG/M2

## 2022-09-29 DIAGNOSIS — R07.9 CHEST PAIN: ICD-10-CM

## 2022-09-29 DIAGNOSIS — R06.09 DOE (DYSPNEA ON EXERTION): Primary | ICD-10-CM

## 2022-09-29 DIAGNOSIS — R07.89 OTHER CHEST PAIN: ICD-10-CM

## 2022-09-29 DIAGNOSIS — R06.02 SHORTNESS OF BREATH: ICD-10-CM

## 2022-09-29 PROCEDURE — 93005 ELECTROCARDIOGRAM TRACING: CPT | Performed by: CLINICAL EXERCISE PHYSIOLOGIST

## 2022-09-29 PROCEDURE — 99204 OFFICE O/P NEW MOD 45 MIN: CPT | Performed by: INTERNAL MEDICINE

## 2022-09-29 PROCEDURE — 93010 ELECTROCARDIOGRAM REPORT: CPT | Performed by: INTERNAL MEDICINE

## 2022-09-29 RX ORDER — SODIUM CHLORIDE 9 MG/ML
INJECTION, SOLUTION INTRAVENOUS CONTINUOUS
Status: CANCELLED | OUTPATIENT
Start: 2022-09-29

## 2022-09-29 RX ORDER — LIDOCAINE 40 MG/G
CREAM TOPICAL
Status: CANCELLED | OUTPATIENT
Start: 2022-09-29

## 2022-09-29 NOTE — PROGRESS NOTES
HPI and Plan:             CURRENT MEDICATIONS:  Current Outpatient Medications   Medication Sig Dispense Refill     albuterol (PROVENTIL) (2.5 MG/3ML) 0.083% neb solution inhale 1 vial (2.5 mg) by nebulization every 4 hours as needed for shortness of breath / dyspnea or wheezing 225 mL 11     albuterol (VENTOLIN HFA) 108 (90 Base) MCG/ACT inhaler INHALE 1-2 PUFFS INTO THE LUNGS EVERY 4 HOURS AS NEEDED FOR SHORTNESS OF BREATH/DYSPNEA OR WHEEZING . 18 g 11     amLODIPine (NORVASC) 2.5 MG tablet Take 2 tablets (5 mg) by mouth once daily. 180 tablet 0     cetirizine (ZYRTEC) 10 MG tablet Take 10 mg by mouth daily       FLUoxetine (PROZAC) 20 MG capsule Take 1 capsule (20 mg) by mouth daily 90 capsule 3     FLUoxetine (PROZAC) 40 MG capsule Take 1 capsule (40 mg) by mouth daily Take with 20 mg tab for a total of 60 mg daily 90 capsule 3     fluticasone-salmeterol (ADVAIR) 500-50 MCG/ACT inhaler Inhale 1 puff into the lungs every 12 hours 1 each 11     loperamide (ANTI-DIARRHEAL) 2 MG tablet Take 1 tablet (2 mg) by mouth 4 times daily as needed for diarrhea 90 tablet 6     montelukast (SINGULAIR) 10 MG tablet Take 1 tablet (10 mg) by mouth At Bedtime 90 tablet 3     multivitamin (ONE-DAILY) tablet Take 1 tablet by mouth daily       Omega-3 Fatty Acids (FISH OIL) 1200 MG capsule Take 1,200 mg by mouth daily        omeprazole 20 MG tablet Take 1 tablet (20 mg) by mouth daily 90 tablet 3     Psyllium Husk 100 % POWD Taking daily.       tiotropium (SPIRIVA) 18 MCG inhaled capsule Inhale 1 capsule (18 mcg) into the lungs daily 30 capsule 11     neomycin-polymyxin-dexamethasone (MAXITROL) 3.5-05289-1.1 SUSP ophthalmic susp Place 1 drop into both eyes 3 times daily       olopatadine (PATANOL) 0.1 % ophthalmic solution Place 1 drop into both eyes 2 times daily for 5 days         ALLERGIES     Allergies   Allergen Reactions     Doxycycline Difficulty breathing     Penicillins Itching       PAST MEDICAL HISTORY:  Past Medical  History:   Diagnosis Date     Asthma      C. difficile colitis      Depression      Depressive disorder Have had it most of my life     Osteoarthritis      Sinusitis, chronic        PAST SURGICAL HISTORY:  Past Surgical History:   Procedure Laterality Date     COLONOSCOPY  2017     ESOPHAGOSCOPY, GASTROSCOPY, DUODENOSCOPY (EGD), COMBINED N/A 2022    Procedure: ESOPHAGOGASTRODUODENOSCOPY, WITH BIOPSY;  Surgeon: Timothy Oliva DO;  Location: [facility] [place]       FAMILY HISTORY:  Family History   Problem Relation Age of Onset     Glaucoma Father      Coronary Artery Disease Father         stents     Cancer Maternal Grandfather      Coronary Artery Disease Paternal Grandfather      Schizophrenia Daughter      Diabetes Daughter      Cancer Daughter      Crohn's Disease No family hx of      Ulcerative Colitis No family hx of      GERD No family hx of      Stomach Cancer No family hx of        SOCIAL HISTORY:  Social History     Socioeconomic History     Marital status:      Spouse name: None     Number of children: None     Years of education: None     Highest education level: None   Tobacco Use     Smoking status: Former Smoker     Packs/day: 0.00     Years: 23.00     Pack years: 0.00     Types: Cigarettes     Quit date: 2021     Years since quittin.2     Smokeless tobacco: Never Used     Tobacco comment: 6 cig a day    Vaping Use     Vaping Use: Never used   Substance and Sexual Activity     Alcohol use: Not Currently     Comment: 6 to 8 beers 4 times weekly      Drug use: No     Sexual activity: Not Currently       Review of Systems:  Skin:          Eyes:         ENT:         Respiratory:  Positive for dyspnea on exertion COPD   Cardiovascular:    chest pain;Positive for;fatigue;exercise intolerance    Gastroenterology:        Genitourinary:         Musculoskeletal:         Neurologic:         Psychiatric:         Heme/Lymph/Imm:         Endocrine:           Physical Exam:  Vitals: /89 (BP  Location: Right arm, Patient Position: Sitting, Cuff Size: Adult Regular)   Pulse 85   Resp 20   Wt 73.8 kg (162 lb 12.8 oz)   BMI 28.84 kg/m      Constitutional:  cooperative, alert and oriented, well developed, well nourished, in no acute distress        Skin:  warm and dry to the touch, no apparent skin lesions or masses noted          Head:  normocephalic, no masses or lesions        Eyes:  pupils equal and round        Lymph:      ENT:  no pallor or cyanosis, dentition good        Neck:           Respiratory:  clear to auscultation         Cardiac: regular rhythm     no presence of murmur          pulses full and equal                                        GI:  abdomen soft        Extremities and Muscular Skeletal:  no edema              Neurological:  no gross motor deficits        Psych:  Alert and Oriented x 3        CC  Maximino Turner MD  [address] [place] [id number]

## 2022-09-29 NOTE — PROGRESS NOTES
Reviewed all instructions and directions with pt during clinic visit today 9/29/22. Pt verbalized an understanding. See AVS from 9/29/22 for details.    Roxanne Shabazz RN

## 2022-09-29 NOTE — PROGRESS NOTES
Service Date: 09/29/2022    REASON FOR CONSULTATION:  Chest discomfort.    HISTORY OF PRESENT ILLNESS:  I had the pleasure of seeing Ms. Hernandez in consultation at the [facility] today.  She is a very pleasant 57-year-old female with a history of severe COPD, who was referred today due to concerns regarding chest discomfort over the past few months.    The patient, as stated above with a history of moderate COPD and severe persistent asthma.  She actually saw Dr. Turner from Pulmonology on 09/26/2022, at which point her medications were adjusted.  Her dyspnea on exertion has improved with these interventions.  Her last PFTs in 05/2021 documented moderate to severe airflow obstruction.    She was referred to Cardiology today due to concerns regarding exertional chest discomfort, the last episode occurring approximately 2 weeks ago.  She states that she has had occasional episodes of chest tightness after exertion.  These are associated with generalized weakness and diaphoresis.  As stated above, the last episode occurred after biking approximately 2 weeks ago.  The symptoms eventually resolved spontaneously.  She denies any palpitations, syncope or presyncope.  She denies any PND or orthopnea.  Fortunately, she quit smoking approximately a year and a half ago.    Her past medical history, family history, social history, allergies and medications are reviewed in Epic.    PHYSICAL EXAMINATION:  Dictated below.    I personally reviewed her EKG obtained today in our clinic, which demonstrated sinus rhythm with no acute ST-T wave abnormalities.    Labs on 11/20/2020 demonstrated total cholesterol 232, HDL 98, LDL 88, triglycerides 232.    I also personally reviewed her CT of the chest obtained on 07/05/2021.  This demonstrated calcified pulmonary nodules as well as moderate to severe coronary artery calcification.    IMPRESSION:     1.  Moderate to severe COPD as described above.  2.  Dyspnea on  exertion secondary to #1.  3.  Exertional chest discomfort, which is worrisome for angina in the context of her risk factor profile and moderate to severe coronary artery calcification.    The patient presents for an evaluation regarding exertional dyspnea as well as chest discomfort post-exertion as described above.  Given the fact that her symptoms appear to be quite typical and the pretest probability of significant coronary artery disease is high, I recommended we proceed to coronary angiography for delineation of her coronary anatomy and revascularization if clinically appropriate.  I have explained the indication, risks and benefits of coronary angiography to the patient in detail who is agreeable to proceeding.  I have also ordered an echocardiogram for baseline assessment of left ventricular systolic/valvular function prior to coronary angiography.    Regarding medical therapy, I have asked her to start taking aspirin 81 mg daily.  Statin therapy will also probably be indicated, but I will make a decision in this regard after the results of her angiogram are available.    It was a pleasure seeing her in consultation.    Eduardo Chapa MD        D: 2022   T: 2022   MT: DAQUAN    Name:     MICHAEL ALLENRICARDA VELASQUEZ  MRN:      [id number]        Account:      [id number]   :      1965           Service Date: 2022       Document: [id number]

## 2022-09-29 NOTE — LETTER
9/29/2022    Julia Oneill MD  [address] [facility] [address]    RE: Carolina Hernandez       Dear Colleague,     I had the pleasure of seeing Carolina Hernandez in the [facility].  HPI and Plan:             CURRENT MEDICATIONS:  Current Outpatient Medications   Medication Sig Dispense Refill     albuterol (PROVENTIL) (2.5 MG/3ML) 0.083% neb solution inhale 1 vial (2.5 mg) by nebulization every 4 hours as needed for shortness of breath / dyspnea or wheezing 225 mL 11     albuterol (VENTOLIN HFA) 108 (90 Base) MCG/ACT inhaler INHALE 1-2 PUFFS INTO THE LUNGS EVERY 4 HOURS AS NEEDED FOR SHORTNESS OF BREATH/DYSPNEA OR WHEEZING . 18 g 11     amLODIPine (NORVASC) 2.5 MG tablet Take 2 tablets (5 mg) by mouth once daily. 180 tablet 0     cetirizine (ZYRTEC) 10 MG tablet Take 10 mg by mouth daily       FLUoxetine (PROZAC) 20 MG capsule Take 1 capsule (20 mg) by mouth daily 90 capsule 3     FLUoxetine (PROZAC) 40 MG capsule Take 1 capsule (40 mg) by mouth daily Take with 20 mg tab for a total of 60 mg daily 90 capsule 3     fluticasone-salmeterol (ADVAIR) 500-50 MCG/ACT inhaler Inhale 1 puff into the lungs every 12 hours 1 each 11     loperamide (ANTI-DIARRHEAL) 2 MG tablet Take 1 tablet (2 mg) by mouth 4 times daily as needed for diarrhea 90 tablet 6     montelukast (SINGULAIR) 10 MG tablet Take 1 tablet (10 mg) by mouth At Bedtime 90 tablet 3     multivitamin (ONE-DAILY) tablet Take 1 tablet by mouth daily       Omega-3 Fatty Acids (FISH OIL) 1200 MG capsule Take 1,200 mg by mouth daily        omeprazole 20 MG tablet Take 1 tablet (20 mg) by mouth daily 90 tablet 3     Psyllium Husk 100 % POWD Taking daily.       tiotropium (SPIRIVA) 18 MCG inhaled capsule Inhale 1 capsule (18 mcg) into the lungs daily 30 capsule 11     neomycin-polymyxin-dexamethasone (MAXITROL) 3.5-37515-6.1 SUSP ophthalmic susp Place 1 drop into both eyes 3 times daily       olopatadine (PATANOL) 0.1 % ophthalmic  solution Place 1 drop into both eyes 2 times daily for 5 days         ALLERGIES     Allergies   Allergen Reactions     Doxycycline Difficulty breathing     Penicillins Itching       PAST MEDICAL HISTORY:  Past Medical History:   Diagnosis Date     Asthma      C. difficile colitis      Depression      Depressive disorder Have had it most of my life     Osteoarthritis      Sinusitis, chronic        PAST SURGICAL HISTORY:  Past Surgical History:   Procedure Laterality Date     COLONOSCOPY  2017     ESOPHAGOSCOPY, GASTROSCOPY, DUODENOSCOPY (EGD), COMBINED N/A 2022    Procedure: ESOPHAGOGASTRODUODENOSCOPY, WITH BIOPSY;  Surgeon: Timothy Oliva DO;  Location: [facility] OR       FAMILY HISTORY:  Family History   Problem Relation Age of Onset     Glaucoma Father      Coronary Artery Disease Father         stents     Cancer Maternal Grandfather      Coronary Artery Disease Paternal Grandfather      Schizophrenia Daughter      Diabetes Daughter      Cancer Daughter      Crohn's Disease No family hx of      Ulcerative Colitis No family hx of      GERD No family hx of      Stomach Cancer No family hx of        SOCIAL HISTORY:  Social History     Socioeconomic History     Marital status:      Spouse name: None     Number of children: None     Years of education: None     Highest education level: None   Tobacco Use     Smoking status: Former Smoker     Packs/day: 0.00     Years: 23.00     Pack years: 0.00     Types: Cigarettes     Quit date: 2021     Years since quittin.2     Smokeless tobacco: Never Used     Tobacco comment: 6 cig a day    Vaping Use     Vaping Use: Never used   Substance and Sexual Activity     Alcohol use: Not Currently     Comment: 6 to 8 beers 4 times weekly      Drug use: No     Sexual activity: Not Currently       Review of Systems:  Skin:          Eyes:         ENT:         Respiratory:  Positive for dyspnea on exertion COPD   Cardiovascular:    chest pain;Positive for;fatigue;exercise  intolerance    Gastroenterology:        Genitourinary:         Musculoskeletal:         Neurologic:         Psychiatric:         Heme/Lymph/Imm:         Endocrine:           Physical Exam:  Vitals: /89 (BP Location: Right arm, Patient Position: Sitting, Cuff Size: Adult Regular)   Pulse 85   Resp 20   Wt 73.8 kg (162 lb 12.8 oz)   BMI 28.84 kg/m      Constitutional:  cooperative, alert and oriented, well developed, well nourished, in no acute distress        Skin:  warm and dry to the touch, no apparent skin lesions or masses noted          Head:  normocephalic, no masses or lesions        Eyes:  pupils equal and round        Lymph:      ENT:  no pallor or cyanosis, dentition good        Neck:           Respiratory:  clear to auscultation         Cardiac: regular rhythm     no presence of murmur          pulses full and equal                                        GI:  abdomen soft        Extremities and Muscular Skeletal:  no edema              Neurological:  no gross motor deficits        Psych:  Alert and Oriented x 3        CC  Maximino Turner MD  [address] [place] [id number]                Service Date: 09/29/2022    REASON FOR CONSULTATION:  Chest discomfort.    HISTORY OF PRESENT ILLNESS:  I had the pleasure of seeing Ms. Hernandez in consultation at the [facility] today.  She is a very pleasant 57-year-old female with a history of severe COPD, who was referred today due to concerns regarding chest discomfort over the past few months.    The patient, as stated above with a history of moderate COPD and severe persistent asthma.  She actually saw Dr. Turner from Pulmonology on 09/26/2022, at which point her medications were adjusted.  Her dyspnea on exertion has improved with these interventions.  Her last PFTs in 05/2021 documented moderate to severe airflow obstruction.    She was referred to Cardiology today due to concerns regarding exertional chest discomfort,  the last episode occurring approximately 2 weeks ago.  She states that she has had occasional episodes of chest tightness after exertion.  These are associated with generalized weakness and diaphoresis.  As stated above, the last episode occurred after biking approximately 2 weeks ago.  The symptoms eventually resolved spontaneously.  She denies any palpitations, syncope or presyncope.  She denies any PND or orthopnea.  Fortunately, she quit smoking approximately a year and a half ago.    Her past medical history, family history, social history, allergies and medications are reviewed in Epic.    PHYSICAL EXAMINATION:  Dictated below.    I personally reviewed her EKG obtained today in our clinic, which demonstrated sinus rhythm with no acute ST-T wave abnormalities.    Labs on 11/20/2020 demonstrated total cholesterol 232, HDL 98, LDL 88, triglycerides 232.    I also personally reviewed her CT of the chest obtained on 07/05/2021.  This demonstrated calcified pulmonary nodules as well as moderate to severe coronary artery calcification.    IMPRESSION:     1.  Moderate to severe COPD as described above.  2.  Dyspnea on exertion secondary to #1.  3.  Exertional chest discomfort, which is worrisome for angina in the context of her risk factor profile and moderate to severe coronary artery calcification.    The patient presents for an evaluation regarding exertional dyspnea as well as chest discomfort post-exertion as described above.  Given the fact that her symptoms appear to be quite typical and the probability of significant coronary artery disease is high, I recommended we proceed to coronary angiography for delineation of her coronary anatomy and revascularization if clinically appropriate.  I have explained the indication, risks and benefits of coronary angiography to the patient in detail who is agreeable to proceeding.  I have also ordered an echocardiogram for baseline assessment of left ventricular  systolic/valvular function prior to coronary angiography.    Regarding medical therapy, I have asked her to start taking aspirin 81 mg daily.  Statin therapy will also probably be indicated, but I will make a decision in this regard after the results of her angiogram are available.    It was a pleasure seeing her in consultation.    Eduardo Chapa MD        D: 2022   T: 2022   MT: FARRUKH/ALLISON    Name:     JONAH ALLEN  MRN:      [id number]        Account:      [id number]   :      1965           Service Date: 2022       Document: [id number]      Thank you for allowing me to participate in the care of your patient.      Sincerely,     Eduardo Chapa MD     [facility]  cc:   Maximino Turner MD  [address] [place] [id number]

## 2022-09-29 NOTE — PATIENT INSTRUCTIONS
"COVID swab to be done at home 1-2 days prior to procedure. Take a photo of the negative result and bring it with you to the hospital.   Take your temperature the morning of the procedure. If it is >100 call the Care Suites at [phone]  Coronary angiogram to be done at [facility] on 10/14/22. Please arrive at 8:30am. If you need to contact [facility] for any reason, please call [phone] option #2.  Follow up with Marilee Chu NP on 10/21/22 3:10pm    Please call the cardiology nurse line at [phone] for any questions or concerns  Sheila RN; Roxanne RN; Unique RN    ANGIOGRAM  [facility]   [place], [place]   Contact [facility] scheduling if needed at [phone].      In preparation for your procedure, we require that you do the following:  Nothing to eat or drink after midnight if your procedure is before 12 noon.  Take your usual morning medications with a small sip of water immediately upon arising on the morning of your procedure unless outlined below:  Aspirin:  If the patient does not currently take aspirin daily, they are to take aspirin 325 mg the morning of procedure  You will be unable to drive after your procedure; please arrange to have someone drive you home. Make sure that there is a responsible adult with you for 24 hours after your procedure. Your procedure will be cancelled if you do not have transportation home or someone staying with you for 24 hrs.   3.  Your procedure will be done at [facility]. Please park in the  Sky[address] Ramp  on the west side of [facility] on [address]. Take the skyway over [facility] to the hospital. Please check in on the first floor, \"Skyway Welcome Desk\" which is one floor down from the skyway level.   4. If you have any questions about your upcoming procedure, please contact nursing at [facility] at [phone] or at [facility] at [phone].      "

## 2022-10-12 ENCOUNTER — TELEPHONE (OUTPATIENT)
Dept: CARDIOLOGY | Facility: CLINIC | Age: 57
End: 2022-10-12

## 2022-10-12 ENCOUNTER — HOSPITAL ENCOUNTER (OUTPATIENT)
Dept: CARDIOLOGY | Facility: CLINIC | Age: 57
Discharge: HOME | End: 2022-10-12
Attending: INTERNAL MEDICINE | Admitting: INTERNAL MEDICINE
Payer: COMMERCIAL

## 2022-10-12 DIAGNOSIS — R07.89 OTHER CHEST PAIN: ICD-10-CM

## 2022-10-12 LAB — LVEF ECHO: NORMAL

## 2022-10-12 PROCEDURE — 93306 TTE W/DOPPLER COMPLETE: CPT | Mod: 26 | Performed by: INTERNAL MEDICINE

## 2022-10-12 PROCEDURE — 93306 TTE W/DOPPLER COMPLETE: CPT

## 2022-10-12 NOTE — TELEPHONE ENCOUNTER
Pt calling in in regards to her upcoming angiogram this Friday. She states last Thursday she started to feel like she was coming down with something. She was experiencing a sore throat and congestion. She is feeling better now but still congested. She is wondering if it is still okay for her to have the angiogram.   Notified pt that as long as her COVID test is negative and she is not experiencing a fever for 24 hours prior to procedure without fever reducing medicine then okay to continue with procedure as planned.   Pt verbalized an understanding.    Roxanne Shabazz RN

## 2022-10-14 ENCOUNTER — HOSPITAL ENCOUNTER (OUTPATIENT)
Facility: CLINIC | Age: 57
Discharge: HOME | End: 2022-10-14
Admitting: INTERNAL MEDICINE
Payer: COMMERCIAL

## 2022-10-14 VITALS
BODY MASS INDEX: 26.99 KG/M2 | DIASTOLIC BLOOD PRESSURE: 77 MMHG | SYSTOLIC BLOOD PRESSURE: 141 MMHG | RESPIRATION RATE: 16 BRPM | OXYGEN SATURATION: 93 % | TEMPERATURE: 98 F | HEIGHT: 65 IN | HEART RATE: 73 BPM | WEIGHT: 162 LBS

## 2022-10-14 DIAGNOSIS — R06.09 DOE (DYSPNEA ON EXERTION): ICD-10-CM

## 2022-10-14 DIAGNOSIS — R07.9 CHEST PAIN: ICD-10-CM

## 2022-10-14 DIAGNOSIS — R07.89 OTHER CHEST PAIN: ICD-10-CM

## 2022-10-14 DIAGNOSIS — R06.02 SHORTNESS OF BREATH: ICD-10-CM

## 2022-10-14 PROBLEM — Z98.890 STATUS POST CORONARY ANGIOGRAM: Status: ACTIVE | Noted: 2022-10-14

## 2022-10-14 LAB
ANION GAP SERPL CALCULATED.3IONS-SCNC: 6 MMOL/L (ref 3–14)
APTT PPP: 31 SECONDS (ref 22–38)
BUN SERPL-MCNC: 13 MG/DL (ref 7–30)
CALCIUM SERPL-MCNC: 8.8 MG/DL (ref 8.5–10.1)
CHLORIDE BLD-SCNC: 107 MMOL/L (ref 94–109)
CO2 SERPL-SCNC: 26 MMOL/L (ref 20–32)
CREAT SERPL-MCNC: 0.66 MG/DL (ref 0.52–1.04)
ERYTHROCYTE [DISTWIDTH] IN BLOOD BY AUTOMATED COUNT: 12.9 % (ref 10–15)
GFR SERPL CREATININE-BSD FRML MDRD: >90 ML/MIN/1.73M2
GLUCOSE BLD-MCNC: 109 MG/DL (ref 70–99)
HCT VFR BLD AUTO: 38.9 % (ref 35–47)
HGB BLD-MCNC: 12.8 G/DL (ref 11.7–15.7)
INR PPP: 0.97 (ref 0.85–1.15)
MCH RBC QN AUTO: 30.4 PG (ref 26.5–33)
MCHC RBC AUTO-ENTMCNC: 32.9 G/DL (ref 31.5–36.5)
MCV RBC AUTO: 92 FL (ref 78–100)
PLATELET # BLD AUTO: 385 10E3/UL (ref 150–450)
POTASSIUM BLD-SCNC: 4 MMOL/L (ref 3.4–5.3)
RBC # BLD AUTO: 4.21 10E6/UL (ref 3.8–5.2)
SODIUM SERPL-SCNC: 139 MMOL/L (ref 133–144)
WBC # BLD AUTO: 6.6 10E3/UL (ref 4–11)

## 2022-10-14 PROCEDURE — 93454 CORONARY ARTERY ANGIO S&I: CPT | Performed by: INTERNAL MEDICINE

## 2022-10-14 PROCEDURE — 36415 COLL VENOUS BLD VENIPUNCTURE: CPT | Performed by: INTERNAL MEDICINE

## 2022-10-14 PROCEDURE — 250N000009 HC RX 250: Performed by: INTERNAL MEDICINE

## 2022-10-14 PROCEDURE — 250N000011 HC RX IP 250 OP 636: Performed by: INTERNAL MEDICINE

## 2022-10-14 PROCEDURE — 93454 CORONARY ARTERY ANGIO S&I: CPT | Mod: 26 | Performed by: INTERNAL MEDICINE

## 2022-10-14 PROCEDURE — C1769 GUIDE WIRE: HCPCS | Performed by: INTERNAL MEDICINE

## 2022-10-14 PROCEDURE — 85730 THROMBOPLASTIN TIME PARTIAL: CPT | Performed by: INTERNAL MEDICINE

## 2022-10-14 PROCEDURE — 85027 COMPLETE CBC AUTOMATED: CPT | Performed by: INTERNAL MEDICINE

## 2022-10-14 PROCEDURE — 99152 MOD SED SAME PHYS/QHP 5/>YRS: CPT | Performed by: INTERNAL MEDICINE

## 2022-10-14 PROCEDURE — 999N000054 HC STATISTIC EKG NON-CHARGEABLE

## 2022-10-14 PROCEDURE — 999N000071 HC STATISTIC HEART CATH LAB OR EP LAB

## 2022-10-14 PROCEDURE — 258N000003 HC RX IP 258 OP 636: Performed by: INTERNAL MEDICINE

## 2022-10-14 PROCEDURE — 80048 BASIC METABOLIC PNL TOTAL CA: CPT | Performed by: INTERNAL MEDICINE

## 2022-10-14 PROCEDURE — 272N000001 HC OR GENERAL SUPPLY STERILE: Performed by: INTERNAL MEDICINE

## 2022-10-14 PROCEDURE — C1887 CATHETER, GUIDING: HCPCS | Performed by: INTERNAL MEDICINE

## 2022-10-14 PROCEDURE — 36591 DRAW BLOOD OFF VENOUS DEVICE: CPT

## 2022-10-14 PROCEDURE — 93005 ELECTROCARDIOGRAM TRACING: CPT

## 2022-10-14 PROCEDURE — C1894 INTRO/SHEATH, NON-LASER: HCPCS | Performed by: INTERNAL MEDICINE

## 2022-10-14 PROCEDURE — 93010 ELECTROCARDIOGRAM REPORT: CPT | Performed by: INTERNAL MEDICINE

## 2022-10-14 PROCEDURE — 85610 PROTHROMBIN TIME: CPT | Performed by: INTERNAL MEDICINE

## 2022-10-14 PROCEDURE — 999N000184 HC STATISTIC TELEMETRY

## 2022-10-14 RX ORDER — NALOXONE HYDROCHLORIDE 0.4 MG/ML
0.2 INJECTION, SOLUTION INTRAMUSCULAR; INTRAVENOUS; SUBCUTANEOUS
Status: DISCONTINUED | OUTPATIENT
Start: 2022-10-14 | End: 2022-10-14 | Stop reason: HOSPADM

## 2022-10-14 RX ORDER — ATROPINE SULFATE 0.1 MG/ML
0.5 INJECTION INTRAVENOUS
Status: DISCONTINUED | OUTPATIENT
Start: 2022-10-14 | End: 2022-10-14 | Stop reason: HOSPADM

## 2022-10-14 RX ORDER — FENTANYL CITRATE 50 UG/ML
25 INJECTION, SOLUTION INTRAMUSCULAR; INTRAVENOUS
Status: DISCONTINUED | OUTPATIENT
Start: 2022-10-14 | End: 2022-10-14 | Stop reason: HOSPADM

## 2022-10-14 RX ORDER — OXYCODONE HYDROCHLORIDE 5 MG/1
5 TABLET ORAL EVERY 4 HOURS PRN
Status: DISCONTINUED | OUTPATIENT
Start: 2022-10-14 | End: 2022-10-14 | Stop reason: HOSPADM

## 2022-10-14 RX ORDER — IOPAMIDOL 755 MG/ML
INJECTION, SOLUTION INTRAVASCULAR
Status: DISCONTINUED | OUTPATIENT
Start: 2022-10-14 | End: 2022-10-14 | Stop reason: HOSPADM

## 2022-10-14 RX ORDER — FENTANYL CITRATE 50 UG/ML
INJECTION, SOLUTION INTRAMUSCULAR; INTRAVENOUS
Status: DISCONTINUED | OUTPATIENT
Start: 2022-10-14 | End: 2022-10-14 | Stop reason: HOSPADM

## 2022-10-14 RX ORDER — NALOXONE HYDROCHLORIDE 0.4 MG/ML
0.4 INJECTION, SOLUTION INTRAMUSCULAR; INTRAVENOUS; SUBCUTANEOUS
Status: DISCONTINUED | OUTPATIENT
Start: 2022-10-14 | End: 2022-10-14 | Stop reason: HOSPADM

## 2022-10-14 RX ORDER — VERAPAMIL HYDROCHLORIDE 2.5 MG/ML
INJECTION, SOLUTION INTRAVENOUS
Status: DISCONTINUED | OUTPATIENT
Start: 2022-10-14 | End: 2022-10-14 | Stop reason: HOSPADM

## 2022-10-14 RX ORDER — SODIUM CHLORIDE 9 MG/ML
INJECTION, SOLUTION INTRAVENOUS CONTINUOUS
Status: DISCONTINUED | OUTPATIENT
Start: 2022-10-14 | End: 2022-10-14 | Stop reason: HOSPADM

## 2022-10-14 RX ORDER — LIDOCAINE 40 MG/G
CREAM TOPICAL
Status: DISCONTINUED | OUTPATIENT
Start: 2022-10-14 | End: 2022-10-14 | Stop reason: HOSPADM

## 2022-10-14 RX ORDER — HEPARIN SODIUM 1000 [USP'U]/ML
INJECTION, SOLUTION INTRAVENOUS; SUBCUTANEOUS
Status: DISCONTINUED | OUTPATIENT
Start: 2022-10-14 | End: 2022-10-14 | Stop reason: HOSPADM

## 2022-10-14 RX ORDER — ACETAMINOPHEN 325 MG/1
650 TABLET ORAL EVERY 4 HOURS PRN
Status: DISCONTINUED | OUTPATIENT
Start: 2022-10-14 | End: 2022-10-14 | Stop reason: HOSPADM

## 2022-10-14 RX ORDER — NITROGLYCERIN 5 MG/ML
VIAL (ML) INTRAVENOUS
Status: DISCONTINUED | OUTPATIENT
Start: 2022-10-14 | End: 2022-10-14 | Stop reason: HOSPADM

## 2022-10-14 RX ORDER — OXYCODONE HYDROCHLORIDE 5 MG/1
10 TABLET ORAL EVERY 4 HOURS PRN
Status: DISCONTINUED | OUTPATIENT
Start: 2022-10-14 | End: 2022-10-14 | Stop reason: HOSPADM

## 2022-10-14 RX ORDER — FLUMAZENIL 0.1 MG/ML
0.2 INJECTION, SOLUTION INTRAVENOUS
Status: DISCONTINUED | OUTPATIENT
Start: 2022-10-14 | End: 2022-10-14 | Stop reason: HOSPADM

## 2022-10-14 RX ADMIN — SODIUM CHLORIDE: 9 INJECTION, SOLUTION INTRAVENOUS at 08:50

## 2022-10-14 ASSESSMENT — ACTIVITIES OF DAILY LIVING (ADL)
ADLS_ACUITY_SCORE: 35

## 2022-10-14 NOTE — DISCHARGE INSTRUCTIONS
Cardiac Angiogram Discharge Instructions - Radial    After you go home:    Have an adult stay with you until tomorrow.  Drink extra fluids for 2 days.  You may resume your normal diet.  No smoking       For 24 hours - due to the sedation you received:  Relax and take it easy.  Do NOT make any important or legal decisions.  Do NOT drive or operate machines at home or at work.  Do NOT drink alcohol.    Care of Wrist Puncture Site:    For the first 24 hrs - check the puncture site every 1-2 hours while awake.  It is normal to have soreness at the puncture site and mild tingling in your hand for up to 3 days.  Remove the bandaid after 24 hours. If there is minor oozing, apply another bandaid and remove it after 12 hours.  You may shower tomorrow.  Do NOT take a bath, or use a hot tub or pool for at least 3 days. Do NOT scrub the site. Do not use lotion or powder near the puncture site.           Activity:        For 2 days:   do not use your hand or arm to support your weight (such as rising from a chair)   do not bend your wrist (such as lifting a garage door).  do not lift more than 5 pounds or exercise your arm (such as tennis, golf or bowling).  Do NOT do any heavy activity such as exercise, lifting, or straining.     Bleeding:    If you start bleeding from the site in your wrist, sit down and press firmly on/above the site for 10 minutes.   Once bleeding stops, keep arm still for 2 hours.   Call [facility] as soon as you can.       Call 911 right away if you have heavy bleeding or bleeding that does not stop.      Medicines:    Take your medications, including blood thinners, unless your provider tells you not to.    If you have stopped any medicines, check with your provider about when to restart them.    Follow Up Appointments:    Follow up with [facility] Nurse Practitioner at [facility] of patient preference in 7-10 days.    Call the clinic if:    You have a large or growing hard lump around the site.  The  site is red, swollen, hot or tender.  Blood or fluid is draining from the site.  You have chills or a fever greater than 101 F (38 C).  Your arm feels numb, cool or changes color.  You have hives, a rash or unusual itching.  Any questions or concerns.    [facility] [place]:    [phone] UMP (7 days a week)

## 2022-10-14 NOTE — Clinical Note
Potential access sites were evaluated for patency using ultrasound.   The right radial artery was selected. Access was obtained under with Sonosite guidance using a standard 18 guage needle with direct visualization of needle entry.

## 2022-10-14 NOTE — PROGRESS NOTES
Care Suites Post Procedure Note    Patient Information  Name: Carolina Hernandez  Age: 57 year old    Post Procedure  Time patient returned to Care Suites: 1345  Concerns/abnormal assessment: none  If abnormal assessment, provider notified: N/A  Plan/Other: Recovery from sedation, close monitoring of VS and puncture site as ordered, discharge education, bedrest x 1 hour, TR band removal per protocol, home at 1630 if stable.      Shawanda Bennett RN

## 2022-10-14 NOTE — DISCHARGE SUMMARY
Care Suites Discharge Nursing Note    Patient Information  Name: Carolina Hernandez  Age: 57 year old    Discharge Education:  Discharge instructions reviewed: Yes  Additional education/resources provided: no  Patient/patient representative verbalizes understanding: Yes  Patient discharging on new medications: No  Medication education completed: N/A    Discharge Plans:   Discharge location: home  Discharge ride contacted: Yes  Approximate discharge time: 1640    Discharge Criteria:  Discharge criteria met and vital signs stable: Yes    Patient Belongs:  Patient belongings returned to patient: Yes    Fidel Farmer RN

## 2022-10-18 LAB
ATRIAL RATE - MUSE: 69 BPM
DIASTOLIC BLOOD PRESSURE - MUSE: NORMAL MMHG
INTERPRETATION ECG - MUSE: NORMAL
P AXIS - MUSE: 39 DEGREES
PR INTERVAL - MUSE: 124 MS
QRS DURATION - MUSE: 72 MS
QT - MUSE: 442 MS
QTC - MUSE: 473 MS
R AXIS - MUSE: 50 DEGREES
SYSTOLIC BLOOD PRESSURE - MUSE: NORMAL MMHG
T AXIS - MUSE: 63 DEGREES
VENTRICULAR RATE- MUSE: 69 BPM

## 2022-10-20 ENCOUNTER — OFFICE VISIT (OUTPATIENT)
Dept: PODIATRY | Facility: CLINIC | Age: 57
End: 2022-10-20
Attending: FAMILY MEDICINE
Payer: COMMERCIAL

## 2022-10-20 ENCOUNTER — OFFICE VISIT (OUTPATIENT)
Dept: FAMILY MEDICINE | Facility: CLINIC | Age: 57
End: 2022-10-20
Payer: COMMERCIAL

## 2022-10-20 VITALS
TEMPERATURE: 98.7 F | BODY MASS INDEX: 27.36 KG/M2 | SYSTOLIC BLOOD PRESSURE: 134 MMHG | WEIGHT: 164.2 LBS | OXYGEN SATURATION: 98 % | HEART RATE: 82 BPM | RESPIRATION RATE: 12 BRPM | DIASTOLIC BLOOD PRESSURE: 80 MMHG | HEIGHT: 65 IN

## 2022-10-20 VITALS
SYSTOLIC BLOOD PRESSURE: 152 MMHG | WEIGHT: 164 LBS | DIASTOLIC BLOOD PRESSURE: 88 MMHG | BODY MASS INDEX: 27.32 KG/M2 | HEIGHT: 65 IN

## 2022-10-20 DIAGNOSIS — Z23 NEED FOR VACCINATION: ICD-10-CM

## 2022-10-20 DIAGNOSIS — D36.10 NEUROMA: Primary | ICD-10-CM

## 2022-10-20 DIAGNOSIS — Z23 NEED FOR PROPHYLACTIC VACCINATION AND INOCULATION AGAINST INFLUENZA: ICD-10-CM

## 2022-10-20 DIAGNOSIS — M77.51 CAPSULITIS OF METATARSOPHALANGEAL (MTP) JOINT OF RIGHT FOOT: Primary | ICD-10-CM

## 2022-10-20 DIAGNOSIS — J32.9 CHRONIC SINUSITIS, UNSPECIFIED LOCATION: ICD-10-CM

## 2022-10-20 DIAGNOSIS — Z23 HIGH PRIORITY FOR 2019-NCOV VACCINE: ICD-10-CM

## 2022-10-20 PROCEDURE — 90682 RIV4 VACC RECOMBINANT DNA IM: CPT | Performed by: FAMILY MEDICINE

## 2022-10-20 PROCEDURE — 91313 COVID-19,PF,MODERNA BIVALENT: CPT | Performed by: FAMILY MEDICINE

## 2022-10-20 PROCEDURE — 90471 IMMUNIZATION ADMIN: CPT | Performed by: FAMILY MEDICINE

## 2022-10-20 PROCEDURE — 90714 TD VACC NO PRESV 7 YRS+ IM: CPT | Performed by: FAMILY MEDICINE

## 2022-10-20 PROCEDURE — 0134A COVID-19,PF,MODERNA BIVALENT: CPT | Performed by: FAMILY MEDICINE

## 2022-10-20 PROCEDURE — 99203 OFFICE O/P NEW LOW 30 MIN: CPT | Performed by: PODIATRIST

## 2022-10-20 PROCEDURE — 99213 OFFICE O/P EST LOW 20 MIN: CPT | Mod: 25 | Performed by: FAMILY MEDICINE

## 2022-10-20 PROCEDURE — 90472 IMMUNIZATION ADMIN EACH ADD: CPT | Performed by: FAMILY MEDICINE

## 2022-10-20 PROCEDURE — 90677 PCV20 VACCINE IM: CPT | Performed by: FAMILY MEDICINE

## 2022-10-20 RX ORDER — MELOXICAM 7.5 MG/1
7.5 TABLET ORAL DAILY
Qty: 30 TABLET | Refills: 1 | Status: SHIPPED | OUTPATIENT
Start: 2022-10-20 | End: 2022-10-27

## 2022-10-20 RX ORDER — FLUTICASONE PROPIONATE 50 MCG
1 SPRAY, SUSPENSION (ML) NASAL DAILY
Qty: 1 G | Refills: 11 | Status: SHIPPED | OUTPATIENT
Start: 2022-10-20 | End: 2023-04-04

## 2022-10-20 ASSESSMENT — ENCOUNTER SYMPTOMS
PSYCHIATRIC NEGATIVE: 1
MUSCULOSKELETAL NEGATIVE: 1
NEUROLOGICAL NEGATIVE: 1
EYES NEGATIVE: 1
SHORTNESS OF BREATH: 1
RHINORRHEA: 1
GASTROINTESTINAL NEGATIVE: 1
ALLERGIC/IMMUNOLOGIC NEGATIVE: 1
HEMATOLOGIC/LYMPHATIC NEGATIVE: 1
CONSTITUTIONAL NEGATIVE: 1
ENDOCRINE NEGATIVE: 1
SINUS PRESSURE: 1

## 2022-10-20 ASSESSMENT — PAIN SCALES - GENERAL: PAINLEVEL: WORST PAIN (10)

## 2022-10-20 NOTE — PROGRESS NOTES
PATIENT HISTORY:  Carolina Hernandez is a 57 year old female who presents to clinic in consultation at the request of  Julia Oneill M.D. with a chief complaint of acute right foot pain.  The patient is seen by themselves.  The patient relates the pain is primarily located around the forefoot, between second and third toes.  Reports insidious onset without acute precipitating event. that has been going on for 2 month(s).  The patient has previously tried epsom salt soaks, alternate footwear, and ice with little relief.  Denies any prior history of similar issues..  The patient is retired.  Any previous notes and studies that pertain to the patient's condition were reviewed.    Pertinent medical, surgical and family history was reviewed in the [facility] chart.    Past Medical History:   Past Medical History:   Diagnosis Date     Asthma      C. difficile colitis      Depression      Depressive disorder Have had it most of my life     Moderate persistent asthma with exacerbation 9/18/2007     Osteoarthritis      Sinusitis, chronic      Status post coronary angiogram 10/14/2022       Medications:   Current Outpatient Medications:      albuterol (PROVENTIL) (2.5 MG/3ML) 0.083% neb solution, inhale 1 vial (2.5 mg) by nebulization every 4 hours as needed for shortness of breath / dyspnea or wheezing, Disp: 225 mL, Rfl: 11     albuterol (VENTOLIN HFA) 108 (90 Base) MCG/ACT inhaler, INHALE 1-2 PUFFS INTO THE LUNGS EVERY 4 HOURS AS NEEDED FOR SHORTNESS OF BREATH/DYSPNEA OR WHEEZING ., Disp: 18 g, Rfl: 11     amLODIPine (NORVASC) 2.5 MG tablet, Take 2 tablets (5 mg) by mouth once daily., Disp: 180 tablet, Rfl: 0     aspirin (ASA) 81 MG EC tablet, Take 1 tablet (81 mg) by mouth daily, Disp: , Rfl:      carboxymethylcellulose PF (REFRESH PLUS) 0.5 % ophthalmic solution, 1 drop 3 times daily as needed for dry eyes, Disp: , Rfl:      cetirizine (ZYRTEC) 10 MG tablet, Take 10 mg by mouth daily, Disp: , Rfl:      FLUoxetine  "(PROZAC) 20 MG capsule, Take 1 capsule (20 mg) by mouth daily, Disp: 90 capsule, Rfl: 3     FLUoxetine (PROZAC) 40 MG capsule, Take 1 capsule (40 mg) by mouth daily Take with 20 mg tab for a total of 60 mg daily, Disp: 90 capsule, Rfl: 3     fluticasone (FLONASE) 50 MCG/ACT nasal spray, Spray 1 spray into both nostrils daily, Disp: 1 g, Rfl: 11     fluticasone-salmeterol (ADVAIR) 500-50 MCG/ACT inhaler, Inhale 1 puff into the lungs every 12 hours, Disp: 1 each, Rfl: 11     montelukast (SINGULAIR) 10 MG tablet, Take 1 tablet (10 mg) by mouth At Bedtime, Disp: 90 tablet, Rfl: 3     omeprazole 20 MG tablet, Take 2 tablets (40 mg) by mouth daily, Disp: 90 tablet, Rfl: 3     Psyllium Husk 100 % POWD, Taking daily., Disp: , Rfl:      tiotropium (SPIRIVA) 18 MCG inhaled capsule, Inhale 1 capsule (18 mcg) into the lungs daily, Disp: 30 capsule, Rfl: 11     Allergies:    Allergies   Allergen Reactions     Doxycycline Difficulty breathing     Penicillins Itching       Vitals: BP (!) 152/88   Ht 1.651 m (5' 5\")   Wt 74.4 kg (164 lb)   BMI 27.29 kg/m    BMI= Body mass index is 27.29 kg/m .    LOWER EXTREMITY PHYSICAL EXAM    Dermatologic: Skin is intact to right lower extremity without significant lesions, rash or abrasion.        Vascular: DP & PT pulses are intact & regular on the right.   CFT and skin temperature is normal to the right lower extremity.     Neurologic: Lower extremity sensation is intact to light touch.  No evidence of weakness in the right lower extremity.        Musculoskeletal: Patient is ambulatory without assistive device or brace.  No gross ankle deformity noted.  No foot or ankle joint effusion is noted.  Noted pain on palpation on the plantar aspect of the central metatarsal phalangeal joints on the right.  No surrounding erythema or edema noted.  Negative Tinel's sign.       ASSESSMENT / PLAN:     ICD-10-CM    1. Capsulitis of metatarsophalangeal (MTP) joint of right foot  M77.51 Ankle/Foot " [employer] DME     DISCONTINUED: meloxicam (MOBIC) 7.5 MG tablet          I have explained to Carolina about the conditions.  We discussed the underlying contributing factors to the condition as well as treatment options along with expected length of recovery.  At this time, the patient was educated on the importance of offloading supportive shoes and other devices.  I demonstrated to the patient calf stretches to perform every hour daily until symptoms resolve.  After symptoms resolve, the patient was advised to perform the stretches 3 times daily to prevent future recurrence.  The patient was instructed to perform warm soaks with Epson salt after which to also apply over-the-counter Voltaren gel to deeply massage the injured tissue.  The patient was instructed to do this on a daily basis until symptoms resolve.  The patient was fitted with a Dynaflex insert that will aid in offloading the tension forces to the soft tissues and prevent further inflammation.  To reduce the amount of current inflammation, the patient was prescribed Mobic 7.5 mg to be taken daily with food and instructed to stop taking if any stomach irritation or swelling in extremities are noted.  The patient may return in four weeks for reevaluation to determine if any further treatment will be needed.        Carolina verbalized agreement with and understanding of the rational for the diagnosis and treatment plan.  All questions were answered to best of my ability and the patient's satisfaction. The patient was advised to contact the clinic with any questions that may arise after the clinic visit.      Disclaimer: This note consists of symbols derived from keyboarding, dictation and/or voice recognition software. As a result, there may be errors in the script that have gone undetected. Please consider this when interpreting information found in this chart.       CESAR Alarcon D.P.M., F.GALINDO.C.F.A.S.

## 2022-10-20 NOTE — PATIENT INSTRUCTIONS

## 2022-10-20 NOTE — LETTER
10/20/2022         RE: Carolina Hernandez  [address] [place] [id number]        Dear Colleague,    Thank you for referring your patient, Carolina Hernandez, to the [facility] [place]. Please see a copy of my visit note below.    PATIENT HISTORY:  Carolina Hernandez is a 57 year old female who presents to clinic in consultation at the request of  Julia Oneill M.D. with a chief complaint of acute right foot pain.  The patient is seen by themselves.  The patient relates the pain is primarily located around the forefoot, between second and third toes.  Reports insidious onset without acute precipitating event. that has been going on for 2 month(s).  The patient has previously tried epsom salt soaks, alternate footwear, and ice with little relief.  Denies any prior history of similar issues..  The patient is retired.  Any previous notes and studies that pertain to the patient's condition were reviewed.    Pertinent medical, surgical and family history was reviewed in the [facility] chart.    Past Medical History:   Past Medical History:   Diagnosis Date     Asthma      C. difficile colitis      Depression      Depressive disorder Have had it most of my life     Moderate persistent asthma with exacerbation 9/18/2007     Osteoarthritis      Sinusitis, chronic      Status post coronary angiogram 10/14/2022       Medications:   Current Outpatient Medications:      albuterol (PROVENTIL) (2.5 MG/3ML) 0.083% neb solution, inhale 1 vial (2.5 mg) by nebulization every 4 hours as needed for shortness of breath / dyspnea or wheezing, Disp: 225 mL, Rfl: 11     albuterol (VENTOLIN HFA) 108 (90 Base) MCG/ACT inhaler, INHALE 1-2 PUFFS INTO THE LUNGS EVERY 4 HOURS AS NEEDED FOR SHORTNESS OF BREATH/DYSPNEA OR WHEEZING ., Disp: 18 g, Rfl: 11     amLODIPine (NORVASC) 2.5 MG tablet, Take 2 tablets (5 mg) by mouth once daily., Disp: 180 tablet, Rfl: 0     aspirin (ASA) 81 MG EC tablet, Take 1  "tablet (81 mg) by mouth daily, Disp: , Rfl:      carboxymethylcellulose PF (REFRESH PLUS) 0.5 % ophthalmic solution, 1 drop 3 times daily as needed for dry eyes, Disp: , Rfl:      cetirizine (ZYRTEC) 10 MG tablet, Take 10 mg by mouth daily, Disp: , Rfl:      FLUoxetine (PROZAC) 20 MG capsule, Take 1 capsule (20 mg) by mouth daily, Disp: 90 capsule, Rfl: 3     FLUoxetine (PROZAC) 40 MG capsule, Take 1 capsule (40 mg) by mouth daily Take with 20 mg tab for a total of 60 mg daily, Disp: 90 capsule, Rfl: 3     fluticasone (FLONASE) 50 MCG/ACT nasal spray, Spray 1 spray into both nostrils daily, Disp: 1 g, Rfl: 11     fluticasone-salmeterol (ADVAIR) 500-50 MCG/ACT inhaler, Inhale 1 puff into the lungs every 12 hours, Disp: 1 each, Rfl: 11     montelukast (SINGULAIR) 10 MG tablet, Take 1 tablet (10 mg) by mouth At Bedtime, Disp: 90 tablet, Rfl: 3     omeprazole 20 MG tablet, Take 2 tablets (40 mg) by mouth daily, Disp: 90 tablet, Rfl: 3     Psyllium Husk 100 % POWD, Taking daily., Disp: , Rfl:      tiotropium (SPIRIVA) 18 MCG inhaled capsule, Inhale 1 capsule (18 mcg) into the lungs daily, Disp: 30 capsule, Rfl: 11     Allergies:    Allergies   Allergen Reactions     Doxycycline Difficulty breathing     Penicillins Itching       Vitals: BP (!) 152/88   Ht 1.651 m (5' 5\")   Wt 74.4 kg (164 lb)   BMI 27.29 kg/m    BMI= Body mass index is 27.29 kg/m .    LOWER EXTREMITY PHYSICAL EXAM    Dermatologic: Skin is intact to right lower extremity without significant lesions, rash or abrasion.        Vascular: DP & PT pulses are intact & regular on the right.   CFT and skin temperature is normal to the right lower extremity.     Neurologic: Lower extremity sensation is intact to light touch.  No evidence of weakness in the right lower extremity.        Musculoskeletal: Patient is ambulatory without assistive device or brace.  No gross ankle deformity noted.  No foot or ankle joint effusion is noted.  Noted pain on palpation on " the plantar aspect of the central metatarsal phalangeal joints on the right.  No surrounding erythema or edema noted.  Negative Tinel's sign.       ASSESSMENT / PLAN:     ICD-10-CM    1. Capsulitis of metatarsophalangeal (MTP) joint of right foot  M77.51 Ankle/Foot Bracing Supplies DME     DISCONTINUED: meloxicam (MOBIC) 7.5 MG tablet          I have explained to Carolina about the conditions.  We discussed the underlying contributing factors to the condition as well as treatment options along with expected length of recovery.  At this time, the patient was educated on the importance of offloading supportive shoes and other devices.  I demonstrated to the patient calf stretches to perform every hour daily until symptoms resolve.  After symptoms resolve, the patient was advised to perform the stretches 3 times daily to prevent future recurrence.  The patient was instructed to perform warm soaks with Epson salt after which to also apply over-the-counter Voltaren gel to deeply massage the injured tissue.  The patient was instructed to do this on a daily basis until symptoms resolve.  The patient was fitted with a Dynaflex insert that will aid in offloading the tension forces to the soft tissues and prevent further inflammation.  To reduce the amount of current inflammation, the patient was prescribed Mobic 7.5 mg to be taken daily with food and instructed to stop taking if any stomach irritation or swelling in extremities are noted.  The patient may return in four weeks for reevaluation to determine if any further treatment will be needed.        Carolina verbalized agreement with and understanding of the rational for the diagnosis and treatment plan.  All questions were answered to best of my ability and the patient's satisfaction. The patient was advised to contact the clinic with any questions that may arise after the clinic visit.      Disclaimer: This note consists of symbols derived from keyboarding, dictation and/or  voice recognition software. As a result, there may be errors in the script that have gone undetected. Please consider this when interpreting information found in this chart.       CESAR Alarcon D.P.M., F.A.C.F.A.S.        Again, thank you for allowing me to participate in the care of your patient.        Sincerely,        Jean-Claude Alarcon DPM

## 2022-10-20 NOTE — PROGRESS NOTES
Assessment & Plan     Neuroma  - Orthopedic  Referral; Future    Need for vaccination  - Pneumococcal 20 Valent Conjugate (Prevnar 20)  - INFLUENZA QUAD, RECOMBINANT, P-FREE (RIV4) (FLUBLOK)  - COVID-19,PF,MODERNA BIVALENT 18+Yrs  - TD, PF, 7+YRS (TENIVAC/DECAVAC)    Need for prophylactic vaccination and inoculation against influenza  Immunization updated.    High priority for 2019-nCoV vaccine  Immunization updated.     Chronic sinusitis, unspecified location  Faxed some nasal spray to the pharmacy   - fluticasone (FLONASE) 50 MCG/ACT nasal spray; Spray 1 spray into both nostrils daily        FUTURE APPOINTMENTS:       - Follow-up visit in one month or sooner as needed.    Return in about 4 weeks (around 11/17/2022) for Follow up.    Julia Oneill MD  [facility]    Thor Figueroa is a 57 year old accompanied by her self, presenting for the following health issues:  Health Maintenance (Due for shingles flu COVID pcv 20 TD), ER F/U, Hypertension, Pain (Right foot ), Imm/Inj (Flu Shot), and Imm/Inj (COVID-19 VACCINE)      HPI   She has a 57-year-old here follow-up from the hospital. She was seen for shortness of breath and had a  Heart cath done which showed mild nonobstructive coronary artery disease.  She is following up with a cardiologist tomorrow.        She also has underlying COPD and follows with the pulmonologist.      Other concerns today is right foot pain she has had pain in the right foot for couple of months pain is localized between her second and third toes.  It is worse with walking.  There is no swelling and no history of any trauma.      She also wants to talk about her sinus issues which she has all the time, she has sinus headaches and a lot of nasal drainage.  She uses a sinus rinse almost daily.  She has had chronic sinusitis for years.  She is on an allergy medications I recommended that she add Flonase which was faxed to the pharmacy.      No  other complaints today.      We did talk briefly about her blood pressure.  When I saw her in June I had discontinued hydrochlorothiazide because her blood pressures were running low.  Blood pressure reading today is fair she is on amlodipine 10 mg and appears to be tolerating it.  No other complaints today.    Chief Complaint   Patient presents with     Health Maintenance     Due for shingles flu COVID pcv 20 TD     ER F/U     Hypertension     Pain     Right foot      Imm/Inj     Flu Shot     Imm/Inj     COVID-19 VACCINE       ED/UC Followup:    Facility:  [facility]  Date of visit: 10/14/22  Reason for visit: SOB, coronary angiogram   Current Status: still have SOB is better since saw pulmonology and started new inhaler      Hypertension Follow-up      Do you check your blood pressure regularly outside of the clinic? No     Are you following a low salt diet? No    Are your blood pressures ever more than 140 on the top number (systolic) OR more   than 90 on the bottom number (diastolic), for example 140/90? No    Pain History:  When did you first notice your pain? - Acute Pain   Have you seen anyone else for your pain? No  Where in your body do you have pain? Musculoskeletal problem/pain  Onset/Duration: 2 months  Description  Location: foot - right  Joint Swelling: YES  Redness: YES  Pain: YES  Warmth: YES- sometimes  Intensity:  severe  Progression of Symptoms:  same  Accompanying signs and symptoms:   Fevers: No  Numbness/tingling/weakness: No  History  Trauma to the area: No  Recent illness:  No  Previous similar problem: No  Previous evaluation:  No  Precipitating or alleviating factors:  Aggravating factors include: standing, walking, climbing stairs, lifting, exercise and overuse  Therapies tried and outcome: rest/inactivity, heat, ice, massage, stretching, exercises, acetaminophen and Ibuprofen        How many servings of fruits and vegetables do you eat daily?  2-3    On average, how many sweetened  "beverages do you drink each day (Examples: soda, juice, sweet tea, etc.  Do NOT count diet or artificially sweetened beverages)?   0    How many days per week do you exercise enough to make your heart beat faster? 0    How many minutes a day do you exercise enough to make your heart beat faster? 0    How many days per week do you miss taking your medication? 0      Review of Systems   Constitutional: Negative.    HENT: Positive for congestion, rhinorrhea and sinus pressure.    Eyes: Negative.    Respiratory: Positive for shortness of breath.    Cardiovascular: Positive for chest pain.   Gastrointestinal: Negative.    Endocrine: Negative.    Breasts:  negative.    Genitourinary: Negative.    Musculoskeletal: Negative.    Skin: Negative.    Allergic/Immunologic: Negative.    Neurological: Negative.    Hematological: Negative.    Psychiatric/Behavioral: Negative.             Objective    /80 (BP Location: Left arm, Patient Position: Sitting, Cuff Size: Adult Regular)   Pulse 82   Temp 98.7  F (37.1  C) (Tympanic)   Resp 12   Ht 1.651 m (5' 5\")   Wt 74.5 kg (164 lb 3.2 oz)   SpO2 98%   BMI 27.32 kg/m    Body mass index is 27.32 kg/m .  Physical Exam  Constitutional:       Appearance: Normal appearance.   HENT:      Head: Normocephalic and atraumatic.      Mouth/Throat:      Mouth: Mucous membranes are moist.   Eyes:      Extraocular Movements: Extraocular movements intact.      Pupils: Pupils are equal, round, and reactive to light.   Cardiovascular:      Rate and Rhythm: Normal rate and regular rhythm.      Pulses: Normal pulses.      Heart sounds: Normal heart sounds.   Pulmonary:      Effort: Pulmonary effort is normal.      Breath sounds: Normal breath sounds.   Abdominal:      General: Abdomen is flat. Bowel sounds are normal.      Palpations: Abdomen is soft.   Musculoskeletal:         General: Normal range of motion.        Feet:    Feet:      Right foot:      Skin integrity: Skin integrity normal. "      Left foot:      Skin integrity: Skin integrity normal.      Comments: Pain in between second and third toe on the right foot  Skin:     General: Skin is warm and dry.   Neurological:      Mental Status: She is alert and oriented to person, place, and time.   Psychiatric:         Mood and Affect: Mood normal.         Behavior: Behavior normal.

## 2022-10-21 ENCOUNTER — OFFICE VISIT (OUTPATIENT)
Dept: CARDIOLOGY | Facility: CLINIC | Age: 57
End: 2022-10-21
Payer: COMMERCIAL

## 2022-10-21 VITALS
DIASTOLIC BLOOD PRESSURE: 86 MMHG | WEIGHT: 165.6 LBS | SYSTOLIC BLOOD PRESSURE: 141 MMHG | OXYGEN SATURATION: 96 % | BODY MASS INDEX: 27.56 KG/M2 | HEART RATE: 78 BPM

## 2022-10-21 DIAGNOSIS — E78.5 HYPERLIPIDEMIA LDL GOAL <70: ICD-10-CM

## 2022-10-21 DIAGNOSIS — I25.10 NONOBSTRUCTIVE ATHEROSCLEROSIS OF CORONARY ARTERY: Primary | ICD-10-CM

## 2022-10-21 DIAGNOSIS — I10 BENIGN ESSENTIAL HYPERTENSION: ICD-10-CM

## 2022-10-21 DIAGNOSIS — R06.09 DOE (DYSPNEA ON EXERTION): ICD-10-CM

## 2022-10-21 DIAGNOSIS — R07.89 OTHER CHEST PAIN: ICD-10-CM

## 2022-10-21 PROCEDURE — 99214 OFFICE O/P EST MOD 30 MIN: CPT | Performed by: NURSE PRACTITIONER

## 2022-10-21 RX ORDER — ROSUVASTATIN CALCIUM 20 MG/1
20 TABLET, COATED ORAL DAILY
Qty: 30 TABLET | Refills: 11 | Status: ON HOLD | OUTPATIENT
Start: 2022-10-21 | End: 2022-11-01

## 2022-10-21 NOTE — PATIENT INSTRUCTIONS
Medication Changes:  START rosuvastatin 20 mg daily   START aspirin 81 mg daily     Recommendations:  Check blood pressure at least 1 hour after medications. Call the clinic if your blood pressure is consistently greater than 130/80.    Call my nurse with average blood pressure readings after 1-2 weeks   150 mins/ week of exercise     Follow-up:  Follow-up with primary care about chest discomfort   Fasting lab in 2 months (lipid/ALT) after starting statin  See Dr. Chapa for cardiology follow up at [facility]: Oct 2023.   Call 6 months prior, to schedule.     Cardiology Scheduling~[phone]  Cardiology Clinic RN~[phone] (Sheila RN, Roxanne RN, Unique RN)

## 2022-10-21 NOTE — LETTER
10/21/2022    Julia Oneill MD  [address] [facility] [address]    RE: Carolina Hernandez       Dear Colleague,     I had the pleasure of seeing Carolina Hernandez in the [facility].  Cardiology Clinic Progress Note  Carolina Hernandez MRN# [id number]   YOB: 1965 Age: 57 year old      Primary Cardiologist:   Dr. Chapa          History of Presenting Illness:      Carolina Hernandez is a pleasant 57 year old patient with a past cardiac history significant for   1. Nonobstructive CAD    Angio 2022 40% mid LAD, 30% mid LCx  2. Hypertension  3. Hyperlipidemia  Past medical history significant for COPD and asthma, prior tobacco use with cessation 2021.     Patient with history of moderate COPD and severe persistent asthma.  She follows with pulmonology and was seen September 2022 with adjustments to her medications.  She was referred to cardiology for exertional chest discomfort.    Patient was seen by Dr. Chapa in consultation on 9/29/2022.  Her dyspnea on exertion had improved with pulmonary medication changes.  PFTs 2021 showed moderate to severe airflow obstruction.  She had an episode of chest discomfort 2 weeks prior to her visit.  This occurred after biking and was associated with weakness and diaphoresis.  Symptoms resolved spontaneously. He reviewed chest CT which showed moderate to severe coronary artery calcification.  Recommend proceeding with angiogram.  She was started on aspirin.    Pt presents today for Angiogram follow-up. Coronary angiogram 10/14/2022 showing mild nonobstructive CAD with 40% mid LAD, 30% mid LCx. Echocardiogram 10/12/2022 showing normal EF 55 to 60%, normal RV, no significant valvular disease. BMP 10/14/2022 showing normal renal function and electrolytes.  Results reviewed today.      Blood pressure today is elevated even on recheck at 141/86.  She reports home blood pressures are better controlled.  She is agreeable to checking them and call my  nurse with updated readings.  If they remain elevated we can uptitrate her amlodipine.  She continues with chest discomfort only occasionally, which can happen at rest or with exertion.  If this continues, I asked her to follow-up with PCP.  She also notes occasional heartburn.  She was not aware that she was supposed to start baby aspirin so we will start this.  She is agreeable to statin therapy and we discussed the benefits of this.  Right radial angiogram site has a moderate amount of ecchymosis and is without bleeding, hematoma, or bruit. Patient reports no PND, orthopnea, presyncope, syncope, edema, heart racing, or palpitations.    Labs:  LIPID RESULTS:  Lab Results   Component Value Date    CHOL 232 (H) 11/20/2020    HDL 98 11/20/2020    LDL 88 11/20/2020    TRIG 232 (H) 11/20/2020       LIVER ENZYME RESULTS:  Lab Results   Component Value Date    AST 16 12/10/2019    AST 21 10/25/2019    ALT 15 12/10/2019    ALT 26 10/25/2019       CBC RESULTS:  Lab Results   Component Value Date    WBC 6.6 10/14/2022    WBC 8.3 07/05/2021    RBC 4.21 10/14/2022    RBC 4.32 07/05/2021    HGB 12.8 10/14/2022    HGB 13.3 07/05/2021    HCT 38.9 10/14/2022    HCT 38.1 07/05/2021    MCV 92 10/14/2022    MCV 88 07/05/2021    MCH 30.4 10/14/2022    MCH 30.8 07/05/2021    MCHC 32.9 10/14/2022    MCHC 34.9 07/05/2021    RDW 12.9 10/14/2022    RDW 12.5 07/05/2021     10/14/2022     07/05/2021       BMP RESULTS:  Lab Results   Component Value Date     10/14/2022     01/26/2021    POTASSIUM 4.0 10/14/2022    POTASSIUM 4.1 01/26/2021    CHLORIDE 107 10/14/2022    CHLORIDE 104 01/26/2021    CO2 26 10/14/2022    CO2 27 01/26/2021    ANIONGAP 6 10/14/2022    ANIONGAP 4 01/26/2021     (H) 10/14/2022     (H) 01/26/2021    BUN 13 10/14/2022    BUN 14 01/26/2021    CR 0.66 10/14/2022    CR 0.72 01/26/2021    GFRESTIMATED >90 10/14/2022    GFRESTIMATED >90 01/26/2021    GFRESTBLACK >90 01/26/2021    REILLY  8.8 10/14/2022    REILLY 9.1 01/26/2021        A1C RESULTS:  No results found for: A1C    INR RESULTS:  Lab Results   Component Value Date    INR 0.97 10/14/2022    INR 1.09 12/10/2019       Results reviewed today.       Current Cardiac Medications     Amlodipine 5 mg daily  Fish oil                     Assessment and Plan:       Plan    Patient Instructions   Medication Changes:  4. START rosuvastatin 20 mg daily   5. START aspirin 81 mg daily     Recommendations:  1. Check blood pressure at least 1 hour after medications. Call the clinic if your blood pressure is consistently greater than 130/80.    2. Call my nurse with average blood pressure readings after 1-2 weeks   3. 150 mins/ week of exercise     Follow-up:  1. Follow-up with primary care about chest discomfort   2. Fasting lab in 2 months (lipid/ALT) after starting statin  3. See Dr. Chapa for cardiology follow up at [facility]: Oct 2023.   Call 6 months prior, to schedule.     Cardiology Scheduling~[phone]  Cardiology Clinic RN~[phone] (Sheila RN, Roxanne RN, Unique RN)            1. Nonobstructive CAD    No angina    Continue aspirin, CCB, start statin       2. Hypertension    Not Controlled    Continue amlodipine       3. Hyperlipidemia    Last LDL 88 in 2020 - Not on statin therapy    Start rosuvastatin 20 mg daily             Thank you for allowing me to participate in this delightful patient's care.      This note was completed in part using Dragon voice recognition software. Although reviewed after completion, some word and grammatical errors may occur.    Marilee Veliz, APRN CNP, APRN, CNP           Data:   All laboratory data reviewed            Constitutional:  cooperative, alert and oriented, well developed, well nourished, in no acute distress  overweight    Skin:  warm and dry to the touch         Head:  normocephalic       Eyes:  pupils equal and round       ENT:  no pallor or cyanosis       Neck:  no stiffness        Respiratory:  clear to auscultation; normal symmetry        Cardiac: regular rate and rhythm; normal S1 and S2                 pulses full and equal     GI:  abdomen soft, nondistended     Extremities and Muscular Skeletal:  no edema        Neurological:  affect appropriate; no gross motor deficits       Psych:  Alert and Oriented x 3 , appropriate affact    Thank you for allowing me to participate in the care of your patient.      Sincerely,     VALERIA Krishnan [facility]  cc:   No referring provider defined for this encounter.

## 2022-10-21 NOTE — PROGRESS NOTES
Cardiology Clinic Progress Note  Carolina Hernandez MRN# [id number]   YOB: 1965 Age: 57 year old      Primary Cardiologist:   Dr. Chapa          History of Presenting Illness:      Carolina Hernandez is a pleasant 57 year old patient with a past cardiac history significant for   1. Nonobstructive CAD    Angio 2022 40% mid LAD, 30% mid LCx  2. Hypertension  3. Hyperlipidemia  Past medical history significant for COPD and asthma, prior tobacco use with cessation 2021.     Patient with history of moderate COPD and severe persistent asthma.  She follows with pulmonology and was seen September 2022 with adjustments to her medications.  She was referred to cardiology for exertional chest discomfort.    Patient was seen by Dr. Chapa in consultation on 9/29/2022.  Her dyspnea on exertion had improved with pulmonary medication changes.  PFTs 2021 showed moderate to severe airflow obstruction.  She had an episode of chest discomfort 2 weeks prior to her visit.  This occurred after biking and was associated with weakness and diaphoresis.  Symptoms resolved spontaneously. He reviewed chest CT which showed moderate to severe coronary artery calcification.  Recommend proceeding with angiogram.  She was started on aspirin.    Pt presents today for Angiogram follow-up. Coronary angiogram 10/14/2022 showing mild nonobstructive CAD with 40% mid LAD, 30% mid LCx. Echocardiogram 10/12/2022 showing normal EF 55 to 60%, normal RV, no significant valvular disease. BMP 10/14/2022 showing normal renal function and electrolytes.  Results reviewed today.      Blood pressure today is elevated even on recheck at 141/86.  She reports home blood pressures are better controlled.  She is agreeable to checking them and call my nurse with updated readings.  If they remain elevated we can uptitrate her amlodipine.  She continues with chest discomfort only occasionally, which can happen at rest or with exertion.  If this continues, I asked her  to follow-up with PCP.  She also notes occasional heartburn.  She was not aware that she was supposed to start baby aspirin so we will start this.  She is agreeable to statin therapy and we discussed the benefits of this.  Right radial angiogram site has a moderate amount of ecchymosis and is without bleeding, hematoma, or bruit. Patient reports no PND, orthopnea, presyncope, syncope, edema, heart racing, or palpitations.    Labs:  LIPID RESULTS:  Lab Results   Component Value Date    CHOL 232 (H) 11/20/2020    HDL 98 11/20/2020    LDL 88 11/20/2020    TRIG 232 (H) 11/20/2020       LIVER ENZYME RESULTS:  Lab Results   Component Value Date    AST 16 12/10/2019    AST 21 10/25/2019    ALT 15 12/10/2019    ALT 26 10/25/2019       CBC RESULTS:  Lab Results   Component Value Date    WBC 6.6 10/14/2022    WBC 8.3 07/05/2021    RBC 4.21 10/14/2022    RBC 4.32 07/05/2021    HGB 12.8 10/14/2022    HGB 13.3 07/05/2021    HCT 38.9 10/14/2022    HCT 38.1 07/05/2021    MCV 92 10/14/2022    MCV 88 07/05/2021    MCH 30.4 10/14/2022    MCH 30.8 07/05/2021    MCHC 32.9 10/14/2022    MCHC 34.9 07/05/2021    RDW 12.9 10/14/2022    RDW 12.5 07/05/2021     10/14/2022     07/05/2021       BMP RESULTS:  Lab Results   Component Value Date     10/14/2022     01/26/2021    POTASSIUM 4.0 10/14/2022    POTASSIUM 4.1 01/26/2021    CHLORIDE 107 10/14/2022    CHLORIDE 104 01/26/2021    CO2 26 10/14/2022    CO2 27 01/26/2021    ANIONGAP 6 10/14/2022    ANIONGAP 4 01/26/2021     (H) 10/14/2022     (H) 01/26/2021    BUN 13 10/14/2022    BUN 14 01/26/2021    CR 0.66 10/14/2022    CR 0.72 01/26/2021    GFRESTIMATED >90 10/14/2022    GFRESTIMATED >90 01/26/2021    GFRESTBLACK >90 01/26/2021    REILLY 8.8 10/14/2022    REILLY 9.1 01/26/2021        A1C RESULTS:  No results found for: A1C    INR RESULTS:  Lab Results   Component Value Date    INR 0.97 10/14/2022    INR 1.09 12/10/2019       Results reviewed today.        Current Cardiac Medications     Amlodipine 5 mg daily  Fish oil                     Assessment and Plan:       Plan    Patient Instructions   Medication Changes:  4. START rosuvastatin 20 mg daily   5. START aspirin 81 mg daily     Recommendations:  1. Check blood pressure at least 1 hour after medications. Call the clinic if your blood pressure is consistently greater than 130/80.    2. Call my nurse with average blood pressure readings after 1-2 weeks   3. 150 mins/ week of exercise     Follow-up:  1. Follow-up with primary care about chest discomfort   2. Fasting lab in 2 months (lipid/ALT) after starting statin  3. See Dr. Chapa for cardiology follow up at [facility]: Oct 2023.   Call 6 months prior, to schedule.     Cardiology Scheduling~[phone]  Cardiology Clinic RN~[phone] (Sheila RN, Roxanne RN, Unique RN)            1. Nonobstructive CAD    No angina    Continue aspirin, CCB, start statin       2. Hypertension    Not Controlled    Continue amlodipine       3. Hyperlipidemia    Last LDL 88 in 2020 - Not on statin therapy    Start rosuvastatin 20 mg daily             Thank you for allowing me to participate in this delightful patient's care.      This note was completed in part using Dragon voice recognition software. Although reviewed after completion, some word and grammatical errors may occur.    Marilee Veliz, APRN CNP, APRN, CNP           Data:   All laboratory data reviewed            Constitutional:  cooperative, alert and oriented, well developed, well nourished, in no acute distress  overweight    Skin:  warm and dry to the touch         Head:  normocephalic       Eyes:  pupils equal and round       ENT:  no pallor or cyanosis       Neck:  no stiffness       Respiratory:  clear to auscultation; normal symmetry        Cardiac: regular rate and rhythm; normal S1 and S2                 pulses full and equal     GI:  abdomen soft, nondistended     Extremities and Muscular  Skeletal:  no edema        Neurological:  affect appropriate; no gross motor deficits       Psych:  Alert and Oriented x 3 , appropriate affact

## 2022-10-25 ENCOUNTER — TELEPHONE (OUTPATIENT)
Dept: FAMILY MEDICINE | Facility: CLINIC | Age: 57
End: 2022-10-25

## 2022-10-25 NOTE — TELEPHONE ENCOUNTER
S-(situation): hemorrhoids    B-(background): had a bm and blood noted on toliet tissue and in stool mixed with fecal matter.  Recently put on meloxicam and ASA 81 mg.  Hx of internal and external hemorrhoids.  Is not actively bleeding now.      A-(assessment): hemorrhoids    R-(recommendations): appt made.  Advised sitz baths.  Tucks pad and hemorrhoid oint or cream.  Advised to use either the meloxicam or ASA.  If bleeding reoccurs and is worse to ER. Ana ALEXANDER RN

## 2022-10-25 NOTE — TELEPHONE ENCOUNTER
Reason for call:  Patient reporting a symptom    Symptom or request: Pt reporting rectal bleeding every time she sits on the toilet, this started last night and she thinks it is from new medications, denies pain. Transfer for triage    Phone Number patient can be reached at:  Home number on file [phone] (home)    Best Time:  any    Can we leave a detailed message on this number:  YES    Call taken on 10/25/2022 at 1:19 PM by Susie Cabrera

## 2022-10-26 ASSESSMENT — ANXIETY QUESTIONNAIRES
6. BECOMING EASILY ANNOYED OR IRRITABLE: MORE THAN HALF THE DAYS
2. NOT BEING ABLE TO STOP OR CONTROL WORRYING: SEVERAL DAYS
GAD7 TOTAL SCORE: 9
GAD7 TOTAL SCORE: 9
5. BEING SO RESTLESS THAT IT IS HARD TO SIT STILL: SEVERAL DAYS
7. FEELING AFRAID AS IF SOMETHING AWFUL MIGHT HAPPEN: SEVERAL DAYS
7. FEELING AFRAID AS IF SOMETHING AWFUL MIGHT HAPPEN: SEVERAL DAYS
3. WORRYING TOO MUCH ABOUT DIFFERENT THINGS: SEVERAL DAYS
4. TROUBLE RELAXING: SEVERAL DAYS
1. FEELING NERVOUS, ANXIOUS, OR ON EDGE: MORE THAN HALF THE DAYS
GAD7 TOTAL SCORE: 9

## 2022-10-26 ASSESSMENT — PATIENT HEALTH QUESTIONNAIRE - PHQ9
SUM OF ALL RESPONSES TO PHQ QUESTIONS 1-9: 11
SUM OF ALL RESPONSES TO PHQ QUESTIONS 1-9: 11
10. IF YOU CHECKED OFF ANY PROBLEMS, HOW DIFFICULT HAVE THESE PROBLEMS MADE IT FOR YOU TO DO YOUR WORK, TAKE CARE OF THINGS AT HOME, OR GET ALONG WITH OTHER PEOPLE: VERY DIFFICULT

## 2022-10-27 ENCOUNTER — OFFICE VISIT (OUTPATIENT)
Dept: FAMILY MEDICINE | Facility: CLINIC | Age: 57
End: 2022-10-27
Payer: COMMERCIAL

## 2022-10-27 VITALS
BODY MASS INDEX: 27.32 KG/M2 | SYSTOLIC BLOOD PRESSURE: 122 MMHG | HEIGHT: 65 IN | HEART RATE: 78 BPM | WEIGHT: 164 LBS | DIASTOLIC BLOOD PRESSURE: 76 MMHG | RESPIRATION RATE: 18 BRPM | TEMPERATURE: 97.7 F | OXYGEN SATURATION: 98 %

## 2022-10-27 DIAGNOSIS — K62.5 RECTAL BLEEDING: Primary | ICD-10-CM

## 2022-10-27 DIAGNOSIS — K21.9 GASTROESOPHAGEAL REFLUX DISEASE WITHOUT ESOPHAGITIS: ICD-10-CM

## 2022-10-27 PROCEDURE — 99213 OFFICE O/P EST LOW 20 MIN: CPT | Performed by: FAMILY MEDICINE

## 2022-10-27 RX ORDER — NICOTINE POLACRILEX 4 MG/1
40 GUM, CHEWING ORAL DAILY
Qty: 90 TABLET | Refills: 3 | Status: SHIPPED | OUTPATIENT
Start: 2022-10-27 | End: 2023-04-04

## 2022-10-27 ASSESSMENT — ENCOUNTER SYMPTOMS
EYES NEGATIVE: 1
HEARTBURN: 1
PSYCHIATRIC NEGATIVE: 1
CONSTITUTIONAL NEGATIVE: 1
ENDOCRINE NEGATIVE: 1
CARDIOVASCULAR NEGATIVE: 1
RECTAL PAIN: 0
HEMATOCHEZIA: 1
ABDOMINAL PAIN: 1
MUSCULOSKELETAL NEGATIVE: 1
NEUROLOGICAL NEGATIVE: 1
ALLERGIC/IMMUNOLOGIC NEGATIVE: 1
RESPIRATORY NEGATIVE: 1
HEMATOLOGIC/LYMPHATIC NEGATIVE: 1

## 2022-10-27 ASSESSMENT — PATIENT HEALTH QUESTIONNAIRE - PHQ9
SUM OF ALL RESPONSES TO PHQ QUESTIONS 1-9: 11
10. IF YOU CHECKED OFF ANY PROBLEMS, HOW DIFFICULT HAVE THESE PROBLEMS MADE IT FOR YOU TO DO YOUR WORK, TAKE CARE OF THINGS AT HOME, OR GET ALONG WITH OTHER PEOPLE: VERY DIFFICULT

## 2022-10-27 ASSESSMENT — ANXIETY QUESTIONNAIRES: GAD7 TOTAL SCORE: 9

## 2022-10-27 NOTE — PROGRESS NOTES
"    Assessment & Plan     Rectal bleeding  Patient comes in with rectal bleeding. She was taking meloxicam,aspirin and had just started taking rosuvastatin . Patient stopped taking the meloxicam. Her last colonoscopy was in 2017 and it was normal. Recommend that she stop the meloxicam and all NSAIDs going forward.     Gastroesophageal reflux disease without esophagitis  Increased the omeprazole to 40 mg to be taken for the next several weeks until the flare of the GERD resolves and then go back to taking 20 mg.   - omeprazole 20 MG tablet; Take 2 tablets (40 mg) by mouth daily    [id number]}     BMI:   Estimated body mass index is 27.29 kg/m  as calculated from the following:    Height as of this encounter: 1.651 m (5' 5\").    Weight as of this encounter: 74.4 kg (164 lb).       FUTURE APPOINTMENTS:       - Follow-up visit in one month or sooner as needed.    Return in about 4 weeks (around 11/24/2022) for Follow up.    Julia Oneill MD  [facility] HospitalALANNAH Figueroa is a 57 year old, presenting for the following health issues:    Patient is a 57-year-old with past medical history significant for severe COPD, coronary artery disease, GERD, hemorrhoids, she comes in today for rectal bleeding. She said she started taking meloxicam for foot pain a few weeks ago and at the same time also started aspirin and atorvastatin and then had the bleeding episode.      It was painless bleeding there was no pain with her bowel movement.  She does endorse that she has hemorrhoids.      Her last colonoscopy was in 2017 and at the time it was normal she was asked to come back for another colonoscopy in 10 years.  No polyps was seen on that colonoscopy.      She does also mention lower abdominal pain she said she started taking Osteo Bi-Flex for knee pain a couple of weeks ago and found that she was not tolerating the medication and stopped. She has also had more GERD symptoms in terms of feeling like her " food is coming up and chest burning.  She takes omeprazole 20 mg daily already.  She said it reminds her of her of when she had an ulcer many years ago.  Rectal Bleeding (Patient states she started taking Meloxicam, Crestor and baby aspirin Friday 10/21/2022 for foot issues and cardic issues.  Was told to start these medications by her podiatrist and cardiologist.  She then started having rectal bleeding on Monday.  She believes the reason she started bleeding was from taking these medications.  She stopped taking it. ) and Abdominal Pain (Her stomach has been getting really upset for about the past 2 weeks, she is concerned she may have an ulcer. )    Chief Complaint   Patient presents with     Rectal Bleeding     Patient states she started taking Meloxicam, Crestor and baby aspirin Friday 10/21/2022 for foot issues and cardic issues.  Was told to start these medications by her podiatrist and cardiologist.  She then started having rectal bleeding on Monday.  She believes the reason she started bleeding was from taking these medications.  She stopped taking it.      Abdominal Pain     Her stomach has been getting really upset for about the past 2 weeks, she is concerned she may have an ulcer.        History of Present Illness     Asthma:  She presents for follow up of asthma.  She has no cough, no wheezing, and some shortness of breath. She is using a relief medication a few times a week. She does not miss any doses of her controller medication throughout the week.Patient is aware of the following triggers: same as previous visit. The patient has not had a visit to the Emergency Room, Urgent Care or Hospital due to asthma since the last clinic visit.     COPD:  She presents for follow up of COPD.  Overall, COPD symptoms are better since last visit. She has less than usual fatigue or shortness of breath with exertion and no shortness of breath at rest.She often coughs and does not have change in sputum. No recent  "fever. She can walk 2-5 blocks without stopping to rest. She can walk 2 flights of stairs without resting.The patient has had no ED, urgent care, or hospital admissions because of COPD since the last visit.     Mental Health Follow-up:  Patient presents to follow-up on Depression & Anxiety.Patient's depression since last visit has been:  Good  The patient is not having other symptoms associated with depression.  Patient's anxiety since last visit has been:  Medium  The patient is having other symptoms associated with anxiety.  Any significant life events: relationship concerns and health concerns  Patient is not feeling anxious or having panic attacks.  Patient has no concerns about alcohol or drug use.    Hypertension: She presents for follow up of hypertension.  She does check blood pressure  regularly outside of the clinic. Outpatient blood pressures have not been over 140/90. She does not follow a low salt diet.      Today's PHQ-9         PHQ-9 Total Score: 11    PHQ-9 Q9 Thoughts of better off dead/self-harm past 2 weeks :   Not at all    How difficult have these problems made it for you to do your work, take care of things at home, or get along with other people: Very difficult  Today's JAK-7 Score: 9         Review of Systems   Constitutional: Negative.    HENT: Negative.    Eyes: Negative.    Respiratory: Negative.    Cardiovascular: Negative.    Gastrointestinal: Positive for abdominal pain, heartburn and hematochezia. Negative for rectal pain.   Endocrine: Negative.    Breasts:  negative.    Genitourinary: Negative.    Musculoskeletal: Negative.    Allergic/Immunologic: Negative.    Neurological: Negative.    Hematological: Negative.    Psychiatric/Behavioral: Negative.             Objective    /76   Pulse 78   Temp 97.7  F (36.5  C) (Tympanic)   Resp 18   Ht 1.651 m (5' 5\")   Wt 74.4 kg (164 lb)   SpO2 98%   BMI 27.29 kg/m    Body mass index is 27.29 kg/m .     Physical Exam  Constitutional:  "      Appearance: Normal appearance.   HENT:      Head: Normocephalic and atraumatic.   Eyes:      Extraocular Movements: Extraocular movements intact.      Pupils: Pupils are equal, round, and reactive to light.   Cardiovascular:      Rate and Rhythm: Normal rate and regular rhythm.      Pulses: Normal pulses.      Heart sounds: Normal heart sounds. No murmur heard.    No gallop.   Pulmonary:      Effort: Pulmonary effort is normal. No respiratory distress.      Breath sounds: Normal breath sounds. No stridor. No wheezing, rhonchi or rales.   Chest:      Chest wall: No tenderness.   Abdominal:      General: Abdomen is flat. Bowel sounds are normal. There is no distension.      Palpations: Abdomen is soft. There is no mass.      Tenderness: There is no abdominal tenderness. There is no right CVA tenderness, left CVA tenderness, guarding or rebound.      Hernia: No hernia is present.   Genitourinary:     Rectum: External hemorrhoid and internal hemorrhoid present. Normal anal tone.   Musculoskeletal:         General: Normal range of motion.      Cervical back: Normal range of motion and neck supple.   Skin:     General: Skin is warm and dry.   Neurological:      Mental Status: She is alert and oriented to person, place, and time.   Psychiatric:         Mood and Affect: Mood normal.         Behavior: Behavior normal.

## 2022-10-29 ENCOUNTER — APPOINTMENT (OUTPATIENT)
Dept: ULTRASOUND IMAGING | Facility: CLINIC | Age: 57
End: 2022-10-29
Attending: FAMILY MEDICINE
Payer: COMMERCIAL

## 2022-10-29 ENCOUNTER — HOSPITAL ENCOUNTER (INPATIENT)
Facility: CLINIC | Age: 57
LOS: 3 days | Discharge: HOME | End: 2022-11-01
Attending: FAMILY MEDICINE | Admitting: INTERNAL MEDICINE
Payer: COMMERCIAL

## 2022-10-29 ENCOUNTER — NURSE TRIAGE (OUTPATIENT)
Dept: NURSING | Facility: CLINIC | Age: 57
End: 2022-10-29

## 2022-10-29 ENCOUNTER — APPOINTMENT (OUTPATIENT)
Dept: CT IMAGING | Facility: CLINIC | Age: 57
End: 2022-10-29
Attending: FAMILY MEDICINE
Payer: COMMERCIAL

## 2022-10-29 DIAGNOSIS — Z11.52 ENCOUNTER FOR SCREENING LABORATORY TESTING FOR SEVERE ACUTE RESPIRATORY SYNDROME CORONAVIRUS 2 (SARS-COV-2): ICD-10-CM

## 2022-10-29 DIAGNOSIS — K85.90 ACUTE PANCREATITIS WITHOUT INFECTION OR NECROSIS, UNSPECIFIED PANCREATITIS TYPE: Primary | ICD-10-CM

## 2022-10-29 DIAGNOSIS — K85.20 ALCOHOL-INDUCED ACUTE PANCREATITIS WITHOUT INFECTION OR NECROSIS: ICD-10-CM

## 2022-10-29 DIAGNOSIS — R74.01 TRANSAMINITIS: ICD-10-CM

## 2022-10-29 LAB
ALBUMIN SERPL BCG-MCNC: 4.5 G/DL (ref 3.5–5.2)
ALBUMIN UR-MCNC: NEGATIVE MG/DL
ALP SERPL-CCNC: 176 U/L (ref 35–104)
ALT SERPL W P-5'-P-CCNC: 1230 U/L (ref 10–35)
ANION GAP SERPL CALCULATED.3IONS-SCNC: 16 MMOL/L (ref 7–15)
APAP SERPL-MCNC: <5 UG/ML (ref 10–30)
APPEARANCE UR: CLEAR
AST SERPL W P-5'-P-CCNC: 2121 U/L (ref 10–35)
BASOPHILS # BLD AUTO: 0 10E3/UL (ref 0–0.2)
BASOPHILS NFR BLD AUTO: 0 %
BILIRUB SERPL-MCNC: 2.3 MG/DL
BILIRUB UR QL STRIP: NEGATIVE
BUN SERPL-MCNC: 10.4 MG/DL (ref 8–23)
CALCIUM SERPL-MCNC: 9.6 MG/DL (ref 8.6–10)
CHLORIDE SERPL-SCNC: 97 MMOL/L (ref 98–107)
CK SERPL-CCNC: 117 U/L (ref 26–192)
COLOR UR AUTO: YELLOW
CREAT SERPL-MCNC: 0.58 MG/DL (ref 0.51–0.95)
DEPRECATED HCO3 PLAS-SCNC: 22 MMOL/L (ref 22–29)
EOSINOPHIL # BLD AUTO: 0.1 10E3/UL (ref 0–0.7)
EOSINOPHIL NFR BLD AUTO: 1 %
ERYTHROCYTE [DISTWIDTH] IN BLOOD BY AUTOMATED COUNT: 12.7 % (ref 10–15)
GFR SERPL CREATININE-BSD FRML MDRD: >90 ML/MIN/1.73M2
GLUCOSE SERPL-MCNC: 109 MG/DL (ref 70–99)
GLUCOSE UR STRIP-MCNC: NEGATIVE MG/DL
HCT VFR BLD AUTO: 39.9 % (ref 35–47)
HGB BLD-MCNC: 13.6 G/DL (ref 11.7–15.7)
HGB UR QL STRIP: NEGATIVE
HOLD SPECIMEN: NORMAL
IMM GRANULOCYTES # BLD: 0.1 10E3/UL
IMM GRANULOCYTES NFR BLD: 1 %
KETONES UR STRIP-MCNC: NEGATIVE MG/DL
LACTATE SERPL-SCNC: 1.1 MMOL/L (ref 0.7–2)
LEUKOCYTE ESTERASE UR QL STRIP: NEGATIVE
LIPASE SERPL-CCNC: >3000 U/L (ref 13–60)
LYMPHOCYTES # BLD AUTO: 1.3 10E3/UL (ref 0.8–5.3)
LYMPHOCYTES NFR BLD AUTO: 12 %
MCH RBC QN AUTO: 30.6 PG (ref 26.5–33)
MCHC RBC AUTO-ENTMCNC: 34.1 G/DL (ref 31.5–36.5)
MCV RBC AUTO: 90 FL (ref 78–100)
MONOCYTES # BLD AUTO: 0.7 10E3/UL (ref 0–1.3)
MONOCYTES NFR BLD AUTO: 6 %
MONOCYTES NFR BLD AUTO: NEGATIVE %
NEUTROPHILS # BLD AUTO: 8.8 10E3/UL (ref 1.6–8.3)
NEUTROPHILS NFR BLD AUTO: 80 %
NITRATE UR QL: NEGATIVE
NRBC # BLD AUTO: 0 10E3/UL
NRBC BLD AUTO-RTO: 0 /100
PH UR STRIP: 7.5 [PH] (ref 5–7)
PLATELET # BLD AUTO: 386 10E3/UL (ref 150–450)
POTASSIUM SERPL-SCNC: 3.8 MMOL/L (ref 3.4–5.3)
PROT SERPL-MCNC: 8 G/DL (ref 6.4–8.3)
RBC # BLD AUTO: 4.44 10E6/UL (ref 3.8–5.2)
RBC URINE: <1 /HPF
SODIUM SERPL-SCNC: 135 MMOL/L (ref 136–145)
SP GR UR STRIP: 1.01 (ref 1–1.03)
SQUAMOUS EPITHELIAL: <1 /HPF
UROBILINOGEN UR STRIP-MCNC: NORMAL MG/DL
WBC # BLD AUTO: 10.9 10E3/UL (ref 4–11)
WBC URINE: <1 /HPF

## 2022-10-29 PROCEDURE — 83690 ASSAY OF LIPASE: CPT | Performed by: EMERGENCY MEDICINE

## 2022-10-29 PROCEDURE — 80143 DRUG ASSAY ACETAMINOPHEN: CPT | Performed by: FAMILY MEDICINE

## 2022-10-29 PROCEDURE — 36415 COLL VENOUS BLD VENIPUNCTURE: CPT | Performed by: FAMILY MEDICINE

## 2022-10-29 PROCEDURE — 80053 COMPREHEN METABOLIC PANEL: CPT | Performed by: EMERGENCY MEDICINE

## 2022-10-29 PROCEDURE — C9803 HOPD COVID-19 SPEC COLLECT: HCPCS | Performed by: FAMILY MEDICINE

## 2022-10-29 PROCEDURE — 250N000011 HC RX IP 250 OP 636: Performed by: FAMILY MEDICINE

## 2022-10-29 PROCEDURE — 36415 COLL VENOUS BLD VENIPUNCTURE: CPT | Performed by: EMERGENCY MEDICINE

## 2022-10-29 PROCEDURE — 120N000001 HC R&B MED SURG/OB

## 2022-10-29 PROCEDURE — 96361 HYDRATE IV INFUSION ADD-ON: CPT | Performed by: FAMILY MEDICINE

## 2022-10-29 PROCEDURE — 85025 COMPLETE CBC W/AUTO DIFF WBC: CPT | Performed by: EMERGENCY MEDICINE

## 2022-10-29 PROCEDURE — 96374 THER/PROPH/DIAG INJ IV PUSH: CPT | Mod: 59 | Performed by: FAMILY MEDICINE

## 2022-10-29 PROCEDURE — 93005 ELECTROCARDIOGRAM TRACING: CPT | Performed by: FAMILY MEDICINE

## 2022-10-29 PROCEDURE — 93005 ELECTROCARDIOGRAM TRACING: CPT | Mod: 76 | Performed by: FAMILY MEDICINE

## 2022-10-29 PROCEDURE — 83605 ASSAY OF LACTIC ACID: CPT | Performed by: FAMILY MEDICINE

## 2022-10-29 PROCEDURE — U0003 INFECTIOUS AGENT DETECTION BY NUCLEIC ACID (DNA OR RNA); SEVERE ACUTE RESPIRATORY SYNDROME CORONAVIRUS 2 (SARS-COV-2) (CORONAVIRUS DISEASE [COVID-19]), AMPLIFIED PROBE TECHNIQUE, MAKING USE OF HIGH THROUGHPUT TECHNOLOGIES AS DESCRIBED BY CMS-2020-01-R: HCPCS | Performed by: FAMILY MEDICINE

## 2022-10-29 PROCEDURE — 99285 EMERGENCY DEPT VISIT HI MDM: CPT | Mod: 25 | Performed by: FAMILY MEDICINE

## 2022-10-29 PROCEDURE — 76705 ECHO EXAM OF ABDOMEN: CPT

## 2022-10-29 PROCEDURE — 258N000003 HC RX IP 258 OP 636: Performed by: FAMILY MEDICINE

## 2022-10-29 PROCEDURE — 74177 CT ABD & PELVIS W/CONTRAST: CPT

## 2022-10-29 PROCEDURE — 93010 ELECTROCARDIOGRAM REPORT: CPT | Performed by: FAMILY MEDICINE

## 2022-10-29 PROCEDURE — 86308 HETEROPHILE ANTIBODY SCREEN: CPT | Performed by: FAMILY MEDICINE

## 2022-10-29 PROCEDURE — 82550 ASSAY OF CK (CPK): CPT | Performed by: INTERNAL MEDICINE

## 2022-10-29 PROCEDURE — 96376 TX/PRO/DX INJ SAME DRUG ADON: CPT | Performed by: FAMILY MEDICINE

## 2022-10-29 PROCEDURE — 250N000009 HC RX 250: Performed by: FAMILY MEDICINE

## 2022-10-29 PROCEDURE — 81003 URINALYSIS AUTO W/O SCOPE: CPT | Performed by: FAMILY MEDICINE

## 2022-10-29 PROCEDURE — 96375 TX/PRO/DX INJ NEW DRUG ADDON: CPT | Performed by: FAMILY MEDICINE

## 2022-10-29 PROCEDURE — 250N000013 HC RX MED GY IP 250 OP 250 PS 637: Performed by: FAMILY MEDICINE

## 2022-10-29 PROCEDURE — 99223 1ST HOSP IP/OBS HIGH 75: CPT | Mod: AI | Performed by: INTERNAL MEDICINE

## 2022-10-29 RX ORDER — ONDANSETRON 2 MG/ML
4 INJECTION INTRAMUSCULAR; INTRAVENOUS EVERY 30 MIN PRN
Status: DISCONTINUED | OUTPATIENT
Start: 2022-10-29 | End: 2022-10-29 | Stop reason: DRUGHIGH

## 2022-10-29 RX ORDER — HYDROMORPHONE HYDROCHLORIDE 1 MG/ML
.3-.5 INJECTION, SOLUTION INTRAMUSCULAR; INTRAVENOUS; SUBCUTANEOUS
Status: DISCONTINUED | OUTPATIENT
Start: 2022-10-29 | End: 2022-10-30

## 2022-10-29 RX ORDER — NALOXONE HYDROCHLORIDE 0.4 MG/ML
0.4 INJECTION, SOLUTION INTRAMUSCULAR; INTRAVENOUS; SUBCUTANEOUS
Status: DISCONTINUED | OUTPATIENT
Start: 2022-10-29 | End: 2022-11-01 | Stop reason: HOSPADM

## 2022-10-29 RX ORDER — SODIUM CHLORIDE 9 MG/ML
INJECTION, SOLUTION INTRAVENOUS CONTINUOUS
Status: DISCONTINUED | OUTPATIENT
Start: 2022-10-29 | End: 2022-10-29

## 2022-10-29 RX ORDER — NALOXONE HYDROCHLORIDE 0.4 MG/ML
0.2 INJECTION, SOLUTION INTRAMUSCULAR; INTRAVENOUS; SUBCUTANEOUS
Status: DISCONTINUED | OUTPATIENT
Start: 2022-10-29 | End: 2022-11-01 | Stop reason: HOSPADM

## 2022-10-29 RX ORDER — MAGNESIUM OXIDE 400 MG/1
800 TABLET ORAL ONCE
Status: COMPLETED | OUTPATIENT
Start: 2022-10-29 | End: 2022-10-29

## 2022-10-29 RX ORDER — IOPAMIDOL 755 MG/ML
72 INJECTION, SOLUTION INTRAVASCULAR ONCE
Status: COMPLETED | OUTPATIENT
Start: 2022-10-29 | End: 2022-10-29

## 2022-10-29 RX ORDER — HYDROMORPHONE HYDROCHLORIDE 1 MG/ML
0.5 INJECTION, SOLUTION INTRAMUSCULAR; INTRAVENOUS; SUBCUTANEOUS
Status: COMPLETED | OUTPATIENT
Start: 2022-10-29 | End: 2022-10-29

## 2022-10-29 RX ORDER — ONDANSETRON 2 MG/ML
4 INJECTION INTRAMUSCULAR; INTRAVENOUS ONCE
Status: COMPLETED | OUTPATIENT
Start: 2022-10-29 | End: 2022-10-29

## 2022-10-29 RX ORDER — NALOXONE HYDROCHLORIDE 0.4 MG/ML
0.2 INJECTION, SOLUTION INTRAMUSCULAR; INTRAVENOUS; SUBCUTANEOUS
Status: DISCONTINUED | OUTPATIENT
Start: 2022-10-29 | End: 2022-10-30

## 2022-10-29 RX ORDER — NALOXONE HYDROCHLORIDE 0.4 MG/ML
0.4 INJECTION, SOLUTION INTRAMUSCULAR; INTRAVENOUS; SUBCUTANEOUS
Status: DISCONTINUED | OUTPATIENT
Start: 2022-10-29 | End: 2022-10-30

## 2022-10-29 RX ORDER — OXYCODONE HYDROCHLORIDE 5 MG/1
5-10 TABLET ORAL
Status: DISCONTINUED | OUTPATIENT
Start: 2022-10-29 | End: 2022-11-01 | Stop reason: HOSPADM

## 2022-10-29 RX ORDER — SODIUM CHLORIDE, SODIUM LACTATE, POTASSIUM CHLORIDE, CALCIUM CHLORIDE 600; 310; 30; 20 MG/100ML; MG/100ML; MG/100ML; MG/100ML
125 INJECTION, SOLUTION INTRAVENOUS CONTINUOUS
Status: DISCONTINUED | OUTPATIENT
Start: 2022-10-29 | End: 2022-10-30

## 2022-10-29 RX ORDER — ONDANSETRON 4 MG/1
4 TABLET, ORALLY DISINTEGRATING ORAL EVERY 6 HOURS PRN
Status: DISCONTINUED | OUTPATIENT
Start: 2022-10-29 | End: 2022-11-01 | Stop reason: HOSPADM

## 2022-10-29 RX ORDER — FOLIC ACID 1 MG/1
1 TABLET ORAL ONCE
Status: COMPLETED | OUTPATIENT
Start: 2022-10-29 | End: 2022-10-29

## 2022-10-29 RX ORDER — MULTIVITAMIN,THERAPEUTIC
1 TABLET ORAL ONCE
Status: COMPLETED | OUTPATIENT
Start: 2022-10-29 | End: 2022-10-29

## 2022-10-29 RX ORDER — SODIUM CHLORIDE, SODIUM LACTATE, POTASSIUM CHLORIDE, CALCIUM CHLORIDE 600; 310; 30; 20 MG/100ML; MG/100ML; MG/100ML; MG/100ML
1000 INJECTION, SOLUTION INTRAVENOUS CONTINUOUS
Status: DISCONTINUED | OUTPATIENT
Start: 2022-10-29 | End: 2022-10-30

## 2022-10-29 RX ORDER — ONDANSETRON 2 MG/ML
4 INJECTION INTRAMUSCULAR; INTRAVENOUS EVERY 6 HOURS PRN
Status: DISCONTINUED | OUTPATIENT
Start: 2022-10-29 | End: 2022-11-01 | Stop reason: HOSPADM

## 2022-10-29 RX ADMIN — HYDROMORPHONE HYDROCHLORIDE 0.5 MG: 1 INJECTION, SOLUTION INTRAMUSCULAR; INTRAVENOUS; SUBCUTANEOUS at 16:52

## 2022-10-29 RX ADMIN — SODIUM CHLORIDE, POTASSIUM CHLORIDE, SODIUM LACTATE AND CALCIUM CHLORIDE 1000 ML: 600; 310; 30; 20 INJECTION, SOLUTION INTRAVENOUS at 23:10

## 2022-10-29 RX ADMIN — HYDROMORPHONE HYDROCHLORIDE 0.5 MG: 1 INJECTION, SOLUTION INTRAMUSCULAR; INTRAVENOUS; SUBCUTANEOUS at 18:10

## 2022-10-29 RX ADMIN — ONDANSETRON 4 MG: 2 INJECTION INTRAMUSCULAR; INTRAVENOUS at 16:32

## 2022-10-29 RX ADMIN — SODIUM CHLORIDE, POTASSIUM CHLORIDE, SODIUM LACTATE AND CALCIUM CHLORIDE 125 ML/HR: 600; 310; 30; 20 INJECTION, SOLUTION INTRAVENOUS at 19:35

## 2022-10-29 RX ADMIN — SODIUM CHLORIDE, POTASSIUM CHLORIDE, SODIUM LACTATE AND CALCIUM CHLORIDE 1000 ML: 600; 310; 30; 20 INJECTION, SOLUTION INTRAVENOUS at 17:03

## 2022-10-29 RX ADMIN — MULTIVITAMIN TABLET 1 TABLET: TABLET at 17:04

## 2022-10-29 RX ADMIN — FOLIC ACID 1 MG: 1 TABLET ORAL at 17:04

## 2022-10-29 RX ADMIN — Medication 800 MG: at 17:04

## 2022-10-29 RX ADMIN — SODIUM CHLORIDE 60 ML: 9 INJECTION, SOLUTION INTRAVENOUS at 17:51

## 2022-10-29 RX ADMIN — HYDROMORPHONE HYDROCHLORIDE 0.5 MG: 1 INJECTION, SOLUTION INTRAMUSCULAR; INTRAVENOUS; SUBCUTANEOUS at 19:35

## 2022-10-29 RX ADMIN — THIAMINE HCL TAB 100 MG 100 MG: 100 TAB at 17:03

## 2022-10-29 RX ADMIN — IOPAMIDOL 72 ML: 755 INJECTION, SOLUTION INTRAVENOUS at 17:52

## 2022-10-29 RX ADMIN — HYDROMORPHONE HYDROCHLORIDE 0.5 MG: 1 INJECTION, SOLUTION INTRAMUSCULAR; INTRAVENOUS; SUBCUTANEOUS at 23:51

## 2022-10-29 ASSESSMENT — ENCOUNTER SYMPTOMS
CONSTIPATION: 0
ABDOMINAL PAIN: 1
FEVER: 0
SINUS PRESSURE: 0
DIARRHEA: 0
SORE THROAT: 0
NAUSEA: 1
COUGH: 0
FREQUENCY: 0
PALPITATIONS: 0
BLOOD IN STOOL: 0
CHILLS: 1
HEADACHES: 0
VOMITING: 1
WHEEZING: 0
DYSURIA: 0
SHORTNESS OF BREATH: 0
DIAPHORESIS: 0

## 2022-10-29 ASSESSMENT — ACTIVITIES OF DAILY LIVING (ADL)
ADLS_ACUITY_SCORE: 35
ADLS_ACUITY_SCORE: 35
CHANGE_IN_FUNCTIONAL_STATUS_SINCE_ONSET_OF_CURRENT_ILLNESS/INJURY: NO
ADLS_ACUITY_SCORE: 35
WEAR_GLASSES_OR_BLIND: NO
FALL_HISTORY_WITHIN_LAST_SIX_MONTHS: NO
DOING_ERRANDS_INDEPENDENTLY_DIFFICULTY: NO
DIFFICULTY_EATING/SWALLOWING: NO
TOILETING_ISSUES: NO
CONCENTRATING,_REMEMBERING_OR_MAKING_DECISIONS_DIFFICULTY: NO
WALKING_OR_CLIMBING_STAIRS_DIFFICULTY: NO
DRESSING/BATHING_DIFFICULTY: NO
ADLS_ACUITY_SCORE: 35

## 2022-10-29 NOTE — ED TRIAGE NOTES
Pt reports woke up with severe pain to epigastric region that radiates into midsternal region and wraps around to back, pt reports pain started at about 3AM. Pt reports nausea, sob at this time. Pt reports she has been taking omperazole for acid reflux, recently increased to 40mg with much relief

## 2022-10-29 NOTE — TELEPHONE ENCOUNTER
Nurse Triage SBAR    Is this a 2nd Level Triage? NO    Situation: Patient having severe upper abdominal pain that radiates into her back and chest, nausea, and notes chills.     Background: reports recent visit with PCP for GERD- with medication changes    Assessment: abdominal pain  Started around 3 am today  Sharp pain in her upper chest  Both right and left side  Pain is making her weak  Took 40 mg of Prilosec having issues with acid reflux   Notes radiating pain into her upper back, radiates into her chest as well  States she feels cold  Has tried food, also did baking soda and water  - threw up   - still is nauseated now  States she has some shortness of breath earlier but did her neb  Rating pain 10/10       Protocol Recommended Disposition:   No disposition on file.    Recommendation: recommended to be seen in the ER- agrees to be seen and that her  will take her.       ER now    Does the patient meet one of the following criteria for ADS visit consideration? 16+ years old, with an [facility] PCP     TIP  Providers, please consider if this condition is appropriate for management at one of our Acute and Diagnostic Services sites.     If patient is a good candidate, please use dotphrase <dot>triageresponse and select Refer to ADS to document.    Reason for Disposition    [1] SEVERE pain (e.g., excruciating) AND [2] present > 1 hour    Additional Information    Negative: SEVERE difficulty breathing (e.g., struggling for each breath, speaks in single words)    Negative: Shock suspected (e.g., cold/pale/clammy skin, too weak to stand, low BP, rapid pulse)    Negative: Difficult to awaken or acting confused (e.g., disoriented, slurred speech)    Negative: Passed out (i.e., lost consciousness, collapsed and was not responding)    Negative: Visible sweat on face or sweat dripping down face    Negative: Sounds like a life-threatening emergency to the triager    Negative: Followed an abdomen (stomach) injury     Negative: Chest pain    Protocols used: ABDOMINAL PAIN - UPPER-A-AH

## 2022-10-29 NOTE — ED PROVIDER NOTES
History     Chief Complaint   Patient presents with     Abdominal Pain     Pt reports woke up with severe pain to epigastric region that radiates into midsternal region and wraps around to back, pt reports pain started at about 3AM. Pt reports nausea, sob at this time. Pt reports she has been taking omperazole for acid reflux, recently increased to 40mg with much relief        HPI  Carolina Hernandez is a 57 year old female who presents with a history of tobacco abuse, heavy alcohol use about 612 ounce beers per day long-term, COPD coronary disease with 30 to 40% stenosis on an angiogram 3 weeks ago.  Seen by pulmonology recently as well hyperlipidemia presents here with abdominal pain and recent reflux symptoms.  She had reflux treated in the clinic and omeprazole was given.  She has in the last 24 hours developed significant upper abdominal pain not relieved with her Prilosec.  This has been severe.  Mostly upper quadrants.  No associated fever.  No prior abdominal surgeries.  No history of gallbladder removal, appendectomy.  No history of pancreatitis.  No fever with this.  No change in stools had a relatively normal stool earlier today.  No diarrhea or constipation.  No hematuria, no dysuria urgency frequency.  She did have nonbilious nonbloody emesis multiple episodes since 3 AM this morning.    Allergies:  Allergies   Allergen Reactions     Doxycycline Difficulty breathing     Penicillins Itching       Problem List:    Patient Active Problem List    Diagnosis Date Noted     Nonobstructive atherosclerosis of coronary artery 10/21/2022     Priority: Medium     Hyperlipidemia LDL goal <70 10/21/2022     Priority: Medium     Other chest pain 10/21/2022     Priority: Medium     ESCALANTE (dyspnea on exertion) 10/21/2022     Priority: Medium     Status post coronary angiogram 10/14/2022     Priority: Medium     COPD exacerbation (H) 06/08/2022     Priority: Medium     Low back pain due to bilateral sciatica 10/27/2021      Priority: Medium     Primary osteoarthritis of both knees 2021     Priority: Medium     Benign essential hypertension 2019     Priority: Medium     New diagnosis 2019         Recurrent major depressive disorder, in remission (H) 2019     Priority: Medium     Encounter for tobacco use cessation counseling 2019     Priority: Medium     Mild intermittent asthma without complication 2019     Priority: Medium     CARDIOVASCULAR SCREENING; LDL GOAL LESS THAN 160 10/31/2010     Priority: Medium        Past Medical History:    Past Medical History:   Diagnosis Date     Asthma      C. difficile colitis      Depression      Depressive disorder Have had it most of my life     Osteoarthritis      Sinusitis, chronic        Past Surgical History:    Past Surgical History:   Procedure Laterality Date     COLONOSCOPY       CV CORONARY ANGIOGRAM N/A 10/14/2022    Procedure: Coronary Angiogram;  Surgeon: Fidel Martin MD;  Location: [facility] CARDIAC CATH LAB     ESOPHAGOSCOPY, GASTROSCOPY, DUODENOSCOPY (EGD), COMBINED N/A 2022    Procedure: ESOPHAGOGASTRODUODENOSCOPY, WITH BIOPSY;  Surgeon: Timothy Oliva DO;  Location: [facility] OR       Family History:    Family History   Problem Relation Age of Onset     Glaucoma Father      Coronary Artery Disease Father         stents     Cancer Maternal Grandfather      Coronary Artery Disease Paternal Grandfather      Schizophrenia Daughter      Diabetes Daughter      Cancer Daughter      Crohn's Disease No family hx of      Ulcerative Colitis No family hx of      GERD No family hx of      Stomach Cancer No family hx of        Social History:  Marital Status:   [2]  Social History     Tobacco Use     Smoking status: Former     Packs/day: 0.00     Years: 23.00     Pack years: 0.00     Types: Cigarettes     Quit date: 2021     Years since quittin.2     Smokeless tobacco: Never     Tobacco comments:     6 cig a day    Vaping Use  "    Vaping Use: Never used   Substance Use Topics     Alcohol use: Not Currently     Comment: 4-6 beers multiple times weekly     Drug use: No        Medications:    albuterol (PROVENTIL) (2.5 MG/3ML) 0.083% neb solution  albuterol (VENTOLIN HFA) 108 (90 Base) MCG/ACT inhaler  amLODIPine (NORVASC) 2.5 MG tablet  cetirizine (ZYRTEC) 10 MG tablet  FLUoxetine (PROZAC) 20 MG capsule  FLUoxetine (PROZAC) 40 MG capsule  fluticasone (FLONASE) 50 MCG/ACT nasal spray  fluticasone-salmeterol (ADVAIR) 500-50 MCG/ACT inhaler  loperamide (ANTI-DIARRHEAL) 2 MG tablet  montelukast (SINGULAIR) 10 MG tablet  omeprazole 20 MG tablet  Psyllium Husk 100 % POWD  rosuvastatin (CRESTOR) 20 MG tablet  tiotropium (SPIRIVA) 18 MCG inhaled capsule          Review of Systems   Constitutional: Positive for chills. Negative for diaphoresis and fever.   HENT: Negative for ear pain, sinus pressure and sore throat.    Eyes: Negative for visual disturbance.   Respiratory: Negative for cough, shortness of breath and wheezing.    Cardiovascular: Negative for chest pain and palpitations.   Gastrointestinal: Positive for abdominal pain, nausea and vomiting. Negative for blood in stool, constipation and diarrhea.   Genitourinary: Negative for dysuria, frequency and urgency.   Skin: Negative for rash.   Neurological: Negative for headaches.   All other systems reviewed and are negative.      Physical Exam   BP: (!) 179/91  Pulse: 73  Temp: 98.5  F (36.9  C)  Resp: 18  Height: 160 cm (5' 3\")  Weight: 74.4 kg (164 lb)  SpO2: 97 %      Physical Exam  Constitutional:       General: She is in acute distress.      Appearance: She is not diaphoretic.   HENT:      Head: Atraumatic.   Eyes:      Conjunctiva/sclera: Conjunctivae normal.   Cardiovascular:      Rate and Rhythm: Normal rate and regular rhythm.   Pulmonary:      Effort: Pulmonary effort is normal. No respiratory distress.      Breath sounds: No stridor. No wheezing or rhonchi.   Abdominal:      " General: There is distension.      Palpations: There is no mass.      Tenderness: There is abdominal tenderness. There is no right CVA tenderness, left CVA tenderness or guarding.   Musculoskeletal:      Right lower leg: No edema.      Left lower leg: No edema.   Skin:     Coloration: Skin is not pale.      Findings: No rash.   Neurological:      Mental Status: She is alert.      Motor: No weakness.         ED Course                 Procedures                EKG Interpretation:      Interpreted by Ab Sol MD  EKG done at 1700 hrs. demonstrates a sinus rhythm 69 bpm normal axis.  There is no ST change.  No T wave changes.  Normal R progression and no Q waves.  Normal intervals.  Normal conduction.  No ectopy.  Impression sinus rhythm 69 bpm no acute change    Critical Care time:  none               Results for orders placed or performed during the hospital encounter of 10/29/22 (from the past 24 hour(s))   CBC with platelets differential    Narrative    The following orders were created for panel order CBC with platelets differential.  Procedure                               Abnormality         Status                     ---------                               -----------         ------                     CBC with platelets and d...[[id number]]  Abnormal            Final result                 Please view results for these tests on the individual orders.   Comprehensive metabolic panel   Result Value Ref Range    Sodium 135 (L) 136 - 145 mmol/L    Potassium 3.8 3.4 - 5.3 mmol/L    Chloride 97 (L) 98 - 107 mmol/L    Carbon Dioxide (CO2) 22 22 - 29 mmol/L    Anion Gap 16 (H) 7 - 15 mmol/L    Urea Nitrogen 10.4 8.0 - 23.0 mg/dL    Creatinine 0.58 0.51 - 0.95 mg/dL    Calcium 9.6 8.6 - 10.0 mg/dL    Glucose 109 (H) 70 - 99 mg/dL    Alkaline Phosphatase 176 (H) 35 - 104 U/L    AST 2,121 (HH) 10 - 35 U/L    ALT 1,230 (HH) 10 - 35 U/L    Protein Total 8.0 6.4 - 8.3 g/dL    Albumin 4.5 3.5 - 5.2 g/dL    Bilirubin  Total 2.3 (H) <=1.2 mg/dL    GFR Estimate >90 >60 mL/min/1.73m2   [place] Draw    Narrative    The following orders were created for panel order [place] Draw.  Procedure                               Abnormality         Status                     ---------                               -----------         ------                     Extra Blue Top Tube[[id number]]                              In process                 Extra Red Top Tube[[id number]]                               In process                 Extra Green Top (Lithium...[[id number]]                      In process                 Extra Purple Top Tube[[id number]]                            In process                   Please view results for these tests on the individual orders.   CBC with platelets and differential   Result Value Ref Range    WBC Count 10.9 4.0 - 11.0 10e3/uL    RBC Count 4.44 3.80 - 5.20 10e6/uL    Hemoglobin 13.6 11.7 - 15.7 g/dL    Hematocrit 39.9 35.0 - 47.0 %    MCV 90 78 - 100 fL    MCH 30.6 26.5 - 33.0 pg    MCHC 34.1 31.5 - 36.5 g/dL    RDW 12.7 10.0 - 15.0 %    Platelet Count 386 150 - 450 10e3/uL    % Neutrophils 80 %    % Lymphocytes 12 %    % Monocytes 6 %    % Eosinophils 1 %    % Basophils 0 %    % Immature Granulocytes 1 %    NRBCs per 100 WBC 0 <1 /100    Absolute Neutrophils 8.8 (H) 1.6 - 8.3 10e3/uL    Absolute Lymphocytes 1.3 0.8 - 5.3 10e3/uL    Absolute Monocytes 0.7 0.0 - 1.3 10e3/uL    Absolute Eosinophils 0.1 0.0 - 0.7 10e3/uL    Absolute Basophils 0.0 0.0 - 0.2 10e3/uL    Absolute Immature Granulocytes 0.1 <=0.4 10e3/uL    Absolute NRBCs 0.0 10e3/uL       Medications   0.9% sodium chloride BOLUS (has no administration in time range)     Followed by   sodium chloride 0.9% infusion (has no administration in time range)   ondansetron (ZOFRAN) injection 4 mg (has no administration in time range)   HYDROmorphone (PF) (DILAUDID) injection 0.5 mg (has no administration in time range)   ondansetron (ZOFRAN) injection 4 mg (4  mg Intravenous Given 10/29/22 [id number])       Assessments & Plan (with Medical Decision Making)     MDM: Carolina Hernandez is a 57 year old female presents with findings suggestive of acute abdominal pain with possible small bowel obstruction but more likely be pancreatitis and as I was performing her evaluation AST came back at 2121 and ALT 1230.  We will start with CT imaging.  She has a hepatitis B history apparently when she was at child.  That has not been followed up.  There is the potential for abdominal mass related to this.  I am suspicious of pancreatitis we may need to go back and perform a ultrasound of the right upper quadrant as well.  Plan for initial management with IV fluids and we will make use of lactated Ringer's.  This is with my suspicion that lipase will also be elevated.  We will also plan for WA protocol.  And vits    I spoke with hepatology [facility], who agreed with the plan of treating pancreatitis but also rechecking LFTs serially about every 12 hours initially to evaluate trend.  We discussed other testing.  Reasonable to check an acetaminophen level but the patient denies Tylenol use.  She denies mushroom use.  No IV drug abuse history and most hepatitis viral would be unlikely to cause these elevations.  Agreed that right upper quadrant ultrasound was reasonable but low suspicion for common bile duct stone.  Bilirubin and alkaline phosphatase are relatively normal compared to the transaminases.  Discussed with Dr. Norman who accepts for admission.  Continue LR.  Pain management.         ED to Inpatient Handoff:    Discussed with Dr. Norman at   Patient accepted for Inpatient Stay  Pending studies include RUQ u/s ,ascetaminphen leve;  Code Status: Not Addressed           I have reviewed the nursing notes.    I have reviewed the findings, diagnosis, plan and need for follow up with the patient.       New Prescriptions    No medications on file       Final diagnoses:    Alcohol-induced acute pancreatitis without infection or necrosis   Transaminitis       10/29/2022   [facility] EMERGENCY DEPT     Ab Sol MD  10/29/22 2034       Ab Sol MD  10/29/22 2037

## 2022-10-30 PROBLEM — J44.9 COPD (CHRONIC OBSTRUCTIVE PULMONARY DISEASE) (H): Chronic | Status: ACTIVE | Noted: 2022-10-30

## 2022-10-30 PROBLEM — K85.90 ACUTE PANCREATITIS: Status: ACTIVE | Noted: 2022-10-29

## 2022-10-30 PROBLEM — J44.1 COPD EXACERBATION (H): Status: RESOLVED | Noted: 2022-06-08 | Resolved: 2022-10-30

## 2022-10-30 LAB
ALBUMIN SERPL BCG-MCNC: 3.7 G/DL (ref 3.5–5.2)
ALP SERPL-CCNC: 159 U/L (ref 35–104)
ALT SERPL W P-5'-P-CCNC: 816 U/L (ref 10–35)
ANION GAP SERPL CALCULATED.3IONS-SCNC: 7 MMOL/L (ref 7–15)
AST SERPL W P-5'-P-CCNC: 640 U/L (ref 10–35)
BILIRUB SERPL-MCNC: 1.1 MG/DL
BUN SERPL-MCNC: 6.5 MG/DL (ref 8–23)
CALCIUM SERPL-MCNC: 9 MG/DL (ref 8.6–10)
CHLORIDE SERPL-SCNC: 103 MMOL/L (ref 98–107)
CREAT SERPL-MCNC: 0.62 MG/DL (ref 0.51–0.95)
DEPRECATED HCO3 PLAS-SCNC: 27 MMOL/L (ref 22–29)
ERYTHROCYTE [DISTWIDTH] IN BLOOD BY AUTOMATED COUNT: 13.1 % (ref 10–15)
GFR SERPL CREATININE-BSD FRML MDRD: >90 ML/MIN/1.73M2
GLUCOSE SERPL-MCNC: 92 MG/DL (ref 70–99)
HCT VFR BLD AUTO: 35.9 % (ref 35–47)
HGB BLD-MCNC: 11.8 G/DL (ref 11.7–15.7)
HOLD SPECIMEN: NORMAL
LIPASE SERPL-CCNC: 4118 U/L (ref 73–393)
MCH RBC QN AUTO: 30.1 PG (ref 26.5–33)
MCHC RBC AUTO-ENTMCNC: 32.9 G/DL (ref 31.5–36.5)
MCV RBC AUTO: 92 FL (ref 78–100)
PLATELET # BLD AUTO: 358 10E3/UL (ref 150–450)
POTASSIUM SERPL-SCNC: 3.6 MMOL/L (ref 3.4–5.3)
PROT SERPL-MCNC: 6.5 G/DL (ref 6.4–8.3)
RBC # BLD AUTO: 3.92 10E6/UL (ref 3.8–5.2)
SARS-COV-2 RNA RESP QL NAA+PROBE: NEGATIVE
SODIUM SERPL-SCNC: 137 MMOL/L (ref 136–145)
WBC # BLD AUTO: 5.8 10E3/UL (ref 4–11)

## 2022-10-30 PROCEDURE — 250N000013 HC RX MED GY IP 250 OP 250 PS 637: Performed by: INTERNAL MEDICINE

## 2022-10-30 PROCEDURE — 86706 HEP B SURFACE ANTIBODY: CPT | Performed by: INTERNAL MEDICINE

## 2022-10-30 PROCEDURE — 86015 ACTIN ANTIBODY EACH: CPT | Performed by: INTERNAL MEDICINE

## 2022-10-30 PROCEDURE — 86381 MITOCHONDRIAL ANTIBODY EACH: CPT | Performed by: INTERNAL MEDICINE

## 2022-10-30 PROCEDURE — C9113 INJ PANTOPRAZOLE SODIUM, VIA: HCPCS | Performed by: FAMILY MEDICINE

## 2022-10-30 PROCEDURE — 258N000003 HC RX IP 258 OP 636: Performed by: INTERNAL MEDICINE

## 2022-10-30 PROCEDURE — 86038 ANTINUCLEAR ANTIBODIES: CPT | Performed by: INTERNAL MEDICINE

## 2022-10-30 PROCEDURE — 250N000011 HC RX IP 250 OP 636: Performed by: FAMILY MEDICINE

## 2022-10-30 PROCEDURE — 82784 ASSAY IGA/IGD/IGG/IGM EACH: CPT | Performed by: INTERNAL MEDICINE

## 2022-10-30 PROCEDURE — 36415 COLL VENOUS BLD VENIPUNCTURE: CPT | Performed by: INTERNAL MEDICINE

## 2022-10-30 PROCEDURE — 250N000013 HC RX MED GY IP 250 OP 250 PS 637: Performed by: FAMILY MEDICINE

## 2022-10-30 PROCEDURE — 250N000011 HC RX IP 250 OP 636: Performed by: INTERNAL MEDICINE

## 2022-10-30 PROCEDURE — 120N000001 HC R&B MED SURG/OB

## 2022-10-30 PROCEDURE — 86803 HEPATITIS C AB TEST: CPT | Performed by: INTERNAL MEDICINE

## 2022-10-30 PROCEDURE — 83516 IMMUNOASSAY NONANTIBODY: CPT | Performed by: INTERNAL MEDICINE

## 2022-10-30 PROCEDURE — 86376 MICROSOMAL ANTIBODY EACH: CPT | Performed by: INTERNAL MEDICINE

## 2022-10-30 PROCEDURE — 87340 HEPATITIS B SURFACE AG IA: CPT | Performed by: INTERNAL MEDICINE

## 2022-10-30 PROCEDURE — 99232 SBSQ HOSP IP/OBS MODERATE 35: CPT | Performed by: FAMILY MEDICINE

## 2022-10-30 PROCEDURE — 80053 COMPREHEN METABOLIC PANEL: CPT | Performed by: INTERNAL MEDICINE

## 2022-10-30 PROCEDURE — 86709 HEPATITIS A IGM ANTIBODY: CPT | Performed by: INTERNAL MEDICINE

## 2022-10-30 PROCEDURE — 83690 ASSAY OF LIPASE: CPT | Performed by: INTERNAL MEDICINE

## 2022-10-30 PROCEDURE — 85014 HEMATOCRIT: CPT | Performed by: INTERNAL MEDICINE

## 2022-10-30 RX ORDER — SODIUM CHLORIDE, SODIUM LACTATE, POTASSIUM CHLORIDE, CALCIUM CHLORIDE 600; 310; 30; 20 MG/100ML; MG/100ML; MG/100ML; MG/100ML
1000 INJECTION, SOLUTION INTRAVENOUS CONTINUOUS
Status: ACTIVE | OUTPATIENT
Start: 2022-10-30 | End: 2022-10-30

## 2022-10-30 RX ORDER — MONTELUKAST SODIUM 10 MG/1
10 TABLET ORAL AT BEDTIME
Status: DISCONTINUED | OUTPATIENT
Start: 2022-10-30 | End: 2022-11-01 | Stop reason: HOSPADM

## 2022-10-30 RX ORDER — ALBUTEROL SULFATE 0.83 MG/ML
2.5 SOLUTION RESPIRATORY (INHALATION) EVERY 4 HOURS PRN
Status: DISCONTINUED | OUTPATIENT
Start: 2022-10-30 | End: 2022-10-30

## 2022-10-30 RX ORDER — CARBOXYMETHYLCELLULOSE SODIUM 5 MG/ML
1 SOLUTION/ DROPS OPHTHALMIC
Status: DISCONTINUED | OUTPATIENT
Start: 2022-10-30 | End: 2022-11-01 | Stop reason: HOSPADM

## 2022-10-30 RX ORDER — TIOTROPIUM BROMIDE 18 UG/1
18 CAPSULE ORAL; RESPIRATORY (INHALATION) DAILY
Status: DISCONTINUED | OUTPATIENT
Start: 2022-10-30 | End: 2022-11-01 | Stop reason: HOSPADM

## 2022-10-30 RX ORDER — FLUTICASONE PROPIONATE AND SALMETEROL 500; 50 UG/1; UG/1
1 POWDER RESPIRATORY (INHALATION) EVERY 12 HOURS
Status: DISCONTINUED | OUTPATIENT
Start: 2022-10-30 | End: 2022-11-01 | Stop reason: HOSPADM

## 2022-10-30 RX ORDER — AMLODIPINE BESYLATE 5 MG/1
5 TABLET ORAL DAILY
Status: DISCONTINUED | OUTPATIENT
Start: 2022-10-30 | End: 2022-11-01 | Stop reason: HOSPADM

## 2022-10-30 RX ORDER — HYDROMORPHONE HYDROCHLORIDE 1 MG/ML
.5-1 INJECTION, SOLUTION INTRAMUSCULAR; INTRAVENOUS; SUBCUTANEOUS
Status: DISCONTINUED | OUTPATIENT
Start: 2022-10-30 | End: 2022-11-01 | Stop reason: HOSPADM

## 2022-10-30 RX ORDER — CARBOXYMETHYLCELLULOSE SODIUM 5 MG/ML
1 SOLUTION/ DROPS OPHTHALMIC 3 TIMES DAILY PRN
COMMUNITY
End: 2022-11-18

## 2022-10-30 RX ORDER — ASPIRIN 81 MG/1
81 TABLET ORAL DAILY
Status: DISCONTINUED | OUTPATIENT
Start: 2022-10-30 | End: 2022-11-01 | Stop reason: HOSPADM

## 2022-10-30 RX ADMIN — OXYCODONE HYDROCHLORIDE 5 MG: 5 TABLET ORAL at 21:42

## 2022-10-30 RX ADMIN — Medication 1 DROP: at 15:44

## 2022-10-30 RX ADMIN — FLUTICASONE PROPIONATE AND SALMETEROL 1 PUFF: 500; 50 POWDER RESPIRATORY (INHALATION) at 21:41

## 2022-10-30 RX ADMIN — Medication 3 MG: at 22:43

## 2022-10-30 RX ADMIN — HYDROMORPHONE HYDROCHLORIDE 0.5 MG: 1 INJECTION, SOLUTION INTRAMUSCULAR; INTRAVENOUS; SUBCUTANEOUS at 08:45

## 2022-10-30 RX ADMIN — SODIUM CHLORIDE, POTASSIUM CHLORIDE, SODIUM LACTATE AND CALCIUM CHLORIDE 1000 ML: 600; 310; 30; 20 INJECTION, SOLUTION INTRAVENOUS at 01:00

## 2022-10-30 RX ADMIN — PANTOPRAZOLE SODIUM 40 MG: 40 INJECTION, POWDER, FOR SOLUTION INTRAVENOUS at 21:41

## 2022-10-30 RX ADMIN — FLUOXETINE 60 MG: 20 CAPSULE ORAL at 08:45

## 2022-10-30 RX ADMIN — AMLODIPINE BESYLATE 5 MG: 5 TABLET ORAL at 08:48

## 2022-10-30 RX ADMIN — OXYCODONE HYDROCHLORIDE 5 MG: 5 TABLET ORAL at 12:09

## 2022-10-30 RX ADMIN — TIOTROPIUM BROMIDE 18 MCG: 18 CAPSULE ORAL; RESPIRATORY (INHALATION) at 15:45

## 2022-10-30 RX ADMIN — PANTOPRAZOLE SODIUM 40 MG: 40 INJECTION, POWDER, FOR SOLUTION INTRAVENOUS at 13:36

## 2022-10-30 RX ADMIN — MONTELUKAST 10 MG: 10 TABLET, FILM COATED ORAL at 21:42

## 2022-10-30 RX ADMIN — ASPIRIN 81 MG: 81 TABLET, COATED ORAL at 15:44

## 2022-10-30 ASSESSMENT — ACTIVITIES OF DAILY LIVING (ADL)
ADLS_ACUITY_SCORE: 18

## 2022-10-30 NOTE — PROGRESS NOTES
Alert and oriented.  Reports better pain control today with use of oral oxycodone   Denies nausea. Tolerated ice chips and sips water, diet advanced to clear liquids this afternoon.   Up independently in room.

## 2022-10-30 NOTE — PROGRESS NOTES
Neuro: A&Ox4.  Cardiac: WNL  Respiratory: WNL  GI/: Last BM 10/29.   Mobility: IND.   Diet: NPO   Pain: Controlled w/ IV dilaudid 0.5mg.   Skin: WNL.  LDA: PIV infusing LR @ 125ml/hr.     VS: VSS on RA. Afebrile.    Labs: ALT and AST trending down

## 2022-10-30 NOTE — H&P
[facility]    History and Physical  Hospital Medicine       Date of Admission:  10/29/2022  Date of Service: 10/30/2022     Assessment & Plan   Carolina Heranndez is a 57 year old female who presents on 10/29/2022 with about 12 hours of severe epigastric pain radiating to the back and associated with nausea.    Acute pancreatitis without necrosis    Acute onset of abdominal pain 3 AM on day of admission.  Epigastric pain radiating to the back initially then later down the right side of the abdomen.  Nausea with emesis x1. No prior history of pancreatitis. Patient does drink alcohol regularly though says she has been cutting back the last month.  Small gallstones were also seen on the right carotid ultrasound.  Bilirubin is mildly elevated 2.3 and alk phos mildly elevated 176.  Patient attributes her symptoms to starting rosuvastatin and aspirin 2 days prior to presentation.  Did not see a link of rosuvastatin with pancreatitis on a quick literature review.  CT showed evidence of mild pancreatitis without necrosis.  I suspect she has pancreatic fluid headed on the right pericolic gutter explaining the migration of her pain.   - Continue IV fluid rehydration  -Start sips of Ensure clear to get some nutrition going anteriorly   - Pain control   - Additional work-up as below    Acute hepatitis    Presents with AST 2121 and ALT 1230 with BiliT 2.3 and alk phos 176.  Transaminases seem unexpectedly high if this were gallstone pancreatitis.  Patient reports she had hepatitis B and mononucleosis when she was a teenager.  Has not been eating unusual mushrooms denies any Tylenol use.  She has had normal liver enzymes though last checked was 2019.  Case was discussed by the ED physician with hepatology, and shock liver is a consideration.  Viral hepatitis or autoimmune hepatitis also in the differential.  The patient was just started on high-dose rosuvastatin 2 days prior to admission, but such high  "elevations in transaminases would be unusual and more often than ALT is greater than AST when statin induced transaminitis occurs.  However given the proximity of starting the rosuvastatin with the elevated transaminases, will err on the side of holding rosuvastatin.   - Follow liver function tests, consider MRCP as noted below   - We will check viral hepatitis panel   - We will check antibodies associated with autoimmune hepatitis    At risk alcohol use    ED obtained h/o 6 beers per day though patient admits to \"a few\" 3-4 times per week usually but had cut to 2 beers/day and not everyday for the past month. Denies h/o withdrawal. No recent binge drinking. Last beer was 2 days prior to admission. No evidence of withdrawal currently.    - watch for signs of withdrawal and consider starting CIWA if concerned    Gallstones  Possible cholecystitis    RUQ US showed multiple small gallstones with significant gallbladder wall thickening, CBD mildly dilated 7 mm. Bilirubin only slightly up 2.3 and alk phos only mildly up as well.  Suspect changes could be secondary to the pancreatitis but also gallstone pancreatitis still possible.   - Follow LFTs   - Consider MRCP in the morning    Nonobstructive atherosclerosis of coronary artery  Hyperlipidemia LDL goal <70    H/o exertional chest pain leading to coronary angiogram 10/14/2022 which showed non-obstructing CAD. Rosuvastatin 20 mg prescribed along with aspirin 81 mg. Patient started both on 10/27 and now concern whether the rosuvastatin is a trigger for patient's hepatitis or pancreatitis.  (See above.)  Patient is also concerned that aspirin may be a contributor though this seems unlikely.   -Hold rosuvastatin   -Hold aspirin for now given patient's concerns      Benign essential hypertension   -Continue PTA amlodipine      COPD (chronic obstructive pulmonary disease) (H)     Compensated on Advair once daily and Spiriva daily.    - continue PTA inhalers    COVID-19 " "status    SARS-CoV-2 PCR is pending on admission. No suspicious symptoms.     Clinically Significant Risk Factors Present on Admission         # Hyponatremia: Lowest Na = 135 mmol/L (Ref range: 136-145) in last 2 days, will monitor as appropriate              # Overweight: Estimated body mass index is 29.05 kg/m  as calculated from the following:    Height as of this encounter: 1.6 m (5' 3\").    Weight as of this encounter: 74.4 kg (164 lb).         Diet: NPO for Medical/Clinical Reasons Except for: Other, Meds; Specify: Sips of Ensure  Snacks/Supplements Adult: Ensure Clear; With Meals  DVT Prophylaxis: Low Risk/Ambulatory with no VTE prophylaxis indicated  Weinberg Catheter: Not present  Central Lines: None  Code Status: Full Code         Disposition: Anticipate discharge in 3-5 day(s) once pancreatitis resolving and tolerating diet. Appropriate for inpatient admission    Albino Norman MD  [facility] Medicine        Primary Care Physician   Julia Oneill [phone]    History is obtained from the patient who is a good historian, discussion with the ER physician and review of old records via the EMR.    History of Present Illness   Carolina Hernandez is a 57 year old female who presents with cute onset of epigastric pain that woke her up at 3 AM on the day of admission.  Pain radiates into her back.  Associated with nausea.  She thought it was ulcers and tried some baking soda and water and then a banana but then had emesis of all of this.  She is continued of nausea without further emesis.  There is no blood in the emesis.  Estrella was able to take some oatmeal and chuy tea and kept that down.  However she continued to have pain.  She is never had pain like this before.  She had been having episodes of chest pain which is up higher lasting 10 to 15 minutes and that led ultimately to coronary angiogram on 14 October which showed some nonobstructing coronary disease.  She started rosuvastatin and aspirin 2 " days prior to admission.  She is concerned these medications may be the cause.  She had increased acid reflux for the last couple weeks.  Over the course the day that abdominal pain is moved more towards the right upper quadrant and down the right abdomen.  She had a normal bowel movement within the last day.  Not passing any melena or bright red blood per rectum.  Has not had any rashes.  Denies any mushroom intake.  She does drink a few beers 3-4 times a week as she reports this to me.  If even cut this back down to a 2 beers when she does drink 2 to heartburn and chest pain she had last month.  Her weight has been stable.     Review of Systems   The 10 point Review of Systems is negative other than noted in the HPI or here.  Her irritable bowel syndrome has actually been better since been taking psyllium twice a day.  Stopped her prior antidiarrheal.    Past Medical History    Past Medical History:   Diagnosis Date     Asthma      C. difficile colitis      Depression      Depressive disorder Have had it most of my life     Osteoarthritis      Sinusitis, chronic      Patient Active Problem List    Diagnosis Date Noted     COPD (chronic obstructive pulmonary disease) (H) 10/30/2022     Priority: Medium     Nonobstructive atherosclerosis of coronary artery 10/21/2022     Priority: Medium     Hyperlipidemia LDL goal <70 10/21/2022     Priority: Medium     Other chest pain 10/21/2022     Priority: Medium     ESCALANTE (dyspnea on exertion) 10/21/2022     Priority: Medium     Status post coronary angiogram 10/14/2022     Priority: Medium     Low back pain due to bilateral sciatica 10/27/2021     Priority: Medium     Primary osteoarthritis of both knees 09/23/2021     Priority: Medium     Benign essential hypertension 07/26/2019     Priority: Medium     New diagnosis 7/26/2019         Recurrent major depressive disorder, in remission (H) 01/17/2019     Priority: Medium     Encounter for tobacco use cessation counseling  01/17/2019     Priority: Medium     Mild intermittent asthma without complication 01/17/2019     Priority: Medium     CARDIOVASCULAR SCREENING; LDL GOAL LESS THAN 160 10/31/2010     Priority: Medium        Past Surgical History   Past Surgical History:   Procedure Laterality Date     COLONOSCOPY  2017     CV CORONARY ANGIOGRAM N/A 10/14/2022    Procedure: Coronary Angiogram;  Surgeon: Fidel Martin MD;  Location: [facility] CARDIAC CATH LAB     ESOPHAGOSCOPY, GASTROSCOPY, DUODENOSCOPY (EGD), COMBINED N/A 05/05/2022    Procedure: ESOPHAGOGASTRODUODENOSCOPY, WITH BIOPSY;  Surgeon: Tiomthy Oliva DO;  Location: [facility]        Prior to Admission Medications   Prior to Admission Medications   Prescriptions Last Dose Informant Patient Reported? Taking?   FLUoxetine (PROZAC) 20 MG capsule 10/28/2022 at 0800  No Yes   Sig: Take 1 capsule (20 mg) by mouth daily   FLUoxetine (PROZAC) 40 MG capsule 10/28/2022 at 0800  No Yes   Sig: Take 1 capsule (40 mg) by mouth daily Take with 20 mg tab for a total of 60 mg daily   Psyllium Husk 100 % POWD   Yes No   Sig: Taking daily.   albuterol (PROVENTIL) (2.5 MG/3ML) 0.083% neb solution 10/29/2022 at 0800  No Yes   Sig: inhale 1 vial (2.5 mg) by nebulization every 4 hours as needed for shortness of breath / dyspnea or wheezing   albuterol (VENTOLIN HFA) 108 (90 Base) MCG/ACT inhaler   No No   Sig: INHALE 1-2 PUFFS INTO THE LUNGS EVERY 4 HOURS AS NEEDED FOR SHORTNESS OF BREATH/DYSPNEA OR WHEEZING .   amLODIPine (NORVASC) 2.5 MG tablet 10/28/2022  No Yes   Sig: Take 2 tablets (5 mg) by mouth once daily.   cetirizine (ZYRTEC) 10 MG tablet 10/28/2022 at 0800 Self Yes Yes   Sig: Take 10 mg by mouth daily   fluticasone (FLONASE) 50 MCG/ACT nasal spray 10/28/2022 at 0800  No Yes   Sig: Spray 1 spray into both nostrils daily   fluticasone-salmeterol (ADVAIR) 500-50 MCG/ACT inhaler 10/29/2022 at 0800  No Yes   Sig: Inhale 1 puff into the lungs every 12 hours   loperamide (ANTI-DIARRHEAL)  2 MG tablet   No No   Sig: Take 1 tablet (2 mg) by mouth 4 times daily as needed for diarrhea   Patient not taking: Reported on 10/27/2022   montelukast (SINGULAIR) 10 MG tablet 10/28/2022 at 0800  No Yes   Sig: Take 1 tablet (10 mg) by mouth At Bedtime   omeprazole 20 MG tablet 10/28/2022 at 0800  No Yes   Sig: Take 2 tablets (40 mg) by mouth daily   rosuvastatin (CRESTOR) 20 MG tablet 10/28/2022 at 0800  No Yes   Sig: Take 1 tablet (20 mg) by mouth daily   tiotropium (SPIRIVA) 18 MCG inhaled capsule 10/28/2022 at 0800  No Yes   Sig: Inhale 1 capsule (18 mcg) into the lungs daily      Facility-Administered Medications: None     Allergies   Allergies   Allergen Reactions     Doxycycline Difficulty breathing     Penicillins Itching       Family History    Family History   Problem Relation Age of Onset     Glaucoma Father      Coronary Artery Disease Father         stents     Cancer Maternal Grandfather      Coronary Artery Disease Paternal Grandfather      Schizophrenia Daughter      Diabetes Daughter      Cancer Daughter      Crohn's Disease No family hx of      Ulcerative Colitis No family hx of      GERD No family hx of      Stomach Cancer No family hx of          Social History   Social History     Socioeconomic History     Marital status:      Spouse name: Not on file     Number of children: Not on file     Years of education: Not on file     Highest education level: Not on file   Occupational History     Not on file   Tobacco Use     Smoking status: Former     Packs/day: 0.00     Years: 23.00     Pack years: 0.00     Types: Cigarettes     Quit date: 2021     Years since quittin.2     Smokeless tobacco: Never     Tobacco comments:     6 cig a day    Vaping Use     Vaping Use: Never used   Substance and Sexual Activity     Alcohol use: Not Currently     Comment: 4-6 beers multiple times weekly     Drug use: No     Sexual activity: Not Currently   Other Topics Concern     Not on file   Social  "History Narrative     Not on file       Physical Exam   BP (!) 146/76 (BP Location: Left arm)   Pulse 72   Temp 98.3  F (36.8  C) (Oral)   Resp 16   Ht 1.6 m (5' 3\")   Wt 74.4 kg (164 lb)   SpO2 96%   BMI 29.05 kg/m       Weight: 164 lbs 0 oz Body mass index is 29.05 kg/m .     Constitutional: Alert, oriented, cooperative, appears uncomfortable and is frequently moving about the bed but otherwise no apparent distress, appears nontoxic  Eyes: Eyes are injected, pupils are reactive.  HEENT: Oropharynx is clear and a bit dry. No evidence of cranial trauma.  Lymph/Hematologic: No epitrochlear, axillary, anterior or posterior cervical, or supraclavicular lymphadenopathy is appreciated.  Cardiovascular: Regular rate and rhythm, normal S1 and S2, and no murmur noted. JVP is normal. Good peripheral pulses in wrists bilaterally.  1+ right lower extremity edema.  Respiratory: Clear to auscultation bilaterally.   GI: Very tender to mild to moderate palpation of the right abdomen, right upper quadrant and left upper quadrant abdomen.  No rebound..  Genitourinary: Deferred  Musculoskeletal: Normal muscle bulk and tone.  Skin: Warm and dry, no rashes.   Neurologic: Neck supple. Cranial nerves are grossly intact.  is symmetric.     Data   Data reviewed today:   Recent Labs   Lab 10/29/22  1548   WBC 10.9   HGB 13.6   MCV 90      *   POTASSIUM 3.8   CHLORIDE 97*   CO2 22   BUN 10.4   CR 0.58   ANIONGAP 16*   REILLY 9.6   *   ALBUMIN 4.5   PROTTOTAL 8.0   BILITOTAL 2.3*   ALKPHOS 176*   ALT 1,230*   AST 2,121*   LIPASE >3,000*       Recent Results (from the past 24 hour(s))   CT Abdomen Pelvis w Contrast    Narrative    EXAM: CT ABDOMEN PELVIS W CONTRAST  LOCATION: [facility]  DATE/TIME: 10/29/2022 6:00 PM    INDICATION: abd pain generalized and upper quadrants.  eval for sbo, pancreatitis, diverticulitis, ruptured viscus  COMPARISON: 03/03/2022  TECHNIQUE: CT scan of the " abdomen and pelvis was performed following injection of IV contrast. Multiplanar reformats were obtained. Dose reduction techniques were used.  CONTRAST: 72 mL Isovue 370    FINDINGS:   LOWER CHEST: Small hiatal hernia.    HEPATOBILIARY: Mild hepatic steatosis. No suspicious liver lesion. No calcified gallstones. Mild gallbladder wall thickening.    PANCREAS: Mild inflammation and fluid around the pancreatic head. Homogeneous pancreatic enhancement. No pancreatic mass.    SPLEEN: Normal.    ADRENAL GLANDS: Normal.    KIDNEYS/BLADDER: Normal.    BOWEL: Mild sigmoid diverticulosis. No evidence for diverticulitis. No free air, free fluid. Normal appendix. No obstruction.    LYMPH NODES: Normal.    VASCULATURE: Unremarkable.    PELVIC ORGANS: Normal.    MUSCULOSKELETAL: Severe degenerative disc disease at L4-L5 and L5-S1. Severe degenerative disc disease in the lower thoracic spine. No suspicious bone lesion.      Impression    IMPRESSION:   1.  Mild acute pancreatitis without evidence for pancreatic necrosis.   Abdomen US, limited (RUQ only)    Narrative    EXAM: US ABDOMEN LIMITED  LOCATION: [facility]  DATE/TIME: 10/29/2022 11:08 PM    INDICATION: Abdominal pain.  COMPARISON: CT abdomen and pelvis 10/29/2022.  TECHNIQUE: Limited abdominal ultrasound.    FINDINGS:    GALLBLADDER: Small gallstones are present with gallbladder wall thickening.    BILE DUCTS: Mild common bile duct dilatation. The common duct measures 7 mm.    LIVER: Fatty liver. No focal mass.    RIGHT KIDNEY: No hydronephrosis.    PANCREAS: Obscured by overlying bowel gas.    No ascites.      Impression    IMPRESSION:  1.  Multiple small gallstones with significant gallbladder wall thickening. Common bile duct is mildly dilated measuring 7 mm without significant intrahepatic bile duct dilatation.    2.  Fatty liver.           I personally reviewed the abdominal CT image(s) showing Mild peripancreatic edema.    Albino [place],  [facility]

## 2022-10-30 NOTE — ED NOTES
"[facility]   Admission Handoff    The patient is Carolina Hernandez, 57 year old who arrived in the ED by WALKED from home with a complaint of Abdominal Pain (Pt reports woke up with severe pain to epigastric region that radiates into midsternal region and wraps around to back, pt reports pain started at about 3AM. Pt reports nausea, sob at this time. Pt reports she has been taking omperazole for acid reflux, recently increased to 40mg with much relief /)  . The patient's current symptoms are new and during this time the symptoms have remained the same. In the ED, patient was diagnosed with   Final diagnoses:   None         Needed?: No    Allergies:    Allergies   Allergen Reactions     Doxycycline Difficulty breathing     Penicillins Itching       Past Medical Hx:   Past Medical History:   Diagnosis Date     Asthma      C. difficile colitis      Depression      Depressive disorder Have had it most of my life     Osteoarthritis      Sinusitis, chronic        Initial vitals were: BP: (!) 179/91  Pulse: 73  Temp: 98.5  F (36.9  C)  Resp: 18  Height: 160 cm (5' 3\")  Weight: 74.4 kg (164 lb)  SpO2: 97 %   Recent vital Signs: BP (!) 142/75   Pulse 69   Temp 98.5  F (36.9  C) (Tympanic)   Resp 15   Ht 1.6 m (5' 3\")   Wt 74.4 kg (164 lb)   SpO2 96%   BMI 29.05 kg/m      Elimination Status: Continent: Yes     Activity Level: Independent    Fall Status: Reason for falls risk: High Risk Medications  patient and family education    Baseline Mental status: WDL  Current Mental Status changes: at basesline    Infection present or suspected this encounter: no  Sepsis suspected: No    Isolation type: NA    Bariatric equipment needed?: No    In the ED these meds were given:   Medications   ondansetron (ZOFRAN) injection 4 mg (has no administration in time range)   lactated ringers BOLUS 1,000 mL (0 mLs Intravenous Stopped 10/29/22 1935)     Followed by   lactated ringers infusion (125 " mL/hr Intravenous New Bag 10/29/22 1935)   ondansetron (ZOFRAN) injection 4 mg (4 mg Intravenous Given 10/29/22 1632)   HYDROmorphone (PF) (DILAUDID) injection 0.5 mg (0.5 mg Intravenous Given 10/29/22 1935)   multivitamin, therapeutic (THERA-VIT) tablet 1 tablet (1 tablet Oral Given 10/29/22 1704)   folic acid (FOLVITE) tablet 1 mg (1 mg Oral Given 10/29/22 1704)   thiamine (B-1) tablet 100 mg (100 mg Oral Given 10/29/22 1703)   magnesium oxide (MAG-OX) tablet 800 mg (800 mg Oral Given 10/29/22 1704)   iopamidol (ISOVUE-370) solution 72 mL (72 mLs Intravenous Given 10/29/22 1752)   sodium chloride 0.9 % bag 500mL for CT scan flush use (60 mLs Intravenous Given 10/29/22 1751)       Drips running?  No    Home pump  No    Current LDAs: Peripheral IV: Site RFA; Gauge 18  lactated Ringer's     Results:   Labs/Imaging  Ordered and Resulted from Time of ED Arrival Up to the Time of Departure from the ED  Results for orders placed or performed during the hospital encounter of 10/29/22 (from the past 24 hour(s))   CBC with platelets differential    Narrative    The following orders were created for panel order CBC with platelets differential.  Procedure                               Abnormality         Status                     ---------                               -----------         ------                     CBC with platelets and d...[[id number]]  Abnormal            Final result                 Please view results for these tests on the individual orders.   Comprehensive metabolic panel   Result Value Ref Range    Sodium 135 (L) 136 - 145 mmol/L    Potassium 3.8 3.4 - 5.3 mmol/L    Chloride 97 (L) 98 - 107 mmol/L    Carbon Dioxide (CO2) 22 22 - 29 mmol/L    Anion Gap 16 (H) 7 - 15 mmol/L    Urea Nitrogen 10.4 8.0 - 23.0 mg/dL    Creatinine 0.58 0.51 - 0.95 mg/dL    Calcium 9.6 8.6 - 10.0 mg/dL    Glucose 109 (H) 70 - 99 mg/dL    Alkaline Phosphatase 176 (H) 35 - 104 U/L    AST 2,121 (HH) 10 - 35 U/L    ALT 1,230 ()  10 - 35 U/L    Protein Total 8.0 6.4 - 8.3 g/dL    Albumin 4.5 3.5 - 5.2 g/dL    Bilirubin Total 2.3 (H) <=1.2 mg/dL    GFR Estimate >90 >60 mL/min/1.73m2   Lipase   Result Value Ref Range    Lipase >3,000 (H) 13 - 60 U/L   [place] Draw    Narrative    The following orders were created for panel order [place] Draw.  Procedure                               Abnormality         Status                     ---------                               -----------         ------                     Extra Blue Top Tube[[id number]]                              Final result               Extra Red Top Tube[[id number]]                               Final result               Extra Green Top (Lithium...[[id number]]                      Final result               Extra Purple Top Tube[[id number]]                            Final result                 Please view results for these tests on the individual orders.   CBC with platelets and differential   Result Value Ref Range    WBC Count 10.9 4.0 - 11.0 10e3/uL    RBC Count 4.44 3.80 - 5.20 10e6/uL    Hemoglobin 13.6 11.7 - 15.7 g/dL    Hematocrit 39.9 35.0 - 47.0 %    MCV 90 78 - 100 fL    MCH 30.6 26.5 - 33.0 pg    MCHC 34.1 31.5 - 36.5 g/dL    RDW 12.7 10.0 - 15.0 %    Platelet Count 386 150 - 450 10e3/uL    % Neutrophils 80 %    % Lymphocytes 12 %    % Monocytes 6 %    % Eosinophils 1 %    % Basophils 0 %    % Immature Granulocytes 1 %    NRBCs per 100 WBC 0 <1 /100    Absolute Neutrophils 8.8 (H) 1.6 - 8.3 10e3/uL    Absolute Lymphocytes 1.3 0.8 - 5.3 10e3/uL    Absolute Monocytes 0.7 0.0 - 1.3 10e3/uL    Absolute Eosinophils 0.1 0.0 - 0.7 10e3/uL    Absolute Basophils 0.0 0.0 - 0.2 10e3/uL    Absolute Immature Granulocytes 0.1 <=0.4 10e3/uL    Absolute NRBCs 0.0 10e3/uL   Extra Blue Top Tube   Result Value Ref Range    Hold Specimen JIC    Extra Red Top Tube   Result Value Ref Range    Hold Specimen JIC    Extra Green Top (Lithium Heparin) Tube   Result Value Ref Range    Hold  Specimen JIC    Extra Purple Top Tube   Result Value Ref Range    Hold Specimen JIC    UA with Microscopic reflex to Culture    Specimen: Urine, Clean Catch   Result Value Ref Range    Color Urine Yellow Colorless, Straw, Light Yellow, Yellow    Appearance Urine Clear Clear    Glucose Urine Negative Negative mg/dL    Bilirubin Urine Negative Negative    Ketones Urine Negative Negative mg/dL    Specific Gravity Urine 1.010 1.003 - 1.035    Blood Urine Negative Negative    pH Urine 7.5 (H) 5.0 - 7.0    Protein Albumin Urine Negative Negative mg/dL    Urobilinogen Urine Normal Normal, 2.0 mg/dL    Nitrite Urine Negative Negative    Leukocyte Esterase Urine Negative Negative    RBC Urine <1 <=2 /HPF    WBC Urine <1 <=5 /HPF    Squamous Epithelials Urine <1 <=1 /HPF    Narrative    Urine Culture not indicated   Lactic acid whole blood   Result Value Ref Range    Lactic Acid 1.1 0.7 - 2.0 mmol/L   CT Abdomen Pelvis w Contrast    Narrative    EXAM: CT ABDOMEN PELVIS W CONTRAST  LOCATION: [facility]  DATE/TIME: 10/29/2022 6:00 PM    INDICATION: abd pain generalized and upper quadrants.  eval for sbo, pancreatitis, diverticulitis, ruptured viscus  COMPARISON: 03/03/2022  TECHNIQUE: CT scan of the abdomen and pelvis was performed following injection of IV contrast. Multiplanar reformats were obtained. Dose reduction techniques were used.  CONTRAST: 72 mL Isovue 370    FINDINGS:   LOWER CHEST: Small hiatal hernia.    HEPATOBILIARY: Mild hepatic steatosis. No suspicious liver lesion. No calcified gallstones. Mild gallbladder wall thickening.    PANCREAS: Mild inflammation and fluid around the pancreatic head. Homogeneous pancreatic enhancement. No pancreatic mass.    SPLEEN: Normal.    ADRENAL GLANDS: Normal.    KIDNEYS/BLADDER: Normal.    BOWEL: Mild sigmoid diverticulosis. No evidence for diverticulitis. No free air, free fluid. Normal appendix. No obstruction.    LYMPH NODES:  Normal.    VASCULATURE: Unremarkable.    PELVIC ORGANS: Normal.    MUSCULOSKELETAL: Severe degenerative disc disease at L4-L5 and L5-S1. Severe degenerative disc disease in the lower thoracic spine. No suspicious bone lesion.      Impression    IMPRESSION:   1.  Mild acute pancreatitis without evidence for pancreatic necrosis.       For the majority of the shift this patient's behavior was Green     Cardiac Rhythm: Normal Sinus  Pt needs tele? No  Skin/wound Issues: NA    Code Status: Full Code    Pain control: good    Nausea control: good    Abnormal labs/tests/findings requiring intervention: Lipase, ALT/AST elevated reflective of pancreatitis    Patient tested for COVID 19 prior to admission: YES     OBS brochure/video discussed/provided to patient/family: N/A     Family present during ED course? No     Family Comments/Social Situation comments: NA    Tasks needing completion: None    Joe Silva RN

## 2022-10-30 NOTE — PROGRESS NOTES
[facility]           Assessment & Plan      Carolina Hernandez is a 57 year old female who presents on 10/29/2022 with about 12 hours of severe epigastric pain radiating to the back and associated with nausea.     Acute pancreatitis without necrosis - uncertain etiology but suspect due to alcohol with crestor perhaps increasing risk     Acute onset of abdominal pain 3 AM on day of admission.  Epigastric pain radiating to the back initially then later down the right side of the abdomen.  Nausea with emesis x1. No prior history of pancreatitis. Patient does drink alcohol regularly though says she has been cutting back the last month.  Small gallstones were also seen on the abdominal ultrasound.  Bilirubin initially mildly elevated 2.3 and alk phos mildly elevated 176.  Patient attributes her symptoms to starting rosuvastatin and aspirin 2 days prior to presentation.  Did not see a link of rosuvastatin with pancreatitis on a quick literature review, requested pharmacy to review 10/30 - crestor can increase risk especially combined with alcohol.  See sticky note.  CT showed evidence of mild pancreatitis without necrosis.  I suspect she has pancreatic fluid putting pressure on the right pericolic gutter explaining the migration of her pain.   - Continue IV fluid rehydration - LR @ 150/h after bolus in ER  -Started clears 10/30    - pain improving, continue oxycodone prn for pain control  - fasting lipid panel ordered   - labs improving 10/30      Acute hepatitis - suspect due to pancreatitis as above    Presents with AST 2121 and ALT 1230 with BiliT 2.3 and alk phos 176.  Transaminases seem unexpectedly high if this were gallstone pancreatitis.  Patient reports she had hepatitis B and mononucleosis when she was a teenager.  Has not been eating unusual mushrooms, denies any Tylenol use.  She has had normal liver enzymes though last checked was 2019.  Case was discussed by the ED physician with  "hepatology, and shock liver is a consideration.  Viral hepatitis or autoimmune hepatitis also in the differential.  The patient was just started on high-dose rosuvastatin 2 days prior to admission, but such high elevations in transaminases would be unusual and more often ALT is greater than AST when statin induced transaminitis occurs.  However given the proximity of starting the rosuvastatin with the elevated transaminases, will err on the side of holding rosuvastatin.   - liver enzymes clearly improving 10/30 consistent with being due to pancreatitis without stone or stone that has now passed.  Continue to follow. Consider MRCP if not continuing to normalize.     - viral hepatitis panel and antibodies associated with autoimmune hepatitis pending      At risk alcohol use    ED obtained h/o 6 beers per day though patient admits to \"a few\" 3-4 times per week usually but had cut to 2 beers/day and not everyday for the past month. Denies h/o withdrawal. No recent binge drinking. Last beer was 2 days prior to admission. No evidence of withdrawal currently.    - watch for signs of withdrawal and consider starting CIWA if concerned.  Doing well so far.       Gallstones  Possible cholecystitis    RUQ US showed multiple small gallstones with significant gallbladder wall thickening, CBD mildly dilated 7 mm. Bilirubin only slightly up 2.3 and alk phos only mildly up as well.  both improved 10/30.  Suspect changes could be secondary to the pancreatitis but also gallstone pancreatitis still possible.   - Follow LFTs   - Consider MRCP if not continuing to normalize.       Nonobstructive atherosclerosis of coronary artery  Hyperlipidemia LDL goal <70    H/o exertional chest pain leading to coronary angiogram 10/14/2022 which showed non-obstructing CAD. Rosuvastatin 20 mg prescribed along with aspirin 81 mg. Patient started both on 10/27 and now concerned whether the rosuvastatin is a trigger for patient's hepatitis or " "pancreatitis.  (See above.)  Patient is also concerned that aspirin may be a contributor though this seems unlikely.   -Held rosuvastatin given possible connection to pancreatitis as above - will need to confirm with cardiology what they want to do regarding this - consult placed for Monday   -continue aspirin   - appears stable.       GERD  Patient symptomatic 10/30.  Started on IV protonix twice daily, can slow to once daily vs switch to home oral omeprazole 40 mg or equivalent 10/31.      Benign essential hypertension   -Continue PTA amlodipine      COPD (chronic obstructive pulmonary disease) (H)     Compensated on Advair once daily and Spiriva daily.    - continue PTA inhalers     COVID-19 status    SARS-CoV-2 PCR is pending on admission. No suspicious symptoms.            Clinically Significant Risk Factors Present on Admission             # Hyponatremia: Lowest Na = 135 mmol/L (Ref range: 136-145) in last 2 days, will monitor as appropriate              # Overweight: Estimated body mass index is 29.05 kg/m  as calculated from the following:    Height as of this encounter: 1.6 m (5' 3\").    Weight as of this encounter: 74.4 kg (164 lb).              DVT Prophylaxis: Low Risk/Ambulatory with no VTE prophylaxis indicated  Weinberg Catheter: Not present  Central Lines: None  Code Status: Full Code      Diet  Orders Placed This Encounter      Clear Liquid Diet                  Disposition Plan      Anticipate at least 2 more days inpatient                 Yury Grayson MD, MD  Hospitalist Service  [facility]  Securely message with the Vocera Web Console (learn more here)  Text page via [employer] Paging/Directory             Interval History:   Improving.  Pain better but still there  Apatite returning.  No nausea no fever or chills. Does have heartburn symptoms.  No chest pain.  No other changes.  Overall feels like she's definitely doing better today.  Mental status clear, no shakiness or " "evidence of withdrawal.    No other pain             Review of Systems:    ROS: 10 point ROS neg other than the symptoms noted above in the HPI.           Medications:   Current active medications and PTA medications reviewed, see medication list for details.            Physical Exam:   Vitals were reviewed  Patient Vitals for the past 24 hrs:   BP Temp Temp src Pulse Resp SpO2 Height Weight   10/30/22 1038 117/60 97.8  F (36.6  C) Oral 67 16 94 % -- --   10/30/22 0739 124/69 97.6  F (36.4  C) Oral 74 16 95 % -- --   10/30/22 0300 123/67 97.8  F (36.6  C) Oral 64 14 92 % -- --   10/29/22 2242 (!) 146/76 98.3  F (36.8  C) Oral 72 16 96 % -- --   10/29/22 2230 128/72 -- -- 64 11 92 % -- --   10/29/22 2200 (!) 140/79 -- -- 63 -- -- -- --   10/29/22 2130 (!) 155/81 -- -- 68 8 91 % -- --   10/29/22 2100 (!) 144/90 -- -- 66 19 97 % -- --   10/29/22 2030 126/70 -- -- 67 17 93 % -- --   10/29/22 1945 (!) 141/61 -- -- 63 12 95 % -- --   10/29/22 1930 (!) 142/75 -- -- 69 15 96 % -- --   10/29/22 191 (!) 153/83 -- -- 67 8 95 % -- --   10/29/22 1900 (!) 156/81 -- -- 71 12 94 % -- --   10/29/22 1845 (!) 143/79 -- -- 68 19 95 % -- --   10/29/22 1830 139/79 -- -- 75 12 95 % -- --   10/29/22 1815 (!) 157/81 -- -- 77 11 93 % -- --   10/29/22 1745 (!) 159/84 -- -- 80 24 -- -- --   10/29/22 1730 (!) 152/82 -- -- 72 (!) 7 -- -- --   10/29/22 1715 (!) 123/92 -- -- 62 12 -- -- --   10/29/22 1700 (!) 103/91 -- -- 71 16 -- -- --   10/29/22 1630 (!) 137/105 -- -- 79 21 -- -- --   10/29/22 1615 (!) 152/87 -- -- 73 (!) 32 -- -- --   10/29/22 1457 (!) 179/91 98.5  F (36.9  C) Tympanic 73 18 97 % 1.6 m (5' 3\") 74.4 kg (164 lb)       Temperatures:  Current - Temp: 97.8  F (36.6  C); Max - Temp  Av  F (36.7  C)  Min: 97.6  F (36.4  C)  Max: 98.5  F (36.9  C)  Respiration range: Resp  Avg: 15.3  Min: 7  Max: 32  Pulse range: Pulse  Av.8  Min: 62  Max: 80  Blood pressure range: Systolic (24hrs), Av , Min:103 , Max:179   ; Diastolic " (24hrs), Av, Min:60, Max:105    Pulse oximetry range: SpO2  Av.4 %  Min: 91 %  Max: 97 %  No intake/output data recorded.  No intake or output data in the 24 hours ending 10/30/22 1306  EXAM:  General: awake and alert, NAD, oriented x 3  Head: normocephalic  Neck: unremarkable, no lymphadenopathy   HEENT: oropharynx pink and moist    Heart: Regular rate and rhythm, no murmurs, rubs, or gallops  Lungs: clear to auscultation bilaterally with good air movement throughout  Abdomen: soft, mild tenderness upper mid abdomen without rebound or guarding, bowel sounds present.  Extremities: no edema in lower extremities   Skin unremarkable.               Data:     Results for orders placed or performed during the hospital encounter of 10/29/22 (from the past 24 hour(s))   CBC with platelets differential    Narrative    The following orders were created for panel order CBC with platelets differential.  Procedure                               Abnormality         Status                     ---------                               -----------         ------                     CBC with platelets and d...[[id number]]  Abnormal            Final result                 Please view results for these tests on the individual orders.   Comprehensive metabolic panel   Result Value Ref Range    Sodium 135 (L) 136 - 145 mmol/L    Potassium 3.8 3.4 - 5.3 mmol/L    Chloride 97 (L) 98 - 107 mmol/L    Carbon Dioxide (CO2) 22 22 - 29 mmol/L    Anion Gap 16 (H) 7 - 15 mmol/L    Urea Nitrogen 10.4 8.0 - 23.0 mg/dL    Creatinine 0.58 0.51 - 0.95 mg/dL    Calcium 9.6 8.6 - 10.0 mg/dL    Glucose 109 (H) 70 - 99 mg/dL    Alkaline Phosphatase 176 (H) 35 - 104 U/L    AST 2,121 (HH) 10 - 35 U/L    ALT 1,230 (HH) 10 - 35 U/L    Protein Total 8.0 6.4 - 8.3 g/dL    Albumin 4.5 3.5 - 5.2 g/dL    Bilirubin Total 2.3 (H) <=1.2 mg/dL    GFR Estimate >90 >60 mL/min/1.73m2   Lipase   Result Value Ref Range    Lipase >3,000 (H) 13 - 60 U/L   [place] Draw     Narrative    The following orders were created for panel order [place] Draw.  Procedure                               Abnormality         Status                     ---------                               -----------         ------                     Extra Blue Top Tube[[id number]]                              Final result               Extra Red Top Tube[[id number]]                               Final result               Extra Green Top (Lithium...[[id number]]                      Final result               Extra Purple Top Tube[[id number]]                            Final result                 Please view results for these tests on the individual orders.   CBC with platelets and differential   Result Value Ref Range    WBC Count 10.9 4.0 - 11.0 10e3/uL    RBC Count 4.44 3.80 - 5.20 10e6/uL    Hemoglobin 13.6 11.7 - 15.7 g/dL    Hematocrit 39.9 35.0 - 47.0 %    MCV 90 78 - 100 fL    MCH 30.6 26.5 - 33.0 pg    MCHC 34.1 31.5 - 36.5 g/dL    RDW 12.7 10.0 - 15.0 %    Platelet Count 386 150 - 450 10e3/uL    % Neutrophils 80 %    % Lymphocytes 12 %    % Monocytes 6 %    % Eosinophils 1 %    % Basophils 0 %    % Immature Granulocytes 1 %    NRBCs per 100 WBC 0 <1 /100    Absolute Neutrophils 8.8 (H) 1.6 - 8.3 10e3/uL    Absolute Lymphocytes 1.3 0.8 - 5.3 10e3/uL    Absolute Monocytes 0.7 0.0 - 1.3 10e3/uL    Absolute Eosinophils 0.1 0.0 - 0.7 10e3/uL    Absolute Basophils 0.0 0.0 - 0.2 10e3/uL    Absolute Immature Granulocytes 0.1 <=0.4 10e3/uL    Absolute NRBCs 0.0 10e3/uL   Extra Blue Top Tube   Result Value Ref Range    Hold Specimen JIC    Extra Red Top Tube   Result Value Ref Range    Hold Specimen JIC    Extra Green Top (Lithium Heparin) Tube   Result Value Ref Range    Hold Specimen JIC    Extra Purple Top Tube   Result Value Ref Range    Hold Specimen JIC    Acetaminophen level   Result Value Ref Range    Acetaminophen <5.0 (L) 10.0 - 30.0 ug/mL   Mono   Result Value Ref Range    Mononucleosis Screen Negative  Negative   CK total   Result Value Ref Range     26 - 192 U/L   UA with Microscopic reflex to Culture    Specimen: Urine, Clean Catch   Result Value Ref Range    Color Urine Yellow Colorless, Straw, Light Yellow, Yellow    Appearance Urine Clear Clear    Glucose Urine Negative Negative mg/dL    Bilirubin Urine Negative Negative    Ketones Urine Negative Negative mg/dL    Specific Gravity Urine 1.010 1.003 - 1.035    Blood Urine Negative Negative    pH Urine 7.5 (H) 5.0 - 7.0    Protein Albumin Urine Negative Negative mg/dL    Urobilinogen Urine Normal Normal, 2.0 mg/dL    Nitrite Urine Negative Negative    Leukocyte Esterase Urine Negative Negative    RBC Urine <1 <=2 /HPF    WBC Urine <1 <=5 /HPF    Squamous Epithelials Urine <1 <=1 /HPF    Narrative    Urine Culture not indicated   Lactic acid whole blood   Result Value Ref Range    Lactic Acid 1.1 0.7 - 2.0 mmol/L   CT Abdomen Pelvis w Contrast    Narrative    EXAM: CT ABDOMEN PELVIS W CONTRAST  LOCATION: [facility]  DATE/TIME: 10/29/2022 6:00 PM    INDICATION: abd pain generalized and upper quadrants.  eval for sbo, pancreatitis, diverticulitis, ruptured viscus  COMPARISON: 03/03/2022  TECHNIQUE: CT scan of the abdomen and pelvis was performed following injection of IV contrast. Multiplanar reformats were obtained. Dose reduction techniques were used.  CONTRAST: 72 mL Isovue 370    FINDINGS:   LOWER CHEST: Small hiatal hernia.    HEPATOBILIARY: Mild hepatic steatosis. No suspicious liver lesion. No calcified gallstones. Mild gallbladder wall thickening.    PANCREAS: Mild inflammation and fluid around the pancreatic head. Homogeneous pancreatic enhancement. No pancreatic mass.    SPLEEN: Normal.    ADRENAL GLANDS: Normal.    KIDNEYS/BLADDER: Normal.    BOWEL: Mild sigmoid diverticulosis. No evidence for diverticulitis. No free air, free fluid. Normal appendix. No obstruction.    LYMPH NODES: Normal.    VASCULATURE:  Unremarkable.    PELVIC ORGANS: Normal.    MUSCULOSKELETAL: Severe degenerative disc disease at L4-L5 and L5-S1. Severe degenerative disc disease in the lower thoracic spine. No suspicious bone lesion.      Impression    IMPRESSION:   1.  Mild acute pancreatitis without evidence for pancreatic necrosis.   Abdomen US, limited (RUQ only)    Narrative    EXAM: US ABDOMEN LIMITED  LOCATION: [facility]  DATE/TIME: 10/29/2022 11:08 PM    INDICATION: Abdominal pain.  COMPARISON: CT abdomen and pelvis 10/29/2022.  TECHNIQUE: Limited abdominal ultrasound.    FINDINGS:    GALLBLADDER: Small gallstones are present with gallbladder wall thickening.    BILE DUCTS: Mild common bile duct dilatation. The common duct measures 7 mm.    LIVER: Fatty liver. No focal mass.    RIGHT KIDNEY: No hydronephrosis.    PANCREAS: Obscured by overlying bowel gas.    No ascites.      Impression    IMPRESSION:  1.  Multiple small gallstones with significant gallbladder wall thickening. Common bile duct is mildly dilated measuring 7 mm without significant intrahepatic bile duct dilatation.    2.  Fatty liver.       Comprehensive metabolic panel   Result Value Ref Range    Sodium 137 136 - 145 mmol/L    Potassium 3.6 3.4 - 5.3 mmol/L    Chloride 103 98 - 107 mmol/L    Carbon Dioxide (CO2) 27 22 - 29 mmol/L    Anion Gap 7 7 - 15 mmol/L    Urea Nitrogen 6.5 (L) 8.0 - 23.0 mg/dL    Creatinine 0.62 0.51 - 0.95 mg/dL    Calcium 9.0 8.6 - 10.0 mg/dL    Glucose 92 70 - 99 mg/dL    Alkaline Phosphatase 159 (H) 35 - 104 U/L     (HH) 10 - 35 U/L     (HH) 10 - 35 U/L    Protein Total 6.5 6.4 - 8.3 g/dL    Albumin 3.7 3.5 - 5.2 g/dL    Bilirubin Total 1.1 <=1.2 mg/dL    GFR Estimate >90 >60 mL/min/1.73m2   CBC with platelets   Result Value Ref Range    WBC Count 5.8 4.0 - 11.0 10e3/uL    RBC Count 3.92 3.80 - 5.20 10e6/uL    Hemoglobin 11.8 11.7 - 15.7 g/dL    Hematocrit 35.9 35.0 - 47.0 %    MCV 92 78 - 100 fL    MCH  30.1 26.5 - 33.0 pg    MCHC 32.9 31.5 - 36.5 g/dL    RDW 13.1 10.0 - 15.0 %    Platelet Count 358 150 - 450 10e3/uL   Extra Tube ([place] Draw)    Narrative    The following orders were created for panel order Extra Tube ([place] Draw).  Procedure                               Abnormality         Status                     ---------                               -----------         ------                     Extra Red Top Tube[[id number]]                               Final result                 Please view results for these tests on the individual orders.   Extra Red Top Tube   Result Value Ref Range    Hold Specimen JI    Lipase   Result Value Ref Range    Lipase 4,118 (H) 73 - 393 U/L           Attestation:  I have reviewed today's vital signs, notes, medications, labs and imaging.  Amount of time spent in direct patient care: 30 minutes.     Yury Grayson MD, MD

## 2022-10-31 PROBLEM — E78.5 HYPERLIPIDEMIA LDL GOAL <70: Chronic | Status: ACTIVE | Noted: 2022-10-21

## 2022-10-31 PROBLEM — F33.40 RECURRENT MAJOR DEPRESSIVE DISORDER, IN REMISSION (H): Chronic | Status: ACTIVE | Noted: 2019-01-17

## 2022-10-31 PROBLEM — I10 BENIGN ESSENTIAL HYPERTENSION: Chronic | Status: ACTIVE | Noted: 2019-07-26

## 2022-10-31 PROBLEM — Z98.890 STATUS POST CORONARY ANGIOGRAM: Status: RESOLVED | Noted: 2022-10-14 | Resolved: 2022-10-31

## 2022-10-31 PROBLEM — I25.10 NONOBSTRUCTIVE ATHEROSCLEROSIS OF CORONARY ARTERY: Chronic | Status: ACTIVE | Noted: 2022-10-21

## 2022-10-31 PROBLEM — R07.89 OTHER CHEST PAIN: Status: RESOLVED | Noted: 2022-10-21 | Resolved: 2022-10-31

## 2022-10-31 LAB
ALBUMIN SERPL BCG-MCNC: 3.9 G/DL (ref 3.5–5.2)
ALP SERPL-CCNC: 150 U/L (ref 35–104)
ALT SERPL W P-5'-P-CCNC: 529 U/L (ref 10–35)
ANION GAP SERPL CALCULATED.3IONS-SCNC: 9 MMOL/L (ref 7–15)
AST SERPL W P-5'-P-CCNC: 220 U/L (ref 10–35)
BILIRUB SERPL-MCNC: 0.6 MG/DL
BUN SERPL-MCNC: 6.4 MG/DL (ref 8–23)
CALCIUM SERPL-MCNC: 9.6 MG/DL (ref 8.6–10)
CHLORIDE SERPL-SCNC: 101 MMOL/L (ref 98–107)
CHOLEST SERPL-MCNC: 197 MG/DL
CREAT SERPL-MCNC: 0.65 MG/DL (ref 0.51–0.95)
DEPRECATED HCO3 PLAS-SCNC: 28 MMOL/L (ref 22–29)
ERYTHROCYTE [DISTWIDTH] IN BLOOD BY AUTOMATED COUNT: 13.2 % (ref 10–15)
GFR SERPL CREATININE-BSD FRML MDRD: >90 ML/MIN/1.73M2
GLUCOSE SERPL-MCNC: 90 MG/DL (ref 70–99)
HAV IGM SERPL QL IA: NONREACTIVE
HBV SURFACE AB SERPL IA-ACNC: 0.29 M[IU]/ML
HBV SURFACE AB SERPL IA-ACNC: NONREACTIVE M[IU]/ML
HBV SURFACE AG SERPL QL IA: NONREACTIVE
HCT VFR BLD AUTO: 36.3 % (ref 35–47)
HCV AB SERPL QL IA: NONREACTIVE
HDLC SERPL-MCNC: 83 MG/DL
HGB BLD-MCNC: 11.6 G/DL (ref 11.7–15.7)
IGG SERPL-MCNC: 1132 MG/DL (ref 610–1616)
LDLC SERPL CALC-MCNC: 95 MG/DL
LIPASE SERPL-CCNC: 971 U/L (ref 73–393)
MAGNESIUM SERPL-MCNC: 2.2 MG/DL (ref 1.7–2.3)
MCH RBC QN AUTO: 29.9 PG (ref 26.5–33)
MCHC RBC AUTO-ENTMCNC: 32 G/DL (ref 31.5–36.5)
MCV RBC AUTO: 94 FL (ref 78–100)
NONHDLC SERPL-MCNC: 114 MG/DL
PHOSPHATE SERPL-MCNC: 3.9 MG/DL (ref 2.5–4.5)
PLATELET # BLD AUTO: 347 10E3/UL (ref 150–450)
POTASSIUM SERPL-SCNC: 4.1 MMOL/L (ref 3.4–5.3)
PROT SERPL-MCNC: 6.9 G/DL (ref 6.4–8.3)
RBC # BLD AUTO: 3.88 10E6/UL (ref 3.8–5.2)
SMA IGG SER-ACNC: 4 UNITS
SODIUM SERPL-SCNC: 138 MMOL/L (ref 136–145)
TRIGL SERPL-MCNC: 96 MG/DL
WBC # BLD AUTO: 5.3 10E3/UL (ref 4–11)

## 2022-10-31 PROCEDURE — 250N000013 HC RX MED GY IP 250 OP 250 PS 637: Performed by: INTERNAL MEDICINE

## 2022-10-31 PROCEDURE — 83690 ASSAY OF LIPASE: CPT | Performed by: FAMILY MEDICINE

## 2022-10-31 PROCEDURE — 99232 SBSQ HOSP IP/OBS MODERATE 35: CPT | Performed by: INTERNAL MEDICINE

## 2022-10-31 PROCEDURE — 36415 COLL VENOUS BLD VENIPUNCTURE: CPT | Performed by: FAMILY MEDICINE

## 2022-10-31 PROCEDURE — 250N000013 HC RX MED GY IP 250 OP 250 PS 637: Performed by: FAMILY MEDICINE

## 2022-10-31 PROCEDURE — 80053 COMPREHEN METABOLIC PANEL: CPT | Performed by: FAMILY MEDICINE

## 2022-10-31 PROCEDURE — 250N000011 HC RX IP 250 OP 636: Performed by: FAMILY MEDICINE

## 2022-10-31 PROCEDURE — 120N000001 HC R&B MED SURG/OB

## 2022-10-31 PROCEDURE — 84100 ASSAY OF PHOSPHORUS: CPT | Performed by: FAMILY MEDICINE

## 2022-10-31 PROCEDURE — 85027 COMPLETE CBC AUTOMATED: CPT | Performed by: FAMILY MEDICINE

## 2022-10-31 PROCEDURE — C9113 INJ PANTOPRAZOLE SODIUM, VIA: HCPCS | Performed by: FAMILY MEDICINE

## 2022-10-31 PROCEDURE — 80061 LIPID PANEL: CPT | Performed by: FAMILY MEDICINE

## 2022-10-31 PROCEDURE — 83735 ASSAY OF MAGNESIUM: CPT | Performed by: FAMILY MEDICINE

## 2022-10-31 RX ADMIN — Medication 3 MG: at 21:18

## 2022-10-31 RX ADMIN — PANTOPRAZOLE SODIUM 40 MG: 40 INJECTION, POWDER, FOR SOLUTION INTRAVENOUS at 19:28

## 2022-10-31 RX ADMIN — ASPIRIN 81 MG: 81 TABLET, COATED ORAL at 07:51

## 2022-10-31 RX ADMIN — MONTELUKAST 10 MG: 10 TABLET, FILM COATED ORAL at 21:18

## 2022-10-31 RX ADMIN — PANTOPRAZOLE SODIUM 40 MG: 40 INJECTION, POWDER, FOR SOLUTION INTRAVENOUS at 07:51

## 2022-10-31 RX ADMIN — FLUOXETINE 60 MG: 20 CAPSULE ORAL at 07:51

## 2022-10-31 RX ADMIN — Medication 1 DROP: at 06:54

## 2022-10-31 RX ADMIN — FLUTICASONE PROPIONATE AND SALMETEROL 1 PUFF: 500; 50 POWDER RESPIRATORY (INHALATION) at 19:28

## 2022-10-31 RX ADMIN — TIOTROPIUM BROMIDE 18 MCG: 18 CAPSULE ORAL; RESPIRATORY (INHALATION) at 07:50

## 2022-10-31 RX ADMIN — AMLODIPINE BESYLATE 5 MG: 5 TABLET ORAL at 07:51

## 2022-10-31 RX ADMIN — OXYCODONE HYDROCHLORIDE 5 MG: 5 TABLET ORAL at 02:02

## 2022-10-31 RX ADMIN — FLUTICASONE PROPIONATE AND SALMETEROL 1 PUFF: 500; 50 POWDER RESPIRATORY (INHALATION) at 07:50

## 2022-10-31 ASSESSMENT — ACTIVITIES OF DAILY LIVING (ADL)
ADLS_ACUITY_SCORE: 18

## 2022-10-31 NOTE — PROGRESS NOTES
Neuro: A&Ox4.  Cardiac: WNL  Respiratory: WNL  GI/: Continent. BS active. Abdomen tender.    Mobility: IND.   Diet: Clears, tolerating   Pain: Abdominal pain.   Skin: WNL.  LDA: PIV SL.     VS: VSS on RA. Afebrile.

## 2022-10-31 NOTE — PLAN OF CARE
Goal Outcome Evaluation:    Plan of Care Reviewed With: patient    Overall Patient Progress: improving    Pt A&Ox4, able to make needs known, utilizing call light appropriately. VSS, afebrile. No complaints of pain or nausea. Pt continues to tolerate clear liquid diet well. IND in room. Hoping to discharge home soon.    Temp: 98.2  F (36.8  C) Temp src: Oral BP: 132/73 Pulse: 68   Resp: 18 SpO2: 94 % O2 Device: None (Room air)

## 2022-10-31 NOTE — PROGRESS NOTES
[facility]    Medicine Progress Note - Hospitalist Service    Date of Admission:  10/29/2022    Assessment & Plan        Carolina Hernandez is a 57 year old female who presents on 10/29/2022 with about 12 hours of severe epigastric pain radiating to the back and associated with nausea.     Acute pancreatitis without necrosis -  suspect gallstones, consider sphincter of Kristopher dysfunction, alcohol with crestor perhaps less likely    Acute onset of abdominal pain 3 AM on day of admission.  Epigastric pain radiating to the back initially then later down the right side of the abdomen.  Nausea with emesis x1. No prior history of pancreatitis. Patient does drink alcohol regularly though says she has been cutting back the last month.  Small gallstones were also seen on the abdominal ultrasound.  Lipase 4118. Bilirubin initially mildly elevated 2.3 and alk phos mildly elevated 176.  Patient attributes her symptoms to starting rosuvastatin and aspirin 2 days prior to presentation. CT showed evidence of mild pancreatitis without necrosis.  I suspect she has pancreatic fluid putting pressure on the right pericolic gutter explaining the migration of her pain.    Crestor can increase risk especially combined with alcohol.      Lipase 971  TG 96    Improving  - Started clears 10/30, advance to regulatr  - pain improving, continue oxycodone prn for pain control       Acute hepatitis - suspect due to pancreatitis as above    Presents with AST 2121 and ALT 1230 with BiliT 2.3 and alk phos 176. Transaminases seem unexpectedly high if this were gallstone pancreatitis.  Patient reports she had hepatitis B and mononucleosis when she was a teenager.  Has not been eating unusual mushrooms, denies any Tylenol use.  She has had normal liver enzymes though last checked was 2019.  Case was discussed by the ED physician with hepatology, and shock liver is a consideration. Viral hepatitis or autoimmune hepatitis also in  "the differential.  The patient was just started on high-dose rosuvastatin 2 days prior to admission, but such high elevations in transaminases would be unusual and more often ALT is greater than AST when statin induced transaminitis occurs.  However given the proximity of starting the rosuvastatin with the elevated transaminases, will err on the side of holding rosuvastatin.    Hep C Ab negative, Hep B sAb negative, sAg negative, Hep A IGM Ab negative     Liver enzymes improving     - antibodies associated with autoimmune hepatitis pending      Gallstones  Possible cholecystitis    RUQ US showed multiple small gallstones with significant gallbladder wall thickening, CBD mildly dilated 7 mm. Bilirubin only slightly up 2.3 and alk phos only mildly up as well.  both improved 10/30.  Suspect changes could be secondary to the pancreatitis but also gallstone pancreatitis still possible.  - Follow LFTs  - Consider MRCP if not continuing to normalize.    - Follow-up surgery as an outpatient     At risk alcohol use    ED obtained h/o 6 beers per day though patient admits to \"a few\" 3-4 times per week usually but had cut to 2 beers/day and not everyday for the past month. Denies h/o withdrawal. No recent binge drinking. Last beer was 2 days prior to admission. No evidence of withdrawal currently.    - watch for signs of withdrawal and consider starting CIWA if concerned.        Nonobstructive atherosclerosis of coronary artery  Hyperlipidemia LDL goal <70    H/o exertional chest pain leading to coronary angiogram 10/14/2022 which showed non-obstructing CAD. Rosuvastatin 20 mg prescribed along with aspirin 81 mg. Patient started both on 10/27 and now concerned whether the rosuvastatin is a trigger for patient's hepatitis or pancreatitis.  (See above.)  Patient is also concerned that aspirin may be a contributor though this seems unlikely.   -Stop rosuvastatin given possible connection to pancreatitis as above - will need to " "follow-up with PCP or with cardiology for statin selection once improved   -continue aspirin     GERD  Patient symptomatic 10/30.  Started on IV protonix twice daily, can slow to once daily vs switch to home oral omeprazole 40 mg or equivalent       Benign essential hypertension   -Continue PTA amlodipine      COPD vs Asthma     Compensated on Advair once daily and Spiriva daily.    - continue PTA inhalers      # Hyponatremia: Lowest Na = 135 mmol/L (Ref range: 136-145) in last 2 days, will monitor as appropriate                # Overweight: Estimated body mass index is 29.05 kg/m  as calculated from the following:    Height as of this encounter: 1.6 m (5' 3\").    Weight as of this encounter: 74.4 kg (164 lb).               Clinically Significant Risk Factors Present on Admission                    Diet: Snacks/Supplements Adult: Ensure Clear; With Meals  Clear Liquid Diet    DVT Prophylaxis: Low Risk/Ambulatory with no VTE prophylaxis indicated  Weinberg Catheter: Not present  Central Lines: None  Cardiac Monitoring: None  Code Status: Full Code      Disposition Plan     Expected Discharge Date: 10/31/2022                The patient's care was discussed with the Patient.    Brock Reeder MD  Hospitalist Service  [facility]  Securely message with the Vocera Web Console (learn more here)  Text page via [employer] Paging/Directory         Clinically Significant Risk Factors                       ., PRESENT ON ADMISSION         ______________________________________________________________________    Interval History   Feeling better    Data reviewed today: I reviewed all medications, new labs and imaging results over the last 24 hours. I personally reviewed no images or EKG's today.    Physical Exam   Vital Signs: Temp: 98.2  F (36.8  C) Temp src: Oral BP: 132/73 Pulse: 68   Resp: 18 SpO2: 94 % O2 Device: None (Room air)    Weight: 164 lbs 0 oz  Constitutional: awake, alert, cooperative, no " apparent distress, and appears stated age    Data     Lipase   Date Value Ref Range Status   10/31/2022 971 (H) 73 - 393 U/L Final     Recent Labs   Lab Test 10/31/22  0453 11/20/20  1138   CHOL 197 232*   HDL 83 98   LDL 95 88   TRIG 96 232*        CMPRecent Labs   Lab 10/31/22  0453 10/30/22  0540 10/29/22  1548    137 135*   POTASSIUM 4.1 3.6 3.8   CHLORIDE 101 103 97*   CO2 28 27 22   ANIONGAP 9 7 16*   GLC 90 92 109*   BUN 6.4* 6.5* 10.4   CR 0.65 0.62 0.58   GFRESTIMATED >90 >90 >90   REILLY 9.6 9.0 9.6   MAG 2.2  --   --    PHOS 3.9  --   --    PROTTOTAL 6.9 6.5 8.0   ALBUMIN 3.9 3.7 4.5   BILITOTAL 0.6 1.1 2.3*   ALKPHOS 150* 159* 176*   * 640* 2,121*   * 816* 1,230*     CBC  Recent Labs   Lab 10/31/22  0453 10/30/22  0540 10/29/22  1548   WBC 5.3 5.8 10.9   RBC 3.88 3.92 4.44   HGB 11.6* 11.8 13.6   HCT 36.3 35.9 39.9   MCV 94 92 90   MCH 29.9 30.1 30.6   MCHC 32.0 32.9 34.1   RDW 13.2 13.1 12.7    358 386

## 2022-10-31 NOTE — PROGRESS NOTES
"A&O x4. Saline locked. Independent in the room. Tolerating clears very well. Oxycodone utilized for pain. Aqua-K pad utilized for some muscle soreness to neck and shoulders, helpful. Melatonin given for sleep.     /63 (BP Location: Left arm)   Pulse 70   Temp 98.4  F (36.9  C) (Oral)   Resp 16   Ht 1.6 m (5' 3\")   Wt 74.4 kg (164 lb)   SpO2 96%   BMI 29.05 kg/m      "

## 2022-11-01 VITALS
HEART RATE: 61 BPM | SYSTOLIC BLOOD PRESSURE: 134 MMHG | WEIGHT: 164 LBS | OXYGEN SATURATION: 95 % | HEIGHT: 63 IN | TEMPERATURE: 98 F | DIASTOLIC BLOOD PRESSURE: 70 MMHG | BODY MASS INDEX: 29.06 KG/M2 | RESPIRATION RATE: 16 BRPM

## 2022-11-01 LAB
ALBUMIN SERPL BCG-MCNC: 4.2 G/DL (ref 3.5–5.2)
ALP SERPL-CCNC: 130 U/L (ref 35–104)
ALT SERPL W P-5'-P-CCNC: 344 U/L (ref 10–35)
ANA SER QL IF: NEGATIVE
ANION GAP SERPL CALCULATED.3IONS-SCNC: 10 MMOL/L (ref 7–15)
AST SERPL W P-5'-P-CCNC: 87 U/L (ref 10–35)
BILIRUB SERPL-MCNC: 0.4 MG/DL
BUN SERPL-MCNC: 7.9 MG/DL (ref 8–23)
CALCIUM SERPL-MCNC: 9.4 MG/DL (ref 8.6–10)
CHLORIDE SERPL-SCNC: 100 MMOL/L (ref 98–107)
CREAT SERPL-MCNC: 0.65 MG/DL (ref 0.51–0.95)
DEPRECATED HCO3 PLAS-SCNC: 26 MMOL/L (ref 22–29)
ERYTHROCYTE [DISTWIDTH] IN BLOOD BY AUTOMATED COUNT: 13 % (ref 10–15)
GFR SERPL CREATININE-BSD FRML MDRD: >90 ML/MIN/1.73M2
GLUCOSE SERPL-MCNC: 195 MG/DL (ref 70–99)
HCT VFR BLD AUTO: 37.3 % (ref 35–47)
HGB BLD-MCNC: 12 G/DL (ref 11.7–15.7)
LIPASE SERPL-CCNC: 375 U/L (ref 13–60)
MCH RBC QN AUTO: 29.7 PG (ref 26.5–33)
MCHC RBC AUTO-ENTMCNC: 32.2 G/DL (ref 31.5–36.5)
MCV RBC AUTO: 92 FL (ref 78–100)
MITOCHONDRIA M2 IGG SER-ACNC: <1 U/ML
PLATELET # BLD AUTO: 347 10E3/UL (ref 150–450)
POTASSIUM SERPL-SCNC: 4.1 MMOL/L (ref 3.4–5.3)
PROT SERPL-MCNC: 7.4 G/DL (ref 6.4–8.3)
RBC # BLD AUTO: 4.04 10E6/UL (ref 3.8–5.2)
SODIUM SERPL-SCNC: 136 MMOL/L (ref 136–145)
SOLUBLE LIVER IGG SER IA-ACNC: 1.6 U
WBC # BLD AUTO: 5.4 10E3/UL (ref 4–11)

## 2022-11-01 PROCEDURE — 250N000013 HC RX MED GY IP 250 OP 250 PS 637: Performed by: FAMILY MEDICINE

## 2022-11-01 PROCEDURE — C9113 INJ PANTOPRAZOLE SODIUM, VIA: HCPCS | Performed by: FAMILY MEDICINE

## 2022-11-01 PROCEDURE — 99238 HOSP IP/OBS DSCHRG MGMT 30/<: CPT | Performed by: INTERNAL MEDICINE

## 2022-11-01 PROCEDURE — 85027 COMPLETE CBC AUTOMATED: CPT | Performed by: INTERNAL MEDICINE

## 2022-11-01 PROCEDURE — 80053 COMPREHEN METABOLIC PANEL: CPT | Performed by: INTERNAL MEDICINE

## 2022-11-01 PROCEDURE — 83690 ASSAY OF LIPASE: CPT | Performed by: INTERNAL MEDICINE

## 2022-11-01 PROCEDURE — 250N000013 HC RX MED GY IP 250 OP 250 PS 637: Performed by: INTERNAL MEDICINE

## 2022-11-01 PROCEDURE — 36415 COLL VENOUS BLD VENIPUNCTURE: CPT | Performed by: INTERNAL MEDICINE

## 2022-11-01 PROCEDURE — 250N000011 HC RX IP 250 OP 636: Performed by: FAMILY MEDICINE

## 2022-11-01 RX ADMIN — AMLODIPINE BESYLATE 5 MG: 5 TABLET ORAL at 07:44

## 2022-11-01 RX ADMIN — FLUTICASONE PROPIONATE AND SALMETEROL 1 PUFF: 500; 50 POWDER RESPIRATORY (INHALATION) at 07:43

## 2022-11-01 RX ADMIN — FLUOXETINE 60 MG: 20 CAPSULE ORAL at 07:44

## 2022-11-01 RX ADMIN — ASPIRIN 81 MG: 81 TABLET, COATED ORAL at 07:45

## 2022-11-01 RX ADMIN — PANTOPRAZOLE SODIUM 40 MG: 40 INJECTION, POWDER, FOR SOLUTION INTRAVENOUS at 07:44

## 2022-11-01 RX ADMIN — Medication 1 DROP: at 07:48

## 2022-11-01 ASSESSMENT — ACTIVITIES OF DAILY LIVING (ADL)
ADLS_ACUITY_SCORE: 18

## 2022-11-01 NOTE — DISCHARGE SUMMARY
[facility]  Hospitalist Discharge Summary      Date of Admission:  10/29/2022  Date of Discharge:  11/1/2022  Discharging Provider: Brock Reeder MD  Discharge Service: Hospitalist Service    Discharge Diagnoses   Principal Problem:    Acute pancreatitis (10/29/2022)  Active Problems:    Nonobstructive atherosclerosis of coronary artery (10/21/2022)    Benign essential hypertension (7/26/2019)    Hyperlipidemia LDL goal <70 (10/21/2022)    Transaminitis (10/29/2022)    COPD vs Asthma (10/30/2022)        Follow-ups Needed After Discharge   Follow-up Appointments     Follow-up and recommended labs and tests       Follow up with primary care provider, Julia Oneill, within 7   days for hospital follow- up.  The following labs/tests are recommended:   Lipase, comprehensive metabolic panel  Surgery consult.             Unresulted Labs Ordered in the Past 30 Days of this Admission     Date and Time Order Name Status Description    10/30/2022  1:01 AM Anti-SLA LP Antibody In process     10/30/2022  1:01 AM Liver kidney microsomal antibody IgG In process     10/30/2022  1:01 AM Anti Nuclear Mariah IgG by IFA with Reflex In process       These results will be followed up by Julia Oneill     Discharge Disposition   Discharged to home  Condition at discharge: Good      Hospital Course         Carolina Hernandez is a 57 year old female who presents on 10/29/2022 with about 12 hours of severe epigastric pain radiating to the back and associated with nausea.     Acute pancreatitis without necrosis -  suspect gallstones, consider sphincter of Kristopher dysfunction, alcohol with crestor perhaps less likely    Acute onset of abdominal pain 3 AM on day of admission.  Epigastric pain radiating to the back initially then later down the right side of the abdomen.  Nausea with emesis x1. No prior history of pancreatitis. Patient does drink alcohol regularly though says she has been cutting back  the last month.  Small gallstones were also seen on the abdominal ultrasound.  Lipase 4118. Bilirubin initially mildly elevated 2.3 and alk phos mildly elevated 176.  Patient attributes her symptoms to starting rosuvastatin and aspirin 2 days prior to presentation. CT showed evidence of mild pancreatitis without necrosis.  I suspect she has pancreatic fluid putting pressure on the right pericolic gutter explaining the migration of her pain.    Crestor can increase risk especially combined with alcohol.      Lipase 971  TG 96    Improving quickly  - Started clears 10/30, advance to regular and discharged  - Follow-up surgery as outpatient      Acute hepatitis - suspect due to pancreatitis as above    Presents with AST 2121 and ALT 1230 with BiliT 2.3 and alk phos 176. Transaminases seem unexpectedly high if this were gallstone pancreatitis.  Patient reports she had hepatitis B and mononucleosis when she was a teenager.  Has not been eating unusual mushrooms, denies any Tylenol use.  She has had normal liver enzymes though last checked was 2019.  Case was discussed by the ED physician with hepatology, and shock liver is a consideration. Viral hepatitis or autoimmune hepatitis also in the differential.  The patient was just started on high-dose rosuvastatin 2 days prior to admission, but such high elevations in transaminases would be unusual and more often ALT is greater than AST when statin induced transaminitis occurs.  However given the proximity of starting the rosuvastatin with the elevated transaminases, will err on the side of holding rosuvastatin.    Hep C Ab negative, Hep B sAb negative, sAg negative, Hep A IGM Ab negative     Liver enzymes improving rapidly    - antibodies associated with autoimmune hepatitis pending      Gallstones    RUQ US showed multiple small gallstones with significant gallbladder wall thickening, CBD mildly dilated 7 mm. Bilirubin only slightly up 2.3 and alk phos only mildly up as  "well.  both improved 10/30.  Suspect changes could be secondary to the pancreatitis but also gallstone pancreatitis still possible.  - Follow LFTs  - Follow-up surgery as an outpatient     At risk alcohol use    ED obtained h/o 6 beers per day though patient admits to \"a few\" 3-4 times per week usually but had cut to 2 beers/day and not everyday for the past month. Denies h/o withdrawal. No recent binge drinking. Last beer was 2 days prior to admission. No evidence of withdrawal currently.    - watch for signs of withdrawal and consider starting CIWA if concerned.        Nonobstructive atherosclerosis of coronary artery  Hyperlipidemia LDL goal <70    H/o exertional chest pain leading to coronary angiogram 10/14/2022 which showed non-obstructing CAD. Rosuvastatin 20 mg prescribed along with aspirin 81 mg. Patient started both on 10/27 and now concerned whether the rosuvastatin is a trigger for patient's hepatitis or pancreatitis.  (See above.)  Patient is also concerned that aspirin may be a contributor though this seems unlikely.   -Stop rosuvastatin given possible connection to pancreatitis as above, will need to follow-up with PCP or with cardiology for statin selection once improved   -continue aspirin     GERD  Patient symptomatic 10/30.  Started on IV protonix twice daily, can slow to once daily vs switch to home oral omeprazole 40 mg or equivalent       Benign essential hypertension   -Continue PTA amlodipine      COPD vs Asthma     Compensated on Advair once daily and Spiriva daily.    - continue PTA inhalers      # Hyponatremia: Lowest Na = 135 mmol/L (Ref range: 136-145) in last 2 days, will monitor as appropriate                # Overweight: Estimated body mass index is 29.05 kg/m  as calculated from the following:    Height as of this encounter: 1.6 m (5' 3\").    Weight as of this encounter: 74.4 kg (164 lb).               Clinically Significant Risk Factors Present on Admission           "         Consultations This Hospital Stay   PHARMACY IP CONSULT  CARDIOLOGY IP CONSULT    Code Status   Full Code    Time Spent on this Encounter   I, Brock Reeder MD, personally saw the patient today and spent less than or equal to 30 minutes discharging this patient.       Brock Reeder MD  [facility] MEDICAL SURGICAL  [address] [facility] [address]  Phone: [phone]  Fax: [phone]  ______________________________________________________________________    Physical Exam   Vital Signs: Temp: 98  F (36.7  C) Temp src: Oral BP: 134/70 Pulse: 61   Resp: 16 SpO2: 95 % O2 Device: None (Room air)    Weight: 164 lbs 0 oz  Constitutional: awake, alert, cooperative, no apparent distress, and appears stated age       Primary Care Physician   Julia Oneill    Discharge Orders      Adult General Surg Referral      Reason for your hospital stay    Pancreatitis, hepatitis, suspect due to gall stones     Follow-up and recommended labs and tests     Follow up with primary care provider, Julia Oneill, within 7 days for hospital follow- up.  The following labs/tests are recommended: Lipase, comprehensive metabolic panel  Surgery consult.     Activity    Your activity upon discharge: activity as tolerated     Diet    Follow this diet upon discharge: Regular       Significant Results and Procedures   Results for orders placed or performed during the hospital encounter of 10/29/22   CT Abdomen Pelvis w Contrast    Narrative    EXAM: CT ABDOMEN PELVIS W CONTRAST  LOCATION: [facility]  DATE/TIME: 10/29/2022 6:00 PM    INDICATION: abd pain generalized and upper quadrants.  eval for sbo, pancreatitis, diverticulitis, ruptured viscus  COMPARISON: 03/03/2022  TECHNIQUE: CT scan of the abdomen and pelvis was performed following injection of IV contrast. Multiplanar reformats were obtained. Dose reduction techniques were used.  CONTRAST: 72 mL Isovue  370    FINDINGS:   LOWER CHEST: Small hiatal hernia.    HEPATOBILIARY: Mild hepatic steatosis. No suspicious liver lesion. No calcified gallstones. Mild gallbladder wall thickening.    PANCREAS: Mild inflammation and fluid around the pancreatic head. Homogeneous pancreatic enhancement. No pancreatic mass.    SPLEEN: Normal.    ADRENAL GLANDS: Normal.    KIDNEYS/BLADDER: Normal.    BOWEL: Mild sigmoid diverticulosis. No evidence for diverticulitis. No free air, free fluid. Normal appendix. No obstruction.    LYMPH NODES: Normal.    VASCULATURE: Unremarkable.    PELVIC ORGANS: Normal.    MUSCULOSKELETAL: Severe degenerative disc disease at L4-L5 and L5-S1. Severe degenerative disc disease in the lower thoracic spine. No suspicious bone lesion.      Impression    IMPRESSION:   1.  Mild acute pancreatitis without evidence for pancreatic necrosis.       Abdomen US, limited (RUQ only)    Narrative    EXAM: US ABDOMEN LIMITED  LOCATION: [facility]  DATE/TIME: 10/29/2022 11:08 PM    INDICATION: Abdominal pain.  COMPARISON: CT abdomen and pelvis 10/29/2022.  TECHNIQUE: Limited abdominal ultrasound.    FINDINGS:    GALLBLADDER: Small gallstones are present with gallbladder wall thickening.    BILE DUCTS: Mild common bile duct dilatation. The common duct measures 7 mm.    LIVER: Fatty liver. No focal mass.    RIGHT KIDNEY: No hydronephrosis.    PANCREAS: Obscured by overlying bowel gas.    No ascites.      Impression    IMPRESSION:  1.  Multiple small gallstones with significant gallbladder wall thickening. Common bile duct is mildly dilated measuring 7 mm without significant intrahepatic bile duct dilatation.    2.  Fatty liver.           CMPRecent Labs   Lab 11/01/22  0856 10/31/22  0453 10/30/22  0540 10/29/22  1548    138 137 135*   POTASSIUM 4.1 4.1 3.6 3.8   CHLORIDE 100 101 103 97*   CO2 26 28 27 22   ANIONGAP 10 9 7 16*   * 90 92 109*   BUN 7.9* 6.4* 6.5* 10.4   CR 0.65 0.65  0.62 0.58   GFRESTIMATED >90 >90 >90 >90   REILLY 9.4 9.6 9.0 9.6   MAG  --  2.2  --   --    PHOS  --  3.9  --   --    PROTTOTAL 7.4 6.9 6.5 8.0   ALBUMIN 4.2 3.9 3.7 4.5   BILITOTAL 0.4 0.6 1.1 2.3*   ALKPHOS 130* 150* 159* 176*   AST 87* 220* 640* 2,121*   * 529* 816* 1,230*     CBC  Recent Labs   Lab 11/01/22  0856 10/31/22  0453 10/30/22  0540 10/29/22  1548   WBC 5.4 5.3 5.8 10.9   RBC 4.04 3.88 3.92 4.44   HGB 12.0 11.6* 11.8 13.6   HCT 37.3 36.3 35.9 39.9   MCV 92 94 92 90   MCH 29.7 29.9 30.1 30.6   MCHC 32.2 32.0 32.9 34.1   RDW 13.0 13.2 13.1 12.7    347 358 386     Lipase Results:  Lab Results   Component Value Date    LIPASE 375 (H) 11/01/2022    LIPASE 971 (H) 10/31/2022    LIPASE 4,118 (H) 10/30/2022    LIPASE >3,000 (H) 10/29/2022       Discharge Medications   Current Discharge Medication List      START taking these medications    Details   aspirin (ASA) 81 MG EC tablet Take 1 tablet (81 mg) by mouth daily         CONTINUE these medications which have NOT CHANGED    Details   albuterol (PROVENTIL) (2.5 MG/3ML) 0.083% neb solution inhale 1 vial (2.5 mg) by nebulization every 4 hours as needed for shortness of breath / dyspnea or wheezing  Qty: 225 mL, Refills: 11    Associated Diagnoses: Uncontrolled moderate persistent asthma      amLODIPine (NORVASC) 2.5 MG tablet Take 2 tablets (5 mg) by mouth once daily.  Qty: 180 tablet, Refills: 0    Associated Diagnoses: Essential hypertension      carboxymethylcellulose PF (REFRESH PLUS) 0.5 % ophthalmic solution 1 drop 3 times daily as needed for dry eyes      cetirizine (ZYRTEC) 10 MG tablet Take 10 mg by mouth daily      !! FLUoxetine (PROZAC) 20 MG capsule Take 1 capsule (20 mg) by mouth daily  Qty: 90 capsule, Refills: 3    Associated Diagnoses: Recurrent major depressive disorder, in remission (H)      !! FLUoxetine (PROZAC) 40 MG capsule Take 1 capsule (40 mg) by mouth daily Take with 20 mg tab for a total of 60 mg daily  Qty: 90 capsule,  Refills: 3    Associated Diagnoses: Recurrent major depressive disorder, in remission (H)      fluticasone (FLONASE) 50 MCG/ACT nasal spray Spray 1 spray into both nostrils daily  Qty: 1 g, Refills: 11    Associated Diagnoses: Chronic sinusitis, unspecified location      fluticasone-salmeterol (ADVAIR) 500-50 MCG/ACT inhaler Inhale 1 puff into the lungs every 12 hours  Qty: 1 each, Refills: 11    Associated Diagnoses: Stage 2 moderate COPD by GOLD classification (H)      montelukast (SINGULAIR) 10 MG tablet Take 1 tablet (10 mg) by mouth At Bedtime  Qty: 90 tablet, Refills: 3    Associated Diagnoses: Mild intermittent asthma without complication      omeprazole 20 MG tablet Take 2 tablets (40 mg) by mouth daily  Qty: 90 tablet, Refills: 3    Associated Diagnoses: Gastroesophageal reflux disease without esophagitis      tiotropium (SPIRIVA) 18 MCG inhaled capsule Inhale 1 capsule (18 mcg) into the lungs daily  Qty: 30 capsule, Refills: 11    Associated Diagnoses: Stage 2 moderate COPD by GOLD classification (H)      albuterol (VENTOLIN HFA) 108 (90 Base) MCG/ACT inhaler INHALE 1-2 PUFFS INTO THE LUNGS EVERY 4 HOURS AS NEEDED FOR SHORTNESS OF BREATH/DYSPNEA OR WHEEZING .  Qty: 18 g, Refills: 11    Comments: Pharmacy may dispense brand covered by insurance (Proair, or proventil or ventolin or generic albuterol inhaler)  Associated Diagnoses: Mild intermittent asthma without complication      Psyllium Husk 100 % POWD Taking daily.       !! - Potential duplicate medications found. Please discuss with provider.      STOP taking these medications       loperamide (ANTI-DIARRHEAL) 2 MG tablet Comments:   Reason for Stopping:         rosuvastatin (CRESTOR) 20 MG tablet Comments:   Reason for Stopping:             Allergies   Allergies   Allergen Reactions     Doxycycline Difficulty breathing     Penicillins Itching

## 2022-11-01 NOTE — PROGRESS NOTES
[facility] DISCHARGE NOTE    Patient discharged to home at 11:55 AM via wheel chair. Accompanied by spouse and staff. Discharge instructions reviewed with patient, opportunity offered to ask questions. Prescriptions - None ordered for discharge. All belongings sent with patient.    Dione Méndez RN

## 2022-11-01 NOTE — PROGRESS NOTES
"Patient is alert and oriented. Patient is calm and cooperative.  Patient denies pain.  Patient appeared to be able to sleep most of the night.     Temp: 97.6  F (36.4  C) Temp src: Oral BP: 128/70 Pulse: 62   Resp: 16 SpO2: 93 % Height: 160 cm (5' 3\") Weight: 74.4 kg (164 lb)  O2 Device: None (Room air) at    "

## 2022-11-01 NOTE — PLAN OF CARE
Alert and orientated x4. Upgraded to regular diet tolerated well. No nausea or abdominal pain. Independent and steady in room. Saline locked. Melatonin given at bedtime per request. Patient anticipating discharging tomorrow.

## 2022-11-02 ENCOUNTER — PATIENT OUTREACH (OUTPATIENT)
Dept: CARE COORDINATION | Facility: CLINIC | Age: 57
End: 2022-11-02

## 2022-11-02 LAB — LKM AB TITR SER IF: NORMAL {TITER}

## 2022-11-02 NOTE — PROGRESS NOTES
Clinic Care Coordination Contact  [facility]: Post-Discharge Note  SITUATION                                                      Admission:    Admission Date: 10/29/22   Reason for Admission: Pancreatitis, hepatitis, suspect due to gall stones  Discharge:   Discharge Date: 11/01/22  Discharge Diagnosis: Pancreatitis, hepatitis, suspect due to gall stones    BACKGROUND                                                      Per hospital discharge summary and inpatient provider notes:    Carolina Hernandez is a 57 year old female who presents with cute onset of epigastric pain that woke her up at 3 AM on the day of admission.  Pain radiates into her back.  Associated with nausea.  She thought it was ulcers and tried some baking soda and water and then a banana but then had emesis of all of this.  She is continued of nausea without further emesis.  There is no blood in the emesis.  Sameer was able to take some oatmeal and chuy tea and kept that down.  However she continued to have pain.  She is never had pain like this before.  She had been having episodes of chest pain which is up higher lasting 10 to 15 minutes and that led ultimately to coronary angiogram on 14 October which showed some nonobstructing coronary disease.  She started rosuvastatin and aspirin 2 days prior to admission.  She is concerned these medications may be the cause.  She had increased acid reflux for the last couple weeks.  Over the course the day that abdominal pain is moved more towards the right upper quadrant and down the right abdomen.  She had a normal bowel movement within the last day.  Not passing any melena or bright red blood per rectum.  Has not had any rashes.  Denies any mushroom intake.  She does drink a few beers 3-4 times a week as she reports this to me.  If even cut this back down to a 2 beers when she does drink 2 to heartburn and chest pain she had last month.  Her weight has been stable.     ASSESSMENT            Discharge Assessment  How are you doing now that you are home?: I am doing pretty good. I am taking my medications on time.  How are your symptoms? (Red Flag symptoms escalate to triage hotline per guidelines): Improved  Do you feel your condition is stable enough to be safe at home until your provider visit?: Yes  Does the patient have their discharge instructions? : Yes  Does the patient have questions regarding their discharge instructions? : No  Were you started on any new medications or were there changes to any of your previous medications? : Yes  Does the patient have all of their medications?: Yes  Do you have questions regarding any of your medications? : No  Discharge follow-up appointment scheduled within 14 calendar days? : Yes (Patient also has surgery consult tomorrow 11/3)  Discharge Follow Up Appointment Date: 11/04/22  Discharge Follow Up Appointment Scheduled with?: Primary Care Provider                  PLAN                                                      Outpatient Plan: Follow up with primary care provider, Julia Oneill, within 7 days for hospital follow- up. The following labs/  tests are recommended: Lipase, comprehensive metabolic panel  Surgery consult.    Future Appointments   Date Time Provider Department Center   11/3/2022  1:00 PM Eros Butt DO [facility]   11/4/2022 10:30 AM Cassidy Kent MD [facility]   12/21/2022 10:15 AM [facility]   2/14/2023 12:00 PM Sameera Flaherty PA-C [facility]   3/6/2023  9:30 AM  PFL C [facility]   3/6/2023 10:00 AM Maximino Turner MD [facility]         For any urgent concerns, please contact our 24 hour nurse triage line: [phone] ([phone])         AZALIA Yu  [phone]  [facility]

## 2022-11-03 ENCOUNTER — OFFICE VISIT (OUTPATIENT)
Dept: SURGERY | Facility: CLINIC | Age: 57
End: 2022-11-03
Payer: COMMERCIAL

## 2022-11-03 VITALS
HEART RATE: 76 BPM | SYSTOLIC BLOOD PRESSURE: 127 MMHG | TEMPERATURE: 98.4 F | HEIGHT: 63 IN | DIASTOLIC BLOOD PRESSURE: 80 MMHG | BODY MASS INDEX: 29.06 KG/M2 | WEIGHT: 164.02 LBS

## 2022-11-03 DIAGNOSIS — K85.90 ACUTE PANCREATITIS WITHOUT INFECTION OR NECROSIS, UNSPECIFIED PANCREATITIS TYPE: ICD-10-CM

## 2022-11-03 PROCEDURE — 99214 OFFICE O/P EST MOD 30 MIN: CPT | Performed by: SURGERY

## 2022-11-03 NOTE — NURSING NOTE
"Initial /80 (BP Location: Right arm, Patient Position: Sitting, Cuff Size: Adult Regular)   Pulse 76   Temp 98.4  F (36.9  C) (Tympanic)   Ht 1.6 m (5' 2.99\")   Wt 74.4 kg (164 lb 0.4 oz)   BMI 29.06 kg/m   Estimated body mass index is 29.06 kg/m  as calculated from the following:    Height as of this encounter: 1.6 m (5' 2.99\").    Weight as of this encounter: 74.4 kg (164 lb 0.4 oz). .    Monserrat Pop MA    "

## 2022-11-03 NOTE — LETTER
11/3/2022         RE: Carolina Hernandez  [address] [place] [id number]        Dear Colleague,    Thank you for referring your patient, Carolina Hernandez, to the [facility]. Please see a copy of my visit note below.    Surgical Consultation/History and Physical  [facility] General Surgery    Carolina is seen in consultation for Abdominal pain, at the request of Julia Oneill MD.    Chief Complaint:  Abdominal pain    History of Present Illness: Carolina Hernandez is a 57 year old female presents with abdominal pain. Patient had episode of epigastric pain, starting mid abdomen, radiated to right side of abdomen.  She had associated nausea, vomiting.  She does drink alcohol. She did have 2 alcoholic beverages that evening.  Denies acholic stools, jaundice or melena.    Patient Active Problem List   Diagnosis     Recurrent major depressive disorder, in remission (H)     Benign essential hypertension     Primary osteoarthritis of both knees     Low back pain due to bilateral sciatica     Nonobstructive atherosclerosis of coronary artery     Hyperlipidemia LDL goal <70     Transaminitis     Acute pancreatitis     COPD vs Asthma     GERD (gastroesophageal reflux disease)     Seasonal allergies     Fatty liver     Past Medical History:   Diagnosis Date     Asthma      C. difficile colitis      Depression      Depressive disorder Have had it most of my life     Moderate persistent asthma with exacerbation 9/18/2007     Osteoarthritis      Sinusitis, chronic      Status post coronary angiogram 10/14/2022     Past Surgical History:   Procedure Laterality Date     COLONOSCOPY  2017     CV CORONARY ANGIOGRAM N/A 10/14/2022    Procedure: Coronary Angiogram;  Surgeon: Fidel Martin MD;  Location: [facility] CARDIAC CATH LAB     ESOPHAGOSCOPY, GASTROSCOPY, DUODENOSCOPY (EGD), COMBINED N/A 05/05/2022    Procedure: ESOPHAGOGASTRODUODENOSCOPY, WITH BIOPSY;  Surgeon: Pj  DO Timothy;  Location: [facility] [place]     Family History   Problem Relation Age of Onset     Glaucoma Father      Coronary Artery Disease Father         stents     Cancer Maternal Grandfather      Coronary Artery Disease Paternal Grandfather      Schizophrenia Daughter      Diabetes Daughter      Cancer Daughter      Crohn's Disease No family hx of      Ulcerative Colitis No family hx of      GERD No family hx of      Stomach Cancer No family hx of      Social History     Tobacco Use     Smoking status: Former     Packs/day: 0.00     Years: 23.00     Pack years: 0.00     Types: Cigarettes     Quit date: 2021     Years since quittin.3     Smokeless tobacco: Never     Tobacco comments:     6 cig a day    Substance Use Topics     Alcohol use: Not Currently     Comment: 4-6 beers multiple times weekly      History   Drug Use No     Current Outpatient Medications   Medication Sig Dispense Refill     albuterol (PROVENTIL) (2.5 MG/3ML) 0.083% neb solution inhale 1 vial (2.5 mg) by nebulization every 4 hours as needed for shortness of breath / dyspnea or wheezing 225 mL 11     albuterol (VENTOLIN HFA) 108 (90 Base) MCG/ACT inhaler INHALE 1-2 PUFFS INTO THE LUNGS EVERY 4 HOURS AS NEEDED FOR SHORTNESS OF BREATH/DYSPNEA OR WHEEZING . 18 g 11     amLODIPine (NORVASC) 2.5 MG tablet Take 2 tablets (5 mg) by mouth once daily. 180 tablet 0     aspirin (ASA) 81 MG EC tablet Take 1 tablet (81 mg) by mouth daily       carboxymethylcellulose PF (REFRESH PLUS) 0.5 % ophthalmic solution 1 drop 3 times daily as needed for dry eyes       cetirizine (ZYRTEC) 10 MG tablet Take 10 mg by mouth daily       FLUoxetine (PROZAC) 20 MG capsule Take 1 capsule (20 mg) by mouth daily 90 capsule 3     FLUoxetine (PROZAC) 40 MG capsule Take 1 capsule (40 mg) by mouth daily Take with 20 mg tab for a total of 60 mg daily 90 capsule 3     fluticasone (FLONASE) 50 MCG/ACT nasal spray Spray 1 spray into both nostrils daily 1 g 11     fluticasone-salmeterol  "(ADVAIR) 500-50 MCG/ACT inhaler Inhale 1 puff into the lungs every 12 hours 1 each 11     montelukast (SINGULAIR) 10 MG tablet Take 1 tablet (10 mg) by mouth At Bedtime 90 tablet 3     omeprazole 20 MG tablet Take 2 tablets (40 mg) by mouth daily 90 tablet 3     Psyllium Husk 100 % POWD Taking daily.       tiotropium (SPIRIVA) 18 MCG inhaled capsule Inhale 1 capsule (18 mcg) into the lungs daily 30 capsule 11     Allergies   Allergen Reactions     Doxycycline Difficulty breathing     Penicillins Itching     Review of Systems:   10 point ROS otherwise    Physical Exam:  /80 (BP Location: Right arm, Patient Position: Sitting, Cuff Size: Adult Regular)   Pulse 76   Temp 98.4  F (36.9  C) (Tympanic)   Ht 1.6 m (5' 2.99\")   Wt 74.4 kg (164 lb 0.4 oz)   BMI 29.06 kg/m      Constitutional- No acute distress, well nourished, non-toxic  Eyes: Anicteric, no injection.  PERRL  ENT:  Normocephalic, atraumatic, Nose midline, moist mucus membranes  Neck - supple, no LAD  Respiratory- Clear to auscultation bilaterally, good inspiratory effort  Cardiovascular - Heart RRR, no lift's, thrills, murmurs, rubs, or gallop.  No peripheral edema.  No clubbing.  Abdomen - Soft, non-tender, +BS, no hepatosplenomegaly, no palpable masses, negative garvin's sign  Neuro - No focal neuro deficits, Alert and oriented x 3  Psych: Appropriate mood and affect  Musculoskeletal: Normal gait, symmetric strength.  FROM upper and lower extremities.  Skin: Warm, Dry    Lab Results   Component Value Date    WBC 5.4 11/01/2022    WBC 8.3 07/05/2021     Lab Results   Component Value Date    RBC 4.04 11/01/2022    RBC 4.32 07/05/2021     Lab Results   Component Value Date    HGB 12.0 11/01/2022    HGB 13.3 07/05/2021     Lab Results   Component Value Date    HCT 37.3 11/01/2022    HCT 38.1 07/05/2021     Lab Results   Component Value Date    MCV 92 11/01/2022    MCV 88 07/05/2021     Lab Results   Component Value Date    MCH 29.7 11/01/2022    MCH " 30.8 07/05/2021     Lab Results   Component Value Date    MCHC 32.2 11/01/2022    MCHC 34.9 07/05/2021     Lab Results   Component Value Date    RDW 13.0 11/01/2022    RDW 12.5 07/05/2021     Lab Results   Component Value Date     11/01/2022     07/05/2021     Last Comprehensive Metabolic Panel:  Sodium   Date Value Ref Range Status   11/01/2022 136 136 - 145 mmol/L Final   01/26/2021 135 133 - 144 mmol/L Final     Potassium   Date Value Ref Range Status   11/01/2022 4.1 3.4 - 5.3 mmol/L Final   10/14/2022 4.0 3.4 - 5.3 mmol/L Final   01/26/2021 4.1 3.4 - 5.3 mmol/L Final     Chloride   Date Value Ref Range Status   11/01/2022 100 98 - 107 mmol/L Final   10/14/2022 107 94 - 109 mmol/L Final   01/26/2021 104 94 - 109 mmol/L Final     Carbon Dioxide   Date Value Ref Range Status   01/26/2021 27 20 - 32 mmol/L Final     Carbon Dioxide (CO2)   Date Value Ref Range Status   11/01/2022 26 22 - 29 mmol/L Final   10/14/2022 26 20 - 32 mmol/L Final     Anion Gap   Date Value Ref Range Status   11/01/2022 10 7 - 15 mmol/L Final   10/14/2022 6 3 - 14 mmol/L Final   01/26/2021 4 3 - 14 mmol/L Final     Glucose   Date Value Ref Range Status   11/01/2022 195 (H) 70 - 99 mg/dL Final   10/14/2022 109 (H) 70 - 99 mg/dL Final   01/26/2021 119 (H) 70 - 99 mg/dL Final     Urea Nitrogen   Date Value Ref Range Status   11/01/2022 7.9 (L) 8.0 - 23.0 mg/dL Final   10/14/2022 13 7 - 30 mg/dL Final   01/26/2021 14 7 - 30 mg/dL Final     Creatinine   Date Value Ref Range Status   11/01/2022 0.65 0.51 - 0.95 mg/dL Final   01/26/2021 0.72 0.52 - 1.04 mg/dL Final     GFR Estimate   Date Value Ref Range Status   11/01/2022 >90 >60 mL/min/1.73m2 Final     Comment:     Effective December 21, 2021 eGFRcr in adults is calculated using the 2021 CKD-EPI creatinine equation which includes age and gender (Kacey haider al., NEJ, DOI: 10.1056/JTZRvt7990471)   01/26/2021 >90 >60 mL/min/[1.73_m2] Final     Comment:     Non  GFR  Calc  Starting 12/18/2018, serum creatinine based estimated GFR (eGFR) will be   calculated using the Chronic Kidney Disease Epidemiology Collaboration   (CKD-EPI) equation.       Calcium   Date Value Ref Range Status   11/01/2022 9.4 8.6 - 10.0 mg/dL Final   01/26/2021 9.1 8.5 - 10.1 mg/dL Final     Bilirubin Total   Date Value Ref Range Status   11/01/2022 0.4 <=1.2 mg/dL Final   10/25/2019 0.3 0.2 - 1.3 mg/dL Final     Alkaline Phosphatase   Date Value Ref Range Status   11/01/2022 130 (H) 35 - 104 U/L Final   10/25/2019 64 40 - 150 U/L Final     ALT   Date Value Ref Range Status   11/01/2022 344 (H) 10 - 35 U/L Final   10/25/2019 26 0 - 50 U/L Final     AST   Date Value Ref Range Status   11/01/2022 87 (H) 10 - 35 U/L Final   10/25/2019 21 0 - 45 U/L Final       Lipase   Date Value Ref Range Status   11/01/2022 375 (H) 13 - 60 U/L Final   10/31/2022 971 (H) 73 - 393 U/L Final     US Abdomen:  FINDINGS:     GALLBLADDER: Small gallstones are present with gallbladder wall thickening.     BILE DUCTS: Mild common bile duct dilatation. The common duct measures 7 mm.     LIVER: Fatty liver. No focal mass.     RIGHT KIDNEY: No hydronephrosis.     PANCREAS: Obscured by overlying bowel gas.     No ascites.                                                                      IMPRESSION:  1.  Multiple small gallstones with significant gallbladder wall thickening. Common bile duct is mildly dilated measuring 7 mm without significant intrahepatic bile duct dilatation.     2.  Fatty liver.    CT Abdomen/Pelvis:     FINDINGS:   LOWER CHEST: Small hiatal hernia.     HEPATOBILIARY: Mild hepatic steatosis. No suspicious liver lesion. No calcified gallstones. Mild gallbladder wall thickening.     PANCREAS: Mild inflammation and fluid around the pancreatic head. Homogeneous pancreatic enhancement. No pancreatic mass.     SPLEEN: Normal.     ADRENAL GLANDS: Normal.     KIDNEYS/BLADDER: Normal.     BOWEL: Mild sigmoid diverticulosis.  No evidence for diverticulitis. No free air, free fluid. Normal appendix. No obstruction.     LYMPH NODES: Normal.     VASCULATURE: Unremarkable.     PELVIC ORGANS: Normal.     MUSCULOSKELETAL: Severe degenerative disc disease at L4-L5 and L5-S1. Severe degenerative disc disease in the lower thoracic spine. No suspicious bone lesion.                                                                      IMPRESSION:   1.  Mild acute pancreatitis without evidence for pancreatic necrosis.    Assessment:  1. Acute pancreatitis without infection or necrosis, unspecified pancreatitis type      Plan:   Carolina Hernandez presents with history of biliary pancreatitis . Symptoms have resolved increasing in intensity and in frequency. Examination is generally unremarkable, and liver function tests are normal. Ultrasound demonstrates gallstones, without biliary duct dilatation, gallbladder wall thickening or pericholecystic fluid.  The patient may benefit from laparoscopic cholecystectomy with cholangiogram, and the indications, risks, benefits and alternatives to surgery were discussed in detail.  She understood the counseling offered and wishes to proceed as planned and outlined. Risks specifically discussed include  bleeding, infection, hernia, need for additional treatment, nontherapeutic intervention, wound complication (such as dehiscence), retained bile duct stone, conversion to open surgery, potential for cholangiography or bile duct exploration, drainage tubes, chronic diarrhea, bile duct injury, bile leak, and rare complications related to surgery and/or anesthesia such as venous thromboembolism and cardiorespiratory complications.    Eros Butt DO on 11/3/2022 at 1:07 PM        Again, thank you for allowing me to participate in the care of your patient.        Sincerely,        Eros Butt DO

## 2022-11-04 ENCOUNTER — OFFICE VISIT (OUTPATIENT)
Dept: FAMILY MEDICINE | Facility: CLINIC | Age: 57
End: 2022-11-04
Payer: COMMERCIAL

## 2022-11-04 VITALS
DIASTOLIC BLOOD PRESSURE: 82 MMHG | HEIGHT: 63 IN | OXYGEN SATURATION: 96 % | WEIGHT: 164 LBS | SYSTOLIC BLOOD PRESSURE: 120 MMHG | TEMPERATURE: 98.3 F | BODY MASS INDEX: 29.06 KG/M2 | RESPIRATION RATE: 16 BRPM | HEART RATE: 80 BPM

## 2022-11-04 DIAGNOSIS — K85.90 ACUTE PANCREATITIS WITHOUT INFECTION OR NECROSIS, UNSPECIFIED PANCREATITIS TYPE: ICD-10-CM

## 2022-11-04 DIAGNOSIS — K21.00 GASTROESOPHAGEAL REFLUX DISEASE WITH ESOPHAGITIS WITHOUT HEMORRHAGE: Primary | ICD-10-CM

## 2022-11-04 DIAGNOSIS — K85.10 ACUTE BILIARY PANCREATITIS, UNSPECIFIED COMPLICATION STATUS: ICD-10-CM

## 2022-11-04 DIAGNOSIS — Z11.4 SCREENING FOR HIV (HUMAN IMMUNODEFICIENCY VIRUS): ICD-10-CM

## 2022-11-04 LAB
ALBUMIN SERPL BCG-MCNC: 4.7 G/DL (ref 3.5–5.2)
ALP SERPL-CCNC: 137 U/L (ref 35–104)
ALT SERPL W P-5'-P-CCNC: 264 U/L (ref 10–35)
ANION GAP SERPL CALCULATED.3IONS-SCNC: 9 MMOL/L (ref 7–15)
AST SERPL W P-5'-P-CCNC: 76 U/L (ref 10–35)
BILIRUB SERPL-MCNC: 0.2 MG/DL
BUN SERPL-MCNC: 10.3 MG/DL (ref 8–23)
CALCIUM SERPL-MCNC: 9.9 MG/DL (ref 8.6–10)
CHLORIDE SERPL-SCNC: 100 MMOL/L (ref 98–107)
CREAT SERPL-MCNC: 0.67 MG/DL (ref 0.51–0.95)
DEPRECATED HCO3 PLAS-SCNC: 27 MMOL/L (ref 22–29)
GFR SERPL CREATININE-BSD FRML MDRD: >90 ML/MIN/1.73M2
GLUCOSE SERPL-MCNC: 94 MG/DL (ref 70–99)
HIV 1+2 AB+HIV1 P24 AG SERPL QL IA: NONREACTIVE
LIPASE SERPL-CCNC: 356 U/L (ref 13–60)
POTASSIUM SERPL-SCNC: 4 MMOL/L (ref 3.4–5.3)
PROT SERPL-MCNC: 8.3 G/DL (ref 6.4–8.3)
SODIUM SERPL-SCNC: 136 MMOL/L (ref 136–145)

## 2022-11-04 PROCEDURE — 99214 OFFICE O/P EST MOD 30 MIN: CPT | Performed by: STUDENT IN AN ORGANIZED HEALTH CARE EDUCATION/TRAINING PROGRAM

## 2022-11-04 PROCEDURE — 83690 ASSAY OF LIPASE: CPT | Performed by: STUDENT IN AN ORGANIZED HEALTH CARE EDUCATION/TRAINING PROGRAM

## 2022-11-04 PROCEDURE — 87389 HIV-1 AG W/HIV-1&-2 AB AG IA: CPT | Performed by: STUDENT IN AN ORGANIZED HEALTH CARE EDUCATION/TRAINING PROGRAM

## 2022-11-04 PROCEDURE — 80053 COMPREHEN METABOLIC PANEL: CPT | Performed by: STUDENT IN AN ORGANIZED HEALTH CARE EDUCATION/TRAINING PROGRAM

## 2022-11-04 PROCEDURE — 36415 COLL VENOUS BLD VENIPUNCTURE: CPT | Performed by: STUDENT IN AN ORGANIZED HEALTH CARE EDUCATION/TRAINING PROGRAM

## 2022-11-04 RX ORDER — SUCRALFATE 1 G/1
1 TABLET ORAL 2 TIMES DAILY PRN
Qty: 60 TABLET | Refills: 1 | Status: SHIPPED | OUTPATIENT
Start: 2022-11-04 | End: 2022-11-16 | Stop reason: SINTOL

## 2022-11-04 ASSESSMENT — PAIN SCALES - GENERAL: PAINLEVEL: MILD PAIN (3)

## 2022-11-04 NOTE — PROGRESS NOTES
1. Gastroesophageal reflux disease with esophagitis without hemorrhage  -Currently on 40 mg Prilosec daily  -States that her symptoms feel slightly different than her usual GERD symptoms  -Prescription sent for sucralfate (CARAFATE) 1 GM tablet; Take 1 tablet (1 g) by mouth 2 times daily as needed (abdominal discomfort)  Dispense: 60 tablet; Refill: 1  -Discussed the importance of dietary changes including limiting her intake of acidic, fatty, greasy, oily, spicy foods  -Patient is doing well with keeping herself adequately hydrated    2. Screening for HIV (human immunodeficiency virus)  - HIV Antigen Antibody Combo    3. Acute biliary pancreatitis, unspecified complication status  4. Acute pancreatitis without infection or necrosis, unspecified pancreatitis type  - Lipase ordered   - Comprehensive metabolic panel (BMP + Alb, Alk Phos, ALT, AST, Total. Bili, TP)    Follow up plan:   - Patient will see me in clinic later on for a preop clearance for her cholecystectomy  - She is already aware to stop her aspirin 5 days before the procedure    Cassidy Kent MD     Subjective   Carolina is a 57 year old, presenting for the following health issues:  Hospital F/U    HPI     Post Discharge Outreach 11/2/2022   Admission Date 10/29/2022   Reason for Admission Pancreatitis, hepatitis, suspect due to gall stones   Discharge Date 11/1/2022   Discharge Diagnosis Pancreatitis, hepatitis, suspect due to gall stones   How are you doing now that you are home? I am doing pretty good. I am taking my medications on time.   How are your symptoms? (Red Flag symptoms escalate to triage hotline per guidelines) Improved   Do you feel your condition is stable enough to be safe at home until your provider visit? Yes   Does the patient have their discharge instructions?  Yes   Does the patient have questions regarding their discharge instructions?  No   Were you started on any new medications or were there changes to any of your previous  "medications?  Yes   Does the patient have all of their medications? Yes   Do you have questions regarding any of your medications?  No   Discharge follow-up appointment scheduled within 14 calendar days?  Yes   Discharge Follow Up Appointment Date 11/4/2022   Discharge Follow Up Appointment Scheduled with? Primary Care Provider     Hospital Follow-up Visit:    Hospital/Nursing Home/IP Rehab Facility: [facility]  Date of Admission: 10/29/2022  Date of Discharge: 11/1/2022  Reason(s) for Admission: acute pancreatitis    Was your hospitalization related to COVID-19? No   Problems taking medications regularly:  None  Medication changes since discharge:   START taking these medications     Details   aspirin (ASA) 81 MG EC tablet Take 1 tablet (81 mg) by mouth daily     STOP taking these medications         loperamide (ANTI-DIARRHEAL) 2 MG tablet Comments:   Reason for Stopping:            rosuvastatin (CRESTOR) 20 MG tablet Comments:   Reason for Stopping:      Problems adhering to non-medication therapy:  None    Summary of hospitalization:  [facility] discharge summary reviewed  Diagnostic Tests/Treatments reviewed.  Follow up needed: ordered Lipase and CMP   Other Healthcare Providers Involved in Patient s Care:         Specialist appointment - surgery for cholecystectomy  Update since discharge: improved.         Plan of care communicated with patient           GERD/Heartburn  Onset/Duration: Since hospital discharge   Description: acidy taste   Intensity: moderate  Progression of Symptoms: same and intermittent  Accompanying Signs & Symptoms:  Does it feel like food gets stuck or trouble swallowing: No  Nausea: No  Vomiting (bloody?): No  Abdominal Pain: \"not so much pain, but discomfort, like my stomach is unsettled\"   Black-Tarry stools: No  Bloody stools: No  History:  Previous similar episodes: has had acid reflux since she was in her 20s   Previous ulcers: YES, when " "she was first told to take prilosec in her late 20s/early 30s, her current symptoms feel different than that time    Precipitating factors:   Caffeine use: She was drinking a lot of coffee, but now is down to 2 \"big\" cups   Alcohol use: Endorses past alcohol use, but has not had a drink since hospital discharge   NSAID/Aspirin use: No  Tobacco use: No  Worse with fatty foods, spicy foods, but pt has been decreasing her intake, spaghetti sauce would make her symptoms worse   Alleviating factors: oatmeal and milk   Therapies tried and outcome:             Lifestyle changes: cut back on her coffee, fatty and spicy food intake            Medications: Omeprazole (Prilosec) 40mg by mouth daily       Review of Systems   As above       Objective    /82 (BP Location: Right arm, Patient Position: Sitting, Cuff Size: Adult Large)   Pulse 80   Temp 98.3  F (36.8  C) (Tympanic)   Resp 16   Ht 1.6 m (5' 2.99\")   Wt 74.4 kg (164 lb)   SpO2 96%   Breastfeeding No   BMI 29.06 kg/m    Body mass index is 29.06 kg/m .  Physical Exam  Constitutional:       General: She is not in acute distress.  HENT:      Head: Normocephalic and atraumatic.      Right Ear: External ear normal.      Left Ear: External ear normal.   Eyes:      Extraocular Movements: Extraocular movements intact.   Cardiovascular:      Rate and Rhythm: Normal rate and regular rhythm.      Heart sounds: Normal heart sounds.   Pulmonary:      Effort: Pulmonary effort is normal. No respiratory distress.      Breath sounds: No wheezing or rhonchi.      Comments: Occasional expiratory wheezing   Abdominal:      Palpations: Abdomen is soft. There is no mass.      Comments: Minimal tenderness to palpation in epigastric area, negative garvin's sign    Musculoskeletal:         General: No deformity. Normal range of motion.      Cervical back: Normal range of motion and neck supple.   Skin:     General: Skin is warm.      Findings: No rash.   Neurological:      " General: No focal deficit present.      Mental Status: She is alert and oriented to person, place, and time.   Psychiatric:         Mood and Affect: Mood normal.            Admission on 10/29/2022, Discharged on 11/01/2022   Component Date Value Ref Range Status     Sodium 10/29/2022 135 (L)  136 - 145 mmol/L Final     Potassium 10/29/2022 3.8  3.4 - 5.3 mmol/L Final     Chloride 10/29/2022 97 (L)  98 - 107 mmol/L Final     Carbon Dioxide (CO2) 10/29/2022 22  22 - 29 mmol/L Final     Anion Gap 10/29/2022 16 (H)  7 - 15 mmol/L Final     Urea Nitrogen 10/29/2022 10.4  8.0 - 23.0 mg/dL Final     Creatinine 10/29/2022 0.58  0.51 - 0.95 mg/dL Final     Calcium 10/29/2022 9.6  8.6 - 10.0 mg/dL Final     Glucose 10/29/2022 109 (H)  70 - 99 mg/dL Final     Alkaline Phosphatase 10/29/2022 176 (H)  35 - 104 U/L Final     AST 10/29/2022 2,121 (HH)  10 - 35 U/L Final     ALT 10/29/2022 1,230 (HH)  10 - 35 U/L Final     Protein Total 10/29/2022 8.0  6.4 - 8.3 g/dL Final     Albumin 10/29/2022 4.5  3.5 - 5.2 g/dL Final     Bilirubin Total 10/29/2022 2.3 (H)  <=1.2 mg/dL Final     GFR Estimate 10/29/2022 >90  >60 mL/min/1.73m2 Final    Effective December 21, 2021 eGFRcr in adults is calculated using the 2021 CKD-EPI creatinine equation which includes age and gender (Kacey et al., NEJM, DOI: 10.1056/DRZHkg7710810)     Lipase 10/29/2022 >3,000 (H)  13 - 60 U/L Final     WBC Count 10/29/2022 10.9  4.0 - 11.0 10e3/uL Final     RBC Count 10/29/2022 4.44  3.80 - 5.20 10e6/uL Final     Hemoglobin 10/29/2022 13.6  11.7 - 15.7 g/dL Final     Hematocrit 10/29/2022 39.9  35.0 - 47.0 % Final     MCV 10/29/2022 90  78 - 100 fL Final     MCH 10/29/2022 30.6  26.5 - 33.0 pg Final     MCHC 10/29/2022 34.1  31.5 - 36.5 g/dL Final     RDW 10/29/2022 12.7  10.0 - 15.0 % Final     Platelet Count 10/29/2022 386  150 - 450 10e3/uL Final     % Neutrophils 10/29/2022 80  % Final     % Lymphocytes 10/29/2022 12  % Final     % Monocytes 10/29/2022 6  %  Final     % Eosinophils 10/29/2022 1  % Final     % Basophils 10/29/2022 0  % Final     % Immature Granulocytes 10/29/2022 1  % Final     NRBCs per 100 WBC 10/29/2022 0  <1 /100 Final     Absolute Neutrophils 10/29/2022 8.8 (H)  1.6 - 8.3 10e3/uL Final     Absolute Lymphocytes 10/29/2022 1.3  0.8 - 5.3 10e3/uL Final     Absolute Monocytes 10/29/2022 0.7  0.0 - 1.3 10e3/uL Final     Absolute Eosinophils 10/29/2022 0.1  0.0 - 0.7 10e3/uL Final     Absolute Basophils 10/29/2022 0.0  0.0 - 0.2 10e3/uL Final     Absolute Immature Granulocytes 10/29/2022 0.1  <=0.4 10e3/uL Final     Absolute NRBCs 10/29/2022 0.0  10e3/uL Final     Hold Specimen 10/29/2022 [facility]   Final     Hold Specimen 10/29/2022 [facility]   Final     Hold Specimen 10/29/2022 [facility]   Final     Hold Specimen 10/29/2022 [facility]   Final     Color Urine 10/29/2022 Yellow  Colorless, Straw, Light Yellow, Yellow Final     Appearance Urine 10/29/2022 Clear  Clear Final     Glucose Urine 10/29/2022 Negative  Negative mg/dL Final     Bilirubin Urine 10/29/2022 Negative  Negative Final     Ketones Urine 10/29/2022 Negative  Negative mg/dL Final     Specific Gravity Urine 10/29/2022 1.010  1.003 - 1.035 Final     Blood Urine 10/29/2022 Negative  Negative Final     pH Urine 10/29/2022 7.5 (H)  5.0 - 7.0 Final     Protein Albumin Urine 10/29/2022 Negative  Negative mg/dL Final     Urobilinogen Urine 10/29/2022 Normal  Normal, 2.0 mg/dL Final     Nitrite Urine 10/29/2022 Negative  Negative Final     Leukocyte Esterase Urine 10/29/2022 Negative  Negative Final     RBC Urine 10/29/2022 <1  <=2 /HPF Final     WBC Urine 10/29/2022 <1  <=5 /HPF Final     Squamous Epithelials Urine 10/29/2022 <1  <=1 /HPF Final     Lactic Acid 10/29/2022 1.1  0.7 - 2.0 mmol/L Final     Acetaminophen 10/29/2022 <5.0 (L)  10.0 - 30.0 ug/mL Final     Mononucleosis Screen 10/29/2022 Negative  Negative Final     CK 10/29/2022 117  26 - 192 U/L Final     SARS CoV2 PCR 10/29/2022 Negative  Negative Final     NEGATIVE: SARS-CoV-2 (COVID-19) RNA not detected, presumed negative.     Sodium 10/30/2022 137  136 - 145 mmol/L Final     Potassium 10/30/2022 3.6  3.4 - 5.3 mmol/L Final     Chloride 10/30/2022 103  98 - 107 mmol/L Final     Carbon Dioxide (CO2) 10/30/2022 27  22 - 29 mmol/L Final     Anion Gap 10/30/2022 7  7 - 15 mmol/L Final     Urea Nitrogen 10/30/2022 6.5 (L)  8.0 - 23.0 mg/dL Final     Creatinine 10/30/2022 0.62  0.51 - 0.95 mg/dL Final     Calcium 10/30/2022 9.0  8.6 - 10.0 mg/dL Final     Glucose 10/30/2022 92  70 - 99 mg/dL Final     Alkaline Phosphatase 10/30/2022 159 (H)  35 - 104 U/L Final     AST 10/30/2022 640 (HH)  10 - 35 U/L Final     ALT 10/30/2022 816 (HH)  10 - 35 U/L Final     Protein Total 10/30/2022 6.5  6.4 - 8.3 g/dL Final     Albumin 10/30/2022 3.7  3.5 - 5.2 g/dL Final     Bilirubin Total 10/30/2022 1.1  <=1.2 mg/dL Final     GFR Estimate 10/30/2022 >90  >60 mL/min/1.73m2 Final    Effective December 21, 2021 eGFRcr in adults is calculated using the 2021 CKD-EPI creatinine equation which includes age and gender (Kacey et al., NEJ, DOI: 10.1056/XFPRjj4915332)     Hepatitis A Antibody IgM 10/30/2022 Nonreactive  Nonreactive Final    IgM anti-HAV not detected. Does not exclude the possibility of recent exposure to or infection with HAV.     Hepatitis B Surface Antigen 10/30/2022 Nonreactive  Nonreactive Final     Hepatitis B Surface Antibody Instr* 10/30/2022 0.29  <8.00 m[IU]/mL Final     Hepatitis B Surface Antibody 10/30/2022 Nonreactive   Final    No antibody detected when the value is less than 8.00 mIU/mL.     Hepatitis C Antibody 10/30/2022 Nonreactive  Nonreactive Final     JANET interpretation 10/30/2022 Negative  Negative Final      Negative:              <1:40  Borderline Positive:   1:40 - 1:80  Positive:              >1:80     F-Actin (Smooth Muscle) Ab, IgG by* 10/30/2022 4  0 - 19 Units Final    Comment:    If F-Actin (Smooth Muscle) Antibody, IgG is negative, the   Smooth  Muscle Antibody titer by IFA is not performed.  REFERENCE INTERVAL: F-Actin (Smooth Muscle) Antibody, IgG   by                       GILMAR      19 Units or less ....... Negative    20 - 30 Units .......... Weak Positive-Suggest repeat                             testing in two to three weeks                             with fresh specimen.    31 Units or greater..... Positive-Suggestive of                             autoimmune hepatitis type 1                             or chronic active hepatitis.    F-actin IgG antibodies have been shown to have increased   sensitivity for autoimmune hepatitis (AIH) but lower   specificity than smooth muscle antibodies (SMA). F-actin   IgG antibodies can also be seen in SMA-negative disease   controls (non-AIH), especially in patients with primary   biliary cirrhosis and chronic hepatitis C infections. Some   patients with AIH may be SMA-positive but negative for   F-actin IgG.                            Consider testing for SMA by IFA if suspicion   for AIH is strong.  Performed By: [facility]  [address] [place], [place] [id number]  : Yvon Barrett MD, PhD     Liver-Kidney Micro Antibody 10/30/2022 <1:20  <1:20 Final    Comment: INTERPRETIVE INFORMATION:  Liver-Kidney-Microsome Abs, IgG    Liver-Kidney Microsome IgG antibody (anti-LKM), as detected   by indirect immunofluorescent antibody (IFA) techniques,   may be observed in patients with autoimmune hepatitis type   2 (AIH-2), AIH-2 associated with autoimmune   wtadqhnliknmwtiabh-sqbkzfvansx-xddjuibzkd dystrophy   (APECED), viral hepatitis C or D, and some forms of   drug-induced hepatitis. This IFA does not differentiate   among the four types of LKM antibodies (LKM-1, LKM-2,   LKM-3, and a fourth type that recognizes CY and CY   antigens). Of these, anti-LKM-1 (cytochrome [id number]) IgG   antibodies are considered specific for AIH-2.    This test was developed and its performance  characteristics   determined by [facility]. It has not been cleared or   approved by the US Food and Drug Administration. This test   was performed in a CLIA certified laboratory and is   intended for clinical purposes.  Performed By: [facility]  [address] [place] [id number]  : Yvon Barrett MD, PhD     Mitochondrial M2 Antibody IgG 10/30/2022 <1.0  <4.0 U/mL Final    Negative     Immunoglobulin G 10/30/2022 1,132  610 - 1,616 mg/dL Final     Soluble Liver Ag Ab 10/30/2022 1.6  0.0 - 24.9 U Final    REFERENCE INTERVAL: Soluble Liver Antigen Antibody, IgG       0.0 - 20.0 U ........... Negative    20.1 - 24.9 U ........... Equivocal    25.0 U or greater ....... Positive    The presence of SLA antibodies has almost 100% specificity   for autoimmune hepatitis, although only 12-30% have these   antibodies.  Thus, a negative SLA IgG test does not rule   out autoimmune hepatitis.  Performed By: [facility]  [address] [place] [address]  : Yvon Barrett MD, PhD     WBC Count 10/30/2022 5.8  4.0 - 11.0 10e3/uL Final     RBC Count 10/30/2022 3.92  3.80 - 5.20 10e6/uL Final     Hemoglobin 10/30/2022 11.8  11.7 - 15.7 g/dL Final     Hematocrit 10/30/2022 35.9  35.0 - 47.0 % Final     MCV 10/30/2022 92  78 - 100 fL Final     MCH 10/30/2022 30.1  26.5 - 33.0 pg Final     MCHC 10/30/2022 32.9  31.5 - 36.5 g/dL Final     RDW 10/30/2022 13.1  10.0 - 15.0 % Final     Platelet Count 10/30/2022 358  150 - 450 10e3/uL Final     Lipase 10/30/2022 4,118 (H)  73 - 393 U/L Final     Hold Specimen 10/30/2022 [facility]   Final     Cholesterol 10/31/2022 197  <200 mg/dL Final     Triglycerides 10/31/2022 96  <150 mg/dL Final     Direct Measure HDL 10/31/2022 83  >=50 mg/dL Final     LDL Cholesterol Calculated 10/31/2022 95  <=100 mg/dL Final     Non HDL Cholesterol 10/31/2022 114  <130 mg/dL Final     Sodium 10/31/2022 138  136  - 145 mmol/L Final     Potassium 10/31/2022 4.1  3.4 - 5.3 mmol/L Final     Chloride 10/31/2022 101  98 - 107 mmol/L Final     Carbon Dioxide (CO2) 10/31/2022 28  22 - 29 mmol/L Final     Anion Gap 10/31/2022 9  7 - 15 mmol/L Final     Urea Nitrogen 10/31/2022 6.4 (L)  8.0 - 23.0 mg/dL Final     Creatinine 10/31/2022 0.65  0.51 - 0.95 mg/dL Final     Calcium 10/31/2022 9.6  8.6 - 10.0 mg/dL Final     Glucose 10/31/2022 90  70 - 99 mg/dL Final     Alkaline Phosphatase 10/31/2022 150 (H)  35 - 104 U/L Final     AST 10/31/2022 220 (H)  10 - 35 U/L Final     ALT 10/31/2022 529 (HH)  10 - 35 U/L Final     Protein Total 10/31/2022 6.9  6.4 - 8.3 g/dL Final     Albumin 10/31/2022 3.9  3.5 - 5.2 g/dL Final     Bilirubin Total 10/31/2022 0.6  <=1.2 mg/dL Final     GFR Estimate 10/31/2022 >90  >60 mL/min/1.73m2 Final    Effective December 21, 2021 eGFRcr in adults is calculated using the 2021 CKD-EPI creatinine equation which includes age and gender (Kacey et al., [facility], DOI: 10.1056/[id number])     WBC Count 10/31/2022 5.3  4.0 - 11.0 10e3/uL Final     RBC Count 10/31/2022 3.88  3.80 - 5.20 10e6/uL Final     Hemoglobin 10/31/2022 11.6 (L)  11.7 - 15.7 g/dL Final     Hematocrit 10/31/2022 36.3  35.0 - 47.0 % Final     MCV 10/31/2022 94  78 - 100 fL Final     MCH 10/31/2022 29.9  26.5 - 33.0 pg Final     MCHC 10/31/2022 32.0  31.5 - 36.5 g/dL Final     RDW 10/31/2022 13.2  10.0 - 15.0 % Final     Platelet Count 10/31/2022 347  150 - 450 10e3/uL Final     Magnesium 10/31/2022 2.2  1.7 - 2.3 mg/dL Final     Phosphorus 10/31/2022 3.9  2.5 - 4.5 mg/dL Final     Lipase 10/31/2022 971 (H)  73 - 393 U/L Final     WBC Count 11/01/2022 5.4  4.0 - 11.0 10e3/uL Final     RBC Count 11/01/2022 4.04  3.80 - 5.20 10e6/uL Final     Hemoglobin 11/01/2022 12.0  11.7 - 15.7 g/dL Final     Hematocrit 11/01/2022 37.3  35.0 - 47.0 % Final     MCV 11/01/2022 92  78 - 100 fL Final     MCH 11/01/2022 29.7  26.5 - 33.0 pg Final     MCHC 11/01/2022  32.2  31.5 - 36.5 g/dL Final     RDW 11/01/2022 13.0  10.0 - 15.0 % Final     Platelet Count 11/01/2022 347  150 - 450 10e3/uL Final     Sodium 11/01/2022 136  136 - 145 mmol/L Final     Potassium 11/01/2022 4.1  3.4 - 5.3 mmol/L Final     Chloride 11/01/2022 100  98 - 107 mmol/L Final     Carbon Dioxide (CO2) 11/01/2022 26  22 - 29 mmol/L Final     Anion Gap 11/01/2022 10  7 - 15 mmol/L Final     Urea Nitrogen 11/01/2022 7.9 (L)  8.0 - 23.0 mg/dL Final     Creatinine 11/01/2022 0.65  0.51 - 0.95 mg/dL Final     Calcium 11/01/2022 9.4  8.6 - 10.0 mg/dL Final     Glucose 11/01/2022 195 (H)  70 - 99 mg/dL Final     Alkaline Phosphatase 11/01/2022 130 (H)  35 - 104 U/L Final     AST 11/01/2022 87 (H)  10 - 35 U/L Final     ALT 11/01/2022 344 (H)  10 - 35 U/L Final     Protein Total 11/01/2022 7.4  6.4 - 8.3 g/dL Final     Albumin 11/01/2022 4.2  3.5 - 5.2 g/dL Final     Bilirubin Total 11/01/2022 0.4  <=1.2 mg/dL Final     GFR Estimate 11/01/2022 >90  >60 mL/min/1.73m2 Final    Effective December 21, 2021 eGFRcr in adults is calculated using the 2021 CKD-EPI creatinine equation which includes age and gender (Kacey et al., NEJM, DOI: 10.1056/GIANmp7358201)     Lipase 11/01/2022 375 (H)  13 - 60 U/L Final

## 2022-11-04 NOTE — PATIENT INSTRUCTIONS
Hello! It was a pleasure seeing you today. Just some things we discussed at today's visit:     Pancreatitis    Please abstain from alcohol use and   Abdominal Discomfort   Prescription for carafate was sent to your pharmacy, you can pick it up today or tomorrow   For you surgery stop taking aspirin 5 days before the procedure, we can discuss this more when you see me for your preop clearance appointment     Sincerely,     Cassidy Kent MD

## 2022-11-05 NOTE — RESULT ENCOUNTER NOTE
Trever Hernandez,     It was a pleasure speaking with you the other day. I have received and reviewed your lab results, and have the following recommendations:     Congratulations! You are negative for HIV and you Lipase values are going down. We look at lipase values to determine the severity of pancreatitis. I would still encourage you to drink plenty of fluids to help keep your lipase at lower levels.     Additionally, your labs assessing for liver abnormalities are also improving. Sometimes, statin medications such as crestor can affect your liver. Because of this, I would recommend holding off on the crestor at this time and we can discuss alternative options at our next visit.     Sincerely,     Cassidy Kent MD          Sincerely,     Cassidy Kent MD

## 2022-11-10 ENCOUNTER — OFFICE VISIT (OUTPATIENT)
Dept: ORTHOPEDICS | Facility: CLINIC | Age: 57
End: 2022-11-10
Payer: COMMERCIAL

## 2022-11-10 VITALS — WEIGHT: 164 LBS | BODY MASS INDEX: 29.06 KG/M2 | HEIGHT: 63 IN

## 2022-11-10 DIAGNOSIS — M17.0 BILATERAL PRIMARY OSTEOARTHRITIS OF KNEE: Primary | ICD-10-CM

## 2022-11-10 PROCEDURE — 20610 DRAIN/INJ JOINT/BURSA W/O US: CPT | Mod: 50 | Performed by: ORTHOPAEDIC SURGERY

## 2022-11-10 RX ADMIN — METHYLPREDNISOLONE ACETATE 80 MG: 80 INJECTION, SUSPENSION INTRA-ARTICULAR; INTRALESIONAL; INTRAMUSCULAR; SOFT TISSUE at 15:33

## 2022-11-10 RX ADMIN — BUPIVACAINE HYDROCHLORIDE 4 ML: 2.5 INJECTION, SOLUTION INFILTRATION; PERINEURAL at 15:33

## 2022-11-10 ASSESSMENT — KOOS JR
STRAIGHTENING KNEE FULLY: EXTREME
RISING FROM SITTING: EXTREME
TWISING OR PIVOTING ON KNEE: EXTREME
STANDING UPRIGHT: SEVERE
KOOS JR SCORING: 8.29
GOING UP OR DOWN STAIRS: EXTREME
HOW SEVERE IS YOUR KNEE STIFFNESS AFTER FIRST WAKING IN MORNING: EXTREME
BENDING TO THE FLOOR TO PICK UP OBJECT: EXTREME

## 2022-11-10 ASSESSMENT — PAIN SCALES - GENERAL: PAINLEVEL: SEVERE PAIN (7)

## 2022-11-10 NOTE — PROGRESS NOTES
CHIEF COMPLAINT:   Chief Complaint   Patient presents with     Knee Pain     Would like bilateral cortisone injections. She had the best shots she's ever received on 8/15/22 by Dr. Chong. She had pancreatitis recently and is scheduled for a cholecystectomy on 11/21/22. Prior to that she was planning to get a knee replaced this winter.        HISTORY OF PRESENT ILLNESS    Carolina Hernandez is a 57 year old female seen for evaluation of ongoing bilateral knee pain with no known injury, both about the same but varies.   Pain has been present for 20 years. We saw her 8/15/2022 and provided bilateral cortisone injections,  4/27/2022 and received bilateral visco injections (synviscOne). She had previously received a cortisone injection with Dr Gaspar 8/2021 which helped to some extent for a few months. Returns today stating the last injections were the best she had.    Recent bout of pancreatitis and has a cholecystectomy on 11/21/2022. She was planning to get a knee replacement this winter but with these medical things going on she'll have to wait.    Also has foot problems. Gaining some weight..      Pain really progressed several months ago, pushing heavy wheelbarrows full of wet hay through the woods, cleaning out goat pens.    Locates pain along the inner aspect and front of the knees, and can radiate down the leg to the ankle, up the thigh.  Pain is worse with work, lifting/carrying things, gardening. Treatment has been occasional use of over the counter pain medications without relief.     Pain at rest, pain at night better with injections. Sleeps with pillow between knees. Has tried tylenol arthritis and ibuprofen for pain but doesn't seem to really help.    Left knee injury at 10 years old.    Has chronic low back pain, denies numbness and tingling.    Present symptoms: pain medially  and anteriorly, pain dull/achy , mild pain.    Pain severity: 7/10  Frequency of symptoms: are constant  Exacerbating Factors:  weight bearing, carrying and lifting things, gardening, kneeling, stairs  Relieving Factors: rest, ice  Night Pain: Yes  Pain while at rest: Yes   Numbness or tingling: No   Patient has tried:     NSAIDS: Yes , not recently in the past couple of years.     Physical Therapy: No      Activity modification: Yes      Bracing: No      Injections: Yes 8/15/2022, 8/20/2021 cortisone, 4/27/2022 with SynviscOne.     Ice: Yes      Assistive device:  No     Other: tylenol. Topical ointments.      Other PMH:  has a past medical history of Asthma, C. difficile colitis, Depression, Depressive disorder (Have had it most of my life), Moderate persistent asthma with exacerbation (9/18/2007), Osteoarthritis, Sinusitis, chronic, and Status post coronary angiogram (10/14/2022).    She has no past medical history of Breast cancer (H), Malignant neoplasm of ovary (H), or Need for prophylactic hormone replacement therapy (postmenopausal).  Patient Active Problem List   Diagnosis     Recurrent major depressive disorder, in remission (H)     Benign essential hypertension     Primary osteoarthritis of both knees     Low back pain due to bilateral sciatica     Nonobstructive atherosclerosis of coronary artery     Hyperlipidemia LDL goal <70     Transaminitis     Acute pancreatitis     COPD vs Asthma     GERD (gastroesophageal reflux disease)     Seasonal allergies     Fatty liver       Surgical Hx:  has a past surgical history that includes colonoscopy (2017); Esophagoscopy, gastroscopy, duodenoscopy (EGD), combined (N/A, 05/05/2022); and Coronary Angiogram (N/A, 10/14/2022).    Medications:   Current Outpatient Medications:      albuterol (PROVENTIL) (2.5 MG/3ML) 0.083% neb solution, inhale 1 vial (2.5 mg) by nebulization every 4 hours as needed for shortness of breath / dyspnea or wheezing, Disp: 225 mL, Rfl: 11     albuterol (VENTOLIN HFA) 108 (90 Base) MCG/ACT inhaler, INHALE 1-2 PUFFS INTO THE LUNGS EVERY 4 HOURS AS NEEDED FOR SHORTNESS  OF BREATH/DYSPNEA OR WHEEZING ., Disp: 18 g, Rfl: 11     amLODIPine (NORVASC) 2.5 MG tablet, Take 2 tablets (5 mg) by mouth once daily., Disp: 180 tablet, Rfl: 0     aspirin (ASA) 81 MG EC tablet, Take 1 tablet (81 mg) by mouth daily, Disp: , Rfl:      carboxymethylcellulose PF (REFRESH PLUS) 0.5 % ophthalmic solution, 1 drop 3 times daily as needed for dry eyes, Disp: , Rfl:      cetirizine (ZYRTEC) 10 MG tablet, Take 10 mg by mouth daily, Disp: , Rfl:      FLUoxetine (PROZAC) 20 MG capsule, Take 1 capsule (20 mg) by mouth daily, Disp: 90 capsule, Rfl: 3     FLUoxetine (PROZAC) 40 MG capsule, Take 1 capsule (40 mg) by mouth daily Take with 20 mg tab for a total of 60 mg daily, Disp: 90 capsule, Rfl: 3     fluticasone (FLONASE) 50 MCG/ACT nasal spray, Spray 1 spray into both nostrils daily, Disp: 1 g, Rfl: 11     fluticasone-salmeterol (ADVAIR) 500-50 MCG/ACT inhaler, Inhale 1 puff into the lungs every 12 hours, Disp: 1 each, Rfl: 11     montelukast (SINGULAIR) 10 MG tablet, Take 1 tablet (10 mg) by mouth At Bedtime, Disp: 90 tablet, Rfl: 3     omeprazole 20 MG tablet, Take 2 tablets (40 mg) by mouth daily, Disp: 90 tablet, Rfl: 3     Psyllium Husk 100 % POWD, Taking daily., Disp: , Rfl:      sucralfate (CARAFATE) 1 GM tablet, Take 1 tablet (1 g) by mouth 2 times daily as needed (abdominal discomfort), Disp: 60 tablet, Rfl: 1     tiotropium (SPIRIVA) 18 MCG inhaled capsule, Inhale 1 capsule (18 mcg) into the lungs daily, Disp: 30 capsule, Rfl: 11    Allergies:   Allergies   Allergen Reactions     Doxycycline Difficulty breathing     Penicillins Itching       Social Hx: retired ().   reports that she quit smoking about 15 months ago. Her smoking use included cigarettes. She has never used smokeless tobacco. She reports that she does not currently use alcohol. She reports that she does not use drugs.    Family Hx: family history includes Cancer in her daughter and maternal grandfather; Coronary Artery  "Disease in her father and paternal grandfather; Diabetes in her daughter; Glaucoma in her father; Schizophrenia in her daughter.    REVIEW OF SYSTEMS:   CONSTITUTIONAL:NEGATIVE for fever, chills, change in weight  INTEGUMENTARY/SKIN: NEGATIVE for worrisome rashes, moles or lesions  MUSCULOSKELETAL:See HPI above  NEURO: NEGATIVE for weakness, dizziness or paresthesias    PHYSICAL EXAM:  Ht 1.6 m (5' 3\")   Wt 74.4 kg (164 lb)   BMI 29.05 kg/m     GENERAL APPEARANCE: healthy, alert, no distress  SKIN: no suspicious lesions or rashes  NEURO: Normal strength and tone, mentation intact and speech normal  PSYCH:  mentation appears normal and affect normal, not anxious  RESPIRATORY: No increased work of breathing.      BILATERAL LOWER EXTREMITIES:  Gait: normal.  Alignment: varus  Intact sensation deep peroneal nerve, superficial peroneal nerve, med/lat tibial nerve, sural nerve, saphenous nerve  Intact EHL, EDL, TA, FHL, GS, quadriceps hamstrings and hip flexors  Bilateral calf soft and nttp or squeeze.  Edema: trace    LEFT KNEE EXAM:    Skin: intact, no ecchymosis or erythema  ROM: full extension to 125 flexion, discomfort with range of motion.  Tight hamstrings on straight leg raise.  Effusion: trace  Tender: medial joint line, pes, lateral joint line.    MCL: stable, and non-painful at both 0 and 30 degrees knee flexion  Varus stress: stable, and non-painful at both 0 and 30 degrees knee flexion  Lachmans: neg, firm endpoint  Posterior Drawer stable  Patellofemoral joint:                Apprehension: negative              Crepitations: mild   Grind: positive.    RIGHT KNEE EXAM:    Skin: intact, no ecchymosis or erythema  ROM: full extension to 125+ flexion  Tight hamstrings on straight leg raise.  Effusion: none  Tender: medial joint line, lateral joint line.    MCL: stable, and non-painful at both 0 and 30 degrees knee flexion  Varus stress: stable, and non-painful at both 0 and 30 degrees knee flexion  Lachmans: " "neg, firm endpoint  Posterior Drawer stable  Patellofemoral joint:                Apprehension: negative              Crepitations: mild   Grind: positive.    X-RAY: no new images today.    3 views bilateral knee from 8/20/2021 -- Bilateral medial compartment severe joint space narrowing, bone bone bone with articular flattening, subcondral sclerosis. Bilateral patellofemoral lateral facet mild joint space narrowing with medial  patello-femoral osteophytes.           ASSESSMENT/PLAN: Carolina Hernandez is a 57 year old female with chronic bilateral knee pain, advanced primary osteoarthritis.     * reviewed imaging studies with patient, showing arthritic changes, or wearing of the cartilage in the knee. This can be caused by normal \"wear and tear\" over the years or following prior injury to the knee.    Treatment typically starts nonsurgically. Surgical indication for total knee arthroplasty  when nonsurgical management is no longer effective.    At this point, we discussed either trying repeat cortisone injections, otherwise surgical total knee arthroplasty.    At this time, she'd like to try cortisone again as she has things going on right now with her health.    Non-surgical treatment for knee arthritis includes:    * rest, sitting  * Activity modification - avoid impact activities or activities that aggravate symptoms.  * NSAIDS (non-steroidal anti-inflammatory medications; e.g. Aleve, advil, motrin, ibuprofen) - regular use for inflammation ( twice daily or three times daily), with food, as long as no contra-indications Please discuss with primary care doctor if needed  * ice, 15-20 minutes at a time several times a day or as needed.  * Strengthening of quadriceps muscles  * Physical Therapy for strengthening, stretching and range of motion exercises of legs  * Tylenol as needed for pain, consider Tylenol arthritis or similar  * Weight loss: Weight loss:  Body mass index is 29.05 kg/m .. weight loss benefits, not " "only for the current pain symptoms, but also overall health. Recommend a good diet plan that works for the patient, with the assistance of a dietician or primary care doctor as needed. Also, a good, low-impact exercise program for at least 20 minutes per day, 3 times per week, such as exercise bike, elliptical , or pool.  * Exercise: low impact such as stationary bike, elliptical, pool.  * Injections: cortisone versus viscosupplementation (hyaluronic acid, \"rooster comb\", \"gel shots\"); risks and perceived benefits discussed today. We will proceed with bilateral knee cortisone injections today.    * Bracing: bracing the knee may offer some relief of symptoms when worn and provide some stability.  * over the counter supplements such as glucosamine and chondroitin sulfate may help with joint pain.  * topical ointments may help as well    * return to clinic as needed.      * All questions were addressed and answered prior to discharge from clinic today. The patient acknowledges an understanding of and agreement with the plan set forth during today's visit. Patient was advised to call our office or MyChart us if any further questions arise upon leaving our office today.    Giovanny Chong M.D., M.S.  Dept. of Orthopaedic Surgery  [facility]    Large Joint Injection/Arthocentesis: bilateral knee    Date/Time: 11/10/2022 3:33 PM  Performed by: Acosta Eng PA  Authorized by: Giovanny Chong MD     Indications:  Pain and osteoarthritis  Needle Size:  22 G  Guidance: landmark guided    Approach:  Anteromedial  Location:  Knee  Laterality:  Bilateral      Medications (Right):  80 mg methylPREDNISolone 80 MG/ML; 4 mL bupivacaine 0.25 %  Medications (Left):  80 mg methylPREDNISolone 80 MG/ML; 4 mL bupivacaine 0.25 %  Outcome:  Tolerated well, no immediate complications  Procedure discussed: discussed risks, benefits, and alternatives    Consent Given by:  Patient  Prep: patient was prepped and " draped in usual sterile fashion

## 2022-11-10 NOTE — LETTER
11/10/2022         RE: Carolina Hernandez  [address] [place] [id number]        Dear Colleague,    Thank you for referring your patient, Carolina Hernandez, to the [facility]. Please see a copy of my visit note below.    CHIEF COMPLAINT:   Chief Complaint   Patient presents with     Knee Pain     Would like bilateral cortisone injections. She had the best shots she's ever received on 8/15/22 by Dr. Chong. She had pancreatitis recently and is scheduled for a cholecystectomy on 11/21/22. Prior to that she was planning to get a knee replaced this winter.        HISTORY OF PRESENT ILLNESS    Carolina Hernandez is a 57 year old female seen for evaluation of ongoing bilateral knee pain with no known injury, both about the same but varies.   Pain has been present for 20 years. We saw her 8/15/2022 and provided bilateral cortisone injections,  4/27/2022 and received bilateral visco injections (synviscOne). She had previously received a cortisone injection with Dr Gaspar 8/2021 which helped to some extent for a few months. Returns today stating the last injections were the best she had.    Recent bout of pancreatitis and has a cholecystectomy on 11/21/2022. She was planning to get a knee replacement this winter but with these medical things going on she'll have to wait.    Also has foot problems. Gaining some weight..      Pain really progressed several months ago, pushing heavy wheelbarrows full of wet hay through the woods, cleaning out goat pens.    Locates pain along the inner aspect and front of the knees, and can radiate down the leg to the ankle, up the thigh.  Pain is worse with work, lifting/carrying things, gardening. Treatment has been occasional use of over the counter pain medications without relief.     Pain at rest, pain at night better with injections. Sleeps with pillow between knees. Has tried tylenol arthritis and ibuprofen for pain but doesn't seem to  really help.    Left knee injury at 10 years old.    Has chronic low back pain, denies numbness and tingling.    Present symptoms: pain medially  and anteriorly, pain dull/achy , mild pain.    Pain severity: 7/10  Frequency of symptoms: are constant  Exacerbating Factors: weight bearing, carrying and lifting things, gardening, kneeling, stairs  Relieving Factors: rest, ice  Night Pain: Yes  Pain while at rest: Yes   Numbness or tingling: No   Patient has tried:     NSAIDS: Yes , not recently in the past couple of years.     Physical Therapy: No      Activity modification: Yes      Bracing: No      Injections: Yes 8/15/2022, 8/20/2021 cortisone, 4/27/2022 with SynviscOne.     Ice: Yes      Assistive device:  No     Other: tylenol. Topical ointments.      Other PMH:  has a past medical history of Asthma, C. difficile colitis, Depression, Depressive disorder (Have had it most of my life), Moderate persistent asthma with exacerbation (9/18/2007), Osteoarthritis, Sinusitis, chronic, and Status post coronary angiogram (10/14/2022).    She has no past medical history of Breast cancer (H), Malignant neoplasm of ovary (H), or Need for prophylactic hormone replacement therapy (postmenopausal).  Patient Active Problem List   Diagnosis     Recurrent major depressive disorder, in remission (H)     Benign essential hypertension     Primary osteoarthritis of both knees     Low back pain due to bilateral sciatica     Nonobstructive atherosclerosis of coronary artery     Hyperlipidemia LDL goal <70     Transaminitis     Acute pancreatitis     COPD vs Asthma     GERD (gastroesophageal reflux disease)     Seasonal allergies     Fatty liver       Surgical Hx:  has a past surgical history that includes colonoscopy (2017); Esophagoscopy, gastroscopy, duodenoscopy (EGD), combined (N/A, 05/05/2022); and Coronary Angiogram (N/A, 10/14/2022).    Medications:   Current Outpatient Medications:      albuterol (PROVENTIL) (2.5 MG/3ML) 0.083% neb  solution, inhale 1 vial (2.5 mg) by nebulization every 4 hours as needed for shortness of breath / dyspnea or wheezing, Disp: 225 mL, Rfl: 11     albuterol (VENTOLIN HFA) 108 (90 Base) MCG/ACT inhaler, INHALE 1-2 PUFFS INTO THE LUNGS EVERY 4 HOURS AS NEEDED FOR SHORTNESS OF BREATH/DYSPNEA OR WHEEZING ., Disp: 18 g, Rfl: 11     amLODIPine (NORVASC) 2.5 MG tablet, Take 2 tablets (5 mg) by mouth once daily., Disp: 180 tablet, Rfl: 0     aspirin (ASA) 81 MG EC tablet, Take 1 tablet (81 mg) by mouth daily, Disp: , Rfl:      carboxymethylcellulose PF (REFRESH PLUS) 0.5 % ophthalmic solution, 1 drop 3 times daily as needed for dry eyes, Disp: , Rfl:      cetirizine (ZYRTEC) 10 MG tablet, Take 10 mg by mouth daily, Disp: , Rfl:      FLUoxetine (PROZAC) 20 MG capsule, Take 1 capsule (20 mg) by mouth daily, Disp: 90 capsule, Rfl: 3     FLUoxetine (PROZAC) 40 MG capsule, Take 1 capsule (40 mg) by mouth daily Take with 20 mg tab for a total of 60 mg daily, Disp: 90 capsule, Rfl: 3     fluticasone (FLONASE) 50 MCG/ACT nasal spray, Spray 1 spray into both nostrils daily, Disp: 1 g, Rfl: 11     fluticasone-salmeterol (ADVAIR) 500-50 MCG/ACT inhaler, Inhale 1 puff into the lungs every 12 hours, Disp: 1 each, Rfl: 11     montelukast (SINGULAIR) 10 MG tablet, Take 1 tablet (10 mg) by mouth At Bedtime, Disp: 90 tablet, Rfl: 3     omeprazole 20 MG tablet, Take 2 tablets (40 mg) by mouth daily, Disp: 90 tablet, Rfl: 3     Psyllium Husk 100 % POWD, Taking daily., Disp: , Rfl:      sucralfate (CARAFATE) 1 GM tablet, Take 1 tablet (1 g) by mouth 2 times daily as needed (abdominal discomfort), Disp: 60 tablet, Rfl: 1     tiotropium (SPIRIVA) 18 MCG inhaled capsule, Inhale 1 capsule (18 mcg) into the lungs daily, Disp: 30 capsule, Rfl: 11    Allergies:   Allergies   Allergen Reactions     Doxycycline Difficulty breathing     Penicillins Itching       Social Hx: retired ().   reports that she quit smoking about 15 months ago.  "Her smoking use included cigarettes. She has never used smokeless tobacco. She reports that she does not currently use alcohol. She reports that she does not use drugs.    Family Hx: family history includes Cancer in her daughter and maternal grandfather; Coronary Artery Disease in her father and paternal grandfather; Diabetes in her daughter; Glaucoma in her father; Schizophrenia in her daughter.    REVIEW OF SYSTEMS:   CONSTITUTIONAL:NEGATIVE for fever, chills, change in weight  INTEGUMENTARY/SKIN: NEGATIVE for worrisome rashes, moles or lesions  MUSCULOSKELETAL:See HPI above  NEURO: NEGATIVE for weakness, dizziness or paresthesias    PHYSICAL EXAM:  Ht 1.6 m (5' 3\")   Wt 74.4 kg (164 lb)   BMI 29.05 kg/m     GENERAL APPEARANCE: healthy, alert, no distress  SKIN: no suspicious lesions or rashes  NEURO: Normal strength and tone, mentation intact and speech normal  PSYCH:  mentation appears normal and affect normal, not anxious  RESPIRATORY: No increased work of breathing.      BILATERAL LOWER EXTREMITIES:  Gait: normal.  Alignment: varus  Intact sensation deep peroneal nerve, superficial peroneal nerve, med/lat tibial nerve, sural nerve, saphenous nerve  Intact EHL, EDL, TA, FHL, GS, quadriceps hamstrings and hip flexors  Bilateral calf soft and nttp or squeeze.  Edema: trace    LEFT KNEE EXAM:    Skin: intact, no ecchymosis or erythema  ROM: full extension to 125 flexion, discomfort with range of motion.  Tight hamstrings on straight leg raise.  Effusion: trace  Tender: medial joint line, pes, lateral joint line.    MCL: stable, and non-painful at both 0 and 30 degrees knee flexion  Varus stress: stable, and non-painful at both 0 and 30 degrees knee flexion  Lachmans: neg, firm endpoint  Posterior Drawer stable  Patellofemoral joint:                Apprehension: negative              Crepitations: mild   Grind: positive.    RIGHT KNEE EXAM:    Skin: intact, no ecchymosis or erythema  ROM: full extension to 125+ " "flexion  Tight hamstrings on straight leg raise.  Effusion: none  Tender: medial joint line, lateral joint line.    MCL: stable, and non-painful at both 0 and 30 degrees knee flexion  Varus stress: stable, and non-painful at both 0 and 30 degrees knee flexion  Lachmans: neg, firm endpoint  Posterior Drawer stable  Patellofemoral joint:                Apprehension: negative              Crepitations: mild   Grind: positive.    X-RAY: no new images today.    3 views bilateral knee from 8/20/2021 -- Bilateral medial compartment severe joint space narrowing, bone bone bone with articular flattening, subcondral sclerosis. Bilateral patellofemoral lateral facet mild joint space narrowing with medial  patello-femoral osteophytes.           ASSESSMENT/PLAN: Carolina Hernandez is a 57 year old female with chronic bilateral knee pain, advanced primary osteoarthritis.     * reviewed imaging studies with patient, showing arthritic changes, or wearing of the cartilage in the knee. This can be caused by normal \"wear and tear\" over the years or following prior injury to the knee.    Treatment typically starts nonsurgically. Surgical indication for total knee arthroplasty  when nonsurgical management is no longer effective.    At this point, we discussed either trying repeat cortisone injections, otherwise surgical total knee arthroplasty.    At this time, she'd like to try cortisone again as she has things going on right now with her health.    Non-surgical treatment for knee arthritis includes:    * rest, sitting  * Activity modification - avoid impact activities or activities that aggravate symptoms.  * NSAIDS (non-steroidal anti-inflammatory medications; e.g. Aleve, advil, motrin, ibuprofen) - regular use for inflammation ( twice daily or three times daily), with food, as long as no contra-indications Please discuss with primary care doctor if needed  * ice, 15-20 minutes at a time several times a day or as needed.  * " "Strengthening of quadriceps muscles  * Physical Therapy for strengthening, stretching and range of motion exercises of legs  * Tylenol as needed for pain, consider Tylenol arthritis or similar  * Weight loss: Weight loss:  Body mass index is 29.05 kg/m .. weight loss benefits, not only for the current pain symptoms, but also overall health. Recommend a good diet plan that works for the patient, with the assistance of a dietician or primary care doctor as needed. Also, a good, low-impact exercise program for at least 20 minutes per day, 3 times per week, such as exercise bike, elliptical , or pool.  * Exercise: low impact such as stationary bike, elliptical, pool.  * Injections: cortisone versus viscosupplementation (hyaluronic acid, \"rooster comb\", \"gel shots\"); risks and perceived benefits discussed today. We will proceed with bilateral knee cortisone injections today.    * Bracing: bracing the knee may offer some relief of symptoms when worn and provide some stability.  * over the counter supplements such as glucosamine and chondroitin sulfate may help with joint pain.  * topical ointments may help as well    * return to clinic as needed.      * All questions were addressed and answered prior to discharge from clinic today. The patient acknowledges an understanding of and agreement with the plan set forth during today's visit. Patient was advised to call our office or MyChart us if any further questions arise upon leaving our office today.    Giovanny Chong M.D., M.S.  Dept. of Orthopaedic Surgery  [facility]    Large Joint Injection/Arthocentesis: bilateral knee    Date/Time: 11/10/2022 3:33 PM  Performed by: Acosta Eng PA  Authorized by: Giovanny Chong MD     Indications:  Pain and osteoarthritis  Needle Size:  22 G  Guidance: landmark guided    Approach:  Anteromedial  Location:  Knee  Laterality:  Bilateral      Medications (Right):  80 mg methylPREDNISolone 80 MG/ML; 4 mL " bupivacaine 0.25 %  Medications (Left):  80 mg methylPREDNISolone 80 MG/ML; 4 mL bupivacaine 0.25 %  Outcome:  Tolerated well, no immediate complications  Procedure discussed: discussed risks, benefits, and alternatives    Consent Given by:  Patient  Prep: patient was prepped and draped in usual sterile fashion              Again, thank you for allowing me to participate in the care of your patient.        Sincerely,        Giovanny Chong MD

## 2022-11-11 RX ORDER — BUPIVACAINE HYDROCHLORIDE 2.5 MG/ML
4 INJECTION, SOLUTION INFILTRATION; PERINEURAL
Status: DISCONTINUED | OUTPATIENT
Start: 2022-11-10 | End: 2023-02-13

## 2022-11-11 RX ORDER — METHYLPREDNISOLONE ACETATE 80 MG/ML
80 INJECTION, SUSPENSION INTRA-ARTICULAR; INTRALESIONAL; INTRAMUSCULAR; SOFT TISSUE
Status: DISCONTINUED | OUTPATIENT
Start: 2022-11-10 | End: 2023-02-13

## 2022-11-14 ENCOUNTER — LAB (OUTPATIENT)
Dept: LAB | Facility: CLINIC | Age: 57
End: 2022-11-14
Payer: COMMERCIAL

## 2022-11-14 DIAGNOSIS — K85.90 ACUTE PANCREATITIS WITHOUT INFECTION OR NECROSIS, UNSPECIFIED PANCREATITIS TYPE: ICD-10-CM

## 2022-11-14 LAB
ALBUMIN SERPL BCG-MCNC: 4.6 G/DL (ref 3.5–5.2)
ALP SERPL-CCNC: 106 U/L (ref 35–104)
ALT SERPL W P-5'-P-CCNC: 98 U/L (ref 10–35)
ANION GAP SERPL CALCULATED.3IONS-SCNC: 12 MMOL/L (ref 7–15)
AST SERPL W P-5'-P-CCNC: 89 U/L (ref 10–35)
BILIRUB SERPL-MCNC: 0.3 MG/DL
BUN SERPL-MCNC: 10.8 MG/DL (ref 8–23)
CALCIUM SERPL-MCNC: 9.9 MG/DL (ref 8.6–10)
CHLORIDE SERPL-SCNC: 95 MMOL/L (ref 98–107)
CREAT SERPL-MCNC: 0.64 MG/DL (ref 0.51–0.95)
DEPRECATED HCO3 PLAS-SCNC: 26 MMOL/L (ref 22–29)
GFR SERPL CREATININE-BSD FRML MDRD: >90 ML/MIN/1.73M2
GLUCOSE SERPL-MCNC: 111 MG/DL (ref 70–99)
POTASSIUM SERPL-SCNC: 4.1 MMOL/L (ref 3.4–5.3)
PROT SERPL-MCNC: 8 G/DL (ref 6.4–8.3)
SODIUM SERPL-SCNC: 133 MMOL/L (ref 136–145)

## 2022-11-14 PROCEDURE — 80053 COMPREHEN METABOLIC PANEL: CPT

## 2022-11-14 PROCEDURE — 36415 COLL VENOUS BLD VENIPUNCTURE: CPT

## 2022-11-15 ENCOUNTER — DOCUMENTATION ONLY (OUTPATIENT)
Dept: PODIATRY | Facility: CLINIC | Age: 57
End: 2022-11-15

## 2022-11-15 DIAGNOSIS — M77.51 OTHER ENTHESOPATHY OF RIGHT FOOT AND ANKLE: Primary | ICD-10-CM

## 2022-11-15 NOTE — RESULT ENCOUNTER NOTE
Trever Figueroa!     Thank you so much for completing your repeat blood work. Great news! Your liver enzymes are still going down and your ALP and ALP specifically are much more improved from prior results. You may have noticed that your AST was very mildly elevated compared to last time, but this is something we can just continue to monitor for now, and doesn't need to be emergently addressed.     Although your Sodium is slightly decreased at 133, this does not require any intervention especially because your previous values have been on the lower limits of normal. I usually start working up patients when their values are in the 120s and you are not in  that range.     Please remember as discussed in our prior visit to stop taking your Aspirin 5 days before your surgery.     Sincerely,     Cassidy Kent MD

## 2022-11-16 NOTE — TELEPHONE ENCOUNTER
Good morning Dr. Jean-Claude Alarcon,    Yes, according to the purchased agreement we received. The sticker on it is a [employer] ankle brace PT signed for.

## 2022-11-17 ENCOUNTER — ANESTHESIA EVENT (OUTPATIENT)
Dept: SURGERY | Facility: CLINIC | Age: 57
End: 2022-11-17
Payer: COMMERCIAL

## 2022-11-18 ENCOUNTER — OFFICE VISIT (OUTPATIENT)
Dept: FAMILY MEDICINE | Facility: CLINIC | Age: 57
End: 2022-11-18
Payer: COMMERCIAL

## 2022-11-18 VITALS
RESPIRATION RATE: 24 BRPM | DIASTOLIC BLOOD PRESSURE: 80 MMHG | BODY MASS INDEX: 29.41 KG/M2 | HEART RATE: 72 BPM | TEMPERATURE: 97.7 F | OXYGEN SATURATION: 98 % | HEIGHT: 63 IN | WEIGHT: 166 LBS | SYSTOLIC BLOOD PRESSURE: 134 MMHG

## 2022-11-18 DIAGNOSIS — Z01.818 PRE-OP EVALUATION: Primary | ICD-10-CM

## 2022-11-18 DIAGNOSIS — K85.10 ACUTE BILIARY PANCREATITIS WITHOUT INFECTION OR NECROSIS: ICD-10-CM

## 2022-11-18 PROCEDURE — 99214 OFFICE O/P EST MOD 30 MIN: CPT | Performed by: NURSE PRACTITIONER

## 2022-11-18 ASSESSMENT — PAIN SCALES - GENERAL: PAINLEVEL: NO PAIN (0)

## 2022-11-18 NOTE — H&P (VIEW-ONLY)
[facility]  [address] [place] [address]  Phone: [phone]  Primary Provider: Julia Oneill  Pre-op Performing Provider: ANNE LE      PREOPERATIVE EVALUATION:  Today's date: 11/18/2022    Carolina Hernandez is a 57 year old female who presents for a preoperative evaluation.    Surgical Information:  Surgery/Procedure: CHOLECYSTECTOMY, LAPAROSCOPIC, WITH CHOLANGIOGRAM  Surgery Location: [facility] OR  Surgeon: Dr Butt  Surgery Date: 11/21/22  Time of Surgery: 1030  Where patient plans to recover: At home with family  Fax number for surgical facility: Note does not need to be faxed, will be available electronically in Epic.    Type of Anesthesia Anticipated: General    Assessment & Plan     The proposed surgical procedure is considered INTERMEDIATE risk.    Pre-op evaluation  Acute biliary pancreatitis without infection or necrosis      Risks and Recommendations:  The patient has the following additional risks and recommendations for perioperative complications:   - No identified additional risk factors other than previously addressed    Medication Instructions:  Patient is to take all scheduled medications on the day of surgery   Has already stopped her aspirin.      RECOMMENDATION:  APPROVAL GIVEN to proceed with proposed procedure, without further diagnostic evaluation.                      Subjective     HPI related to upcoming procedure:     Diagnosed with biliary pancreatitis - requires cholecystectomy       Preop Questions 11/17/2022   1. Have you ever had a heart attack or stroke? No   2. Have you ever had surgery on your heart or blood vessels, such as a stent placement, a coronary artery bypass, or surgery on an artery in your head, neck, heart, or legs? No   3. Do you have chest pain with activity? No   4. Do you have a history of  heart failure? No   5. Do you currently have a cold, bronchitis or symptoms of other infection? No   6. Do you have  a cough, shortness of breath, or wheezing? No   7. Do you or anyone in your family have previous history of blood clots? No   8. Do you or does anyone in your family have a serious bleeding problem such as prolonged bleeding following surgeries or cuts? No   9. Have you ever had problems with anemia or been told to take iron pills? No   10. Have you had any abnormal blood loss such as black, tarry or bloody stools, or abnormal vaginal bleeding? No   11. Have you ever had a blood transfusion? No   12. Are you willing to have a blood transfusion if it is medically needed before, during, or after your surgery? Yes   13. Have you or any of your relatives ever had problems with anesthesia? No   14. Do you have sleep apnea, excessive snoring or daytime drowsiness? No   15. Do you have any artifical heart valves or other implanted medical devices like a pacemaker, defibrillator, or continuous glucose monitor? No   16. Do you have artificial joints? No   17. Are you allergic to latex? No   18. Is there any chance that you may be pregnant? No       Health Care Directive:  Patient does not have a Health Care Directive or Living Will: Discussed advance care planning with patient; however, patient declined at this time.    Preoperative Review of :   reviewed - controlled substances reflected in medication list.      Status of Chronic Conditions:  See problem list for active medical problems.  Problems all longstanding and stable, except as noted/documented.  See ROS for pertinent symptoms related to these conditions.        Review of Systems  CONSTITUTIONAL: NEGATIVE for fever, chills, change in weight  INTEGUMENTARY/SKIN: NEGATIVE for worrisome rashes, moles or lesions  EYES: NEGATIVE for vision changes or irritation  ENT/MOUTH: NEGATIVE for ear, mouth and throat problems  RESP: NEGATIVE for significant cough or SOB  CV: NEGATIVE for chest pain, palpitations or peripheral edema  GI: NEGATIVE for nausea, abdominal pain,  heartburn, or change in bowel habits  : NEGATIVE for frequency, dysuria, or hematuria  MUSCULOSKELETAL: NEGATIVE for significant arthralgias or myalgia  NEURO: NEGATIVE for weakness, dizziness or paresthesias  ENDOCRINE: NEGATIVE for temperature intolerance, skin/hair changes  HEME: NEGATIVE for bleeding problems  PSYCHIATRIC: NEGATIVE for changes in mood or affect    Patient Active Problem List    Diagnosis Date Noted     Fatty liver 11/01/2022     Priority: Medium     ultrasound 10/29/22       COPD vs Asthma 10/30/2022     Priority: Medium     PFTS 5/2021 - FEV1- 1.21 (48%), ratio 0.50, no change with bronchodilators,  %, %, DLCO 94%        Transaminitis 10/29/2022     Priority: Medium     Acute pancreatitis 10/29/2022     Priority: Medium     Nonobstructive atherosclerosis of coronary artery 10/21/2022     Priority: Medium      H/o exertional chest pain leading to coronary angiogram 10/14/2022 which showed non-obstructing CAD       Hyperlipidemia LDL goal <70 10/21/2022     Priority: Medium     Low back pain due to bilateral sciatica 10/27/2021     Priority: Medium     Primary osteoarthritis of both knees 09/23/2021     Priority: Medium     Benign essential hypertension 07/26/2019     Priority: Medium     New diagnosis 7/26/2019         Recurrent major depressive disorder, in remission (H) 01/17/2019     Priority: Medium     GERD (gastroesophageal reflux disease) 07/31/2012     Priority: Medium     Seasonal allergies 07/31/2012     Priority: Medium      Past Medical History:   Diagnosis Date     Asthma      C. difficile colitis      Depression      Depressive disorder Have had it most of my life     Moderate persistent asthma with exacerbation 9/18/2007     Osteoarthritis      Sinusitis, chronic      Status post coronary angiogram 10/14/2022     Past Surgical History:   Procedure Laterality Date     COLONOSCOPY  2017     CV CORONARY ANGIOGRAM N/A 10/14/2022    Procedure: Coronary Angiogram;   Surgeon: Fidel Martin MD;  Location:  [facility]     ESOPHAGOSCOPY, GASTROSCOPY, DUODENOSCOPY (EGD), COMBINED N/A 05/05/2022    Procedure: ESOPHAGOGASTRODUODENOSCOPY, WITH BIOPSY;  Surgeon: Timothy Oliva DO;  Location: [facility] OR     Current Outpatient Medications   Medication Sig Dispense Refill     albuterol (PROVENTIL) (2.5 MG/3ML) 0.083% neb solution inhale 1 vial (2.5 mg) by nebulization every 4 hours as needed for shortness of breath / dyspnea or wheezing 225 mL 11     albuterol (VENTOLIN HFA) 108 (90 Base) MCG/ACT inhaler INHALE 1-2 PUFFS INTO THE LUNGS EVERY 4 HOURS AS NEEDED FOR SHORTNESS OF BREATH/DYSPNEA OR WHEEZING . 18 g 11     amLODIPine (NORVASC) 2.5 MG tablet Take 2 tablets (5 mg) by mouth once daily. 180 tablet 0     cetirizine (ZYRTEC) 10 MG tablet Take 10 mg by mouth daily       FLUoxetine (PROZAC) 20 MG capsule Take 1 capsule (20 mg) by mouth daily 90 capsule 3     FLUoxetine (PROZAC) 40 MG capsule Take 1 capsule (40 mg) by mouth daily Take with 20 mg tab for a total of 60 mg daily 90 capsule 3     fluticasone (FLONASE) 50 MCG/ACT nasal spray Spray 1 spray into both nostrils daily (Patient taking differently: Spray 1 spray into both nostrils daily as needed) 1 g 11     fluticasone-salmeterol (ADVAIR) 500-50 MCG/ACT inhaler Inhale 1 puff into the lungs every 12 hours 1 each 11     montelukast (SINGULAIR) 10 MG tablet Take 1 tablet (10 mg) by mouth At Bedtime 90 tablet 3     omeprazole 20 MG tablet Take 2 tablets (40 mg) by mouth daily 90 tablet 3     Psyllium Husk 100 % POWD Taking daily.       tiotropium (SPIRIVA) 18 MCG inhaled capsule Inhale 1 capsule (18 mcg) into the lungs daily 30 capsule 11     aspirin (ASA) 81 MG EC tablet Take 1 tablet (81 mg) by mouth daily (Patient not taking: Reported on 11/18/2022)       carboxymethylcellulose PF (REFRESH PLUS) 0.5 % ophthalmic solution 1 drop 3 times daily as needed for dry eyes         Allergies   Allergen Reactions      "Doxycycline Difficulty breathing     Penicillins Itching     Sucralfate Rash        Social History     Tobacco Use     Smoking status: Former     Packs/day: 0.00     Years: 23.00     Pack years: 0.00     Types: Cigarettes     Quit date: 2021     Years since quittin.3     Smokeless tobacco: Never   Substance Use Topics     Alcohol use: Not Currently     Comment: 4-6 beers multiple times weekly       History   Drug Use No         Objective     /80 (BP Location: Right arm, Cuff Size: Adult Regular)   Pulse 72   Temp 97.7  F (36.5  C) (Tympanic)   Resp 24   Ht 1.6 m (5' 3\")   Wt 75.3 kg (166 lb)   SpO2 98%   BMI 29.41 kg/m      Physical Exam    GENERAL APPEARANCE: healthy, alert and no distress     EYES: EOMI, PERRL     HENT: ear canals and TM's normal and nose and mouth without ulcers or lesions     NECK: no adenopathy, no asymmetry, masses, or scars and thyroid normal to palpation     RESP: lungs clear to auscultation - no rales, rhonchi or wheezes     CV: regular rates and rhythm, normal S1 S2, no S3 or S4 and no murmur, click or rub     MS: extremities normal- no gross deformities noted, no evidence of inflammation in joints, FROM in all extremities.     SKIN: no suspicious lesions or rashes     NEURO: Normal strength and tone, sensory exam grossly normal, mentation intact and speech normal     PSYCH: mentation appears normal. and affect normal/bright     LYMPHATICS: No cervical adenopathy    Recent Labs   Lab Test 22  1408 22  1056 22  0856 10/31/22  0453 10/29/22  1548 10/14/22  0847   HGB  --   --  12.0 11.6*   < > 12.8   PLT  --   --  347 347   < > 385   INR  --   --   --   --   --  0.97   * 136 136 138   < > 139   POTASSIUM 4.1 4.0 4.1 4.1   < > 4.0   CR 0.64 0.67 0.65 0.65   < > 0.66    < > = values in this interval not displayed.        Diagnostics:  Recent labs in ER 22, 22, 22 - reviewed.    Recent EKG in ER 10/29/2022 - reviewed  Recent cardiac " cath - coronary angio - mild non-obstructive CAD    Revised Cardiac Risk Index (RCRI):  The patient has the following serious cardiovascular risks for perioperative complications:   - Coronary Artery Disease (MI, positive stress test, angina, Qs on EKG) = 1 point     RCRI Interpretation: 1 point: Class II (low risk - 0.9% complication rate)           Signed Electronically by: VALERIA Burciaga CNP  Copy of this evaluation report is provided to requesting physician.

## 2022-11-18 NOTE — PROGRESS NOTES
[facility]  [address] [address]  Phone: [phone]  Primary Provider: Julia Oneill  Pre-op Performing Provider: ANNE LE      PREOPERATIVE EVALUATION:  Today's date: 11/18/2022    Carolina Hernandez is a 57 year old female who presents for a preoperative evaluation.    Surgical Information:  Surgery/Procedure: CHOLECYSTECTOMY, LAPAROSCOPIC, WITH CHOLANGIOGRAM  Surgery Location: [facility]  Surgeon: Dr Butt  Surgery Date: 11/21/22  Time of Surgery: 1030  Where patient plans to recover: At home with family  Fax number for surgical facility: Note does not need to be faxed, will be available electronically in Epic.    Type of Anesthesia Anticipated: General    Assessment & Plan     The proposed surgical procedure is considered INTERMEDIATE risk.    Pre-op evaluation  Acute biliary pancreatitis without infection or necrosis      Risks and Recommendations:  The patient has the following additional risks and recommendations for perioperative complications:   - No identified additional risk factors other than previously addressed    Medication Instructions:  Patient is to take all scheduled medications on the day of surgery   Has already stopped her aspirin.      RECOMMENDATION:  APPROVAL GIVEN to proceed with proposed procedure, without further diagnostic evaluation.                      Subjective     HPI related to upcoming procedure:     Diagnosed with biliary pancreatitis - requires cholecystectomy       Preop Questions 11/17/2022   1. Have you ever had a heart attack or stroke? No   2. Have you ever had surgery on your heart or blood vessels, such as a stent placement, a coronary artery bypass, or surgery on an artery in your head, neck, heart, or legs? No   3. Do you have chest pain with activity? No   4. Do you have a history of  heart failure? No   5. Do you currently have a cold, bronchitis or symptoms of other infection? No   6. Do you have  a cough, shortness of breath, or wheezing? No   7. Do you or anyone in your family have previous history of blood clots? No   8. Do you or does anyone in your family have a serious bleeding problem such as prolonged bleeding following surgeries or cuts? No   9. Have you ever had problems with anemia or been told to take iron pills? No   10. Have you had any abnormal blood loss such as black, tarry or bloody stools, or abnormal vaginal bleeding? No   11. Have you ever had a blood transfusion? No   12. Are you willing to have a blood transfusion if it is medically needed before, during, or after your surgery? Yes   13. Have you or any of your relatives ever had problems with anesthesia? No   14. Do you have sleep apnea, excessive snoring or daytime drowsiness? No   15. Do you have any artifical heart valves or other implanted medical devices like a pacemaker, defibrillator, or continuous glucose monitor? No   16. Do you have artificial joints? No   17. Are you allergic to latex? No   18. Is there any chance that you may be pregnant? No       Health Care Directive:  Patient does not have a Health Care Directive or Living Will: Discussed advance care planning with patient; however, patient declined at this time.    Preoperative Review of :   reviewed - controlled substances reflected in medication list.      Status of Chronic Conditions:  See problem list for active medical problems.  Problems all longstanding and stable, except as noted/documented.  See ROS for pertinent symptoms related to these conditions.        Review of Systems  CONSTITUTIONAL: NEGATIVE for fever, chills, change in weight  INTEGUMENTARY/SKIN: NEGATIVE for worrisome rashes, moles or lesions  EYES: NEGATIVE for vision changes or irritation  ENT/MOUTH: NEGATIVE for ear, mouth and throat problems  RESP: NEGATIVE for significant cough or SOB  CV: NEGATIVE for chest pain, palpitations or peripheral edema  GI: NEGATIVE for nausea, abdominal pain,  heartburn, or change in bowel habits  : NEGATIVE for frequency, dysuria, or hematuria  MUSCULOSKELETAL: NEGATIVE for significant arthralgias or myalgia  NEURO: NEGATIVE for weakness, dizziness or paresthesias  ENDOCRINE: NEGATIVE for temperature intolerance, skin/hair changes  HEME: NEGATIVE for bleeding problems  PSYCHIATRIC: NEGATIVE for changes in mood or affect    Patient Active Problem List    Diagnosis Date Noted     Fatty liver 11/01/2022     Priority: Medium     ultrasound 10/29/22       COPD vs Asthma 10/30/2022     Priority: Medium     PFTS 5/2021 - FEV1- 1.21 (48%), ratio 0.50, no change with bronchodilators,  %, %, DLCO 94%        Transaminitis 10/29/2022     Priority: Medium     Acute pancreatitis 10/29/2022     Priority: Medium     Nonobstructive atherosclerosis of coronary artery 10/21/2022     Priority: Medium      H/o exertional chest pain leading to coronary angiogram 10/14/2022 which showed non-obstructing CAD       Hyperlipidemia LDL goal <70 10/21/2022     Priority: Medium     Low back pain due to bilateral sciatica 10/27/2021     Priority: Medium     Primary osteoarthritis of both knees 09/23/2021     Priority: Medium     Benign essential hypertension 07/26/2019     Priority: Medium     New diagnosis 7/26/2019         Recurrent major depressive disorder, in remission (H) 01/17/2019     Priority: Medium     GERD (gastroesophageal reflux disease) 07/31/2012     Priority: Medium     Seasonal allergies 07/31/2012     Priority: Medium      Past Medical History:   Diagnosis Date     Asthma      C. difficile colitis      Depression      Depressive disorder Have had it most of my life     Moderate persistent asthma with exacerbation 9/18/2007     Osteoarthritis      Sinusitis, chronic      Status post coronary angiogram 10/14/2022     Past Surgical History:   Procedure Laterality Date     COLONOSCOPY  2017     CV CORONARY ANGIOGRAM N/A 10/14/2022    Procedure: Coronary Angiogram;   Surgeon: Fidel Martin MD;  Location:  [facility]     ESOPHAGOSCOPY, GASTROSCOPY, DUODENOSCOPY (EGD), COMBINED N/A 05/05/2022    Procedure: ESOPHAGOGASTRODUODENOSCOPY, WITH BIOPSY;  Surgeon: Timothy Oliva DO;  Location: [facility] OR     Current Outpatient Medications   Medication Sig Dispense Refill     albuterol (PROVENTIL) (2.5 MG/3ML) 0.083% neb solution inhale 1 vial (2.5 mg) by nebulization every 4 hours as needed for shortness of breath / dyspnea or wheezing 225 mL 11     albuterol (VENTOLIN HFA) 108 (90 Base) MCG/ACT inhaler INHALE 1-2 PUFFS INTO THE LUNGS EVERY 4 HOURS AS NEEDED FOR SHORTNESS OF BREATH/DYSPNEA OR WHEEZING . 18 g 11     amLODIPine (NORVASC) 2.5 MG tablet Take 2 tablets (5 mg) by mouth once daily. 180 tablet 0     cetirizine (ZYRTEC) 10 MG tablet Take 10 mg by mouth daily       FLUoxetine (PROZAC) 20 MG capsule Take 1 capsule (20 mg) by mouth daily 90 capsule 3     FLUoxetine (PROZAC) 40 MG capsule Take 1 capsule (40 mg) by mouth daily Take with 20 mg tab for a total of 60 mg daily 90 capsule 3     fluticasone (FLONASE) 50 MCG/ACT nasal spray Spray 1 spray into both nostrils daily (Patient taking differently: Spray 1 spray into both nostrils daily as needed) 1 g 11     fluticasone-salmeterol (ADVAIR) 500-50 MCG/ACT inhaler Inhale 1 puff into the lungs every 12 hours 1 each 11     montelukast (SINGULAIR) 10 MG tablet Take 1 tablet (10 mg) by mouth At Bedtime 90 tablet 3     omeprazole 20 MG tablet Take 2 tablets (40 mg) by mouth daily 90 tablet 3     Psyllium Husk 100 % POWD Taking daily.       tiotropium (SPIRIVA) 18 MCG inhaled capsule Inhale 1 capsule (18 mcg) into the lungs daily 30 capsule 11     aspirin (ASA) 81 MG EC tablet Take 1 tablet (81 mg) by mouth daily (Patient not taking: Reported on 11/18/2022)       carboxymethylcellulose PF (REFRESH PLUS) 0.5 % ophthalmic solution 1 drop 3 times daily as needed for dry eyes         Allergies   Allergen Reactions      "Doxycycline Difficulty breathing     Penicillins Itching     Sucralfate Rash        Social History     Tobacco Use     Smoking status: Former     Packs/day: 0.00     Years: 23.00     Pack years: 0.00     Types: Cigarettes     Quit date: 2021     Years since quittin.3     Smokeless tobacco: Never   Substance Use Topics     Alcohol use: Not Currently     Comment: 4-6 beers multiple times weekly       History   Drug Use No         Objective     /80 (BP Location: Right arm, Cuff Size: Adult Regular)   Pulse 72   Temp 97.7  F (36.5  C) (Tympanic)   Resp 24   Ht 1.6 m (5' 3\")   Wt 75.3 kg (166 lb)   SpO2 98%   BMI 29.41 kg/m      Physical Exam    GENERAL APPEARANCE: healthy, alert and no distress     EYES: EOMI, PERRL     HENT: ear canals and TM's normal and nose and mouth without ulcers or lesions     NECK: no adenopathy, no asymmetry, masses, or scars and thyroid normal to palpation     RESP: lungs clear to auscultation - no rales, rhonchi or wheezes     CV: regular rates and rhythm, normal S1 S2, no S3 or S4 and no murmur, click or rub     MS: extremities normal- no gross deformities noted, no evidence of inflammation in joints, FROM in all extremities.     SKIN: no suspicious lesions or rashes     NEURO: Normal strength and tone, sensory exam grossly normal, mentation intact and speech normal     PSYCH: mentation appears normal. and affect normal/bright     LYMPHATICS: No cervical adenopathy    Recent Labs   Lab Test 22  1408 22  1056 22  0856 10/31/22  0453 10/29/22  1548 10/14/22  0847   HGB  --   --  12.0 11.6*   < > 12.8   PLT  --   --  347 347   < > 385   INR  --   --   --   --   --  0.97   * 136 136 138   < > 139   POTASSIUM 4.1 4.0 4.1 4.1   < > 4.0   CR 0.64 0.67 0.65 0.65   < > 0.66    < > = values in this interval not displayed.        Diagnostics:  Recent labs in ER 22, 22, 22 - reviewed.    Recent EKG in ER 10/29/2022 - reviewed  Recent cardiac " cath - coronary angio - mild non-obstructive CAD    Revised Cardiac Risk Index (RCRI):  The patient has the following serious cardiovascular risks for perioperative complications:   - Coronary Artery Disease (MI, positive stress test, angina, Qs on EKG) = 1 point     RCRI Interpretation: 1 point: Class II (low risk - 0.9% complication rate)           Signed Electronically by: VALERIA Burciaga CNP  Copy of this evaluation report is provided to requesting physician.

## 2022-11-21 ENCOUNTER — ANESTHESIA (OUTPATIENT)
Dept: SURGERY | Facility: CLINIC | Age: 57
End: 2022-11-21
Payer: COMMERCIAL

## 2022-11-21 ENCOUNTER — HOSPITAL ENCOUNTER (OUTPATIENT)
Facility: CLINIC | Age: 57
Discharge: HOME | End: 2022-11-21
Attending: SURGERY | Admitting: SURGERY
Payer: COMMERCIAL

## 2022-11-21 ENCOUNTER — APPOINTMENT (OUTPATIENT)
Dept: GENERAL RADIOLOGY | Facility: CLINIC | Age: 57
End: 2022-11-21
Attending: SURGERY
Payer: COMMERCIAL

## 2022-11-21 VITALS
RESPIRATION RATE: 16 BRPM | HEART RATE: 72 BPM | DIASTOLIC BLOOD PRESSURE: 77 MMHG | HEIGHT: 63 IN | WEIGHT: 166 LBS | OXYGEN SATURATION: 94 % | SYSTOLIC BLOOD PRESSURE: 126 MMHG | BODY MASS INDEX: 29.41 KG/M2 | TEMPERATURE: 98.6 F

## 2022-11-21 DIAGNOSIS — K85.10 ACUTE BILIARY PANCREATITIS WITHOUT INFECTION OR NECROSIS: Primary | ICD-10-CM

## 2022-11-21 PROCEDURE — 250N000011 HC RX IP 250 OP 636: Performed by: SURGERY

## 2022-11-21 PROCEDURE — 47563 LAPARO CHOLECYSTECTOMY/GRAPH: CPT | Performed by: SURGERY

## 2022-11-21 PROCEDURE — 999N000105 HC STATISTIC NO DOCUMENTATION TO SUPPORT CHARGE

## 2022-11-21 PROCEDURE — 258N000003 HC RX IP 258 OP 636: Performed by: NURSE ANESTHETIST, CERTIFIED REGISTERED

## 2022-11-21 PROCEDURE — 710N000012 HC RECOVERY PHASE 2, PER MINUTE: Performed by: SURGERY

## 2022-11-21 PROCEDURE — 999N000179 XR SURGERY CARM FLUORO LESS THAN 5 MIN W STILLS: Mod: TC

## 2022-11-21 PROCEDURE — 360N000084 HC SURGERY LEVEL 4 W/ FLUORO, PER MIN: Performed by: SURGERY

## 2022-11-21 PROCEDURE — 999N000141 HC STATISTIC PRE-PROCEDURE NURSING ASSESSMENT: Performed by: SURGERY

## 2022-11-21 PROCEDURE — 88304 TISSUE EXAM BY PATHOLOGIST: CPT | Mod: 26

## 2022-11-21 PROCEDURE — 250N000009 HC RX 250: Performed by: NURSE ANESTHETIST, CERTIFIED REGISTERED

## 2022-11-21 PROCEDURE — 250N000009 HC RX 250: Performed by: SURGERY

## 2022-11-21 PROCEDURE — 710N000009 HC RECOVERY PHASE 1, LEVEL 1, PER MIN: Performed by: SURGERY

## 2022-11-21 PROCEDURE — 250N000011 HC RX IP 250 OP 636: Performed by: NURSE ANESTHETIST, CERTIFIED REGISTERED

## 2022-11-21 PROCEDURE — 250N000025 HC SEVOFLURANE, PER MIN: Performed by: SURGERY

## 2022-11-21 PROCEDURE — 271N000001 HC OR GENERAL SUPPLY NON-STERILE: Performed by: SURGERY

## 2022-11-21 PROCEDURE — 370N000017 HC ANESTHESIA TECHNICAL FEE, PER MIN: Performed by: SURGERY

## 2022-11-21 PROCEDURE — 88304 TISSUE EXAM BY PATHOLOGIST: CPT | Mod: TC | Performed by: SURGERY

## 2022-11-21 PROCEDURE — 250N000013 HC RX MED GY IP 250 OP 250 PS 637: Performed by: NURSE ANESTHETIST, CERTIFIED REGISTERED

## 2022-11-21 PROCEDURE — 272N000001 HC OR GENERAL SUPPLY STERILE: Performed by: SURGERY

## 2022-11-21 PROCEDURE — 258N000001 HC RX 258: Performed by: SURGERY

## 2022-11-21 RX ORDER — CLINDAMYCIN PHOSPHATE 900 MG/50ML
900 INJECTION, SOLUTION INTRAVENOUS
Status: DISCONTINUED | OUTPATIENT
Start: 2022-11-21 | End: 2022-11-21 | Stop reason: HOSPADM

## 2022-11-21 RX ORDER — LIDOCAINE HYDROCHLORIDE AND EPINEPHRINE 10; 10 MG/ML; UG/ML
INJECTION, SOLUTION INFILTRATION; PERINEURAL PRN
Status: DISCONTINUED | OUTPATIENT
Start: 2022-11-21 | End: 2022-11-21 | Stop reason: HOSPADM

## 2022-11-21 RX ORDER — ALBUTEROL SULFATE 0.83 MG/ML
2.5 SOLUTION RESPIRATORY (INHALATION) EVERY 4 HOURS PRN
Status: DISCONTINUED | OUTPATIENT
Start: 2022-11-21 | End: 2022-11-21 | Stop reason: HOSPADM

## 2022-11-21 RX ORDER — DOCUSATE SODIUM 100 MG/1
100 CAPSULE, LIQUID FILLED ORAL 2 TIMES DAILY
Refills: 0 | COMMUNITY
Start: 2022-11-21 | End: 2023-03-08

## 2022-11-21 RX ORDER — ONDANSETRON 4 MG/1
4 TABLET, ORALLY DISINTEGRATING ORAL EVERY 30 MIN PRN
Status: DISCONTINUED | OUTPATIENT
Start: 2022-11-21 | End: 2022-11-21 | Stop reason: HOSPADM

## 2022-11-21 RX ORDER — CLINDAMYCIN PHOSPHATE 900 MG/50ML
900 INJECTION, SOLUTION INTRAVENOUS SEE ADMIN INSTRUCTIONS
Status: DISCONTINUED | OUTPATIENT
Start: 2022-11-21 | End: 2022-11-21 | Stop reason: HOSPADM

## 2022-11-21 RX ORDER — HYDROMORPHONE HCL IN WATER/PF 6 MG/30 ML
0.2 PATIENT CONTROLLED ANALGESIA SYRINGE INTRAVENOUS EVERY 5 MIN PRN
Status: DISCONTINUED | OUTPATIENT
Start: 2022-11-21 | End: 2022-11-21 | Stop reason: HOSPADM

## 2022-11-21 RX ORDER — KETOROLAC TROMETHAMINE 30 MG/ML
INJECTION, SOLUTION INTRAMUSCULAR; INTRAVENOUS PRN
Status: DISCONTINUED | OUTPATIENT
Start: 2022-11-21 | End: 2022-11-21

## 2022-11-21 RX ORDER — DEXAMETHASONE SODIUM PHOSPHATE 4 MG/ML
INJECTION, SOLUTION INTRA-ARTICULAR; INTRALESIONAL; INTRAMUSCULAR; INTRAVENOUS; SOFT TISSUE PRN
Status: DISCONTINUED | OUTPATIENT
Start: 2022-11-21 | End: 2022-11-21

## 2022-11-21 RX ORDER — MEPERIDINE HYDROCHLORIDE 25 MG/ML
INJECTION INTRAMUSCULAR; INTRAVENOUS; SUBCUTANEOUS PRN
Status: DISCONTINUED | OUTPATIENT
Start: 2022-11-21 | End: 2022-11-21

## 2022-11-21 RX ORDER — ONDANSETRON 2 MG/ML
4 INJECTION INTRAMUSCULAR; INTRAVENOUS EVERY 30 MIN PRN
Status: DISCONTINUED | OUTPATIENT
Start: 2022-11-21 | End: 2022-11-21 | Stop reason: HOSPADM

## 2022-11-21 RX ORDER — NALOXONE HYDROCHLORIDE 0.4 MG/ML
0.4 INJECTION, SOLUTION INTRAMUSCULAR; INTRAVENOUS; SUBCUTANEOUS
Status: DISCONTINUED | OUTPATIENT
Start: 2022-11-21 | End: 2022-11-21 | Stop reason: HOSPADM

## 2022-11-21 RX ORDER — PROPOFOL 10 MG/ML
INJECTION, EMULSION INTRAVENOUS PRN
Status: DISCONTINUED | OUTPATIENT
Start: 2022-11-21 | End: 2022-11-21

## 2022-11-21 RX ORDER — NALOXONE HYDROCHLORIDE 0.4 MG/ML
0.2 INJECTION, SOLUTION INTRAMUSCULAR; INTRAVENOUS; SUBCUTANEOUS
Status: DISCONTINUED | OUTPATIENT
Start: 2022-11-21 | End: 2022-11-21 | Stop reason: HOSPADM

## 2022-11-21 RX ORDER — BUPIVACAINE HYDROCHLORIDE 5 MG/ML
INJECTION, SOLUTION PERINEURAL PRN
Status: DISCONTINUED | OUTPATIENT
Start: 2022-11-21 | End: 2022-11-21 | Stop reason: HOSPADM

## 2022-11-21 RX ORDER — INDOCYANINE GREEN AND WATER 25 MG
2.5 KIT INJECTION ONCE
Status: COMPLETED | OUTPATIENT
Start: 2022-11-21 | End: 2022-11-21

## 2022-11-21 RX ORDER — HYDROMORPHONE HCL IN WATER/PF 6 MG/30 ML
0.4 PATIENT CONTROLLED ANALGESIA SYRINGE INTRAVENOUS EVERY 5 MIN PRN
Status: DISCONTINUED | OUTPATIENT
Start: 2022-11-21 | End: 2022-11-21 | Stop reason: HOSPADM

## 2022-11-21 RX ORDER — FENTANYL CITRATE 50 UG/ML
50 INJECTION, SOLUTION INTRAMUSCULAR; INTRAVENOUS EVERY 5 MIN PRN
Status: DISCONTINUED | OUTPATIENT
Start: 2022-11-21 | End: 2022-11-21 | Stop reason: HOSPADM

## 2022-11-21 RX ORDER — HYDROXYZINE HYDROCHLORIDE 25 MG/1
25 TABLET, FILM COATED ORAL EVERY 6 HOURS PRN
Status: DISCONTINUED | OUTPATIENT
Start: 2022-11-21 | End: 2022-11-21 | Stop reason: HOSPADM

## 2022-11-21 RX ORDER — OXYCODONE HYDROCHLORIDE 5 MG/1
5 TABLET ORAL EVERY 4 HOURS PRN
Status: DISCONTINUED | OUTPATIENT
Start: 2022-11-21 | End: 2022-11-21 | Stop reason: HOSPADM

## 2022-11-21 RX ORDER — FENTANYL CITRATE 50 UG/ML
25 INJECTION, SOLUTION INTRAMUSCULAR; INTRAVENOUS EVERY 5 MIN PRN
Status: DISCONTINUED | OUTPATIENT
Start: 2022-11-21 | End: 2022-11-21 | Stop reason: HOSPADM

## 2022-11-21 RX ORDER — LIDOCAINE HYDROCHLORIDE 10 MG/ML
INJECTION, SOLUTION INFILTRATION; PERINEURAL PRN
Status: DISCONTINUED | OUTPATIENT
Start: 2022-11-21 | End: 2022-11-21

## 2022-11-21 RX ORDER — ALBUTEROL SULFATE 0.83 MG/ML
2.5 SOLUTION RESPIRATORY (INHALATION) ONCE
Status: COMPLETED | OUTPATIENT
Start: 2022-11-21 | End: 2022-11-21

## 2022-11-21 RX ORDER — SODIUM CHLORIDE, SODIUM LACTATE, POTASSIUM CHLORIDE, CALCIUM CHLORIDE 600; 310; 30; 20 MG/100ML; MG/100ML; MG/100ML; MG/100ML
INJECTION, SOLUTION INTRAVENOUS CONTINUOUS
Status: DISCONTINUED | OUTPATIENT
Start: 2022-11-21 | End: 2022-11-21 | Stop reason: HOSPADM

## 2022-11-21 RX ORDER — DIMENHYDRINATE 50 MG/ML
25 INJECTION, SOLUTION INTRAMUSCULAR; INTRAVENOUS
Status: DISCONTINUED | OUTPATIENT
Start: 2022-11-21 | End: 2022-11-21 | Stop reason: HOSPADM

## 2022-11-21 RX ORDER — OXYCODONE HYDROCHLORIDE 5 MG/1
10 TABLET ORAL EVERY 4 HOURS PRN
Status: DISCONTINUED | OUTPATIENT
Start: 2022-11-21 | End: 2022-11-21 | Stop reason: HOSPADM

## 2022-11-21 RX ORDER — ACETAMINOPHEN 325 MG/1
975 TABLET ORAL ONCE
Status: COMPLETED | OUTPATIENT
Start: 2022-11-21 | End: 2022-11-21

## 2022-11-21 RX ORDER — OXYCODONE HYDROCHLORIDE 5 MG/1
5 TABLET ORAL EVERY 6 HOURS PRN
Qty: 12 TABLET | Refills: 0 | Status: SHIPPED | OUTPATIENT
Start: 2022-11-21 | End: 2022-11-24

## 2022-11-21 RX ORDER — CEFAZOLIN SODIUM 1 G/3ML
INJECTION, POWDER, FOR SOLUTION INTRAMUSCULAR; INTRAVENOUS PRN
Status: DISCONTINUED | OUTPATIENT
Start: 2022-11-21 | End: 2022-11-21

## 2022-11-21 RX ORDER — HYDROXYZINE HYDROCHLORIDE 50 MG/1
50 TABLET, FILM COATED ORAL EVERY 6 HOURS PRN
Status: DISCONTINUED | OUTPATIENT
Start: 2022-11-21 | End: 2022-11-21 | Stop reason: HOSPADM

## 2022-11-21 RX ORDER — GABAPENTIN 300 MG/1
300 CAPSULE ORAL
Status: COMPLETED | OUTPATIENT
Start: 2022-11-21 | End: 2022-11-21

## 2022-11-21 RX ORDER — ONDANSETRON 2 MG/ML
INJECTION INTRAMUSCULAR; INTRAVENOUS PRN
Status: DISCONTINUED | OUTPATIENT
Start: 2022-11-21 | End: 2022-11-21

## 2022-11-21 RX ORDER — MEPERIDINE HYDROCHLORIDE 25 MG/ML
12.5 INJECTION INTRAMUSCULAR; INTRAVENOUS; SUBCUTANEOUS
Status: DISCONTINUED | OUTPATIENT
Start: 2022-11-21 | End: 2022-11-21 | Stop reason: HOSPADM

## 2022-11-21 RX ORDER — GLYCOPYRROLATE 0.2 MG/ML
INJECTION, SOLUTION INTRAMUSCULAR; INTRAVENOUS PRN
Status: DISCONTINUED | OUTPATIENT
Start: 2022-11-21 | End: 2022-11-21

## 2022-11-21 RX ORDER — HEPARIN SODIUM 5000 [USP'U]/.5ML
5000 INJECTION, SOLUTION INTRAVENOUS; SUBCUTANEOUS
Status: COMPLETED | OUTPATIENT
Start: 2022-11-21 | End: 2022-11-21

## 2022-11-21 RX ORDER — LIDOCAINE 40 MG/G
CREAM TOPICAL
Status: DISCONTINUED | OUTPATIENT
Start: 2022-11-21 | End: 2022-11-21 | Stop reason: HOSPADM

## 2022-11-21 RX ORDER — FENTANYL CITRATE 50 UG/ML
INJECTION, SOLUTION INTRAMUSCULAR; INTRAVENOUS PRN
Status: DISCONTINUED | OUTPATIENT
Start: 2022-11-21 | End: 2022-11-21

## 2022-11-21 RX ORDER — PROPOFOL 10 MG/ML
INJECTION, EMULSION INTRAVENOUS CONTINUOUS PRN
Status: DISCONTINUED | OUTPATIENT
Start: 2022-11-21 | End: 2022-11-21

## 2022-11-21 RX ADMIN — HEPARIN SODIUM 5000 UNITS: 10000 INJECTION, SOLUTION INTRAVENOUS; SUBCUTANEOUS at 12:04

## 2022-11-21 RX ADMIN — ALBUTEROL SULFATE 2.5 MG: 2.5 SOLUTION RESPIRATORY (INHALATION) at 11:00

## 2022-11-21 RX ADMIN — LIDOCAINE HYDROCHLORIDE 100 MG: 10 INJECTION, SOLUTION INFILTRATION; PERINEURAL at 11:55

## 2022-11-21 RX ADMIN — FENTANYL CITRATE 100 MCG: 50 INJECTION, SOLUTION INTRAMUSCULAR; INTRAVENOUS at 11:55

## 2022-11-21 RX ADMIN — PROPOFOL 150 MCG/KG/MIN: 10 INJECTION, EMULSION INTRAVENOUS at 12:00

## 2022-11-21 RX ADMIN — SODIUM CHLORIDE, POTASSIUM CHLORIDE, SODIUM LACTATE AND CALCIUM CHLORIDE: 600; 310; 30; 20 INJECTION, SOLUTION INTRAVENOUS at 10:06

## 2022-11-21 RX ADMIN — MIDAZOLAM 2 MG: 1 INJECTION INTRAMUSCULAR; INTRAVENOUS at 11:51

## 2022-11-21 RX ADMIN — MEPERIDINE HYDROCHLORIDE 25 MG: 25 INJECTION, SOLUTION INTRAMUSCULAR; INTRAVENOUS; SUBCUTANEOUS at 12:20

## 2022-11-21 RX ADMIN — GLYCOPYRROLATE 0.1 MG: 0.2 INJECTION, SOLUTION INTRAMUSCULAR; INTRAVENOUS at 11:51

## 2022-11-21 RX ADMIN — SUGAMMADEX 100 MG: 100 INJECTION, SOLUTION INTRAVENOUS at 13:05

## 2022-11-21 RX ADMIN — Medication 2.5 MG: at 11:05

## 2022-11-21 RX ADMIN — OXYCODONE HYDROCHLORIDE 5 MG: 5 TABLET ORAL at 13:53

## 2022-11-21 RX ADMIN — PROPOFOL 160 MG: 10 INJECTION, EMULSION INTRAVENOUS at 11:55

## 2022-11-21 RX ADMIN — DEXAMETHASONE SODIUM PHOSPHATE 4 MG: 4 INJECTION, SOLUTION INTRA-ARTICULAR; INTRALESIONAL; INTRAMUSCULAR; INTRAVENOUS; SOFT TISSUE at 11:55

## 2022-11-21 RX ADMIN — ONDANSETRON 4 MG: 2 INJECTION INTRAMUSCULAR; INTRAVENOUS at 12:58

## 2022-11-21 RX ADMIN — PHENYLEPHRINE HYDROCHLORIDE 100 MCG: 10 INJECTION INTRAVENOUS at 12:43

## 2022-11-21 RX ADMIN — GABAPENTIN 300 MG: 300 CAPSULE ORAL at 09:49

## 2022-11-21 RX ADMIN — KETOROLAC TROMETHAMINE 15 MG: 30 INJECTION, SOLUTION INTRAMUSCULAR at 13:02

## 2022-11-21 RX ADMIN — CEFAZOLIN 2 G: 1 INJECTION, POWDER, FOR SOLUTION INTRAMUSCULAR; INTRAVENOUS at 11:46

## 2022-11-21 RX ADMIN — ACETAMINOPHEN 975 MG: 325 TABLET, FILM COATED ORAL at 09:49

## 2022-11-21 RX ADMIN — LIDOCAINE HYDROCHLORIDE 0.1 ML: 10 INJECTION, SOLUTION EPIDURAL; INFILTRATION; INTRACAUDAL; PERINEURAL at 10:06

## 2022-11-21 RX ADMIN — FENTANYL CITRATE 25 MCG: 50 INJECTION INTRAMUSCULAR; INTRAVENOUS at 13:54

## 2022-11-21 RX ADMIN — GLYCOPYRROLATE 0.1 MG: 0.2 INJECTION, SOLUTION INTRAMUSCULAR; INTRAVENOUS at 11:55

## 2022-11-21 RX ADMIN — ROCURONIUM BROMIDE 40 MG: 50 INJECTION, SOLUTION INTRAVENOUS at 11:55

## 2022-11-21 RX ADMIN — FENTANYL CITRATE 150 MCG: 50 INJECTION, SOLUTION INTRAMUSCULAR; INTRAVENOUS at 12:13

## 2022-11-21 ASSESSMENT — ACTIVITIES OF DAILY LIVING (ADL)
ADLS_ACUITY_SCORE: 35

## 2022-11-21 ASSESSMENT — LIFESTYLE VARIABLES: TOBACCO_USE: 1

## 2022-11-21 ASSESSMENT — COPD QUESTIONNAIRES
CAT_SEVERITY: MILD
COPD: 1

## 2022-11-21 NOTE — OP NOTE
Procedure Date: November 21, 2022    Pre-operative Diagnosis: 1. History of biliary pancreatitis    Post-operative Diagnosis:  Same    Procedure Performed: Laparoscopic Cholecystectomy with intraoperative cholangiogram    Surgeon: Eros Butt DO    Anesthesia Type: General plus local    Findings: Critical view of safety, no choledocholithiasis, fatty liver  Estimated Blood Loss: 3 mL           Condition: Stable    Indications:   Ms. Carolina Hernandez presents with history of biliary pancreatitis.  She was treated as outpatient. Her LFTs had nearly normalized. She may benefit from cholecystectomy with intraoperative cholangiogram, and was advised of the indications, alternatives, benefits, and risks (including, but not limited to, bleeding, infection, conversion to open surgery, potential for bile leak or bile duct injury, MI, DVT/PE, or death). She understood the information and consented to the aforementioned operative procedure    Procedure: The patient was brought to the Operating Room and placed on the operating table in a supine position. After induction of general endotracheal anesthesia, the abdomen was prepped and draped in the usual sterile fashion. The abdomen was entered through an infraumbilical incision using an open Eliezer approach, a 5mm trochar was delivered under direct visualization, and the abdomen was insufflated with CO2 gas to a pressure of 15mmHg. Standard laparoscopic cholecystectomy ports were placed in the epigastrium, right midclavicular line and right anterior axillary line. The gallbladder was identified and the fundus retracted superiorly. The cystic duct and artery were carefully dissected to establish the critical view of safety confirmed with indocyanine green.  The cystic artery was clipped triply but not divided. A cystic ductotomy was created after placing a single hemoclip proximal to prevent bile spill, there was evacuation of a small amount of sludge with ductotomy. A  cholangiocatheter was introduced and cholangiography performed. The bile duct filled normally, and there was a smooth taper as contrast entered the duodenum. There were no filling defects of the common bile duct or hepatic ducts, nor stricture or features of concern whatsoever. The cholangiogram was terminated. The cystic duct was secured with two hemoclips proximal and one hemooclip distal, prior to division.  The cystic artery was divided. The gallbladder was then dissected from the liver bed using electrocautery, a small posterior arterial branch was clipped midway up the gallbladder fossa. The gallbladder was retrieved through the epigastric port, and the trochar replaced. Once pneumoperitoneum was reestablished, the gallbladder bed was inspected for bleeding and bile leak and neither were found. The patient was positioned flat, irrigant solution was suctioned free, and pneumoperitoneum was relieved before removing the trocars. The fascia of the infraumbilical port was closed with interrupted 0-Vicryl. Wound closure at all incision was accomplished with 4-0 Monocryl, and the sites cleansed and dried prior to application of sterile glue. The patient tolerated the procedure well, was extubated in the Operating Room without incident, and transferred to the recovery room in stable condition.     Eros Butt DO on 11/21/2022 at 1:27 PM

## 2022-11-21 NOTE — ANESTHESIA PREPROCEDURE EVALUATION
Anesthesia Pre-Procedure Evaluation    Patient: Carolina Hernandez   MRN: [id number] : 1965        Procedure : Procedure(s):  CHOLECYSTECTOMY, LAPAROSCOPIC, WITH CHOLANGIOGRAM          Past Medical History:   Diagnosis Date     Asthma      C. difficile colitis      Depression      Depressive disorder Have had it most of my life     Moderate persistent asthma with exacerbation 2007     Osteoarthritis      Sinusitis, chronic      Status post coronary angiogram 10/14/2022      Past Surgical History:   Procedure Laterality Date     COLONOSCOPY       CV CORONARY ANGIOGRAM N/A 10/14/2022    Procedure: Coronary Angiogram;  Surgeon: Fidel Martin MD;  [facility]     ESOPHAGOSCOPY, GASTROSCOPY, DUODENOSCOPY (EGD), COMBINED N/A 2022    Procedure: ESOPHAGOGASTRODUODENOSCOPY, WITH BIOPSY;  Surgeon: Timothy Oliva DO;  Location: [facility] OR      Allergies   Allergen Reactions     Doxycycline Difficulty breathing     Penicillins Itching     Sucralfate Rash      Social History     Tobacco Use     Smoking status: Former     Packs/day: 0.00     Years: 23.00     Pack years: 0.00     Types: Cigarettes     Quit date: 2021     Years since quittin.3     Smokeless tobacco: Never   Substance Use Topics     Alcohol use: Not Currently     Comment: 4-6 beers multiple times weekly      Wt Readings from Last 1 Encounters:   22 75.3 kg (166 lb)        Anesthesia Evaluation   Pt has had prior anesthetic. Type: MAC.    No history of anesthetic complications       ROS/MED HX  ENT/Pulmonary:     (+) allergic rhinitis, tobacco use, Past use, Moderate Persistent, asthma Treatment: Inhaler prn, Inhaler daily, Inhaled steroids and Nebulizer prn,  mild,  COPD,     Neurologic:  - neg neurologic ROS     Cardiovascular:     (+) Dyslipidemia hypertension--CAD ---    METS/Exercise Tolerance:     Hematologic:  - neg hematologic  ROS     Musculoskeletal: Comment: Back pain with sciatica  (+)  arthritis,     GI/Hepatic:     (+) GERD, Asymptomatic on medication, cholecystitis/cholelithiasis, liver disease,     Renal/Genitourinary:  - neg Renal ROS     Endo:     (+) Obesity,     Psychiatric/Substance Use:     (+) psychiatric history depression     Infectious Disease:  - neg infectious disease ROS     Malignancy:  - neg malignancy ROS     Other:  - neg other ROS          Physical Exam    Airway  airway exam normal           Respiratory Devices and Support         Dental       (+) lower dentures and upper dentures      Cardiovascular   cardiovascular exam normal          Pulmonary   pulmonary exam normal                OUTSIDE LABS:  CBC:   Lab Results   Component Value Date    WBC 5.4 11/01/2022    WBC 5.3 10/31/2022    HGB 12.0 11/01/2022    HGB 11.6 (L) 10/31/2022    HCT 37.3 11/01/2022    HCT 36.3 10/31/2022     11/01/2022     10/31/2022     BMP:   Lab Results   Component Value Date     (L) 11/14/2022     11/04/2022    POTASSIUM 4.1 11/14/2022    POTASSIUM 4.0 11/04/2022    CHLORIDE 95 (L) 11/14/2022    CHLORIDE 100 11/04/2022    CO2 26 11/14/2022    CO2 27 11/04/2022    BUN 10.8 11/14/2022    BUN 10.3 11/04/2022    CR 0.64 11/14/2022    CR 0.67 11/04/2022     (H) 11/14/2022    GLC 94 11/04/2022     COAGS:   Lab Results   Component Value Date    PTT 31 10/14/2022    INR 0.97 10/14/2022     POC: No results found for: BGM, HCG, HCGS  HEPATIC:   Lab Results   Component Value Date    ALBUMIN 4.6 11/14/2022    PROTTOTAL 8.0 11/14/2022    ALT 98 (H) 11/14/2022    AST 89 (H) 11/14/2022    ALKPHOS 106 (H) 11/14/2022    BILITOTAL 0.3 11/14/2022     OTHER:   Lab Results   Component Value Date    LACT 1.1 10/29/2022    REILLY 9.9 11/14/2022    PHOS 3.9 10/31/2022    MAG 2.2 10/31/2022    LIPASE 356 (H) 11/04/2022    TSH 0.29 (L) 09/22/2005    T4 1.07 09/22/2005       Anesthesia Plan    ASA Status:  3   NPO Status:  NPO Appropriate    Anesthesia Type: General.     - Airway: ETT    Induction: Intravenous.   Maintenance: Balanced.        Consents    Anesthesia Plan(s) and associated risks, benefits, and realistic alternatives discussed. Questions answered and patient/representative(s) expressed understanding.     - Discussed: Risks, Benefits and Alternatives for BOTH SEDATION and the PROCEDURE were discussed     - Discussed with:  Patient         Postoperative Care    Pain management: IV analgesics, Oral pain medications, Multi-modal analgesia.   PONV prophylaxis: Ondansetron (or other 5HT-3), Dexamethasone or Solumedrol     Comments:                VALERIA Woodruff CRNA

## 2022-11-21 NOTE — DISCHARGE INSTRUCTIONS
HOME CARE FOLLOWING ABDOMINAL SURGERY    INCISIONAL CARE:  Replace the bandage over your incision (or incisions) until all drainage stops, or if more comfortable to have in place.  If present, leave the steri-strips (white paper tapes) in place for 14 days after surgery.  If you have staples in your incision at the time of discharge, they will be removed at your follow-up appointment.  If Dermabond (a type of skin glue) is present, leave in place until it wears/flakes off.     BATHING:  Avoid baths for 1 week after surgery.  Showers are okay.  You may wash your hair at any time.  Gently pat your incision dry after bathing.    ACTIVITY:  Light Activity -- you may immediately be up and about as tolerated.  Driving -- you may drive when comfortable and off narcotic pain medications (example: Norco, Percocet, Hydrocodone).  Light Work -- resume when comfortable off pain medications.  (If you can drive, you probably can work.)  Strenuous Work/Activity -- limit lifting to 20 pounds for 4 weeks.  Then, progressively increase with time.  Active Sports (running, biking, etc.) -- cautiously resume after 6 weeks.  Most importantly listen to your body.  If an activity causes pain or discomfort please stop and try in a few days or decrease your intensity.    DISCOMFORT:  Use pain medications as prescribed by your surgeon.  Take the pain medication with some food, when possible, to minimize side effects.  Expect gradual improvement.      Narcotic Pain Medications:  Do not drive, make important decisions or operate heavy machinery while on narcotic pain medications.  Narcotic pain medications can be associated with nausea, sleep disturbance, and constipation.  Unless directed otherwise, please take an over the counter stool softener (Colace 100mg twice a day) unless directed otherwise.  As soon as you are able to stop narcotic pain medications the better. Long term use of narcotics is associated with tolerance (the need for more  medication for effect) and potential addiction.    DIET:  Return to diet you were on before surgery, unless you are given specific diet instructions.  Drink plenty of fluids.  While taking pain medications, increase dietary fiber or add a fiber supplementation like Metamucil or Citrucel to help prevent constipation - a possible side effect of pain medications.    NAUSEA:  If nauseated from the anesthetic/pain meds; rest in bed, get up cautiously with assistance, and drink clear liquids (juice, tea, broth).    RETURN APPOINTMENT:  Schedule a follow-up visit 2-3 weeks after discharge from the hospital.  Office Phone: [phone]     CONTACT US IF THE FOLLOWING DEVELOPS:   1. A fever that is above 101     2. If there is a large amount of drainage, bleeding, or swelling.   3. Severe pain that is not relieved by your prescription.   4. Drainage that is thick, cloudy, yellow, green or white.   5. Any other questions not answered by  Frequently Asked Questions  sheet.      FREQUENTLY ASKED QUESTIONS:    Q:  How should my incision look?    A:  Normally your incision will appear slightly swollen with light redness directly along the incision itself as it heals.  It may feel like a bump or ridge as the healing/scarring happens, and over time (3-4 months) this bump or ridge feeling should slowly go away.  In general, clear or pink watery drainage can be normal at first as your incision heals, but should decrease over time.    Q:  How do I know if my incision is infected?  A:  Look at your incision for signs of infection, like redness around the incision spreading to surrounding skin, or drainage of cloudy or foul-smelling drainage.  If you feel warm, check your temperature to see if you are running a fever.    **If any of these things occur, please notify the nurse at our office.  We may need you to come into the office for an incision check.      Q:  How do I take care of my incision?  A:  If you have a dressing in place -  Starting the day after surgery, replace the dressing 1-2 times a day until there is no further drainage from the incision.  At that time, a dressing is no longer needed.  Try to minimize tape on the skin if irritation is occurring at the tape sites.  If you have significant irritation from tape on the skin, please call the office to discuss other method of dressing your incision.    Small pieces of tape called  steri-strips  may be present directly overlying your incision; these may be removed 10 days after surgery unless otherwise specified by your surgeon.  If these tapes start to loosen at the ends, you may trim them back until they fall off or are removed.    A:  If you had  Dermabond  tissue glue used as a dressing (this causes your incision to look shiny with a clear covering over it) - This type of dressing wears off with time and does not require more dressings over the top unless it is draining around the glue as it wears off.  Do not apply ointments or lotions over the incisions until the glue has completely worn off.    Q:  There is a piece of tape or a sticky  lead  still on my skin.  Can I remove this?  A:  Sometimes the sticky  leads  used for monitoring during surgery or for evaluation in the emergency department are not all removed while you are in the hospital.  These sometimes have a tab or metal dot on them.  You can easily remove these on your own, like taking off a band-aid.  If there is a gel substance under the  lead , simply wipe/clean it off with a washcloth or paper towel.      Q:  What can I do to minimize constipation (very hard stools, or lack of stools)?  A:  Stay well hydrated.  Increase your dietary fiber intake or take a fiber supplement -with plenty of water.  Walk around frequently.  You may consider an over-the-counter stool-softener.  Your Pharmacist can assist you with choosing one that is stocked at your pharmacy.  Constipation is also one of the most common side effects of  pain medication.  If you are using pain medication, be pro-active and try to PREVENT problems with constipation by taking the steps above BEFORE constipation becomes a problem.    Q:  What do I do if I need more pain medications?  A:  Call the office to receive refills.  Be aware that certain pain meds cannot be called into a pharmacy and actually require a paper prescription.  A change may be made in your pain med as you progress thru your recovery period or if you have side effects to certain meds.    --Pain meds are NOT refilled after 5pm on weekdays, and NOT AT ALL on the weekends, so please look ahead to prevent problems.      Q:  Why am I having a hard time sleeping now that I am at home?  A:  Many medications you receive while you are in the hospital can impact your sleep for a number of days after your surgery/hospitalization.  Decreased level of activity and naps during the day may also make sleeping at night difficult.  Try to minimize day-time naps, and get up frequently during the day to walk around your home during your recovery time.  Sleep aides may be of some help, but are not recommended for long-term use.      Q:  I am having some back discomfort.  What should I do?  A:  This may be related to certain positioning that was required for your surgery, extended periods of time in bed, or other changes in your overall activity level.  You may try ice, heat, acetaminophen, or ibuprofen to treat this temporarily.  Note that many pain medications have acetaminophen in them and would state this on the prescription bottle.  Be sure not to exceed the maximum of 4000mg per day of acetaminophen.     **If the pain you are having does not resolve, is severe, or is a flare of back pain you have had on other occasions prior to surgery, please contact your primary physician for further recommendations or for an appointment to be examined at their office.    Q:  Why am I having headaches?  A:  Headaches can be caused  by many things:  caffeine withdrawal, use of pain meds, dehydration, high blood pressure, lack of sleep, over-activity/exhaustion, flare-up of usual migraine headaches.  If you feel this is related to muscle tension (a band-like feeling around the head, or a pressure at the low-back of the head) you may try ice or heat to this area.  You may need to drink more fluids (try electrolyte drink like Gatorade), rest, or take your usual migraine medications.   **If your headaches do not resolve, worsen, are accompanied by other symptoms, or if your blood pressure is high, please call your primary physician for recommendation and/or examination.    Q:  I am unable to urinate.  What do I do?  A:  A small percentage of people can have difficulty urinating initially after surgery.  This includes being able to urinate only a very small amount at a time and feeling discomfort or pressure in the very low abdomen.  This is called  urinary retention , and is actually an urgent situation.  Proceed to your nearest Emergency department for evaluation (not an Urgent Care Center).  Sometimes the bladder does not work correctly after certain medications you receive during surgery, or related to certain procedures.  You may need to have a catheter placed until your bladder recovers.  When planning to go to an Emergency department, it may help to call the ER to let them know you are coming in for this problem after a surgery.  This may help you get in quicker to be evaluated.  **If you have symptoms of a urinary tract infection, please contact your primary physician for the proper evaluation and treatment.          If you have other questions, please call the office Monday thru Friday between 8am and 5pm to discuss with the nurse.  # [phone]    There is a surgeon ON CALL on weekday evenings and over the weekend in case of urgent need only, and may be contacted at the same number.    If you are having an emergency, call 911 or proceed  to your nearest emergency department.                          Same Day Surgery Discharge Instructions  Special Precautions After Surgery - Adult    It is not unusual to feel lightheaded or faint, up to 24 hours after surgery or while taking pain medication.  If you have these symptoms; sit for a few minutes before standing and have someone assist you when getting up.  You should rest and relax for the next 24 hours and must have someone stay with you for at least 24 hours after your discharge.  DO NOT DRIVE any vehicle or operate mechanical equipment for 24 hours following the end of your surgery.  DO NOT DRIVE while taking narcotic pain medications that have been prescribed by your physician.  If you had a limb operated on, you must be able to use it fully to drive.  DO NOT drink alcoholic beverages for 24 hours following surgery or while taking prescription pain medication.  Drink clear liquids (apple juice, ginger ale, broth, 7-Up, etc.).  Progress to your regular diet as you feel able.  Any questions call your physician and do not make important decisions for 24 hours.      __________________________________________________________________________________________________________________________________  IMPORTANT NUMBERS:    [facility] [place] Main Number:  [phone], [phone]  Pharmacy:  [phone]  Same Day Surgery:  [phone], Monday - Friday until 8:30 p.m.  Urgent Care:  [phone]  Emergency Room:  [phone]      [facility]:  [phone]

## 2022-11-21 NOTE — INTERVAL H&P NOTE
"I have reviewed the surgical (or preoperative) H&P that is linked to this encounter, and examined the patient. There are no significant changes    Clinical Conditions Present on Arrival:  Clinically Significant Risk Factors Present on Admission           # Hyponatremia: Lowest Na = 133 mmol/L (Ref range: 136-145) in last 30 days, will monitor as appropriate          # Overweight: Estimated body mass index is 29.41 kg/m  as calculated from the following:    Height as of this encounter: 1.6 m (5' 3\").    Weight as of this encounter: 75.3 kg (166 lb).       "

## 2022-11-21 NOTE — ANESTHESIA POSTPROCEDURE EVALUATION
Patient: Carolina Hernandez    Procedure: Procedure(s):  CHOLECYSTECTOMY, LAPAROSCOPIC, WITH CHOLANGIOGRAM       Anesthesia Type:  General    Note:  Disposition: Outpatient   Postop Pain Control: Uneventful            Sign Out: Well controlled pain   PONV: No   Neuro/Psych: Uneventful            Sign Out: Acceptable/Baseline neuro status   Airway/Respiratory: Uneventful            Sign Out: Acceptable/Baseline resp. status   CV/Hemodynamics: Uneventful            Sign Out: Acceptable CV status; No obvious hypovolemia; No obvious fluid overload   Other NRE: NONE   DID A NON-ROUTINE EVENT OCCUR? No           Last vitals:  Vitals Value Taken Time   /73 11/21/22 1400   Temp 37.2  C (99  F) 11/21/22 1330   Pulse 85 11/21/22 1413   Resp 18 11/21/22 1413   SpO2 93 % 11/21/22 1413   Vitals shown include unvalidated device data.    Electronically Signed By: VALERIA Woodruff CRNA  November 21, 2022  2:20 PM

## 2022-11-21 NOTE — ANESTHESIA CARE TRANSFER NOTE
Patient: Carolina Hernandez    Procedure: Procedure(s):  CHOLECYSTECTOMY, LAPAROSCOPIC, WITH CHOLANGIOGRAM       Diagnosis: Acute pancreatitis without infection or necrosis, unspecified pancreatitis type [K85.90]  Diagnosis Additional Information: No value filed.    Anesthesia Type:   General     Note:    Oropharynx: oropharynx clear of all foreign objects and spontaneously breathing  Level of Consciousness: drowsy  Oxygen Supplementation: room air    Independent Airway: airway patency satisfactory and stable  Dentition: dentition unchanged  Vital Signs Stable: post-procedure vital signs reviewed and stable  Report to RN Given: handoff report given  Patient transferred to: PACU    Handoff Report: Identifed the Patient, Identified the Reponsible Provider, Reviewed the pertinent medical history, Discussed the surgical course, Reviewed Intra-OP anesthesia mangement and issues during anesthesia, Set expectations for post-procedure period and Allowed opportunity for questions and acknowledgement of understanding      Vitals:  Vitals Value Taken Time   /83 11/21/22 1330   Temp     Pulse 77 11/21/22 1333   Resp 12 11/21/22 1333   SpO2 94 % 11/21/22 1333   Vitals shown include unvalidated device data.    Electronically Signed By: VALERIA Woodruff CRNA  November 21, 2022  1:34 PM

## 2022-12-02 ENCOUNTER — TELEPHONE (OUTPATIENT)
Dept: SURGERY | Facility: CLINIC | Age: 57
End: 2022-12-02

## 2022-12-02 NOTE — TELEPHONE ENCOUNTER
Pt called and asked if she could ever drink alcohol again since she had her gallbladder out and had pancreatitis. Explained to pt that if the pancreatitis is from alcohol then it would be wise to not drink. Pt stated she had not had any alcohol one month prior to getting pancreatitis. Advised that she could try a small amount and see, bu that she should wait and talk tot he surgeon at her post op appt next week. Pt than also asked about her clips that were placed and said that she has been able to taste metal recently. She is wondering if this normal. Advised that this was not something that was common and provider would be notified. Pt stated she will wait and talk to the provider at her follow up appt.   Symone COBOS RN BSN PHN  Specialty Clinics

## 2022-12-08 ENCOUNTER — OFFICE VISIT (OUTPATIENT)
Dept: SURGERY | Facility: CLINIC | Age: 57
End: 2022-12-08
Payer: COMMERCIAL

## 2022-12-08 VITALS
TEMPERATURE: 98.1 F | DIASTOLIC BLOOD PRESSURE: 83 MMHG | HEART RATE: 91 BPM | SYSTOLIC BLOOD PRESSURE: 145 MMHG | OXYGEN SATURATION: 95 %

## 2022-12-08 DIAGNOSIS — Z90.49 S/P LAPAROSCOPIC CHOLECYSTECTOMY: Primary | ICD-10-CM

## 2022-12-08 PROCEDURE — 99024 POSTOP FOLLOW-UP VISIT: CPT | Performed by: SURGERY

## 2022-12-08 ASSESSMENT — PAIN SCALES - GENERAL: PAINLEVEL: NO PAIN (0)

## 2022-12-08 NOTE — NURSING NOTE
"Initial BP (!) 145/83   Pulse 91   Temp 98.1  F (36.7  C) (Tympanic)   SpO2 95%  Estimated body mass index is 29.41 kg/m  as calculated from the following:    Height as of 11/21/22: 1.6 m (5' 3\").    Weight as of 11/21/22: 75.3 kg (166 lb). .    Jesusita Fairchild LPN on 12/8/2022 at 9:57 AM    "

## 2022-12-08 NOTE — LETTER
12/8/2022         RE: Carolina Hernandez  [address] [place] [id number]        Dear Colleague,    Thank you for referring your patient, Carolina Hernandez, to the [facility]. Please see a copy of my visit note below.    General Surgery Post Op    Pt returns for follow up visit s/p laparoscopic cholecystectomy on 11/21/22.    Patient has been doing well, tolerating diet. Bowels moving well. Pain controlled. No issues with wound healing/redness/drainage. No fevers.      Physical exam: Vitals: BP (!) 145/83   Pulse 91   Temp 98.1  F (36.7  C) (Tympanic)   SpO2 95%   BMI= There is no height or weight on file to calculate BMI.    Exam:  Constitutional: healthy, alert and no distress  Cardiovascular: negative  Respiratory: negative  Gastrointestinal: Abdomen soft, non-tender. BS normal. No masses, organomegaly  Incisions C/D/I    Path:  Final Diagnosis   Gallbladder, cholecystectomy-  - Chronic cholecystitis, mild.  - No gallstones identified.  - Negative for malignancy.       Assessment:     ICD-10-CM    1. S/P laparoscopic cholecystectomy  Z90.49         Plan: Carolina Hernandez was seen for follow-up after laparoscopic cholecystectomy.  Patient is doing well and recovering without issue at this time.  We reviewed the pathology which was benign.  We discussed routine post-operative care of wounds including avoiding sun exposure to wounds to limit scarring, no need for overlying ointments, and weight restrictions going forward.  Patient was instructed to call with any questions or concerns.  Carolina Hernandez can follow-up on an as needed basis.    She asked about drinking alcohol.  Discussed she can have recurrent pancreatitis even if gallstones were cause of her pancreatitis.  She expressed understanding.    Eros Butt, DO on 12/8/2022 at 9:41 AM          Again, thank you for allowing me to participate in the care of your patient.        Sincerely,        Eros ALMAZAN  Filomena, DO

## 2022-12-08 NOTE — PROGRESS NOTES
General Surgery Post Op    Pt returns for follow up visit s/p laparoscopic cholecystectomy on 11/21/22.    Patient has been doing well, tolerating diet. Bowels moving well. Pain controlled. No issues with wound healing/redness/drainage. No fevers.      Physical exam: Vitals: BP (!) 145/83   Pulse 91   Temp 98.1  F (36.7  C) (Tympanic)   SpO2 95%   BMI= There is no height or weight on file to calculate BMI.    Exam:  Constitutional: healthy, alert and no distress  Cardiovascular: negative  Respiratory: negative  Gastrointestinal: Abdomen soft, non-tender. BS normal. No masses, organomegaly  Incisions C/D/I    Path:  Final Diagnosis   Gallbladder, cholecystectomy-  - Chronic cholecystitis, mild.  - No gallstones identified.  - Negative for malignancy.       Assessment:     ICD-10-CM    1. S/P laparoscopic cholecystectomy  Z90.49         Plan: Carolina Hernandez was seen for follow-up after laparoscopic cholecystectomy.  Patient is doing well and recovering without issue at this time.  We reviewed the pathology which was benign.  We discussed routine post-operative care of wounds including avoiding sun exposure to wounds to limit scarring, no need for overlying ointments, and weight restrictions going forward.  Patient was instructed to call with any questions or concerns.  Carolina Hernandez can follow-up on an as needed basis.    She asked about drinking alcohol.  Discussed she can have recurrent pancreatitis even if gallstones were cause of her pancreatitis.  She expressed understanding.    Eros Butt, DO on 12/8/2022 at 9:41 AM

## 2022-12-22 DIAGNOSIS — I10 ESSENTIAL HYPERTENSION: ICD-10-CM

## 2022-12-26 RX ORDER — AMLODIPINE BESYLATE 2.5 MG/1
TABLET ORAL
Qty: 180 TABLET | Refills: 0 | Status: SHIPPED | OUTPATIENT
Start: 2022-12-26 | End: 2023-03-17

## 2023-01-10 ENCOUNTER — TELEPHONE (OUTPATIENT)
Dept: CARDIOLOGY | Facility: CLINIC | Age: 58
End: 2023-01-10

## 2023-01-10 DIAGNOSIS — K72.00 ACUTE LIVER FAILURE: ICD-10-CM

## 2023-01-10 DIAGNOSIS — I25.10 NONOBSTRUCTIVE ATHEROSCLEROSIS OF CORONARY ARTERY: Primary | ICD-10-CM

## 2023-01-10 DIAGNOSIS — E78.5 HYPERLIPIDEMIA LDL GOAL <70: ICD-10-CM

## 2023-01-10 NOTE — CONFIDENTIAL NOTE
Routing to Marilee Chu,     Patient called in to determine if a follow up is needed after her gallbladder removal surgery in November. She is anxious since she was taken off of Crestor and has elevated Lipids.    Last seen in October 2022. Hx of non obstructive CAD - Angio in October 2022 w/o intervention, HLD and HTN.     Recent BP's and HR WDL. No other symptoms. Let me know when he F/U should be and if you would like updated labs/ scans.    Polo Lemus RN

## 2023-01-11 NOTE — TELEPHONE ENCOUNTER
Routing to scheduling     Please schedule pt for a follow up with Marilee Chu CNP in 2 months with liver function (ALT) blood draw before visit.     Thanks    Roxanne Shabazz RN

## 2023-01-14 DIAGNOSIS — J45.20 MILD INTERMITTENT ASTHMA WITHOUT COMPLICATION: ICD-10-CM

## 2023-01-16 RX ORDER — ALBUTEROL SULFATE 90 UG/1
AEROSOL, METERED RESPIRATORY (INHALATION)
Qty: 18 G | Refills: 0 | Status: SHIPPED | OUTPATIENT
Start: 2023-01-16 | End: 2023-02-19

## 2023-01-16 NOTE — TELEPHONE ENCOUNTER
"Requested Prescriptions   Pending Prescriptions Disp Refills     albuterol (VENTOLIN HFA) 108 (90 Base) MCG/ACT inhaler [Pharmacy Med Name: Ventolin HFA Inhalation Aerosol Solution 108 (90 Base) MCG/ACT] 18 g 0     Sig: INHALE 1-2 PUFFS INTO THE LUNGS EVERY 4 HOURS AS NEEDED FOR SHORTNESS OF BREATH/DYSPNEA OR WHEEZING .       Asthma Maintenance Inhalers - Anticholinergics Failed - 1/14/2023 10:15 AM        Failed - Asthma control assessment score within normal limits in last 6 months     Please review ACT score.           Passed - Patient is age 12 years or older        Passed - Medication is active on med list        Passed - Recent (6 mo) or future (30 days) visit within the authorizing provider's specialty     Patient had office visit in the last 6 months or has a visit in the next 30 days with authorizing provider or within the authorizing provider's specialty.  See \"Patient Info\" tab in inbasket, or \"Choose Columns\" in Meds & Orders section of the refill encounter.           Short-Acting Beta Agonist Inhalers Protocol  Failed - 1/14/2023 10:15 AM        Failed - Asthma control assessment score within normal limits in last 6 months     Please review ACT score.           Passed - Patient is age 12 or older        Passed - Medication is active on med list        Passed - Recent (6 mo) or future (30 days) visit within the authorizing provider's specialty     Patient had office visit in the last 6 months or has a visit in the next 30 days with authorizing provider or within the authorizing provider's specialty.  See \"Patient Info\" tab in inbasket, or \"Choose Columns\" in Meds & Orders section of the refill encounter.                 "

## 2023-02-09 NOTE — PROGRESS NOTES
CHIEF COMPLAINT:   Chief Complaint   Patient presents with     Knee Pain     Bilateral knee pain. R>L. Last injections on 11/10/22. These worked well up until 2 weeks ago.       HISTORY OF PRESENT ILLNESS    Carolina Hernandez is a 57 year old female seen for evaluation of ongoing bilateral knee pain with no known injury, both about the same but varies, today right more than left. Right side pain radiates down the calf more than the left.  Pain has been present for 20 years. We saw her 11/10/2022 and provided bilateral cortisone injections, which worked well up until 2 weeks ago. She'd like repeat injections today.    Previously on 4/27/2022  received bilateral visco injections (synviscOne). She had previously received a cortisone injection with Dr Gaspar 8/2021 which helped to some extent for a few months, but not as well as our injections.      Recent bout of pancreatitis and had a cholecystectomy on 11/21/2022. She was planning to get a knee replacement this winter but with these medical things going on she wants to wait until cortisone no longer helps with the pain. Also, her 79yo father just had a knee replacement in November and having his other knee done soon.    Also has foot problems. Gaining some weight..    Locates pain along the inner aspect and front of the knees, and can radiate down the leg to the ankle, up the thigh.  Pain is worse with work, lifting/carrying things, gardening. Treatment has been occasional use of over the counter pain medications without relief.     Pain at rest, pain at night better with injections. Sleeps with pillow between knees. Has tried tylenol arthritis and ibuprofen for pain but doesn't seem to really help.    Left knee injury at 10 years old.    Has chronic low back pain, denies numbness and tingling.    Present symptoms: pain medially  and anteriorly, pain dull/achy , mild pain.    Pain severity: 6/10  Frequency of symptoms: are constant  Exacerbating Factors: weight  bearing, carrying and lifting things, gardening, kneeling, stairs  Relieving Factors: rest, ice  Night Pain: Yes  Pain while at rest: Yes   Numbness or tingling: No   Patient has tried:     NSAIDS: Yes , not recently in the past couple of years.     Physical Therapy: No      Activity modification: Yes      Bracing: No      Injections: Yes cortisone 11/2022, 8/15/2022, 8/20/2021 cortisone;    4/27/2022 with SynviscOne.     Ice: Yes      Assistive device:  No     Other: tylenol. Topical ointments.      Other PMH:  has a past medical history of Asthma, C. difficile colitis, Depression, Depressive disorder (Have had it most of my life), Moderate persistent asthma with exacerbation (9/18/2007), Osteoarthritis, Sinusitis, chronic, and Status post coronary angiogram (10/14/2022).    She has no past medical history of Breast cancer (H), Malignant neoplasm of ovary (H), or Need for prophylactic hormone replacement therapy (postmenopausal).  Patient Active Problem List   Diagnosis     Recurrent major depressive disorder, in remission (H)     Benign essential hypertension     Primary osteoarthritis of both knees     Low back pain due to bilateral sciatica     Nonobstructive atherosclerosis of coronary artery     Hyperlipidemia LDL goal <70     Transaminitis     Acute pancreatitis     COPD vs Asthma     GERD (gastroesophageal reflux disease)     Seasonal allergies     Fatty liver       Surgical Hx:  has a past surgical history that includes colonoscopy (2017); Esophagoscopy, gastroscopy, duodenoscopy (EGD), combined (N/A, 05/05/2022); Coronary Angiogram (N/A, 10/14/2022); and Laparoscopic cholecystectomy with cholangiograms (N/A, 11/21/2022).    Medications:   Current Outpatient Medications:      albuterol (PROVENTIL) (2.5 MG/3ML) 0.083% neb solution, inhale 1 vial (2.5 mg) by nebulization every 4 hours as needed for shortness of breath / dyspnea or wheezing, Disp: 225 mL, Rfl: 11     albuterol (VENTOLIN HFA) 108 (90 Base)  MCG/ACT inhaler, INHALE 1-2 PUFFS INTO THE LUNGS EVERY 4 HOURS AS NEEDED FOR SHORTNESS OF BREATH/DYSPNEA OR WHEEZING ., Disp: 18 g, Rfl: 0     amLODIPine (NORVASC) 2.5 MG tablet, Take 2 tablets (5 mg) by mouth once daily., Disp: 180 tablet, Rfl: 0     aspirin (ASA) 81 MG EC tablet, Take 81 mg by mouth daily, Disp: , Rfl:      cetirizine (ZYRTEC) 10 MG tablet, Take 10 mg by mouth daily, Disp: , Rfl:      docusate sodium (COLACE) 100 MG capsule, Take 1 capsule (100 mg) by mouth 2 times daily Take while on opiate to prevent constipation, Disp: , Rfl: 0     FLUoxetine (PROZAC) 20 MG capsule, Take 1 capsule (20 mg) by mouth daily, Disp: 90 capsule, Rfl: 3     FLUoxetine (PROZAC) 40 MG capsule, Take 1 capsule (40 mg) by mouth daily Take with 20 mg tab for a total of 60 mg daily, Disp: 90 capsule, Rfl: 3     fluticasone (FLONASE) 50 MCG/ACT nasal spray, Spray 1 spray into both nostrils daily (Patient taking differently: Spray 1 spray into both nostrils daily as needed), Disp: 1 g, Rfl: 11     fluticasone-salmeterol (ADVAIR) 500-50 MCG/ACT inhaler, Inhale 1 puff into the lungs every 12 hours, Disp: 1 each, Rfl: 11     montelukast (SINGULAIR) 10 MG tablet, Take 1 tablet (10 mg) by mouth At Bedtime, Disp: 90 tablet, Rfl: 3     omeprazole 20 MG tablet, Take 2 tablets (40 mg) by mouth daily, Disp: 90 tablet, Rfl: 3     Psyllium Husk 100 % POWD, Taking daily., Disp: , Rfl:      tiotropium (SPIRIVA) 18 MCG inhaled capsule, Inhale 1 capsule (18 mcg) into the lungs daily, Disp: 30 capsule, Rfl: 11    Allergies:   Allergies   Allergen Reactions     Doxycycline Difficulty breathing     Penicillins Itching     Sucralfate Rash       Social Hx: retired ().   reports that she quit smoking about 18 months ago. Her smoking use included cigarettes. She has never used smokeless tobacco. She reports that she does not currently use alcohol. She reports that she does not use drugs.    Family Hx: family history includes Cancer  "in her daughter and maternal grandfather; Coronary Artery Disease in her father and paternal grandfather; Diabetes in her daughter; Glaucoma in her father; Schizophrenia in her daughter.    REVIEW OF SYSTEMS:   CONSTITUTIONAL:NEGATIVE for fever, chills, change in weight  INTEGUMENTARY/SKIN: NEGATIVE for worrisome rashes, moles or lesions  MUSCULOSKELETAL:See HPI above  NEURO: NEGATIVE for weakness, dizziness or paresthesias    PHYSICAL EXAM:  Ht 1.6 m (5' 3\")   Wt 79.4 kg (175 lb)   BMI 31.00 kg/m     GENERAL APPEARANCE: healthy, alert, no distress  SKIN: no suspicious lesions or rashes  NEURO: Normal strength and tone, mentation intact and speech normal  PSYCH:  mentation appears normal and affect normal, not anxious  RESPIRATORY: No increased work of breathing.      BILATERAL LOWER EXTREMITIES:  Gait: favors the right today.  Alignment: varus  Intact sensation deep peroneal nerve, superficial peroneal nerve, med/lat tibial nerve, sural nerve, saphenous nerve  Intact EHL, EDL, TA, FHL, GS, quadriceps hamstrings and hip flexors  Bilateral calf soft and nttp or squeeze.  Edema: trace    LEFT KNEE EXAM:    Skin: intact, no ecchymosis or erythema  ROM: full extension to 125 flexion, discomfort with range of motion.  Tight hamstrings on straight leg raise.  Effusion: trace  Tender: medial joint line, pes, lateral joint line.    MCL: stable, and non-painful at both 0 and 30 degrees knee flexion  Varus stress: stable, and non-painful at both 0 and 30 degrees knee flexion  Lachmans: neg, firm endpoint  Posterior Drawer stable  Patellofemoral joint:                Apprehension: negative              Crepitations: mild   Grind: positive.    RIGHT KNEE EXAM:    Skin: intact, no ecchymosis or erythema  ROM: full extension to 125+ flexion  Tight hamstrings on straight leg raise.  Effusion: none  Tender: medial joint line, lateral joint line.    MCL: stable, and non-painful at both 0 and 30 degrees knee flexion  Varus stress: " "stable, and non-painful at both 0 and 30 degrees knee flexion  Lachmans: neg, firm endpoint  Posterior Drawer stable  Patellofemoral joint:                Apprehension: negative              Crepitations: mild   Grind: positive.    X-RAY: no new images today.    3 views bilateral knee from 8/20/2021 -- Bilateral medial compartment severe joint space narrowing, bone bone bone with articular flattening, subcondral sclerosis. Bilateral patellofemoral lateral facet mild joint space narrowing with medial  patello-femoral osteophytes.           ASSESSMENT/PLAN: Carolina Hernandez is a 57 year old female with chronic bilateral knee pain, advanced primary osteoarthritis.     * reviewed imaging studies with patient, showing arthritic changes, or wearing of the cartilage in the knee. This can be caused by normal \"wear and tear\" over the years or following prior injury to the knee.    Treatment typically starts nonsurgically. Surgical indication for total knee arthroplasty  when nonsurgical management is no longer effective.    At this point, we discussed either trying repeat cortisone injections, otherwise surgical total knee arthroplasty.    At this time, she'd like to try cortisone again as she has things going on right now with her health.    Non-surgical treatment for knee arthritis includes:    * rest, sitting  * Activity modification - avoid impact activities or activities that aggravate symptoms.  * NSAIDS (non-steroidal anti-inflammatory medications; e.g. Aleve, advil, motrin, ibuprofen) - regular use for inflammation ( twice daily or three times daily), with food, as long as no contra-indications Please discuss with primary care doctor if needed  * ice, 15-20 minutes at a time several times a day or as needed.  * Strengthening of quadriceps muscles  * Physical Therapy for strengthening, stretching and range of motion exercises of legs  * Tylenol as needed for pain, consider Tylenol arthritis or similar  * Weight loss: " "Weight loss:  Body mass index is 31 kg/m .. weight loss benefits, not only for the current pain symptoms, but also overall health. Recommend a good diet plan that works for the patient, with the assistance of a dietician or primary care doctor as needed. Also, a good, low-impact exercise program for at least 20 minutes per day, 3 times per week, such as exercise bike, elliptical , or pool.  * Exercise: low impact such as stationary bike, elliptical, pool.  * Injections: cortisone versus viscosupplementation (hyaluronic acid, \"rooster comb\", \"gel shots\"); risks and perceived benefits discussed today. We will proceed with bilateral knee cortisone injections today.    * Bracing: bracing the knee may offer some relief of symptoms when worn and provide some stability.  * over the counter supplements such as glucosamine and chondroitin sulfate may help with joint pain.  * topical ointments may help as well    * return to clinic as needed.      * All questions were addressed and answered prior to discharge from clinic today. The patient acknowledges an understanding of and agreement with the plan set forth during today's visit. Patient was advised to call our office or MyChart us if any further questions arise upon leaving our office today.    Giovanny Cohng M.D., M.S.  Dept. of Orthopaedic Surgery  [facility]    Large Joint Injection/Arthocentesis: bilateral knee    Date/Time: 2/13/2023 10:39 AM  Performed by: Acosta Eng PA  Authorized by: Giovanny Chong MD     Indications:  Pain and osteoarthritis  Needle Size:  22 G  Guidance: landmark guided    Approach:  Anteromedial  Location:  Knee  Laterality:  Bilateral      Medications (Right):  80 mg methylPREDNISolone 80 MG/ML; 4 mL bupivacaine 0.25 %  Medications (Left):  80 mg methylPREDNISolone 80 MG/ML; 4 mL bupivacaine 0.25 %  Outcome:  Tolerated well, no immediate complications  Procedure discussed: discussed risks, benefits, and " alternatives    Consent Given by:  Patient  Prep: patient was prepped and draped in usual sterile fashion

## 2023-02-10 ASSESSMENT — KOOS JR
GOING UP OR DOWN STAIRS: MODERATE
STRAIGHTENING KNEE FULLY: SEVERE
BENDING TO THE FLOOR TO PICK UP OBJECT: SEVERE
KOOS JR SCORING: 39.63
STANDING UPRIGHT: MODERATE
TWISING OR PIVOTING ON KNEE: SEVERE
HOW SEVERE IS YOUR KNEE STIFFNESS AFTER FIRST WAKING IN MORNING: SEVERE
RISING FROM SITTING: SEVERE

## 2023-02-13 ENCOUNTER — OFFICE VISIT (OUTPATIENT)
Dept: ORTHOPEDICS | Facility: CLINIC | Age: 58
End: 2023-02-13
Payer: COMMERCIAL

## 2023-02-13 VITALS — WEIGHT: 175 LBS | HEIGHT: 63 IN | BODY MASS INDEX: 31.01 KG/M2

## 2023-02-13 DIAGNOSIS — M25.561 CHRONIC PAIN OF BOTH KNEES: ICD-10-CM

## 2023-02-13 DIAGNOSIS — G89.29 CHRONIC PAIN OF BOTH KNEES: ICD-10-CM

## 2023-02-13 DIAGNOSIS — M17.0 PRIMARY OSTEOARTHRITIS OF BOTH KNEES: Primary | ICD-10-CM

## 2023-02-13 DIAGNOSIS — M25.562 CHRONIC PAIN OF BOTH KNEES: ICD-10-CM

## 2023-02-13 PROCEDURE — 20610 DRAIN/INJ JOINT/BURSA W/O US: CPT | Mod: 50 | Performed by: ORTHOPAEDIC SURGERY

## 2023-02-13 RX ORDER — METHYLPREDNISOLONE ACETATE 80 MG/ML
80 INJECTION, SUSPENSION INTRA-ARTICULAR; INTRALESIONAL; INTRAMUSCULAR; SOFT TISSUE
Status: DISCONTINUED | OUTPATIENT
Start: 2023-02-13 | End: 2023-04-25

## 2023-02-13 RX ORDER — BUPIVACAINE HYDROCHLORIDE 2.5 MG/ML
4 INJECTION, SOLUTION INFILTRATION; PERINEURAL
Status: DISCONTINUED | OUTPATIENT
Start: 2023-02-13 | End: 2023-04-25

## 2023-02-13 RX ADMIN — METHYLPREDNISOLONE ACETATE 80 MG: 80 INJECTION, SUSPENSION INTRA-ARTICULAR; INTRALESIONAL; INTRAMUSCULAR; SOFT TISSUE at 10:39

## 2023-02-13 RX ADMIN — BUPIVACAINE HYDROCHLORIDE 4 ML: 2.5 INJECTION, SOLUTION INFILTRATION; PERINEURAL at 10:39

## 2023-02-13 ASSESSMENT — PAIN SCALES - GENERAL: PAINLEVEL: SEVERE PAIN (6)

## 2023-02-13 NOTE — LETTER
2/13/2023         RE: Carolina Hernandez  [address] [place] [id number]        Dear Colleague,    Thank you for referring your patient, Carolina Hernandez, to the [facility]. Please see a copy of my visit note below.    CHIEF COMPLAINT:   Chief Complaint   Patient presents with     Knee Pain     Bilateral knee pain. R>L. Last injections on 11/10/22. These worked well up until 2 weeks ago.       HISTORY OF PRESENT ILLNESS    Carolina Hernandez is a 57 year old female seen for evaluation of ongoing bilateral knee pain with no known injury, both about the same but varies, today right more than left. Right side pain radiates down the calf more than the left.  Pain has been present for 20 years. We saw her 11/10/2022 and provided bilateral cortisone injections, which worked well up until 2 weeks ago. She'd like repeat injections today.    Previously on 4/27/2022  received bilateral visco injections (synviscOne). She had previously received a cortisone injection with Dr Gaspar 8/2021 which helped to some extent for a few months, but not as well as our injections.      Recent bout of pancreatitis and had a cholecystectomy on 11/21/2022. She was planning to get a knee replacement this winter but with these medical things going on she wants to wait until cortisone no longer helps with the pain. Also, her 81yo father just had a knee replacement in November and having his other knee done soon.    Also has foot problems. Gaining some weight..    Locates pain along the inner aspect and front of the knees, and can radiate down the leg to the ankle, up the thigh.  Pain is worse with work, lifting/carrying things, gardening. Treatment has been occasional use of over the counter pain medications without relief.     Pain at rest, pain at night better with injections. Sleeps with pillow between knees. Has tried tylenol arthritis and ibuprofen for pain but doesn't seem to really  help.    Left knee injury at 10 years old.    Has chronic low back pain, denies numbness and tingling.    Present symptoms: pain medially  and anteriorly, pain dull/achy , mild pain.    Pain severity: 6/10  Frequency of symptoms: are constant  Exacerbating Factors: weight bearing, carrying and lifting things, gardening, kneeling, stairs  Relieving Factors: rest, ice  Night Pain: Yes  Pain while at rest: Yes   Numbness or tingling: No   Patient has tried:     NSAIDS: Yes , not recently in the past couple of years.     Physical Therapy: No      Activity modification: Yes      Bracing: No      Injections: Yes cortisone 11/2022, 8/15/2022, 8/20/2021 cortisone;    4/27/2022 with SynviscOne.     Ice: Yes      Assistive device:  No     Other: tylenol. Topical ointments.      Other PMH:  has a past medical history of Asthma, C. difficile colitis, Depression, Depressive disorder (Have had it most of my life), Moderate persistent asthma with exacerbation (9/18/2007), Osteoarthritis, Sinusitis, chronic, and Status post coronary angiogram (10/14/2022).    She has no past medical history of Breast cancer (H), Malignant neoplasm of ovary (H), or Need for prophylactic hormone replacement therapy (postmenopausal).  Patient Active Problem List   Diagnosis     Recurrent major depressive disorder, in remission (H)     Benign essential hypertension     Primary osteoarthritis of both knees     Low back pain due to bilateral sciatica     Nonobstructive atherosclerosis of coronary artery     Hyperlipidemia LDL goal <70     Transaminitis     Acute pancreatitis     COPD vs Asthma     GERD (gastroesophageal reflux disease)     Seasonal allergies     Fatty liver       Surgical Hx:  has a past surgical history that includes colonoscopy (2017); Esophagoscopy, gastroscopy, duodenoscopy (EGD), combined (N/A, 05/05/2022); Coronary Angiogram (N/A, 10/14/2022); and Laparoscopic cholecystectomy with cholangiograms (N/A, 11/21/2022).    Medications:    Current Outpatient Medications:      albuterol (PROVENTIL) (2.5 MG/3ML) 0.083% neb solution, inhale 1 vial (2.5 mg) by nebulization every 4 hours as needed for shortness of breath / dyspnea or wheezing, Disp: 225 mL, Rfl: 11     albuterol (VENTOLIN HFA) 108 (90 Base) MCG/ACT inhaler, INHALE 1-2 PUFFS INTO THE LUNGS EVERY 4 HOURS AS NEEDED FOR SHORTNESS OF BREATH/DYSPNEA OR WHEEZING ., Disp: 18 g, Rfl: 0     amLODIPine (NORVASC) 2.5 MG tablet, Take 2 tablets (5 mg) by mouth once daily., Disp: 180 tablet, Rfl: 0     aspirin (ASA) 81 MG EC tablet, Take 81 mg by mouth daily, Disp: , Rfl:      cetirizine (ZYRTEC) 10 MG tablet, Take 10 mg by mouth daily, Disp: , Rfl:      docusate sodium (COLACE) 100 MG capsule, Take 1 capsule (100 mg) by mouth 2 times daily Take while on opiate to prevent constipation, Disp: , Rfl: 0     FLUoxetine (PROZAC) 20 MG capsule, Take 1 capsule (20 mg) by mouth daily, Disp: 90 capsule, Rfl: 3     FLUoxetine (PROZAC) 40 MG capsule, Take 1 capsule (40 mg) by mouth daily Take with 20 mg tab for a total of 60 mg daily, Disp: 90 capsule, Rfl: 3     fluticasone (FLONASE) 50 MCG/ACT nasal spray, Spray 1 spray into both nostrils daily (Patient taking differently: Spray 1 spray into both nostrils daily as needed), Disp: 1 g, Rfl: 11     fluticasone-salmeterol (ADVAIR) 500-50 MCG/ACT inhaler, Inhale 1 puff into the lungs every 12 hours, Disp: 1 each, Rfl: 11     montelukast (SINGULAIR) 10 MG tablet, Take 1 tablet (10 mg) by mouth At Bedtime, Disp: 90 tablet, Rfl: 3     omeprazole 20 MG tablet, Take 2 tablets (40 mg) by mouth daily, Disp: 90 tablet, Rfl: 3     Psyllium Husk 100 % POWD, Taking daily., Disp: , Rfl:      tiotropium (SPIRIVA) 18 MCG inhaled capsule, Inhale 1 capsule (18 mcg) into the lungs daily, Disp: 30 capsule, Rfl: 11    Allergies:   Allergies   Allergen Reactions     Doxycycline Difficulty breathing     Penicillins Itching     Sucralfate Rash       Social Hx: retired (electrical  "worker).   reports that she quit smoking about 18 months ago. Her smoking use included cigarettes. She has never used smokeless tobacco. She reports that she does not currently use alcohol. She reports that she does not use drugs.    Family Hx: family history includes Cancer in her daughter and maternal grandfather; Coronary Artery Disease in her father and paternal grandfather; Diabetes in her daughter; Glaucoma in her father; Schizophrenia in her daughter.    REVIEW OF SYSTEMS:   CONSTITUTIONAL:NEGATIVE for fever, chills, change in weight  INTEGUMENTARY/SKIN: NEGATIVE for worrisome rashes, moles or lesions  MUSCULOSKELETAL:See HPI above  NEURO: NEGATIVE for weakness, dizziness or paresthesias    PHYSICAL EXAM:  Ht 1.6 m (5' 3\")   Wt 79.4 kg (175 lb)   BMI 31.00 kg/m     GENERAL APPEARANCE: healthy, alert, no distress  SKIN: no suspicious lesions or rashes  NEURO: Normal strength and tone, mentation intact and speech normal  PSYCH:  mentation appears normal and affect normal, not anxious  RESPIRATORY: No increased work of breathing.      BILATERAL LOWER EXTREMITIES:  Gait: favors the right today.  Alignment: varus  Intact sensation deep peroneal nerve, superficial peroneal nerve, med/lat tibial nerve, sural nerve, saphenous nerve  Intact EHL, EDL, TA, FHL, GS, quadriceps hamstrings and hip flexors  Bilateral calf soft and nttp or squeeze.  Edema: trace    LEFT KNEE EXAM:    Skin: intact, no ecchymosis or erythema  ROM: full extension to 125 flexion, discomfort with range of motion.  Tight hamstrings on straight leg raise.  Effusion: trace  Tender: medial joint line, pes, lateral joint line.    MCL: stable, and non-painful at both 0 and 30 degrees knee flexion  Varus stress: stable, and non-painful at both 0 and 30 degrees knee flexion  Lachmans: neg, firm endpoint  Posterior Drawer stable  Patellofemoral joint:                Apprehension: negative              Crepitations: mild   Grind: positive.    RIGHT KNEE " "EXAM:    Skin: intact, no ecchymosis or erythema  ROM: full extension to 125+ flexion  Tight hamstrings on straight leg raise.  Effusion: none  Tender: medial joint line, lateral joint line.    MCL: stable, and non-painful at both 0 and 30 degrees knee flexion  Varus stress: stable, and non-painful at both 0 and 30 degrees knee flexion  Lachmans: neg, firm endpoint  Posterior Drawer stable  Patellofemoral joint:                Apprehension: negative              Crepitations: mild   Grind: positive.    X-RAY: no new images today.    3 views bilateral knee from 8/20/2021 -- Bilateral medial compartment severe joint space narrowing, bone bone bone with articular flattening, subcondral sclerosis. Bilateral patellofemoral lateral facet mild joint space narrowing with medial  patello-femoral osteophytes.           ASSESSMENT/PLAN: Carolina Hernandez is a 57 year old female with chronic bilateral knee pain, advanced primary osteoarthritis.     * reviewed imaging studies with patient, showing arthritic changes, or wearing of the cartilage in the knee. This can be caused by normal \"wear and tear\" over the years or following prior injury to the knee.    Treatment typically starts nonsurgically. Surgical indication for total knee arthroplasty  when nonsurgical management is no longer effective.    At this point, we discussed either trying repeat cortisone injections, otherwise surgical total knee arthroplasty.    At this time, she'd like to try cortisone again as she has things going on right now with her health.    Non-surgical treatment for knee arthritis includes:    * rest, sitting  * Activity modification - avoid impact activities or activities that aggravate symptoms.  * NSAIDS (non-steroidal anti-inflammatory medications; e.g. Aleve, advil, motrin, ibuprofen) - regular use for inflammation ( twice daily or three times daily), with food, as long as no contra-indications Please discuss with primary care doctor if " "needed  * ice, 15-20 minutes at a time several times a day or as needed.  * Strengthening of quadriceps muscles  * Physical Therapy for strengthening, stretching and range of motion exercises of legs  * Tylenol as needed for pain, consider Tylenol arthritis or similar  * Weight loss: Weight loss:  Body mass index is 31 kg/m .. weight loss benefits, not only for the current pain symptoms, but also overall health. Recommend a good diet plan that works for the patient, with the assistance of a dietician or primary care doctor as needed. Also, a good, low-impact exercise program for at least 20 minutes per day, 3 times per week, such as exercise bike, elliptical , or pool.  * Exercise: low impact such as stationary bike, elliptical, pool.  * Injections: cortisone versus viscosupplementation (hyaluronic acid, \"rooster comb\", \"gel shots\"); risks and perceived benefits discussed today. We will proceed with bilateral knee cortisone injections today.    * Bracing: bracing the knee may offer some relief of symptoms when worn and provide some stability.  * over the counter supplements such as glucosamine and chondroitin sulfate may help with joint pain.  * topical ointments may help as well    * return to clinic as needed.      * All questions were addressed and answered prior to discharge from clinic today. The patient acknowledges an understanding of and agreement with the plan set forth during today's visit. Patient was advised to call our office or MyChart us if any further questions arise upon leaving our office today.    Giovanny Chong M.D., M.S.  Dept. of Orthopaedic Surgery  [facility]    Large Joint Injection/Arthocentesis: bilateral knee    Date/Time: 2/13/2023 10:39 AM  Performed by: Acosta Eng PA  Authorized by: Giovanny Chong MD     Indications:  Pain and osteoarthritis  Needle Size:  22 G  Guidance: landmark guided    Approach:  Anteromedial  Location:  Knee  Laterality:  " Bilateral      Medications (Right):  80 mg methylPREDNISolone 80 MG/ML; 4 mL bupivacaine 0.25 %  Medications (Left):  80 mg methylPREDNISolone 80 MG/ML; 4 mL bupivacaine 0.25 %  Outcome:  Tolerated well, no immediate complications  Procedure discussed: discussed risks, benefits, and alternatives    Consent Given by:  Patient  Prep: patient was prepped and draped in usual sterile fashion              Again, thank you for allowing me to participate in the care of your patient.        Sincerely,        Giovanny Chong MD

## 2023-02-16 DIAGNOSIS — J45.20 MILD INTERMITTENT ASTHMA WITHOUT COMPLICATION: ICD-10-CM

## 2023-02-19 RX ORDER — ALBUTEROL SULFATE 90 UG/1
AEROSOL, METERED RESPIRATORY (INHALATION)
Qty: 18 G | Refills: 0 | Status: SHIPPED | OUTPATIENT
Start: 2023-02-19 | End: 2023-03-29

## 2023-02-24 DIAGNOSIS — F33.40 RECURRENT MAJOR DEPRESSIVE DISORDER, IN REMISSION (H): ICD-10-CM

## 2023-02-24 RX ORDER — FLUOXETINE 40 MG/1
40 CAPSULE ORAL DAILY
Qty: 90 CAPSULE | Refills: 0 | Status: SHIPPED | OUTPATIENT
Start: 2023-02-24 | End: 2023-04-04

## 2023-02-24 NOTE — TELEPHONE ENCOUNTER
"Requested Prescriptions   Pending Prescriptions Disp Refills     FLUoxetine (PROZAC) 40 MG capsule [Pharmacy Med Name: FLUoxetine HCl Oral Capsule 40 MG] 90 capsule 0     Sig: Take 1 capsule (40 mg) by mouth daily. Take with 20 mg tab for a total of 60 mg daily       SSRIs Protocol Failed - 2/24/2023 12:06 PM        Failed - PHQ-9 score less than 5 in past 6 months     Please review last PHQ-9 score.           Passed - Medication is active on med list        Passed - Patient is age 18 or older        Passed - No active pregnancy on record        Passed - No positive pregnancy test in last 12 months        Passed - Recent (6 mo) or future (30 days) visit within the authorizing provider's specialty     Patient had office visit in the last 6 months or has a visit in the next 30 days with authorizing provider or within the authorizing provider's specialty.  See \"Patient Info\" tab in inBillingstreetsket, or \"Choose Columns\" in Meds & Orders section of the refill encounter.               FLUoxetine (PROZAC) 20 MG capsule [Pharmacy Med Name: FLUoxetine HCl Oral Capsule 20 MG] 90 capsule 0     Sig: Take 1 capsule (20 mg) by mouth daily       SSRIs Protocol Failed - 2/24/2023 12:06 PM        Failed - PHQ-9 score less than 5 in past 6 months     Please review last PHQ-9 score.           Passed - Medication is active on med list        Passed - Patient is age 18 or older        Passed - No active pregnancy on record        Passed - No positive pregnancy test in last 12 months        Passed - Recent (6 mo) or future (30 days) visit within the authorizing provider's specialty     Patient had office visit in the last 6 months or has a visit in the next 30 days with authorizing provider or within the authorizing provider's specialty.  See \"Patient Info\" tab in inbasket, or \"Choose Columns\" in Meds & Orders section of the refill encounter.                 "

## 2023-03-07 ENCOUNTER — LAB (OUTPATIENT)
Dept: LAB | Facility: CLINIC | Age: 58
End: 2023-03-07
Payer: COMMERCIAL

## 2023-03-07 DIAGNOSIS — K72.00 ACUTE LIVER FAILURE: ICD-10-CM

## 2023-03-07 DIAGNOSIS — E78.5 HYPERLIPIDEMIA LDL GOAL <70: ICD-10-CM

## 2023-03-07 DIAGNOSIS — I25.10 NONOBSTRUCTIVE ATHEROSCLEROSIS OF CORONARY ARTERY: ICD-10-CM

## 2023-03-07 LAB
ALT SERPL W P-5'-P-CCNC: 43 U/L (ref 10–35)
CHOLEST SERPL-MCNC: 272 MG/DL
HDLC SERPL-MCNC: 99 MG/DL
LDLC SERPL CALC-MCNC: 144 MG/DL
NONHDLC SERPL-MCNC: 173 MG/DL
TRIGL SERPL-MCNC: 147 MG/DL

## 2023-03-07 PROCEDURE — 36415 COLL VENOUS BLD VENIPUNCTURE: CPT | Performed by: NURSE PRACTITIONER

## 2023-03-07 PROCEDURE — 80061 LIPID PANEL: CPT | Performed by: NURSE PRACTITIONER

## 2023-03-07 PROCEDURE — 84460 ALANINE AMINO (ALT) (SGPT): CPT | Performed by: NURSE PRACTITIONER

## 2023-03-08 ENCOUNTER — OFFICE VISIT (OUTPATIENT)
Dept: CARDIOLOGY | Facility: CLINIC | Age: 58
End: 2023-03-08
Payer: COMMERCIAL

## 2023-03-08 VITALS
OXYGEN SATURATION: 97 % | SYSTOLIC BLOOD PRESSURE: 136 MMHG | HEART RATE: 82 BPM | WEIGHT: 177.2 LBS | RESPIRATION RATE: 20 BRPM | BODY MASS INDEX: 31.39 KG/M2 | DIASTOLIC BLOOD PRESSURE: 85 MMHG

## 2023-03-08 DIAGNOSIS — R79.89 ELEVATED LFTS: Primary | ICD-10-CM

## 2023-03-08 PROCEDURE — 99214 OFFICE O/P EST MOD 30 MIN: CPT | Performed by: NURSE PRACTITIONER

## 2023-03-08 NOTE — PROGRESS NOTES
Cardiology Clinic Progress Note  Carolina Hernandez MRN# [id number]   YOB: 1965 Age: 57 year old      Primary Cardiologist:   Dr. Chapa          History of Presenting Illness:      Carolina Hernandez is a pleasant 57 year old patient with a past cardiac history significant for   1. Nonobstructive CAD    Angio 2022 40% mid LAD, 30% mid LCx  2. Hypertension  3. Hyperlipidemia  Past medical history significant for  moderate to severe COPD and asthma (follows with pulmonology), prior tobacco use with cessation 2021.      Patient was seen by Dr. Chapa in September 2022 in consultation.  Dyspnea on exertion had improved with pulmonary medications but she had exertional chest discomfort.  Coronary angiogram showed mild nonobstructive disease.  Echocardiogram with normal biventricular function and no valvular disease.    Patient was seen by me in October 2022.  She continued with occasional chest discomfort at rest and with exertion and recommended follow-up with PCP.  She had not started aspirin so this was encouraged and was started on statin.  Blood pressure was mildly elevated.    Patient was hospitalized in November 2022 with acute hepatitis due to pancreatitis.  She did undergo cholecystectomy but statin was also discontinued in case this was contributing.  Discharge summary reviewed today.    Pt presents today for hospital follow-up. ALT significantly improved yesterday, almost back to normal at 43.  Lipids show LDL of 144.  Results reviewed today.     She is hesitant to retry statin medication.  She is agreeable to rechecking ALT in a couple months and then starting low-dose rosuvastatin.  She has been taking baby aspirin appropriately.  She denies any angina. She mentions her father is having cardiac work-up with stress test. Patient reports no chest pain, shortness of breath, PND, orthopnea, presyncope, syncope, edema, heart racing, or palpitations.    Labs:  LIPID RESULTS:  Lab Results   Component Value  Date    CHOL 272 (H) 03/07/2023    CHOL 232 (H) 11/20/2020    HDL 99 03/07/2023    HDL 98 11/20/2020     (H) 03/07/2023    LDL 88 11/20/2020    TRIG 147 03/07/2023    TRIG 232 (H) 11/20/2020       LIVER ENZYME RESULTS:  Lab Results   Component Value Date    AST 89 (H) 11/14/2022    AST 21 10/25/2019    ALT 43 (H) 03/07/2023    ALT 26 10/25/2019       CBC RESULTS:  Lab Results   Component Value Date    WBC 5.4 11/01/2022    WBC 8.3 07/05/2021    RBC 4.04 11/01/2022    RBC 4.32 07/05/2021    HGB 12.0 11/01/2022    HGB 13.3 07/05/2021    HCT 37.3 11/01/2022    HCT 38.1 07/05/2021    MCV 92 11/01/2022    MCV 88 07/05/2021    MCH 29.7 11/01/2022    MCH 30.8 07/05/2021    MCHC 32.2 11/01/2022    MCHC 34.9 07/05/2021    RDW 13.0 11/01/2022    RDW 12.5 07/05/2021     11/01/2022     07/05/2021       BMP RESULTS:  Lab Results   Component Value Date     (L) 11/14/2022     01/26/2021    POTASSIUM 4.1 11/14/2022    POTASSIUM 4.0 10/14/2022    POTASSIUM 4.1 01/26/2021    CHLORIDE 95 (L) 11/14/2022    CHLORIDE 107 10/14/2022    CHLORIDE 104 01/26/2021    CO2 26 11/14/2022    CO2 26 10/14/2022    CO2 27 01/26/2021    ANIONGAP 12 11/14/2022    ANIONGAP 6 10/14/2022    ANIONGAP 4 01/26/2021     (H) 11/14/2022     (H) 10/14/2022     (H) 01/26/2021    BUN 10.8 11/14/2022    BUN 13 10/14/2022    BUN 14 01/26/2021    CR 0.64 11/14/2022    CR 0.72 01/26/2021    GFRESTIMATED >90 11/14/2022    GFRESTIMATED >90 01/26/2021    GFRESTBLACK >90 01/26/2021    REILLY 9.9 11/14/2022    REILLY 9.1 01/26/2021        A1C RESULTS:  No results found for: A1C    INR RESULTS:  Lab Results   Component Value Date    INR 0.97 10/14/2022    INR 1.09 12/10/2019       Results reviewed today.       Current Cardiac Medications     Current Outpatient Medications   Medication Sig Dispense Refill     albuterol (PROVENTIL) (2.5 MG/3ML) 0.083% neb solution inhale 1 vial (2.5 mg) by nebulization every 4 hours as needed  for shortness of breath / dyspnea or wheezing 225 mL 11     albuterol (VENTOLIN HFA) 108 (90 Base) MCG/ACT inhaler INHALE 1-2 PUFFS INTO THE LUNGS EVERY 4 HOURS AS NEEDED FOR SHORTNESS OF BREATH/DYSPNEA OR WHEEZING . 18 g 0     amLODIPine (NORVASC) 2.5 MG tablet Take 2 tablets (5 mg) by mouth once daily. 180 tablet 0     aspirin (ASA) 81 MG EC tablet Take 81 mg by mouth daily       cetirizine (ZYRTEC) 10 MG tablet Take 10 mg by mouth daily       FLUoxetine (PROZAC) 20 MG capsule Take 1 capsule (20 mg) by mouth daily 90 capsule 0     FLUoxetine (PROZAC) 40 MG capsule Take 1 capsule (40 mg) by mouth daily. Take with 20 mg tab for a total of 60 mg daily 90 capsule 0     fluticasone (FLONASE) 50 MCG/ACT nasal spray Spray 1 spray into both nostrils daily (Patient taking differently: Spray 1 spray into both nostrils daily as needed) 1 g 11     fluticasone-salmeterol (ADVAIR) 500-50 MCG/ACT inhaler Inhale 1 puff into the lungs every 12 hours 1 each 11     montelukast (SINGULAIR) 10 MG tablet Take 1 tablet (10 mg) by mouth At Bedtime 90 tablet 3     omeprazole 20 MG tablet Take 2 tablets (40 mg) by mouth daily 90 tablet 3     Psyllium Husk 100 % POWD Taking daily.       tiotropium (SPIRIVA) 18 MCG inhaled capsule Inhale 1 capsule (18 mcg) into the lungs daily 30 capsule 11                        Assessment and Plan:       Plan    Patient Instructions   Medication Changes:  1. None     Recommendations:  1. Call with questions     Follow-up:  1. lab in 2 months (ALT)- if normal then restart lower dose of rosuvastatin 5 mg daily.   2. See Dr. Chapa for cardiology follow up at [facility]: Oct 2023.   Call 6 months prior, to schedule.     Cardiology Scheduling~[phone]  Cardiology Clinic RN~[phone] (Sheila RN, Roxanne RN, Unique RN)                2. Nonobstructive CAD    No angina     Continue aspirin, CCB, start statin         4. Hypertension     Controlled     Continue amlodipine     Consider increasing amlodipine, if  needed        3. Hyperlipidemia    Last   - Not on statin therapy     ALT elevated after starting rosuvastatin 20 mg daily but also had pancreatitis from gallstones, now s/p cholecystectomy    If ALT normalizes retry lower dose rosuvastatin and reassess lipids           Thank you for allowing me to participate in this delightful patient's care.      This note was completed in part using Dragon voice recognition software. Although reviewed after completion, some word and grammatical errors may occur.    Marilee Veliz, APRN CNP, APRN, CNP           Data:   All laboratory data reviewed      Total time spent today was 30 mins, reviewing labs, testing, notes, documenting notes, and seeing patient.          Constitutional:  cooperative, alert and oriented, well developed, well nourished, in no acute distress  overweight     Skin:  warm and dry to the touch         Head:  normocephalic       Eyes:  pupils equal and round       ENT:  no pallor or cyanosis       Neck:  no stiffness       Respiratory:  clear to auscultation; normal symmetry        Cardiac: regular rate and rhythm; normal S1 and S2                 pulses full and equal      GI:  abdomen soft, nondistended     Extremities and Muscular Skeletal:  no edema        Neurological:  affect appropriate; no gross motor deficits       Psych:  Alert and Oriented x 3 , appropriate affact

## 2023-03-08 NOTE — LETTER
3/8/2023    Julia Oneill MD  [address] [facility] [address]    RE: Carolina Hernandez       Dear Colleague,     I had the pleasure of seeing Carolina Hernandez in the [facility].  Cardiology Clinic Progress Note  Carolina Hernandez MRN# [id number]   YOB: 1965 Age: 57 year old      Primary Cardiologist:   Dr. Chapa          History of Presenting Illness:      Carolina Hernandez is a pleasant 57 year old patient with a past cardiac history significant for   1. Nonobstructive CAD    Angio 2022 40% mid LAD, 30% mid LCx  2. Hypertension  3. Hyperlipidemia  Past medical history significant for  moderate to severe COPD and asthma (follows with pulmonology), prior tobacco use with cessation 2021.      Patient was seen by Dr. Chapa in September 2022 in consultation.  Dyspnea on exertion had improved with pulmonary medications but she had exertional chest discomfort.  Coronary angiogram showed mild nonobstructive disease.  Echocardiogram with normal biventricular function and no valvular disease.    Patient was seen by me in October 2022.  She continued with occasional chest discomfort at rest and with exertion and recommended follow-up with PCP.  She had not started aspirin so this was encouraged and was started on statin.  Blood pressure was mildly elevated.    Patient was hospitalized in November 2022 with acute hepatitis due to pancreatitis.  She did undergo cholecystectomy but statin was also discontinued in case this was contributing.  Discharge summary reviewed today.    Pt presents today for hospital follow-up. ALT significantly improved yesterday, almost back to normal at 43.  Lipids show LDL of 144.  Results reviewed today.     She is hesitant to retry statin medication.  She is agreeable to rechecking ALT in a couple months and then starting low-dose rosuvastatin.  She has been taking baby aspirin appropriately.  She denies any angina. She mentions her father is having  cardiac work-up with stress test. Patient reports no chest pain, shortness of breath, PND, orthopnea, presyncope, syncope, edema, heart racing, or palpitations.    Labs:  LIPID RESULTS:  Lab Results   Component Value Date    CHOL 272 (H) 03/07/2023    CHOL 232 (H) 11/20/2020    HDL 99 03/07/2023    HDL 98 11/20/2020     (H) 03/07/2023    LDL 88 11/20/2020    TRIG 147 03/07/2023    TRIG 232 (H) 11/20/2020       LIVER ENZYME RESULTS:  Lab Results   Component Value Date    AST 89 (H) 11/14/2022    AST 21 10/25/2019    ALT 43 (H) 03/07/2023    ALT 26 10/25/2019       CBC RESULTS:  Lab Results   Component Value Date    WBC 5.4 11/01/2022    WBC 8.3 07/05/2021    RBC 4.04 11/01/2022    RBC 4.32 07/05/2021    HGB 12.0 11/01/2022    HGB 13.3 07/05/2021    HCT 37.3 11/01/2022    HCT 38.1 07/05/2021    MCV 92 11/01/2022    MCV 88 07/05/2021    MCH 29.7 11/01/2022    MCH 30.8 07/05/2021    MCHC 32.2 11/01/2022    MCHC 34.9 07/05/2021    RDW 13.0 11/01/2022    RDW 12.5 07/05/2021     11/01/2022     07/05/2021       BMP RESULTS:  Lab Results   Component Value Date     (L) 11/14/2022     01/26/2021    POTASSIUM 4.1 11/14/2022    POTASSIUM 4.0 10/14/2022    POTASSIUM 4.1 01/26/2021    CHLORIDE 95 (L) 11/14/2022    CHLORIDE 107 10/14/2022    CHLORIDE 104 01/26/2021    CO2 26 11/14/2022    CO2 26 10/14/2022    CO2 27 01/26/2021    ANIONGAP 12 11/14/2022    ANIONGAP 6 10/14/2022    ANIONGAP 4 01/26/2021     (H) 11/14/2022     (H) 10/14/2022     (H) 01/26/2021    BUN 10.8 11/14/2022    BUN 13 10/14/2022    BUN 14 01/26/2021    CR 0.64 11/14/2022    CR 0.72 01/26/2021    GFRESTIMATED >90 11/14/2022    GFRESTIMATED >90 01/26/2021    GFRESTBLACK >90 01/26/2021    REILLY 9.9 11/14/2022    REILLY 9.1 01/26/2021        A1C RESULTS:  No results found for: A1C    INR RESULTS:  Lab Results   Component Value Date    INR 0.97 10/14/2022    INR 1.09 12/10/2019       Results reviewed today.        Current Cardiac Medications     Current Outpatient Medications   Medication Sig Dispense Refill     albuterol (PROVENTIL) (2.5 MG/3ML) 0.083% neb solution inhale 1 vial (2.5 mg) by nebulization every 4 hours as needed for shortness of breath / dyspnea or wheezing 225 mL 11     albuterol (VENTOLIN HFA) 108 (90 Base) MCG/ACT inhaler INHALE 1-2 PUFFS INTO THE LUNGS EVERY 4 HOURS AS NEEDED FOR SHORTNESS OF BREATH/DYSPNEA OR WHEEZING . 18 g 0     amLODIPine (NORVASC) 2.5 MG tablet Take 2 tablets (5 mg) by mouth once daily. 180 tablet 0     aspirin (ASA) 81 MG EC tablet Take 81 mg by mouth daily       cetirizine (ZYRTEC) 10 MG tablet Take 10 mg by mouth daily       FLUoxetine (PROZAC) 20 MG capsule Take 1 capsule (20 mg) by mouth daily 90 capsule 0     FLUoxetine (PROZAC) 40 MG capsule Take 1 capsule (40 mg) by mouth daily. Take with 20 mg tab for a total of 60 mg daily 90 capsule 0     fluticasone (FLONASE) 50 MCG/ACT nasal spray Spray 1 spray into both nostrils daily (Patient taking differently: Spray 1 spray into both nostrils daily as needed) 1 g 11     fluticasone-salmeterol (ADVAIR) 500-50 MCG/ACT inhaler Inhale 1 puff into the lungs every 12 hours 1 each 11     montelukast (SINGULAIR) 10 MG tablet Take 1 tablet (10 mg) by mouth At Bedtime 90 tablet 3     omeprazole 20 MG tablet Take 2 tablets (40 mg) by mouth daily 90 tablet 3     Psyllium Husk 100 % POWD Taking daily.       tiotropium (SPIRIVA) 18 MCG inhaled capsule Inhale 1 capsule (18 mcg) into the lungs daily 30 capsule 11                        Assessment and Plan:       Plan    Patient Instructions   Medication Changes:  1. None     Recommendations:  1. Call with questions     Follow-up:  1. lab in 2 months (ALT)- if normal then restart lower dose of rosuvastatin 5 mg daily.   2. See Dr. Chapa for cardiology follow up at [facility]: Oct 2023.   Call 6 months prior, to schedule.     Cardiology Scheduling~[phone]  Cardiology Clinic RN~[phone]  (Sheila RN, Roxanne RN, Unique RN)                2. Nonobstructive CAD    No angina     Continue aspirin, CCB, start statin         4. Hypertension     Controlled     Continue amlodipine     Consider increasing amlodipine, if needed        3. Hyperlipidemia    Last   - Not on statin therapy     ALT elevated after starting rosuvastatin 20 mg daily but also had pancreatitis from gallstones, now s/p cholecystectomy    If ALT normalizes retry lower dose rosuvastatin and reassess lipids           Thank you for allowing me to participate in this delightful patient's care.      This note was completed in part using Dragon voice recognition software. Although reviewed after completion, some word and grammatical errors may occur.    VALERIA Krishnan CNP, APRN, CNP           Data:   All laboratory data reviewed      Total time spent today was 30 mins, reviewing labs, testing, notes, documenting notes, and seeing patient.          Constitutional:  cooperative, alert and oriented, well developed, well nourished, in no acute distress  overweight     Skin:  warm and dry to the touch         Head:  normocephalic       Eyes:  pupils equal and round       ENT:  no pallor or cyanosis       Neck:  no stiffness       Respiratory:  clear to auscultation; normal symmetry        Cardiac: regular rate and rhythm; normal S1 and S2                 pulses full and equal      GI:  abdomen soft, nondistended     Extremities and Muscular Skeletal:  no edema        Neurological:  affect appropriate; no gross motor deficits       Psych:  Alert and Oriented x 3 , appropriate affact        Thank you for allowing me to participate in the care of your patient.    Sincerely,     VALERIA Krishnan CNP     [facility]

## 2023-03-08 NOTE — PATIENT INSTRUCTIONS
Medication Changes:  None     Recommendations:  Call with questions     Follow-up:  lab in 2 months (ALT)- if normal then restart lower dose of rosuvastatin 5 mg daily.   See Dr. Chapa for cardiology follow up at [facility]: Oct 2023.   Call 6 months prior, to schedule.     Cardiology Scheduling~[phone]  Cardiology Clinic RN~[phone] (Sheila RN, Roxanne RN, Unique RN)

## 2023-03-17 DIAGNOSIS — I10 ESSENTIAL HYPERTENSION: ICD-10-CM

## 2023-03-17 RX ORDER — AMLODIPINE BESYLATE 2.5 MG/1
TABLET ORAL
Qty: 180 TABLET | Refills: 0 | Status: SHIPPED | OUTPATIENT
Start: 2023-03-17 | End: 2023-04-04

## 2023-03-28 DIAGNOSIS — J45.20 MILD INTERMITTENT ASTHMA WITHOUT COMPLICATION: ICD-10-CM

## 2023-03-28 NOTE — TELEPHONE ENCOUNTER
Pending Prescriptions:                       Disp   Refills    albuterol (VENTOLIN HFA) 108 (90 Base) MC*18 g   0            Sig: INHALE 1-2 PUFFS INTO THE LUNGS EVERY 4 HOURS AS           NEEDED FOR SHORTNESS OF BREATH/DYSPNEA OR           WHEEZING .    Routing refill request to provider for review/approval because:  Asthma control assessment      Chun Gandara RN

## 2023-03-29 RX ORDER — ALBUTEROL SULFATE 90 UG/1
AEROSOL, METERED RESPIRATORY (INHALATION)
Qty: 18 G | Refills: 0 | Status: SHIPPED | OUTPATIENT
Start: 2023-03-29 | End: 2023-04-04

## 2023-04-04 ENCOUNTER — OFFICE VISIT (OUTPATIENT)
Dept: FAMILY MEDICINE | Facility: CLINIC | Age: 58
End: 2023-04-04
Payer: COMMERCIAL

## 2023-04-04 VITALS
SYSTOLIC BLOOD PRESSURE: 132 MMHG | HEIGHT: 63 IN | DIASTOLIC BLOOD PRESSURE: 80 MMHG | HEART RATE: 73 BPM | BODY MASS INDEX: 31.89 KG/M2 | WEIGHT: 180 LBS | RESPIRATION RATE: 16 BRPM | OXYGEN SATURATION: 98 % | TEMPERATURE: 97.1 F

## 2023-04-04 DIAGNOSIS — J45.20 MILD INTERMITTENT ASTHMA WITHOUT COMPLICATION: ICD-10-CM

## 2023-04-04 DIAGNOSIS — F33.40 RECURRENT MAJOR DEPRESSIVE DISORDER, IN REMISSION (H): ICD-10-CM

## 2023-04-04 DIAGNOSIS — I10 ESSENTIAL HYPERTENSION: ICD-10-CM

## 2023-04-04 DIAGNOSIS — K21.9 GASTROESOPHAGEAL REFLUX DISEASE WITHOUT ESOPHAGITIS: ICD-10-CM

## 2023-04-04 PROCEDURE — 99214 OFFICE O/P EST MOD 30 MIN: CPT | Performed by: FAMILY MEDICINE

## 2023-04-04 RX ORDER — NICOTINE POLACRILEX 4 MG/1
40 GUM, CHEWING ORAL DAILY
Qty: 180 TABLET | Refills: 3 | Status: SHIPPED | OUTPATIENT
Start: 2023-04-04 | End: 2023-07-03

## 2023-04-04 RX ORDER — FLUOXETINE 40 MG/1
40 CAPSULE ORAL DAILY
Qty: 90 CAPSULE | Refills: 3 | Status: SHIPPED | OUTPATIENT
Start: 2023-04-04 | End: 2024-05-10

## 2023-04-04 RX ORDER — ALBUTEROL SULFATE 90 UG/1
AEROSOL, METERED RESPIRATORY (INHALATION)
Qty: 18 G | Refills: 11 | Status: SHIPPED | OUTPATIENT
Start: 2023-04-04 | End: 2024-05-13

## 2023-04-04 RX ORDER — AMLODIPINE BESYLATE 2.5 MG/1
TABLET ORAL
Qty: 180 TABLET | Refills: 3 | Status: SHIPPED | OUTPATIENT
Start: 2023-04-04 | End: 2024-05-10

## 2023-04-04 ASSESSMENT — ENCOUNTER SYMPTOMS
HEMATOLOGIC/LYMPHATIC NEGATIVE: 1
ABDOMINAL DISTENTION: 1
NEUROLOGICAL NEGATIVE: 1
EYES NEGATIVE: 1
RESPIRATORY NEGATIVE: 1
CARDIOVASCULAR NEGATIVE: 1
MUSCULOSKELETAL NEGATIVE: 1
ALLERGIC/IMMUNOLOGIC NEGATIVE: 1
CONSTITUTIONAL NEGATIVE: 1
PSYCHIATRIC NEGATIVE: 1
ENDOCRINE NEGATIVE: 1

## 2023-04-04 ASSESSMENT — ANXIETY QUESTIONNAIRES
4. TROUBLE RELAXING: NOT AT ALL
3. WORRYING TOO MUCH ABOUT DIFFERENT THINGS: SEVERAL DAYS
7. FEELING AFRAID AS IF SOMETHING AWFUL MIGHT HAPPEN: NOT AT ALL
IF YOU CHECKED OFF ANY PROBLEMS ON THIS QUESTIONNAIRE, HOW DIFFICULT HAVE THESE PROBLEMS MADE IT FOR YOU TO DO YOUR WORK, TAKE CARE OF THINGS AT HOME, OR GET ALONG WITH OTHER PEOPLE: NOT DIFFICULT AT ALL
7. FEELING AFRAID AS IF SOMETHING AWFUL MIGHT HAPPEN: NOT AT ALL
GAD7 TOTAL SCORE: 1
6. BECOMING EASILY ANNOYED OR IRRITABLE: NOT AT ALL
GAD7 TOTAL SCORE: 1
8. IF YOU CHECKED OFF ANY PROBLEMS, HOW DIFFICULT HAVE THESE MADE IT FOR YOU TO DO YOUR WORK, TAKE CARE OF THINGS AT HOME, OR GET ALONG WITH OTHER PEOPLE?: NOT DIFFICULT AT ALL
5. BEING SO RESTLESS THAT IT IS HARD TO SIT STILL: NOT AT ALL
1. FEELING NERVOUS, ANXIOUS, OR ON EDGE: NOT AT ALL
GAD7 TOTAL SCORE: 1
2. NOT BEING ABLE TO STOP OR CONTROL WORRYING: NOT AT ALL

## 2023-04-04 ASSESSMENT — PAIN SCALES - GENERAL: PAINLEVEL: NO PAIN (0)

## 2023-04-04 ASSESSMENT — PATIENT HEALTH QUESTIONNAIRE - PHQ9: SUM OF ALL RESPONSES TO PHQ QUESTIONS 1-9: 8

## 2023-04-04 NOTE — PROGRESS NOTES
"Patient is a 58 yr old female here for mu;tiple issues.     Assessment & Plan     Recurrent major depressive disorder, in remission (H)  Medication refilled.   - FLUoxetine (PROZAC) 20 MG capsule; Take 1 capsule (20 mg) by mouth daily  - FLUoxetine (PROZAC) 40 MG capsule; Take 1 capsule (40 mg) by mouth daily Take with 20 mg tab for a total of 60 mg daily    Essential hypertension  Blood pressure within normal range. Medication faxed.   - amLODIPine (NORVASC) 2.5 MG tablet; Take 2 tablets (5 mg) by mouth once daily.    Gastroesophageal reflux disease without esophagitis  Continue with omeprazole. Follow up with GI for the bloating.  - omeprazole 20 MG tablet; Take 2 tablets (40 mg) by mouth daily for 90 days    Mild intermittent asthma without complication  Medication faxed, asthma seems well controlled.  - albuterol (VENTOLIN HFA) 108 (90 Base) MCG/ACT inhaler; INHALE 1-2 PUFFS INTO THE LUNGS EVERY 4 HOURS AS NEEDED FOR SHORTNESS OF BREATH/DYSPNEA OR WHEEZING .      BMI:   Estimated body mass index is 32.4 kg/m  as calculated from the following:    Height as of this encounter: 1.588 m (5' 2.5\").    Weight as of this encounter: 81.6 kg (180 lb).           Julia Oneill MD  [facility]    Thor Figueroa is a 58 year old, presenting for the following health issues:  Patient has been dealing with some gastrointestinal symptoms for some time now. At first she had loose stools but this has since improved. She also had her gall bladder removed last Fall. She says that she has had more issues with bloating for a couple of months. She was on a regimen of Imoduim and also on Psyllium. This helped a bit but she says she stopped the Psyllium because it made her gassy. She was seen by the GI specialist last Fall and there was talk of getting a fructose test and a SIBO. Recommend that patient follow up with GI.  Other concerns today is her medication refills on hypertension and depression. " "Her blood pressure is in fair range. She reports that her depression fluctuates. She reports that her living situation is what causes her to be depressed. She lives with her brother in law and his family and she feels \"stuck\" she is trying to get a job so she can move out.   Gastric Problem (Recheck on Omeprazole.  It was increased to 2 tablets and she is wanting to know if she should stay at that dose.), Depression (Recheck on depression.), Hypertension (Recheck on blood pressure medication. ), and Weight Problem (She is still having issues with weight gain.  Still having bloating and having issues with trying to find the correct fiber to take.  She stopped the Psyllium husk as she thought this was causing her gas.  She has been seeing a GI MD for about 3 years now.  Doesn't feel she is getting much help with her issues.)      Chief Complaint   Patient presents with     Gastric Problem     Recheck on Omeprazole.  It was increased to 2 tablets and she is wanting to know if she should stay at that dose.     Depression     Recheck on depression.     Hypertension     Recheck on blood pressure medication.      Weight Problem     She is still having issues with weight gain.  Still having bloating and having issues with trying to find the correct fiber to take.  She stopped the Psyllium husk as she thought this was causing her gas.  She has been seeing a GI MD for about 3 years now.  Doesn't feel she is getting much help with her issues.           4/4/2023     8:44 AM   Additional Questions   Roomed by Roxanne Morin CMA   Accompanied by Self     HPI     Hypertension Follow-up      Do you check your blood pressure regularly outside of the clinic? Yes     Are you following a low salt diet? Yes    Are your blood pressures ever more than 140 on the top number (systolic) OR more   than 90 on the bottom number (diastolic), for example 140/90? No    Depression Followup    How are you doing with your depression since your last " visit? No change    Are you having other symptoms that might be associated with depression? Yes:  Feels she is really having issues with her weight.  Trying to walk and eat salads, cutting out fat and sugar.    Have you had a significant life event?  No     Are you feeling anxious or having panic attacks?   No    Do you have any concerns with your use of alcohol or other drugs? No    Social History     Tobacco Use     Smoking status: Former     Packs/day: 0.00     Years: 23.00     Pack years: 0.00     Types: Cigarettes     Quit date: 2021     Years since quittin.7     Smokeless tobacco: Never   Vaping Use     Vaping status: Never Used   Substance Use Topics     Alcohol use: Not Currently     Comment: 4-6 beers multiple times weekly     Drug use: No         2022    10:02 AM 10/26/2022     5:03 PM 2023     9:51 AM   PHQ   PHQ-9 Total Score 5 11 8   Q9: Thoughts of better off dead/self-harm past 2 weeks Several days Not at all Not at all   F/U: Thoughts of suicide or self-harm No     F/U: Safety concerns No           2021    11:46 AM 10/26/2022     5:03 PM 2023     9:51 AM   JAK-7 SCORE   Total Score 17 (severe anxiety) 9 (mild anxiety) 1 (minimal anxiety)   Total Score 17 9 1         2023     9:51 AM   Last PHQ-9   1.  Little interest or pleasure in doing things 0   2.  Feeling down, depressed, or hopeless 2   3.  Trouble falling or staying asleep, or sleeping too much 1   4.  Feeling tired or having little energy 1   5.  Poor appetite or overeating 1   6.  Feeling bad about yourself 1   7.  Trouble concentrating 1   8.  Moving slowly or restless 1   Q9: Thoughts of better off dead/self-harm past 2 weeks 0   PHQ-9 Total Score 8   Difficulty at work, home, or with people Somewhat difficult         2023     9:51 AM   JAK-7    1. Feeling nervous, anxious, or on edge 0   2. Not being able to stop or control worrying 0   3. Worrying too much about different things 1   4. Trouble relaxing  "0   5. Being so restless that it is hard to sit still 0   6. Becoming easily annoyed or irritable 0   7. Feeling afraid, as if something awful might happen 0   JAK-7 Total Score 1   If you checked any problems, how difficult have they made it for you to do your work, take care of things at home, or get along with other people? Not difficult at all       Suicide Assessment Five-step Evaluation and Treatment (SAFE-T)      How many servings of fruits and vegetables do you eat daily?  4 or more    On average, how many sweetened beverages do you drink each day (Examples: soda, juice, sweet tea, etc.  Do NOT count diet or artificially sweetened beverages)?   None    How many days per week do you exercise enough to make your heart beat faster?  4-5    How many minutes a day do you exercise enough to make your heart beat faster? Over 2 miles for her walking.  Then taking care of her animals.    How many days per week do you miss taking your medication? 0    Medication Followup of Reflux    Taking Medication as prescribed: yes    Side Effects:  None    Medication Helping Symptoms:  Her medication was increased to 2 daily.  She is wanting to know if she should continue at that dose.  She states her body feels different since having her gallbladder removed.          Review of Systems   Constitutional: Negative.    HENT: Negative.    Eyes: Negative.    Respiratory: Negative.    Cardiovascular: Negative.    Gastrointestinal: Positive for abdominal distention.   Endocrine: Negative.    Breasts:  negative.    Genitourinary: Negative.    Musculoskeletal: Negative.    Allergic/Immunologic: Negative.    Neurological: Negative.    Hematological: Negative.    Psychiatric/Behavioral: Negative.             Objective    /80 (BP Location: Left arm, Patient Position: Chair, Cuff Size: Adult Regular)   Pulse 73   Temp 97.1  F (36.2  C) (Tympanic)   Resp 16   Ht 1.588 m (5' 2.5\")   Wt 81.6 kg (180 lb)   SpO2 98%   BMI 32.40 kg/m  "   Body mass index is 32.4 kg/m .  Physical Exam   GENERAL: healthy, alert and no distress  EYES: Eyes grossly normal to inspection, PERRL and conjunctivae and sclerae normal  HENT: ear canals and TM's normal, nose and mouth without ulcers or lesions  NECK: no adenopathy, no asymmetry, masses, or scars and thyroid normal to palpation  RESP: lungs clear to auscultation - no rales, rhonchi or wheezes  CV: regular rate and rhythm, normal S1 S2, no S3 or S4, no murmur, click or rub, no peripheral edema and peripheral pulses strong  ABDOMEN: soft, nontender, without hepatosplenomegaly or masses, bowel sounds normal and bloated  MS: no gross musculoskeletal defects noted, no edema    No results found for this or any previous visit (from the past 24 hour(s)).                Answers for HPI/ROS submitted by the patient on 4/4/2023  JAK 7 TOTAL SCORE: 1

## 2023-04-08 ENCOUNTER — HEALTH MAINTENANCE LETTER (OUTPATIENT)
Age: 58
End: 2023-04-08

## 2023-04-17 ASSESSMENT — ENCOUNTER SYMPTOMS
DYSURIA: 0
DOUBLE VISION: 0
DIFFICULTY URINATING: 0
DECREASED LIBIDO: 1
EYE WATERING: 0
FLANK PAIN: 0
HEMATURIA: 0
EYE PAIN: 0
EYE IRRITATION: 1
EYE REDNESS: 1
HOT FLASHES: 0

## 2023-04-24 ENCOUNTER — OFFICE VISIT (OUTPATIENT)
Dept: PULMONOLOGY | Facility: CLINIC | Age: 58
End: 2023-04-24
Payer: COMMERCIAL

## 2023-04-24 VITALS — SYSTOLIC BLOOD PRESSURE: 118 MMHG | DIASTOLIC BLOOD PRESSURE: 74 MMHG | OXYGEN SATURATION: 96 % | HEART RATE: 77 BPM

## 2023-04-24 DIAGNOSIS — J45.50 SEVERE PERSISTENT ASTHMA WITHOUT COMPLICATION (H): ICD-10-CM

## 2023-04-24 DIAGNOSIS — J44.9 COPD (CHRONIC OBSTRUCTIVE PULMONARY DISEASE) (H): Primary | Chronic | ICD-10-CM

## 2023-04-24 DIAGNOSIS — J44.9 STAGE 2 MODERATE COPD BY GOLD CLASSIFICATION (H): Primary | ICD-10-CM

## 2023-04-24 DIAGNOSIS — J44.9 STAGE 2 MODERATE COPD BY GOLD CLASSIFICATION (H): ICD-10-CM

## 2023-04-24 PROCEDURE — 94729 DIFFUSING CAPACITY: CPT | Performed by: INTERNAL MEDICINE

## 2023-04-24 PROCEDURE — 99214 OFFICE O/P EST MOD 30 MIN: CPT | Mod: 25

## 2023-04-24 PROCEDURE — G0463 HOSPITAL OUTPT CLINIC VISIT: HCPCS

## 2023-04-24 PROCEDURE — 94375 RESPIRATORY FLOW VOLUME LOOP: CPT | Performed by: INTERNAL MEDICINE

## 2023-04-24 RX ORDER — FLUTICASONE PROPIONATE AND SALMETEROL 100; 50 UG/1; UG/1
1 POWDER RESPIRATORY (INHALATION) EVERY 12 HOURS
Qty: 1 EACH | Refills: 11 | Status: SHIPPED | OUTPATIENT
Start: 2023-04-24 | End: 2023-07-12

## 2023-04-24 NOTE — NURSING NOTE
Chief Complaint   Patient presents with     General Visit     F/u     Vitals were taken and medications were reconciled.     WHITNEY Moore

## 2023-04-24 NOTE — LETTER
4/24/2023         RE: Carolina Hernandez  [address] [place] [id number]        Dear Colleague,    Thank you for referring your patient, Carolina Hernandez, to the [facility] [place]. Please see a copy of my visit note below.          [facility]  Pulmonary Medicine  Visit Clinic Note  April 24, 2023         ASSESSMENT & PLAN       Moderate COPD  Severe persistent asthma  Shortness of breath    Her symptoms are markedly improved after starting Spiriva daily.  She is prescribed Advair 500 mcg twice a day, but she is mostly only taking it about once a day.  Her spirometry is greatly improved as well.  Given her subjective improvement, I do think that we can de-escalate therapy.  I will decrease her Advair to 100 mcg twice a day and continue Spiriva.  She should also stay on Singulair.    I will have her follow-up in 6 months.    Maximino Turner MD    I spent 31 minutes on the date of the encounter reviewing medical record and performing history and physical exam, discussing inhaler therapy changes.       Today's visit note:     Chief Complaint: General Visit (F/u)      HISTORY OF PRESENT ILLNESS:    I my last visit with her 6 months ago, she is still having a lot of shortness of breath with exertion.  We added Spiriva to her 500 mcg of Advair.  Also have her see a cardiologist.  She had a left heart catheterization showing nonobstructive coronary artery disease.  She is started on a statin medication.  Shortly afterwards she had an episode of what was thought to be gallstone pancreatitis and was admitted to the hospital.  She had her gallbladder eventually removed.  Since that time she did gain a lot of weight, and she has been working to try to lose this.  She has since lost about 10 pounds.  She is trying to exercise regularly by walking.  Despite having multiple other medical problems crop up, her breathing has been quite  excellent.  She really thinks that Spiriva made a big difference in her day-to-day shortness of breath and cough symptoms.           Past Medical and Surgical History:     Past Medical History:   Diagnosis Date    Asthma     C. difficile colitis     Depression     Depressive disorder Have had it most of my life    Moderate persistent asthma with exacerbation 2007    Osteoarthritis     Sinusitis, chronic     Status post coronary angiogram 10/14/2022     Past Surgical History:   Procedure Laterality Date    COLONOSCOPY      CV CORONARY ANGIOGRAM N/A 10/14/2022    Procedure: Coronary Angiogram;  Surgeon: Fidel Martin MD;  Location: [facility] CARDIAC CATH LAB    ESOPHAGOSCOPY, GASTROSCOPY, DUODENOSCOPY (EGD), COMBINED N/A 2022    Procedure: ESOPHAGOGASTRODUODENOSCOPY, WITH BIOPSY;  Surgeon: Timothy Oliva DO;  Location: [facility] OR    LAPAROSCOPIC CHOLECYSTECTOMY WITH CHOLANGIOGRAMS N/A 2022    Procedure: CHOLECYSTECTOMY, LAPAROSCOPIC, WITH CHOLANGIOGRAM;  Surgeon: Eros Butt DO;  Location: [facility] OR           Family History:     Family History   Problem Relation Age of Onset    Glaucoma Father     Coronary Artery Disease Father         stents    Cancer Maternal Grandfather     Coronary Artery Disease Paternal Grandfather     Schizophrenia Daughter     Diabetes Daughter     Cancer Daughter     Crohn's Disease No family hx of     Ulcerative Colitis No family hx of     GERD No family hx of     Stomach Cancer No family hx of               Social History:     Social History     Socioeconomic History    Marital status:      Spouse name: Not on file    Number of children: Not on file    Years of education: Not on file    Highest education level: Not on file   Occupational History    Not on file   Tobacco Use    Smoking status: Former     Packs/day: 0.00     Years: 23.00     Pack years: 0.00     Types: Cigarettes     Quit date: 2021     Years since quittin.7    Smokeless  tobacco: Never   Vaping Use    Vaping status: Never Used   Substance and Sexual Activity    Alcohol use: Not Currently     Comment: 4-6 beers multiple times weekly    Drug use: No    Sexual activity: Not Currently     Partners: Male     Birth control/protection: Female Surgical   Other Topics Concern    Not on file   Social History Narrative    Not on file     Social Determinants of Health     Financial Resource Strain: Not on file   Food Insecurity: Not on file   Transportation Needs: Not on file   Physical Activity: Not on file   Stress: Not on file   Social Connections: Not on file   Intimate Partner Violence: Not on file   Housing Stability: Not on file            Medications:     Current Outpatient Medications   Medication    albuterol (PROVENTIL) (2.5 MG/3ML) 0.083% neb solution    albuterol (VENTOLIN HFA) 108 (90 Base) MCG/ACT inhaler    amLODIPine (NORVASC) 2.5 MG tablet    aspirin (ASA) 81 MG EC tablet    cetirizine (ZYRTEC) 10 MG tablet    FLUoxetine (PROZAC) 20 MG capsule    FLUoxetine (PROZAC) 40 MG capsule    fluticasone-salmeterol (ADVAIR) 100-50 MCG/ACT inhaler    montelukast (SINGULAIR) 10 MG tablet    omeprazole 20 MG tablet    tiotropium (SPIRIVA) 18 MCG inhaled capsule     No current facility-administered medications for this visit.            Review of Systems:       Answers for HPI/ROS submitted by the patient on 4/17/2023  General Symptoms: No  Skin Symptoms: No  HENT Symptoms: No  EYE SYMPTOMS: Yes  HEART SYMPTOMS: No  LUNG SYMPTOMS: No  INTESTINAL SYMPTOMS: No  URINARY SYMPTOMS: Yes  GYNECOLOGIC SYMPTOMS: Yes  BREAST SYMPTOMS: No  SKELETAL SYMPTOMS: No  BLOOD SYMPTOMS: No  NERVOUS SYSTEM SYMPTOMS: No  MENTAL HEALTH SYMPTOMS: No  Eye pain: No  Vision loss: No  Dry eyes: Yes  Watery eyes: No  Eye bulging: No  Double vision: No  Flashing of lights: No  Spots: No  Floaters: No  Redness: Yes  Crossed eyes: No  Tunnel Vision: No  Yellowing of eyes: No  Eye irritation: Yes  Trouble holding urine or  incontinence: Yes  Pain or burning: No  Trouble starting or stopping: No  Increased frequency of urination: Yes  Blood in urine: No  Decreased frequency of urination: No  Frequent nighttime urination: Yes  Flank pain: No  Difficulty emptying bladder: No  Bleeding or spotting between periods: No  Heavy or painful periods: No  Irregular periods: No  Vaginal discharge: No  Hot flashes: No  Vaginal dryness: Yes  Genital ulcers: No  Reduced libido: Yes  Painful intercourse: Yes  Difficulty with sexual arousal: Yes  Post-menopausal bleeding: No    A complete review of systems was otherwise negative except as noted in the HPI.      PHYSICAL EXAM:  /74   Pulse 77   SpO2 96%      General: Comfortable, No apparent distress  Eyes: Anicteric   Ears: Hearing grossly normal  Neck: supple, no thyromegaly   Lymphatics: No cervical or supraclavicular nodes  Respiratory: Good air movement. No crackles. No rhonchi. No wheezes  Cardiac: RRR, normal S1, S2. No murmurs. No JVD  Musculoskeletal: Extremities normal. No clubbing. No cyanosis. No edema.  Skin: No rash on limited exam  Neuro: Normal mentation. Normal speech.  Psych:Normal affect           Data:   All laboratory and imaging data reviewed.      IgE 130  Hypersensitivity panel negative  ANCA antibody less than 1:10  JANET negative    PFTs April 24, 2023: FEV1/FVC ratio 0.56.  FVC is 2.85 L which is 101% predicted and the FEV1 is 1.60 L which is 70% predicted.  Her diffusion capacity is 106% predicted.    Interpretation: Moderate airflow obstruction.  Normal diffusion capacity.  Significant improvement in her spirometry compared to prior.      Recent Results (from the past 168 hour(s))   General PFT Lab (Please always keep checked)    Collection Time: 04/24/23  2:00 PM   Result Value Ref Range    FVC-Pred 2.81 L    FVC-Pre 2.85 L    FVC-%Pred-Pre 101 %    FEV1-Pre 1.60 L    FEV1-%Pred-Pre 70 %    FEV1FVC-Pred 80 %    FEV1FVC-Pre 56 %    FEFMax-Pred 6.14 L/sec    FEFMax-Pre  4.23 L/sec    FEFMax-%Pred-Pre 68 %    FEF2575-Pred 2.11 L/sec    FEF2575-Pre 0.53 L/sec    TTG6720-%Pred-Pre 25 %    ExpTime-Pre 12.65 sec    FIFMax-Pre 3.23 L/sec    VC-Pred 3.14 L    VC-Pre 2.81 L    VC-%Pred-Pre 89 %    IC-Pred 2.59 L    IC-Pre 1.94 L    IC-%Pred-Pre 74 %    ERV-Pred 0.55 L    ERV-Pre 0.88 L    ERV-%Pred-Pre 159 %    FEV1FEV6-Pred 81 %    FEV1FEV6-Pre 60 %    DLCOunc-Pred 19.54 ml/min/mmHg    DLCOunc-Pre 20.85 ml/min/mmHg    DLCOunc-%Pred-Pre 106 %    VA-Pre 5.10 L    VA-%Pred-Pre 110 %    FEV1SVC-Pred 72 %    FEV1SVC-Pre 57 %                             Again, thank you for allowing me to participate in the care of your patient.        Sincerely,        Maximino Turner MD

## 2023-04-24 NOTE — PROGRESS NOTES
[facility]  Pulmonary Medicine  Visit Clinic Note  April 24, 2023         ASSESSMENT & PLAN       Moderate COPD  Severe persistent asthma  Shortness of breath    Her symptoms are markedly improved after starting Spiriva daily.  She is prescribed Advair 500 mcg twice a day, but she is mostly only taking it about once a day.  Her spirometry is greatly improved as well.  Given her subjective improvement, I do think that we can de-escalate therapy.  I will decrease her Advair to 100 mcg twice a day and continue Spiriva.  She should also stay on Singulair.    I will have her follow-up in 6 months.    Maximino Turner MD    I spent 31 minutes on the date of the encounter reviewing medical record and performing history and physical exam, discussing inhaler therapy changes.       Today's visit note:     Chief Complaint: General Visit (F/u)      HISTORY OF PRESENT ILLNESS:    I my last visit with her 6 months ago, she is still having a lot of shortness of breath with exertion.  We added Spiriva to her 500 mcg of Advair.  Also have her see a cardiologist.  She had a left heart catheterization showing nonobstructive coronary artery disease.  She is started on a statin medication.  Shortly afterwards she had an episode of what was thought to be gallstone pancreatitis and was admitted to the hospital.  She had her gallbladder eventually removed.  Since that time she did gain a lot of weight, and she has been working to try to lose this.  She has since lost about 10 pounds.  She is trying to exercise regularly by walking.  Despite having multiple other medical problems crop up, her breathing has been quite excellent.  She really thinks that Spiriva made a big difference in her day-to-day shortness of breath and cough symptoms.           Past Medical and Surgical History:     Past Medical History:   Diagnosis Date     Asthma      C. difficile colitis      Depression      Depressive disorder Have had  it most of my life     Moderate persistent asthma with exacerbation 2007     Osteoarthritis      Sinusitis, chronic      Status post coronary angiogram 10/14/2022     Past Surgical History:   Procedure Laterality Date     COLONOSCOPY       CV CORONARY ANGIOGRAM N/A 10/14/2022    Procedure: Coronary Angiogram;  Surgeon: Fidel Martin MD;  [facility]     ESOPHAGOSCOPY, GASTROSCOPY, DUODENOSCOPY (EGD), COMBINED N/A 2022    Procedure: ESOPHAGOGASTRODUODENOSCOPY, WITH BIOPSY;  Surgeon: Timothy Oliva DO;  Location: [facility] OR     LAPAROSCOPIC CHOLECYSTECTOMY WITH CHOLANGIOGRAMS N/A 2022    Procedure: CHOLECYSTECTOMY, LAPAROSCOPIC, WITH CHOLANGIOGRAM;  Surgeon: Eros Butt DO;  Location: [facility] OR           Family History:     Family History   Problem Relation Age of Onset     Glaucoma Father      Coronary Artery Disease Father         stents     Cancer Maternal Grandfather      Coronary Artery Disease Paternal Grandfather      Schizophrenia Daughter      Diabetes Daughter      Cancer Daughter      Crohn's Disease No family hx of      Ulcerative Colitis No family hx of      GERD No family hx of      Stomach Cancer No family hx of               Social History:     Social History     Socioeconomic History     Marital status:      Spouse name: Not on file     Number of children: Not on file     Years of education: Not on file     Highest education level: Not on file   Occupational History     Not on file   Tobacco Use     Smoking status: Former     Packs/day: 0.00     Years: 23.00     Pack years: 0.00     Types: Cigarettes     Quit date: 2021     Years since quittin.7     Smokeless tobacco: Never   Vaping Use     Vaping status: Never Used   Substance and Sexual Activity     Alcohol use: Not Currently     Comment: 4-6 beers multiple times weekly     Drug use: No     Sexual activity: Not Currently     Partners: Male     Birth control/protection:  Female Surgical   Other Topics Concern     Not on file   Social History Narrative     Not on file     Social Determinants of Health     Financial Resource Strain: Not on file   Food Insecurity: Not on file   Transportation Needs: Not on file   Physical Activity: Not on file   Stress: Not on file   Social Connections: Not on file   Intimate Partner Violence: Not on file   Housing Stability: Not on file            Medications:     Current Outpatient Medications   Medication     albuterol (PROVENTIL) (2.5 MG/3ML) 0.083% neb solution     albuterol (VENTOLIN HFA) 108 (90 Base) MCG/ACT inhaler     amLODIPine (NORVASC) 2.5 MG tablet     aspirin (ASA) 81 MG EC tablet     cetirizine (ZYRTEC) 10 MG tablet     FLUoxetine (PROZAC) 20 MG capsule     FLUoxetine (PROZAC) 40 MG capsule     fluticasone-salmeterol (ADVAIR) 100-50 MCG/ACT inhaler     montelukast (SINGULAIR) 10 MG tablet     omeprazole 20 MG tablet     tiotropium (SPIRIVA) 18 MCG inhaled capsule     No current facility-administered medications for this visit.            Review of Systems:       Answers for HPI/ROS submitted by the patient on 4/17/2023  General Symptoms: No  Skin Symptoms: No  HENT Symptoms: No  EYE SYMPTOMS: Yes  HEART SYMPTOMS: No  LUNG SYMPTOMS: No  INTESTINAL SYMPTOMS: No  URINARY SYMPTOMS: Yes  GYNECOLOGIC SYMPTOMS: Yes  BREAST SYMPTOMS: No  SKELETAL SYMPTOMS: No  BLOOD SYMPTOMS: No  NERVOUS SYSTEM SYMPTOMS: No  MENTAL HEALTH SYMPTOMS: No  Eye pain: No  Vision loss: No  Dry eyes: Yes  Watery eyes: No  Eye bulging: No  Double vision: No  Flashing of lights: No  Spots: No  Floaters: No  Redness: Yes  Crossed eyes: No  Tunnel Vision: No  Yellowing of eyes: No  Eye irritation: Yes  Trouble holding urine or incontinence: Yes  Pain or burning: No  Trouble starting or stopping: No  Increased frequency of urination: Yes  Blood in urine: No  Decreased frequency of urination: No  Frequent nighttime urination: Yes  Flank pain: No  Difficulty emptying bladder:  No  Bleeding or spotting between periods: No  Heavy or painful periods: No  Irregular periods: No  Vaginal discharge: No  Hot flashes: No  Vaginal dryness: Yes  Genital ulcers: No  Reduced libido: Yes  Painful intercourse: Yes  Difficulty with sexual arousal: Yes  Post-menopausal bleeding: No    A complete review of systems was otherwise negative except as noted in the HPI.      PHYSICAL EXAM:  /74   Pulse 77   SpO2 96%      General: Comfortable, No apparent distress  Eyes: Anicteric   Ears: Hearing grossly normal  Neck: supple, no thyromegaly   Lymphatics: No cervical or supraclavicular nodes  Respiratory: Good air movement. No crackles. No rhonchi. No wheezes  Cardiac: RRR, normal S1, S2. No murmurs. No JVD  Musculoskeletal: Extremities normal. No clubbing. No cyanosis. No edema.  Skin: No rash on limited exam  Neuro: Normal mentation. Normal speech.  Psych:Normal affect           Data:   All laboratory and imaging data reviewed.      IgE 130  Hypersensitivity panel negative  ANCA antibody less than 1:10  JANET negative    PFTs April 24, 2023: FEV1/FVC ratio 0.56.  FVC is 2.85 L which is 101% predicted and the FEV1 is 1.60 L which is 70% predicted.  Her diffusion capacity is 106% predicted.    Interpretation: Moderate airflow obstruction.  Normal diffusion capacity.  Significant improvement in her spirometry compared to prior.      Recent Results (from the past 168 hour(s))   General PFT Lab (Please always keep checked)    Collection Time: 04/24/23  2:00 PM   Result Value Ref Range    FVC-Pred 2.81 L    FVC-Pre 2.85 L    FVC-%Pred-Pre 101 %    FEV1-Pre 1.60 L    FEV1-%Pred-Pre 70 %    FEV1FVC-Pred 80 %    FEV1FVC-Pre 56 %    FEFMax-Pred 6.14 L/sec    FEFMax-Pre 4.23 L/sec    FEFMax-%Pred-Pre 68 %    FEF2575-Pred 2.11 L/sec    FEF2575-Pre 0.53 L/sec    FNA8678-%Pred-Pre 25 %    ExpTime-Pre 12.65 sec    FIFMax-Pre 3.23 L/sec    VC-Pred 3.14 L    VC-Pre 2.81 L    VC-%Pred-Pre 89 %    IC-Pred 2.59 L    IC-Pre  1.94 L    IC-%Pred-Pre 74 %    ERV-Pred 0.55 L    ERV-Pre 0.88 L    ERV-%Pred-Pre 159 %    FEV1FEV6-Pred 81 %    FEV1FEV6-Pre 60 %    DLCOunc-Pred 19.54 ml/min/mmHg    DLCOunc-Pre 20.85 ml/min/mmHg    DLCOunc-%Pred-Pre 106 %    VA-Pre 5.10 L    VA-%Pred-Pre 110 %    FEV1SVC-Pred 72 %    FEV1SVC-Pre 57 %

## 2023-04-25 LAB
DLCOUNC-%PRED-PRE: 106 %
DLCOUNC-PRE: 20.85 ML/MIN/MMHG
DLCOUNC-PRED: 19.54 ML/MIN/MMHG
ERV-%PRED-PRE: 159 %
ERV-PRE: 0.88 L
ERV-PRED: 0.55 L
EXPTIME-PRE: 12.65 SEC
FEF2575-%PRED-PRE: 25 %
FEF2575-PRE: 0.53 L/SEC
FEF2575-PRED: 2.11 L/SEC
FEFMAX-%PRED-PRE: 68 %
FEFMAX-PRE: 4.23 L/SEC
FEFMAX-PRED: 6.14 L/SEC
FEV1-%PRED-PRE: 70 %
FEV1-PRE: 1.6 L
FEV1FEV6-PRE: 60 %
FEV1FEV6-PRED: 81 %
FEV1FVC-PRE: 56 %
FEV1FVC-PRED: 80 %
FEV1SVC-PRE: 57 %
FEV1SVC-PRED: 72 %
FIFMAX-PRE: 3.23 L/SEC
FVC-%PRED-PRE: 101 %
FVC-PRE: 2.85 L
FVC-PRED: 2.81 L
IC-%PRED-PRE: 74 %
IC-PRE: 1.94 L
IC-PRED: 2.59 L
VA-%PRED-PRE: 110 %
VA-PRE: 5.1 L
VC-%PRED-PRE: 89 %
VC-PRE: 2.81 L
VC-PRED: 3.14 L

## 2023-05-01 ENCOUNTER — TELEPHONE (OUTPATIENT)
Dept: FAMILY MEDICINE | Facility: CLINIC | Age: 58
End: 2023-05-01
Payer: COMMERCIAL

## 2023-05-01 DIAGNOSIS — J30.1 ALLERGIC RHINITIS DUE TO POLLEN, UNSPECIFIED SEASONALITY: Primary | ICD-10-CM

## 2023-05-01 NOTE — TELEPHONE ENCOUNTER
Dr. Oneill,    Patient was in to see you recently and forgot to mention she is having urine frequency problem.  Some days are better than others.  Is limiting her liquid intake in evening.  Last night up to urinate 4 times.  Has a had a few accidents of urinary incontinence.No odor to her urine.  No fever, + frequency and Urgency.   Has been trying to get out and walk but the urgency is a problem. Patient also would like her Flonase refilled.  Not on active medication list, pended.  [facility] in [place]. Ana ALEXANDER RN

## 2023-05-08 RX ORDER — FLUTICASONE PROPIONATE 50 MCG
1 SPRAY, SUSPENSION (ML) NASAL DAILY
Qty: 1 G | Refills: 1 | Status: SHIPPED | OUTPATIENT
Start: 2023-05-08 | End: 2024-02-07

## 2023-05-08 NOTE — TELEPHONE ENCOUNTER
I have faxed the Flonase. She will need a visit to address the urinary frequency . She can make a virtual visit if she wants.

## 2023-05-09 ENCOUNTER — VIRTUAL VISIT (OUTPATIENT)
Dept: FAMILY MEDICINE | Facility: CLINIC | Age: 58
End: 2023-05-09
Payer: COMMERCIAL

## 2023-05-09 DIAGNOSIS — N39.41 URGE INCONTINENCE OF URINE: Primary | ICD-10-CM

## 2023-05-09 PROCEDURE — 99214 OFFICE O/P EST MOD 30 MIN: CPT | Mod: VID | Performed by: FAMILY MEDICINE

## 2023-05-09 RX ORDER — OXYBUTYNIN CHLORIDE 5 MG/1
5 TABLET, EXTENDED RELEASE ORAL DAILY
Qty: 30 TABLET | Refills: 1 | Status: SHIPPED | OUTPATIENT
Start: 2023-05-09 | End: 2023-07-26

## 2023-05-09 ASSESSMENT — PATIENT HEALTH QUESTIONNAIRE - PHQ9: SUM OF ALL RESPONSES TO PHQ QUESTIONS 1-9: 5

## 2023-05-09 NOTE — PROGRESS NOTES
"Carolina is a 58 year old who is being evaluated via a billable video visit.      How would you like to obtain your AVS? MyChart  If the video visit is dropped, the invitation should be resent by: Logging into My Chart.  Will anyone else be joining your video visit? No    Patient is a 58-year-old female presenting with urinary symptoms.  She said that she has been having the urge to go and if she does not get to the bathroom quickly enough she has some leakage.  I when she goes to the bathroom and only a little bit of urine comes out.  This has been ongoing for about a year.  She reports when she goes out she is constantly looking for the nearest bathroom.  Reports no burning.  No history of constipation. No blood in the urine.    Assessment & Plan     Urge incontinence of urine  Appears patient may be experiencing some urge incontinence. Recommend trying medication. Asked that she try some kegel exercises  - oxybutynin ER (DITROPAN XL) 5 MG 24 hr tablet; Take 1 tablet (5 mg) by mouth daily      BMI:   Estimated body mass index is 30.83 kg/m  as calculated from the following:    Height as of 5/15/23: 1.588 m (5' 2.5\").    Weight as of 5/15/23: 77.7 kg (171 lb 4.8 oz).       FUTURE APPOINTMENTS:       - Follow-up visit in one month or sooner as needed.    Julia Oneill MD  [facility]    Thor Figueroa is a 58 year old, presenting for the following health issues:  Urinary Problem (Doesn't feel she has a bladder infection.  She drinks a lot of water and has been trying to lose weight.  For over one year she has been having frequent urination.  When out grocery shopping she will have to urinate prior to going to the store, then at the store and prior to coming home.  Even when going for a walk she will have to urinate prior to going for the walk.  She has lost weight at is down to 168./) and Medicaiton (She has been taking Ginger root daily.)        5/9/2023    10:31 AM   Additional " Questions   Roomed by Roxanne Morin CMA     Chief Complaint   Patient presents with     Urinary Problem     Doesn't feel she has a bladder infection.  She drinks a lot of water and has been trying to lose weight.  For over one year she has been having frequent urination.  When out grocery shopping she will have to urinate prior to going to the store, then at the store and prior to coming home.  Even when going for a walk she will have to urinate prior to going for the walk.  She has lost weight at is down to 168.       Medicaiton     She has been taking Galina root daily.       History of Present Illness       Reason for visit:  Urinary Problem    She eats 4 or more servings of fruits and vegetables daily.She consumes 0 sweetened beverage(s) daily.She exercises with enough effort to increase her heart rate 30 to 60 minutes per day.  She exercises with enough effort to increase her heart rate 7 days per week.   She is taking medications regularly.               Review of Systems   Constitutional: Negative.    HENT: Negative.    Eyes: Negative.    Respiratory: Negative.    Cardiovascular: Negative.    Gastrointestinal: Negative.    Endocrine: Negative.    Breasts:  negative.    Genitourinary: Positive for decreased urine volume, frequency and urgency. Negative for pelvic pain.   Musculoskeletal: Negative.    Allergic/Immunologic: Negative.    Neurological: Negative.    Hematological: Negative.    Psychiatric/Behavioral: Negative.  Negative for agitation.            Objective    Vitals - Patient Reported  Weight (Patient Reported): 76.2 kg (168 lb)  Pain Score: No Pain (0)        Physical Exam   GENERAL: Healthy, alert and no distress  EYES: Eyes grossly normal to inspection.  No discharge or erythema, or obvious scleral/conjunctival abnormalities.  RESP: No audible wheeze, cough, or visible cyanosis.  No visible retractions or increased work of breathing.    SKIN: Visible skin clear. No significant rash, abnormal  pigmentation or lesions.  PSYCH: Mentation appears normal, affect normal/bright, judgement and insight intact, normal speech and appearance well-groomed.            Video-Visit Details    Type of service:  Video Visit   Video Start Time:12:41 PM  Video End Time:12:51 PM    Originating Location (pt. Location): Home  Distant Location (provider location):  On-site  Platform used for Video Visit: [employer]

## 2023-05-11 NOTE — PROGRESS NOTES
CHIEF COMPLAINT:   Chief Complaint   Patient presents with     Knee Pain     Bilateral knee pain. Last injection: 2/13/23. Patient notes the injections work good but in the last month she has been walking to lose weight so her left knee is hurting more than usual. Left knee is worse than right knee in the last month. She would like more injections today.       HISTORY OF PRESENT ILLNESS    Carolina Hernandez is a 58 year old female seen for evaluation of ongoing bilateral knee pain with no known injury, both about the same but varies, today left more than right. Right side pain radiates down the calf more than the left.  Pain has been present for 20 years. We saw her 2/13/2023 and 11/10/2022 and provided bilateral cortisone injections, which worked well up until about 4 weeks ago. She's been doing more walking to lose weight, so now the left knee is hurting more than usual. She'd like repeat injections today.    Previously on 4/27/2022  received bilateral visco injections (synviscOne). She had previously received a cortisone injection with Dr Gaspar 8/2021 which helped to some extent for a few months, but not as well as our injections.      Recent bout of pancreatitis and had a cholecystectomy on 11/21/2022. She was planning to get a knee replacement this winter but with these medical things going on she wants to wait until cortisone no longer helps with the pain. Also, her 79yo father just had a knee replacement in November and having his other knee done soon.    Also has foot problems. Gaining some weight..    Locates pain along the inner aspect and front of the knees, and can radiate down the leg to the ankle, up the thigh.  Pain is worse with work, lifting/carrying things, gardening. Treatment has been occasional use of over the counter pain medications without relief.     Pain at rest, pain at night better with injections. Sleeps with pillow between knees. Has tried tylenol arthritis and ibuprofen for pain but  doesn't seem to really help.    Left knee injury at 10 years old.    Has chronic low back pain, denies numbness and tingling.    Present symptoms: pain medially  and anteriorly, pain dull/achy , mild pain.    Pain severity: 2/10  Frequency of symptoms: are constant  Exacerbating Factors: weight bearing, carrying and lifting things, gardening, kneeling, stairs  Relieving Factors: rest, ice  Night Pain: Yes  Pain while at rest: Yes   Numbness or tingling: No   Patient has tried:     NSAIDS: Yes , not recently in the past couple of years.     Physical Therapy: No      Activity modification: Yes      Bracing: No      Injections: Yes cortisone 2/2023, 11/2022, 8/15/2022, 8/20/2021 cortisone;    4/27/2022 with SynviscOne.     Ice: Yes      Assistive device:  No     Other: tylenol. Topical ointments.      Other PMH:  has a past medical history of Asthma, C. difficile colitis, Depression, Depressive disorder (Have had it most of my life), Moderate persistent asthma with exacerbation (9/18/2007), Osteoarthritis, Sinusitis, chronic, and Status post coronary angiogram (10/14/2022).    She has no past medical history of Breast cancer (H), Malignant neoplasm of ovary (H), or Need for prophylactic hormone replacement therapy (postmenopausal).  Patient Active Problem List   Diagnosis     Recurrent major depressive disorder, in remission (H)     Benign essential hypertension     Primary osteoarthritis of both knees     Low back pain due to bilateral sciatica     Nonobstructive atherosclerosis of coronary artery     Hyperlipidemia LDL goal <70     Transaminitis     Acute pancreatitis     COPD vs Asthma     GERD (gastroesophageal reflux disease)     Seasonal allergies     Fatty liver       Surgical Hx:  has a past surgical history that includes colonoscopy (2017); Esophagoscopy, gastroscopy, duodenoscopy (EGD), combined (N/A, 05/05/2022); Coronary Angiogram (N/A, 10/14/2022); and Laparoscopic cholecystectomy with cholangiograms (N/A,  11/21/2022).    Medications:   Current Outpatient Medications:      albuterol (PROVENTIL) (2.5 MG/3ML) 0.083% neb solution, inhale 1 vial (2.5 mg) by nebulization every 4 hours as needed for shortness of breath / dyspnea or wheezing, Disp: 225 mL, Rfl: 11     albuterol (VENTOLIN HFA) 108 (90 Base) MCG/ACT inhaler, INHALE 1-2 PUFFS INTO THE LUNGS EVERY 4 HOURS AS NEEDED FOR SHORTNESS OF BREATH/DYSPNEA OR WHEEZING ., Disp: 18 g, Rfl: 11     amLODIPine (NORVASC) 2.5 MG tablet, Take 2 tablets (5 mg) by mouth once daily., Disp: 180 tablet, Rfl: 3     aspirin (ASA) 81 MG EC tablet, Take 81 mg by mouth daily, Disp: , Rfl:      cetirizine (ZYRTEC) 10 MG tablet, Take 10 mg by mouth daily, Disp: , Rfl:      FLUoxetine (PROZAC) 20 MG capsule, Take 1 capsule (20 mg) by mouth daily, Disp: 90 capsule, Rfl: 3     FLUoxetine (PROZAC) 40 MG capsule, Take 1 capsule (40 mg) by mouth daily Take with 20 mg tab for a total of 60 mg daily, Disp: 90 capsule, Rfl: 3     fluticasone (FLONASE) 50 MCG/ACT nasal spray, Spray 1 spray into both nostrils daily, Disp: 1 g, Rfl: 1     fluticasone-salmeterol (ADVAIR) 100-50 MCG/ACT inhaler, Inhale 1 puff into the lungs every 12 hours, Disp: 1 each, Rfl: 11     montelukast (SINGULAIR) 10 MG tablet, Take 1 tablet (10 mg) by mouth At Bedtime, Disp: 90 tablet, Rfl: 3     omeprazole 20 MG tablet, Take 2 tablets (40 mg) by mouth daily for 90 days, Disp: 180 tablet, Rfl: 3     oxybutynin ER (DITROPAN XL) 5 MG 24 hr tablet, Take 1 tablet (5 mg) by mouth daily, Disp: 30 tablet, Rfl: 1     tiotropium (SPIRIVA) 18 MCG inhaled capsule, Inhale 1 capsule (18 mcg) into the lungs daily, Disp: 30 capsule, Rfl: 11    Allergies:   Allergies   Allergen Reactions     Doxycycline Difficulty breathing     Penicillins Itching     Sucralfate Rash       Social Hx: retired ().   reports that she quit smoking about 21 months ago. Her smoking use included cigarettes. She has never used smokeless tobacco. She  "reports that she does not currently use alcohol. She reports that she does not use drugs.    Family Hx: family history includes Cancer in her daughter and maternal grandfather; Coronary Artery Disease in her father and paternal grandfather; Diabetes in her daughter; Glaucoma in her father; Schizophrenia in her daughter.    REVIEW OF SYSTEMS:   CONSTITUTIONAL:NEGATIVE for fever, chills, change in weight  INTEGUMENTARY/SKIN: NEGATIVE for worrisome rashes, moles or lesions  MUSCULOSKELETAL:See HPI above  NEURO: NEGATIVE for weakness, dizziness or paresthesias    PHYSICAL EXAM:  BP (!) 144/93   Pulse 71   Ht 1.588 m (5' 2.5\")   Wt 77.7 kg (171 lb 4.8 oz)   BMI 30.83 kg/m     GENERAL APPEARANCE: healthy, alert, no distress  SKIN: no suspicious lesions or rashes  NEURO: Normal strength and tone, mentation intact and speech normal  PSYCH:  mentation appears normal and affect normal, not anxious  RESPIRATORY: No increased work of breathing.      BILATERAL LOWER EXTREMITIES:  Gait: favors the left  today.  Alignment: varus  Intact sensation deep peroneal nerve, superficial peroneal nerve, med/lat tibial nerve, sural nerve, saphenous nerve  Intact EHL, EDL, TA, FHL, GS, quadriceps hamstrings and hip flexors  Bilateral calf soft and nttp or squeeze.  Edema: trace    LEFT KNEE EXAM:    Skin: intact, no ecchymosis or erythema  ROM: 5 extension to 120 flexion, discomfort with range of motion.  Tight hamstrings on straight leg raise.  Effusion: trace  Tender: medial joint line, pes, lateral joint line.    MCL: stable, and non-painful at both 0 and 30 degrees knee flexion  Varus stress: stable, and non-painful at both 0 and 30 degrees knee flexion  Lachmans: neg, firm endpoint  Posterior Drawer stable  Patellofemoral joint:                Apprehension: negative              Crepitations: mild   Grind: positive.    RIGHT KNEE EXAM:    Skin: intact, no ecchymosis or erythema  ROM: full extension to 125+ flexion  Tight hamstrings " "on straight leg raise.  Effusion: none  Tender: medial joint line, lateral joint line.    MCL: stable, and non-painful at both 0 and 30 degrees knee flexion  Varus stress: stable, and non-painful at both 0 and 30 degrees knee flexion  Lachmans: neg, firm endpoint  Posterior Drawer stable  Patellofemoral joint:                Apprehension: negative              Crepitations: mild   Grind: positive.    X-RAY: no new images today.    3 views bilateral knee from 8/20/2021 -- Bilateral medial compartment severe joint space narrowing, bone bone bone with articular flattening, subcondral sclerosis. Bilateral patellofemoral lateral facet mild joint space narrowing with medial  patello-femoral osteophytes.           ASSESSMENT/PLAN: Carolina Hernandez is a 58 year old female with chronic bilateral knee pain, advanced primary osteoarthritis.     * reviewed imaging studies with patient, showing arthritic changes, or wearing of the cartilage in the knee. This can be caused by normal \"wear and tear\" over the years or following prior injury to the knee.    Treatment typically starts nonsurgically. Surgical indication for total knee arthroplasty  when nonsurgical management is no longer effective.    At this point, we discussed  surgical total knee arthroplasty.    At this time, she'd like to try cortisone again , maybe surgery this Fall.    Non-surgical treatment for knee arthritis includes:    * rest, sitting  * Activity modification - avoid impact activities or activities that aggravate symptoms.  * NSAIDS (non-steroidal anti-inflammatory medications; e.g. Aleve, advil, motrin, ibuprofen) - regular use for inflammation ( twice daily or three times daily), with food, as long as no contra-indications Please discuss with primary care doctor if needed  * ice, 15-20 minutes at a time several times a day or as needed.  * Strengthening of quadriceps muscles  * Physical Therapy for strengthening, stretching and range of motion exercises " "of legs  * Tylenol as needed for pain, consider Tylenol arthritis or similar  * Weight loss: Weight loss:  Body mass index is 30.83 kg/m .. weight loss benefits, not only for the current pain symptoms, but also overall health. Recommend a good diet plan that works for the patient, with the assistance of a dietician or primary care doctor as needed. Also, a good, low-impact exercise program for at least 20 minutes per day, 3 times per week, such as exercise bike, elliptical , or pool.  * Exercise: low impact such as stationary bike, elliptical, pool.  * Injections: cortisone versus viscosupplementation (hyaluronic acid, \"rooster comb\", \"gel shots\"); risks and perceived benefits discussed today. We will proceed with bilateral knee cortisone injections today.    * Bracing: bracing the knee may offer some relief of symptoms when worn and provide some stability.  * over the counter supplements such as glucosamine and chondroitin sulfate may help with joint pain.  * topical ointments may help as well    * return to clinic as needed.      * All questions were addressed and answered prior to discharge from clinic today. The patient acknowledges an understanding of and agreement with the plan set forth during today's visit. Patient was advised to call our office or MyChart us if any further questions arise upon leaving our office today.    Giovanny Chong M.D., M.S.  Dept. of Orthopaedic Surgery  [facility]    Large Joint Injection/Arthocentesis: bilateral knee    Date/Time: 5/15/2023 8:57 AM    Performed by: Giovanny Chong MD  Authorized by: Giovanny Chong MD    Indications:  Pain  Needle Size:  22 G  Guidance: landmark guided    Approach:  Anteromedial  Location:  Knee  Laterality:  Bilateral      Medications (Right):  4 mL bupivacaine HCl (PF) 0.25 %; 80 mg methylPREDNISolone 80 MG/ML  Medications (Left):  4 mL bupivacaine HCl (PF) 0.25 %; 80 mg methylPREDNISolone 80 MG/ML  Outcome:  Tolerated " well, no immediate complications  Procedure discussed: discussed risks, benefits, and alternatives    Consent Given by:  Patient  Timeout: timeout called immediately prior to procedure    Prep: patient was prepped and draped in usual sterile fashion

## 2023-05-15 ENCOUNTER — OFFICE VISIT (OUTPATIENT)
Dept: ORTHOPEDICS | Facility: CLINIC | Age: 58
End: 2023-05-15
Payer: COMMERCIAL

## 2023-05-15 VITALS
BODY MASS INDEX: 30.35 KG/M2 | HEART RATE: 71 BPM | DIASTOLIC BLOOD PRESSURE: 93 MMHG | HEIGHT: 63 IN | WEIGHT: 171.3 LBS | SYSTOLIC BLOOD PRESSURE: 144 MMHG

## 2023-05-15 DIAGNOSIS — M25.562 CHRONIC PAIN OF BOTH KNEES: ICD-10-CM

## 2023-05-15 DIAGNOSIS — M17.0 PRIMARY OSTEOARTHRITIS OF BOTH KNEES: Primary | ICD-10-CM

## 2023-05-15 DIAGNOSIS — M25.561 CHRONIC PAIN OF BOTH KNEES: ICD-10-CM

## 2023-05-15 DIAGNOSIS — G89.29 CHRONIC PAIN OF BOTH KNEES: ICD-10-CM

## 2023-05-15 PROCEDURE — 20610 DRAIN/INJ JOINT/BURSA W/O US: CPT | Mod: 50 | Performed by: ORTHOPAEDIC SURGERY

## 2023-05-15 RX ORDER — BUPIVACAINE HYDROCHLORIDE 2.5 MG/ML
4 INJECTION, SOLUTION EPIDURAL; INFILTRATION; INTRACAUDAL
Status: DISCONTINUED | OUTPATIENT
Start: 2023-05-15 | End: 2023-07-26

## 2023-05-15 RX ORDER — METHYLPREDNISOLONE ACETATE 80 MG/ML
80 INJECTION, SUSPENSION INTRA-ARTICULAR; INTRALESIONAL; INTRAMUSCULAR; SOFT TISSUE
Status: DISCONTINUED | OUTPATIENT
Start: 2023-05-15 | End: 2023-07-26

## 2023-05-15 RX ADMIN — BUPIVACAINE HYDROCHLORIDE 4 ML: 2.5 INJECTION, SOLUTION EPIDURAL; INFILTRATION; INTRACAUDAL at 08:57

## 2023-05-15 RX ADMIN — METHYLPREDNISOLONE ACETATE 80 MG: 80 INJECTION, SUSPENSION INTRA-ARTICULAR; INTRALESIONAL; INTRAMUSCULAR; SOFT TISSUE at 08:57

## 2023-05-15 ASSESSMENT — PAIN SCALES - GENERAL: PAINLEVEL: MILD PAIN (2)

## 2023-05-15 NOTE — LETTER
5/15/2023         RE: Carolina Hernandez  [address] [place] [id number]        Dear Colleague,    Thank you for referring your patient, Carolina Hernandez, to the [facility]. Please see a copy of my visit note below.    CHIEF COMPLAINT:   Chief Complaint   Patient presents with     Knee Pain     Bilateral knee pain. Last injection: 2/13/23. Patient notes the injections work good but in the last month she has been walking to lose weight so her left knee is hurting more than usual. Left knee is worse than right knee in the last month. She would like more injections today.       HISTORY OF PRESENT ILLNESS    Carolina Hernandez is a 58 year old female seen for evaluation of ongoing bilateral knee pain with no known injury, both about the same but varies, today left more than right. Right side pain radiates down the calf more than the left.  Pain has been present for 20 years. We saw her 2/13/2023 and 11/10/2022 and provided bilateral cortisone injections, which worked well up until about 4 weeks ago. She's been doing more walking to lose weight, so now the left knee is hurting more than usual. She'd like repeat injections today.    Previously on 4/27/2022  received bilateral visco injections (synviscOne). She had previously received a cortisone injection with Dr Gaspar 8/2021 which helped to some extent for a few months, but not as well as our injections.      Recent bout of pancreatitis and had a cholecystectomy on 11/21/2022. She was planning to get a knee replacement this winter but with these medical things going on she wants to wait until cortisone no longer helps with the pain. Also, her 81yo father just had a knee replacement in November and having his other knee done soon.    Also has foot problems. Gaining some weight..    Locates pain along the inner aspect and front of the knees, and can radiate down the leg to the ankle, up the thigh.  Pain is worse with  work, lifting/carrying things, gardening. Treatment has been occasional use of over the counter pain medications without relief.     Pain at rest, pain at night better with injections. Sleeps with pillow between knees. Has tried tylenol arthritis and ibuprofen for pain but doesn't seem to really help.    Left knee injury at 10 years old.    Has chronic low back pain, denies numbness and tingling.    Present symptoms: pain medially  and anteriorly, pain dull/achy , mild pain.    Pain severity: 2/10  Frequency of symptoms: are constant  Exacerbating Factors: weight bearing, carrying and lifting things, gardening, kneeling, stairs  Relieving Factors: rest, ice  Night Pain: Yes  Pain while at rest: Yes   Numbness or tingling: No   Patient has tried:     NSAIDS: Yes , not recently in the past couple of years.     Physical Therapy: No      Activity modification: Yes      Bracing: No      Injections: Yes cortisone 2/2023, 11/2022, 8/15/2022, 8/20/2021 cortisone;    4/27/2022 with SynviscOne.     Ice: Yes      Assistive device:  No     Other: tylenol. Topical ointments.      Other PMH:  has a past medical history of Asthma, C. difficile colitis, Depression, Depressive disorder (Have had it most of my life), Moderate persistent asthma with exacerbation (9/18/2007), Osteoarthritis, Sinusitis, chronic, and Status post coronary angiogram (10/14/2022).    She has no past medical history of Breast cancer (H), Malignant neoplasm of ovary (H), or Need for prophylactic hormone replacement therapy (postmenopausal).  Patient Active Problem List   Diagnosis     Recurrent major depressive disorder, in remission (H)     Benign essential hypertension     Primary osteoarthritis of both knees     Low back pain due to bilateral sciatica     Nonobstructive atherosclerosis of coronary artery     Hyperlipidemia LDL goal <70     Transaminitis     Acute pancreatitis     COPD vs Asthma     GERD (gastroesophageal reflux disease)     Seasonal  allergies     Fatty liver       Surgical Hx:  has a past surgical history that includes colonoscopy (2017); Esophagoscopy, gastroscopy, duodenoscopy (EGD), combined (N/A, 05/05/2022); Coronary Angiogram (N/A, 10/14/2022); and Laparoscopic cholecystectomy with cholangiograms (N/A, 11/21/2022).    Medications:   Current Outpatient Medications:      albuterol (PROVENTIL) (2.5 MG/3ML) 0.083% neb solution, inhale 1 vial (2.5 mg) by nebulization every 4 hours as needed for shortness of breath / dyspnea or wheezing, Disp: 225 mL, Rfl: 11     albuterol (VENTOLIN HFA) 108 (90 Base) MCG/ACT inhaler, INHALE 1-2 PUFFS INTO THE LUNGS EVERY 4 HOURS AS NEEDED FOR SHORTNESS OF BREATH/DYSPNEA OR WHEEZING ., Disp: 18 g, Rfl: 11     amLODIPine (NORVASC) 2.5 MG tablet, Take 2 tablets (5 mg) by mouth once daily., Disp: 180 tablet, Rfl: 3     aspirin (ASA) 81 MG EC tablet, Take 81 mg by mouth daily, Disp: , Rfl:      cetirizine (ZYRTEC) 10 MG tablet, Take 10 mg by mouth daily, Disp: , Rfl:      FLUoxetine (PROZAC) 20 MG capsule, Take 1 capsule (20 mg) by mouth daily, Disp: 90 capsule, Rfl: 3     FLUoxetine (PROZAC) 40 MG capsule, Take 1 capsule (40 mg) by mouth daily Take with 20 mg tab for a total of 60 mg daily, Disp: 90 capsule, Rfl: 3     fluticasone (FLONASE) 50 MCG/ACT nasal spray, Spray 1 spray into both nostrils daily, Disp: 1 g, Rfl: 1     fluticasone-salmeterol (ADVAIR) 100-50 MCG/ACT inhaler, Inhale 1 puff into the lungs every 12 hours, Disp: 1 each, Rfl: 11     montelukast (SINGULAIR) 10 MG tablet, Take 1 tablet (10 mg) by mouth At Bedtime, Disp: 90 tablet, Rfl: 3     omeprazole 20 MG tablet, Take 2 tablets (40 mg) by mouth daily for 90 days, Disp: 180 tablet, Rfl: 3     oxybutynin ER (DITROPAN XL) 5 MG 24 hr tablet, Take 1 tablet (5 mg) by mouth daily, Disp: 30 tablet, Rfl: 1     tiotropium (SPIRIVA) 18 MCG inhaled capsule, Inhale 1 capsule (18 mcg) into the lungs daily, Disp: 30 capsule, Rfl: 11    Allergies:   Allergies  "  Allergen Reactions     Doxycycline Difficulty breathing     Penicillins Itching     Sucralfate Rash       Social Hx: retired ().   reports that she quit smoking about 21 months ago. Her smoking use included cigarettes. She has never used smokeless tobacco. She reports that she does not currently use alcohol. She reports that she does not use drugs.    Family Hx: family history includes Cancer in her daughter and maternal grandfather; Coronary Artery Disease in her father and paternal grandfather; Diabetes in her daughter; Glaucoma in her father; Schizophrenia in her daughter.    REVIEW OF SYSTEMS:   CONSTITUTIONAL:NEGATIVE for fever, chills, change in weight  INTEGUMENTARY/SKIN: NEGATIVE for worrisome rashes, moles or lesions  MUSCULOSKELETAL:See HPI above  NEURO: NEGATIVE for weakness, dizziness or paresthesias    PHYSICAL EXAM:  BP (!) 144/93   Pulse 71   Ht 1.588 m (5' 2.5\")   Wt 77.7 kg (171 lb 4.8 oz)   BMI 30.83 kg/m     GENERAL APPEARANCE: healthy, alert, no distress  SKIN: no suspicious lesions or rashes  NEURO: Normal strength and tone, mentation intact and speech normal  PSYCH:  mentation appears normal and affect normal, not anxious  RESPIRATORY: No increased work of breathing.      BILATERAL LOWER EXTREMITIES:  Gait: favors the left  today.  Alignment: varus  Intact sensation deep peroneal nerve, superficial peroneal nerve, med/lat tibial nerve, sural nerve, saphenous nerve  Intact EHL, EDL, TA, FHL, GS, quadriceps hamstrings and hip flexors  Bilateral calf soft and nttp or squeeze.  Edema: trace    LEFT KNEE EXAM:    Skin: intact, no ecchymosis or erythema  ROM: 5 extension to 120 flexion, discomfort with range of motion.  Tight hamstrings on straight leg raise.  Effusion: trace  Tender: medial joint line, pes, lateral joint line.    MCL: stable, and non-painful at both 0 and 30 degrees knee flexion  Varus stress: stable, and non-painful at both 0 and 30 degrees knee " "flexion  Lachmans: neg, firm endpoint  Posterior Drawer stable  Patellofemoral joint:                Apprehension: negative              Crepitations: mild   Grind: positive.    RIGHT KNEE EXAM:    Skin: intact, no ecchymosis or erythema  ROM: full extension to 125+ flexion  Tight hamstrings on straight leg raise.  Effusion: none  Tender: medial joint line, lateral joint line.    MCL: stable, and non-painful at both 0 and 30 degrees knee flexion  Varus stress: stable, and non-painful at both 0 and 30 degrees knee flexion  Lachmans: neg, firm endpoint  Posterior Drawer stable  Patellofemoral joint:                Apprehension: negative              Crepitations: mild   Grind: positive.    X-RAY: no new images today.    3 views bilateral knee from 8/20/2021 -- Bilateral medial compartment severe joint space narrowing, bone bone bone with articular flattening, subcondral sclerosis. Bilateral patellofemoral lateral facet mild joint space narrowing with medial  patello-femoral osteophytes.           ASSESSMENT/PLAN: Carolina Hernandez is a 58 year old female with chronic bilateral knee pain, advanced primary osteoarthritis.     * reviewed imaging studies with patient, showing arthritic changes, or wearing of the cartilage in the knee. This can be caused by normal \"wear and tear\" over the years or following prior injury to the knee.    Treatment typically starts nonsurgically. Surgical indication for total knee arthroplasty  when nonsurgical management is no longer effective.    At this point, we discussed  surgical total knee arthroplasty.    At this time, she'd like to try cortisone again , maybe surgery this Fall.    Non-surgical treatment for knee arthritis includes:    * rest, sitting  * Activity modification - avoid impact activities or activities that aggravate symptoms.  * NSAIDS (non-steroidal anti-inflammatory medications; e.g. Aleve, advil, motrin, ibuprofen) - regular use for inflammation ( twice daily or three " "times daily), with food, as long as no contra-indications Please discuss with primary care doctor if needed  * ice, 15-20 minutes at a time several times a day or as needed.  * Strengthening of quadriceps muscles  * Physical Therapy for strengthening, stretching and range of motion exercises of legs  * Tylenol as needed for pain, consider Tylenol arthritis or similar  * Weight loss: Weight loss:  Body mass index is 30.83 kg/m .. weight loss benefits, not only for the current pain symptoms, but also overall health. Recommend a good diet plan that works for the patient, with the assistance of a dietician or primary care doctor as needed. Also, a good, low-impact exercise program for at least 20 minutes per day, 3 times per week, such as exercise bike, elliptical , or pool.  * Exercise: low impact such as stationary bike, elliptical, pool.  * Injections: cortisone versus viscosupplementation (hyaluronic acid, \"rooster comb\", \"gel shots\"); risks and perceived benefits discussed today. We will proceed with bilateral knee cortisone injections today.    * Bracing: bracing the knee may offer some relief of symptoms when worn and provide some stability.  * over the counter supplements such as glucosamine and chondroitin sulfate may help with joint pain.  * topical ointments may help as well    * return to clinic as needed.      * All questions were addressed and answered prior to discharge from clinic today. The patient acknowledges an understanding of and agreement with the plan set forth during today's visit. Patient was advised to call our office or MyChart us if any further questions arise upon leaving our office today.    Giovanny Chong M.D., M.S.  Dept. of Orthopaedic Surgery  [facility]    Large Joint Injection/Arthocentesis: bilateral knee    Date/Time: 5/15/2023 8:57 AM    Performed by: Giovanny Chong MD  Authorized by: Giovanny Chong MD    Indications:  Pain  Needle Size:  22 " G  Guidance: landmark guided    Approach:  Anteromedial  Location:  Knee  Laterality:  Bilateral      Medications (Right):  4 mL bupivacaine HCl (PF) 0.25 %; 80 mg methylPREDNISolone 80 MG/ML  Medications (Left):  4 mL bupivacaine HCl (PF) 0.25 %; 80 mg methylPREDNISolone 80 MG/ML  Outcome:  Tolerated well, no immediate complications  Procedure discussed: discussed risks, benefits, and alternatives    Consent Given by:  Patient  Timeout: timeout called immediately prior to procedure    Prep: patient was prepped and draped in usual sterile fashion              Again, thank you for allowing me to participate in the care of your patient.        Sincerely,        Giovanny Chong MD

## 2023-05-18 ASSESSMENT — ENCOUNTER SYMPTOMS
ALLERGIC/IMMUNOLOGIC NEGATIVE: 1
CONSTITUTIONAL NEGATIVE: 1
GASTROINTESTINAL NEGATIVE: 1
HEMATOLOGIC/LYMPHATIC NEGATIVE: 1
ENDOCRINE NEGATIVE: 1
NEUROLOGICAL NEGATIVE: 1
CARDIOVASCULAR NEGATIVE: 1
FREQUENCY: 1
AGITATION: 0
RESPIRATORY NEGATIVE: 1
MUSCULOSKELETAL NEGATIVE: 1
EYES NEGATIVE: 1
PSYCHIATRIC NEGATIVE: 1

## 2023-06-13 NOTE — PROGRESS NOTES
Report called to [facility] [address] Surgical Consultation/History and Physical  [facility] General Surgery    Carolina is seen in consultation for Abdominal pain, at the request of Julia Oneill MD.    Chief Complaint:  Abdominal pain    History of Present Illness: Carolina Hernandez is a 57 year old female presents with abdominal pain. Patient had episode of epigastric pain, starting mid abdomen, radiated to right side of abdomen.  She had associated nausea, vomiting.  She does drink alcohol. She did have 2 alcoholic beverages that evening.  Denies acholic stools, jaundice or melena.    Patient Active Problem List   Diagnosis     Recurrent major depressive disorder, in remission (H)     Benign essential hypertension     Primary osteoarthritis of both knees     Low back pain due to bilateral sciatica     Nonobstructive atherosclerosis of coronary artery     Hyperlipidemia LDL goal <70     Transaminitis     Acute pancreatitis     COPD vs Asthma     GERD (gastroesophageal reflux disease)     Seasonal allergies     Fatty liver     Past Medical History:   Diagnosis Date     Asthma      C. difficile colitis      Depression      Depressive disorder Have had it most of my life     Moderate persistent asthma with exacerbation 9/18/2007     Osteoarthritis      Sinusitis, chronic      Status post coronary angiogram 10/14/2022     Past Surgical History:   Procedure Laterality Date     COLONOSCOPY  2017     CV CORONARY ANGIOGRAM N/A 10/14/2022    Procedure: Coronary Angiogram;  Surgeon: Fidel Martin MD;  Location: [facility] CARDIAC CATH LAB     ESOPHAGOSCOPY, GASTROSCOPY, DUODENOSCOPY (EGD), COMBINED N/A 05/05/2022    Procedure: ESOPHAGOGASTRODUODENOSCOPY, WITH BIOPSY;  Surgeon: Timothy Oliva DO;  Location: [facility] OR     Family History   Problem Relation Age of Onset     Glaucoma Father      Coronary Artery Disease Father         stents     Cancer Maternal Grandfather      Coronary Artery Disease Paternal Grandfather      Schizophrenia  Daughter      Diabetes Daughter      Cancer Daughter      Crohn's Disease No family hx of      Ulcerative Colitis No family hx of      GERD No family hx of      Stomach Cancer No family hx of      Social History     Tobacco Use     Smoking status: Former     Packs/day: 0.00     Years: 23.00     Pack years: 0.00     Types: Cigarettes     Quit date: 2021     Years since quittin.3     Smokeless tobacco: Never     Tobacco comments:     6 cig a day    Substance Use Topics     Alcohol use: Not Currently     Comment: 4-6 beers multiple times weekly      History   Drug Use No     Current Outpatient Medications   Medication Sig Dispense Refill     albuterol (PROVENTIL) (2.5 MG/3ML) 0.083% neb solution inhale 1 vial (2.5 mg) by nebulization every 4 hours as needed for shortness of breath / dyspnea or wheezing 225 mL 11     albuterol (VENTOLIN HFA) 108 (90 Base) MCG/ACT inhaler INHALE 1-2 PUFFS INTO THE LUNGS EVERY 4 HOURS AS NEEDED FOR SHORTNESS OF BREATH/DYSPNEA OR WHEEZING . 18 g 11     amLODIPine (NORVASC) 2.5 MG tablet Take 2 tablets (5 mg) by mouth once daily. 180 tablet 0     aspirin (ASA) 81 MG EC tablet Take 1 tablet (81 mg) by mouth daily       carboxymethylcellulose PF (REFRESH PLUS) 0.5 % ophthalmic solution 1 drop 3 times daily as needed for dry eyes       cetirizine (ZYRTEC) 10 MG tablet Take 10 mg by mouth daily       FLUoxetine (PROZAC) 20 MG capsule Take 1 capsule (20 mg) by mouth daily 90 capsule 3     FLUoxetine (PROZAC) 40 MG capsule Take 1 capsule (40 mg) by mouth daily Take with 20 mg tab for a total of 60 mg daily 90 capsule 3     fluticasone (FLONASE) 50 MCG/ACT nasal spray Spray 1 spray into both nostrils daily 1 g 11     fluticasone-salmeterol (ADVAIR) 500-50 MCG/ACT inhaler Inhale 1 puff into the lungs every 12 hours 1 each 11     montelukast (SINGULAIR) 10 MG tablet Take 1 tablet (10 mg) by mouth At Bedtime 90 tablet 3     omeprazole 20 MG tablet Take 2 tablets (40 mg) by mouth daily 90  "tablet 3     Psyllium Husk 100 % POWD Taking daily.       tiotropium (SPIRIVA) 18 MCG inhaled capsule Inhale 1 capsule (18 mcg) into the lungs daily 30 capsule 11     Allergies   Allergen Reactions     Doxycycline Difficulty breathing     Penicillins Itching     Review of Systems:   10 point ROS otherwise    Physical Exam:  /80 (BP Location: Right arm, Patient Position: Sitting, Cuff Size: Adult Regular)   Pulse 76   Temp 98.4  F (36.9  C) (Tympanic)   Ht 1.6 m (5' 2.99\")   Wt 74.4 kg (164 lb 0.4 oz)   BMI 29.06 kg/m      Constitutional- No acute distress, well nourished, non-toxic  Eyes: Anicteric, no injection.  PERRL  ENT:  Normocephalic, atraumatic, Nose midline, moist mucus membranes  Neck - supple, no LAD  Respiratory- Clear to auscultation bilaterally, good inspiratory effort  Cardiovascular - Heart RRR, no lift's, thrills, murmurs, rubs, or gallop.  No peripheral edema.  No clubbing.  Abdomen - Soft, non-tender, +BS, no hepatosplenomegaly, no palpable masses, negative garvin's sign  Neuro - No focal neuro deficits, Alert and oriented x 3  Psych: Appropriate mood and affect  Musculoskeletal: Normal gait, symmetric strength.  FROM upper and lower extremities.  Skin: Warm, Dry    Lab Results   Component Value Date    WBC 5.4 11/01/2022    WBC 8.3 07/05/2021     Lab Results   Component Value Date    RBC 4.04 11/01/2022    RBC 4.32 07/05/2021     Lab Results   Component Value Date    HGB 12.0 11/01/2022    HGB 13.3 07/05/2021     Lab Results   Component Value Date    HCT 37.3 11/01/2022    HCT 38.1 07/05/2021     Lab Results   Component Value Date    MCV 92 11/01/2022    MCV 88 07/05/2021     Lab Results   Component Value Date    MCH 29.7 11/01/2022    MCH 30.8 07/05/2021     Lab Results   Component Value Date    MCHC 32.2 11/01/2022    MCHC 34.9 07/05/2021     Lab Results   Component Value Date    RDW 13.0 11/01/2022    RDW 12.5 07/05/2021     Lab Results   Component Value Date     11/01/2022 "     07/05/2021     Last Comprehensive Metabolic Panel:  Sodium   Date Value Ref Range Status   11/01/2022 136 136 - 145 mmol/L Final   01/26/2021 135 133 - 144 mmol/L Final     Potassium   Date Value Ref Range Status   11/01/2022 4.1 3.4 - 5.3 mmol/L Final   10/14/2022 4.0 3.4 - 5.3 mmol/L Final   01/26/2021 4.1 3.4 - 5.3 mmol/L Final     Chloride   Date Value Ref Range Status   11/01/2022 100 98 - 107 mmol/L Final   10/14/2022 107 94 - 109 mmol/L Final   01/26/2021 104 94 - 109 mmol/L Final     Carbon Dioxide   Date Value Ref Range Status   01/26/2021 27 20 - 32 mmol/L Final     Carbon Dioxide (CO2)   Date Value Ref Range Status   11/01/2022 26 22 - 29 mmol/L Final   10/14/2022 26 20 - 32 mmol/L Final     Anion Gap   Date Value Ref Range Status   11/01/2022 10 7 - 15 mmol/L Final   10/14/2022 6 3 - 14 mmol/L Final   01/26/2021 4 3 - 14 mmol/L Final     Glucose   Date Value Ref Range Status   11/01/2022 195 (H) 70 - 99 mg/dL Final   10/14/2022 109 (H) 70 - 99 mg/dL Final   01/26/2021 119 (H) 70 - 99 mg/dL Final     Urea Nitrogen   Date Value Ref Range Status   11/01/2022 7.9 (L) 8.0 - 23.0 mg/dL Final   10/14/2022 13 7 - 30 mg/dL Final   01/26/2021 14 7 - 30 mg/dL Final     Creatinine   Date Value Ref Range Status   11/01/2022 0.65 0.51 - 0.95 mg/dL Final   01/26/2021 0.72 0.52 - 1.04 mg/dL Final     GFR Estimate   Date Value Ref Range Status   11/01/2022 >90 >60 mL/min/1.73m2 Final     Comment:     Effective December 21, 2021 eGFRcr in adults is calculated using the 2021 CKD-EPI creatinine equation which includes age and gender (Kacey et al., NEJ, DOI: 10.1056/GWXPuu4976237)   01/26/2021 >90 >60 mL/min/[1.73_m2] Final     Comment:     Non  GFR Calc  Starting 12/18/2018, serum creatinine based estimated GFR (eGFR) will be   calculated using the Chronic Kidney Disease Epidemiology Collaboration   (CKD-EPI) equation.       Calcium   Date Value Ref Range Status   11/01/2022 9.4 8.6 - 10.0 mg/dL  Final   01/26/2021 9.1 8.5 - 10.1 mg/dL Final     Bilirubin Total   Date Value Ref Range Status   11/01/2022 0.4 <=1.2 mg/dL Final   10/25/2019 0.3 0.2 - 1.3 mg/dL Final     Alkaline Phosphatase   Date Value Ref Range Status   11/01/2022 130 (H) 35 - 104 U/L Final   10/25/2019 64 40 - 150 U/L Final     ALT   Date Value Ref Range Status   11/01/2022 344 (H) 10 - 35 U/L Final   10/25/2019 26 0 - 50 U/L Final     AST   Date Value Ref Range Status   11/01/2022 87 (H) 10 - 35 U/L Final   10/25/2019 21 0 - 45 U/L Final       Lipase   Date Value Ref Range Status   11/01/2022 375 (H) 13 - 60 U/L Final   10/31/2022 971 (H) 73 - 393 U/L Final     US Abdomen:  FINDINGS:     GALLBLADDER: Small gallstones are present with gallbladder wall thickening.     BILE DUCTS: Mild common bile duct dilatation. The common duct measures 7 mm.     LIVER: Fatty liver. No focal mass.     RIGHT KIDNEY: No hydronephrosis.     PANCREAS: Obscured by overlying bowel gas.     No ascites.                                                                      IMPRESSION:  1.  Multiple small gallstones with significant gallbladder wall thickening. Common bile duct is mildly dilated measuring 7 mm without significant intrahepatic bile duct dilatation.     2.  Fatty liver.    CT Abdomen/Pelvis:     FINDINGS:   LOWER CHEST: Small hiatal hernia.     HEPATOBILIARY: Mild hepatic steatosis. No suspicious liver lesion. No calcified gallstones. Mild gallbladder wall thickening.     PANCREAS: Mild inflammation and fluid around the pancreatic head. Homogeneous pancreatic enhancement. No pancreatic mass.     SPLEEN: Normal.     ADRENAL GLANDS: Normal.     KIDNEYS/BLADDER: Normal.     BOWEL: Mild sigmoid diverticulosis. No evidence for diverticulitis. No free air, free fluid. Normal appendix. No obstruction.     LYMPH NODES: Normal.     VASCULATURE: Unremarkable.     PELVIC ORGANS: Normal.     MUSCULOSKELETAL: Severe degenerative disc disease at L4-L5 and L5-S1.  Severe degenerative disc disease in the lower thoracic spine. No suspicious bone lesion.                                                                      IMPRESSION:   1.  Mild acute pancreatitis without evidence for pancreatic necrosis.    Assessment:  1. Acute pancreatitis without infection or necrosis, unspecified pancreatitis type      Plan:   Carolina Hernandez presents with history of biliary pancreatitis . Symptoms have resolved increasing in intensity and in frequency. Examination is generally unremarkable, and liver function tests are normal. Ultrasound demonstrates gallstones, without biliary duct dilatation, gallbladder wall thickening or pericholecystic fluid.  The patient may benefit from laparoscopic cholecystectomy with cholangiogram, and the indications, risks, benefits and alternatives to surgery were discussed in detail.  She understood the counseling offered and wishes to proceed as planned and outlined. Risks specifically discussed include  bleeding, infection, hernia, need for additional treatment, nontherapeutic intervention, wound complication (such as dehiscence), retained bile duct stone, conversion to open surgery, potential for cholangiography or bile duct exploration, drainage tubes, chronic diarrhea, bile duct injury, bile leak, and rare complications related to surgery and/or anesthesia such as venous thromboembolism and cardiorespiratory complications.    Eros Butt DO on 11/3/2022 at 1:07 PM

## 2023-07-11 ENCOUNTER — MYC MEDICAL ADVICE (OUTPATIENT)
Dept: PULMONOLOGY | Facility: CLINIC | Age: 58
End: 2023-07-11
Payer: COMMERCIAL

## 2023-07-11 DIAGNOSIS — J45.50 SEVERE PERSISTENT ASTHMA WITHOUT COMPLICATION (H): Primary | ICD-10-CM

## 2023-07-12 RX ORDER — FLUTICASONE PROPIONATE AND SALMETEROL 500; 50 UG/1; UG/1
1 POWDER RESPIRATORY (INHALATION) EVERY 12 HOURS
Qty: 1 EACH | Refills: 11 | Status: SHIPPED | OUTPATIENT
Start: 2023-07-12 | End: 2024-08-05

## 2023-07-13 ENCOUNTER — HOSPITAL ENCOUNTER (OUTPATIENT)
Dept: MAMMOGRAPHY | Facility: CLINIC | Age: 58
Discharge: HOME | End: 2023-07-13
Attending: FAMILY MEDICINE | Admitting: FAMILY MEDICINE
Payer: COMMERCIAL

## 2023-07-13 DIAGNOSIS — Z12.31 VISIT FOR SCREENING MAMMOGRAM: ICD-10-CM

## 2023-07-13 PROCEDURE — 77067 SCR MAMMO BI INCL CAD: CPT

## 2023-07-24 NOTE — PROGRESS NOTES
"CHIEF COMPLAINT:   Chief Complaint   Patient presents with    Left Knee - Pain     Injections are helping for 2 months. Knees feel little \"stiff' today  Walking for exercise down 5#    Right Knee - Pain       HISTORY OF PRESENT ILLNESS    Carolina Hernandez is a 58 year old female seen for evaluation of ongoing bilateral knee pain with no known injury, both about the same but varies, today left more than right. Right side pain radiates down the calf more than the left.  Pain has been present for 20 years. We saw her 5/15/2023,  2/13/2023 and 11/10/2022 and provided bilateral cortisone injections. Injections helped maybe 2 months how.    She is more sore today, was up on her feet a lot this past weekend taking care of 3 grandchildren.    She'd like to discuss surgery, total knee arthroplasty, left knee, this Fall.    Previously on 4/27/2022  received bilateral visco injections (synviscOne). She had previously received a cortisone injection with Dr Gaspar 8/2021 which helped to some extent for a few months, but not as well as our injections.      Recent bout of pancreatitis and had a cholecystectomy on 11/21/2022. She was planning to get a knee replacement this winter but with these medical things going on she wants to wait until cortisone no longer helps with the pain. Also, her 79yo father just had a knee replacement in November and having his other knee done soon.    Also has foot problems. Gaining some weight..    Locates pain along the inner aspect and front of the knees, and can radiate down the leg to the ankle, up the thigh.  Pain is worse with work, lifting/carrying things, gardening. Treatment has been occasional use of over the counter pain medications without relief.     Pain at rest, pain at night better with injections. Sleeps with pillow between knees. Has tried tylenol arthritis and ibuprofen for pain but doesn't seem to really help.    Left knee injury at 10 years old.    Has chronic low back pain, " denies numbness and tingling.    Present symptoms: pain medially  and anteriorly, pain dull/achy , mild pain.    Pain severity: 2/10  Frequency of symptoms: are constant  Exacerbating Factors: weight bearing, carrying and lifting things, gardening, kneeling, stairs  Relieving Factors: rest, ice  Night Pain: Yes  Pain while at rest: Yes   Numbness or tingling: No   Patient has tried:     NSAIDS: Yes , not recently in the past couple of years.     Physical Therapy: No      Activity modification: Yes      Bracing: No      Injections: Yes cortisone 5/15/2023, 2/2023, 11/2022, 8/15/2022, 8/20/2021 cortisone;    4/27/2022 with SynviscOne.     Ice: Yes      Assistive device:  No     Other: tylenol. Topical ointments.      Other PMH:  has a past medical history of Asthma, C. difficile colitis, Depression, Depressive disorder (Have had it most of my life), Moderate persistent asthma with exacerbation (9/18/2007), Osteoarthritis, Sinusitis, chronic, and Status post coronary angiogram (10/14/2022).    She has no past medical history of Breast cancer (H), Malignant neoplasm of ovary (H), or Need for prophylactic hormone replacement therapy (postmenopausal).  Patient Active Problem List   Diagnosis    Recurrent major depressive disorder, in remission (H)    Benign essential hypertension    Primary osteoarthritis of both knees    Low back pain due to bilateral sciatica    Nonobstructive atherosclerosis of coronary artery    Hyperlipidemia LDL goal <70    Transaminitis    Acute pancreatitis    COPD vs Asthma    GERD (gastroesophageal reflux disease)    Seasonal allergies    Fatty liver       Surgical Hx:  has a past surgical history that includes colonoscopy (2017); Esophagoscopy, gastroscopy, duodenoscopy (EGD), combined (N/A, 05/05/2022); Coronary Angiogram (N/A, 10/14/2022); and Laparoscopic cholecystectomy with cholangiograms (N/A, 11/21/2022).    Medications:   Current Outpatient Medications:     fluorometholone (FML  LIQUIFILM) 0.1 % ophthalmic suspension, INSTILL 1 DROP INTO EACH EYE 4 TIMES DAILY FOR 7 DAYS, Disp: , Rfl:     omeprazole 20 MG tablet, Take 2 tablets (40 mg) by mouth daily*, Disp: , Rfl:     albuterol (PROVENTIL) (2.5 MG/3ML) 0.083% neb solution, inhale 1 vial (2.5 mg) by nebulization every 4 hours as needed for shortness of breath / dyspnea or wheezing, Disp: 225 mL, Rfl: 11    albuterol (VENTOLIN HFA) 108 (90 Base) MCG/ACT inhaler, INHALE 1-2 PUFFS INTO THE LUNGS EVERY 4 HOURS AS NEEDED FOR SHORTNESS OF BREATH/DYSPNEA OR WHEEZING ., Disp: 18 g, Rfl: 11    amLODIPine (NORVASC) 2.5 MG tablet, Take 2 tablets (5 mg) by mouth once daily., Disp: 180 tablet, Rfl: 3    aspirin (ASA) 81 MG EC tablet, Take 81 mg by mouth daily, Disp: , Rfl:     cetirizine (ZYRTEC) 10 MG tablet, Take 10 mg by mouth daily, Disp: , Rfl:     FLUoxetine (PROZAC) 20 MG capsule, Take 1 capsule (20 mg) by mouth daily, Disp: 90 capsule, Rfl: 3    FLUoxetine (PROZAC) 40 MG capsule, Take 1 capsule (40 mg) by mouth daily Take with 20 mg tab for a total of 60 mg daily, Disp: 90 capsule, Rfl: 3    fluticasone (FLONASE) 50 MCG/ACT nasal spray, Spray 1 spray into both nostrils daily, Disp: 1 g, Rfl: 1    fluticasone-salmeterol (ADVAIR) 500-50 MCG/ACT inhaler, Inhale 1 puff into the lungs every 12 hours, Disp: 1 each, Rfl: 11    montelukast (SINGULAIR) 10 MG tablet, Take 1 tablet (10 mg) by mouth At Bedtime, Disp: 90 tablet, Rfl: 3    tiotropium (SPIRIVA) 18 MCG inhaled capsule, Inhale 1 capsule (18 mcg) into the lungs daily, Disp: 30 capsule, Rfl: 11    Allergies:   Allergies   Allergen Reactions    Doxycycline Difficulty breathing    Penicillins Itching    Sucralfate Rash       Social Hx: retired ().   reports that she quit smoking about 2 years ago. Her smoking use included cigarettes. She has never used smokeless tobacco. She reports that she does not currently use alcohol. She reports that she does not use drugs.    Family Hx:  "family history includes Cancer in her daughter and maternal grandfather; Coronary Artery Disease in her father and paternal grandfather; Diabetes in her daughter; Glaucoma in her father; Schizophrenia in her daughter.    REVIEW OF SYSTEMS:   CONSTITUTIONAL:NEGATIVE for fever, chills, change in weight  INTEGUMENTARY/SKIN: NEGATIVE for worrisome rashes, moles or lesions  MUSCULOSKELETAL:See HPI above  NEURO: NEGATIVE for weakness, dizziness or paresthesias    PHYSICAL EXAM:  BP (!) 150/80   Pulse 78   Ht 1.6 m (5' 3\")   Wt 74.8 kg (165 lb)   BMI 29.23 kg/m     GENERAL APPEARANCE: healthy, alert, no distress  SKIN: no suspicious lesions or rashes  NEURO: Normal strength and tone, mentation intact and speech normal  PSYCH:  mentation appears normal and affect normal, not anxious  RESPIRATORY: No increased work of breathing.      BILATERAL LOWER EXTREMITIES:  Gait: favors the left  today.  Alignment: varus  Intact sensation deep peroneal nerve, superficial peroneal nerve, med/lat tibial nerve, sural nerve, saphenous nerve  Intact EHL, EDL, TA, FHL, GS, quadriceps hamstrings and hip flexors  Bilateral calf soft and nttp or squeeze.  Edema: trace    LEFT KNEE EXAM:    Skin: intact, no ecchymosis or erythema  ROM: 5 extension to 120 flexion, discomfort with range of motion.  Tight hamstrings on straight leg raise.  Effusion: trace  Tender: medial joint line, pes, lateral joint line.    MCL: stable, and non-painful at both 0 and 30 degrees knee flexion  Varus stress: stable, and non-painful at both 0 and 30 degrees knee flexion  Lachmans: neg, firm endpoint  Posterior Drawer stable  Patellofemoral joint:                Apprehension: negative              Crepitations: mild   Grind: positive.    RIGHT KNEE EXAM:    Skin: intact, no ecchymosis or erythema  ROM: full extension to 125+ flexion  Tight hamstrings on straight leg raise.  Effusion: none  Tender: medial joint line, lateral joint line.    MCL: stable, and " "non-painful at both 0 and 30 degrees knee flexion  Varus stress: stable, and non-painful at both 0 and 30 degrees knee flexion  Lachmans: neg, firm endpoint  Posterior Drawer stable  Patellofemoral joint:                Apprehension: negative              Crepitations: mild   Grind: positive.    X-RAY:     3 views bilateral knee from 7/26/2023 reviewed with Carolina, today,  Bilateral medial compartment severe joint space narrowing, bone bone bone with articular flattening, subcondral sclerosis. Bilateral patellofemoral lateral facet mild joint space narrowing with medial  patello-femoral osteophytes.           ASSESSMENT/PLAN: Carolina Hernandez is a 58 year old female with chronic bilateral knee pain, advanced primary osteoarthritis.     * reviewed imaging studies with patient, showing arthritic changes, or wearing of the cartilage in the knee. This can be caused by normal \"wear and tear\" over the years or following prior injury to the knee.  Treatment typically starts nonsurgically. Surgical indication for total knee arthroplasty  when nonsurgical management is no longer effective.  At this point, we discussed  surgical total knee arthroplasty.  At this time, she'd like to try cortisone again , maybe surgery this Fall. Left knee.  Will place surgical orders for left total knee arthroplasty for this Fall  Baseline KOOS/PROMIS today.      ** Risks of surgery include, but not limited to: bleeding (possibly requiring transfusion), infection, pain, scar, damage to adjacent structures (e.g. Nerves, blood vessels, bone, cartilage), temporary or permanent nerve damage, recurrence of symptoms, implant dislocation, instability, implant failure, implant infection, unequal limb lengths, malalignment, stiffness, need for further surgery, blood clots, pulmonary embolism, risks of anesthesia, and death.  * the knee will not feel \"normal\" as it won't be \"normal\" after knee replacement. It may feel \"clunky\" due to the nature of the " hard metal and plastic implant.  * the expectation is for improved pain, not necessarily complete pain relief.  If you have surgery on your right knee, you will not be able to drive for likely 4-6 weeks, or until you have attained full knee function, range of motion, and ability to drive.    * discussed patient and their  will plan hospitalization overnight with discharge home the next morning, daily physical therapy starting either the day of or day after surgery, and then therapy on an outpatient basis after discharge.  * will plan postoperative, pharmacologic deep vein thrombosis prophylaxis x4 weeks.   * postoperative pain control will be oral medications upon discharge from hospital  * postoperative Physical Therapy to start upon discharge from the hospital.  * patient will need preoperative H+P prior to surgery from primary care provider   * will see patient 2 weeks postoperative, sooner as needed, for wound check and xrays. Will obtain AP, lateral and sunrise views of the knee at that time.    * patient encouraged to call in interim if any new questions or concerns arise.    * recommend no elective dental procedures for 4-6 months after surgery. Any emergency dental procedures, mouth infections, abscesses, etc must be taken care of immediately with preventive antibiotics.     * patient will need longterm (at least 2 years) prophylactic antibiotics use with dental procedures or other invasive procedures (2 g amoxicilin or  2g Keflex or 500mg azithromycin or clarithromycin or 100mg doxycycline) 30-60 minutes prior to dental procedures.      Baseline KOOS/PROMIS today.      * All questions were addressed and answered prior to discharge from clinic today. The patient acknowledges an understanding of and agreement with the plan set forth during today's visit. Patient was advised to call our office or MyChart us if any further questions arise upon leaving our office today.    Giovanny Chong M.D., M.S.  Dept.  of Orthopaedic Surgery  [facility]    Large Joint Injection/Arthocentesis: bilateral knee    Date/Time: 7/26/2023 12:06 PM    Performed by: Acosta Eng PA  Authorized by: Giovanny Chong MD    Indications:  Pain and osteoarthritis  Needle Size:  22 G  Guidance: landmark guided    Approach:  Anteromedial  Location:  Knee  Laterality:  Bilateral      Medications (Right):  4 mL bupivacaine 0.25 %; 80 mg triamcinolone 40 MG/ML  Medications (Left):  4 mL bupivacaine 0.25 %; 80 mg triamcinolone 40 MG/ML  Outcome:  Tolerated well, no immediate complications  Procedure discussed: discussed risks, benefits, and alternatives    Consent Given by:  Patient  Prep: patient was prepped and draped in usual sterile fashion

## 2023-07-26 ENCOUNTER — OFFICE VISIT (OUTPATIENT)
Dept: ORTHOPEDICS | Facility: CLINIC | Age: 58
End: 2023-07-26
Payer: COMMERCIAL

## 2023-07-26 ENCOUNTER — TELEPHONE (OUTPATIENT)
Dept: SURGERY | Facility: CLINIC | Age: 58
End: 2023-07-26

## 2023-07-26 ENCOUNTER — ANCILLARY PROCEDURE (OUTPATIENT)
Dept: GENERAL RADIOLOGY | Facility: CLINIC | Age: 58
End: 2023-07-26
Attending: PHYSICIAN ASSISTANT
Payer: COMMERCIAL

## 2023-07-26 VITALS
SYSTOLIC BLOOD PRESSURE: 150 MMHG | HEIGHT: 63 IN | HEART RATE: 78 BPM | WEIGHT: 165 LBS | DIASTOLIC BLOOD PRESSURE: 80 MMHG | BODY MASS INDEX: 29.23 KG/M2

## 2023-07-26 DIAGNOSIS — M17.0 BILATERAL PRIMARY OSTEOARTHRITIS OF KNEE: Primary | ICD-10-CM

## 2023-07-26 DIAGNOSIS — G89.29 BILATERAL CHRONIC KNEE PAIN: ICD-10-CM

## 2023-07-26 DIAGNOSIS — M25.561 BILATERAL CHRONIC KNEE PAIN: ICD-10-CM

## 2023-07-26 DIAGNOSIS — M25.562 BILATERAL CHRONIC KNEE PAIN: ICD-10-CM

## 2023-07-26 DIAGNOSIS — M17.0 BILATERAL PRIMARY OSTEOARTHRITIS OF KNEE: ICD-10-CM

## 2023-07-26 PROCEDURE — 99214 OFFICE O/P EST MOD 30 MIN: CPT | Mod: 25 | Performed by: ORTHOPAEDIC SURGERY

## 2023-07-26 PROCEDURE — 20610 DRAIN/INJ JOINT/BURSA W/O US: CPT | Mod: 50 | Performed by: ORTHOPAEDIC SURGERY

## 2023-07-26 PROCEDURE — 73562 X-RAY EXAM OF KNEE 3: CPT | Mod: TC | Performed by: RADIOLOGY

## 2023-07-26 RX ORDER — BUPIVACAINE HYDROCHLORIDE 2.5 MG/ML
4 INJECTION, SOLUTION INFILTRATION; PERINEURAL
Status: DISCONTINUED | OUTPATIENT
Start: 2023-07-26 | End: 2023-10-31

## 2023-07-26 RX ORDER — TRIAMCINOLONE ACETONIDE 40 MG/ML
80 INJECTION, SUSPENSION INTRA-ARTICULAR; INTRAMUSCULAR
Status: DISCONTINUED | OUTPATIENT
Start: 2023-07-26 | End: 2023-10-31

## 2023-07-26 RX ORDER — TRANEXAMIC ACID 650 MG/1
1950 TABLET ORAL ONCE
Status: CANCELLED | OUTPATIENT
Start: 2023-07-26 | End: 2023-07-26

## 2023-07-26 RX ORDER — FLUOROMETHOLONE 0.1 %
SUSPENSION, DROPS(FINAL DOSAGE FORM)(ML) OPHTHALMIC (EYE)
COMMUNITY
Start: 2023-07-19 | End: 2023-10-25

## 2023-07-26 RX ORDER — NICOTINE POLACRILEX 4 MG/1
20 GUM, CHEWING ORAL DAILY
COMMUNITY
Start: 2023-07-05 | End: 2024-05-13

## 2023-07-26 RX ADMIN — BUPIVACAINE HYDROCHLORIDE 4 ML: 2.5 INJECTION, SOLUTION INFILTRATION; PERINEURAL at 12:06

## 2023-07-26 RX ADMIN — TRIAMCINOLONE ACETONIDE 80 MG: 40 INJECTION, SUSPENSION INTRA-ARTICULAR; INTRAMUSCULAR at 12:06

## 2023-07-26 ASSESSMENT — PAIN SCALES - GENERAL: PAINLEVEL: MILD PAIN (2)

## 2023-07-26 NOTE — LETTER
"    7/26/2023         RE: Carolina Hernandez  [address] [place] [id number]        Dear Colleague,    Thank you for referring your patient, Carolina Hernandez, to the [facility]. Please see a copy of my visit note below.    CHIEF COMPLAINT:   Chief Complaint   Patient presents with     Left Knee - Pain     Injections are helping for 2 months. Knees feel little \"stiff' today  Walking for exercise down 5#     Right Knee - Pain       HISTORY OF PRESENT ILLNESS    Carolina Hernandez is a 58 year old female seen for evaluation of ongoing bilateral knee pain with no known injury, both about the same but varies, today left more than right. Right side pain radiates down the calf more than the left.  Pain has been present for 20 years. We saw her 5/15/2023,  2/13/2023 and 11/10/2022 and provided bilateral cortisone injections. Injections helped maybe 2 months how.    She is more sore today, was up on her feet a lot this past weekend taking care of 3 grandchildren.    She'd like to discuss surgery, total knee arthroplasty, left knee, this Fall.    Previously on 4/27/2022  received bilateral visco injections (synviscOne). She had previously received a cortisone injection with Dr Gaspar 8/2021 which helped to some extent for a few months, but not as well as our injections.      Recent bout of pancreatitis and had a cholecystectomy on 11/21/2022. She was planning to get a knee replacement this winter but with these medical things going on she wants to wait until cortisone no longer helps with the pain. Also, her 79yo father just had a knee replacement in November and having his other knee done soon.    Also has foot problems. Gaining some weight..    Locates pain along the inner aspect and front of the knees, and can radiate down the leg to the ankle, up the thigh.  Pain is worse with work, lifting/carrying things, gardening. Treatment has been occasional use of over the counter pain " medications without relief.     Pain at rest, pain at night better with injections. Sleeps with pillow between knees. Has tried tylenol arthritis and ibuprofen for pain but doesn't seem to really help.    Left knee injury at 10 years old.    Has chronic low back pain, denies numbness and tingling.    Present symptoms: pain medially  and anteriorly, pain dull/achy , mild pain.    Pain severity: 2/10  Frequency of symptoms: are constant  Exacerbating Factors: weight bearing, carrying and lifting things, gardening, kneeling, stairs  Relieving Factors: rest, ice  Night Pain: Yes  Pain while at rest: Yes   Numbness or tingling: No   Patient has tried:     NSAIDS: Yes , not recently in the past couple of years.     Physical Therapy: No      Activity modification: Yes      Bracing: No      Injections: Yes cortisone 5/15/2023, 2/2023, 11/2022, 8/15/2022, 8/20/2021 cortisone;    4/27/2022 with SynviscOne.     Ice: Yes      Assistive device:  No     Other: tylenol. Topical ointments.      Other PMH:  has a past medical history of Asthma, C. difficile colitis, Depression, Depressive disorder (Have had it most of my life), Moderate persistent asthma with exacerbation (9/18/2007), Osteoarthritis, Sinusitis, chronic, and Status post coronary angiogram (10/14/2022).    She has no past medical history of Breast cancer (H), Malignant neoplasm of ovary (H), or Need for prophylactic hormone replacement therapy (postmenopausal).  Patient Active Problem List   Diagnosis     Recurrent major depressive disorder, in remission (H)     Benign essential hypertension     Primary osteoarthritis of both knees     Low back pain due to bilateral sciatica     Nonobstructive atherosclerosis of coronary artery     Hyperlipidemia LDL goal <70     Transaminitis     Acute pancreatitis     COPD vs Asthma     GERD (gastroesophageal reflux disease)     Seasonal allergies     Fatty liver       Surgical Hx:  has a past surgical history that includes  colonoscopy (2017); Esophagoscopy, gastroscopy, duodenoscopy (EGD), combined (N/A, 05/05/2022); Coronary Angiogram (N/A, 10/14/2022); and Laparoscopic cholecystectomy with cholangiograms (N/A, 11/21/2022).    Medications:   Current Outpatient Medications:      fluorometholone (FML LIQUIFILM) 0.1 % ophthalmic suspension, INSTILL 1 DROP INTO EACH EYE 4 TIMES DAILY FOR 7 DAYS, Disp: , Rfl:      omeprazole 20 MG tablet, Take 2 tablets (40 mg) by mouth daily*, Disp: , Rfl:      albuterol (PROVENTIL) (2.5 MG/3ML) 0.083% neb solution, inhale 1 vial (2.5 mg) by nebulization every 4 hours as needed for shortness of breath / dyspnea or wheezing, Disp: 225 mL, Rfl: 11     albuterol (VENTOLIN HFA) 108 (90 Base) MCG/ACT inhaler, INHALE 1-2 PUFFS INTO THE LUNGS EVERY 4 HOURS AS NEEDED FOR SHORTNESS OF BREATH/DYSPNEA OR WHEEZING ., Disp: 18 g, Rfl: 11     amLODIPine (NORVASC) 2.5 MG tablet, Take 2 tablets (5 mg) by mouth once daily., Disp: 180 tablet, Rfl: 3     aspirin (ASA) 81 MG EC tablet, Take 81 mg by mouth daily, Disp: , Rfl:      cetirizine (ZYRTEC) 10 MG tablet, Take 10 mg by mouth daily, Disp: , Rfl:      FLUoxetine (PROZAC) 20 MG capsule, Take 1 capsule (20 mg) by mouth daily, Disp: 90 capsule, Rfl: 3     FLUoxetine (PROZAC) 40 MG capsule, Take 1 capsule (40 mg) by mouth daily Take with 20 mg tab for a total of 60 mg daily, Disp: 90 capsule, Rfl: 3     fluticasone (FLONASE) 50 MCG/ACT nasal spray, Spray 1 spray into both nostrils daily, Disp: 1 g, Rfl: 1     fluticasone-salmeterol (ADVAIR) 500-50 MCG/ACT inhaler, Inhale 1 puff into the lungs every 12 hours, Disp: 1 each, Rfl: 11     montelukast (SINGULAIR) 10 MG tablet, Take 1 tablet (10 mg) by mouth At Bedtime, Disp: 90 tablet, Rfl: 3     tiotropium (SPIRIVA) 18 MCG inhaled capsule, Inhale 1 capsule (18 mcg) into the lungs daily, Disp: 30 capsule, Rfl: 11    Allergies:   Allergies   Allergen Reactions     Doxycycline Difficulty breathing     Penicillins Itching      "Sucralfate Rash       Social Hx: retired ().   reports that she quit smoking about 2 years ago. Her smoking use included cigarettes. She has never used smokeless tobacco. She reports that she does not currently use alcohol. She reports that she does not use drugs.    Family Hx: family history includes Cancer in her daughter and maternal grandfather; Coronary Artery Disease in her father and paternal grandfather; Diabetes in her daughter; Glaucoma in her father; Schizophrenia in her daughter.    REVIEW OF SYSTEMS:   CONSTITUTIONAL:NEGATIVE for fever, chills, change in weight  INTEGUMENTARY/SKIN: NEGATIVE for worrisome rashes, moles or lesions  MUSCULOSKELETAL:See HPI above  NEURO: NEGATIVE for weakness, dizziness or paresthesias    PHYSICAL EXAM:  BP (!) 150/80   Pulse 78   Ht 1.6 m (5' 3\")   Wt 74.8 kg (165 lb)   BMI 29.23 kg/m     GENERAL APPEARANCE: healthy, alert, no distress  SKIN: no suspicious lesions or rashes  NEURO: Normal strength and tone, mentation intact and speech normal  PSYCH:  mentation appears normal and affect normal, not anxious  RESPIRATORY: No increased work of breathing.      BILATERAL LOWER EXTREMITIES:  Gait: favors the left  today.  Alignment: varus  Intact sensation deep peroneal nerve, superficial peroneal nerve, med/lat tibial nerve, sural nerve, saphenous nerve  Intact EHL, EDL, TA, FHL, GS, quadriceps hamstrings and hip flexors  Bilateral calf soft and nttp or squeeze.  Edema: trace    LEFT KNEE EXAM:    Skin: intact, no ecchymosis or erythema  ROM: 5 extension to 120 flexion, discomfort with range of motion.  Tight hamstrings on straight leg raise.  Effusion: trace  Tender: medial joint line, pes, lateral joint line.    MCL: stable, and non-painful at both 0 and 30 degrees knee flexion  Varus stress: stable, and non-painful at both 0 and 30 degrees knee flexion  Lachmans: neg, firm endpoint  Posterior Drawer stable  Patellofemoral joint:                " "Apprehension: negative              Crepitations: mild   Grind: positive.    RIGHT KNEE EXAM:    Skin: intact, no ecchymosis or erythema  ROM: full extension to 125+ flexion  Tight hamstrings on straight leg raise.  Effusion: none  Tender: medial joint line, lateral joint line.    MCL: stable, and non-painful at both 0 and 30 degrees knee flexion  Varus stress: stable, and non-painful at both 0 and 30 degrees knee flexion  Lachmans: neg, firm endpoint  Posterior Drawer stable  Patellofemoral joint:                Apprehension: negative              Crepitations: mild   Grind: positive.    X-RAY:     3 views bilateral knee from 7/26/2023 reviewed with Carolina, today,  Bilateral medial compartment severe joint space narrowing, bone bone bone with articular flattening, subcondral sclerosis. Bilateral patellofemoral lateral facet mild joint space narrowing with medial  patello-femoral osteophytes.           ASSESSMENT/PLAN: Carolina Hernandez is a 58 year old female with chronic bilateral knee pain, advanced primary osteoarthritis.     * reviewed imaging studies with patient, showing arthritic changes, or wearing of the cartilage in the knee. This can be caused by normal \"wear and tear\" over the years or following prior injury to the knee.  Treatment typically starts nonsurgically. Surgical indication for total knee arthroplasty  when nonsurgical management is no longer effective.  At this point, we discussed  surgical total knee arthroplasty.  At this time, she'd like to try cortisone again , maybe surgery this Fall. Left knee.  Will place surgical orders for left total knee arthroplasty for this Fall  Baseline KOOS/PROMIS today.      ** Risks of surgery include, but not limited to: bleeding (possibly requiring transfusion), infection, pain, scar, damage to adjacent structures (e.g. Nerves, blood vessels, bone, cartilage), temporary or permanent nerve damage, recurrence of symptoms, implant dislocation, instability, " "implant failure, implant infection, unequal limb lengths, malalignment, stiffness, need for further surgery, blood clots, pulmonary embolism, risks of anesthesia, and death.  * the knee will not feel \"normal\" as it won't be \"normal\" after knee replacement. It may feel \"clunky\" due to the nature of the hard metal and plastic implant.  * the expectation is for improved pain, not necessarily complete pain relief.  If you have surgery on your right knee, you will not be able to drive for likely 4-6 weeks, or until you have attained full knee function, range of motion, and ability to drive.    * discussed patient and their  will plan hospitalization overnight with discharge home the next morning, daily physical therapy starting either the day of or day after surgery, and then therapy on an outpatient basis after discharge.  * will plan postoperative, pharmacologic deep vein thrombosis prophylaxis x4 weeks.   * postoperative pain control will be oral medications upon discharge from hospital  * postoperative Physical Therapy to start upon discharge from the hospital.  * patient will need preoperative H+P prior to surgery from primary care provider   * will see patient 2 weeks postoperative, sooner as needed, for wound check and xrays. Will obtain AP, lateral and sunrise views of the knee at that time.    * patient encouraged to call in interim if any new questions or concerns arise.    * recommend no elective dental procedures for 4-6 months after surgery. Any emergency dental procedures, mouth infections, abscesses, etc must be taken care of immediately with preventive antibiotics.     * patient will need longterm (at least 2 years) prophylactic antibiotics use with dental procedures or other invasive procedures (2 g amoxicilin or  2g Keflex or 500mg azithromycin or clarithromycin or 100mg doxycycline) 30-60 minutes prior to dental procedures.      Baseline KOOS/PROMIS today.      * All questions were addressed and " answered prior to discharge from clinic today. The patient acknowledges an understanding of and agreement with the plan set forth during today's visit. Patient was advised to call our office or MyChart us if any further questions arise upon leaving our office today.    Giovanny Chong M.D., M.S.  Dept. of Orthopaedic Surgery  [facility]    Large Joint Injection/Arthocentesis: bilateral knee    Date/Time: 7/26/2023 12:06 PM    Performed by: Acosta Eng PA  Authorized by: Giovanny Chong MD    Indications:  Pain and osteoarthritis  Needle Size:  22 G  Guidance: landmark guided    Approach:  Anteromedial  Location:  Knee  Laterality:  Bilateral      Medications (Right):  4 mL bupivacaine 0.25 %; 80 mg triamcinolone 40 MG/ML  Medications (Left):  4 mL bupivacaine 0.25 %; 80 mg triamcinolone 40 MG/ML  Outcome:  Tolerated well, no immediate complications  Procedure discussed: discussed risks, benefits, and alternatives    Consent Given by:  Patient  Prep: patient was prepped and draped in usual sterile fashion          Again, thank you for allowing me to participate in the care of your patient.        Sincerely,        Giovanny Chong MD

## 2023-07-26 NOTE — TELEPHONE ENCOUNTER
Type of surgery: ARTHROPLASTY, KNEE, TOTAL (Left)   Location of surgery: [facility] [place]  Date and time of surgery: 10/31/2023  Surgeon: IGNACIO   Pre-Op Appt Date: 10/25/2023  Post-Op Appt Date: 11/15/2023   Packet sent out: Yes  Pre-cert/Authorization completed:  No  Date:

## 2023-07-26 NOTE — TELEPHONE ENCOUNTER
Left message for patient to call us back to discuss surgery dates    Faith Guevara M.A.  Specialty surgery Scheduler

## 2023-07-26 NOTE — PATIENT INSTRUCTIONS
Please remember to call and schedule a follow up appointment if one was recommended at your earliest convenience.  Orthopedics CLINIC HOURS TELEPHONE NUMBER   Dr. Castillo Nunez  Certified Medical Assistant   Monday & Wednesday   8am - 5pm  Thursday 1pm - 5pm  Friday 8am -11:30am Specialty schedulers:   [phone] to schedule your surgery.  Main Clinic:   [phone] to make an appointment with any provider.    Urgent Care locations:  [facility] Monday-Friday Closed  Saturday-Sunday 9am-5pm      Monday-Friday 12pm - 8pm  Saturday-Sunday 9am-5pm [phone]   If SURGERY has been recommended, please call our Specialty Schedulers at [phone] to schedule your procedure.  If you need a medication refill, please contact your pharmacy. Please allow 3 business days for your refill to be completed.  If an MRI or CT scan has been recommended, please call [facility] Schedulers at [phone] to schedule your appointment.  Use [facility] (secure e-mail communication and access to your chart) to send a message or to make an appointment. Please ask how you can sign up for [facility].  Your care team's suggested websites for health information:   [email or web address] : Up to date and easily searchable information on multiple topics.   [email or web address] : [place] dept of heat, public health issues in [place], N1N1    
Good

## 2023-08-08 ENCOUNTER — MYC MEDICAL ADVICE (OUTPATIENT)
Dept: GASTROENTEROLOGY | Facility: CLINIC | Age: 58
End: 2023-08-08
Payer: COMMERCIAL

## 2023-08-11 NOTE — TELEPHONE ENCOUNTER
Called patient - They had appointment with Sameera Flaherty for 8/26 In-Person at 2pm - Provider is working remotely that day.      Called to switch patient to virtual appointment. Left message with scheduling line to switch appointment over or reschedule for in person day.

## 2023-08-15 ENCOUNTER — TELEPHONE (OUTPATIENT)
Dept: FAMILY MEDICINE | Facility: CLINIC | Age: 58
End: 2023-08-15
Payer: COMMERCIAL

## 2023-08-15 NOTE — TELEPHONE ENCOUNTER
Called Patient and converted in-person appointment to virtual visit. Per Patient preference, a text link will be sent.    Patient confirmed appointment for  8/26 at 2:00 pm with Sameera Flaherty PA-C.    SK

## 2023-08-15 NOTE — TELEPHONE ENCOUNTER
Legs are sore and she is bruising more easily.  Also has foot cramps.  Thinks she is well hydrated.  Appt leonor. Ana ALEXANDER RN

## 2023-08-17 ENCOUNTER — OFFICE VISIT (OUTPATIENT)
Dept: FAMILY MEDICINE | Facility: CLINIC | Age: 58
End: 2023-08-17
Payer: COMMERCIAL

## 2023-08-17 VITALS
BODY MASS INDEX: 29.23 KG/M2 | WEIGHT: 165 LBS | DIASTOLIC BLOOD PRESSURE: 72 MMHG | SYSTOLIC BLOOD PRESSURE: 128 MMHG | OXYGEN SATURATION: 98 % | TEMPERATURE: 98.8 F | HEART RATE: 77 BPM | HEIGHT: 63 IN | RESPIRATION RATE: 20 BRPM

## 2023-08-17 DIAGNOSIS — M79.662 PAIN IN BOTH LOWER LEGS: Primary | ICD-10-CM

## 2023-08-17 DIAGNOSIS — M79.661 PAIN IN BOTH LOWER LEGS: Primary | ICD-10-CM

## 2023-08-17 DIAGNOSIS — R21 RASH: ICD-10-CM

## 2023-08-17 LAB
ALBUMIN SERPL BCG-MCNC: 4.1 G/DL (ref 3.5–5.2)
ALP SERPL-CCNC: 69 U/L (ref 35–104)
ALT SERPL W P-5'-P-CCNC: 24 U/L (ref 0–50)
ANION GAP SERPL CALCULATED.3IONS-SCNC: 11 MMOL/L (ref 7–15)
AST SERPL W P-5'-P-CCNC: 20 U/L (ref 0–45)
BASOPHILS # BLD AUTO: 0 10E3/UL (ref 0–0.2)
BASOPHILS NFR BLD AUTO: 1 %
BILIRUB SERPL-MCNC: 0.3 MG/DL
BUN SERPL-MCNC: 15 MG/DL (ref 8–23)
CALCIUM SERPL-MCNC: 9.5 MG/DL (ref 8.8–10.2)
CHLORIDE SERPL-SCNC: 101 MMOL/L (ref 98–107)
CREAT SERPL-MCNC: 0.73 MG/DL (ref 0.51–0.95)
CRP SERPL-MCNC: 7.08 MG/L
DEPRECATED HCO3 PLAS-SCNC: 23 MMOL/L (ref 22–29)
EOSINOPHIL # BLD AUTO: 0.1 10E3/UL (ref 0–0.7)
EOSINOPHIL NFR BLD AUTO: 1 %
ERYTHROCYTE [DISTWIDTH] IN BLOOD BY AUTOMATED COUNT: 13.6 % (ref 10–15)
ERYTHROCYTE [SEDIMENTATION RATE] IN BLOOD BY WESTERGREN METHOD: 17 MM/HR (ref 0–30)
GFR SERPL CREATININE-BSD FRML MDRD: >90 ML/MIN/1.73M2
GLUCOSE SERPL-MCNC: 77 MG/DL (ref 70–99)
HCT VFR BLD AUTO: 40.4 % (ref 35–47)
HGB BLD-MCNC: 13 G/DL (ref 11.7–15.7)
IMM GRANULOCYTES # BLD: 0 10E3/UL
IMM GRANULOCYTES NFR BLD: 0 %
LYMPHOCYTES # BLD AUTO: 1.8 10E3/UL (ref 0.8–5.3)
LYMPHOCYTES NFR BLD AUTO: 29 %
MCH RBC QN AUTO: 29.7 PG (ref 26.5–33)
MCHC RBC AUTO-ENTMCNC: 32.2 G/DL (ref 31.5–36.5)
MCV RBC AUTO: 92 FL (ref 78–100)
MONOCYTES # BLD AUTO: 0.6 10E3/UL (ref 0–1.3)
MONOCYTES NFR BLD AUTO: 10 %
NEUTROPHILS # BLD AUTO: 3.8 10E3/UL (ref 1.6–8.3)
NEUTROPHILS NFR BLD AUTO: 60 %
PLATELET # BLD AUTO: 287 10E3/UL (ref 150–450)
POTASSIUM SERPL-SCNC: 4 MMOL/L (ref 3.4–5.3)
PROT SERPL-MCNC: 7 G/DL (ref 6.4–8.3)
RBC # BLD AUTO: 4.37 10E6/UL (ref 3.8–5.2)
SODIUM SERPL-SCNC: 135 MMOL/L (ref 136–145)
WBC # BLD AUTO: 6.3 10E3/UL (ref 4–11)

## 2023-08-17 PROCEDURE — 36415 COLL VENOUS BLD VENIPUNCTURE: CPT | Performed by: NURSE PRACTITIONER

## 2023-08-17 PROCEDURE — 99214 OFFICE O/P EST MOD 30 MIN: CPT | Performed by: NURSE PRACTITIONER

## 2023-08-17 PROCEDURE — 85652 RBC SED RATE AUTOMATED: CPT | Performed by: NURSE PRACTITIONER

## 2023-08-17 PROCEDURE — 85025 COMPLETE CBC W/AUTO DIFF WBC: CPT | Performed by: NURSE PRACTITIONER

## 2023-08-17 PROCEDURE — 80053 COMPREHEN METABOLIC PANEL: CPT | Performed by: NURSE PRACTITIONER

## 2023-08-17 PROCEDURE — 86140 C-REACTIVE PROTEIN: CPT | Performed by: NURSE PRACTITIONER

## 2023-08-17 RX ORDER — TRIAMCINOLONE ACETONIDE 1 MG/G
CREAM TOPICAL 2 TIMES DAILY
Qty: 45 G | Refills: 0 | Status: SHIPPED | OUTPATIENT
Start: 2023-08-17 | End: 2023-10-25

## 2023-08-17 ASSESSMENT — PAIN SCALES - GENERAL: PAINLEVEL: MODERATE PAIN (5)

## 2023-08-17 NOTE — PROGRESS NOTES
"  Assessment & Plan     Pain in both lower legs  Nothing to suggest DVT, no significant risk factors. Will check labs today. ?venous stasis given symptoms. Will obtain US for further evaluation.  - CBC with platelets and differential; Future  - Comprehensive metabolic panel (BMP + Alb, Alk Phos, ALT, AST, Total. Bili, TP); Future  - CRP, inflammation; Future  - ESR: Erythrocyte sedimentation rate; Future  - US Venous Competency Bilateral; Future      Rash  ?venous stasis dermatitis. Can try triamcinolone.   - triamcinolone (KENALOG) 0.1 % external cream; Apply topically 2 times daily         BMI:   Estimated body mass index is 29.23 kg/m  as calculated from the following:    Height as of this encounter: 1.6 m (5' 3\").    Weight as of this encounter: 74.8 kg (165 lb).       Patient Instructions   To schedule ultrasound, call [phone].  Labs today.  Can use the topical steroid to the rash.  If chest pain, shortness of breath, worsening symptoms, go to ER.    VALERIA Polk [facility]    Thor Figueroa is a 58 year old, presenting for the following health issues:  Bleeding/Bruising and Rash (Both lower legs)        8/17/2023     7:39 AM   Additional Questions   Roomed by Laly MERCER   Accompanied by self       HPI     On Monday noticed she had some redness and pain in her upper right thigh and a big bruise on her right foot, since then this redness has spread down into both lower legs and has cramping in her lower right leg. These spots are painful to the touch. The redness and bruising has been improving but the pain has not.     Above HPI reviewed. Additionally, bilateral leg pain for the past 3 days. Developed rash to lateral left leg last night. No significant itching, mild burning. Notes had a bruise to the top of the right foot on Monday as well, this has resolved. No recent travel. No exogenous estrogen. No history of trauma or malignancy. No chest pain or shortness of " "breath. No significant swelling of legs. No fevers or significant fatigue.         Review of Systems   Constitutional, HEENT, cardiovascular, pulmonary, gi and gu systems are negative, except as otherwise noted.      Objective    /72   Pulse 77   Temp 98.8  F (37.1  C) (Tympanic)   Resp 20   Ht 1.6 m (5' 3\")   Wt 74.8 kg (165 lb)   SpO2 98%   BMI 29.23 kg/m    Body mass index is 29.23 kg/m .  Physical Exam  Vitals and nursing note reviewed.   Constitutional:       General: She is not in acute distress.     Appearance: Normal appearance.   HENT:      Head: Normocephalic and atraumatic.      Mouth/Throat:      Mouth: Mucous membranes are moist.   Cardiovascular:      Rate and Rhythm: Normal rate.   Pulmonary:      Effort: Pulmonary effort is normal.   Musculoskeletal:      Cervical back: Neck supple.      Right lower leg: No edema.      Left lower leg: No edema.      Comments: Bilateral lower extremities - no deformity. No significant swelling. No calf tenderness, no bony tenderness. Small area of mild reddened discoloration to lateral left lower leg, blanchable. Prominent vessels bilaterally   Skin:     General: Skin is warm and dry.   Neurological:      General: No focal deficit present.      Mental Status: She is alert.   Psychiatric:         Mood and Affect: Mood normal.         Behavior: Behavior normal.            Results for orders placed or performed in visit on 08/17/23 (from the past 24 hour(s))   CBC with platelets and differential    Narrative    The following orders were created for panel order CBC with platelets and differential.  Procedure                               Abnormality         Status                     ---------                               -----------         ------                     CBC with platelets and d...[[id number]]                      Final result                 Please view results for these tests on the individual orders.   Comprehensive metabolic panel (BMP + " Alb, Alk Phos, ALT, AST, Total. Bili, TP)   Result Value Ref Range    Sodium 135 (L) 136 - 145 mmol/L    Potassium 4.0 3.4 - 5.3 mmol/L    Chloride 101 98 - 107 mmol/L    Carbon Dioxide (CO2) 23 22 - 29 mmol/L    Anion Gap 11 7 - 15 mmol/L    Urea Nitrogen 15.0 8.0 - 23.0 mg/dL    Creatinine 0.73 0.51 - 0.95 mg/dL    Calcium 9.5 8.8 - 10.2 mg/dL    Glucose 77 70 - 99 mg/dL    Alkaline Phosphatase 69 35 - 104 U/L    AST 20 0 - 45 U/L    ALT 24 0 - 50 U/L    Protein Total 7.0 6.4 - 8.3 g/dL    Albumin 4.1 3.5 - 5.2 g/dL    Bilirubin Total 0.3 <=1.2 mg/dL    GFR Estimate >90 >60 mL/min/1.73m2   CRP, inflammation   Result Value Ref Range    CRP Inflammation 7.08 (H) <5.00 mg/L   ESR: Erythrocyte sedimentation rate   Result Value Ref Range    Erythrocyte Sedimentation Rate 17 0 - 30 mm/hr   CBC with platelets and differential   Result Value Ref Range    WBC Count 6.3 4.0 - 11.0 10e3/uL    RBC Count 4.37 3.80 - 5.20 10e6/uL    Hemoglobin 13.0 11.7 - 15.7 g/dL    Hematocrit 40.4 35.0 - 47.0 %    MCV 92 78 - 100 fL    MCH 29.7 26.5 - 33.0 pg    MCHC 32.2 31.5 - 36.5 g/dL    RDW 13.6 10.0 - 15.0 %    Platelet Count 287 150 - 450 10e3/uL    % Neutrophils 60 %    % Lymphocytes 29 %    % Monocytes 10 %    % Eosinophils 1 %    % Basophils 1 %    % Immature Granulocytes 0 %    Absolute Neutrophils 3.8 1.6 - 8.3 10e3/uL    Absolute Lymphocytes 1.8 0.8 - 5.3 10e3/uL    Absolute Monocytes 0.6 0.0 - 1.3 10e3/uL    Absolute Eosinophils 0.1 0.0 - 0.7 10e3/uL    Absolute Basophils 0.0 0.0 - 0.2 10e3/uL    Absolute Immature Granulocytes 0.0 <=0.4 10e3/uL

## 2023-08-17 NOTE — PATIENT INSTRUCTIONS
To schedule ultrasound, call [phone].  Labs today.  Can use the topical steroid to the rash.  If chest pain, shortness of breath, worsening symptoms, go to ER.

## 2023-08-29 NOTE — Clinical Note
Middle spine x-ray is normal except you have minimal curvature of the spine. Multilevel arthritis is present. Sheath prepped and inserted in the right radial artery.

## 2023-09-08 ENCOUNTER — HOSPITAL ENCOUNTER (OUTPATIENT)
Dept: ULTRASOUND IMAGING | Facility: CLINIC | Age: 58
Discharge: HOME | End: 2023-09-08
Attending: NURSE PRACTITIONER | Admitting: NURSE PRACTITIONER
Payer: COMMERCIAL

## 2023-09-08 DIAGNOSIS — M79.661 PAIN IN BOTH LOWER LEGS: ICD-10-CM

## 2023-09-08 DIAGNOSIS — M79.662 PAIN IN BOTH LOWER LEGS: ICD-10-CM

## 2023-09-08 PROCEDURE — 93970 EXTREMITY STUDY: CPT

## 2023-09-11 DIAGNOSIS — I87.2 VENOUS (PERIPHERAL) INSUFFICIENCY: Primary | ICD-10-CM

## 2023-10-02 DIAGNOSIS — J44.9 STAGE 2 MODERATE COPD BY GOLD CLASSIFICATION (H): ICD-10-CM

## 2023-10-02 RX ORDER — TIOTROPIUM BROMIDE 18 UG/1
18 CAPSULE ORAL; RESPIRATORY (INHALATION) DAILY
Qty: 30 CAPSULE | Refills: 11 | Status: SHIPPED | OUTPATIENT
Start: 2023-10-02 | End: 2024-08-26

## 2023-10-05 DIAGNOSIS — J45.20 MILD INTERMITTENT ASTHMA WITHOUT COMPLICATION: ICD-10-CM

## 2023-10-05 RX ORDER — MONTELUKAST SODIUM 10 MG/1
10 TABLET ORAL AT BEDTIME
Qty: 90 TABLET | Refills: 3 | Status: SHIPPED | OUTPATIENT
Start: 2023-10-05

## 2023-10-13 DIAGNOSIS — J45.40 UNCONTROLLED MODERATE PERSISTENT ASTHMA: ICD-10-CM

## 2023-10-13 RX ORDER — ALBUTEROL SULFATE 0.83 MG/ML
SOLUTION RESPIRATORY (INHALATION)
Qty: 225 ML | Refills: 11 | Status: SHIPPED | OUTPATIENT
Start: 2023-10-13 | End: 2024-08-26

## 2023-10-24 NOTE — PROGRESS NOTES
10/24/23 1541   Discharge Planning   Patient/Family Anticipates Transition to home with family   Concerns to be Addressed no discharge needs identified   Living Arrangements   People in Home spouse   Type of Residence Private Residence   Number of Stairs, Within Home, Primary none   Stair Railings, Within Home, Primary none   Once home, are you able to live on one level? No   Which level? Main Level   Bathroom Shower/Tub Walk-in shower   Equipment Currently Used at Home shower chair;none   Support System   Support Systems Spouse/Significant Other   Do you have someone available to stay with you one or two nights once you are home? Yes   Medical Clearance   Date of Physical 10/25/23   Clinic Name [facility]   It is recommended that you call and check with any specialty providers before surgery to see if you need surgical clearance.  Do you see any specialty providers outside of your primary care provider? Yes   Blood   Known Bleeding Disorder or Coagulopathy No   Does the patient have any Yazidism/cultural preferences related to blood products? No   Education   Has the patient scheduled or completed pre-op total joint education, either in class or online, in the last 12 months? Yes   What day did the patient complete, or plan to complete, pre-op total joint education? 10/24/23   Patient attended total joint pre-op class/received pre-op teaching  online   Relationship/Living Environment   Name(s) of People in Home Adi - spouse

## 2023-10-24 NOTE — PROGRESS NOTES
Ortho Navigator Note      Pre-op Date 10/25/23     Medical Clearance  Cleared      Labs Stable      COVID Test Date No longer indicated      Skin  Intact      Activity: Ambulates independently without assistive device      Equipment Need Patient will likely need a walker for discharge. Defer to PT/OT for recs.       Meds to Hold Held all supplements 14 days prior to surgery  ASA- Hold 7 days prior to surgery   Ibuprofen- Hold 48 hrs prior to surgery   * Medication recommendations are not intended to be exhaustive; they are limited to common medications that are potentially dangerous if incorrectly managed   NPO Instructions  Instructed to stop solids 8 hr prior to arrival and clears 2 hr prior to arrival.       Pre-op Joint Education Complete? Email link sent to patient.    Discharge Plan Patient has plan to discharge home on morning of POD 1.   Estevan () will arrive at hospital at 0800 to participate in therapy and discharge education. They will then transport patient home   /Transportation Estevan () will be .  is physically able to care for patient.      Barriers to Discharge No barriers to discharge.      Additional Info/   Special Needs : Patient had no unanswered questions or concerns.           10/24/23 8723   Discharge Planning   Patient/Family Anticipates Transition to home with family   Concerns to be Addressed no discharge needs identified   Living Arrangements   People in Home spouse   Type of Residence Private Residence   Is your private residence a single family home or apartment? Single family home   Number of Stairs, Within Home, Primary none   Once home, are you able to live on one level? No   Which level? Lower Level   Bathroom Shower/Tub Walk-in shower   Support System   Support Systems Spouse/Significant Other   Do you have someone available to stay with you one or two nights once you are home? Yes   Medical Clearance   Date of Physical 10/24/23   Clinic Name  [facility]   Blood   Known Bleeding Disorder or Coagulopathy No   Does the patient have any Advent/cultural preferences related to blood products? No   Education   Has the patient scheduled or completed pre-op total joint education, either in class or online, in the last 12 months? Yes   Patient attended total joint pre-op class/received pre-op teaching  online   Relationship/Living Environment   Name(s) of People in Home Estevan

## 2023-10-25 ENCOUNTER — OFFICE VISIT (OUTPATIENT)
Dept: FAMILY MEDICINE | Facility: CLINIC | Age: 58
End: 2023-10-25
Payer: COMMERCIAL

## 2023-10-25 VITALS
HEART RATE: 77 BPM | TEMPERATURE: 98.2 F | HEIGHT: 63 IN | RESPIRATION RATE: 20 BRPM | BODY MASS INDEX: 29.95 KG/M2 | DIASTOLIC BLOOD PRESSURE: 78 MMHG | WEIGHT: 169 LBS | SYSTOLIC BLOOD PRESSURE: 134 MMHG | OXYGEN SATURATION: 96 %

## 2023-10-25 DIAGNOSIS — I10 ESSENTIAL HYPERTENSION: ICD-10-CM

## 2023-10-25 DIAGNOSIS — I25.10 CORONARY ARTERY DISEASE INVOLVING NATIVE HEART, UNSPECIFIED VESSEL OR LESION TYPE, UNSPECIFIED WHETHER ANGINA PRESENT: ICD-10-CM

## 2023-10-25 DIAGNOSIS — R79.89 ELEVATED LFTS: ICD-10-CM

## 2023-10-25 DIAGNOSIS — J42 CHRONIC BRONCHITIS, UNSPECIFIED CHRONIC BRONCHITIS TYPE (H): ICD-10-CM

## 2023-10-25 DIAGNOSIS — M17.12 PRIMARY OSTEOARTHRITIS OF LEFT KNEE: ICD-10-CM

## 2023-10-25 DIAGNOSIS — Z01.818 PREOP GENERAL PHYSICAL EXAM: Primary | ICD-10-CM

## 2023-10-25 LAB
ALT SERPL W P-5'-P-CCNC: 25 U/L (ref 0–50)
ANION GAP SERPL CALCULATED.3IONS-SCNC: 11 MMOL/L (ref 7–15)
BASOPHILS # BLD AUTO: 0.1 10E3/UL (ref 0–0.2)
BASOPHILS NFR BLD AUTO: 1 %
BUN SERPL-MCNC: 9 MG/DL (ref 8–23)
CALCIUM SERPL-MCNC: 9.5 MG/DL (ref 8.8–10.2)
CHLORIDE SERPL-SCNC: 99 MMOL/L (ref 98–107)
CREAT SERPL-MCNC: 0.67 MG/DL (ref 0.51–0.95)
DEPRECATED HCO3 PLAS-SCNC: 26 MMOL/L (ref 22–29)
EGFRCR SERPLBLD CKD-EPI 2021: >90 ML/MIN/1.73M2
EOSINOPHIL # BLD AUTO: 0.2 10E3/UL (ref 0–0.7)
EOSINOPHIL NFR BLD AUTO: 2 %
ERYTHROCYTE [DISTWIDTH] IN BLOOD BY AUTOMATED COUNT: 13.5 % (ref 10–15)
GLUCOSE SERPL-MCNC: 87 MG/DL (ref 70–99)
HCT VFR BLD AUTO: 39 % (ref 35–47)
HGB BLD-MCNC: 12.8 G/DL (ref 11.7–15.7)
IMM GRANULOCYTES # BLD: 0 10E3/UL
IMM GRANULOCYTES NFR BLD: 0 %
LYMPHOCYTES # BLD AUTO: 2.1 10E3/UL (ref 0.8–5.3)
LYMPHOCYTES NFR BLD AUTO: 25 %
MCH RBC QN AUTO: 30.7 PG (ref 26.5–33)
MCHC RBC AUTO-ENTMCNC: 32.8 G/DL (ref 31.5–36.5)
MCV RBC AUTO: 94 FL (ref 78–100)
MONOCYTES # BLD AUTO: 0.7 10E3/UL (ref 0–1.3)
MONOCYTES NFR BLD AUTO: 9 %
NEUTROPHILS # BLD AUTO: 5.2 10E3/UL (ref 1.6–8.3)
NEUTROPHILS NFR BLD AUTO: 63 %
PLATELET # BLD AUTO: 368 10E3/UL (ref 150–450)
POTASSIUM SERPL-SCNC: 4.2 MMOL/L (ref 3.4–5.3)
RBC # BLD AUTO: 4.17 10E6/UL (ref 3.8–5.2)
SODIUM SERPL-SCNC: 136 MMOL/L (ref 135–145)
WBC # BLD AUTO: 8.3 10E3/UL (ref 4–11)

## 2023-10-25 PROCEDURE — 36415 COLL VENOUS BLD VENIPUNCTURE: CPT | Performed by: FAMILY MEDICINE

## 2023-10-25 PROCEDURE — 93000 ELECTROCARDIOGRAM COMPLETE: CPT | Performed by: FAMILY MEDICINE

## 2023-10-25 PROCEDURE — 84460 ALANINE AMINO (ALT) (SGPT): CPT | Performed by: FAMILY MEDICINE

## 2023-10-25 PROCEDURE — 80048 BASIC METABOLIC PNL TOTAL CA: CPT | Performed by: FAMILY MEDICINE

## 2023-10-25 PROCEDURE — 99214 OFFICE O/P EST MOD 30 MIN: CPT | Mod: 25 | Performed by: FAMILY MEDICINE

## 2023-10-25 PROCEDURE — 85025 COMPLETE CBC W/AUTO DIFF WBC: CPT | Performed by: FAMILY MEDICINE

## 2023-10-25 ASSESSMENT — PAIN SCALES - GENERAL: PAINLEVEL: MILD PAIN (3)

## 2023-10-25 NOTE — PROGRESS NOTES
[facility]  [address] [address]  Phone: [phone]  Primary Provider: Julia Oneill  Pre-op Performing Provider: JULIA ONEILL    Today's date: 10/25/2023    Carolina is a 58 year old female who presents for a preoperative evaluation.      10/25/2023    10:58 AM   Additional Questions   Roomed by Roxanne Morin CMA       Surgical Information:  Surgery/Procedure: Arthroplasty, knee, total Left.  Surgery Location: [facility]  Surgeon: Dr. Chong  Surgery Date: 10-  Time of Surgery: Arriving at 8:30, surgery is at 10:45.  Where patient plans to recover: At home with family  Will spend one night in the hospital.   Fax number for surgical facility: Note does not need to be faxed, will be available electronically in Epic.    Assessment & Plan     The proposed surgical procedure is considered INTERMEDIATE risk.    (Z01.818) Preop general physical exam  (primary encounter diagnosis)  Comment: 58 yr old female here for her pre op evaluation for left knee replacement. Labs ordered. Patient will be notified of results.  Plan: Basic metabolic panel  (Ca, Cl, CO2, Creat,         Gluc, K, Na, BUN), CBC with platelets and         differential            (I25.10) Coronary artery disease involving native heart, unspecified vessel or lesion type, unspecified whether angina present  Comment: stable,EKG showed no acute changes.   Plan: EKG 12-lead complete w/read - Clinics            (R79.89) Elevated LFTs  Comment: Patient has some labs ordered  Plan: Patient will be notified of results.    (M17.12) Primary osteoarthritis of left knee  Comment: Patient has severe arthritis.  Plan: Cleared for surgery     (I10) Essential hypertension  Comment: Blood pressure is within fair range.  Plan: Patient can take her amlodipine on morning of surgery    (J42) Chronic bronchitis, unspecified chronic bronchitis type (H)  Comment: Patient with COPD.  Follows with pulmonology  Plan: encouraged to use her inhalers on the day of surgery.            - No identified additional risk factors other than previously addressed    Antiplatelet or Anticoagulation Medication Instructions:   - Patient is on no antiplatelet or anticoagulation medications.    Additional Medication Instructions:   - Calcium Channel Blockers: May be continued on the day of surgery.   - ibuprofen (Advil, Motrin): HOLD 1 day before surgery.    - meloxicam (Mobic): HOLD 10 days before surgery.    - nabumetone (Relafen): HOLD 10 days before surgery.    - leukotriene Inhibitors: Continue without modifcation.   - LABA, inhaled corticosteroid, long-acting anticholinergics: Continue without modification.    RECOMMENDATION:  APPROVAL GIVEN to proceed with proposed procedure, without further diagnostic evaluation.    Review of external notes as documented elsewhere in note      Subjective       HPI related to upcoming procedure:   Patient is a 58-year-old here for a preop evaluation.  She is having a total knee replacement.  Her past medical history is significant for depression and  anxiety, hypertension.      She reports no chest pain or shortness of breath today.  Medications were discussed with her.  She can take most of her medications on the morning of surgery.          10/23/2023    10:32 AM   Preop Questions   1. Have you ever had a heart attack or str  She reports no acute symptoms today.  A past medical history significant for COPD, coronary artery disease,koffi? No   2. Have you ever had surgery on your heart or blood vessels, such as a stent placement, a coronary artery bypass, or surgery on an artery in your head, neck, heart, or legs? No   3. Do you have chest pain with activity? No   4. Do you have a history of  heart failure? No   5. Do you currently have a cold, bronchitis or symptoms of other infection? No   6. Do you have a cough, shortness of breath, or wheezing? No   7. Do you or anyone  in your family have previous history of blood clots? No   8. Do you or does anyone in your family have a serious bleeding problem such as prolonged bleeding following surgeries or cuts? No   9. Have you ever had problems with anemia or been told to take iron pills? No   10. Have you had any abnormal blood loss such as black, tarry or bloody stools, or abnormal vaginal bleeding? No   11. Have you ever had a blood transfusion? No   12. Are you willing to have a blood transfusion if it is medically needed before, during, or after your surgery? Yes   13. Have you or any of your relatives ever had problems with anesthesia? No   14. Do you have sleep apnea, excessive snoring or daytime drowsiness? No   15. Do you have any artifical heart valves or other implanted medical devices like a pacemaker, defibrillator, or continuous glucose monitor? No   16. Do you have artificial joints? No   17. Are you allergic to latex? No   18. Is there any chance that you may be pregnant? No       Health Care Directive:  Patient does not have a Health Care Directive or Living Will: Discussed advance care planning with patient; information given to patient to review.    Preoperative Review of :   reviewed - no record of controlled substances prescribed.      Status of Chronic Conditions:  CAD - Patient has a longstanding history of moderate-severe CAD. Patient denies recent chest pain or NTG use, denies exercise induced dyspnea or PND. Last Stress test 10/2022, EKG 10/25/23  .     COPD - Patient has a longstanding history of moderate-severe COPD . Patient has been doing well overall noting NO SYMPTOMS and continues on medication regimen consisting of albuterol and Advair without adverse reactions or side effects.    DEPRESSION - Patient has a long history of Depression of moderate severity requiring medication for control with recent symptoms being stable..Current symptoms of depression include depressed mood.     HYPERLIPIDEMIA -  Patient has a long history of significant Hyperlipidemia requiring medication for treatment with recent good control. Patient reports no problems or side effects with the medication.     HYPERTENSION - Patient has longstanding history of HTN , currently denies any symptoms referable to elevated blood pressure. Specifically denies chest pain, palpitations, dyspnea, orthopnea, PND or peripheral edema. Blood pressure readings have been in normal range. Current medication regimen is as listed below. Patient denies any side effects of medication.     Review of Systems  CONSTITUTIONAL: NEGATIVE for fever, chills, change in weight  INTEGUMENTARY/SKIN: NEGATIVE for worrisome rashes, moles or lesions  EYES: NEGATIVE for vision changes or irritation  ENT/MOUTH: NEGATIVE for ear, mouth and throat problems  RESP: NEGATIVE for significant cough or SOB  CV: NEGATIVE for chest pain, palpitations or peripheral edema  GI: NEGATIVE for nausea, abdominal pain, heartburn, or change in bowel habits  : NEGATIVE for frequency, dysuria, or hematuria  MUSCULOSKELETAL: NEGATIVE for significant arthralgias or myalgia  NEURO: NEGATIVE for weakness, dizziness or paresthesias  HEME: NEGATIVE for bleeding problems  PSYCHIATRIC: NEGATIVE for changes in mood or affect    Patient Active Problem List    Diagnosis Date Noted    Fatty liver 11/01/2022     Priority: Medium     ultrasound 10/29/22      COPD vs Asthma 10/30/2022     Priority: Medium     PFTS 5/2021 - FEV1- 1.21 (48%), ratio 0.50, no change with bronchodilators,  %, %, DLCO 94%       Transaminitis 10/29/2022     Priority: Medium    Acute pancreatitis 10/29/2022     Priority: Medium    Nonobstructive atherosclerosis of coronary artery 10/21/2022     Priority: Medium      H/o exertional chest pain leading to coronary angiogram 10/14/2022 which showed non-obstructing CAD      Hyperlipidemia LDL goal <70 10/21/2022     Priority: Medium    Low back pain due to bilateral sciatica  10/27/2021     Priority: Medium    Primary osteoarthritis of both knees 09/23/2021     Priority: Medium    Benign essential hypertension 07/26/2019     Priority: Medium     New diagnosis 7/26/2019        Recurrent major depressive disorder, in remission (H24) 01/17/2019     Priority: Medium    GERD (gastroesophageal reflux disease) 07/31/2012     Priority: Medium    Seasonal allergies 07/31/2012     Priority: Medium      Past Medical History:   Diagnosis Date    Asthma     C. difficile colitis     Depression     Depressive disorder Have had it most of my life    Moderate persistent asthma with exacerbation 9/18/2007    Osteoarthritis     Sinusitis, chronic     Status post coronary angiogram 10/14/2022     Past Surgical History:   Procedure Laterality Date    COLONOSCOPY  2017    CV CORONARY ANGIOGRAM N/A 10/14/2022    Procedure: Coronary Angiogram;  Surgeon: Fidel Martin MD;  Location: [facility] CARDIAC CATH LAB    ESOPHAGOSCOPY, GASTROSCOPY, DUODENOSCOPY (EGD), COMBINED N/A 05/05/2022    Procedure: ESOPHAGOGASTRODUODENOSCOPY, WITH BIOPSY;  Surgeon: Timothy Oliva DO;  Location: [facility] OR    LAPAROSCOPIC CHOLECYSTECTOMY WITH CHOLANGIOGRAMS N/A 11/21/2022    Procedure: CHOLECYSTECTOMY, LAPAROSCOPIC, WITH CHOLANGIOGRAM;  Surgeon: Eros Butt DO;  Location: [facility] OR     Current Outpatient Medications   Medication Sig Dispense Refill    albuterol (PROVENTIL) (2.5 MG/3ML) 0.083% neb solution inhale 1 vial (2.5 mg) by nebulization every 4 hours as needed for shortness of breath / dyspnea or wheezing 225 mL 11    albuterol (VENTOLIN HFA) 108 (90 Base) MCG/ACT inhaler INHALE 1-2 PUFFS INTO THE LUNGS EVERY 4 HOURS AS NEEDED FOR SHORTNESS OF BREATH/DYSPNEA OR WHEEZING . 18 g 11    amLODIPine (NORVASC) 2.5 MG tablet Take 2 tablets (5 mg) by mouth once daily. 180 tablet 3    cetirizine (ZYRTEC) 10 MG tablet Take 10 mg by mouth daily      FLUoxetine (PROZAC) 20 MG capsule Take 1 capsule (20 mg) by mouth daily  90 capsule 3    FLUoxetine (PROZAC) 40 MG capsule Take 1 capsule (40 mg) by mouth daily Take with 20 mg tab for a total of 60 mg daily 90 capsule 3    fluticasone (FLONASE) 50 MCG/ACT nasal spray Spray 1 spray into both nostrils daily 1 g 1    fluticasone-salmeterol (ADVAIR) 500-50 MCG/ACT inhaler Inhale 1 puff into the lungs every 12 hours 1 each 11    montelukast (SINGULAIR) 10 MG tablet Take 1 tablet (10 mg) by mouth At Bedtime 90 tablet 3    omeprazole 20 MG tablet Take 2 tablets (40 mg) by mouth daily*      tiotropium (SPIRIVA) 18 MCG inhaled capsule Inhale 1 capsule (18 mcg) into the lungs daily 30 capsule 11    aspirin (ASA) 81 MG EC tablet Take 81 mg by mouth daily (Patient not taking: Reported on 10/25/2023)      fluorometholone (FML LIQUIFILM) 0.1 % ophthalmic suspension INSTILL 1 DROP INTO EACH EYE 4 TIMES DAILY FOR 7 DAYS      triamcinolone (KENALOG) 0.1 % external cream Apply topically 2 times daily (Patient not taking: Reported on 10/25/2023) 45 g 0       Allergies   Allergen Reactions    Doxycycline Difficulty breathing    Penicillins Itching    Sucralfate Rash        Social History     Tobacco Use    Smoking status: Former     Packs/day: 0.00     Years: 23.00     Additional pack years: 0.00     Total pack years: 0.00     Types: Cigarettes     Quit date: 2021     Years since quittin.2    Smokeless tobacco: Never   Substance Use Topics    Alcohol use: Not Currently     Comment: 4-6 beers multiple times weekly     Family History   Problem Relation Age of Onset    Glaucoma Father     Coronary Artery Disease Father         stents    Cancer Maternal Grandfather     Coronary Artery Disease Paternal Grandfather     Schizophrenia Daughter     Diabetes Daughter     Cancer Daughter     Crohn's Disease No family hx of     Ulcerative Colitis No family hx of     GERD No family hx of     Stomach Cancer No family hx of      History   Drug Use No         Objective     /78 (BP Location: Right arm,  "Patient Position: Chair, Cuff Size: Adult Regular)   Pulse 77   Temp 98.2  F (36.8  C) (Tympanic)   Resp 20   Ht 1.588 m (5' 2.5\")   Wt 76.7 kg (169 lb)   SpO2 96%   BMI 30.42 kg/m      Physical Exam    GENERAL APPEARANCE: healthy, alert and no distress     EYES: EOMI, PERRL     HENT: ear canals and TM's normal and nose and mouth without ulcers or lesions     NECK: no adenopathy, no asymmetry, masses, or scars and thyroid normal to palpation     RESP: lungs clear to auscultation - no rales, rhonchi or wheezes     CV: regular rates and rhythm, normal S1 S2, no S3 or S4 and no murmur, click or rub     ABDOMEN:  soft, nontender, no HSM or masses and bowel sounds normal     MS: extremities normal- no gross deformities noted, no evidence of inflammation in joints, FROM in all extremities.     SKIN: no suspicious lesions or rashes     PSYCH: mentation appears normal. and affect normal/bright     LYMPHATICS: No cervical adenopathy    Recent Labs   Lab Test 08/17/23  0822 11/14/22  1408 11/04/22  1056 11/01/22  0856 10/29/22  1548 10/14/22  0847   HGB 13.0  --   --  12.0   < > 12.8     --   --  347   < > 385   INR  --   --   --   --   --  0.97   * 133*   < > 136   < > 139   POTASSIUM 4.0 4.1   < > 4.1   < > 4.0   CR 0.73 0.64   < > 0.65   < > 0.66    < > = values in this interval not displayed.        Diagnostics:  Labs pending at this time.  Results will be reviewed when available.   EKG: appears normal, NSR, normal axis, normal intervals, no acute ST/T changes c/w ischemia, no LVH by voltage criteria, unchanged from previous tracings    Revised Cardiac Risk Index (RCRI):  The patient has the following serious cardiovascular risks for perioperative complications:   - No serious cardiac risks = 0 points     RCRI Interpretation: 1 point: Class II (low risk - 0.9% complication rate)         Signed Electronically by: Julia Oneill MD  Copy of this evaluation report is provided to requesting " physician.

## 2023-10-25 NOTE — PATIENT INSTRUCTIONS
You can take all your medication on morning of surgery    Avoid Ibuprofen, Advil, Aleve from now till time of surgery.

## 2023-10-27 ENCOUNTER — ANESTHESIA EVENT (OUTPATIENT)
Dept: SURGERY | Facility: CLINIC | Age: 58
End: 2023-10-27
Payer: COMMERCIAL

## 2023-10-27 ENCOUNTER — TELEPHONE (OUTPATIENT)
Dept: FAMILY MEDICINE | Facility: CLINIC | Age: 58
End: 2023-10-27

## 2023-10-27 NOTE — ANESTHESIA PREPROCEDURE EVALUATION
Anesthesia Pre-Procedure Evaluation    Patient: Carolina Hernandez   MRN: [id number] : 1965        Procedure : Procedure(s):  CHOLECYSTECTOMY, LAPAROSCOPIC, WITH CHOLANGIOGRAM          Past Medical History:   Diagnosis Date    Asthma     C. difficile colitis     COPD (chronic obstructive pulmonary disease) (H)     Depression     Depressive disorder Have had it most of my life    Hypertension     Moderate persistent asthma with exacerbation 2007    Osteoarthritis     Sinusitis, chronic     Status post coronary angiogram 10/14/2022      Past Surgical History:   Procedure Laterality Date    COLONOSCOPY      CV CORONARY ANGIOGRAM N/A 10/14/2022    Procedure: Coronary Angiogram;  Surgeon: Fidel Martin MD;  Location: [facility] CARDIAC CATH LAB    ESOPHAGOSCOPY, GASTROSCOPY, DUODENOSCOPY (EGD), COMBINED N/A 2022    Procedure: ESOPHAGOGASTRODUODENOSCOPY, WITH BIOPSY;  Surgeon: Timothy Oliva DO;  Location: [facility] OR    LAPAROSCOPIC CHOLECYSTECTOMY WITH CHOLANGIOGRAMS N/A 2022    Procedure: CHOLECYSTECTOMY, LAPAROSCOPIC, WITH CHOLANGIOGRAM;  Surgeon: Eros Butt DO;  Location: [facility] OR      Allergies   Allergen Reactions    Doxycycline Difficulty breathing    Penicillins Itching    Sucralfate Rash      Social History     Tobacco Use    Smoking status: Former     Packs/day: 0.00     Years: 23.00     Additional pack years: 0.00     Total pack years: 0.00     Types: Cigarettes     Quit date: 2021     Years since quittin.2    Smokeless tobacco: Never   Substance Use Topics    Alcohol use: Yes     Comment: 4-6 beers multiple times weekly      Wt Readings from Last 1 Encounters:   10/31/23 76.7 kg (169 lb)        Anesthesia Evaluation    Type: MAC.        ROS/MED HX  ENT/Pulmonary:     (+)           allergic rhinitis,     tobacco use, Past use,   Moderate Persistent, asthma  Treatment: Inhaler prn, Inhaler daily, Inhaled steroids and Nebulizer prn,  mild,  COPD,             "  Neurologic:  - neg neurologic ROS     Cardiovascular:     (+) Dyslipidemia hypertension- -  CAD -  - -                                      METS/Exercise Tolerance:     Hematologic:  - neg hematologic  ROS     Musculoskeletal: Comment: Back pain with sciatica  (+)  arthritis,             GI/Hepatic:     (+) GERD, Asymptomatic on medication,        cholecystitis/cholelithiasis,   liver disease,       Renal/Genitourinary:  - neg Renal ROS     Endo:     (+)               Obesity,       Psychiatric/Substance Use:     (+) psychiatric history depression       Infectious Disease:  - neg infectious disease ROS     Malignancy:  - neg malignancy ROS     Other:  - neg other ROS          Physical Exam    Airway             Respiratory Devices and Support         Dental       (+) lower dentures and upper dentures      Cardiovascular   cardiovascular exam normal          Pulmonary   pulmonary exam normal                OUTSIDE LABS:  CBC:   Lab Results   Component Value Date    WBC 8.3 10/25/2023    WBC 6.3 08/17/2023    HGB 12.8 10/25/2023    HGB 13.0 08/17/2023    HCT 39.0 10/25/2023    HCT 40.4 08/17/2023     10/25/2023     08/17/2023     BMP:   Lab Results   Component Value Date     10/25/2023     (L) 08/17/2023    POTASSIUM 4.2 10/25/2023    POTASSIUM 4.0 08/17/2023    CHLORIDE 99 10/25/2023    CHLORIDE 101 08/17/2023    CO2 26 10/25/2023    CO2 23 08/17/2023    BUN 9.0 10/25/2023    BUN 15.0 08/17/2023    CR 0.67 10/25/2023    CR 0.73 08/17/2023    GLC 87 10/25/2023    GLC 77 08/17/2023     COAGS:   Lab Results   Component Value Date    PTT 31 10/14/2022    INR 0.97 10/14/2022     POC: No results found for: \"BGM\", \"HCG\", \"HCGS\"  HEPATIC:   Lab Results   Component Value Date    ALBUMIN 4.1 08/17/2023    PROTTOTAL 7.0 08/17/2023    ALT 25 10/25/2023    AST 20 08/17/2023    ALKPHOS 69 08/17/2023    BILITOTAL 0.3 08/17/2023     OTHER:   Lab Results   Component Value Date    LACT 1.1 10/29/2022    REILLY " 9.5 10/25/2023    PHOS 3.9 10/31/2022    MAG 2.2 10/31/2022    LIPASE 356 (H) 11/04/2022    TSH 0.29 (L) 09/22/2005    T4 1.07 09/22/2005    SED 17 08/17/2023       Anesthesia Plan    ASA Status:  3    NPO Status:  NPO Appropriate    Anesthesia Type: Spinal.   Induction: Intravenous.   Maintenance: Balanced.        Consents    Anesthesia Plan(s) and associated risks, benefits, and realistic alternatives discussed. Questions answered and patient/representative(s) expressed understanding.     - Discussed: Risks, Benefits and Alternatives for BOTH SEDATION and the PROCEDURE were discussed     - Discussed with:  Patient            Postoperative Care    Pain management: IV analgesics, Oral pain medications, Multi-modal analgesia, Neuraxial analgesia, Peripheral nerve block (Single Shot).   PONV prophylaxis: Ondansetron (or other 5HT-3), Dexamethasone or Solumedrol, Background Propofol Infusion     Comments:                VALERIA Peralta CRNA

## 2023-10-27 NOTE — TELEPHONE ENCOUNTER
Mireille called from chart review and stated we need to complete 10/25 encounter as the pt is scheduled for surgery 10/31. Thank you.  Viridiana Johnson on 10/27/2023 at 7:32 AM

## 2023-10-31 ENCOUNTER — HOSPITAL ENCOUNTER (OUTPATIENT)
Facility: CLINIC | Age: 58
Discharge: HOME | End: 2023-11-01
Attending: ORTHOPAEDIC SURGERY | Admitting: ORTHOPAEDIC SURGERY
Payer: COMMERCIAL

## 2023-10-31 ENCOUNTER — APPOINTMENT (OUTPATIENT)
Dept: GENERAL RADIOLOGY | Facility: CLINIC | Age: 58
End: 2023-10-31
Attending: PHYSICIAN ASSISTANT
Payer: COMMERCIAL

## 2023-10-31 ENCOUNTER — ANESTHESIA (OUTPATIENT)
Dept: SURGERY | Facility: CLINIC | Age: 58
End: 2023-10-31
Payer: COMMERCIAL

## 2023-10-31 DIAGNOSIS — Z96.652 S/P TOTAL KNEE ARTHROPLASTY, LEFT: Primary | ICD-10-CM

## 2023-10-31 PROBLEM — K76.0 FATTY LIVER: Chronic | Status: ACTIVE | Noted: 2022-11-01

## 2023-10-31 PROCEDURE — 258N000003 HC RX IP 258 OP 636: Performed by: PHYSICIAN ASSISTANT

## 2023-10-31 PROCEDURE — 250N000009 HC RX 250: Performed by: NURSE ANESTHETIST, CERTIFIED REGISTERED

## 2023-10-31 PROCEDURE — 250N000011 HC RX IP 250 OP 636: Mod: JZ

## 2023-10-31 PROCEDURE — 27447 TOTAL KNEE ARTHROPLASTY: CPT | Mod: LT | Performed by: ORTHOPAEDIC SURGERY

## 2023-10-31 PROCEDURE — 250N000011 HC RX IP 250 OP 636: Performed by: PHYSICIAN ASSISTANT

## 2023-10-31 PROCEDURE — 271N000001 HC OR GENERAL SUPPLY NON-STERILE: Performed by: ORTHOPAEDIC SURGERY

## 2023-10-31 PROCEDURE — 272N000001 HC OR GENERAL SUPPLY STERILE: Performed by: ORTHOPAEDIC SURGERY

## 2023-10-31 PROCEDURE — 250N000011 HC RX IP 250 OP 636: Performed by: ORTHOPAEDIC SURGERY

## 2023-10-31 PROCEDURE — 250N000013 HC RX MED GY IP 250 OP 250 PS 637: Performed by: PHYSICIAN ASSISTANT

## 2023-10-31 PROCEDURE — 258N000003 HC RX IP 258 OP 636: Performed by: NURSE ANESTHETIST, CERTIFIED REGISTERED

## 2023-10-31 PROCEDURE — 258N000003 HC RX IP 258 OP 636

## 2023-10-31 PROCEDURE — C1776 JOINT DEVICE (IMPLANTABLE): HCPCS | Performed by: ORTHOPAEDIC SURGERY

## 2023-10-31 PROCEDURE — 710N000009 HC RECOVERY PHASE 1, LEVEL 1, PER MIN: Performed by: ORTHOPAEDIC SURGERY

## 2023-10-31 PROCEDURE — 360N000077 HC SURGERY LEVEL 4, PER MIN: Performed by: ORTHOPAEDIC SURGERY

## 2023-10-31 PROCEDURE — 999N000065 XR KNEE PORT LEFT 1/2 VIEWS: Mod: LT

## 2023-10-31 PROCEDURE — 250N000009 HC RX 250: Performed by: ORTHOPAEDIC SURGERY

## 2023-10-31 PROCEDURE — 27447 TOTAL KNEE ARTHROPLASTY: CPT | Mod: AS | Performed by: PHYSICIAN ASSISTANT

## 2023-10-31 PROCEDURE — 250N000009 HC RX 250

## 2023-10-31 PROCEDURE — 370N000017 HC ANESTHESIA TECHNICAL FEE, PER MIN: Performed by: ORTHOPAEDIC SURGERY

## 2023-10-31 PROCEDURE — 250N000013 HC RX MED GY IP 250 OP 250 PS 637: Performed by: NURSE ANESTHETIST, CERTIFIED REGISTERED

## 2023-10-31 PROCEDURE — 999N000141 HC STATISTIC PRE-PROCEDURE NURSING ASSESSMENT: Performed by: ORTHOPAEDIC SURGERY

## 2023-10-31 PROCEDURE — 250N000011 HC RX IP 250 OP 636: Mod: JZ | Performed by: NURSE ANESTHETIST, CERTIFIED REGISTERED

## 2023-10-31 PROCEDURE — C1713 ANCHOR/SCREW BN/BN,TIS/BN: HCPCS | Performed by: ORTHOPAEDIC SURGERY

## 2023-10-31 DEVICE — IMPLANTABLE DEVICE: Type: IMPLANTABLE DEVICE | Site: KNEE | Status: FUNCTIONAL

## 2023-10-31 DEVICE — IMP COMP PATELLA HOWM TRI ASYM 29X09MM 555-L-299: Type: IMPLANTABLE DEVICE | Site: KNEE | Status: FUNCTIONAL

## 2023-10-31 DEVICE — IMP BASEPLATE TIBIAL STRK TRIATHLN KNEE SZ 3 5536-B-300: Type: IMPLANTABLE DEVICE | Site: KNEE | Status: FUNCTIONAL

## 2023-10-31 DEVICE — BONE CEMENT DEPUY FAST SET 20GM CMW2: Type: IMPLANTABLE DEVICE | Site: KNEE | Status: FUNCTIONAL

## 2023-10-31 RX ORDER — HYDROXYZINE HYDROCHLORIDE 25 MG/1
25 TABLET, FILM COATED ORAL EVERY 6 HOURS PRN
Qty: 30 TABLET | Refills: 0 | Status: SHIPPED | OUTPATIENT
Start: 2023-10-31 | End: 2023-11-15

## 2023-10-31 RX ORDER — MONTELUKAST SODIUM 10 MG/1
10 TABLET ORAL AT BEDTIME
Status: DISCONTINUED | OUTPATIENT
Start: 2023-11-01 | End: 2023-11-01 | Stop reason: HOSPADM

## 2023-10-31 RX ORDER — OXYCODONE HYDROCHLORIDE 5 MG/1
5-10 TABLET ORAL EVERY 4 HOURS PRN
Qty: 16 TABLET | Refills: 0 | Status: SHIPPED | OUTPATIENT
Start: 2023-10-31 | End: 2023-11-15

## 2023-10-31 RX ORDER — SODIUM CHLORIDE, SODIUM LACTATE, POTASSIUM CHLORIDE, CALCIUM CHLORIDE 600; 310; 30; 20 MG/100ML; MG/100ML; MG/100ML; MG/100ML
INJECTION, SOLUTION INTRAVENOUS CONTINUOUS
Status: DISCONTINUED | OUTPATIENT
Start: 2023-10-31 | End: 2023-11-01 | Stop reason: HOSPADM

## 2023-10-31 RX ORDER — FLUTICASONE PROPIONATE AND SALMETEROL 500; 50 UG/1; UG/1
1 POWDER RESPIRATORY (INHALATION) EVERY 12 HOURS
Status: DISCONTINUED | OUTPATIENT
Start: 2023-10-31 | End: 2023-11-01 | Stop reason: HOSPADM

## 2023-10-31 RX ORDER — BUPIVACAINE HYDROCHLORIDE 7.5 MG/ML
INJECTION, SOLUTION INTRASPINAL
Status: COMPLETED | OUTPATIENT
Start: 2023-10-31 | End: 2023-10-31

## 2023-10-31 RX ORDER — CETIRIZINE HYDROCHLORIDE 10 MG/1
10 TABLET ORAL DAILY
Status: CANCELLED | OUTPATIENT
Start: 2023-10-31

## 2023-10-31 RX ORDER — ESTRADIOL 0.1 MG/G
CREAM VAGINAL
COMMUNITY
Start: 2023-09-25 | End: 2024-02-07

## 2023-10-31 RX ORDER — OMEGA-3S/DHA/EPA/FISH OIL 1000-1400
1 CAPSULE,DELAYED RELEASE (ENTERIC COATED) ORAL DAILY
COMMUNITY

## 2023-10-31 RX ORDER — UBIDECARENONE 100 MG
100 CAPSULE ORAL DAILY PRN
COMMUNITY
End: 2024-05-10

## 2023-10-31 RX ORDER — ASPIRIN 325 MG
325 TABLET, DELAYED RELEASE (ENTERIC COATED) ORAL DAILY
Status: DISCONTINUED | OUTPATIENT
Start: 2023-10-31 | End: 2023-11-01 | Stop reason: HOSPADM

## 2023-10-31 RX ORDER — CEFAZOLIN SODIUM 1 G/3ML
1 INJECTION, POWDER, FOR SOLUTION INTRAMUSCULAR; INTRAVENOUS EVERY 8 HOURS
Qty: 10 ML | Refills: 0 | Status: COMPLETED | OUTPATIENT
Start: 2023-10-31 | End: 2023-11-01

## 2023-10-31 RX ORDER — PROPOFOL 10 MG/ML
INJECTION, EMULSION INTRAVENOUS CONTINUOUS PRN
Status: DISCONTINUED | OUTPATIENT
Start: 2023-10-31 | End: 2023-10-31

## 2023-10-31 RX ORDER — FENTANYL CITRATE 50 UG/ML
50 INJECTION, SOLUTION INTRAMUSCULAR; INTRAVENOUS EVERY 5 MIN PRN
Status: DISCONTINUED | OUTPATIENT
Start: 2023-10-31 | End: 2023-10-31 | Stop reason: HOSPADM

## 2023-10-31 RX ORDER — HYDROXYZINE HYDROCHLORIDE 25 MG/1
25 TABLET, FILM COATED ORAL EVERY 6 HOURS PRN
Status: DISCONTINUED | OUTPATIENT
Start: 2023-10-31 | End: 2023-11-01 | Stop reason: HOSPADM

## 2023-10-31 RX ORDER — BISACODYL 10 MG
10 SUPPOSITORY, RECTAL RECTAL DAILY PRN
Status: DISCONTINUED | OUTPATIENT
Start: 2023-10-31 | End: 2023-11-01 | Stop reason: HOSPADM

## 2023-10-31 RX ORDER — NICOTINE POLACRILEX 4 MG/1
20 GUM, CHEWING ORAL DAILY
Status: DISCONTINUED | OUTPATIENT
Start: 2023-11-01 | End: 2023-10-31

## 2023-10-31 RX ORDER — CEFAZOLIN SODIUM/WATER 2 G/20 ML
2 SYRINGE (ML) INTRAVENOUS
Status: DISCONTINUED | OUTPATIENT
Start: 2023-10-31 | End: 2023-10-31 | Stop reason: HOSPADM

## 2023-10-31 RX ORDER — ONDANSETRON 2 MG/ML
INJECTION INTRAMUSCULAR; INTRAVENOUS PRN
Status: DISCONTINUED | OUTPATIENT
Start: 2023-10-31 | End: 2023-10-31

## 2023-10-31 RX ORDER — GABAPENTIN 300 MG/1
300 CAPSULE ORAL
Status: COMPLETED | OUTPATIENT
Start: 2023-10-31 | End: 2023-10-31

## 2023-10-31 RX ORDER — LIDOCAINE 40 MG/G
CREAM TOPICAL
Status: DISCONTINUED | OUTPATIENT
Start: 2023-10-31 | End: 2023-11-01 | Stop reason: HOSPADM

## 2023-10-31 RX ORDER — EPINEPHRINE 1 MG/ML
INJECTION, SOLUTION, CONCENTRATE INTRAVENOUS
Status: COMPLETED | OUTPATIENT
Start: 2023-10-31 | End: 2023-10-31

## 2023-10-31 RX ORDER — SODIUM CHLORIDE, SODIUM LACTATE, POTASSIUM CHLORIDE, CALCIUM CHLORIDE 600; 310; 30; 20 MG/100ML; MG/100ML; MG/100ML; MG/100ML
INJECTION, SOLUTION INTRAVENOUS CONTINUOUS
Status: DISCONTINUED | OUTPATIENT
Start: 2023-10-31 | End: 2023-10-31 | Stop reason: HOSPADM

## 2023-10-31 RX ORDER — FLUOXETINE 40 MG/1
40 CAPSULE ORAL DAILY
Status: DISCONTINUED | OUTPATIENT
Start: 2023-10-31 | End: 2023-10-31 | Stop reason: DRUGHIGH

## 2023-10-31 RX ORDER — MULTIVIT WITH MINERALS/LUTEIN
1 TABLET ORAL DAILY
COMMUNITY

## 2023-10-31 RX ORDER — ONDANSETRON 4 MG/1
4 TABLET, ORALLY DISINTEGRATING ORAL EVERY 30 MIN PRN
Status: DISCONTINUED | OUTPATIENT
Start: 2023-10-31 | End: 2023-10-31 | Stop reason: HOSPADM

## 2023-10-31 RX ORDER — DEXAMETHASONE SODIUM PHOSPHATE 4 MG/ML
INJECTION, SOLUTION INTRA-ARTICULAR; INTRALESIONAL; INTRAMUSCULAR; INTRAVENOUS; SOFT TISSUE PRN
Status: DISCONTINUED | OUTPATIENT
Start: 2023-10-31 | End: 2023-10-31

## 2023-10-31 RX ORDER — LIDOCAINE 40 MG/G
CREAM TOPICAL
Status: DISCONTINUED | OUTPATIENT
Start: 2023-10-31 | End: 2023-10-31 | Stop reason: HOSPADM

## 2023-10-31 RX ORDER — ALBUTEROL SULFATE 90 UG/1
2 AEROSOL, METERED RESPIRATORY (INHALATION) EVERY 4 HOURS PRN
Status: DISCONTINUED | OUTPATIENT
Start: 2023-10-31 | End: 2023-11-01 | Stop reason: HOSPADM

## 2023-10-31 RX ORDER — ACETAMINOPHEN 325 MG/1
975 TABLET ORAL ONCE
Status: COMPLETED | OUTPATIENT
Start: 2023-10-31 | End: 2023-10-31

## 2023-10-31 RX ORDER — AMLODIPINE BESYLATE 2.5 MG/1
2.5 TABLET ORAL DAILY
Status: DISCONTINUED | OUTPATIENT
Start: 2023-11-01 | End: 2023-11-01

## 2023-10-31 RX ORDER — NALOXONE HYDROCHLORIDE 0.4 MG/ML
0.2 INJECTION, SOLUTION INTRAMUSCULAR; INTRAVENOUS; SUBCUTANEOUS
Status: DISCONTINUED | OUTPATIENT
Start: 2023-10-31 | End: 2023-11-01 | Stop reason: HOSPADM

## 2023-10-31 RX ORDER — TIOTROPIUM BROMIDE 18 UG/1
18 CAPSULE ORAL; RESPIRATORY (INHALATION) DAILY
Status: DISCONTINUED | OUTPATIENT
Start: 2023-10-31 | End: 2023-11-01 | Stop reason: HOSPADM

## 2023-10-31 RX ORDER — ACETAMINOPHEN 325 MG/1
650 TABLET ORAL EVERY 4 HOURS PRN
Qty: 100 TABLET | Refills: 0 | Status: SHIPPED | OUTPATIENT
Start: 2023-10-31 | End: 2024-05-10

## 2023-10-31 RX ORDER — PROCHLORPERAZINE MALEATE 10 MG
10 TABLET ORAL EVERY 6 HOURS PRN
Status: DISCONTINUED | OUTPATIENT
Start: 2023-10-31 | End: 2023-11-01 | Stop reason: HOSPADM

## 2023-10-31 RX ORDER — HYDROMORPHONE HCL IN WATER/PF 6 MG/30 ML
0.2 PATIENT CONTROLLED ANALGESIA SYRINGE INTRAVENOUS
Status: DISCONTINUED | OUTPATIENT
Start: 2023-10-31 | End: 2023-11-01 | Stop reason: HOSPADM

## 2023-10-31 RX ORDER — BUPIVACAINE HYDROCHLORIDE 5 MG/ML
INJECTION, SOLUTION EPIDURAL; INTRACAUDAL
Status: DISCONTINUED | OUTPATIENT
Start: 2023-10-31 | End: 2023-10-31

## 2023-10-31 RX ORDER — ONDANSETRON 2 MG/ML
4 INJECTION INTRAMUSCULAR; INTRAVENOUS EVERY 6 HOURS PRN
Status: DISCONTINUED | OUTPATIENT
Start: 2023-10-31 | End: 2023-11-01 | Stop reason: HOSPADM

## 2023-10-31 RX ORDER — SODIUM CHLORIDE, SODIUM LACTATE, POTASSIUM CHLORIDE, CALCIUM CHLORIDE 600; 310; 30; 20 MG/100ML; MG/100ML; MG/100ML; MG/100ML
INJECTION, SOLUTION INTRAVENOUS CONTINUOUS PRN
Status: DISCONTINUED | OUTPATIENT
Start: 2023-10-31 | End: 2023-10-31

## 2023-10-31 RX ORDER — VANCOMYCIN HYDROCHLORIDE 1 G/20ML
INJECTION, POWDER, LYOPHILIZED, FOR SOLUTION INTRAVENOUS PRN
Status: DISCONTINUED | OUTPATIENT
Start: 2023-10-31 | End: 2023-10-31 | Stop reason: HOSPADM

## 2023-10-31 RX ORDER — HYDROMORPHONE HCL IN WATER/PF 6 MG/30 ML
0.4 PATIENT CONTROLLED ANALGESIA SYRINGE INTRAVENOUS
Status: DISCONTINUED | OUTPATIENT
Start: 2023-10-31 | End: 2023-11-01 | Stop reason: HOSPADM

## 2023-10-31 RX ORDER — HYDROMORPHONE HCL IN WATER/PF 6 MG/30 ML
0.2 PATIENT CONTROLLED ANALGESIA SYRINGE INTRAVENOUS EVERY 5 MIN PRN
Status: DISCONTINUED | OUTPATIENT
Start: 2023-10-31 | End: 2023-10-31 | Stop reason: HOSPADM

## 2023-10-31 RX ORDER — OXYCODONE HYDROCHLORIDE 5 MG/1
10 TABLET ORAL EVERY 4 HOURS PRN
Status: DISCONTINUED | OUTPATIENT
Start: 2023-10-31 | End: 2023-11-01 | Stop reason: HOSPADM

## 2023-10-31 RX ORDER — AMOXICILLIN 250 MG
1 CAPSULE ORAL 2 TIMES DAILY
Status: DISCONTINUED | OUTPATIENT
Start: 2023-10-31 | End: 2023-11-01 | Stop reason: HOSPADM

## 2023-10-31 RX ORDER — BUPIVACAINE HYDROCHLORIDE 5 MG/ML
INJECTION, SOLUTION PERINEURAL PRN
Status: DISCONTINUED | OUTPATIENT
Start: 2023-10-31 | End: 2023-10-31 | Stop reason: HOSPADM

## 2023-10-31 RX ORDER — ROPIVACAINE HYDROCHLORIDE 5 MG/ML
INJECTION, SOLUTION EPIDURAL; INFILTRATION; PERINEURAL PRN
Status: DISCONTINUED | OUTPATIENT
Start: 2023-10-31 | End: 2023-10-31

## 2023-10-31 RX ORDER — PROPOFOL 10 MG/ML
INJECTION, EMULSION INTRAVENOUS PRN
Status: DISCONTINUED | OUTPATIENT
Start: 2023-10-31 | End: 2023-10-31

## 2023-10-31 RX ORDER — ONDANSETRON 4 MG/1
4 TABLET, ORALLY DISINTEGRATING ORAL EVERY 6 HOURS PRN
Status: DISCONTINUED | OUTPATIENT
Start: 2023-10-31 | End: 2023-11-01 | Stop reason: HOSPADM

## 2023-10-31 RX ORDER — NALOXONE HYDROCHLORIDE 0.4 MG/ML
0.4 INJECTION, SOLUTION INTRAMUSCULAR; INTRAVENOUS; SUBCUTANEOUS
Status: DISCONTINUED | OUTPATIENT
Start: 2023-10-31 | End: 2023-11-01 | Stop reason: HOSPADM

## 2023-10-31 RX ORDER — HYDROMORPHONE HCL IN WATER/PF 6 MG/30 ML
0.4 PATIENT CONTROLLED ANALGESIA SYRINGE INTRAVENOUS EVERY 5 MIN PRN
Status: DISCONTINUED | OUTPATIENT
Start: 2023-10-31 | End: 2023-10-31 | Stop reason: HOSPADM

## 2023-10-31 RX ORDER — PANTOPRAZOLE SODIUM 40 MG/1
40 TABLET, DELAYED RELEASE ORAL
Status: DISCONTINUED | OUTPATIENT
Start: 2023-11-01 | End: 2023-11-01 | Stop reason: HOSPADM

## 2023-10-31 RX ORDER — TRANEXAMIC ACID 650 MG/1
1950 TABLET ORAL ONCE
Status: DISCONTINUED | OUTPATIENT
Start: 2023-10-31 | End: 2023-10-31 | Stop reason: HOSPADM

## 2023-10-31 RX ORDER — CEFAZOLIN SODIUM/WATER 2 G/20 ML
2 SYRINGE (ML) INTRAVENOUS SEE ADMIN INSTRUCTIONS
Status: DISCONTINUED | OUTPATIENT
Start: 2023-10-31 | End: 2023-10-31 | Stop reason: HOSPADM

## 2023-10-31 RX ORDER — POLYETHYLENE GLYCOL 3350 17 G/17G
17 POWDER, FOR SOLUTION ORAL DAILY
Status: DISCONTINUED | OUTPATIENT
Start: 2023-11-01 | End: 2023-11-01 | Stop reason: HOSPADM

## 2023-10-31 RX ORDER — FENTANYL CITRATE 0.05 MG/ML
INJECTION, SOLUTION INTRAMUSCULAR; INTRAVENOUS PRN
Status: DISCONTINUED | OUTPATIENT
Start: 2023-10-31 | End: 2023-10-31

## 2023-10-31 RX ORDER — GLYCOPYRROLATE 0.2 MG/ML
INJECTION, SOLUTION INTRAMUSCULAR; INTRAVENOUS PRN
Status: DISCONTINUED | OUTPATIENT
Start: 2023-10-31 | End: 2023-10-31

## 2023-10-31 RX ORDER — MULTIVIT WITH MINERALS/LUTEIN
1 TABLET ORAL DAILY
Status: CANCELLED | OUTPATIENT
Start: 2023-10-31

## 2023-10-31 RX ORDER — FENTANYL CITRATE 50 UG/ML
25 INJECTION, SOLUTION INTRAMUSCULAR; INTRAVENOUS EVERY 5 MIN PRN
Status: DISCONTINUED | OUTPATIENT
Start: 2023-10-31 | End: 2023-10-31 | Stop reason: HOSPADM

## 2023-10-31 RX ORDER — LIDOCAINE HYDROCHLORIDE 20 MG/ML
INJECTION, SOLUTION INFILTRATION; PERINEURAL PRN
Status: DISCONTINUED | OUTPATIENT
Start: 2023-10-31 | End: 2023-10-31

## 2023-10-31 RX ORDER — ACETAMINOPHEN 325 MG/1
650 TABLET ORAL EVERY 4 HOURS PRN
Status: DISCONTINUED | OUTPATIENT
Start: 2023-11-03 | End: 2023-11-01 | Stop reason: HOSPADM

## 2023-10-31 RX ORDER — OXYCODONE HYDROCHLORIDE 5 MG/1
5 TABLET ORAL EVERY 4 HOURS PRN
Status: DISCONTINUED | OUTPATIENT
Start: 2023-10-31 | End: 2023-11-01 | Stop reason: HOSPADM

## 2023-10-31 RX ORDER — ACETAMINOPHEN 325 MG/1
975 TABLET ORAL EVERY 8 HOURS
Qty: 27 TABLET | Refills: 0 | Status: DISCONTINUED | OUTPATIENT
Start: 2023-10-31 | End: 2023-11-01 | Stop reason: HOSPADM

## 2023-10-31 RX ORDER — ASPIRIN 325 MG
325 TABLET, DELAYED RELEASE (ENTERIC COATED) ORAL DAILY
Qty: 30 TABLET | Refills: 0 | Status: SHIPPED | OUTPATIENT
Start: 2023-10-31 | End: 2023-12-13

## 2023-10-31 RX ORDER — AMOXICILLIN 250 MG
1-2 CAPSULE ORAL 2 TIMES DAILY
Qty: 30 TABLET | Refills: 0 | Status: SHIPPED | OUTPATIENT
Start: 2023-10-31 | End: 2023-12-13

## 2023-10-31 RX ORDER — ONDANSETRON 2 MG/ML
4 INJECTION INTRAMUSCULAR; INTRAVENOUS EVERY 30 MIN PRN
Status: DISCONTINUED | OUTPATIENT
Start: 2023-10-31 | End: 2023-10-31 | Stop reason: HOSPADM

## 2023-10-31 RX ADMIN — SODIUM CHLORIDE, POTASSIUM CHLORIDE, SODIUM LACTATE AND CALCIUM CHLORIDE: 600; 310; 30; 20 INJECTION, SOLUTION INTRAVENOUS at 09:15

## 2023-10-31 RX ADMIN — HYDROMORPHONE HYDROCHLORIDE 0.4 MG: 0.2 INJECTION, SOLUTION INTRAMUSCULAR; INTRAVENOUS; SUBCUTANEOUS at 14:58

## 2023-10-31 RX ADMIN — FENTANYL CITRATE 25 MCG: 0.05 INJECTION, SOLUTION INTRAMUSCULAR; INTRAVENOUS at 11:51

## 2023-10-31 RX ADMIN — PHENYLEPHRINE HYDROCHLORIDE 100 MCG: 10 INJECTION INTRAVENOUS at 11:49

## 2023-10-31 RX ADMIN — DEXAMETHASONE SODIUM PHOSPHATE 8 MG: 4 INJECTION, SOLUTION INTRA-ARTICULAR; INTRALESIONAL; INTRAMUSCULAR; INTRAVENOUS; SOFT TISSUE at 13:13

## 2023-10-31 RX ADMIN — BENZOCAINE 6 MG-MENTHOL 10 MG LOZENGES 1 LOZENGE: at 17:21

## 2023-10-31 RX ADMIN — DEXAMETHASONE SODIUM PHOSPHATE 4 MG: 4 INJECTION, SOLUTION INTRA-ARTICULAR; INTRALESIONAL; INTRAMUSCULAR; INTRAVENOUS; SOFT TISSUE at 11:25

## 2023-10-31 RX ADMIN — FENTANYL CITRATE 25 MCG: 0.05 INJECTION, SOLUTION INTRAMUSCULAR; INTRAVENOUS at 12:12

## 2023-10-31 RX ADMIN — PROPOFOL 200 MCG/KG/MIN: 10 INJECTION, EMULSION INTRAVENOUS at 11:14

## 2023-10-31 RX ADMIN — LIDOCAINE HYDROCHLORIDE 0.1 ML: 10 INJECTION, SOLUTION EPIDURAL; INFILTRATION; INTRACAUDAL; PERINEURAL at 09:15

## 2023-10-31 RX ADMIN — TIOTROPIUM BROMIDE 18 MCG: 18 CAPSULE ORAL; RESPIRATORY (INHALATION) at 21:16

## 2023-10-31 RX ADMIN — FENTANYL CITRATE 25 MCG: 0.05 INJECTION, SOLUTION INTRAMUSCULAR; INTRAVENOUS at 12:21

## 2023-10-31 RX ADMIN — GABAPENTIN 300 MG: 300 CAPSULE ORAL at 08:51

## 2023-10-31 RX ADMIN — CEFAZOLIN 1 G: 1 INJECTION, POWDER, FOR SOLUTION INTRAMUSCULAR; INTRAVENOUS at 19:23

## 2023-10-31 RX ADMIN — LIDOCAINE HYDROCHLORIDE 100 MG: 20 INJECTION, SOLUTION INFILTRATION; PERINEURAL at 11:19

## 2023-10-31 RX ADMIN — PROPOFOL 30 MG: 10 INJECTION, EMULSION INTRAVENOUS at 11:19

## 2023-10-31 RX ADMIN — ALBUTEROL SULFATE 2 PUFF: 90 INHALANT RESPIRATORY (INHALATION) at 21:17

## 2023-10-31 RX ADMIN — PHENYLEPHRINE HYDROCHLORIDE 100 MCG: 10 INJECTION INTRAVENOUS at 12:41

## 2023-10-31 RX ADMIN — OXYCODONE HYDROCHLORIDE 10 MG: 5 TABLET ORAL at 21:16

## 2023-10-31 RX ADMIN — EPINEPHRINE 0.1 MG: 1 INJECTION, SOLUTION, CONCENTRATE INTRAVENOUS at 11:11

## 2023-10-31 RX ADMIN — FENTANYL CITRATE 25 MCG: 0.05 INJECTION, SOLUTION INTRAMUSCULAR; INTRAVENOUS at 11:13

## 2023-10-31 RX ADMIN — OXYCODONE HYDROCHLORIDE 5 MG: 5 TABLET ORAL at 17:05

## 2023-10-31 RX ADMIN — SENNOSIDES AND DOCUSATE SODIUM 1 TABLET: 8.6; 5 TABLET ORAL at 21:16

## 2023-10-31 RX ADMIN — Medication 2 G: at 11:07

## 2023-10-31 RX ADMIN — PHENYLEPHRINE HYDROCHLORIDE 100 MCG: 10 INJECTION INTRAVENOUS at 12:11

## 2023-10-31 RX ADMIN — SODIUM CHLORIDE, POTASSIUM CHLORIDE, SODIUM LACTATE AND CALCIUM CHLORIDE: 600; 310; 30; 20 INJECTION, SOLUTION INTRAVENOUS at 11:06

## 2023-10-31 RX ADMIN — HYDROXYZINE HYDROCHLORIDE 25 MG: 25 TABLET, FILM COATED ORAL at 17:05

## 2023-10-31 RX ADMIN — MIDAZOLAM 2 MG: 1 INJECTION INTRAMUSCULAR; INTRAVENOUS at 11:06

## 2023-10-31 RX ADMIN — ACETAMINOPHEN 975 MG: 325 TABLET, FILM COATED ORAL at 08:51

## 2023-10-31 RX ADMIN — ONDANSETRON 4 MG: 2 INJECTION INTRAMUSCULAR; INTRAVENOUS at 11:06

## 2023-10-31 RX ADMIN — PHENYLEPHRINE HYDROCHLORIDE 100 MCG: 10 INJECTION INTRAVENOUS at 12:21

## 2023-10-31 RX ADMIN — ACETAMINOPHEN 975 MG: 325 TABLET, FILM COATED ORAL at 17:04

## 2023-10-31 RX ADMIN — ROPIVACAINE HYDROCHLORIDE 20 ML: 5 INJECTION, SOLUTION EPIDURAL; INFILTRATION; PERINEURAL at 13:13

## 2023-10-31 RX ADMIN — SODIUM CHLORIDE, POTASSIUM CHLORIDE, SODIUM LACTATE AND CALCIUM CHLORIDE: 600; 310; 30; 20 INJECTION, SOLUTION INTRAVENOUS at 15:06

## 2023-10-31 RX ADMIN — GLYCOPYRROLATE 0.2 MG: 0.2 INJECTION, SOLUTION INTRAMUSCULAR; INTRAVENOUS at 11:20

## 2023-10-31 RX ADMIN — BUPIVACAINE HYDROCHLORIDE IN DEXTROSE 1.5 ML: 7.5 INJECTION, SOLUTION SUBARACHNOID at 11:11

## 2023-10-31 RX ADMIN — PHENYLEPHRINE HYDROCHLORIDE 100 MCG: 10 INJECTION INTRAVENOUS at 11:52

## 2023-10-31 RX ADMIN — ASPIRIN 325 MG: 325 TABLET ORAL at 17:06

## 2023-10-31 RX ADMIN — BENZOCAINE 6 MG-MENTHOL 10 MG LOZENGES 1 LOZENGE: at 19:05

## 2023-10-31 ASSESSMENT — ACTIVITIES OF DAILY LIVING (ADL)
ADLS_ACUITY_SCORE: 18

## 2023-10-31 ASSESSMENT — COPD QUESTIONNAIRES
CAT_SEVERITY: MILD
COPD: 1

## 2023-10-31 ASSESSMENT — LIFESTYLE VARIABLES: TOBACCO_USE: 1

## 2023-10-31 NOTE — MEDICATION SCRIBE - ADMISSION MEDICATION HISTORY
Medication Scribe Admission Medication History    Admission medication history is complete. The information provided in this note is only as accurate as the sources available at the time of the update.    Information Source(s): Patient and CareEverywhere/SureScripts via  in room with patient and finished at desk.    Pertinent Information: Patient takes multiple supplements, not every day, that I added to PTA list.  Was taking MVI and Fish Oil daily.  Has not had any of them since last week.  Ran out of Estradiol cream last week and has a refill ready to be picked up.  Has Spiriva inhaler with her today.  Will have Advair inhaler brought in for her today.    Changes made to PTA medication list:  Added: Cinnamon, Estradiol cream, Fish Oil, MVI, CoQ10, Garlic, Turmeric Curcumin, Niacin  Deleted: None  Changed: Omeprazole from 2 tabs daily to 1 tab daily.    Medication Affordability:  Not including over the counter (OTC) medications, was there a time in the past 3 months when you did not take your medications as prescribed because of cost?: No    Allergies reviewed with patient and updates made in EHR: yes, no change.    Medication History Completed By: Aura Garvey 10/31/2023 11:08 AM    PTA Med List   Medication Sig Note Last Dose    albuterol (PROVENTIL) (2.5 MG/3ML) 0.083% neb solution inhale 1 vial (2.5 mg) by nebulization every 4 hours as needed for shortness of breath / dyspnea or wheezing  10/31/2023 at am    albuterol (VENTOLIN HFA) 108 (90 Base) MCG/ACT inhaler INHALE 1-2 PUFFS INTO THE LUNGS EVERY 4 HOURS AS NEEDED FOR SHORTNESS OF BREATH/DYSPNEA OR WHEEZING .  10/30/2023 at Unknown    amLODIPine (NORVASC) 2.5 MG tablet Take 2 tablets (5 mg) by mouth once daily.  10/31/2023 at 0600    cetirizine (ZYRTEC) 10 MG tablet Take 10 mg by mouth daily  10/31/2023 at 0600    CINNAMON PO Take 2 capsules by mouth daily as needed  Past Month at Unknown    co-enzyme Q-10 100 MG CAPS capsule Take 100 mg by mouth daily as  needed  Past Month at Unknown    estradiol (ESTRACE) 0.1 MG/GM vaginal cream Place vaginally twice a week 10/31/2023: Ran out of this last week and has a refill ready to be picked up. Past Week at hs    FLUoxetine (PROZAC) 20 MG capsule Take 1 capsule (20 mg) by mouth daily  10/31/2023 at 0600    FLUoxetine (PROZAC) 40 MG capsule Take 1 capsule (40 mg) by mouth daily Take with 20 mg tab for a total of 60 mg daily  10/31/2023 at 0600    fluticasone (FLONASE) 50 MCG/ACT nasal spray Spray 1 spray into both nostrils daily  10/30/2023 at am    fluticasone-salmeterol (ADVAIR) 500-50 MCG/ACT inhaler Inhale 1 puff into the lungs every 12 hours 10/31/2023: Will have this brought in for her today. 10/31/2023 at 0600    GARLIC PO Take 2 capsules by mouth daily as needed  Past Month at Unknown    Misc Natural Products (TURMERIC CURCUMIN) CAPS Take 2 capsules by mouth daily as needed  Past Month at Unknown    montelukast (SINGULAIR) 10 MG tablet Take 1 tablet (10 mg) by mouth At Bedtime  10/31/2023 at 0600    multivitamin (CENTRUM SILVER) tablet Take 1 tablet by mouth daily  Past Month at am    NIACIN PO Take 1 capsule by mouth daily as needed  Past Month at Unknown    Omega-3 Fatty Acids (FISH OIL ULTRA) 1400 MG CAPS Take 1 capsule by mouth daily  Past Month at am    omeprazole 20 MG tablet Take 20 mg by mouth daily  10/31/2023 at 0600    tiotropium (SPIRIVA) 18 MCG inhaled capsule Inhale 1 capsule (18 mcg) into the lungs daily 10/31/2023: Has this with today. 10/30/2023 at afternoon

## 2023-10-31 NOTE — H&P
[facility] Geriatrics Triage Nurse Telephone Encounter    Provider: OSWALDO Aaln  Facility: [facility] Facility Type:  TCU    Caller: Barbara  Call Back Number: [phone]    Allergies:    Allergies   Allergen Reactions     Amitriptyline Hcl Other (See Comments)     Dry mouth     Bees Swelling     Local swelling to Hornet stings     Lamisil Rash        Reason for call: Nurse is reporting that patient's wife stated that he took Norco at home, but since being at TCU, he's now only taking Tylenol and Oxycodone.  Per medical records, patient to 0.5 tablet of Norco 6x/day.  Patient's wife states that the Oxycodone is causing spasms to his arms and legs.      Verbal Order/Direction given by Provider: Discontinue Tylenol.  Discontinue Oxycodone.  Norco 5/325mg tablet---give 1 tablet QID(8am, 12N, 4p, 8pm) and 1 tablet BID PRN.      Provider giving Order:  ZACHARIAH Bullock CNP    Verbal Order given to: Maria Isabel Gillis RN       The History and Physical on patient's chart was personally reviewed today with the patient. there have been no interval changes in patient's history since H+P performed.    History:  Carolina Hernandez is a 58 year old female with ongoing bilateral knee pain with no known injury, both about the same but varies. Right side pain radiates down the calf more than the left.  Pain has been present for 20 years. We saw her 5/15/2023,  2/13/2023 and 11/10/2022 and provided bilateral cortisone injections. Injections helped maybe 2 months how.     She is more sore recently,   taking care of 3 grandchildren.      Previously on 4/27/2022  received bilateral visco injections (synviscOne). She had previously received a cortisone injection with Dr Gaspar 8/2021 which helped to some extent for a few months, but not as well as our injections.       Recent bout of pancreatitis and had a cholecystectomy on 11/21/2022. She was planning to get a knee replacement last winter but with these medical things going on she wants to wait until cortisone no longer helps with the pain.      Also has foot problems. Gaining some weight..     Locates pain along the inner aspect and front of the knees, and can radiate down the leg to the ankle, up the thigh.  Pain is worse with work, lifting/carrying things, gardening. Treatment has been occasional use of over the counter pain medications without relief.      Pain at rest, pain at night better with injections. Sleeps with pillow between knees. Has tried tylenol arthritis and ibuprofen for pain but doesn't seem to really help.     Left knee injury at 10 years old.     Has chronic low back pain, denies numbness and tingling.    3 views bilateral knee from 7/26/2023 -- Bilateral medial compartment severe joint space narrowing, bone bone bone with articular flattening, subcondral sclerosis. Bilateral patellofemoral lateral facet mild joint space narrowing with medial  patello-femoral osteophytes.         "   ASSESSMENT/PLAN: Carolina Hernandez is a 58 year old female with chronic bilateral knee pain, advanced primary osteoarthritis.      * reviewed imaging studies with patient, showing arthritic changes, or wearing of the cartilage in the knee. This can be caused by normal \"wear and tear\" over the years or following prior injury to the knee.  Treatment typically starts nonsurgically. Surgical indication for total knee arthroplasty  when nonsurgical management is no longer effective.        ** Risks of surgery include, but not limited to: bleeding (possibly requiring transfusion), infection, pain, scar, damage to adjacent structures (e.g. Nerves, blood vessels, bone, cartilage), temporary or permanent nerve damage, recurrence of symptoms, implant dislocation, instability, implant failure, implant infection, unequal limb lengths, malalignment, stiffness, need for further surgery, blood clots, pulmonary embolism, risks of anesthesia, and death.  * the knee will not feel \"normal\" as it won't be \"normal\" after knee replacement. It may feel \"clunky\" due to the nature of the hard metal and plastic implant.  * the expectation is for improved pain, not necessarily complete pain relief.  If you have surgery on your right knee, you will not be able to drive for likely 4-6 weeks, or until you have attained full knee function, range of motion, and ability to drive.  Understanding the risks of surgery, as a quality of life decision, she elects for left total knee arthroplasty.       Patient elects to proceed with planned procedure. Left total knee arthroplasty.    Risks and perceived benefits of surgery again discussed with patient. Patient's questions addressed and answered. Written informed consent obtained and reviewed. Surgical site marked with indelible marker with patient's participation after confirming site with patient.      Giovanny Chong M.D., M.S.  Dept. of Orthopaedic Surgery  [facility]    "

## 2023-10-31 NOTE — PROGRESS NOTES
Skin affirmation note    Admitting nurse completed full skin assessment, Tristan score and Tristan interventions. This writer agrees with the initial skin assessment findings.     Patient has some small amount of bruising in left axilla area.

## 2023-10-31 NOTE — ANESTHESIA POSTPROCEDURE EVALUATION
Patient: Carolina Hernandez    Procedure: Procedure(s):  ARTHROPLASTY, KNEE, TOTAL left       Anesthesia Type:  Spinal    Note:  Disposition: Outpatient   Postop Pain Control: Uneventful            Sign Out: Well controlled pain   PONV: No   Neuro/Psych: Uneventful            Sign Out: Acceptable/Baseline neuro status   Airway/Respiratory: Uneventful            Sign Out: Acceptable/Baseline resp. status   CV/Hemodynamics: Uneventful            Sign Out: Acceptable CV status; No obvious hypovolemia; No obvious fluid overload   Other NRE:    DID A NON-ROUTINE EVENT OCCUR?            Last vitals:  Vitals Value Taken Time   /71 10/31/23 1315   Temp 36.8  C (98.3  F) 10/31/23 1257   Pulse 65 10/31/23 1325   Resp 16 10/31/23 1325   SpO2 96 % 10/31/23 1325   Vitals shown include unfiled device data.    Electronically Signed By: VALERIA Peralta CRNA  October 31, 2023  1:26 PM

## 2023-10-31 NOTE — PROGRESS NOTES
[facility] TRANSPORT NOTE  Data:   Reason for Transport:  Change in level of care    Carolina Hernandez was transported to [place] [id number] via cart at 1354.  Patient was accompanied by Nursing Assistant. Equipment used for transport: None. Family was aware of reason for transport: yes,  Adi notified by phone call.     Action:  Report: given to MILIND Meek    Response:  Patient's condition when transferred off unit was stable, denied pain.    Sade Condon RN

## 2023-10-31 NOTE — PROGRESS NOTES
SPIRITUAL HEALTH SERVICES  [facility] - Surgery    Referral Source: Patient Request during Pre-Op Phone Call    - Carolina welcomed visit.  She stated that other than having 4 children and gall bladder surgery recently she has not had significant surgery like this TKA.  But she stated she has known Dr Chong for 3 years or more and has come to trust his judgement.  She stated she was ready for the surgery today and welcomed prayer.    - I prayed for her, for Dr Chong and his team, for a successful surgery, healing and rehab.  Carolina expressed gratitude for the spiritual support.    Plan:  will remain available for any ongoing support needs during LOS.    James Monique M.A., [facility]  Staff Chaplain [facility]  Office: [phone]

## 2023-10-31 NOTE — OP NOTE
DATE OF SERVICE:  10/31/2023    PREOPERATIVE DIAGNOSIS:  Primary osteoarthritis, left knee.    POSTOPERATIVE DIAGNOSIS: Primary osteoarthritis, left knee.    OPERATION PERFORMED: Hybrid tricompartmental total knee arthroplasty ( press-fit tibial and femoral components for the medial, lateral compartments, and cemented patellofemoral component), left knee.    ATTENDING SURGEON: Giovanny Chong M.D., M.S.    ASSISTANT(S): Corey Eng PA-C   The PA's assistance was medically necessary given the technical complexity of the case; with assistance with patient positioning, retraction and knee joint exposure, limb manipulation for femoral and tibial osteotomies, assistance with implant placement, trial and final arthroplasty knee implant reduction, and wound closure.      ANESTHESIA:  Spinal with MAC, in the supine position.    IV FLUIDS:  1300 mL LR.    EBL: 100mL  .    URINE OUTPUT: no singh    TOURNIQUET TIME: 34 minutes at 250mmHg.    IMPLANTS / GRAFTS:        #3 posterior stabilized [place] Triathlon press-fit beaded femur,      #3 [place] Triathlon Tritanium press-fit tibial tray     #29 [place] Triathlon X3 asymmetric medial off-set patella      9mm X3 PS polyethylene tibial liner    LATERAL RELEASE:   None required - good tracking.    APPROACH:   Medial Parapatellar    COMPLICATIONS:  NONE    ANTIBIOTICS:  Cefazolin 2 gm intravenously prior to tourniquet inflation and 2gm at release and closure.    Tranexamic Acid: 1950mg oral prior to procedure in preop.    SPECIMENS: none    DRAINS: none    FINDINGS: advanced tricompartmental degenerative osteoarthritis, eburnated bone on bone changes, with marginal osteophytes. Inflammed synovium, effusion. Good bone quality.,    INDICATIONS FOR PROCEDURE: Carolina Hernandez is a pleasant 58 year old female with ongoing knee pain.Xrays showed advanced degenerative arthrosis.The patient has unfortunately failed nonoperative attempts at knee pain relief with ongoing symptomatic  arthropathy, including physical therapy, activity modification, weight loss, NSAIDS, and injections (cortisone and viscosupplementation). Symptoms have been interfering with activities of daily living and quality of life.  The risk and benefit analysis of knee arthroplasty was explained to include but not be limited to wear, loosening, infection, bleeding, pain, scar, implant dislocation, fracture, failure to relieve symptoms, thromboembolic disease, neurovascular damage with possible temporary or permanent nerve damage, leg length inequality, need for further surgery, risks of anesthesia and death.  Despite these risks, as a quality of life decision, the patient elected to proceed.    And with informed and written consent the patient was taken to the operating room.    PROCEDURE AND FINDINGS:    The patient was identified in the preoperative holding area. The correct surgical procedure and site was confirmed with the patient. Again, the risks of surgery were reviewed. The patient elected to proceed understanding the risks. Written informed consent was obtained. The correct surgical site was marked with an indelible marker by myself, Giovanny Chong MD, the surgeon.     The patient was then taken to the operating room and placed supine on the operating table. After adequate anesthesia was obtained, a non-sterile tourniquet was placed to the upper thigh. All bony prominences were well padded. The arms were well positioned and padded to be comfortable. The left knee and lower extremity was prepped and draped in the usual sterile fashion.     A time-out was completed confirming the correct patient, the correct surgery and surgical site, positioning, the availability of implants, administration of prophylactic antibiotics, and known allergies by all surgical staff.    A midline incision was made and carried down through the peritenon over the patella tendon.  A medial-based arthrotomy was extended proximally using a  medial parapatellar approach and distally using a subperiosteal medial collateral ligament elevation.  Accessible anterior menisci were incised.  The knee was flexed, and canal entry into both femur and tibia, using intramedullary guides, allowed a 5 degree 8 mm distal femoral cut across the distal aspect of the medial and lateral femoral condyles, and a 3 degree 4 mm medial-based tibial cut across the medial and lateral tibial plateau.    Minimally, or less,  invasive techniques with limited incisons and reduced sized cutting blocks and minimal patellar eversion were utilized.    Attention was then turned to the femur.  This was sized to prevent notching and externally rotated to the epicondylar axis.  Necessary anterior and posterior as well as Chamfer cuts were made medial and lateral.   A trial femoral component was placed after removing remaining menisci.  The flexion and extension gap balancing was assured using an appropriate sized poly.  The tibial alignment pins were removed following flexion/extension balancing.  Tibial component size was selected and punched, externally rotating tibial component to the junction of the middle and medial thirds of the tibial tubercle.    Attention was then turned to the patella and the 23 mm patella was recessed to a 13 mm thick patella to accommodate a medial off-set component which was drilled.  A complete synovectomy was performed.    Patellar tracking was assured using a thicker poly insert. No lateral release was needed. After confirmation of such, 1 bag of cement was mixed at the back table.   Beginning with the tibia and then the femur, these components were press-fit into place. This was then followed by the patella, which was cemented in place.  Excess cement was removed from patella component.  The knee was taken to extension.  The tourniquet was released, and a second dose of Ancef was administered.    The wound was copiously irrigated during the cement cure with  dilute betadine solution as well as antibiotic saline. A local joint block was administered through the wound into the surrounding tissues. The actual polyethylene, liner which allowed full extension and good flexion, was inserted. The wound was irrigated again. One gram of vancomycin powder was sprinkled into the knee. The medial arthrotomy, medial collateral lateral elevation, and deep fascia of the vastus medialis obliquus was closed with 0 Ethibond.  0 Vicryl and 3-0 monocryl were used in the peritenon and subdermal tissue respectively.  A 3-0 monocryl subcuticular suture was used to maintain good skin eversion and apposition.  Dermabond was placed over the closed incision. A water-proof sterile dressing (Aquacel Ag)  was applied over adaptec gauze.    The patient was then taken to recovery in stable condition.    The postoperative plan will be weight bearing as tolerated, knee arthroplasty protocol with range of motion to full immediately, pharmacologic DVT prophylaxis for 4 weeks, and antibiotics for 23 hours.  We look forward to following the patient through the perioperative time period.    Giovanny Chong M.D., M.S.  Dept. of Orthopaedic Surgery  [facility]

## 2023-10-31 NOTE — PROGRESS NOTES
"[facility] ADMISSION NOTE    Patient admitted to room 2209 at approximately 1400 via cart from surgery. Patient was accompanied by transport tech.     Verbal SBAR report received from MILIND Stuart prior to patient arrival.     Patient transferred to [facility]. Patient alert and oriented X 3. The patient is not having any pain.  . Admission vital signs: Blood pressure 121/73, pulse 61, temperature 98.3  F (36.8  C), temperature source Temporal, resp. rate 12, height 1.588 m (5' 2.52\"), weight 78.8 kg (173 lb 11.6 oz), SpO2 96%, not currently breastfeeding. Patient was oriented to plan of care, call light, bed controls, tv, telephone, bathroom, and visiting hours.     Risk Assessment    The following safety risks were identified during admission: fall. Yellow risk band applied: YES.     Skin Initial Assessment    This writer admitted this patient and completed a full skin assessment and Tristan score in the Adult PCS flowsheet. Appropriate interventions initiated as needed.     Secondary skin check completed by Cherie GRIER RN.         Education    Patient has a [place] to Observation order: Yes  Observation education completed and documented: Yes      ANDREW YIP RN      "

## 2023-10-31 NOTE — ANESTHESIA PROCEDURE NOTES
"Intrathecal injection Procedure Note    Pre-Procedure   Staff -        CRNA: Stuart Weinberg APRN CRNA       Other Anesthesia Staff: Sandee Deluca       Performed By: DERRICK       Location: OR       Pre-Anesthestic Checklist: patient identified, IV checked, risks and benefits discussed, informed consent, monitors and equipment checked, pre-op evaluation, at physician/surgeon's request and post-op pain management  Timeout:       Correct Patient: Yes        Correct Procedure: Yes        Correct Site: Yes        Correct Position: Yes   Procedure Documentation  Procedure: intrathecal injection       Patient Position: sitting       Skin prep: Betadine       Insertion Site: L3-4. (midline approach).       Needle Gauge: 25.        Needle Length (Inches): 3.5        Spinal Needle Type: Melinda-Philippe       Introducer used       # of attempts: 1 and  # of redirects:  1    Assessment/Narrative         Paresthesias: No.       CSF fluid: clear.       Opening pressure was cmH2O while  Sitting.      Medication(s) Administered   0.75% Hyperbaric Bupivacaine (Intrathecal) - Intrathecal   1.5 mL - 10/31/2023 11:11:00 AM  Epinephrine 1000 mcg/mL (Intrathecal) - Intrathecal   0.1 mg - 10/31/2023 11:11:00 AM    FOR [facility] ([facility]) ONLY:   Pain Team Contact information: please page the Pain Team Via [employer]. Search \"Pain\". During daytime hours, please page the attending first. At night please page the resident first.      "

## 2023-10-31 NOTE — ANESTHESIA CARE TRANSFER NOTE
Patient: Carolina Hernandez    Procedure: Procedure(s):  ARTHROPLASTY, KNEE, TOTAL left       Diagnosis: Bilateral primary osteoarthritis of knee [M17.0]  Diagnosis Additional Information: No value filed.    Anesthesia Type:   Spinal     Note:    Oropharynx: oropharynx clear of all foreign objects  Level of Consciousness: awake      Independent Airway: airway patency satisfactory and stable  Dentition: dentition unchanged  Vital Signs Stable: post-procedure vital signs reviewed and stable  Report to RN Given: handoff report given  Patient transferred to: PACU    Handoff Report: Identifed the Patient, Identified the Reponsible Provider, Reviewed the pertinent medical history, Discussed the surgical course, Reviewed Intra-OP anesthesia mangement and issues during anesthesia, Set expectations for post-procedure period and Allowed opportunity for questions and acknowledgement of understanding      Vitals:  Vitals Value Taken Time   /71 10/31/23 1315   Temp 36.8  C (98.3  F) 10/31/23 1257   Pulse 65 10/31/23 1325   Resp 16 10/31/23 1325   SpO2 96 % 10/31/23 1325   Vitals shown include unfiled device data.    Electronically Signed By: VALERIA Peralta CRNA  October 31, 2023  1:26 PM

## 2023-10-31 NOTE — ANESTHESIA PROCEDURE NOTES
Adductor canal Procedure Note    Pre-Procedure   Staff -        CRNA: Esteban Deras APRN CRNA       Performed By: CRNA       Location: post-op       Procedure Start/Stop Times: 10/31/2023 1:07 PM and 10/31/2023 1:14 PM       Pre-Anesthestic Checklist: patient identified, IV checked, site marked, risks and benefits discussed, informed consent, monitors and equipment checked, pre-op evaluation, at physician/surgeon's request and post-op pain management  Timeout:       Correct Patient: Yes        Correct Procedure: Yes        Correct Site: Yes        Correct Position: Yes        Correct Laterality: Yes        Site Marked: Yes  Procedure Documentation  Procedure: Adductor canal       Laterality: left       Patient Position: supine       Patient Prep/Sterile Barriers: sterile gloves, mask, patient draped       Skin prep: Chloraprep       Needle Type: insulated       Needle Gauge: 21.        Needle Length (millimeters): 100        Ultrasound guided       1. Ultrasound was used to identify targeted nerve, plexus, vascular marker, or fascial plane and place a needle adjacent to it in real-time.       2. Ultrasound was used to visualize the spread of anesthetic in close proximity to the above referenced structure.       3. A permanent image is entered into the patient's record.       4. The visualized anatomic structures appeared normal.       5. There were no apparent abnormal pathologic findings.    Assessment/Narrative         The placement was negative for: blood aspirated, painful injection and site bleeding       Paresthesias: No.       Bolus given via needle..        Secured via.        Insertion/Infusion Method: Single Shot       Complications: none       Injection made incrementally with aspirations every 5 mL.    Medication(s) Administered   Medication Administration Time: 10/31/2023 1:07 PM      FOR [facility] ([facility] ONLY:   Pain Team Contact information: please page the Pain Team Via [employer]. Search  "\"Pain\". During daytime hours, please page the attending first. At night please page the resident first.      "

## 2023-11-01 ENCOUNTER — APPOINTMENT (OUTPATIENT)
Dept: PHYSICAL THERAPY | Facility: CLINIC | Age: 58
End: 2023-11-01
Attending: ORTHOPAEDIC SURGERY
Payer: COMMERCIAL

## 2023-11-01 VITALS
SYSTOLIC BLOOD PRESSURE: 117 MMHG | HEIGHT: 63 IN | OXYGEN SATURATION: 95 % | HEART RATE: 74 BPM | TEMPERATURE: 97.8 F | DIASTOLIC BLOOD PRESSURE: 73 MMHG | WEIGHT: 173.72 LBS | BODY MASS INDEX: 30.78 KG/M2 | RESPIRATION RATE: 16 BRPM

## 2023-11-01 PROBLEM — M17.12 PRIMARY OSTEOARTHRITIS OF LEFT KNEE: Status: ACTIVE | Noted: 2023-11-01

## 2023-11-01 LAB
GLUCOSE SERPL-MCNC: 138 MG/DL (ref 70–99)
HGB BLD-MCNC: 9.8 G/DL (ref 11.7–15.7)
HOLD SPECIMEN: NORMAL

## 2023-11-01 PROCEDURE — 36415 COLL VENOUS BLD VENIPUNCTURE: CPT | Performed by: PHYSICIAN ASSISTANT

## 2023-11-01 PROCEDURE — 85018 HEMOGLOBIN: CPT | Performed by: PHYSICIAN ASSISTANT

## 2023-11-01 PROCEDURE — 97110 THERAPEUTIC EXERCISES: CPT | Mod: GP | Performed by: PHYSICAL MEDICINE & REHABILITATION

## 2023-11-01 PROCEDURE — 97161 PT EVAL LOW COMPLEX 20 MIN: CPT | Mod: GP | Performed by: PHYSICAL MEDICINE & REHABILITATION

## 2023-11-01 PROCEDURE — 250N000013 HC RX MED GY IP 250 OP 250 PS 637: Performed by: PHYSICIAN ASSISTANT

## 2023-11-01 PROCEDURE — 82947 ASSAY GLUCOSE BLOOD QUANT: CPT | Performed by: ORTHOPAEDIC SURGERY

## 2023-11-01 PROCEDURE — 99232 SBSQ HOSP IP/OBS MODERATE 35: CPT | Performed by: PHYSICIAN ASSISTANT

## 2023-11-01 PROCEDURE — 250N000013 HC RX MED GY IP 250 OP 250 PS 637

## 2023-11-01 PROCEDURE — 250N000011 HC RX IP 250 OP 636: Performed by: PHYSICIAN ASSISTANT

## 2023-11-01 PROCEDURE — 97116 GAIT TRAINING THERAPY: CPT | Mod: GP | Performed by: PHYSICAL MEDICINE & REHABILITATION

## 2023-11-01 PROCEDURE — 250N000013 HC RX MED GY IP 250 OP 250 PS 637: Performed by: ORTHOPAEDIC SURGERY

## 2023-11-01 RX ORDER — AMLODIPINE BESYLATE 5 MG/1
5 TABLET ORAL DAILY
Status: DISCONTINUED | OUTPATIENT
Start: 2023-11-02 | End: 2023-11-01 | Stop reason: HOSPADM

## 2023-11-01 RX ADMIN — OXYCODONE HYDROCHLORIDE 10 MG: 5 TABLET ORAL at 01:49

## 2023-11-01 RX ADMIN — SENNOSIDES AND DOCUSATE SODIUM 1 TABLET: 8.6; 5 TABLET ORAL at 09:06

## 2023-11-01 RX ADMIN — OXYCODONE HYDROCHLORIDE 10 MG: 5 TABLET ORAL at 10:39

## 2023-11-01 RX ADMIN — CEFAZOLIN 1 G: 1 INJECTION, POWDER, FOR SOLUTION INTRAMUSCULAR; INTRAVENOUS at 03:57

## 2023-11-01 RX ADMIN — ACETAMINOPHEN 975 MG: 325 TABLET, FILM COATED ORAL at 09:06

## 2023-11-01 RX ADMIN — HYDROXYZINE HYDROCHLORIDE 25 MG: 25 TABLET, FILM COATED ORAL at 09:06

## 2023-11-01 RX ADMIN — FLUOXETINE 60 MG: 20 CAPSULE ORAL at 09:06

## 2023-11-01 RX ADMIN — OXYCODONE HYDROCHLORIDE 10 MG: 5 TABLET ORAL at 05:56

## 2023-11-01 RX ADMIN — ASPIRIN 325 MG: 325 TABLET ORAL at 09:06

## 2023-11-01 RX ADMIN — PANTOPRAZOLE SODIUM 40 MG: 40 TABLET, DELAYED RELEASE ORAL at 05:57

## 2023-11-01 RX ADMIN — POLYETHYLENE GLYCOL 3350 17 G: 17 POWDER, FOR SOLUTION ORAL at 09:07

## 2023-11-01 RX ADMIN — ACETAMINOPHEN 975 MG: 325 TABLET, FILM COATED ORAL at 01:49

## 2023-11-01 RX ADMIN — AMLODIPINE BESYLATE 2.5 MG: 2.5 TABLET ORAL at 09:06

## 2023-11-01 ASSESSMENT — ACTIVITIES OF DAILY LIVING (ADL)
ADLS_ACUITY_SCORE: 19
ADLS_ACUITY_SCORE: 18
ADLS_ACUITY_SCORE: 18

## 2023-11-01 NOTE — PROGRESS NOTES
11/01/23 0700   Appointment Info   Signing Clinician's Name / Credentials (PT) Manuel Logan, PT   Quick Adds   Quick Adds Certification   Living Environment   People in Home alone   Current Living Arrangements house   Home Accessibility no concerns   Transportation Anticipated family or friend will provide   Living Environment Comments lives in basement with , in brother in law's house with his wife, and his kids   Self-Care   Equipment Currently Used at Home walker, standard;cane, straight;raised toilet seat;shower chair   Fall history within last six months no   Activity/Exercise/Self-Care Comment handicap accessible bathroom, no prior use of AD   General Information   Onset of Illness/Injury or Date of Surgery 10/31/23   Referring Physician Dr. Giovanny Chong   Patient/Family Therapy Goals Statement (PT) to return home safely   Pertinent History of Current Problem (include personal factors and/or comorbidities that impact the POC) s/p L TKA on 10/31/2023   Existing Precautions/Restrictions no known precautions/restrictions   Weight-Bearing Status - LLE weight-bearing as tolerated   Cognition   Affect/Mental Status (Cognition) WFL   Orientation Status (Cognition) oriented x 4   Follows Commands (Cognition) WFL   Pain Assessment   Patient Currently in Pain Yes, see Vital Sign flowsheet   Integumentary/Edema   Integumentary/Edema no deficits were identifed   Posture    Posture Forward head position;Kyphosis   Range of Motion (ROM)   Range of Motion ROM deficits secondary to surgical procedure;ROM deficits secondary to pain   Strength (Manual Muscle Testing)   Strength (Manual Muscle Testing) No deficits observed during functional mobility;Able to perform L SLR   Bed Mobility   Bed Mobility no deficits identified   Comment, (Bed Mobility) with HOB elevated using hand rails   Transfers   Transfers no deficits identified   Comment, (Transfers) SBA and slow due to pain   Gait/Stairs (Locomotion)   [place]  Level (Gait) supervision   Assistive Device (Gait) walker, front-wheeled   Distance in Feet (Gait) 95   Pattern (Gait) step-through   Deviations/Abnormal Patterns (Gait) antalgic;weight shifting decreased;gait speed decreased   Balance   Balance other (describe)   Sit-to-Stand Balance fair balance   Balance Quick Add Sit to stand balance   Clinical Impression   Criteria for Skilled Therapeutic Intervention Yes, treatment indicated   PT Diagnosis (PT) s/p L TKA   Influenced by the following impairments pain, weakness   Functional limitations due to impairments transfers, gait   Clinical Presentation (PT Evaluation Complexity) stable   Clinical Presentation Rationale clinical judgement   Clinical Decision Making (Complexity) low complexity   Planned Therapy Interventions (PT) balance training;gait training;home exercise program;ROM (range of motion);stair training;strengthening;stretching;progressive activity/exercise   Risk & Benefits of therapy have been explained evaluation/treatment results reviewed;care plan/treatment goals reviewed;risks/benefits reviewed;current/potential barriers reviewed;participants voiced agreement with care plan;participants included;patient   PT Total Evaluation Time   PT Eval, Low Complexity Minutes (52555) 10   Therapy Certification   Start of care date 11/01/23   Certification date from 11/01/23   Certification date to 11/08/23   Medical Diagnosis s/p L TKA   Physical Therapy Goals   PT Frequency One time eval and treatment only   PT Predicted Duration/Target Date for Goal Attainment 11/01/23   PT Goals Transfers;Gait;Bed Mobility   PT: Bed Mobility Independent;Supine to/from sit;Goal Met;Completed   PT: Transfers Modified independent;Sit to/from stand;Goal Met;Completed   PT: Gait Supervision/stand-by assist;Rolling walker;100 feet;Goal Met;Completed   Interventions   Interventions Quick Adds Gait Training;Therapeutic Procedure   Therapeutic Procedure/Exercise   Ther. Procedure:  strength, endurance, ROM, flexibillity Minutes (17370) 9   Symptoms Noted During/After Treatment fatigue;increased pain   Treatment Detail/Skilled Intervention Patient provided with and instructed in completion of a home program to improve ROM/strength. Exercises included: ankle pumps, SAQ/LAQ, quad/gluteal/HS sets, heel slides seated and supine, SLR, and sit to stands. Verbal/tactile cues given on appropriate form and pacing to maximize benefit.   Gait Training   Gait Training Minutes (73519) 14   Symptoms Noted During/After Treatment (Gait Training) fatigue;increased pain   Treatment Detail/Skilled Intervention Patient ambulates 95 feet using FWW with cues to increase step length, allow for WBAT, and keep forward gaze for safe navigation in hallways. Gait pattern is mild stop/go with FWW, reduced weight bearing, and slow due to pain. Educated patient on walking 3x/day when returning home to improve mobility and reduce muscular atrophy.   Distance in Feet 140   [place] Level (Gait Training) stand-by assist   Physical Assistance Level (Gait Training) supervision;verbal cues   Weight Bearing (Gait Training) weight-bearing as tolerated   Assistive Device (Gait Training) rolling walker   Gait Analysis Deviations decreased weight-shifting ability;decreased step length;decreased stride length   Impairments (Gait Analysis/Training) pain;strength decreased   PT Discharge Planning   PT Plan Discharge today back home.   PT Discharge Recommendation (DC Rec) home with assist;home with outpatient physical therapy   PT Rationale for DC Rec Patient moving well and will be appropriate for OP PT. Has brother in law who can help her if needed, no stairs in home.   PT Brief overview of current status IND bed mob, SBA transfers, SBA gait with FWW x95 feet   PT Equipment Needed at Discharge other (see comments)  (has all that is needed)   Total Session Time   Timed Code Treatment Minutes 23   Total Session Time (sum of timed and  untimed services) 33     [facility]  OUTPATIENT PHYSICAL THERAPY EVALUATION  PLAN OF TREATMENT FOR OUTPATIENT REHABILITATION  (COMPLETE FOR INITIAL CLAIMS ONLY)  Patient's Last Name, First Name, M.I.  YOB: 1965  Carolina Hernandez                        Provider's Name  [facility] Medical Record No.  [id number]                             Onset Date:  10/31/23   Start of Care Date:  11/01/23   Type:     _X_PT   ___OT   ___SLP Medical Diagnosis:  s/p L TKA              PT Diagnosis:  s/p L TKA Visits from SOC:  1     See note for plan of treatment, functional goals and certification details    I CERTIFY THE NEED FOR THESE SERVICES FURNISHED UNDER        THIS PLAN OF TREATMENT AND WHILE UNDER MY CARE     (Physician co-signature of this document indicates review and certification of the therapy plan).

## 2023-11-01 NOTE — PROGRESS NOTES
"[place] [facility] DAILY PROGRESS NOTE      History: Carolina Hernandez is a 58 year old female with advanced knee arthritis now POD # 1 s/p Left total knee arthroplasty on 10/31/2023 with Dr. Chong.     Interval events: None      SUBJECTIVE:  Carolina is feeling well today and would like to go home. She has no steps to enter her home. She will stay on the lower level. She has a walk in shower with seat and sprayer. She will schedule OPPT in [place] for next week.  and in-laws are present to help with her cares.     OBJECTIVE:  /73   Pulse 74   Temp 97.8  F (36.6  C) (Oral)   Resp 16   Ht 1.588 m (5' 2.52\")   Wt 78.8 kg (173 lb 11.6 oz)   SpO2 95%   BMI 31.25 kg/m      Temp (24hrs), Av.1  F (36.7  C), Min:97.8  F (36.6  C), Max:98.3  F (36.8  C)      General: Sitting comfortably in bed on room air.. She is able to straight leg raise and lift her leg over the bed with ease. Exam elicited discomfort but was well tolerated 5 hours post 10 mg Oxycodone.    Left Lower Extremity:  Incision is covered, aquacel in place. Cylinder 6\"ace is in place with good position.  No drainage noted.  Mild swelling of knee, calf    Intact sensation deep peroneal nerve, superficial peroneal nerve, medial and lateral plantar nerve, sural nerve, saphenous nerve.  Intact ehl, edl, ta, fhl, gs, quads, hamstrings, hip flexors  Toes warm and well perfused w/ brisk capillary refill, palpable dorsalis pedis pulse  Calves soft and nontender to squeeze.       \"HEMOGLOBIN\" 9.8. 12.8 pre-op.  Recent Labs   Lab Test 10/14/22  0847 12/10/19  1834   INR 0.97 1.09     Recent Labs   Lab Test 10/25/23  1158 23  0822 22  1408   POTASSIUM 4.2 4.0 4.1   CHLORIDE 99 101 95*   ANIONGAP 11 11 12         PT:  Active participant. Assessment completed. See chart. Patient will attend Outpatient therapy in [place] next week. She was excited to show me how well she can do her exercises.           ASSESSMENT: Caroilna Hernandez is a 58 " year old female POD # 1 s/p Left total knee arthroplasty on 10/31/2023, doing well.    PLAN:Discharge to home.  Follow Dr Chong's Discharge Instructions.  Recheck with Dr. Chong in two weeks.  Remove Ace wrap when home today.  Remove dressing in ten-twelve days, wash and recover with sterile gauze if any drainage noted.  Aspirin 325 mg one daily for 30 days for DVT Prophylaxis.

## 2023-11-01 NOTE — PLAN OF CARE
"11/1/23  [id number]  Problem: Fall Injury Risk  Goal: Absence of Fall and Fall-Related Injury  Outcome: Met   Goal Outcome Evaluation:       vital signs: Blood pressure 117/73, pulse 74, temperature 97.8  F (36.6  C), temperature source Oral, resp. rate 16, height 1.588 m (5' 2.52\"), weight 78.8 kg (173 lb 11.6 oz), SpO2 95%,           Patient vital signs are at baseline: Yes  Patient able to ambulate as they were prior to admission or with assist devices provided by therapies during their stay:  Yes  Patient MUST void prior to discharge:  Yes  Patient able to tolerate oral intake:  Yes  Pain has adequate pain control using Oral analgesics:  Yes  Does patient have an identified :  Yes  Has goal D/C date and time been discussed with patient:  Yes       "

## 2023-11-01 NOTE — PROGRESS NOTES
[facility] DISCHARGE NOTE    Patient discharged to home at 11:44 AM via wheel chair. Accompanied by spouse and staff. Discharge instructions reviewed with patient and spouse, opportunity offered to ask questions. Prescriptions sent to patients preferred pharmacy. All belongings sent with patient.    Susie Cifuentes RN

## 2023-11-01 NOTE — CONSULTS
Care Management:    Received a referral for discharge planning.  The patient had a left TKA.    Chart reviewed and plan of care discussed in Interdisciplinary Rounds.  The patient lives independently in the community.  There are no discharge needs identified.    Plan:  Home with outpatient physical therapy.      Estefania Villafana RN, Care Coordinator [phone]

## 2023-11-01 NOTE — PLAN OF CARE

## 2023-11-01 NOTE — PROGRESS NOTES
[facility] Medicine Progress Note  Date of Service: 11/01/2023    Assessment & Plan   Carolina Hernandez is a 58 year old female who presented on 10/31/2023 for scheduled Procedure(s):  ARTHROPLASTY, KNEE, TOTAL left by Giovanny Chong MD and is being followed by the hospital medicine service for co-management of acute and/or chronic perioperative medical problems.    Primary osteoarthritis of left knee  S/p Procedure(s):  ARTHROPLASTY, KNEE, TOTAL left on 10/31/23  1 Day Post-Op   Hemoglobin 12.8 (pre-op) ? 9.8   - pain control, wound cares, physical therapy, occupational therapy and DVT prophylaxis per orthopedic surgery service    Nonobstructive atherosclerosis of coronary artery  Hyperlipidemia LDL goal <70  Benign essential hypertension  Angiogram in 2022 showed non-obstructive coronary artery disease. Managed prior to admission with amlodipine 5 mg daily. Takes niacin prn. Is not on antiplatelet or statin for unclear reasons.  - Continue amlodipine with holding parameters    COPD vs Asthma  Stable respiratory status. Managed prior to admission with Advair, Spiriva, Singulair, and prn albuterol.   - Continue Singulair  - May use Spiriva and Advair if she has her home inhalers with her, if not, hold during hospitalization and resume at discharge  - Prn albuterol    GERD (gastroesophageal reflux disease)  Managed prior to admission with omeprazole 20 mg daily, continue.     Low back pain due to bilateral sciatica  Not on any prescribed substances for management.    Recurrent major depressive disorder, in remission   Stable mood. Managed prior to admission with Prozac 60 mg daily, continue.     Seasonal allergies  Managed prior to admission with Zyrtec, hold while in the hospital.       DVT Prophylaxis: as per orthopedic surgery service - aspirin 325 mg daily  Code Status: Prior    Lines: Peripheral   Weinberg catheter: No    Discussion: Medically, the patient appears to be  "making appropriate post-op progress.    Disposition: Anticipate discharge today to home. No barriers to discharge from internal medicine standpoint.    Attestation:  I have reviewed today's vital signs, notes, medications, labs and imaging.  Discussed with Dr. Clyde oFng.      Sarah Vegas PA-C  [facility]         Interval History     Patient feeling \"alright\" surgery. Pain rated 2/10. Denies numbness or tingling.   Did not sleep well overnight.     Tolerating oral intake, denies abdominal pain, nausea, vomiting. No bowel movement since surgery, passing flatus. Has IBS at baseline, does not typically get constipated. Urinating without difficulty.    Denies chest pain, palpitations, cough, wheeze, shortness of breath, dizziness, or other complaints.    Physical Exam   Temp:  [97.8  F (36.6  C)-98.3  F (36.8  C)] 97.8  F (36.6  C)  Pulse:  [58-81] 74  Resp:  [12-19] 16  BP: (100-136)/(51-75) 117/73  SpO2:  [92 %-98 %] 95 %    Weights:   Vitals:    10/31/23 0830 10/31/23 1406   Weight: 76.7 kg (169 lb) 78.8 kg (173 lb 11.6 oz)    Body mass index is 31.25 kg/m .    General appearance: Awake, alert, and in no apparent distress. Pleasant and conversational, speaking in full sentences.  CV: Regular rhythm & rate, no murmurs. No edema. Peripheral pulses intact.  Respiratory: Moving air well bilaterally, no wheezing, crackles, or rhonchi.  GI: Non-distended, soft, nontender to palpation. No rebound or guarding. Normoactive bowel sounds.  Skin: Warm, dry, no rashes or ecchymoses. No mottling of skin. Aquacel dressing present on left knee, clean and dry.  Musculoskeletal / extremities: Moves all extremities equally, no obvious abnormalities. Distal CMS intact.  Neurologic: No focal deficits.      Data   Recent Labs   Lab 11/01/23  0447 10/25/23  1158   WBC  --  8.3   HGB 9.8* 12.8   MCV  --  94   PLT  --  368   NA  --  136   POTASSIUM  --  4.2   CHLORIDE  --  99   CO2  --  26   BUN  --  9.0   CR  " --  0.67   ANIONGAP  --  11   REILLY  --  9.5   * 87   ALT  --  25       Recent Labs   Lab 11/01/23  0447 10/25/23  1158   * 87        Unresulted Labs Ordered in the Past 30 Days of this Admission       No orders found for last 31 day(s).             Imaging  Recent Results (from the past 24 hour(s))   XR Knee Port Left 1/2 Views    Narrative    XR KNEE PORT LEFT 1/2 VIEWS   10/31/2023 1:43 PM     HISTORY: Post-Op Total Knee  COMPARISON: None.       Impression    IMPRESSION: There are immediate postoperative changes from left total  knee arthroplasty, in standard alignment. No periprosthetic fracture.    CRISTIANO MAO MD         SYSTEM ID:  [id number]        I reviewed all new labs and imaging results over the last 24 hours. I personally reviewed no images or EKG's today.    Medications    lactated ringers Stopped (10/31/23 2130)      acetaminophen  975 mg Oral Q8H    amLODIPine  2.5 mg Oral Daily    aspirin  325 mg Oral Daily    FLUoxetine  60 mg Oral Daily    fluticasone-salmeterol  1 puff Inhalation Q12H    montelukast  10 mg Oral At Bedtime    pantoprazole  40 mg Oral QAM AC    polyethylene glycol  17 g Oral Daily    senna-docusate  1 tablet Oral BID    sodium chloride (PF)  3 mL Intracatheter Q8H    tiotropium  18 mcg Inhalation Daily       Sarah Vegas PA-C  [facility] Hospitalist Service

## 2023-11-02 ENCOUNTER — TELEPHONE (OUTPATIENT)
Dept: ORTHOPEDICS | Facility: CLINIC | Age: 58
End: 2023-11-02
Payer: COMMERCIAL

## 2023-11-02 DIAGNOSIS — Z96.652 S/P TOTAL KNEE ARTHROPLASTY, LEFT: Primary | ICD-10-CM

## 2023-11-02 RX ORDER — OXYCODONE HYDROCHLORIDE 5 MG/1
5-10 TABLET ORAL EVERY 6 HOURS PRN
Qty: 12 TABLET | Refills: 0 | Status: SHIPPED | OUTPATIENT
Start: 2023-11-03 | End: 2023-11-06

## 2023-11-02 NOTE — TELEPHONE ENCOUNTER
Patient calling as she thinks she missed a call from someone?  I do not see a message in the chart.

## 2023-11-02 NOTE — TELEPHONE ENCOUNTER
I spoke with Carolina. She is struggling with pain a bit. Sounds like her block likely wore off shortly after she got discharged to home. She has been taking 2 oxycodone every 4 hours since last evening. She is constantly icing, elevating above the level of her heart, and taking all other meds as directed. I sent a refill for oxycodone to her pharmacy of choice. This prescription won't be available until 11/3/23. She thanked me for the call and refill.    Acosta Eng PA-C, CAQ-OS  Dept. of Orthopedic Surgery  [facility]

## 2023-11-02 NOTE — TELEPHONE ENCOUNTER
Pt is requesting a refill of Oxycodone.    Please send script through, if approved.    Please CALL pt to follow up.    Pt's pharmacy:    [facility]  [place]

## 2023-11-06 ENCOUNTER — TELEPHONE (OUTPATIENT)
Dept: SURGERY | Facility: CLINIC | Age: 58
End: 2023-11-06
Payer: COMMERCIAL

## 2023-11-06 DIAGNOSIS — Z96.652 S/P TOTAL KNEE ARTHROPLASTY, LEFT: Primary | ICD-10-CM

## 2023-11-06 NOTE — TELEPHONE ENCOUNTER
[facility] Call Center    Phone Message    May a detailed message be left on voicemail: yes     Reason for Call: Other: patient calling as she needs to know how long she needs to keep icing the leg. Pain is too back she can do the instructed exercise. She would like a call back to discuss.       Action Taken: Other: message sent to team     Travel Screening: Not Applicable

## 2023-11-07 RX ORDER — OXYCODONE HYDROCHLORIDE 5 MG/1
5 TABLET ORAL EVERY 6 HOURS PRN
Qty: 10 TABLET | Refills: 0 | Status: SHIPPED | OUTPATIENT
Start: 2023-11-07 | End: 2023-11-10

## 2023-11-07 NOTE — TELEPHONE ENCOUNTER
Dr. Chong sent in refill for oxycodone. Patient called and notified.     Kimberly KAISER RN, [facility] 11/07/23 10:44 AM

## 2023-11-07 NOTE — TELEPHONE ENCOUNTER
Called patient:    Patient had TKA 10/31  Patient states she cannot sleep   Pain has been the same since the surgery   She gets up her knee swells up and is really red   Left foot swollen and red   No numbness or tingling   Patient has been icing and elevating   Patient taking tylenol every 4 hours   Patient last took oxycodone on Sunday, but she is now out and that did help her sleep. She was taking one every 4 hours   No fevers and no sign of infection   No red streaking   Patient has feeling in feet  Patients preferred pharmacy is [facility]  Patient is wondering if she can get oxycodone so she can sleep. Forwarding to Dr. Chong to review    Kimberly KAISER RN, [facility] 11/07/23 9:47 AM

## 2023-11-13 ENCOUNTER — THERAPY VISIT (OUTPATIENT)
Dept: PHYSICAL THERAPY | Facility: CLINIC | Age: 58
End: 2023-11-13
Attending: ORTHOPAEDIC SURGERY
Payer: COMMERCIAL

## 2023-11-13 DIAGNOSIS — M17.12 OSTEOARTHRITIS OF LEFT KNEE: Primary | ICD-10-CM

## 2023-11-13 PROCEDURE — 97110 THERAPEUTIC EXERCISES: CPT | Mod: GP | Performed by: PHYSICAL THERAPIST

## 2023-11-13 PROCEDURE — 97161 PT EVAL LOW COMPLEX 20 MIN: CPT | Mod: GP | Performed by: PHYSICAL THERAPIST

## 2023-11-13 PROCEDURE — 97116 GAIT TRAINING THERAPY: CPT | Mod: GP | Performed by: PHYSICAL THERAPIST

## 2023-11-13 ASSESSMENT — ACTIVITIES OF DAILY LIVING (ADL)
AS_A_RESULT_OF_YOUR_KNEE_INJURY,_HOW_WOULD_YOU_RATE_YOUR_CURRENT_LEVEL_OF_DAILY_ACTIVITY?: ABNORMAL
WALK: ACTIVITY IS MINIMALLY DIFFICULT
HOW_WOULD_YOU_RATE_THE_CURRENT_FUNCTION_OF_YOUR_KNEE_DURING_YOUR_USUAL_DAILY_ACTIVITIES_ON_A_SCALE_FROM_0_TO_100_WITH_100_BEING_YOUR_LEVEL_OF_KNEE_FUNCTION_PRIOR_TO_YOUR_INJURY_AND_0_BEING_THE_INABILITY_TO_PERFORM_ANY_OF_YOUR_USUAL_DAILY_ACTIVITIES?: 50
KNEE_ACTIVITY_OF_DAILY_LIVING_SCORE: 30
LIMPING: THE SYMPTOM AFFECTS MY ACTIVITY MODERATELY
GIVING WAY, BUCKLING OR SHIFTING OF KNEE: THE SYMPTOM AFFECTS MY ACTIVITY MODERATELY
SQUAT: I AM UNABLE TO DO THE ACTIVITY
SWELLING: THE SYMPTOM AFFECTS MY ACTIVITY MODERATELY
STAND: ACTIVITY IS SOMEWHAT DIFFICULT
RISE FROM A CHAIR: ACTIVITY IS SOMEWHAT DIFFICULT
KNEE_ACTIVITY_OF_DAILY_LIVING_SUM: 21
HOW_WOULD_YOU_RATE_THE_OVERALL_FUNCTION_OF_YOUR_KNEE_DURING_YOUR_USUAL_DAILY_ACTIVITIES?: ABNORMAL
GO DOWN STAIRS: I AM UNABLE TO DO THE ACTIVITY
GO UP STAIRS: I AM UNABLE TO DO THE ACTIVITY
STIFFNESS: THE SYMPTOM AFFECTS MY ACTIVITY SEVERELY
RAW_SCORE: 21
WEAKNESS: THE SYMPTOM AFFECTS MY ACTIVITY MODERATELY
PAIN: THE SYMPTOM AFFECTS MY ACTIVITY MODERATELY
SIT WITH YOUR KNEE BENT: I AM UNABLE TO DO THE ACTIVITY
KNEEL ON THE FRONT OF YOUR KNEE: I AM UNABLE TO DO THE ACTIVITY

## 2023-11-13 NOTE — PROGRESS NOTES
PHYSICAL THERAPY EVALUATION  Type of Visit: Evaluation    See electronic medical record for Abuse and Falls Screening details.    Subjective       Presenting condition or subjective complaint: Need to learn how to get my knee to do what it used to do before surgery.    Date of onset: 10/30/23    Relevant medical history: Arthritis; Asthma; Bladder or bowel problems; COPD   Dates & types of surgery: Gallbladder removed last year  The patient just arrived for her fist PT visit since surgery 2 weeks ago.  Post op on Wednesday.  Pain is ok.  Walking at home with walker until today.  Sleeping with pillow behind knee, doing some HEP like Aps and thigh squeezes.  Today is her first day out of the house.  It has been feeling better the past few days.    Prior diagnostic imaging/testing results: X-ray     Prior therapy history for the same diagnosis, illness or injury: Yes      Prior Level of Function  Transfers: Independent  Ambulation: Independent  ADL: Independent  IADL:     Living Environment  Social support: With a significant other or spouse   Type of home: Basement   Stairs to enter the home: No       Ramp: No   Stairs inside the home: No       Help at home: None  Equipment owned: Straight Cane; Walker     Employment: No    Hobbies/Interests: Gardening,  pet goats that need daily care,just being outside in nature, i feed all the critters outside, that keeps me very busy.    Patient goals for therapy: Bend knee, put all of my weight on it.    Pain assessment:      Objective   KNEE EVALUATION  PAIN: Pain Level at Rest: 4/10  Pain Level with Use: 6/10  INTEGUMENTARY (edema, incisions):  still has bandage on.  Lateral upper and lower leg ecchymosis, mild lower leg pitting edema  POSTURE:  rounded  GAIT:  Weightbearing Status:   Assistive Device(s): Cane (single end)  Gait Deviations: Base of support increased  Stride length decreased  Elidia decreased mod limp  BALANCE/PROPRIOCEPTION:   WEIGHTBEARING ALIGNMENT:    NON-WEIGHTBEARING ALIGNMENT:   ROM: 20-45 AROM, 15-50 PROM    STRENGTH: 10 deg lag with SLR, hip abd 4/5, vmo atrophy, sit to stand- needs UE A  FLEXIBILITY: HS 70, quad 100   SPECIAL TESTS:   FUNCTIONAL TESTS: stairs step to gait with 1 rail and SE cane  PALPATION:   JOINT MOBILITY:     Assessment & Plan   CLINICAL IMPRESSIONS  Medical Diagnosis: SP L TK    Treatment Diagnosis: L knee pain   Impression/Assessment:     Clinical Decision Making (Complexity):  Clinical Presentation: Stable/Uncomplicated  Clinical Presentation Rationale: based on medical and personal factors listed in PT evaluation  Clinical Decision Making (Complexity): Low complexity    PLAN OF CARE  Treatment Interventions:  Interventions: Gait Training, Manual Therapy, Neuromuscular Re-education, Therapeutic Activity, Therapeutic Exercise, Self-Care/Home Management    Long Term Goals     PT Goal 1  Goal Identifier: 1  Goal Description: pt will walk indoors without limp or AD  Rationale: to maximize safety and independence with performance of ADLs and functional tasks  Target Date: 11/27/23  PT Goal 2  Goal Identifier: 2  Goal Description: pt will ascend and descend a flight of stairs recip without rail  Rationale: to maximize safety and independence with performance of ADLs and functional tasks  Target Date: 12/25/23  PT Goal 3  Goal Identifier: 3  Goal Description: pt will be able to walk 15 minutes for grocery shopping without sitting  Rationale: to maximize safety and independence with performance of ADLs and functional tasks  Target Date: 12/25/23      Frequency of Treatment: 2x/wk  Duration of Treatment: 6wk    Recommended Referrals to Other Professionals:   Education Assessment:   Learner/Method: Patient  Education Comments: need for knee ROM    Risks and benefits of evaluation/treatment have been explained.   Patient/Family/caregiver agrees with Plan of Care.     Evaluation Time:     PT Eval, Low Complexity Minutes (96817): 20        Signing Clinician: Bharat Montalvo, PT      [facility]                                                                                   OUTPATIENT PHYSICAL THERAPY      PLAN OF TREATMENT FOR OUTPATIENT REHABILITATION   Patient's Last Name, First Name, LEAHRodgerTOBYRodger  Carolina Hrenandez  KIRILL YOB: 1965   Provider's Name   [facility]   Medical Record No.  [id number]     Onset Date: 10/30/23  Start of Care Date:       Medical Diagnosis:  SP L TK      PT Treatment Diagnosis:  L knee pain Plan of Treatment  Frequency/Duration: 2x/wk/ 6wk    Certification date from   to           See note for plan of treatment details and functional goals     Bharat Montalvo, PT                         I CERTIFY THE NEED FOR THESE SERVICES FURNISHED UNDER        THIS PLAN OF TREATMENT AND WHILE UNDER MY CARE     (Physician attestation of this document indicates review and certification of the therapy plan).              Referring Provider:  Giovanny Chong    Initial Assessment  See Epic Evaluation-

## 2023-11-14 NOTE — PROGRESS NOTES
Chief Complaint   Patient presents with    Left Knee - Total Joint Post-op     DOS 10/31/23, 2 wk s/p. Patient notes her knee is really stiff. She didn't get into physical therapy when she wanted. She started physical therapy this week, Monday and goes 2 times a week. She is walking with a cane. No issues or concerns.          SURGERY: Total knee arthroplasty, left knee ([facility])  DATE OF SURGERY: 10/31/2023      HISTORY OF PRESENT ILLNESS: Carolina Hernandez is a 58 year old female seen for postoperative evaluation of left total knee arthroplasty. It has been 2 weeks since surgery. Returns today stating doing well, knee is stiff. She didn't get into therapy when she wanted, just this week, will be going 2x/week. She's walking with a cane.    Denies any wound problems. Denies numbness, tingling, or weakness in the affected extremity. Denies fevers chills night sweats. Continues to take aspirin for anti-coagulation for deep vein thrombosis prophylaxis. Use of pain medications has been one oxycodone at night.   Concerns today include: none.      Present symptoms: mild pain, moderate swelling, stiffness.    Pain severity: 3/10      Past medical history:   Past Medical History:   Diagnosis Date    Asthma     C. difficile colitis     COPD (chronic obstructive pulmonary disease) (H)     Depression     Depressive disorder Have had it most of my life    Hypertension     Moderate persistent asthma with exacerbation 09/18/2007    Osteoarthritis     Sinusitis, chronic     Status post coronary angiogram 10/14/2022     Patient Active Problem List    Diagnosis Date Noted    Osteoarthritis of left knee 11/01/2023     Priority: Medium    S/P total knee arthroplasty, left 10/31/2023     Priority: Medium    Fatty liver 11/01/2022     Priority: Medium     ultrasound 10/29/22      COPD vs Asthma 10/30/2022     Priority: Medium     PFTS 5/2021 - FEV1- 1.21 (48%), ratio 0.50, no change with bronchodilators,  TLC  138%, %, DLCO 94%       Transaminitis 10/29/2022     Priority: Medium    Acute pancreatitis 10/29/2022     Priority: Medium    Nonobstructive atherosclerosis of coronary artery 10/21/2022     Priority: Medium      H/o exertional chest pain leading to coronary angiogram 10/14/2022 which showed non-obstructing CAD      Hyperlipidemia LDL goal <70 10/21/2022     Priority: Medium    Low back pain due to bilateral sciatica 10/27/2021     Priority: Medium    Primary osteoarthritis of both knees 09/23/2021     Priority: Medium    Benign essential hypertension 07/26/2019     Priority: Medium     New diagnosis 7/26/2019        Recurrent major depressive disorder, in remission (H24) 01/17/2019     Priority: Medium    GERD (gastroesophageal reflux disease) 07/31/2012     Priority: Medium    Seasonal allergies 07/31/2012     Priority: Medium       Past Surgical History:   Past Surgical History:   Procedure Laterality Date    ARTHROPLASTY KNEE Left 10/31/2023    Procedure: ARTHROPLASTY, KNEE, TOTAL left;  Surgeon: Giovanny Chong MD;  Location: [facility] OR    COLONOSCOPY  2017    CV CORONARY ANGIOGRAM N/A 10/14/2022    Procedure: Coronary Angiogram;  Surgeon: Fidel Martin MD;  Location: [facility] CARDIAC CATH LAB    ESOPHAGOSCOPY, GASTROSCOPY, DUODENOSCOPY (EGD), COMBINED N/A 05/05/2022    Procedure: ESOPHAGOGASTRODUODENOSCOPY, WITH BIOPSY;  Surgeon: Timothy Oliva DO;  Location: [facility] OR    LAPAROSCOPIC CHOLECYSTECTOMY WITH CHOLANGIOGRAMS N/A 11/21/2022    Procedure: CHOLECYSTECTOMY, LAPAROSCOPIC, WITH CHOLANGIOGRAM;  Surgeon: Eros Butt DO;  Location: [facility] OR       Medications:   Current Outpatient Medications:     acetaminophen (TYLENOL) 325 MG tablet, Take 2 tablets (650 mg) by mouth every 4 hours as needed for other (mild pain), Disp: 100 tablet, Rfl: 0    albuterol (PROVENTIL) (2.5 MG/3ML) 0.083% neb solution, inhale 1 vial (2.5 mg) by nebulization every 4 hours as needed for shortness of breath /  dyspnea or wheezing, Disp: 225 mL, Rfl: 11    albuterol (VENTOLIN HFA) 108 (90 Base) MCG/ACT inhaler, INHALE 1-2 PUFFS INTO THE LUNGS EVERY 4 HOURS AS NEEDED FOR SHORTNESS OF BREATH/DYSPNEA OR WHEEZING ., Disp: 18 g, Rfl: 11    amLODIPine (NORVASC) 2.5 MG tablet, Take 2 tablets (5 mg) by mouth once daily., Disp: 180 tablet, Rfl: 3    aspirin (ASA) 325 MG EC tablet, Take 1 tablet (325 mg) by mouth daily, Disp: 30 tablet, Rfl: 0    cetirizine (ZYRTEC) 10 MG tablet, Take 10 mg by mouth daily, Disp: , Rfl:     CINNAMON PO, Take 2 capsules by mouth daily as needed, Disp: , Rfl:     co-enzyme Q-10 100 MG CAPS capsule, Take 100 mg by mouth daily as needed, Disp: , Rfl:     estradiol (ESTRACE) 0.1 MG/GM vaginal cream, Place vaginally twice a week, Disp: , Rfl:     FLUoxetine (PROZAC) 20 MG capsule, Take 1 capsule (20 mg) by mouth daily, Disp: 90 capsule, Rfl: 3    FLUoxetine (PROZAC) 40 MG capsule, Take 1 capsule (40 mg) by mouth daily Take with 20 mg tab for a total of 60 mg daily, Disp: 90 capsule, Rfl: 3    fluticasone (FLONASE) 50 MCG/ACT nasal spray, Spray 1 spray into both nostrils daily, Disp: 1 g, Rfl: 1    fluticasone-salmeterol (ADVAIR) 500-50 MCG/ACT inhaler, Inhale 1 puff into the lungs every 12 hours, Disp: 1 each, Rfl: 11    GARLIC PO, Take 2 capsules by mouth daily as needed, Disp: , Rfl:     hydrOXYzine (ATARAX) 25 MG tablet, Take 1 tablet (25 mg) by mouth every 6 hours as needed for itching or anxiety (with pain, moderate pain), Disp: 30 tablet, Rfl: 0    Misc Natural Products (TURMERIC CURCUMIN) CAPS, Take 2 capsules by mouth daily as needed, Disp: , Rfl:     montelukast (SINGULAIR) 10 MG tablet, Take 1 tablet (10 mg) by mouth At Bedtime, Disp: 90 tablet, Rfl: 3    multivitamin (CENTRUM SILVER) tablet, Take 1 tablet by mouth daily, Disp: , Rfl:     NIACIN PO, Take 1 capsule by mouth daily as needed, Disp: , Rfl:     Omega-3 Fatty Acids (FISH OIL ULTRA) 1400 MG CAPS, Take 1 capsule by mouth daily, Disp: ,  "Rfl:     omeprazole 20 MG tablet, Take 20 mg by mouth daily, Disp: , Rfl:     oxyCODONE (ROXICODONE) 5 MG tablet, Take 1-2 tablets (5-10 mg) by mouth every 4 hours as needed for moderate to severe pain, Disp: 16 tablet, Rfl: 0    senna-docusate (SENOKOT-S/PERICOLACE) 8.6-50 MG tablet, Take 1-2 tablets by mouth 2 times daily Take while on oral narcotics to prevent or treat constipation., Disp: 30 tablet, Rfl: 0    tiotropium (SPIRIVA) 18 MCG inhaled capsule, Inhale 1 capsule (18 mcg) into the lungs daily, Disp: 30 capsule, Rfl: 11    Allergies:   Allergies   Allergen Reactions    Doxycycline Difficulty breathing    Penicillins Itching    Sucralfate Rash         REVIEW OF SYSTEMS:  CONSTITUTIONAL:NEGATIVE for fever, chills, night sweats, change in weight  INTEGUMENTARY/SKIN: NEGATIVE for worrisome rashes, moles or lesions  MUSCULOSKELETAL:See HPI above  NEURO: NEGATIVE for weakness, dizziness or paresthesias  CARDIOVASCULAR: negative for chest pain, shortness of breath    PHYSICAL EXAM:  /79   Pulse 85   Ht 1.588 m (5' 2.52\")   Wt 76.2 kg (168 lb)   BMI 30.22 kg/m     GENERAL APPEARANCE: healthy, alert, no distress  SKIN: no suspicious lesions or rashes  NEURO: Normal strength and tone, mentation intact and speech normal  PSYCH:  mentation appears normal and affect normal  RESPIRATORY: No increased work of breathing.    BILATERAL LOWER EXTREMITIES:  Gait: favors the left. Quadriceps avoidance.    Intact sensation deep peroneal nerve, superficial peroneal nerve, med/lat tibial nerve, sural nerve, saphenous nerve  Intact EHL, EDL, TA, FHL, GS, quadriceps hamstrings and hip flexors  Bilateral calf soft and nttp or squeeze.  Edema: 1+    left  KNEE EXAM:    Incision: healing well. Dried skin glue in place.  Skin: intact, no erythema, resolving diffuse ecchymosis  ROM: 10 extension to 75 flexion  Effusion: moderate   Tender: diffuse      X-RAY:  3 views left knee from 11/15/2023 were reviewed in clinic today. No " obvious fractures or dislocations. Total knee arthroplasty components in place without evidence of failure or loosening.      Impression: Carolina Hernandez is a 58 year old female status post Total knee arthroplasty, left knee, doing well, 2 weeks out from surgery.      Plan:   * reviewed xrays with patient, showing total knee arthroplasty implants.  * continue DVT prophylaxis for a total of 4 weeks postoperative  * continue with knee range of motion exercises, focusing on extension  * wean off all pain medications as tolerated  * xrays next visit: 2 views of the knee  * return to clinic 4 weeks  * Physical Therapy, home exercise program.  * all questions addressed and answered prior to discharge from clinic today to patient's satisfaction.     * patient will need longterm (at least 2 years) prophylactic antibiotics use with dental procedures or other invasive procedures (2 g amoxicilin or  2g Keflex or 500mg azithromycin or clarithromycin or 100mg doxycycline) 30-60 minutes prior to dental procedures.    * KOOS Jr/Promis Knee score: NOT to be done at next visit; NOT completed during todays visit; to be completed at 12 weeks and 12 months postoperative.      Giovanny Chong M.D., M.S.  Dept. of Orthopaedic Surgery  [facility]

## 2023-11-15 ENCOUNTER — THERAPY VISIT (OUTPATIENT)
Dept: PHYSICAL THERAPY | Facility: CLINIC | Age: 58
End: 2023-11-15
Attending: ORTHOPAEDIC SURGERY
Payer: COMMERCIAL

## 2023-11-15 ENCOUNTER — OFFICE VISIT (OUTPATIENT)
Dept: ORTHOPEDICS | Facility: CLINIC | Age: 58
End: 2023-11-15
Payer: COMMERCIAL

## 2023-11-15 ENCOUNTER — ANCILLARY PROCEDURE (OUTPATIENT)
Dept: GENERAL RADIOLOGY | Facility: CLINIC | Age: 58
End: 2023-11-15
Attending: ORTHOPAEDIC SURGERY
Payer: COMMERCIAL

## 2023-11-15 VITALS
SYSTOLIC BLOOD PRESSURE: 124 MMHG | HEART RATE: 85 BPM | BODY MASS INDEX: 29.77 KG/M2 | WEIGHT: 168 LBS | HEIGHT: 63 IN | DIASTOLIC BLOOD PRESSURE: 79 MMHG

## 2023-11-15 DIAGNOSIS — M17.12 OSTEOARTHRITIS OF LEFT KNEE: Primary | ICD-10-CM

## 2023-11-15 DIAGNOSIS — Z96.652 S/P TOTAL KNEE ARTHROPLASTY, LEFT: ICD-10-CM

## 2023-11-15 DIAGNOSIS — Z96.652 STATUS POST TOTAL LEFT KNEE REPLACEMENT: Primary | ICD-10-CM

## 2023-11-15 DIAGNOSIS — Z96.652 STATUS POST TOTAL LEFT KNEE REPLACEMENT: ICD-10-CM

## 2023-11-15 PROCEDURE — 97140 MANUAL THERAPY 1/> REGIONS: CPT | Mod: GP | Performed by: PHYSICAL THERAPIST

## 2023-11-15 PROCEDURE — 73562 X-RAY EXAM OF KNEE 3: CPT | Mod: TC | Performed by: RADIOLOGY

## 2023-11-15 PROCEDURE — 97110 THERAPEUTIC EXERCISES: CPT | Mod: GP | Performed by: PHYSICAL THERAPIST

## 2023-11-15 PROCEDURE — 99024 POSTOP FOLLOW-UP VISIT: CPT | Performed by: ORTHOPAEDIC SURGERY

## 2023-11-15 RX ORDER — OXYCODONE HYDROCHLORIDE 5 MG/1
5 TABLET ORAL EVERY 8 HOURS PRN
Qty: 12 TABLET | Refills: 0 | Status: SHIPPED | OUTPATIENT
Start: 2023-11-15 | End: 2023-12-13

## 2023-11-15 ASSESSMENT — PAIN SCALES - GENERAL: PAINLEVEL: MILD PAIN (3)

## 2023-11-15 NOTE — LETTER
11/15/2023         RE: Carolina Hernandez  [address] [place] [id number]        Dear Colleague,    Thank you for referring your patient, Carolina Hernandez, to the [facility]. Please see a copy of my visit note below.    Chief Complaint   Patient presents with     Left Knee - Total Joint Post-op     DOS 10/31/23, 2 wk s/p. Patient notes her knee is really stiff. She didn't get into physical therapy when she wanted. She started physical therapy this week, Monday and goes 2 times a week. She is walking with a cane. No issues or concerns.          SURGERY: Total knee arthroplasty, left knee ([facility])  DATE OF SURGERY: 10/31/2023      HISTORY OF PRESENT ILLNESS: Carolina Hernandez is a 58 year old female seen for postoperative evaluation of left total knee arthroplasty. It has been 2 weeks since surgery. Returns today stating doing well, knee is stiff. She didn't get into therapy when she wanted, just this week, will be going 2x/week. She's walking with a cane.    Denies any wound problems. Denies numbness, tingling, or weakness in the affected extremity. Denies fevers chills night sweats. Continues to take aspirin for anti-coagulation for deep vein thrombosis prophylaxis. Use of pain medications has been one oxycodone at night.   Concerns today include: none.      Present symptoms: mild pain, moderate swelling, stiffness.    Pain severity: 3/10      Past medical history:   Past Medical History:   Diagnosis Date     Asthma      C. difficile colitis      COPD (chronic obstructive pulmonary disease) (H)      Depression      Depressive disorder Have had it most of my life     Hypertension      Moderate persistent asthma with exacerbation 09/18/2007     Osteoarthritis      Sinusitis, chronic      Status post coronary angiogram 10/14/2022     Patient Active Problem List    Diagnosis Date Noted     Osteoarthritis of left knee 11/01/2023     Priority: Medium      S/P total knee arthroplasty, left 10/31/2023     Priority: Medium     Fatty liver 11/01/2022     Priority: Medium     ultrasound 10/29/22       COPD vs Asthma 10/30/2022     Priority: Medium     PFTS 5/2021 - FEV1- 1.21 (48%), ratio 0.50, no change with bronchodilators,  %, %, DLCO 94%        Transaminitis 10/29/2022     Priority: Medium     Acute pancreatitis 10/29/2022     Priority: Medium     Nonobstructive atherosclerosis of coronary artery 10/21/2022     Priority: Medium      H/o exertional chest pain leading to coronary angiogram 10/14/2022 which showed non-obstructing CAD       Hyperlipidemia LDL goal <70 10/21/2022     Priority: Medium     Low back pain due to bilateral sciatica 10/27/2021     Priority: Medium     Primary osteoarthritis of both knees 09/23/2021     Priority: Medium     Benign essential hypertension 07/26/2019     Priority: Medium     New diagnosis 7/26/2019         Recurrent major depressive disorder, in remission (H24) 01/17/2019     Priority: Medium     GERD (gastroesophageal reflux disease) 07/31/2012     Priority: Medium     Seasonal allergies 07/31/2012     Priority: Medium       Past Surgical History:   Past Surgical History:   Procedure Laterality Date     ARTHROPLASTY KNEE Left 10/31/2023    Procedure: ARTHROPLASTY, KNEE, TOTAL left;  Surgeon: Giovanny Chong MD;  Location: [facility] OR     COLONOSCOPY  2017     CV CORONARY ANGIOGRAM N/A 10/14/2022    Procedure: Coronary Angiogram;  Surgeon: Fidel Martin MD;  Location: [facility] CARDIAC CATH LAB     ESOPHAGOSCOPY, GASTROSCOPY, DUODENOSCOPY (EGD), COMBINED N/A 05/05/2022    Procedure: ESOPHAGOGASTRODUODENOSCOPY, WITH BIOPSY;  Surgeon: Timothy Oliva DO;  Location: [facility] OR     LAPAROSCOPIC CHOLECYSTECTOMY WITH CHOLANGIOGRAMS N/A 11/21/2022    Procedure: CHOLECYSTECTOMY, LAPAROSCOPIC, WITH CHOLANGIOGRAM;  Surgeon: Eros Butt DO;  Location: [facility] OR       Medications:   Current Outpatient Medications:       acetaminophen (TYLENOL) 325 MG tablet, Take 2 tablets (650 mg) by mouth every 4 hours as needed for other (mild pain), Disp: 100 tablet, Rfl: 0     albuterol (PROVENTIL) (2.5 MG/3ML) 0.083% neb solution, inhale 1 vial (2.5 mg) by nebulization every 4 hours as needed for shortness of breath / dyspnea or wheezing, Disp: 225 mL, Rfl: 11     albuterol (VENTOLIN HFA) 108 (90 Base) MCG/ACT inhaler, INHALE 1-2 PUFFS INTO THE LUNGS EVERY 4 HOURS AS NEEDED FOR SHORTNESS OF BREATH/DYSPNEA OR WHEEZING ., Disp: 18 g, Rfl: 11     amLODIPine (NORVASC) 2.5 MG tablet, Take 2 tablets (5 mg) by mouth once daily., Disp: 180 tablet, Rfl: 3     aspirin (ASA) 325 MG EC tablet, Take 1 tablet (325 mg) by mouth daily, Disp: 30 tablet, Rfl: 0     cetirizine (ZYRTEC) 10 MG tablet, Take 10 mg by mouth daily, Disp: , Rfl:      CINNAMON PO, Take 2 capsules by mouth daily as needed, Disp: , Rfl:      co-enzyme Q-10 100 MG CAPS capsule, Take 100 mg by mouth daily as needed, Disp: , Rfl:      estradiol (ESTRACE) 0.1 MG/GM vaginal cream, Place vaginally twice a week, Disp: , Rfl:      FLUoxetine (PROZAC) 20 MG capsule, Take 1 capsule (20 mg) by mouth daily, Disp: 90 capsule, Rfl: 3     FLUoxetine (PROZAC) 40 MG capsule, Take 1 capsule (40 mg) by mouth daily Take with 20 mg tab for a total of 60 mg daily, Disp: 90 capsule, Rfl: 3     fluticasone (FLONASE) 50 MCG/ACT nasal spray, Spray 1 spray into both nostrils daily, Disp: 1 g, Rfl: 1     fluticasone-salmeterol (ADVAIR) 500-50 MCG/ACT inhaler, Inhale 1 puff into the lungs every 12 hours, Disp: 1 each, Rfl: 11     GARLIC PO, Take 2 capsules by mouth daily as needed, Disp: , Rfl:      hydrOXYzine (ATARAX) 25 MG tablet, Take 1 tablet (25 mg) by mouth every 6 hours as needed for itching or anxiety (with pain, moderate pain), Disp: 30 tablet, Rfl: 0     Misc Natural Products (TURMERIC CURCUMIN) CAPS, Take 2 capsules by mouth daily as needed, Disp: , Rfl:      montelukast (SINGULAIR) 10 MG tablet,  "Take 1 tablet (10 mg) by mouth At Bedtime, Disp: 90 tablet, Rfl: 3     multivitamin (CENTRUM SILVER) tablet, Take 1 tablet by mouth daily, Disp: , Rfl:      NIACIN PO, Take 1 capsule by mouth daily as needed, Disp: , Rfl:      Omega-3 Fatty Acids (FISH OIL ULTRA) 1400 MG CAPS, Take 1 capsule by mouth daily, Disp: , Rfl:      omeprazole 20 MG tablet, Take 20 mg by mouth daily, Disp: , Rfl:      oxyCODONE (ROXICODONE) 5 MG tablet, Take 1-2 tablets (5-10 mg) by mouth every 4 hours as needed for moderate to severe pain, Disp: 16 tablet, Rfl: 0     senna-docusate (SENOKOT-S/PERICOLACE) 8.6-50 MG tablet, Take 1-2 tablets by mouth 2 times daily Take while on oral narcotics to prevent or treat constipation., Disp: 30 tablet, Rfl: 0     tiotropium (SPIRIVA) 18 MCG inhaled capsule, Inhale 1 capsule (18 mcg) into the lungs daily, Disp: 30 capsule, Rfl: 11    Allergies:   Allergies   Allergen Reactions     Doxycycline Difficulty breathing     Penicillins Itching     Sucralfate Rash         REVIEW OF SYSTEMS:  CONSTITUTIONAL:NEGATIVE for fever, chills, night sweats, change in weight  INTEGUMENTARY/SKIN: NEGATIVE for worrisome rashes, moles or lesions  MUSCULOSKELETAL:See HPI above  NEURO: NEGATIVE for weakness, dizziness or paresthesias  CARDIOVASCULAR: negative for chest pain, shortness of breath    PHYSICAL EXAM:  /79   Pulse 85   Ht 1.588 m (5' 2.52\")   Wt 76.2 kg (168 lb)   BMI 30.22 kg/m     GENERAL APPEARANCE: healthy, alert, no distress  SKIN: no suspicious lesions or rashes  NEURO: Normal strength and tone, mentation intact and speech normal  PSYCH:  mentation appears normal and affect normal  RESPIRATORY: No increased work of breathing.    BILATERAL LOWER EXTREMITIES:  Gait: favors the left. Quadriceps avoidance.    Intact sensation deep peroneal nerve, superficial peroneal nerve, med/lat tibial nerve, sural nerve, saphenous nerve  Intact EHL, EDL, TA, FHL, GS, quadriceps hamstrings and hip flexors  Bilateral " calf soft and nttp or squeeze.  Edema: 1+    left  KNEE EXAM:    Incision: healing well. Dried skin glue in place.  Skin: intact, no erythema, resolving diffuse ecchymosis  ROM: 10 extension to 75 flexion  Effusion: moderate   Tender: diffuse      X-RAY:  3 views left knee from 11/15/2023 were reviewed in clinic today. No obvious fractures or dislocations. Total knee arthroplasty components in place without evidence of failure or loosening.      Impression: Carolina Hernandez is a 58 year old female status post Total knee arthroplasty, left knee, doing well, 2 weeks out from surgery.      Plan:   * reviewed xrays with patient, showing total knee arthroplasty implants.  * continue DVT prophylaxis for a total of 4 weeks postoperative  * continue with knee range of motion exercises, focusing on extension  * wean off all pain medications as tolerated  * xrays next visit: 2 views of the knee  * return to clinic 4 weeks  * Physical Therapy, home exercise program.  * all questions addressed and answered prior to discharge from clinic today to patient's satisfaction.     * patient will need longterm (at least 2 years) prophylactic antibiotics use with dental procedures or other invasive procedures (2 g amoxicilin or  2g Keflex or 500mg azithromycin or clarithromycin or 100mg doxycycline) 30-60 minutes prior to dental procedures.    * KOOS Jr/Promis Knee score: NOT to be done at next visit; NOT completed during todays visit; to be completed at 12 weeks and 12 months postoperative.      Giovanny Chong M.D., M.S.  Dept. of Orthopaedic Surgery  [facility]             Again, thank you for allowing me to participate in the care of your patient.        Sincerely,        Giovanny Chong MD

## 2023-11-21 ENCOUNTER — THERAPY VISIT (OUTPATIENT)
Dept: PHYSICAL THERAPY | Facility: CLINIC | Age: 58
End: 2023-11-21
Attending: ORTHOPAEDIC SURGERY
Payer: COMMERCIAL

## 2023-11-21 DIAGNOSIS — M17.12 OSTEOARTHRITIS OF LEFT KNEE: Primary | ICD-10-CM

## 2023-11-21 PROCEDURE — 97110 THERAPEUTIC EXERCISES: CPT | Mod: GP | Performed by: PHYSICAL THERAPIST

## 2023-11-21 PROCEDURE — 97140 MANUAL THERAPY 1/> REGIONS: CPT | Mod: GP | Performed by: PHYSICAL THERAPIST

## 2023-11-24 ENCOUNTER — THERAPY VISIT (OUTPATIENT)
Dept: PHYSICAL THERAPY | Facility: CLINIC | Age: 58
End: 2023-11-24
Attending: ORTHOPAEDIC SURGERY
Payer: COMMERCIAL

## 2023-11-24 DIAGNOSIS — M17.12 OSTEOARTHRITIS OF LEFT KNEE: Primary | ICD-10-CM

## 2023-11-24 PROCEDURE — 97110 THERAPEUTIC EXERCISES: CPT | Mod: GP

## 2023-11-24 PROCEDURE — 97140 MANUAL THERAPY 1/> REGIONS: CPT | Mod: GP

## 2023-11-27 ENCOUNTER — THERAPY VISIT (OUTPATIENT)
Dept: PHYSICAL THERAPY | Facility: CLINIC | Age: 58
End: 2023-11-27
Attending: ORTHOPAEDIC SURGERY
Payer: COMMERCIAL

## 2023-11-27 DIAGNOSIS — M17.12 OSTEOARTHRITIS OF LEFT KNEE: Primary | ICD-10-CM

## 2023-11-27 PROCEDURE — 97110 THERAPEUTIC EXERCISES: CPT | Mod: GP

## 2023-11-27 PROCEDURE — 97140 MANUAL THERAPY 1/> REGIONS: CPT | Mod: GP

## 2023-11-29 ENCOUNTER — THERAPY VISIT (OUTPATIENT)
Dept: PHYSICAL THERAPY | Facility: CLINIC | Age: 58
End: 2023-11-29
Attending: ORTHOPAEDIC SURGERY
Payer: COMMERCIAL

## 2023-11-29 DIAGNOSIS — M17.12 OSTEOARTHRITIS OF LEFT KNEE: Primary | ICD-10-CM

## 2023-11-29 PROCEDURE — 97110 THERAPEUTIC EXERCISES: CPT | Mod: GP

## 2023-11-29 PROCEDURE — 97140 MANUAL THERAPY 1/> REGIONS: CPT | Mod: GP

## 2023-12-05 NOTE — PROGRESS NOTES
Chief Complaint   Patient presents with    Left Knee - Total Joint Post-op     DOS 10/31/23, 6 wk s/p. Patient notes her knee is doing good. The scab is all healed up. She is able to bend her knee 107 degrees. She continues with physical therapy once a week.          SURGERY: Total knee arthroplasty, left knee ([facility])  DATE OF SURGERY: 10/31/2023      HISTORY OF PRESENT ILLNESS: Carolina Hernandez is a 58 year old female seen for postoperative evaluation of left total knee arthroplasty. It has been 6 weeks since surgery. Returns today stating doing well. Wound is healed, no scabs. Knee range of motion is improving, up to 107 degrees with therapy. Therapy weekly.     She's walking unassisted now.    Denies any wound problems. Denies numbness, tingling, or weakness in the affected extremity. Denies fevers chills night sweats.    Concerns today include: none.      Present symptoms: mild pain, mild swelling. Stiffness.   Pain severity: 0/10      Past medical history:   Past Medical History:   Diagnosis Date    Asthma     C. difficile colitis     COPD (chronic obstructive pulmonary disease) (H)     Depression     Depressive disorder Have had it most of my life    Hypertension     Moderate persistent asthma with exacerbation 09/18/2007    Osteoarthritis     Sinusitis, chronic     Status post coronary angiogram 10/14/2022     Patient Active Problem List    Diagnosis Date Noted    Osteoarthritis of left knee 11/01/2023     Priority: Medium    S/P total knee arthroplasty, left 10/31/2023     Priority: Medium    Fatty liver 11/01/2022     Priority: Medium     ultrasound 10/29/22      COPD vs Asthma 10/30/2022     Priority: Medium     PFTS 5/2021 - FEV1- 1.21 (48%), ratio 0.50, no change with bronchodilators,  %, %, DLCO 94%       Transaminitis 10/29/2022     Priority: Medium    Acute pancreatitis 10/29/2022     Priority: Medium    Nonobstructive atherosclerosis of coronary artery 10/21/2022      Priority: Medium      H/o exertional chest pain leading to coronary angiogram 10/14/2022 which showed non-obstructing CAD      Hyperlipidemia LDL goal <70 10/21/2022     Priority: Medium    Low back pain due to bilateral sciatica 10/27/2021     Priority: Medium    Primary osteoarthritis of both knees 09/23/2021     Priority: Medium    Benign essential hypertension 07/26/2019     Priority: Medium     New diagnosis 7/26/2019        Recurrent major depressive disorder, in remission (H24) 01/17/2019     Priority: Medium    GERD (gastroesophageal reflux disease) 07/31/2012     Priority: Medium    Seasonal allergies 07/31/2012     Priority: Medium       Past Surgical History:   Past Surgical History:   Procedure Laterality Date    ARTHROPLASTY KNEE Left 10/31/2023    Procedure: ARTHROPLASTY, KNEE, TOTAL left;  Surgeon: Giovanny Chong MD;  Location: [facility] OR    COLONOSCOPY  2017    CV CORONARY ANGIOGRAM N/A 10/14/2022    Procedure: Coronary Angiogram;  Surgeon: Fidel Martin MD;  Location: [facility] CARDIAC CATH LAB    ESOPHAGOSCOPY, GASTROSCOPY, DUODENOSCOPY (EGD), COMBINED N/A 05/05/2022    Procedure: ESOPHAGOGASTRODUODENOSCOPY, WITH BIOPSY;  Surgeon: Timothy Oliva DO;  Location: [facility] OR    LAPAROSCOPIC CHOLECYSTECTOMY WITH CHOLANGIOGRAMS N/A 11/21/2022    Procedure: CHOLECYSTECTOMY, LAPAROSCOPIC, WITH CHOLANGIOGRAM;  Surgeon: Eros Butt DO;  Location: [facility] OR       Medications:   Current Outpatient Medications:     acetaminophen (TYLENOL) 325 MG tablet, Take 2 tablets (650 mg) by mouth every 4 hours as needed for other (mild pain), Disp: 100 tablet, Rfl: 0    albuterol (PROVENTIL) (2.5 MG/3ML) 0.083% neb solution, inhale 1 vial (2.5 mg) by nebulization every 4 hours as needed for shortness of breath / dyspnea or wheezing, Disp: 225 mL, Rfl: 11    albuterol (VENTOLIN HFA) 108 (90 Base) MCG/ACT inhaler, INHALE 1-2 PUFFS INTO THE LUNGS EVERY 4 HOURS AS NEEDED FOR SHORTNESS OF BREATH/DYSPNEA OR  WHEEZING ., Disp: 18 g, Rfl: 11    amLODIPine (NORVASC) 2.5 MG tablet, Take 2 tablets (5 mg) by mouth once daily., Disp: 180 tablet, Rfl: 3    aspirin (ASA) 325 MG EC tablet, Take 1 tablet (325 mg) by mouth daily, Disp: 30 tablet, Rfl: 0    cetirizine (ZYRTEC) 10 MG tablet, Take 10 mg by mouth daily, Disp: , Rfl:     CINNAMON PO, Take 2 capsules by mouth daily as needed, Disp: , Rfl:     co-enzyme Q-10 100 MG CAPS capsule, Take 100 mg by mouth daily as needed, Disp: , Rfl:     estradiol (ESTRACE) 0.1 MG/GM vaginal cream, Place vaginally twice a week, Disp: , Rfl:     FLUoxetine (PROZAC) 20 MG capsule, Take 1 capsule (20 mg) by mouth daily, Disp: 90 capsule, Rfl: 3    FLUoxetine (PROZAC) 40 MG capsule, Take 1 capsule (40 mg) by mouth daily Take with 20 mg tab for a total of 60 mg daily, Disp: 90 capsule, Rfl: 3    fluticasone (FLONASE) 50 MCG/ACT nasal spray, Spray 1 spray into both nostrils daily, Disp: 1 g, Rfl: 1    fluticasone-salmeterol (ADVAIR) 500-50 MCG/ACT inhaler, Inhale 1 puff into the lungs every 12 hours, Disp: 1 each, Rfl: 11    GARLIC PO, Take 2 capsules by mouth daily as needed, Disp: , Rfl:     Misc Natural Products (TURMERIC CURCUMIN) CAPS, Take 2 capsules by mouth daily as needed, Disp: , Rfl:     montelukast (SINGULAIR) 10 MG tablet, Take 1 tablet (10 mg) by mouth At Bedtime, Disp: 90 tablet, Rfl: 3    multivitamin (CENTRUM SILVER) tablet, Take 1 tablet by mouth daily, Disp: , Rfl:     NIACIN PO, Take 1 capsule by mouth daily as needed, Disp: , Rfl:     Omega-3 Fatty Acids (FISH OIL ULTRA) 1400 MG CAPS, Take 1 capsule by mouth daily, Disp: , Rfl:     omeprazole 20 MG tablet, Take 20 mg by mouth daily, Disp: , Rfl:     oxyCODONE (ROXICODONE) 5 MG tablet, Take 1 tablet (5 mg) by mouth every 8 hours as needed for moderate to severe pain, Disp: 12 tablet, Rfl: 0    senna-docusate (SENOKOT-S/PERICOLACE) 8.6-50 MG tablet, Take 1-2 tablets by mouth 2 times daily Take while on oral narcotics to prevent or  "treat constipation., Disp: 30 tablet, Rfl: 0    tiotropium (SPIRIVA) 18 MCG inhaled capsule, Inhale 1 capsule (18 mcg) into the lungs daily, Disp: 30 capsule, Rfl: 11    Allergies:   Allergies   Allergen Reactions    Doxycycline Difficulty breathing    Penicillins Itching    Sucralfate Rash         REVIEW OF SYSTEMS:  CONSTITUTIONAL:NEGATIVE for fever, chills, night sweats, change in weight  INTEGUMENTARY/SKIN: NEGATIVE for worrisome rashes, moles or lesions  MUSCULOSKELETAL:See HPI above  NEURO: NEGATIVE for weakness, dizziness or paresthesias  CARDIOVASCULAR: negative for chest pain, shortness of breath    PHYSICAL EXAM:  BP (!) 134/90   Pulse 84   Ht 1.588 m (5' 2.52\")   Wt 74.6 kg (164 lb 8 oz)   BMI 29.59 kg/m     GENERAL APPEARANCE: healthy, alert, no distress  SKIN: no suspicious lesions or rashes  NEURO: Normal strength and tone, mentation intact and speech normal  PSYCH:  mentation appears normal and affect normal  RESPIRATORY: No increased work of breathing.    BILATERAL LOWER EXTREMITIES:  Gait: very subtle favors the left.    Intact sensation deep peroneal nerve, superficial peroneal nerve, med/lat tibial nerve, sural nerve, saphenous nerve  Intact EHL, EDL, TA, FHL, GS, quadriceps hamstrings and hip flexors  Bilateral calf soft and nttp or squeeze.  Edema: trace    left  KNEE EXAM:    Incision: healing well. Dried skin glue in place.  Skin: intact, no erythema, resolving diffuse ecchymosis  ROM: 3 extension to 110 flexion  Effusion: small  Tender: diffuse      X-RAY:  2 views left knee from 12/13/2023 were reviewed in clinic today. No obvious fractures or dislocations. Total knee arthroplasty components in place without evidence of failure or loosening.      Impression: Carolina Hernandez is a 58 year old female status post Total knee arthroplasty, left knee, doing well, 6 weeks out from surgery.      Plan:   * reviewed xrays with patient, showing total knee arthroplasty implants.  * continue with " knee range of motion exercises, should get all the way straight and at least 120 degrees flexion if she keeps working on it.  * wean off all pain medications as tolerated  * xrays next visit: 2 views of the knee  * return to clinic 6 weeks  * Physical Therapy, home exercise program.  * all questions addressed and answered prior to discharge from clinic today to patient's satisfaction.     * patient will need longterm (at least 2 years) prophylactic antibiotics use with dental procedures or other invasive procedures (2 g amoxicilin or  2g Keflex or 500mg azithromycin or clarithromycin or 100mg doxycycline) 30-60 minutes prior to dental procedures.    * KOOS Jr/Promis Knee score: IS to be done at next visit; NOT completed during todays visit; to be completed at 12 weeks and 12 months postoperative.      Giovanny Chong M.D., M.S.  Dept. of Orthopaedic Surgery  [facility]

## 2023-12-06 ENCOUNTER — THERAPY VISIT (OUTPATIENT)
Dept: PHYSICAL THERAPY | Facility: CLINIC | Age: 58
End: 2023-12-06
Attending: ORTHOPAEDIC SURGERY
Payer: COMMERCIAL

## 2023-12-06 DIAGNOSIS — M17.12 OSTEOARTHRITIS OF LEFT KNEE: Primary | ICD-10-CM

## 2023-12-06 PROCEDURE — 97110 THERAPEUTIC EXERCISES: CPT | Mod: GP

## 2023-12-08 ENCOUNTER — THERAPY VISIT (OUTPATIENT)
Dept: PHYSICAL THERAPY | Facility: CLINIC | Age: 58
End: 2023-12-08
Attending: ORTHOPAEDIC SURGERY
Payer: COMMERCIAL

## 2023-12-08 ENCOUNTER — ALLIED HEALTH/NURSE VISIT (OUTPATIENT)
Dept: FAMILY MEDICINE | Facility: CLINIC | Age: 58
End: 2023-12-08
Payer: COMMERCIAL

## 2023-12-08 DIAGNOSIS — Z23 ENCOUNTER FOR IMMUNIZATION: Primary | ICD-10-CM

## 2023-12-08 DIAGNOSIS — M17.12 OSTEOARTHRITIS OF LEFT KNEE: Primary | ICD-10-CM

## 2023-12-08 PROCEDURE — 97110 THERAPEUTIC EXERCISES: CPT | Mod: GP

## 2023-12-08 PROCEDURE — 90472 IMMUNIZATION ADMIN EACH ADD: CPT

## 2023-12-08 PROCEDURE — 90682 RIV4 VACC RECOMBINANT DNA IM: CPT

## 2023-12-08 PROCEDURE — 90471 IMMUNIZATION ADMIN: CPT

## 2023-12-08 PROCEDURE — 99207 PR NO CHARGE NURSE ONLY: CPT

## 2023-12-08 PROCEDURE — 91320 SARSCV2 VAC 30MCG TRS-SUC IM: CPT

## 2023-12-08 PROCEDURE — 90750 HZV VACC RECOMBINANT IM: CPT

## 2023-12-08 PROCEDURE — 90480 ADMN SARSCOV2 VAC 1/ONLY CMP: CPT

## 2023-12-08 NOTE — PROGRESS NOTES
Prior to immunization administration, verified patients identity using patient s name and date of birth. Please see Immunization Activity for additional information.     Screening Questionnaire for Adult Immunization    Are you sick today?   No   Do you have allergies to medications, food, a vaccine component or latex?   No   Have you ever had a serious reaction after receiving a vaccination?   No   Do you have a long-term health problem with heart, lung, kidney, or metabolic disease (e.g., diabetes), asthma, a blood disorder, no spleen, complement component deficiency, a cochlear implant, or a spinal fluid leak?  Are you on long-term aspirin therapy?   No   Do you have cancer, leukemia, HIV/AIDS, or any other immune system problem?   No   Do you have a parent, brother, or sister with an immune system problem?   No   In the past 3 months, have you taken medications that affect  your immune system, such as prednisone, other steroids, or anticancer drugs; drugs for the treatment of rheumatoid arthritis, Crohn s disease, or psoriasis; or have you had radiation treatments?   No   Have you had a seizure, or a brain or other nervous system problem?   No   During the past year, have you received a transfusion of blood or blood    products, or been given immune (gamma) globulin or antiviral drug?   No   For women: Are you pregnant or is there a chance you could become       pregnant during the next month?   No   Have you received any vaccinations in the past 4 weeks?   No     Immunization questionnaire answers were all negative.    I have reviewed the following standing orders:   This patient is due and qualifies for the Covid-19 vaccine.     Click here for COVID-19 Standing Order    I have reviewed the vaccines inclusion and exclusion criteria; No concerns regarding eligibility.     This patient is due and qualifies for the Influenza vaccine.    Click here for Influenza Vaccine Standing Order    I have reviewed the  vaccines inclusion and exclusion criteria; No concerns regarding eligibility.         This patient is due and qualifies for the Zoster vaccine.    Click here for Zoster Standing Order    I have reviewed the vaccines inclusion and exclusion criteria; No concerns regarding eligibility.     Patient instructed to remain in clinic for 15 minutes afterwards, and to report any adverse reactions.     Screening performed by Susie Rangel CMA on 12/8/2023 at 2:18 PM.

## 2023-12-11 ENCOUNTER — THERAPY VISIT (OUTPATIENT)
Dept: PHYSICAL THERAPY | Facility: CLINIC | Age: 58
End: 2023-12-11
Attending: ORTHOPAEDIC SURGERY
Payer: COMMERCIAL

## 2023-12-11 DIAGNOSIS — M17.12 OSTEOARTHRITIS OF LEFT KNEE: Primary | ICD-10-CM

## 2023-12-11 PROCEDURE — 97110 THERAPEUTIC EXERCISES: CPT | Mod: GP

## 2023-12-13 ENCOUNTER — ANCILLARY PROCEDURE (OUTPATIENT)
Dept: GENERAL RADIOLOGY | Facility: CLINIC | Age: 58
End: 2023-12-13
Attending: ORTHOPAEDIC SURGERY
Payer: COMMERCIAL

## 2023-12-13 ENCOUNTER — OFFICE VISIT (OUTPATIENT)
Dept: ORTHOPEDICS | Facility: CLINIC | Age: 58
End: 2023-12-13
Payer: COMMERCIAL

## 2023-12-13 VITALS
BODY MASS INDEX: 29.15 KG/M2 | HEART RATE: 84 BPM | HEIGHT: 63 IN | WEIGHT: 164.5 LBS | DIASTOLIC BLOOD PRESSURE: 90 MMHG | SYSTOLIC BLOOD PRESSURE: 134 MMHG

## 2023-12-13 DIAGNOSIS — Z96.652 STATUS POST TOTAL LEFT KNEE REPLACEMENT: ICD-10-CM

## 2023-12-13 DIAGNOSIS — Z96.652 STATUS POST TOTAL LEFT KNEE REPLACEMENT: Primary | ICD-10-CM

## 2023-12-13 PROCEDURE — 99024 POSTOP FOLLOW-UP VISIT: CPT | Performed by: ORTHOPAEDIC SURGERY

## 2023-12-13 PROCEDURE — 73560 X-RAY EXAM OF KNEE 1 OR 2: CPT | Mod: TC | Performed by: RADIOLOGY

## 2023-12-13 ASSESSMENT — PAIN SCALES - GENERAL: PAINLEVEL: NO PAIN (0)

## 2023-12-13 NOTE — LETTER
12/13/2023         RE: Carolina Hernandez  [address] [place] [id number]        Dear Colleague,    Thank you for referring your patient, Carolina Hernandez, to the [facility]. Please see a copy of my visit note below.    Chief Complaint   Patient presents with     Left Knee - Total Joint Post-op     DOS 10/31/23, 6 wk s/p. Patient notes her knee is doing good. The scab is all healed up. She is able to bend her knee 107 degrees. She continues with physical therapy once a week.          SURGERY: Total knee arthroplasty, left knee ([facility])  DATE OF SURGERY: 10/31/2023      HISTORY OF PRESENT ILLNESS: Carolina Hernandez is a 58 year old female seen for postoperative evaluation of left total knee arthroplasty. It has been 6 weeks since surgery. Returns today stating doing well. Wound is healed, no scabs. Knee range of motion is improving, up to 107 degrees with therapy. Therapy weekly.     She's walking unassisted now.    Denies any wound problems. Denies numbness, tingling, or weakness in the affected extremity. Denies fevers chills night sweats.    Concerns today include: none.      Present symptoms: mild pain, mild swelling. Stiffness.   Pain severity: 0/10      Past medical history:   Past Medical History:   Diagnosis Date     Asthma      C. difficile colitis      COPD (chronic obstructive pulmonary disease) (H)      Depression      Depressive disorder Have had it most of my life     Hypertension      Moderate persistent asthma with exacerbation 09/18/2007     Osteoarthritis      Sinusitis, chronic      Status post coronary angiogram 10/14/2022     Patient Active Problem List    Diagnosis Date Noted     Osteoarthritis of left knee 11/01/2023     Priority: Medium     S/P total knee arthroplasty, left 10/31/2023     Priority: Medium     Fatty liver 11/01/2022     Priority: Medium     ultrasound 10/29/22       COPD vs Asthma 10/30/2022     Priority:  Medium     PFTS 5/2021 - FEV1- 1.21 (48%), ratio 0.50, no change with bronchodilators,  %, %, DLCO 94%        Transaminitis 10/29/2022     Priority: Medium     Acute pancreatitis 10/29/2022     Priority: Medium     Nonobstructive atherosclerosis of coronary artery 10/21/2022     Priority: Medium      H/o exertional chest pain leading to coronary angiogram 10/14/2022 which showed non-obstructing CAD       Hyperlipidemia LDL goal <70 10/21/2022     Priority: Medium     Low back pain due to bilateral sciatica 10/27/2021     Priority: Medium     Primary osteoarthritis of both knees 09/23/2021     Priority: Medium     Benign essential hypertension 07/26/2019     Priority: Medium     New diagnosis 7/26/2019         Recurrent major depressive disorder, in remission (H24) 01/17/2019     Priority: Medium     GERD (gastroesophageal reflux disease) 07/31/2012     Priority: Medium     Seasonal allergies 07/31/2012     Priority: Medium       Past Surgical History:   Past Surgical History:   Procedure Laterality Date     ARTHROPLASTY KNEE Left 10/31/2023    Procedure: ARTHROPLASTY, KNEE, TOTAL left;  Surgeon: Giovanny Chong MD;  Location: [facility] OR     COLONOSCOPY  2017     CV CORONARY ANGIOGRAM N/A 10/14/2022    Procedure: Coronary Angiogram;  Surgeon: Fidel Martin MD;  Location: [facility] CARDIAC CATH LAB     ESOPHAGOSCOPY, GASTROSCOPY, DUODENOSCOPY (EGD), COMBINED N/A 05/05/2022    Procedure: ESOPHAGOGASTRODUODENOSCOPY, WITH BIOPSY;  Surgeon: Timothy Oliva DO;  Location: [facility] OR     LAPAROSCOPIC CHOLECYSTECTOMY WITH CHOLANGIOGRAMS N/A 11/21/2022    Procedure: CHOLECYSTECTOMY, LAPAROSCOPIC, WITH CHOLANGIOGRAM;  Surgeon: Eros Butt DO;  Location: [facility] OR       Medications:   Current Outpatient Medications:      acetaminophen (TYLENOL) 325 MG tablet, Take 2 tablets (650 mg) by mouth every 4 hours as needed for other (mild pain), Disp: 100 tablet, Rfl: 0     albuterol (PROVENTIL) (2.5 MG/3ML)  0.083% neb solution, inhale 1 vial (2.5 mg) by nebulization every 4 hours as needed for shortness of breath / dyspnea or wheezing, Disp: 225 mL, Rfl: 11     albuterol (VENTOLIN HFA) 108 (90 Base) MCG/ACT inhaler, INHALE 1-2 PUFFS INTO THE LUNGS EVERY 4 HOURS AS NEEDED FOR SHORTNESS OF BREATH/DYSPNEA OR WHEEZING ., Disp: 18 g, Rfl: 11     amLODIPine (NORVASC) 2.5 MG tablet, Take 2 tablets (5 mg) by mouth once daily., Disp: 180 tablet, Rfl: 3     aspirin (ASA) 325 MG EC tablet, Take 1 tablet (325 mg) by mouth daily, Disp: 30 tablet, Rfl: 0     cetirizine (ZYRTEC) 10 MG tablet, Take 10 mg by mouth daily, Disp: , Rfl:      CINNAMON PO, Take 2 capsules by mouth daily as needed, Disp: , Rfl:      co-enzyme Q-10 100 MG CAPS capsule, Take 100 mg by mouth daily as needed, Disp: , Rfl:      estradiol (ESTRACE) 0.1 MG/GM vaginal cream, Place vaginally twice a week, Disp: , Rfl:      FLUoxetine (PROZAC) 20 MG capsule, Take 1 capsule (20 mg) by mouth daily, Disp: 90 capsule, Rfl: 3     FLUoxetine (PROZAC) 40 MG capsule, Take 1 capsule (40 mg) by mouth daily Take with 20 mg tab for a total of 60 mg daily, Disp: 90 capsule, Rfl: 3     fluticasone (FLONASE) 50 MCG/ACT nasal spray, Spray 1 spray into both nostrils daily, Disp: 1 g, Rfl: 1     fluticasone-salmeterol (ADVAIR) 500-50 MCG/ACT inhaler, Inhale 1 puff into the lungs every 12 hours, Disp: 1 each, Rfl: 11     GARLIC PO, Take 2 capsules by mouth daily as needed, Disp: , Rfl:      Misc Natural Products (TURMERIC CURCUMIN) CAPS, Take 2 capsules by mouth daily as needed, Disp: , Rfl:      montelukast (SINGULAIR) 10 MG tablet, Take 1 tablet (10 mg) by mouth At Bedtime, Disp: 90 tablet, Rfl: 3     multivitamin (CENTRUM SILVER) tablet, Take 1 tablet by mouth daily, Disp: , Rfl:      NIACIN PO, Take 1 capsule by mouth daily as needed, Disp: , Rfl:      Omega-3 Fatty Acids (FISH OIL ULTRA) 1400 MG CAPS, Take 1 capsule by mouth daily, Disp: , Rfl:      omeprazole 20 MG tablet, Take 20  "mg by mouth daily, Disp: , Rfl:      oxyCODONE (ROXICODONE) 5 MG tablet, Take 1 tablet (5 mg) by mouth every 8 hours as needed for moderate to severe pain, Disp: 12 tablet, Rfl: 0     senna-docusate (SENOKOT-S/PERICOLACE) 8.6-50 MG tablet, Take 1-2 tablets by mouth 2 times daily Take while on oral narcotics to prevent or treat constipation., Disp: 30 tablet, Rfl: 0     tiotropium (SPIRIVA) 18 MCG inhaled capsule, Inhale 1 capsule (18 mcg) into the lungs daily, Disp: 30 capsule, Rfl: 11    Allergies:   Allergies   Allergen Reactions     Doxycycline Difficulty breathing     Penicillins Itching     Sucralfate Rash         REVIEW OF SYSTEMS:  CONSTITUTIONAL:NEGATIVE for fever, chills, night sweats, change in weight  INTEGUMENTARY/SKIN: NEGATIVE for worrisome rashes, moles or lesions  MUSCULOSKELETAL:See HPI above  NEURO: NEGATIVE for weakness, dizziness or paresthesias  CARDIOVASCULAR: negative for chest pain, shortness of breath    PHYSICAL EXAM:  BP (!) 134/90   Pulse 84   Ht 1.588 m (5' 2.52\")   Wt 74.6 kg (164 lb 8 oz)   BMI 29.59 kg/m     GENERAL APPEARANCE: healthy, alert, no distress  SKIN: no suspicious lesions or rashes  NEURO: Normal strength and tone, mentation intact and speech normal  PSYCH:  mentation appears normal and affect normal  RESPIRATORY: No increased work of breathing.    BILATERAL LOWER EXTREMITIES:  Gait: very subtle favors the left.    Intact sensation deep peroneal nerve, superficial peroneal nerve, med/lat tibial nerve, sural nerve, saphenous nerve  Intact EHL, EDL, TA, FHL, GS, quadriceps hamstrings and hip flexors  Bilateral calf soft and nttp or squeeze.  Edema: trace    left  KNEE EXAM:    Incision: healing well. Dried skin glue in place.  Skin: intact, no erythema, resolving diffuse ecchymosis  ROM: 3 extension to 110 flexion  Effusion: small  Tender: diffuse      X-RAY:  2 views left knee from 12/13/2023 were reviewed in clinic today. No obvious fractures or dislocations. Total " knee arthroplasty components in place without evidence of failure or loosening.      Impression: Carolina Hernandez is a 58 year old female status post Total knee arthroplasty, left knee, doing well, 6 weeks out from surgery.      Plan:   * reviewed xrays with patient, showing total knee arthroplasty implants.  * continue with knee range of motion exercises, should get all the way straight and at least 120 degrees flexion if she keeps working on it.  * wean off all pain medications as tolerated  * xrays next visit: 2 views of the knee  * return to clinic 6 weeks  * Physical Therapy, home exercise program.  * all questions addressed and answered prior to discharge from clinic today to patient's satisfaction.     * patient will need longterm (at least 2 years) prophylactic antibiotics use with dental procedures or other invasive procedures (2 g amoxicilin or  2g Keflex or 500mg azithromycin or clarithromycin or 100mg doxycycline) 30-60 minutes prior to dental procedures.    * KOOS Jr/Promis Knee score: IS to be done at next visit; NOT completed during todays visit; to be completed at 12 weeks and 12 months postoperative.      Giovanny Chong M.D., M.S.  Dept. of Orthopaedic Surgery  [facility]             Again, thank you for allowing me to participate in the care of your patient.        Sincerely,        Giovanny Chong MD

## 2024-01-03 ENCOUNTER — THERAPY VISIT (OUTPATIENT)
Dept: PHYSICAL THERAPY | Facility: CLINIC | Age: 59
End: 2024-01-03
Attending: ORTHOPAEDIC SURGERY
Payer: COMMERCIAL

## 2024-01-03 DIAGNOSIS — Z96.652 S/P TOTAL KNEE ARTHROPLASTY, LEFT: Primary | ICD-10-CM

## 2024-01-03 PROCEDURE — 97110 THERAPEUTIC EXERCISES: CPT | Mod: GP

## 2024-01-03 NOTE — PROGRESS NOTES
"   01/03/24 0500   Appointment Info   Signing clinician's name / credentials Candy Kim, PT, DPT   Visits Used 10   Medical Diagnosis SP L TK   PT Tx Diagnosis L knee pain   Quick Adds Certification   Progress Note/Certification   Start of Care Date 11/13/23   Onset of illness/injury or Date of Surgery 10/30/23   Therapy Frequency 2x/wk   Predicted Duration 6wk   Certification date from 11/13/23   Certification date to 12/25/23   Progress Note Due Date 12/25/23   Progress Note Completed Date 11/13/23   GOALS   PT Goals 2;3   PT Goal 1   Goal Identifier 1   Goal Description pt will walk indoors without limp or AD   Rationale to maximize safety and independence with performance of ADLs and functional tasks   Goal Progress MET   Target Date 11/27/23   Date Met 01/03/24   PT Goal 2   Goal Identifier 2   Goal Description pt will ascend and descend a flight of stairs recip without rail   Rationale to maximize safety and independence with performance of ADLs and functional tasks   Goal Progress Progressing   Target Date 12/25/23   PT Goal 3   Goal Identifier 3   Goal Description pt will be able to walk 15 minutes for grocery shopping without sitting   Rationale to maximize safety and independence with performance of ADLs and functional tasks   Goal Progress MET   Target Date 12/25/23   Date Met 01/03/24   Subjective Report   Subjective Report Pt reports she has been doing okay overall. Not in pain but also not doing exercises daily. MD visit went well, will have another follow up end of January 2024.   Objective Measures   Objective Measures Objective Measure 1;Objective Measure 2   Objective Measure 1   Objective Measure L knee AROM   Details -1* ext supine; 112* flexion (supine)   Therapeutic Procedure/Exercise   Therapeutic Procedures: strength, endurance, ROM, flexibillity minutes (70065) 27   Ther Proc 1 - Details Recumbent bike seat 8 full rev level 3 x 5 min. Standing KF/KE on 2nd stair x 10 L. Step ups 6\" " "forward and lateral x 12 L. Seated flexion w/slider and overpressure from other leg x 10 L, supine knee ext stretch x60\" L w/overpressure, supine SLR x 15 L 4#. Supine KF/KE stretching. STS staggered L foot back x 10.   Skilled Intervention ROM and strength ex for L knee   Patient Response/Progress Improving KE and KF ROM L knee, less pain and swelling. Still having difficulty with squatting and stairs.   Plan   Home program PTRX - papers   Updates to plan of care Continue 1x/week   Plan for next session Continue to progress ROM L knee, WB strength and floor/stand transfers   Total Session Time   Timed Code Treatment Minutes 27   Total Treatment Time (sum of timed and untimed services) 27         [facility]                                                                                   OUTPATIENT PHYSICAL THERAPY    PLAN OF TREATMENT FOR OUTPATIENT REHABILITATION   Patient's Last Name, First Name, Carolina Putnam YOB: 1965   Provider's Name   [facility]   Medical Record No.  [id number]     Onset Date: 10/30/23  Start of Care Date: 11/13/23     Medical Diagnosis:  SP L TK      PT Treatment Diagnosis:  L knee pain Plan of Treatment  Frequency/Duration: 2x/wk/ 6wk    Certification date from 11/13/23 to 12/25/23         See note for plan of treatment details and functional goals     Candy Kim, PT                         I CERTIFY THE NEED FOR THESE SERVICES FURNISHED UNDER        THIS PLAN OF TREATMENT AND WHILE UNDER MY CARE     (Physician attestation of this document indicates review and certification of the therapy plan).              Referring Provider:  Giovanny Chong    Initial Assessment  See Epic Evaluation- Start of Care Date: 11/13/23    PLAN  Continue therapy per current plan of care.    Beginning/End Dates of Progress Note Reporting Period:  11/13/23 to 01/03/2024    Referring Provider:  Giovanny Chong    "

## 2024-01-10 ENCOUNTER — THERAPY VISIT (OUTPATIENT)
Dept: PHYSICAL THERAPY | Facility: CLINIC | Age: 59
End: 2024-01-10
Attending: ORTHOPAEDIC SURGERY
Payer: COMMERCIAL

## 2024-01-10 DIAGNOSIS — Z96.652 S/P TOTAL KNEE ARTHROPLASTY, LEFT: Primary | ICD-10-CM

## 2024-01-10 PROCEDURE — 97110 THERAPEUTIC EXERCISES: CPT | Mod: GP

## 2024-01-17 ENCOUNTER — THERAPY VISIT (OUTPATIENT)
Dept: PHYSICAL THERAPY | Facility: CLINIC | Age: 59
End: 2024-01-17
Attending: ORTHOPAEDIC SURGERY
Payer: COMMERCIAL

## 2024-01-17 DIAGNOSIS — Z96.652 S/P TOTAL KNEE ARTHROPLASTY, LEFT: Primary | ICD-10-CM

## 2024-01-17 PROCEDURE — 97110 THERAPEUTIC EXERCISES: CPT | Mod: GP

## 2024-01-23 NOTE — PROGRESS NOTES
Chief Complaint   Patient presents with    Left Knee - Total Joint Post-op     DOS 10/31/23, 12 wk s/p. Patient notes her knee is doing pretty good. She has a couple more appointments with physical therapy.  Her right knee has been hurting. Last right knee injection: 7/26/23. She would like to have another injection in her right knee today.          SURGERY: Total knee arthroplasty, left knee ([facility])  DATE OF SURGERY: 10/31/2023      HISTORY OF PRESENT ILLNESS: Carolina Hernandez is a 58 year old female seen for postoperative evaluation of left total knee arthroplasty. It has been 12 weeks since surgery. Returns today stating doing well. Has a couple appts left with therapy. Her right knee has been hurting more now though. Her last injection right knee was 7/26/2023. She'd like another right knee injection today.      Past medical history:   Past Medical History:   Diagnosis Date    Asthma     C. difficile colitis     COPD (chronic obstructive pulmonary disease) (H)     Depression     Depressive disorder Have had it most of my life    Hypertension     Moderate persistent asthma with exacerbation 09/18/2007    Osteoarthritis     Sinusitis, chronic     Status post coronary angiogram 10/14/2022     Patient Active Problem List    Diagnosis Date Noted    Osteoarthritis of left knee 11/01/2023     Priority: Medium    S/P total knee arthroplasty, left 10/31/2023     Priority: Medium    Fatty liver 11/01/2022     Priority: Medium     ultrasound 10/29/22      COPD vs Asthma 10/30/2022     Priority: Medium     PFTS 5/2021 - FEV1- 1.21 (48%), ratio 0.50, no change with bronchodilators,  %, %, DLCO 94%       Transaminitis 10/29/2022     Priority: Medium    Acute pancreatitis 10/29/2022     Priority: Medium    Nonobstructive atherosclerosis of coronary artery 10/21/2022     Priority: Medium      H/o exertional chest pain leading to coronary angiogram 10/14/2022 which showed non-obstructing  CAD      Hyperlipidemia LDL goal <70 10/21/2022     Priority: Medium    Low back pain due to bilateral sciatica 10/27/2021     Priority: Medium    Primary osteoarthritis of both knees 09/23/2021     Priority: Medium    Benign essential hypertension 07/26/2019     Priority: Medium     New diagnosis 7/26/2019        Recurrent major depressive disorder, in remission (H24) 01/17/2019     Priority: Medium    GERD (gastroesophageal reflux disease) 07/31/2012     Priority: Medium    Seasonal allergies 07/31/2012     Priority: Medium       Past Surgical History:   Past Surgical History:   Procedure Laterality Date    ARTHROPLASTY KNEE Left 10/31/2023    Procedure: ARTHROPLASTY, KNEE, TOTAL left;  Surgeon: Giovanny Chong MD;  Location: [facility] OR    COLONOSCOPY  2017    CV CORONARY ANGIOGRAM N/A 10/14/2022    Procedure: Coronary Angiogram;  Surgeon: Fidel Martin MD;  Location: [facility] CARDIAC CATH LAB    ESOPHAGOSCOPY, GASTROSCOPY, DUODENOSCOPY (EGD), COMBINED N/A 05/05/2022    Procedure: ESOPHAGOGASTRODUODENOSCOPY, WITH BIOPSY;  Surgeon: Timothy Oliva DO;  [place] [facility] [place]    LAPAROSCOPIC CHOLECYSTECTOMY WITH CHOLANGIOGRAMS N/A 11/21/2022    Procedure: CHOLECYSTECTOMY, LAPAROSCOPIC, WITH CHOLANGIOGRAM;  Surgeon: Eros Butt DO;  Location: [facility] OR       Medications:   Current Outpatient Medications:     acetaminophen (TYLENOL) 325 MG tablet, Take 2 tablets (650 mg) by mouth every 4 hours as needed for other (mild pain), Disp: 100 tablet, Rfl: 0    albuterol (PROVENTIL) (2.5 MG/3ML) 0.083% neb solution, inhale 1 vial (2.5 mg) by nebulization every 4 hours as needed for shortness of breath / dyspnea or wheezing, Disp: 225 mL, Rfl: 11    albuterol (VENTOLIN HFA) 108 (90 Base) MCG/ACT inhaler, INHALE 1-2 PUFFS INTO THE LUNGS EVERY 4 HOURS AS NEEDED FOR SHORTNESS OF BREATH/DYSPNEA OR WHEEZING ., Disp: 18 g, Rfl: 11    amLODIPine (NORVASC) 2.5 MG tablet, Take 2 tablets (5 mg) by mouth once daily., Disp: 180  tablet, Rfl: 3    cetirizine (ZYRTEC) 10 MG tablet, Take 10 mg by mouth daily, Disp: , Rfl:     CINNAMON PO, Take 2 capsules by mouth daily as needed, Disp: , Rfl:     co-enzyme Q-10 100 MG CAPS capsule, Take 100 mg by mouth daily as needed, Disp: , Rfl:     estradiol (ESTRACE) 0.1 MG/GM vaginal cream, Place vaginally twice a week, Disp: , Rfl:     FLUoxetine (PROZAC) 20 MG capsule, Take 1 capsule (20 mg) by mouth daily, Disp: 90 capsule, Rfl: 3    FLUoxetine (PROZAC) 40 MG capsule, Take 1 capsule (40 mg) by mouth daily Take with 20 mg tab for a total of 60 mg daily, Disp: 90 capsule, Rfl: 3    fluticasone (FLONASE) 50 MCG/ACT nasal spray, Spray 1 spray into both nostrils daily, Disp: 1 g, Rfl: 1    fluticasone-salmeterol (ADVAIR) 500-50 MCG/ACT inhaler, Inhale 1 puff into the lungs every 12 hours, Disp: 1 each, Rfl: 11    GARLIC PO, Take 2 capsules by mouth daily as needed, Disp: , Rfl:     Misc Natural Products (TURMERIC CURCUMIN) CAPS, Take 2 capsules by mouth daily as needed, Disp: , Rfl:     montelukast (SINGULAIR) 10 MG tablet, Take 1 tablet (10 mg) by mouth At Bedtime, Disp: 90 tablet, Rfl: 3    multivitamin (CENTRUM SILVER) tablet, Take 1 tablet by mouth daily, Disp: , Rfl:     NIACIN PO, Take 1 capsule by mouth daily as needed, Disp: , Rfl:     Omega-3 Fatty Acids (FISH OIL ULTRA) 1400 MG CAPS, Take 1 capsule by mouth daily, Disp: , Rfl:     omeprazole 20 MG tablet, Take 20 mg by mouth daily, Disp: , Rfl:     tiotropium (SPIRIVA) 18 MCG inhaled capsule, Inhale 1 capsule (18 mcg) into the lungs daily, Disp: 30 capsule, Rfl: 11    Allergies:   Allergies   Allergen Reactions    Doxycycline Difficulty breathing    Penicillins Itching    Sucralfate Rash         REVIEW OF SYSTEMS:  CONSTITUTIONAL:NEGATIVE for fever, chills, night sweats, change in weight  INTEGUMENTARY/SKIN: NEGATIVE for worrisome rashes, moles or lesions  MUSCULOSKELETAL:See HPI above  NEURO: NEGATIVE for weakness, dizziness or  "paresthesias  CARDIOVASCULAR: negative for chest pain, shortness of breath    PHYSICAL EXAM:  /88   Pulse 75   Ht 1.588 m (5' 2.52\")   Wt 76.5 kg (168 lb 11.2 oz)   BMI 30.34 kg/m     GENERAL APPEARANCE: healthy, alert, no distress  SKIN: no suspicious lesions or rashes  NEURO: Normal strength and tone, mentation intact and speech normal  PSYCH:  mentation appears normal and affect normal  RESPIRATORY: No increased work of breathing.    BILATERAL LOWER EXTREMITIES:  Gait: favors the right.    Intact sensation deep peroneal nerve, superficial peroneal nerve, med/lat tibial nerve, sural nerve, saphenous nerve  Intact EHL, EDL, TA, FHL, GS, quadriceps hamstrings and hip flexors  Bilateral calf soft and nttp or squeeze.  Edema: trace    left  KNEE EXAM:    Incision: healing well. Dried skin glue in place.  Skin: intact, no erythema, resolving diffuse ecchymosis  ROM: 3 extension to 110 flexion  Effusion: small  Tender: diffuse    RIGHT KNEE EXAM:    Skin: intact, no ecchymosis or erythema  ROM: full extension to 125+ flexion  Tight hamstrings on straight leg raise.  Effusion: none  Tender: medial joint line, lateral joint line.     MCL: stable, and non-painful at both 0 and 30 degrees knee flexion  Varus stress: stable, and non-painful at both 0 and 30 degrees knee flexion  Lachmans: neg, firm endpoint  Posterior Drawer stable  Patellofemoral joint:                Apprehension: negative              Crepitations: mild              Grind: positive.      X-RAY:  2 views left knee from 1/24/2024 were reviewed in clinic today. No obvious fractures or dislocations. Total knee arthroplasty components in place without evidence of failure or loosening.      Impression: Carolina Hernandez is a 58 year old female:  1) status post Total knee arthroplasty, left knee, doing well, 12 weeks out from surgery.  2) chronic right knee pain, primary osteoarthritis.      Plan:   * reviewed xrays with patient, showing left knee " total knee arthroplasty implants.  Right knee with osteoarthritis. Repeat injection today.  * xrays next visit: 2 views of the left knee  * return to clinic 9 months  * Physical Therapy, home exercise program.  * all questions addressed and answered prior to discharge from clinic today to patient's satisfaction.     * patient will need longterm (at least 2 years) prophylactic antibiotics use with dental procedures or other invasive procedures (2 g amoxicilin or  2g Keflex or 500mg azithromycin or clarithromycin or 100mg doxycycline) 30-60 minutes prior to dental procedures.    * KOOS Jr/Promis Knee score: IS to be done again at 12 months; was completed during todays visit; to be completed at 12 weeks and 12 months postoperative.      Giovanny Chong M.D., M.S.  Dept. of Orthopaedic Surgery  [facility] [place]       Large Joint Injection/Arthocentesis: R knee joint    Date/Time: 1/24/2024 1:30 PM    Performed by: Acosta Eng PA  Authorized by: Giovanny Chong MD    Indications:  Pain and osteoarthritis  Needle Size:  22 G  Guidance: landmark guided    Approach:  Anteromedial  Location:  Knee      Medications:  80 mg methylPREDNISolone 80 MG/ML; 4 mL BUPivacaine 0.25 %  Outcome:  Tolerated well, no immediate complications  Procedure discussed: discussed risks, benefits, and alternatives    Consent Given by:  Patient  Prep: patient was prepped and draped in usual sterile fashion

## 2024-01-24 ENCOUNTER — OFFICE VISIT (OUTPATIENT)
Dept: ORTHOPEDICS | Facility: CLINIC | Age: 59
End: 2024-01-24
Payer: COMMERCIAL

## 2024-01-24 ENCOUNTER — ANCILLARY PROCEDURE (OUTPATIENT)
Dept: GENERAL RADIOLOGY | Facility: CLINIC | Age: 59
End: 2024-01-24
Attending: ORTHOPAEDIC SURGERY
Payer: COMMERCIAL

## 2024-01-24 ENCOUNTER — THERAPY VISIT (OUTPATIENT)
Dept: PHYSICAL THERAPY | Facility: CLINIC | Age: 59
End: 2024-01-24
Attending: ORTHOPAEDIC SURGERY
Payer: COMMERCIAL

## 2024-01-24 VITALS
HEART RATE: 75 BPM | HEIGHT: 63 IN | WEIGHT: 168.7 LBS | DIASTOLIC BLOOD PRESSURE: 88 MMHG | BODY MASS INDEX: 29.89 KG/M2 | SYSTOLIC BLOOD PRESSURE: 135 MMHG

## 2024-01-24 DIAGNOSIS — Z96.652 STATUS POST TOTAL LEFT KNEE REPLACEMENT: ICD-10-CM

## 2024-01-24 DIAGNOSIS — Z96.652 STATUS POST TOTAL LEFT KNEE REPLACEMENT: Primary | ICD-10-CM

## 2024-01-24 DIAGNOSIS — Z96.652 S/P TOTAL KNEE ARTHROPLASTY, LEFT: Primary | ICD-10-CM

## 2024-01-24 DIAGNOSIS — M17.11 PRIMARY OSTEOARTHRITIS OF RIGHT KNEE: ICD-10-CM

## 2024-01-24 PROCEDURE — 73560 X-RAY EXAM OF KNEE 1 OR 2: CPT | Mod: TC | Performed by: RADIOLOGY

## 2024-01-24 PROCEDURE — 20610 DRAIN/INJ JOINT/BURSA W/O US: CPT | Mod: 79 | Performed by: ORTHOPAEDIC SURGERY

## 2024-01-24 PROCEDURE — 97110 THERAPEUTIC EXERCISES: CPT | Mod: GP

## 2024-01-24 PROCEDURE — 99024 POSTOP FOLLOW-UP VISIT: CPT | Performed by: ORTHOPAEDIC SURGERY

## 2024-01-24 RX ORDER — BUPIVACAINE HYDROCHLORIDE 2.5 MG/ML
4 INJECTION, SOLUTION INFILTRATION; PERINEURAL
Status: DISCONTINUED | OUTPATIENT
Start: 2024-01-24 | End: 2024-04-18

## 2024-01-24 RX ORDER — METHYLPREDNISOLONE ACETATE 80 MG/ML
80 INJECTION, SUSPENSION INTRA-ARTICULAR; INTRALESIONAL; INTRAMUSCULAR; SOFT TISSUE
Status: DISCONTINUED | OUTPATIENT
Start: 2024-01-24 | End: 2024-04-18

## 2024-01-24 RX ADMIN — BUPIVACAINE HYDROCHLORIDE 4 ML: 2.5 INJECTION, SOLUTION INFILTRATION; PERINEURAL at 13:30

## 2024-01-24 RX ADMIN — METHYLPREDNISOLONE ACETATE 80 MG: 80 INJECTION, SUSPENSION INTRA-ARTICULAR; INTRALESIONAL; INTRAMUSCULAR; SOFT TISSUE at 13:30

## 2024-01-24 ASSESSMENT — KOOS JR
KOOS JR SCORING: 84.6
HOW SEVERE IS YOUR KNEE STIFFNESS AFTER FIRST WAKING IN MORNING: MILD
GOING UP OR DOWN STAIRS: MILD

## 2024-01-24 ASSESSMENT — PAIN SCALES - GENERAL: PAINLEVEL: NO PAIN (0)

## 2024-01-24 NOTE — LETTER
1/24/2024         RE: Carolina Hernandez  [address] [place] [id number]        Dear Colleague,    Thank you for referring your patient, Carolina Hernandez, to the [facility]. Please see a copy of my visit note below.    Chief Complaint   Patient presents with     Left Knee - Total Joint Post-op     DOS 10/31/23, 12 wk s/p. Patient notes her knee is doing pretty good. She has a couple more appointments with physical therapy.  Her right knee has been hurting. Last right knee injection: 7/26/23. She would like to have another injection in her right knee today.          SURGERY: Total knee arthroplasty, left knee ([facility])  DATE OF SURGERY: 10/31/2023      HISTORY OF PRESENT ILLNESS: Carolina Hernandez is a 58 year old female seen for postoperative evaluation of left total knee arthroplasty. It has been 12 weeks since surgery. Returns today stating doing well. Has a couple appts left with therapy. Her right knee has been hurting more now though. Her last injection right knee was 7/26/2023. She'd like another right knee injection today.      Past medical history:   Past Medical History:   Diagnosis Date     Asthma      C. difficile colitis      COPD (chronic obstructive pulmonary disease) (H)      Depression      Depressive disorder Have had it most of my life     Hypertension      Moderate persistent asthma with exacerbation 09/18/2007     Osteoarthritis      Sinusitis, chronic      Status post coronary angiogram 10/14/2022     Patient Active Problem List    Diagnosis Date Noted     Osteoarthritis of left knee 11/01/2023     Priority: Medium     S/P total knee arthroplasty, left 10/31/2023     Priority: Medium     Fatty liver 11/01/2022     Priority: Medium     ultrasound 10/29/22       COPD vs Asthma 10/30/2022     Priority: Medium     PFTS 5/2021 - FEV1- 1.21 (48%), ratio 0.50, no change with bronchodilators,  %, %, DLCO 94%         Transaminitis 10/29/2022     Priority: Medium     Acute pancreatitis 10/29/2022     Priority: Medium     Nonobstructive atherosclerosis of coronary artery 10/21/2022     Priority: Medium      H/o exertional chest pain leading to coronary angiogram 10/14/2022 which showed non-obstructing CAD       Hyperlipidemia LDL goal <70 10/21/2022     Priority: Medium     Low back pain due to bilateral sciatica 10/27/2021     Priority: Medium     Primary osteoarthritis of both knees 09/23/2021     Priority: Medium     Benign essential hypertension 07/26/2019     Priority: Medium     New diagnosis 7/26/2019         Recurrent major depressive disorder, in remission (H24) 01/17/2019     Priority: Medium     GERD (gastroesophageal reflux disease) 07/31/2012     Priority: Medium     Seasonal allergies 07/31/2012     Priority: Medium       Past Surgical History:   Past Surgical History:   Procedure Laterality Date     ARTHROPLASTY KNEE Left 10/31/2023    Procedure: ARTHROPLASTY, KNEE, TOTAL left;  Surgeon: Giovanny Chong MD;  Location: [facility] OR     COLONOSCOPY  2017     CV CORONARY ANGIOGRAM N/A 10/14/2022    Procedure: Coronary Angiogram;  Surgeon: Fidel Martin MD;  Location: [facility] CARDIAC CATH LAB     ESOPHAGOSCOPY, GASTROSCOPY, DUODENOSCOPY (EGD), COMBINED N/A 05/05/2022    Procedure: ESOPHAGOGASTRODUODENOSCOPY, WITH BIOPSY;  Surgeon: Timothy Oliva DO;  Location: [facility] OR     LAPAROSCOPIC CHOLECYSTECTOMY WITH CHOLANGIOGRAMS N/A 11/21/2022    Procedure: CHOLECYSTECTOMY, LAPAROSCOPIC, WITH CHOLANGIOGRAM;  Surgeon: Eros Butt DO;  Location: [facility] OR       Medications:   Current Outpatient Medications:      acetaminophen (TYLENOL) 325 MG tablet, Take 2 tablets (650 mg) by mouth every 4 hours as needed for other (mild pain), Disp: 100 tablet, Rfl: 0     albuterol (PROVENTIL) (2.5 MG/3ML) 0.083% neb solution, inhale 1 vial (2.5 mg) by nebulization every 4 hours as needed for shortness of breath / dyspnea or  wheezing, Disp: 225 mL, Rfl: 11     albuterol (VENTOLIN HFA) 108 (90 Base) MCG/ACT inhaler, INHALE 1-2 PUFFS INTO THE LUNGS EVERY 4 HOURS AS NEEDED FOR SHORTNESS OF BREATH/DYSPNEA OR WHEEZING ., Disp: 18 g, Rfl: 11     amLODIPine (NORVASC) 2.5 MG tablet, Take 2 tablets (5 mg) by mouth once daily., Disp: 180 tablet, Rfl: 3     cetirizine (ZYRTEC) 10 MG tablet, Take 10 mg by mouth daily, Disp: , Rfl:      CINNAMON PO, Take 2 capsules by mouth daily as needed, Disp: , Rfl:      co-enzyme Q-10 100 MG CAPS capsule, Take 100 mg by mouth daily as needed, Disp: , Rfl:      estradiol (ESTRACE) 0.1 MG/GM vaginal cream, Place vaginally twice a week, Disp: , Rfl:      FLUoxetine (PROZAC) 20 MG capsule, Take 1 capsule (20 mg) by mouth daily, Disp: 90 capsule, Rfl: 3     FLUoxetine (PROZAC) 40 MG capsule, Take 1 capsule (40 mg) by mouth daily Take with 20 mg tab for a total of 60 mg daily, Disp: 90 capsule, Rfl: 3     fluticasone (FLONASE) 50 MCG/ACT nasal spray, Spray 1 spray into both nostrils daily, Disp: 1 g, Rfl: 1     fluticasone-salmeterol (ADVAIR) 500-50 MCG/ACT inhaler, Inhale 1 puff into the lungs every 12 hours, Disp: 1 each, Rfl: 11     GARLIC PO, Take 2 capsules by mouth daily as needed, Disp: , Rfl:      Misc Natural Products (TURMERIC CURCUMIN) CAPS, Take 2 capsules by mouth daily as needed, Disp: , Rfl:      montelukast (SINGULAIR) 10 MG tablet, Take 1 tablet (10 mg) by mouth At Bedtime, Disp: 90 tablet, Rfl: 3     multivitamin (CENTRUM SILVER) tablet, Take 1 tablet by mouth daily, Disp: , Rfl:      NIACIN PO, Take 1 capsule by mouth daily as needed, Disp: , Rfl:      Omega-3 Fatty Acids (FISH OIL ULTRA) 1400 MG CAPS, Take 1 capsule by mouth daily, Disp: , Rfl:      omeprazole 20 MG tablet, Take 20 mg by mouth daily, Disp: , Rfl:      tiotropium (SPIRIVA) 18 MCG inhaled capsule, Inhale 1 capsule (18 mcg) into the lungs daily, Disp: 30 capsule, Rfl: 11    Allergies:   Allergies   Allergen Reactions     Doxycycline  "Difficulty breathing     Penicillins Itching     Sucralfate Rash         REVIEW OF SYSTEMS:  CONSTITUTIONAL:NEGATIVE for fever, chills, night sweats, change in weight  INTEGUMENTARY/SKIN: NEGATIVE for worrisome rashes, moles or lesions  MUSCULOSKELETAL:See HPI above  NEURO: NEGATIVE for weakness, dizziness or paresthesias  CARDIOVASCULAR: negative for chest pain, shortness of breath    PHYSICAL EXAM:  /88   Pulse 75   Ht 1.588 m (5' 2.52\")   Wt 76.5 kg (168 lb 11.2 oz)   BMI 30.34 kg/m     GENERAL APPEARANCE: healthy, alert, no distress  SKIN: no suspicious lesions or rashes  NEURO: Normal strength and tone, mentation intact and speech normal  PSYCH:  mentation appears normal and affect normal  RESPIRATORY: No increased work of breathing.    BILATERAL LOWER EXTREMITIES:  Gait: favors the right.    Intact sensation deep peroneal nerve, superficial peroneal nerve, med/lat tibial nerve, sural nerve, saphenous nerve  Intact EHL, EDL, TA, FHL, GS, quadriceps hamstrings and hip flexors  Bilateral calf soft and nttp or squeeze.  Edema: trace    left  KNEE EXAM:    Incision: healing well. Dried skin glue in place.  Skin: intact, no erythema, resolving diffuse ecchymosis  ROM: 3 extension to 110 flexion  Effusion: small  Tender: diffuse    RIGHT KNEE EXAM:    Skin: intact, no ecchymosis or erythema  ROM: full extension to 125+ flexion  Tight hamstrings on straight leg raise.  Effusion: none  Tender: medial joint line, lateral joint line.     MCL: stable, and non-painful at both 0 and 30 degrees knee flexion  Varus stress: stable, and non-painful at both 0 and 30 degrees knee flexion  Lachmans: neg, firm endpoint  Posterior Drawer stable  Patellofemoral joint:                Apprehension: negative              Crepitations: mild              Grind: positive.      X-RAY:  2 views left knee from 1/24/2024 were reviewed in clinic today. No obvious fractures or dislocations. Total knee arthroplasty components in place " without evidence of failure or loosening.      Impression: Carolina Hernandez is a 58 year old female:  1) status post Total knee arthroplasty, left knee, doing well, 12 weeks out from surgery.  2) chronic right knee pain, primary osteoarthritis.      Plan:   * reviewed xrays with patient, showing left knee total knee arthroplasty implants.  Right knee with osteoarthritis. Repeat injection today.  * xrays next visit: 2 views of the left knee  * return to clinic 9 months  * Physical Therapy, home exercise program.  * all questions addressed and answered prior to discharge from clinic today to patient's satisfaction.     * patient will need longterm (at least 2 years) prophylactic antibiotics use with dental procedures or other invasive procedures (2 g amoxicilin or  2g Keflex or 500mg azithromycin or clarithromycin or 100mg doxycycline) 30-60 minutes prior to dental procedures.    * KOOS Jr/Promis Knee score: IS to be done again at 12 months; was completed during todays visit; to be completed at 12 weeks and 12 months postoperative.      Giovanny Chong M.D., M.S.  Dept. of Orthopaedic Surgery  [facility]       Large Joint Injection/Arthocentesis: R knee joint    Date/Time: 1/24/2024 1:30 PM    Performed by: Acosta Eng PA  Authorized by: Giovanny Chong MD    Indications:  Pain and osteoarthritis  Needle Size:  22 G  Guidance: landmark guided    Approach:  Anteromedial  Location:  Knee      Medications:  80 mg methylPREDNISolone 80 MG/ML; 4 mL BUPivacaine 0.25 %  Outcome:  Tolerated well, no immediate complications  Procedure discussed: discussed risks, benefits, and alternatives    Consent Given by:  Patient  Prep: patient was prepped and draped in usual sterile fashion          Again, thank you for allowing me to participate in the care of your patient.        Sincerely,        Giovanny Chong MD

## 2024-02-07 ENCOUNTER — VIRTUAL VISIT (OUTPATIENT)
Dept: FAMILY MEDICINE | Facility: CLINIC | Age: 59
End: 2024-02-07
Payer: COMMERCIAL

## 2024-02-07 DIAGNOSIS — F33.40 RECURRENT MAJOR DEPRESSIVE DISORDER, IN REMISSION (H): ICD-10-CM

## 2024-02-07 DIAGNOSIS — J42 CHRONIC BRONCHITIS, UNSPECIFIED CHRONIC BRONCHITIS TYPE (H): ICD-10-CM

## 2024-02-07 DIAGNOSIS — J34.89 SINUS PRESSURE: Primary | ICD-10-CM

## 2024-02-07 DIAGNOSIS — J45.50 SEVERE PERSISTENT ASTHMA WITHOUT COMPLICATION (H): ICD-10-CM

## 2024-02-07 PROCEDURE — 99213 OFFICE O/P EST LOW 20 MIN: CPT | Mod: 95 | Performed by: FAMILY MEDICINE

## 2024-02-07 RX ORDER — PREDNISONE 20 MG/1
40 TABLET ORAL DAILY
Qty: 10 TABLET | Refills: 0 | Status: SHIPPED | OUTPATIENT
Start: 2024-02-07 | End: 2024-02-12

## 2024-02-07 ASSESSMENT — PATIENT HEALTH QUESTIONNAIRE - PHQ9
10. IF YOU CHECKED OFF ANY PROBLEMS, HOW DIFFICULT HAVE THESE PROBLEMS MADE IT FOR YOU TO DO YOUR WORK, TAKE CARE OF THINGS AT HOME, OR GET ALONG WITH OTHER PEOPLE: SOMEWHAT DIFFICULT
SUM OF ALL RESPONSES TO PHQ QUESTIONS 1-9: 4
SUM OF ALL RESPONSES TO PHQ QUESTIONS 1-9: 4

## 2024-02-07 NOTE — PROGRESS NOTES
"Carolina is a 58 year old who is being evaluated via a billable video visit.      How would you like to obtain your AVS? MyChart  If the video visit is dropped, the invitation should be resent by: Text to cell phone: [phone]  Will anyone else be joining your video visit? No          Assessment & Plan   Problem List Items Addressed This Visit       COPD vs Asthma (Chronic)    Relevant Medications    predniSONE (DELTASONE) 20 MG tablet    Recurrent major depressive disorder, in remission (H24) (Chronic)- stable    Severe persistent asthma without complication (H28)- stable.     Other Visit Diagnoses       Sinus pressure    -  Primary    Relevant Medications    predniSONE (DELTASONE) 20 MG tablet         Recommend trying prednisone , if symptoms persist recommend antibiotics.           BMI  Estimated body mass index is 30.34 kg/m  as calculated from the following:    Height as of 1/24/24: 1.588 m (5' 2.52\").    Weight as of 1/24/24: 76.5 kg (168 lb 11.2 oz).       FUTURE APPOINTMENTS:       - Follow-up visit as needed      Thor Figueroa is a 58 year old, presenting for the following health issues:      No chief complaint on file.        2/7/2024     1:48 PM   Additional Questions   Roomed by Tye FORMAN CMA   Accompanied by self     HPI   Patient here for sinus pressure,purulent discharge ( yellow ) initial fevers /chills.  She says she is starting to feel a bit better but can't seem to shake the symptoms off.  She has tried over the counter medications without much relief.  Acute Illness  Acute illness concerns: Sinus   Onset/Duration: x 5 weeks ago   Symptoms:  Fever: No  Chills/Sweats: No  Headache (location?): YES - sinus areas  Sinus Pressure: YES  Conjunctivitis:  YES- sometimes   Ear Pain: no  Rhinorrhea: YES  Congestion: YES  Sore Throat: No  Cough: YES-productive of yellow sputum - \" Yucky smell to it \" per patient   Wheeze: No  Decreased Appetite: No  Nausea: No  Vomiting: No  Diarrhea: No  Dysuria/Freq.: " No  Dysuria or Hematuria: No  Fatigue/Achiness: No  Sick/Strep Exposure: No  Therapies tried and outcome: mucinex - some relief , SInus rinse 2 times daily , Steamer , Tylenol 650mg , vitamin c         Review of Systems  CONSTITUTIONAL: NEGATIVE for fever, chills, change in weight  INTEGUMENTARY/SKIN: NEGATIVE for worrisome rashes, moles or lesions  EYES: NEGATIVE for vision changes or irritation  ENT/MOUTH: POSITIVE for nasal congestion, postnasal drainage, rhinorrhea-purulent, and sinus pressure  RESP:POSITIVE for cough-non productive, and cough-productive  CV: NEGATIVE for chest pain, palpitations or peripheral edema  GI: NEGATIVE for nausea, abdominal pain, heartburn, or change in bowel habits  : NEGATIVE for frequency, dysuria, or hematuria  MUSCULOSKELETAL: NEGATIVE for significant arthralgias or myalgia  NEURO: NEGATIVE for weakness, dizziness or paresthesias  ENDOCRINE: NEGATIVE for temperature intolerance, skin/hair changes  HEME: NEGATIVE for bleeding problems  PSYCHIATRIC: NEGATIVE for changes in mood or affect      Objective    Vitals - Patient Reported  Weight (Patient Reported): 72.6 kg (160 lb)  Pain Score: No Pain (0)        Physical Exam   GENERAL: alert and no distress  EYES: Eyes grossly normal to inspection.  No discharge or erythema, or obvious scleral/conjunctival abnormalities.  RESP: No audible wheeze, cough, or visible cyanosis.    SKIN: Visible skin clear. No significant rash, abnormal pigmentation or lesions.  NEURO: Cranial nerves grossly intact.  Mentation and speech appropriate for age.  PSYCH: Appropriate affect, tone, and pace of words          Video-Visit Details    Type of service:  Video Visit   Video Start Time:  1:57PM  Video End Time: 2:02PM    Originating Location (pt. Location): Home    Distant Location (provider location):  Off-site  Platform used for Video Visit: Sherrie  Signed Electronically by: Julia Oneill MD

## 2024-02-26 NOTE — PROGRESS NOTES
01/24/24 0500   Appointment Info   Signing clinician's name / credentials Candy Kim, PT, DPT   Visits Used 13   Medical Diagnosis SP L TK   PT Tx Diagnosis L knee pain   Quick Adds Certification   Progress Note/Certification   Start of Care Date 11/13/23   Onset of illness/injury or Date of Surgery 10/30/23   Therapy Frequency 1x/week   Predicted Duration 6 weeks   Certification date from 01/03/24   Certification date to 02/14/24   Progress Note Due Date 02/14/24   Progress Note Completed Date 01/03/24   GOALS   PT Goals 2;3   PT Goal 1   Goal Identifier 1   Goal Description pt will walk indoors without limp or AD   Rationale to maximize safety and independence with performance of ADLs and functional tasks   Goal Progress MET   Target Date 11/27/23   Date Met 01/03/24   PT Goal 2   Goal Identifier 2   Goal Description pt will ascend and descend a flight of stairs recip without rail   Rationale to maximize safety and independence with performance of ADLs and functional tasks   Goal Progress Progressing   Target Date 02/14/24   PT Goal 3   Goal Identifier 3   Goal Description pt will be able to walk 15 minutes for grocery shopping without sitting   Rationale to maximize safety and independence with performance of ADLs and functional tasks   Goal Progress MET   Target Date 12/25/23   Date Met 01/03/24   Subjective Report   Subjective Report Pt reports she had MD follow up today which went very well, doesn't need to follow up until 9 months. Pt had R knee injection today too.   Objective Measures   Objective Measures Objective Measure 1;Objective Measure 2   Objective Measure 1   Objective Measure L knee AROM   Details 0* ext supine; 119* flex AROM 125* AAROM flex w/sheet (supine)   Therapeutic Procedure/Exercise   Therapeutic Procedures: strength, endurance, ROM, flexibillity minutes (15941) 24   Ther Proc 1 - Details Recumbent bike seat 8 full rev level 3 x 5 min. Standing KF/KE on 2nd stair x 10 L. Step ups  "6\" forward and lateral x15 ea L. Forward step downs from 6\" stair x 10 L. Supine heel slides w/sheet x10 L, Supine SLR x 15 L 4#. STS staggered L foot back x 15.   Skilled Intervention ROM and strength ex for L knee   Patient Response/Progress Held squats today as pt just got R knee injection today, overall doing very well and progressing with strength and ROM   Plan   Home program PTRX - papers   Updates to plan of care Likely discharge next session   Plan for next session Continue to progress ROM L knee, WB strength and floor/stand transfers   Total Session Time   Timed Code Treatment Minutes 24   Total Treatment Time (sum of timed and untimed services) 24       DISCHARGE  Reason for Discharge: Patient has failed to schedule further appointments.    Discharge Plan: Patient to continue home program.    Referring Provider:  Giovanny Chong    "

## 2024-04-03 NOTE — PROGRESS NOTES
"Carolina Hernandez is a 55 year old female who is being evaluated via a billable video visit.      The patient has been notified of following:     \"This video visit will be conducted via a call between you and your physician/provider. We have found that certain health care needs can be provided without the need for an in-person physical exam.  This service lets us provide the care you need with a video conversation.  If a prescription is necessary we can send it directly to your pharmacy.  If lab work is needed we can place an order for that and you can then stop by our lab to have the test done at a later time.    Video visits are billed at different rates depending on your insurance coverage.  Please reach out to your insurance provider with any questions.    If during the course of the call the physician/provider feels a video visit is not appropriate, you will not be charged for this service.\"    Patient has given verbal consent for Video visit? Yes,  Please send invite to email: [email or web address]  How would you like to obtain your AVS? Mail a copy  If you are dropped from the video visit, the video invite should be resent to: Send to e-mail at: [email or web address] or mobile phone [phone]  Will anyone else be joining your video visit? No        Video-Visit Details    Type of service:  Video Visit    Video Start Time: 10:09 AM  Video End Time: 10:49 AM    Originating Location (pt. Location): Home    Distant Location (provider location):  [facility] [place] (provider's home)     Platform used for Video Visit: Ro Nava PA-C    GI CLINIC VISIT    CC/REFERRING MD:  Julia Oneill  REASON FOR CONSULTATION: loose stools     ASSESSMENT/PLAN:  Carolina Hernandez is a 55 year old woman with a past medical history of tobacco abuse, asthma, anxiety, depression presenting for evaluation of chronic diarrhea.     1.  Chronic diarrhea  Patient " "Physical Therapy   Daily Treatment     Patient Name: Marry Mathur  Age:  85 y.o., Sex:  female  Medical Record #: [id number]  Today's Date: 4/3/2024     Precautions  Precautions: (P) Fall Risk;Swallow Precautions;Nasogastric Tube  Comments: (P) Lft side deficits    Assessment    Pt awake/alert upon entry into the room, son BS. The pt make improvements functionally from previous PT session. The pt is beginning to assist w/sit->supine w/trunk and Rt LE management onto the bed. Once seated, static sit w/Rt UE support CGA->Min assist w/fatigue. The pt tolerated 4 STS from EOB w/her last effort hold static stand w/mod assist for linen adjustment. Initiated transfer to cardiac chair today for OOB tolerance, the pt is now more approp for w/c transfers and pre-gait in the // bars. The pt is actively moving all extremities, Lft remains weak. Pt inconsistent w/following of commands, requiring initiation of mobility tasks. \"I hope so\" when told she is getting up into the chair, low vocal tone.   PT will cont to follow    Plan    Treatment Plan Status: (P) Continue Current Treatment Plan  Type of Treatment: Bed Mobility, Gait Training, Manual Therapy, Neuro Re-Education / Balance, Self Care / Home Evaluation, Therapeutic Activities, Therapeutic Exercise  Treatment Frequency: 5 Times per Week  Treatment Duration: Until Therapy Goals Met    DC Equipment Recommendations: (P) Unable to determine at this time  Discharge Recommendations: (P) Recommend post-acute placement for additional physical therapy services prior to discharge home    Objective       04/03/24 1101   Charge Group   PT Therapeutic Activities (Units) 2   PT Neuromuscular Re-Education / Balance (Units) 1   PT Therapeutic Exercise (Units) 1   Total Time Spent   PT Therapeutic Activities Time Spent (Mins) 30   PT Neuromuscular Re-Education/Balance Time Spent (Mins) 15   PT Therapeutic Exercise Time Spent (Mins) 15   PT Total Time Spent (Calculated) 60 "   Precautions   Precautions Fall Risk;Swallow Precautions;Nasogastric Tube   Comments Lft side deficits   Other Treatments   Other Treatments Provided Initiated tx session w/supine Lft UE AAROM w/PNF D1 and D1, AAROM Lft LE, AROM Rt UE/LE. Rolling each direction x 3 to work on initiation of task, engaging trunk, and strengthening. EOB x 10 mins w/static sit of CGA and STS to scoot along EOB.   Balance   Sitting Balance (Static) Poor +   Sitting Balance (Dynamic) Poor -   Standing Balance (Static) Poor   Standing Balance (Dynamic) Poor -   Weight Shift Sitting Poor   Weight Shift Standing Absent   Bed Mobility    Supine to Sit Total Assist   Sit to Supine Maximal Assist   Scooting Total Assist  (seated)   Rolling Maximum Assist to Lt.;Total Assist to Rt.   Skilled Intervention Verbal Cuing;Postural Facilitation;Compensatory Strategies   Comments Sup<->sit w/HOB flat. The pt was able to initiate Rt LE onto the bed and assisted w/trunk. Total assist still required for sup->sit.   Gait Analysis   Gait Level Of Assist Unable to Participate   Comments PT to initiate pre-gait mobility in // bars next tx session.   Functional Mobility   Sit to Stand Maximal Assist  (STS from EOB)   Bed, Chair, Wheelchair Transfer Total Assist  (bed->cardiac chair)   Transfer Method   (supine slide)   Skilled Intervention Verbal Cuing;Postural Facilitation   Comments The pt managed 4 STS from EOB w/her last stand 20+ seconds for linen adjustment. The pt does give good effort w/her stands and assists w/the initiation. The pt is now more approp for pivot transfers to the w/c next tx session.   6 Clicks Assessment - How much HELP from from another person do you currently need... (If the patient hasn't done an activity recently, how much help from another person do you think he/she would need if he/she tried?)   Turning from your back to your side while in a flat bed without using bedrails? 1   Moving from lying on your back to sitting on the  reports about 3 years of diarrhea without any clear trigger.  Has had unremarkable colonoscopy with random biopsies for microscopic colitis.  Has also had negative chronic infectious stool studies, negative C. difficile, unremarkable basic labs.  Has not been tested for celiac disease, order placed at this time.  Patient does report he eats a significant amount of dairy, discussed recommendation to do either 2-week trial off of dairy versus breath test.  Patient would prefer a breath test.  Order placed.  Other possible sources of symptoms include medication side effect (with hydrochlorothiazide, fluoxetine), functional loose stools, small intestinal bacterial overgrowth.  Would recommend continuing fiber supplementation at this time.  Can also try low-dose scheduled Imodium.  Does not have any red flag symptoms to warrant repeat colonoscopy at this time, can consider colonoscopy or upper endoscopy pending clinical picture.  Future considerations include low FODMAP diet or trial of rifaximin for IBS-D.    We also discussed how exercise and tobacco cessation can improve her overall health.  Would recommend that she works on these goals.  -- breath testing for lactose intolerance   --Continue fiber on a daily basis  --Labs for celiac disease  --Start Imodium on a daily basis.  You can start with 1 tablet a day.  If you continue to have loose stools, you can increase up to 2 tablets a day.  Maximum dose is 8 tablets a day     Colorectal cancer screenin    RTC 2 months    Thank you for this consultation.  It was a pleasure to participate in the care of this patient; please contact us with any further questions.     I spent a total of 40minutes face-to-face (video) with Carolina Hernandez during today's office visit.  Over 50% of which was counseling/coordinating care regarding the above delineated issues. Please see note for details.     Note completed using voice recognition software. Some word and grammatical  "errors may occur.      Aimee Nava PA-C  Division of Gastroenterology, Hepatology & Nutrition  [facility]        HPI  Carolina Hernandez is a 55 year old woman with a past medical history of tobacco abuse, asthma, anxiety, depression presenting for evaluation of chronic diarrhea. She is new to [facility] clinic and this is my first encounter with the patient.     Today the patient reports a three year history of watery diarrhea. Has not been able to identify any specific triggers for her symptoms.  She describes 10+ bowel movements a day that are clustered the first couple of hours that she is awake.Sometimes will continue throughout the day. Consistency is variable.  Symptoms seem to worsen when she has fluids such as water and coffee. Will have urgency with bowel movements.  Can have blood with hemorrhoids. No nocturnal bowel movements. Has had incontinence with bowel movements. Sometimes abdominal pain (gas pain, rare cramping) but not usually.     Within the last 6 months, she has been having more issues with hemmorhoids. She does sometimes strain to have a bowel movement (when she feels a \"pressure from within\"). Was seen by colorectal surgery by Dr. Lane and was told to take metamucil which has helped but she continues to have symptoms (2 rounded tablespoons a day). Since starting fiber they float and and look like \"algae\". Most of the time it is liquid. She also reports increased gas and bloating with this.     She has tried adding more fiber to her diet with spinach, bananas, whole grains and vegetables. She is allergic to a lot of fresh fruits. Raw onions can worsen symptoms, peas and legumes can worsen symptoms.     Previous workup includes:  2019:  - c diff negative   2017:   -colonoscopy- unremarkable, negative biopsies for MC  -Stool studies- negative for giardia/crypto, enteric panel, Ova and parasite  -labs: normal TSH, CBC, hepatic panel     ROS:    No fevers or chills  No weight loss " side of a flat bed without using bedrails? 2   Moving to and from a bed to a chair (including a wheelchair)? 2   Standing up from a chair using your arms (e.g., wheelchair, or bedside chair)? 2   Walking in hospital room? 1   Climbing 3-5 steps with a railing? 1   6 clicks Mobility Score 9   Short Term Goals    Short Term Goal # 1 Pt will perform rolling with use of handrails and Mod A or facilitation by tx6   Goal Outcome # 1 goal not met   Short Term Goal # 2 Pt will perform side to sit to EOB with Mod A by tx 6   Goal Outcome # 2 Goal not met   Short Term Goal # 3 Pt will maintain static sitting balance with CGA for 3 min by tx 6   Goal Outcome # 3 Goal met   Short Term Goal # 4 Pt will transfer from bed to chair with Mod A by tx 6   Goal Outcome # 4 Goal not met   Short Term Goal # 5 Pt will ambulate for 8 ft x 4 in bars with Mod A  for facilitation by tx 6   Goal Outcome # 5 Goal not met   Education Group   Role of Physical Therapist Patient Response Patient;Acceptance;Explanation;Reinforcement Needed   Physical Therapy Treatment Plan   Physical Therapy Treatment Plan Continue Current Treatment Plan   Anticipated Discharge Equipment and Recommendations   DC Equipment Recommendations Unable to determine at this time   Discharge Recommendations Recommend post-acute placement for additional physical therapy services prior to discharge home   Interdisciplinary Plan of Care Collaboration   IDT Collaboration with  Nursing   Patient Position at End of Therapy Seated;Call Light within Reach;Family / Friend in Room   Collaboration Comments Nrsg notified of pts tx efforts   Session Information   Date / Session Number  4/3--2 (2/5, 4/4) PTA/1   Supervising Physical Therapist (PTA Treatments Only)   Supervising Physical Therapist Chris Ye            (has been gaining weight, heavier than she normally is)  No blurry vision, double vision or change in vision  No sore throat  No lymphadenopathy  No headache, paraesthesias, or weakness in a limb  + shortness of breath or wheezing- asthma has been getting worse in the past 6 months   No chest pain or pressure  + arthralgias or myalgias- osteoarthritis in both of her knees, lower lumbar of back (worsened with gardening)  + BRBPR- with hemorrhoids   No hot/cold intolerance or polyria  + anxiety or depression    PROBLEM LIST  Patient Active Problem List    Diagnosis Date Noted     Essential hypertension 07/26/2019     Priority: Medium     New diagnosis 7/26/2019         Recurrent major depressive disorder, in remission (H) 01/17/2019     Priority: Medium     Encounter for tobacco use cessation counseling 01/17/2019     Priority: Medium     Mild intermittent asthma without complication 01/17/2019     Priority: Medium     CARDIOVASCULAR SCREENING; LDL GOAL LESS THAN 160 10/31/2010     Priority: Medium       PERTINENT PAST MEDICAL HISTORY:  Depression  Anxiety  Osteoarthritis   Asthma     PREVIOUS SURGERIES:  No GI surgery     PREVIOUS ENDOSCOPY:  Colonoscopy in 2017 through care everywhere:  Impression:          - Diverticulosis in the sigmoid colon.                       - Non-bleeding internal hemorrhoids.                       - The examined portion of the ileum was normal.                       - The entire examined colon is normal. Biopsied.                       - The examination was otherwise normal on direct and                        retroflexion views.    Final Pathologic Diagnosis   Colon, random, biopsy -- No significant pathologic change     ALLERGIES:  Allergies   Allergen Reactions     Penicillins Itching       PERTINENT MEDICATIONS:    Current Outpatient Medications:      albuterol (PROAIR HFA/PROVENTIL HFA/VENTOLIN HFA) 108 (90 Base) MCG/ACT inhaler, Inhale 1-2 puffs into the lungs every 4 hours as  needed for shortness of breath / dyspnea or wheezing For shortness of breath, Disp: 18 g, Rfl: 11     albuterol (PROVENTIL) (2.5 MG/3ML) 0.083% neb solution, Inhale contents of 1 vial (2.5 mg) by nebulization every 4 hours as needed for shortness of breath / dyspnea or wheezing, Disp: 75 mL, Rfl: 4     amLODIPine (NORVASC) 2.5 MG tablet, Take 2 tablets (5 mg) by mouth daily Please fill at patient request, Disp: 180 tablet, Rfl: 3     cetirizine (ZYRTEC) 10 MG tablet, Take 10 mg by mouth daily, Disp: , Rfl:      FLUoxetine (PROZAC) 20 MG capsule, Take 1 capsule (20 mg) by mouth daily, Disp: 90 capsule, Rfl: 3     FLUoxetine (PROZAC) 40 MG capsule, Take 1 capsule (40 mg) by mouth daily Take with 20 mg tab for a total of 60 mg daily, Disp: 90 capsule, Rfl: 3     fluticasone-salmeterol (ADVAIR) 500-50 MCG/DOSE inhaler, Inhale 1 puff into the lungs every 12 hours Patient has tried the generic and this appears not to be working. May likely need a prior authorization., Disp: 1 Inhaler, Rfl: 0     hydrochlorothiazide (HYDRODIURIL) 25 MG tablet, Take 1 tablet (25 mg) by mouth once daily, Disp: 90 tablet, Rfl: 3     montelukast (SINGULAIR) 10 MG tablet, Take 1 tablet (10 mg) by mouth At Bedtime, Disp: 30 tablet, Rfl: 10     multivitamin  with lutein (OCUVITE WITH LTEIN) CAPS per capsule, Take 1 capsule by mouth daily, Disp: , Rfl:      omeprazole 20 MG tablet, Take 1 tablet (20 mg) by mouth daily, Disp: 90 tablet, Rfl: 3     fluticasone (FLONASE) 50 MCG/ACT nasal spray, Spray 1 spray into both nostrils daily, Disp: , Rfl:      fluticasone-salmeterol (ADVAIR) 500-50 MCG/DOSE inhaler, Inhale 1 puff into the lungs every 12 hours Please prescribe the generic ( Wixela ) (Patient not taking: Reported on 12/30/2020), Disp: 1 Inhaler, Rfl: 11   No NSAIDs    SOCIAL HISTORY:  Will drink beer (4-5 beers a 3 times a week), symptoms are the same if she avoids drinking   Smokes cigarettes (8-10 a day, has decreased from before)  Social  History     Socioeconomic History     Marital status:      Spouse name: Not on file     Number of children: Not on file     Years of education: Not on file     Highest education level: Not on file   Occupational History     Not on file   Social Needs     Financial resource strain: Not on file     Food insecurity     Worry: Not on file     Inability: Not on file     Transportation needs     Medical: Not on file     Non-medical: Not on file   Tobacco Use     Smoking status: Current Every Day Smoker     Packs/day: 0.25     Types: Cigarettes     Smokeless tobacco: Never Used     Tobacco comment: 6 cig a day    Substance and Sexual Activity     Alcohol use: Yes     Comment: 12 pack weekly     Drug use: No     Sexual activity: Not Currently   Lifestyle     Physical activity     Days per week: Not on file     Minutes per session: Not on file     Stress: Not on file   Relationships     Social connections     Talks on phone: Not on file     Gets together: Not on file     Attends Rastafari service: Not on file     Active member of club or organization: Not on file     Attends meetings of clubs or organizations: Not on file     Relationship status: Not on file     Intimate partner violence     Fear of current or ex partner: Not on file     Emotionally abused: Not on file     Physically abused: Not on file     Forced sexual activity: Not on file   Other Topics Concern     Not on file   Social History Narrative     Not on file       FAMILY HISTORY:  Family History   Problem Relation Age of Onset     Glaucoma Father      Coronary Artery Disease Father         stents     Cancer Maternal Grandfather      Coronary Artery Disease Paternal Grandfather      Schizophrenia Daughter      Diabetes Daughter      Cancer Daughter      Crohn's Disease No family hx of      Ulcerative Colitis No family hx of      GERD No family hx of      Stomach Cancer No family hx of        Past/family/social history reviewed and no changes    PHYSICAL  "EXAMINATION:  Vitals reviewed: Ht 1.6 m (5' 3\")   Wt 68.5 kg (151 lb)   BMI 26.75 kg/m    Wt:   Wt Readings from Last 2 Encounters:   12/30/20 68.5 kg (151 lb)   11/20/20 68.5 kg (151 lb)   General appearance: Healthy appearing adult, in no acute distress  Eyes: Sclera anicteric  Ears, nose, mouth and throat: No obvious external lesions of ears and nose, Hearing intact  Neck: Symmetric, No obvious external lesions  Respiratory: Normal respiration, no use of accessory muscles   Skin: No rashes or jaundice   Psychiatric: Oriented to person, place and time, Appropriate mood and affect.    PERTINENT STUDIES:    Office Visit on 11/20/2020   Component Date Value Ref Range Status     Cholesterol 11/20/2020 232* <200 mg/dL Final     Triglycerides 11/20/2020 232* <150 mg/dL Final     HDL Cholesterol 11/20/2020 98  >49 mg/dL Final     LDL Cholesterol Calculated 11/20/2020 88  <100 mg/dL Final     Non HDL Cholesterol 11/20/2020 134* <130 mg/dL Final     Sodium 11/20/2020 132* 133 - 144 mmol/L Final     Potassium 11/20/2020 3.3* 3.4 - 5.3 mmol/L Final     Chloride 11/20/2020 98  94 - 109 mmol/L Final     Carbon Dioxide 11/20/2020 28  20 - 32 mmol/L Final     Anion Gap 11/20/2020 6  3 - 14 mmol/L Final     Glucose 11/20/2020 98  70 - 99 mg/dL Final     Urea Nitrogen 11/20/2020 15  7 - 30 mg/dL Final     Creatinine 11/20/2020 0.81  0.52 - 1.04 mg/dL Final     GFR Estimate 11/20/2020 81  >60 mL/min/[1.73_m2] Final     GFR Estimate If Black 11/20/2020 >90  >60 mL/min/[1.73_m2] Final     Calcium 11/20/2020 9.8  8.5 - 10.1 mg/dL Final           "

## 2024-04-18 ENCOUNTER — OFFICE VISIT (OUTPATIENT)
Dept: ORTHOPEDICS | Facility: CLINIC | Age: 59
End: 2024-04-18
Payer: COMMERCIAL

## 2024-04-18 VITALS
DIASTOLIC BLOOD PRESSURE: 80 MMHG | HEART RATE: 84 BPM | WEIGHT: 167.5 LBS | SYSTOLIC BLOOD PRESSURE: 112 MMHG | HEIGHT: 63 IN | BODY MASS INDEX: 29.68 KG/M2

## 2024-04-18 DIAGNOSIS — M17.11 PRIMARY OSTEOARTHRITIS OF RIGHT KNEE: Primary | ICD-10-CM

## 2024-04-18 PROCEDURE — 20610 DRAIN/INJ JOINT/BURSA W/O US: CPT | Mod: RT | Performed by: ORTHOPAEDIC SURGERY

## 2024-04-18 RX ADMIN — BUPIVACAINE HYDROCHLORIDE 4 ML: 2.5 INJECTION, SOLUTION INFILTRATION; PERINEURAL at 15:16

## 2024-04-18 RX ADMIN — METHYLPREDNISOLONE ACETATE 80 MG: 80 INJECTION, SUSPENSION INTRA-ARTICULAR; INTRALESIONAL; INTRAMUSCULAR; SOFT TISSUE at 15:16

## 2024-04-18 ASSESSMENT — PAIN SCALES - GENERAL: PAINLEVEL: MILD PAIN (2)

## 2024-04-18 NOTE — PROGRESS NOTES
Chief Complaint   Patient presents with    Right Knee - Pain     Last injections: 1/24/24. Injection worked good for a couple of months. She got a new bike in February and she noticed pain after she started to ride it. She would like another injection today. Her left knee is doing good.          SURGERY: Total knee arthroplasty, left knee ([facility])  DATE OF SURGERY: 10/31/2023      HISTORY OF PRESENT ILLNESS: Carolina Hernandez is a 59 year old female seen for ongoing right knee pain. Her right knee has been hurting more now following her left total knee arthroplasty. Her last injection right knee was 1/24/2024, worked well for a couple of months. She'd like another right knee injection today.    Locates pain along the inner aspect and front of the knee, and can radiate down the leg to the ankle, up the thigh.  Pain is worse with work, lifting/carrying things, gardening. Treatment has been occasional use of over the counter pain medications without relief.      Pain at rest, pain at night better with injections. Sleeps with pillow between knees. Has tried tylenol arthritis and ibuprofen for pain but doesn't seem to really help.        Has chronic low back pain, denies numbness and tingling.    Her left knee replacement is doing well. She's thinking of right total knee arthroplasty 1/2025.        Patient has tried:     NSAIDS: Yes      Physical Therapy: Yes      Activity modification: Yes      Injections: Yes cortisone 1/2024, 10/2023, 5/15/2023, 2/2023, 11/2022, 8/15/2022, 8/20/2021 cortisone;    4/27/2022 with SynviscOne.     Ice: Yes      Assistive device:  Yes     Other: tylenol. Topical ointments.      Past medical history:   Past Medical History:   Diagnosis Date    Asthma     C. difficile colitis     COPD (chronic obstructive pulmonary disease) (H)     Depression     Depressive disorder Have had it most of my life    Hypertension     Moderate persistent asthma with exacerbation 09/18/2007     Osteoarthritis     Sinusitis, chronic     Status post coronary angiogram 10/14/2022     Patient Active Problem List    Diagnosis Date Noted    Severe persistent asthma without complication (H28) 02/07/2024     Priority: Medium    Osteoarthritis of left knee 11/01/2023     Priority: Medium    S/P total knee arthroplasty, left 10/31/2023     Priority: Medium    Fatty liver 11/01/2022     Priority: Medium     ultrasound 10/29/22      COPD vs Asthma 10/30/2022     Priority: Medium     PFTS 5/2021 - FEV1- 1.21 (48%), ratio 0.50, no change with bronchodilators,  %, %, DLCO 94%       Transaminitis 10/29/2022     Priority: Medium    Acute pancreatitis 10/29/2022     Priority: Medium    Nonobstructive atherosclerosis of coronary artery 10/21/2022     Priority: Medium      H/o exertional chest pain leading to coronary angiogram 10/14/2022 which showed non-obstructing CAD      Hyperlipidemia LDL goal <70 10/21/2022     Priority: Medium    Low back pain due to bilateral sciatica 10/27/2021     Priority: Medium    Primary osteoarthritis of both knees 09/23/2021     Priority: Medium    Benign essential hypertension 07/26/2019     Priority: Medium     New diagnosis 7/26/2019        Recurrent major depressive disorder, in remission (H24) 01/17/2019     Priority: Medium    GERD (gastroesophageal reflux disease) 07/31/2012     Priority: Medium    Seasonal allergies 07/31/2012     Priority: Medium       Past Surgical History:   Past Surgical History:   Procedure Laterality Date    ARTHROPLASTY KNEE Left 10/31/2023    Procedure: ARTHROPLASTY, KNEE, TOTAL left;  Surgeon: Giovanny Chong MD;  Location: [facility] OR    COLONOSCOPY  2017    CV CORONARY ANGIOGRAM N/A 10/14/2022    Procedure: Coronary Angiogram;  Surgeon: Fidel Martin MD;  Location: [facility] CARDIAC CATH LAB    ESOPHAGOSCOPY, GASTROSCOPY, DUODENOSCOPY (EGD), COMBINED N/A 05/05/2022    Procedure: ESOPHAGOGASTRODUODENOSCOPY, WITH BIOPSY;  Surgeon: Pj  DO Timothy;  Location: [facility] OR    LAPAROSCOPIC CHOLECYSTECTOMY WITH CHOLANGIOGRAMS N/A 11/21/2022    Procedure: CHOLECYSTECTOMY, LAPAROSCOPIC, WITH CHOLANGIOGRAM;  Surgeon: Eros Butt DO;  Location: [facility] OR       Medications:   Current Outpatient Medications:     acetaminophen (TYLENOL) 325 MG tablet, Take 2 tablets (650 mg) by mouth every 4 hours as needed for other (mild pain), Disp: 100 tablet, Rfl: 0    albuterol (PROVENTIL) (2.5 MG/3ML) 0.083% neb solution, inhale 1 vial (2.5 mg) by nebulization every 4 hours as needed for shortness of breath / dyspnea or wheezing, Disp: 225 mL, Rfl: 11    albuterol (VENTOLIN HFA) 108 (90 Base) MCG/ACT inhaler, INHALE 1-2 PUFFS INTO THE LUNGS EVERY 4 HOURS AS NEEDED FOR SHORTNESS OF BREATH/DYSPNEA OR WHEEZING ., Disp: 18 g, Rfl: 11    amLODIPine (NORVASC) 2.5 MG tablet, Take 2 tablets (5 mg) by mouth once daily., Disp: 180 tablet, Rfl: 3    cetirizine (ZYRTEC) 10 MG tablet, Take 10 mg by mouth daily, Disp: , Rfl:     co-enzyme Q-10 100 MG CAPS capsule, Take 100 mg by mouth daily as needed, Disp: , Rfl:     FLUoxetine (PROZAC) 20 MG capsule, Take 1 capsule (20 mg) by mouth daily, Disp: 90 capsule, Rfl: 3    FLUoxetine (PROZAC) 40 MG capsule, Take 1 capsule (40 mg) by mouth daily Take with 20 mg tab for a total of 60 mg daily, Disp: 90 capsule, Rfl: 3    fluticasone-salmeterol (ADVAIR) 500-50 MCG/ACT inhaler, Inhale 1 puff into the lungs every 12 hours, Disp: 1 each, Rfl: 11    montelukast (SINGULAIR) 10 MG tablet, Take 1 tablet (10 mg) by mouth At Bedtime, Disp: 90 tablet, Rfl: 3    multivitamin (CENTRUM SILVER) tablet, Take 1 tablet by mouth daily, Disp: , Rfl:     Omega-3 Fatty Acids (FISH OIL ULTRA) 1400 MG CAPS, Take 1 capsule by mouth daily, Disp: , Rfl:     omeprazole 20 MG tablet, Take 20 mg by mouth daily, Disp: , Rfl:     tiotropium (SPIRIVA) 18 MCG inhaled capsule, Inhale 1 capsule (18 mcg) into the lungs daily, Disp: 30 capsule, Rfl: 11    Current  "Facility-Administered Medications:     BUPivacaine (MARCAINE) 0.25 % injection 4 mL, 4 mL, , , Giovanny Chong MD, 4 mL at 01/24/24 1330    methylPREDNISolone (DEPO-Medrol) injection 80 mg, 80 mg, , , Giovanny Chong MD, 80 mg at 01/24/24 1330    Allergies:   Allergies   Allergen Reactions    Doxycycline Difficulty breathing    Penicillins Itching    Sucralfate Rash         REVIEW OF SYSTEMS:  CONSTITUTIONAL:NEGATIVE for fever, chills, night sweats, change in weight  INTEGUMENTARY/SKIN: NEGATIVE for worrisome rashes, moles or lesions  MUSCULOSKELETAL:See HPI above  NEURO: NEGATIVE for weakness, dizziness or paresthesias  CARDIOVASCULAR: negative for chest pain, shortness of breath    PHYSICAL EXAM:  /80   Pulse 84   Ht 1.588 m (5' 2.52\")   Wt 76 kg (167 lb 8 oz)   BMI 30.13 kg/m     GENERAL APPEARANCE: healthy, alert, no distress  SKIN: no suspicious lesions or rashes  NEURO: Normal strength and tone, mentation intact and speech normal  PSYCH:  mentation appears normal and affect normal  RESPIRATORY: No increased work of breathing.    BILATERAL LOWER EXTREMITIES:  Gait: favors the right.    Right lower extremity:  Intact sensation deep peroneal nerve, superficial peroneal nerve, med/lat tibial nerve, sural nerve, saphenous nerve  Intact EHL, EDL, TA, FHL, GS, quadriceps hamstrings and hip flexors   calf soft and nttp or squeeze.  Edema: trace      RIGHT KNEE EXAM:    Skin: intact, no ecchymosis or erythema  ROM: full extension to 125+ flexion  Tight hamstrings on straight leg raise.  Effusion: none  Tender: medial joint line, lateral joint line.     MCL: stable, and non-painful at both 0 and 30 degrees knee flexion  Varus stress: stable, and non-painful at both 0 and 30 degrees knee flexion  Lachmans: neg, firm endpoint  Posterior Drawer stable  Patellofemoral joint:                Apprehension: negative              Crepitations: mild              Grind: positive.      X-RAY:  3 views bilateral knee " "from 7/26/2023 --  Bilateral medial compartment severe joint space narrowing, bone bone bone with articular flattening, subcondral sclerosis. Bilateral patellofemoral lateral facet mild joint space narrowing with medial  patello-femoral osteophytes.     2 views left knee from 1/24/2024 -- No obvious fractures or dislocations. Total knee arthroplasty components in place without evidence of failure or loosening.      Impression: Carolina Hernandez is a 59 year old female:  1)   chronic right knee pain, primary osteoarthritis.  2) status post left total knee arthroplasty, doing well.      Plan:   * reviewed imaging studies with patient, showing arthritic changes, or wearing of the cartilage in the knee. This can be caused by normal \"wear and tear\" over the years or following prior injury to the knee.  Treatment typically starts nonsurgically. Surgical indication for total knee arthroplasty  when nonsurgical management is no longer effective.    Non-surgical treatment for knee arthritis includes:    * rest, sitting  * Activity modification - avoid impact activities or activities that aggravate symptoms.  * NSAIDS (non-steroidal anti-inflammatory medications; e.g. Aleve, advil, motrin, ibuprofen) - regular use for inflammation ( twice daily or three times daily), with food, as long as no contra-indications Please discuss with primary care doctor if needed  * ice, 15-20 minutes at a time several times a day or as needed.  * Strengthening of quadriceps muscles  * Physical Therapy for strengthening, stretching and range of motion exercises of legs  * Tylenol as needed for pain, consider Tylenol arthritis or similar  * Weight loss: Weight loss:  Body mass index is 30.13 kg/m .. weight loss benefits, not only for the current pain symptoms, but also overall health. Recommend a good diet plan that works for the patient, with the assistance of a dietician or primary care doctor as needed. Also, a good, low-impact exercise program " "for at least 20 minutes per day, 3 times per week, such as exercise bike, elliptical , or pool.  * Exercise: low impact such as stationary bike, elliptical, pool.  * Injections: cortisone versus viscosupplementation (hyaluronic acid, \"rooster comb\", \"gel shots\"); risks and perceived benefits discussed today. Patient elects to proceed.  * Bracing: bracing the knee may offer some relief of symptoms when worn and provide some stability.  * over the counter supplements such as glucosamine and chondroitin sulfate may help with joint pain.  * topical ointments may help as well    * return to clinic as needed.       Giovanny Chong M.D., M.S.  Dept. of Orthopaedic Surgery  [facility]       Large Joint Injection/Arthocentesis: R knee joint    Date/Time: 4/18/2024 3:16 PM    Performed by: Acosta Eng PA  Authorized by: Giovanny Chong MD    Indications:  Pain and osteoarthritis  Needle Size:  22 G  Guidance: landmark guided    Approach:  Anteromedial  Location:  Knee      Medications:  80 mg methylPREDNISolone 80 MG/ML; 4 mL BUPivacaine 0.25 %  Outcome:  Tolerated well, no immediate complications  Procedure discussed: discussed risks, benefits, and alternatives    Consent Given by:  Patient  Prep: patient was prepped and draped in usual sterile fashion        "

## 2024-04-18 NOTE — LETTER
4/18/2024         RE: Carolina Hernandez  [address] [facility] [address]        Dear Colleague,    Thank you for referring your patient, Carolina Hernandez, to the [facility]. Please see a copy of my visit note below.    Chief Complaint   Patient presents with     Right Knee - Pain     Last injections: 1/24/24. Injection worked good for a couple of months. She got a new bike in February and she noticed pain after she started to ride it. She would like another injection today. Her left knee is doing good.          SURGERY: Total knee arthroplasty, left knee ([facility])  DATE OF SURGERY: 10/31/2023      HISTORY OF PRESENT ILLNESS: Carolina Hernandez is a 59 year old female seen for ongoing right knee pain. Her right knee has been hurting more now following her left total knee arthroplasty. Her last injection right knee was 1/24/2024, worked well for a couple of months. She'd like another right knee injection today.    Locates pain along the inner aspect and front of the knee, and can radiate down the leg to the ankle, up the thigh.  Pain is worse with work, lifting/carrying things, gardening. Treatment has been occasional use of over the counter pain medications without relief.      Pain at rest, pain at night better with injections. Sleeps with pillow between knees. Has tried tylenol arthritis and ibuprofen for pain but doesn't seem to really help.        Has chronic low back pain, denies numbness and tingling.    Her left knee replacement is doing well. She's thinking of right total knee arthroplasty 1/2025.        Patient has tried:     NSAIDS: Yes      Physical Therapy: Yes      Activity modification: Yes      Injections: Yes cortisone 1/2024, 10/2023, 5/15/2023, 2/2023, 11/2022, 8/15/2022, 8/20/2021 cortisone;    4/27/2022 with SynviscOne.     Ice: Yes      Assistive device:  Yes     Other: tylenol. Topical ointments.      Past medical history:    Past Medical History:   Diagnosis Date     Asthma      C. difficile colitis      COPD (chronic obstructive pulmonary disease) (H)      Depression      Depressive disorder Have had it most of my life     Hypertension      Moderate persistent asthma with exacerbation 09/18/2007     Osteoarthritis      Sinusitis, chronic      Status post coronary angiogram 10/14/2022     Patient Active Problem List    Diagnosis Date Noted     Severe persistent asthma without complication (H28) 02/07/2024     Priority: Medium     Osteoarthritis of left knee 11/01/2023     Priority: Medium     S/P total knee arthroplasty, left 10/31/2023     Priority: Medium     Fatty liver 11/01/2022     Priority: Medium     ultrasound 10/29/22       COPD vs Asthma 10/30/2022     Priority: Medium     PFTS 5/2021 - FEV1- 1.21 (48%), ratio 0.50, no change with bronchodilators,  %, %, DLCO 94%        Transaminitis 10/29/2022     Priority: Medium     Acute pancreatitis 10/29/2022     Priority: Medium     Nonobstructive atherosclerosis of coronary artery 10/21/2022     Priority: Medium      H/o exertional chest pain leading to coronary angiogram 10/14/2022 which showed non-obstructing CAD       Hyperlipidemia LDL goal <70 10/21/2022     Priority: Medium     Low back pain due to bilateral sciatica 10/27/2021     Priority: Medium     Primary osteoarthritis of both knees 09/23/2021     Priority: Medium     Benign essential hypertension 07/26/2019     Priority: Medium     New diagnosis 7/26/2019         Recurrent major depressive disorder, in remission (H24) 01/17/2019     Priority: Medium     GERD (gastroesophageal reflux disease) 07/31/2012     Priority: Medium     Seasonal allergies 07/31/2012     Priority: Medium       Past Surgical History:   Past Surgical History:   Procedure Laterality Date     ARTHROPLASTY KNEE Left 10/31/2023    Procedure: ARTHROPLASTY, KNEE, TOTAL left;  Surgeon: Giovanny Chong MD;  Location: [facility] OR     COLONOSCOPY   2017     CV CORONARY ANGIOGRAM N/A 10/14/2022    Procedure: Coronary Angiogram;  Surgeon: Fidel Martin MD;  Location:  [facility] C[facility] C[facility]     ESOPHAGOSCOPY, GASTROSCOPY, DUODENOSCOPY (EGD), COMBINED N/A 05/05/2022    Procedure: ESOPHAGOGASTRODUODENOSCOPY, WITH BIOPSY;  Surgeon: Timothy Oliva DO;  Location: [facility] OR     LAPAROSCOPIC CHOLECYSTECTOMY WITH CHOLANGIOGRAMS N/A 11/21/2022    Procedure: CHOLECYSTECTOMY, LAPAROSCOPIC, WITH CHOLANGIOGRAM;  Surgeon: Eros Butt DO;  Location: [facility] OR       Medications:   Current Outpatient Medications:      acetaminophen (TYLENOL) 325 MG tablet, Take 2 tablets (650 mg) by mouth every 4 hours as needed for other (mild pain), Disp: 100 tablet, Rfl: 0     albuterol (PROVENTIL) (2.5 MG/3ML) 0.083% neb solution, inhale 1 vial (2.5 mg) by nebulization every 4 hours as needed for shortness of breath / dyspnea or wheezing, Disp: 225 mL, Rfl: 11     albuterol (VENTOLIN HFA) 108 (90 Base) MCG/ACT inhaler, INHALE 1-2 PUFFS INTO THE LUNGS EVERY 4 HOURS AS NEEDED FOR SHORTNESS OF BREATH/DYSPNEA OR WHEEZING ., Disp: 18 g, Rfl: 11     amLODIPine (NORVASC) 2.5 MG tablet, Take 2 tablets (5 mg) by mouth once daily., Disp: 180 tablet, Rfl: 3     cetirizine (ZYRTEC) 10 MG tablet, Take 10 mg by mouth daily, Disp: , Rfl:      co-enzyme Q-10 100 MG CAPS capsule, Take 100 mg by mouth daily as needed, Disp: , Rfl:      FLUoxetine (PROZAC) 20 MG capsule, Take 1 capsule (20 mg) by mouth daily, Disp: 90 capsule, Rfl: 3     FLUoxetine (PROZAC) 40 MG capsule, Take 1 capsule (40 mg) by mouth daily Take with 20 mg tab for a total of 60 mg daily, Disp: 90 capsule, Rfl: 3     fluticasone-salmeterol (ADVAIR) 500-50 MCG/ACT inhaler, Inhale 1 puff into the lungs every 12 hours, Disp: 1 each, Rfl: 11     montelukast (SINGULAIR) 10 MG tablet, Take 1 tablet (10 mg) by mouth At Bedtime, Disp: 90 tablet, Rfl: 3     multivitamin (CENTRUM SILVER) tablet, Take 1 tablet by mouth daily, Disp: ,  "Rfl:      Omega-3 Fatty Acids (FISH OIL ULTRA) 1400 MG CAPS, Take 1 capsule by mouth daily, Disp: , Rfl:      omeprazole 20 MG tablet, Take 20 mg by mouth daily, Disp: , Rfl:      tiotropium (SPIRIVA) 18 MCG inhaled capsule, Inhale 1 capsule (18 mcg) into the lungs daily, Disp: 30 capsule, Rfl: 11    Current Facility-Administered Medications:      BUPivacaine (MARCAINE) 0.25 % injection 4 mL, 4 mL, , , Giovanny Chong MD, 4 mL at 01/24/24 1330     methylPREDNISolone (DEPO-Medrol) injection 80 mg, 80 mg, , , Giovanny Chong MD, 80 mg at 01/24/24 1330    Allergies:   Allergies   Allergen Reactions     Doxycycline Difficulty breathing     Penicillins Itching     Sucralfate Rash         REVIEW OF SYSTEMS:  CONSTITUTIONAL:NEGATIVE for fever, chills, night sweats, change in weight  INTEGUMENTARY/SKIN: NEGATIVE for worrisome rashes, moles or lesions  MUSCULOSKELETAL:See HPI above  NEURO: NEGATIVE for weakness, dizziness or paresthesias  CARDIOVASCULAR: negative for chest pain, shortness of breath    PHYSICAL EXAM:  /80   Pulse 84   Ht 1.588 m (5' 2.52\")   Wt 76 kg (167 lb 8 oz)   BMI 30.13 kg/m     GENERAL APPEARANCE: healthy, alert, no distress  SKIN: no suspicious lesions or rashes  NEURO: Normal strength and tone, mentation intact and speech normal  PSYCH:  mentation appears normal and affect normal  RESPIRATORY: No increased work of breathing.    BILATERAL LOWER EXTREMITIES:  Gait: favors the right.    Right lower extremity:  Intact sensation deep peroneal nerve, superficial peroneal nerve, med/lat tibial nerve, sural nerve, saphenous nerve  Intact EHL, EDL, TA, FHL, GS, quadriceps hamstrings and hip flexors   calf soft and nttp or squeeze.  Edema: trace      RIGHT KNEE EXAM:    Skin: intact, no ecchymosis or erythema  ROM: full extension to 125+ flexion  Tight hamstrings on straight leg raise.  Effusion: none  Tender: medial joint line, lateral joint line.     MCL: stable, and non-painful at both 0 and " "30 degrees knee flexion  Varus stress: stable, and non-painful at both 0 and 30 degrees knee flexion  Lachmans: neg, firm endpoint  Posterior Drawer stable  Patellofemoral joint:                Apprehension: negative              Crepitations: mild              Grind: positive.      X-RAY:  3 views bilateral knee from 7/26/2023 --  Bilateral medial compartment severe joint space narrowing, bone bone bone with articular flattening, subcondral sclerosis. Bilateral patellofemoral lateral facet mild joint space narrowing with medial  patello-femoral osteophytes.     2 views left knee from 1/24/2024 -- No obvious fractures or dislocations. Total knee arthroplasty components in place without evidence of failure or loosening.      Impression: Carolina Hernandez is a 59 year old female:  1)   chronic right knee pain, primary osteoarthritis.  2) status post left total knee arthroplasty, doing well.      Plan:   * reviewed imaging studies with patient, showing arthritic changes, or wearing of the cartilage in the knee. This can be caused by normal \"wear and tear\" over the years or following prior injury to the knee.  Treatment typically starts nonsurgically. Surgical indication for total knee arthroplasty  when nonsurgical management is no longer effective.    Non-surgical treatment for knee arthritis includes:    * rest, sitting  * Activity modification - avoid impact activities or activities that aggravate symptoms.  * NSAIDS (non-steroidal anti-inflammatory medications; e.g. Aleve, advil, motrin, ibuprofen) - regular use for inflammation ( twice daily or three times daily), with food, as long as no contra-indications Please discuss with primary care doctor if needed  * ice, 15-20 minutes at a time several times a day or as needed.  * Strengthening of quadriceps muscles  * Physical Therapy for strengthening, stretching and range of motion exercises of legs  * Tylenol as needed for pain, consider Tylenol arthritis or " "similar  * Weight loss: Weight loss:  Body mass index is 30.13 kg/m .. weight loss benefits, not only for the current pain symptoms, but also overall health. Recommend a good diet plan that works for the patient, with the assistance of a dietician or primary care doctor as needed. Also, a good, low-impact exercise program for at least 20 minutes per day, 3 times per week, such as exercise bike, elliptical , or pool.  * Exercise: low impact such as stationary bike, elliptical, pool.  * Injections: cortisone versus viscosupplementation (hyaluronic acid, \"rooster comb\", \"gel shots\"); risks and perceived benefits discussed today. Patient elects to proceed.  * Bracing: bracing the knee may offer some relief of symptoms when worn and provide some stability.  * over the counter supplements such as glucosamine and chondroitin sulfate may help with joint pain.  * topical ointments may help as well    * return to clinic as needed.       Giovanny Chong M.D., M.S.  Dept. of Orthopaedic Surgery  [facility]       Large Joint Injection/Arthocentesis: R knee joint    Date/Time: 4/18/2024 3:16 PM    Performed by: Acosta Eng PA  Authorized by: Giovanny Chong MD    Indications:  Pain and osteoarthritis  Needle Size:  22 G  Guidance: landmark guided    Approach:  Anteromedial  Location:  Knee      Medications:  80 mg methylPREDNISolone 80 MG/ML; 4 mL BUPivacaine 0.25 %  Outcome:  Tolerated well, no immediate complications  Procedure discussed: discussed risks, benefits, and alternatives    Consent Given by:  Patient  Prep: patient was prepped and draped in usual sterile fashion          Again, thank you for allowing me to participate in the care of your patient.        Sincerely,        Giovanny Chong MD  "

## 2024-04-19 RX ORDER — METHYLPREDNISOLONE ACETATE 80 MG/ML
80 INJECTION, SUSPENSION INTRA-ARTICULAR; INTRALESIONAL; INTRAMUSCULAR; SOFT TISSUE
Status: DISCONTINUED | OUTPATIENT
Start: 2024-04-18 | End: 2024-07-25

## 2024-04-19 RX ORDER — BUPIVACAINE HYDROCHLORIDE 2.5 MG/ML
4 INJECTION, SOLUTION INFILTRATION; PERINEURAL
Status: DISCONTINUED | OUTPATIENT
Start: 2024-04-18 | End: 2024-07-25

## 2024-05-10 ENCOUNTER — OFFICE VISIT (OUTPATIENT)
Dept: FAMILY MEDICINE | Facility: CLINIC | Age: 59
End: 2024-05-10
Payer: COMMERCIAL

## 2024-05-10 VITALS
TEMPERATURE: 98 F | HEART RATE: 71 BPM | HEIGHT: 62 IN | RESPIRATION RATE: 20 BRPM | SYSTOLIC BLOOD PRESSURE: 150 MMHG | BODY MASS INDEX: 30.73 KG/M2 | DIASTOLIC BLOOD PRESSURE: 86 MMHG | WEIGHT: 167 LBS | OXYGEN SATURATION: 97 %

## 2024-05-10 DIAGNOSIS — J44.9 CHRONIC OBSTRUCTIVE PULMONARY DISEASE, UNSPECIFIED COPD TYPE (H): Primary | Chronic | ICD-10-CM

## 2024-05-10 DIAGNOSIS — I25.10 NONOBSTRUCTIVE ATHEROSCLEROSIS OF CORONARY ARTERY: ICD-10-CM

## 2024-05-10 DIAGNOSIS — I10 ESSENTIAL HYPERTENSION: ICD-10-CM

## 2024-05-10 DIAGNOSIS — F33.40 RECURRENT MAJOR DEPRESSIVE DISORDER, IN REMISSION (H): ICD-10-CM

## 2024-05-10 PROCEDURE — 99214 OFFICE O/P EST MOD 30 MIN: CPT | Performed by: FAMILY MEDICINE

## 2024-05-10 RX ORDER — FLUOXETINE 40 MG/1
40 CAPSULE ORAL DAILY
Qty: 90 CAPSULE | Refills: 3 | Status: SHIPPED | OUTPATIENT
Start: 2024-05-10 | End: 2024-08-26

## 2024-05-10 RX ORDER — PREDNISONE 20 MG/1
TABLET ORAL
Qty: 20 TABLET | Refills: 0 | Status: SHIPPED | OUTPATIENT
Start: 2024-05-10 | End: 2024-05-23

## 2024-05-10 RX ORDER — AZITHROMYCIN 250 MG/1
TABLET, FILM COATED ORAL
Qty: 6 TABLET | Refills: 0 | Status: SHIPPED | OUTPATIENT
Start: 2024-05-10 | End: 2024-05-15

## 2024-05-10 RX ORDER — AMLODIPINE BESYLATE 2.5 MG/1
TABLET ORAL
Qty: 180 TABLET | Refills: 3 | Status: SHIPPED | OUTPATIENT
Start: 2024-05-10

## 2024-05-10 ASSESSMENT — ANXIETY QUESTIONNAIRES
1. FEELING NERVOUS, ANXIOUS, OR ON EDGE: SEVERAL DAYS
7. FEELING AFRAID AS IF SOMETHING AWFUL MIGHT HAPPEN: SEVERAL DAYS
GAD7 TOTAL SCORE: 12
5. BEING SO RESTLESS THAT IT IS HARD TO SIT STILL: NEARLY EVERY DAY
GAD7 TOTAL SCORE: 12
6. BECOMING EASILY ANNOYED OR IRRITABLE: MORE THAN HALF THE DAYS
2. NOT BEING ABLE TO STOP OR CONTROL WORRYING: SEVERAL DAYS
3. WORRYING TOO MUCH ABOUT DIFFERENT THINGS: SEVERAL DAYS

## 2024-05-10 ASSESSMENT — PATIENT HEALTH QUESTIONNAIRE - PHQ9
SUM OF ALL RESPONSES TO PHQ QUESTIONS 1-9: 5
5. POOR APPETITE OR OVEREATING: NEARLY EVERY DAY

## 2024-05-10 ASSESSMENT — ENCOUNTER SYMPTOMS: COUGH: 1

## 2024-05-10 ASSESSMENT — PAIN SCALES - GENERAL: PAINLEVEL: MODERATE PAIN (5)

## 2024-05-10 NOTE — PROGRESS NOTES
"  Assessment & Plan     (J44.9) Chronic obstructive pulmonary disease, unspecified COPD type (H)  (primary encounter diagnosis)  Comment: Patient likely has a  m mild flare. Lung exam was normal. Recommend starting with prednisone and if no improvement can start the antibiotics.   Plan: Nebulizer and Supplies Order for DME - ONLY FOR        DME, predniSONE (DELTASONE) 20 MG tablet,         azithromycin (ZITHROMAX) 250 MG tablet            (I25.10) Nonobstructive atherosclerosis of coronary artery  Comment: No new symptoms  Plan: Continue with lifestyle changes.     (I10) Essential hypertension  Comment: Blood pressure quite elevated. BPs are normally okay. Recommend recheck in a few weeks. Could be because patient is sick. Medication faxed.   Plan: amLODIPine (NORVASC) 2.5 MG tablet            (F33.40) Recurrent major depressive disorder, in remission (H24)  Comment: medication faxed.  Plan: FLUoxetine (PROZAC) 20 MG capsule, FLUoxetine         (PROZAC) 40 MG capsule                    BMI  Estimated body mass index is 30.54 kg/m  as calculated from the following:    Height as of this encounter: 1.575 m (5' 2\").    Weight as of this encounter: 75.8 kg (167 lb).       Depression Screening Follow Up                  Follow Up Actions Taken  Crisis resource information provided in the After Visit Summary    Discussed the following ways the patient can remain in a safe environment:  remove alcohol, remove drugs, and be around others    FUTURE APPOINTMENTS:       - Follow-up visit as needed.    Thor Figueroa is a 59 year old, presenting for the following health issues:    Patient is a 59 yr old female here for URI symptoms. She has had this since Monday. She reports some difficulty with breathing, she is coughing up greenish phlegm. She has a history of COPD. She denies any fevers or chills. She has been more fatigued she is also in need of some refills. She also needs a new neb machine.   Cough (Cough and cold " symptoms.  Not sleeping well at night due to the congestion.), Medication Request (She feels she needs a new nebulizer machine.  She will use this 3-4 times daily.  The machine will feel hot and she doesn't feel it is running as is should.), Hypertension (Recheck on blood pressure medication. ), Depression (Recheck on depression medication. ), and Imm/Inj (Mentioned about updating her Shingles and RSV injection when feeling better.)        5/10/2024    10:08 AM   Additional Questions   Roomed by Roxanne Morin CMA     History of Present Illness       Mental Health Follow-up:  Patient presents to follow-up on Depression.Patient's depression since last visit has been:  Good  The patient is having other symptoms associated with depression.      Any significant life events: other  Patient is feeling anxious or having panic attacks.  Patient has no concerns about alcohol or drug use.    Hypertension: She presents for follow up of hypertension.  She does check blood pressure  regularly outside of the clinic. Outpatient blood pressures have not been over 140/90. She follows a low salt diet.     She eats 2-3 servings of fruits and vegetables daily.She consumes 0 sweetened beverage(s) daily.She exercises with enough effort to increase her heart rate 60 or more minutes per day.  She exercises with enough effort to increase her heart rate 3 or less days per week.   She is taking medications regularly.     Chief Complaint   Patient presents with    Cough     Cough and cold symptoms.  Not sleeping well at night due to the congestion.    Medication Request     She feels she needs a new nebulizer machine.  She will use this 3-4 times daily.  The machine will feel hot and she doesn't feel it is running as is should.    Hypertension     Recheck on blood pressure medication.     Depression     Recheck on depression medication.     Imm/Inj     Mentioned about updating her Shingles and RSV injection when feeling better.       Acute  "Illness  Acute illness concerns: Cough with other symptoms.  Onset/Duration: Started on Monday.  Symptoms:  Fever: No  Chills/Sweats: YES- had that yesterday.   Headache (location?): YES- temple/forehead area.  Sinus Pressure: YES- bilateral.  Conjunctivitis:  No  Ear Pain: Drainage from the left ear, no pain.  Rhinorrhea: YES  Congestion: YES  Sore Throat: YES from the drainage.  Cough: YES-productive of green sputum, feeling more short of breath than usual.  Wheeze: No  Decreased Appetite: YES  Nausea: YES- at times.  Vomiting: No  Diarrhea: No  Dysuria/Freq.: No  Dysuria or Hematuria: No  Fatigue/Achiness: YES- fatigue and body aches.  Sick/Strep Exposure: No  Therapies tried and outcome: Mucinex every 12 hours, Nebulizer and Inhaler.        Review of Systems  CONSTITUTIONAL: NEGATIVE for fever, chills, change in weight  INTEGUMENTARY/SKIN: NEGATIVE for worrisome rashes, moles or lesions  EYES: NEGATIVE for vision changes or irritation  ENT/MOUTH: POSITIVE for ear pain bilateral, ear pain bilateral, nasal congestion, postnasal drainage, and sinus pressure  RESP:POSITIVE for , cough-productive, dyspnea on exertion, Hx chronic bronchitis, and Hx COPD  CV: NEGATIVE for chest pain, palpitations or peripheral edema  GI: NEGATIVE for nausea, abdominal pain, heartburn, or change in bowel habits  : negative for, dysuria, urgency, and vaginal discharge  : negative for dysuria, hematuria, decreased urinary stream, erectile dysfunction  MUSCULOSKELETAL: NEGATIVE for significant arthralgias or myalgia  NEURO: NEGATIVE for weakness, dizziness or paresthesias  ENDOCRINE: NEGATIVE for temperature intolerance, skin/hair changes  HEME/ALLERGY/IMMUNE: NEGATIVE for bleeding problems  PSYCHIATRIC: NEGATIVE for changes in mood or affect      Objective    BP (!) 150/86 (BP Location: Left arm, Patient Position: Chair, Cuff Size: Adult Regular)   Pulse 71   Temp 98  F (36.7  C) (Tympanic)   Resp 20   Ht 1.575 m (5' 2\")   Wt " 75.8 kg (167 lb)   SpO2 97%   BMI 30.54 kg/m    Body mass index is 30.54 kg/m .  Physical Exam   GENERAL: alert and no distress  EYES: Eyes grossly normal to inspection, PERRL and conjunctivae and sclerae normal  HENT: ear canals and TM's normal, nose and mouth without ulcers or lesions  NECK: no adenopathy, no asymmetry, masses, or scars  RESP: lungs clear to auscultation - no rales, rhonchi or wheezes  CV: regular rate and rhythm, normal S1 S2, no S3 or S4, no murmur, click or rub, no peripheral edema  MS: no gross musculoskeletal defects noted, no edema  SKIN: no suspicious lesions or rashes  PSYCH: mentation appears normal, affect normal/bright            Signed Electronically by: Julia Oneill MD

## 2024-05-11 DIAGNOSIS — J45.20 MILD INTERMITTENT ASTHMA WITHOUT COMPLICATION: ICD-10-CM

## 2024-05-11 DIAGNOSIS — K21.00 GASTROESOPHAGEAL REFLUX DISEASE WITH ESOPHAGITIS WITHOUT HEMORRHAGE: Primary | ICD-10-CM

## 2024-05-13 RX ORDER — ALBUTEROL SULFATE 90 UG/1
AEROSOL, METERED RESPIRATORY (INHALATION)
Qty: 18 G | Refills: 4 | Status: SHIPPED | OUTPATIENT
Start: 2024-05-13 | End: 2024-08-26

## 2024-05-13 RX ORDER — NICOTINE POLACRILEX 4 MG/1
40 GUM, CHEWING ORAL DAILY
Qty: 180 TABLET | Refills: 2 | Status: SHIPPED | OUTPATIENT
Start: 2024-05-13

## 2024-05-13 NOTE — TELEPHONE ENCOUNTER
"Requested Prescriptions   Pending Prescriptions Disp Refills    albuterol (VENTOLIN HFA) 108 (90 Base) MCG/ACT inhaler [Pharmacy Med Name: Ventolin HFA Inhalation Aerosol Solution 108 (90 Base) MCG/ACT] 18 g 0     Sig: INHALE 1-2 PUFFS INTO THE LUNGS EVERY 4 HOURS AS NEEDED FOR SHORTNESS OF BREATH/DYSPNEA OR WHEEZING .       Short-Acting Beta Agonist Inhalers Protocol  Failed - 5/11/2024  8:34 AM        Failed - Asthma control assessment score within normal limits in last 6 months     Please review ACT score.           Passed - Patient is age 12 or older        Passed - Medication is active on med list        Passed - Recent (6 mo) or future (30 days) visit within the authorizing provider's specialty     Patient had office visit in the last 6 months or has a visit in the next 30 days with authorizing provider or within the authorizing provider's specialty.  See \"Patient Info\" tab in inbasket, or \"Choose Columns\" in Meds & Orders section of the refill encounter.              omeprazole 20 MG tablet [Pharmacy Med Name: Omeprazole Oral Tablet Delayed Release 20 MG] 180 tablet 0     Sig: Take 2 tablets (40 mg) by mouth daily       PPI Protocol Failed - 5/11/2024  8:34 AM        Failed - Medication indicated for associated diagnosis     The medication is prescribed for one or more of the following conditions:     Erosive esophagitis    Gastritis   Gastric hypersecretion   Gastric ulcer   Gastroesophageal reflux disease   Helicobacter pylori gastrointestinal tract infection   Ulcer of duodenum   Drug-induced peptic ulcer   Esophageal stricture   Gastrointestinal hemorrhage   Indigestion   Stress ulcer   Zollinger-Jara syndrome   Quinonez s esophagus   Laryngopharyngeal reflux          Passed - Medication is active on med list        Passed - Recent (12 mo) or future (90 days) visit within the authorizing provider's specialty     The patient must have completed an in-person or virtual visit within the past 12 months or " has a future visit scheduled within the next 90 days with the authorizing provider s specialty.  Urgent care and e-visits do not quality as an office visit for this protocol.          Passed - Patient is age 18 or older        Passed - No active pregnacy on record        Passed - No positive pregnancy test in past 12 months

## 2024-05-23 ENCOUNTER — NURSE TRIAGE (OUTPATIENT)
Dept: FAMILY MEDICINE | Facility: CLINIC | Age: 59
End: 2024-05-23

## 2024-05-23 ENCOUNTER — OFFICE VISIT (OUTPATIENT)
Dept: PEDIATRICS | Facility: CLINIC | Age: 59
End: 2024-05-23
Payer: COMMERCIAL

## 2024-05-23 ENCOUNTER — ALLIED HEALTH/NURSE VISIT (OUTPATIENT)
Dept: FAMILY MEDICINE | Facility: CLINIC | Age: 59
End: 2024-05-23
Payer: COMMERCIAL

## 2024-05-23 VITALS — SYSTOLIC BLOOD PRESSURE: 140 MMHG | DIASTOLIC BLOOD PRESSURE: 84 MMHG | OXYGEN SATURATION: 98 % | HEART RATE: 79 BPM

## 2024-05-23 VITALS
DIASTOLIC BLOOD PRESSURE: 83 MMHG | HEART RATE: 66 BPM | SYSTOLIC BLOOD PRESSURE: 136 MMHG | OXYGEN SATURATION: 97 % | HEIGHT: 62 IN | TEMPERATURE: 98.2 F | RESPIRATION RATE: 18 BRPM | WEIGHT: 167 LBS | BODY MASS INDEX: 30.73 KG/M2

## 2024-05-23 DIAGNOSIS — Z23 ENCOUNTER FOR IMMUNIZATION: Primary | ICD-10-CM

## 2024-05-23 DIAGNOSIS — I10 ESSENTIAL HYPERTENSION: Primary | ICD-10-CM

## 2024-05-23 DIAGNOSIS — B30.3: Primary | ICD-10-CM

## 2024-05-23 PROCEDURE — 99207 PR NO CHARGE NURSE ONLY: CPT

## 2024-05-23 PROCEDURE — 90471 IMMUNIZATION ADMIN: CPT

## 2024-05-23 PROCEDURE — 99213 OFFICE O/P EST LOW 20 MIN: CPT | Performed by: EMERGENCY MEDICINE

## 2024-05-23 PROCEDURE — 90750 HZV VACC RECOMBINANT IM: CPT

## 2024-05-23 RX ORDER — TOBRAMYCIN AND DEXAMETHASONE 3; 1 MG/ML; MG/ML
2 SUSPENSION/ DROPS OPHTHALMIC 4 TIMES DAILY
Qty: 2.5 ML | Refills: 0 | Status: SHIPPED | OUTPATIENT
Start: 2024-05-23 | End: 2024-05-28

## 2024-05-23 ASSESSMENT — PAIN SCALES - GENERAL: PAINLEVEL: NO PAIN (0)

## 2024-05-23 NOTE — PROGRESS NOTES
[facility] Clinic Visit    Nursing triage note:    Subjective   Carolina is a 59 year old, presenting for the following health issues:  Eye Problem (Left eye red and swollen)    Eye(s) Problem  Onset/Duration: started Wednesday  Description:   Location: Left  Pain: YES  Redness: YES  Accompanying Signs & Symptoms:  Discharge/mattering: No  Swelling: YES  Visual changes: No  Fever: No  Nasal Congestion: No  Bothered by bright lights: No  History:  Trauma: No  Foreign body exposure: No  Wearing contacts: No  Precipitating or alleviating factors: None  Therapies tried and outcome: None    Snellen chart 10 feet  Right Eye 10/16-1   Left Eye 10/16   Both Eyes        10/8            Objective    There were no vitals taken for this visit.  There is no height or weight on file to calculate BMI.        ED PROVIDER NOTE  [facility]      History     Chief Complaint   Patient presents with    Eye Problem     Left eye red and swollen     HPI  Carolina Hernandez is a 59 year old female who presents to the [facility] for evaluation of a left reddened eye.  Patient states that in the last 24 hours her eye has become more reddened and slightly irritated and swollen.  Patient denies any trauma denies any exposure to conjunctivitis and states she has no visual complaints with the eye.  Patient denies any eye pain per se.  Patient is on no blood thinners    I have reviewed the Medications, Allergies, Past Medical and Surgical History, and Social History in the [employer] system.    Past Medical History:   Diagnosis Date    Asthma     C. difficile colitis     COPD (chronic obstructive pulmonary disease) (H)     Depression     Depressive disorder Have had it most of my life    Hypertension     Moderate persistent asthma with exacerbation 09/18/2007    Osteoarthritis     Sinusitis, chronic     Status post coronary angiogram 10/14/2022       Past Surgical History:   Procedure Laterality Date     ARTHROPLASTY KNEE Left 10/31/2023    Procedure: ARTHROPLASTY, KNEE, TOTAL left;  Surgeon: Giovanny Chong MD;  Location: [facility] OR    COLONOSCOPY  2017    CV CORONARY ANGIOGRAM N/A 10/14/2022    Procedure: Coronary Angiogram;  Surgeon: Fidel Martin MD;  Location:  [facility]    ESOPHAGOSCOPY, GASTROSCOPY, DUODENOSCOPY (EGD), COMBINED N/A 05/05/2022    Procedure: ESOPHAGOGASTRODUODENOSCOPY, WITH BIOPSY;  Surgeon: Timothy Oliva DO;  Location: [facility] OR    LAPAROSCOPIC CHOLECYSTECTOMY WITH CHOLANGIOGRAMS N/A 11/21/2022    Procedure: CHOLECYSTECTOMY, LAPAROSCOPIC, WITH CHOLANGIOGRAM;  Surgeon: Eros Butt DO;  Location: [facility] OR           Dose / Directions   * albuterol (2.5 MG/3ML) 0.083% neb solution  Commonly known as: PROVENTIL  Used for: Uncontrolled moderate persistent asthma      inhale 1 vial (2.5 mg) by nebulization every 4 hours as needed for shortness of breath / dyspnea or wheezing  Quantity: 225 mL  Refills: 11     * albuterol 108 (90 Base) MCG/ACT inhaler  Commonly known as: Ventolin HFA  Used for: Mild intermittent asthma without complication      INHALE 1-2 PUFFS INTO THE LUNGS EVERY 4 HOURS AS NEEDED FOR SHORTNESS OF BREATH/DYSPNEA OR WHEEZING .  Quantity: 18 g  Refills: 4     amLODIPine 2.5 MG tablet  Commonly known as: NORVASC  Used for: Essential hypertension      Take 2 tablets (5 mg) by mouth once daily.  Quantity: 180 tablet  Refills: 3     cetirizine 10 MG tablet  Commonly known as: zyrTEC  Indication: Perennial Allergic Rhinitis, Hayfever      Dose: 10 mg  Take 10 mg by mouth daily  Refills: 0     Fish Oil Ultra 1400 MG Caps      Dose: 1 capsule  Take 1 capsule by mouth daily  Refills: 0     * FLUoxetine 20 MG capsule  Commonly known as: PROzac  Used for: Recurrent major depressive disorder, in remission (H24)      Dose: 20 mg  Take 1 capsule (20 mg) by mouth daily  Quantity: 90 capsule  Refills: 3     * FLUoxetine 40 MG capsule  Commonly known as: PROzac  Used  for: Recurrent major depressive disorder, in remission (H24)      Dose: 40 mg  Take 1 capsule (40 mg) by mouth daily Take with 20 mg tab for a total of 60 mg daily  Quantity: 90 capsule  Refills: 3     fluticasone-salmeterol 500-50 MCG/ACT inhaler  Commonly known as: ADVAIR  Used for: Severe persistent asthma without complication (H28)      Dose: 1 puff  Inhale 1 puff into the lungs every 12 hours  Quantity: 1 each  Refills: 11     montelukast 10 MG tablet  Commonly known as: SINGULAIR  Used for: Mild intermittent asthma without complication      Dose: 10 mg  Take 1 tablet (10 mg) by mouth At Bedtime  Quantity: 90 tablet  Refills: 3     multivitamin tablet      Dose: 1 tablet  Take 1 tablet by mouth daily  Refills: 0     omeprazole 20 MG tablet  Used for: Gastroesophageal reflux disease with esophagitis without hemorrhage      Dose: 40 mg  Take 2 tablets (40 mg) by mouth daily  Quantity: 180 tablet  Refills: 2     tiotropium 18 MCG inhaled capsule  Commonly known as: SPIRIVA  Used for: Stage 2 moderate COPD by GOLD classification (H)      Dose: 18 mcg  Inhale 1 capsule (18 mcg) into the lungs daily  Quantity: 30 capsule  Refills: 11           Past medical history, past surgical history, medications, and allergies were reviewed with the patient. Additional pertinent items: None    Family History   Problem Relation Age of Onset    Glaucoma Father     Coronary Artery Disease Father         stents    Cancer Maternal Grandfather     Coronary Artery Disease Paternal Grandfather     Schizophrenia Daughter     Diabetes Daughter     Cancer Daughter     Crohn's Disease No family hx of     Ulcerative Colitis No family hx of     GERD No family hx of     Stomach Cancer No family hx of        Social History     Tobacco Use    Smoking status: Former     Current packs/day: 0.00     Types: Cigarettes     Quit date: 1998     Years since quittin.8    Smokeless tobacco: Never   Substance Use Topics    Alcohol use: Yes      "Comment: 4-6 beers multiple times weekly     Social history was reviewed with the patient. Additional pertinent items: None    Allergies   Allergen Reactions    Doxycycline Difficulty breathing    Penicillins Itching    Sucralfate Rash       Review of Systems  A medically appropriate review of systems was performed with pertinent positives and negatives noted in the HPI, and all other systems negative.    Physical Exam   BP: 136/83  Pulse: 66  Temp: 98.2  F (36.8  C)  Resp: 18  Height: 157.5 cm (5' 2\")  Weight: 75.8 kg (167 lb)  SpO2: 97 %      Physical Exam  Vitals and nursing note reviewed.   Constitutional:       Appearance: She is not ill-appearing or diaphoretic.   HENT:      Head: Atraumatic.   Eyes:      Extraocular Movements: Extraocular movements intact.      Pupils: Pupils are equal, round, and reactive to light.      Comments: Patient's left eye is significantly reddened/hemorrhagic.  Ophthalmoscopic exam reveals the cornea in the fundus to be within normal limits.  No foreign bodies visualized.      Visual acuity as above   Cardiovascular:      Rate and Rhythm: Regular rhythm.      Heart sounds: Normal heart sounds.   Pulmonary:      Effort: No respiratory distress.   Musculoskeletal:         General: No deformity.      Cervical back: Neck supple.   Neurological:      General: No focal deficit present.      Mental Status: She is alert and oriented to person, place, and time.   Psychiatric:         Mood and Affect: Mood normal.         ED Course     Procedures                   No results found for this or any previous visit (from the past 24 hour(s)).        Assessments & Plan (with Medical Decision Making)     I have reviewed the nursing notes.    Patient identified as having a diffuse hemorrhagic conjunctivitis possible scleritis and will be treated with the medication below.    I have reviewed the findings, diagnosis, plan and need for follow up with the patient.    New Prescriptions    " TOBRAMYCIN-DEXAMETHASONE (TOBRADEX) 0.3-0.1 % OPHTHALMIC SUSPENSION    Place 2 drops Into the left eye 4 times daily for 5 days       Final diagnoses:   Conjunctivitis, acute hemorrhagic     Fill your prescription and use as directed    Follow-up for recheck in 7 to 10 days if not better    YAIMA ROWE MD  Total patient care including documentation time is 15 minutes    5/23/2024   [facility]

## 2024-05-23 NOTE — TELEPHONE ENCOUNTER
"Reason for Disposition   Bleeding on white of the eye    Additional Information   Negative: Chemical got in the eye   Negative: Piece of something got in the eye   Negative: Followed an eye injury   Negative: Yellow or green pus in the eyes   Negative: Eyelid is swollen and no redness of white of eye (sclera)   Negative: SEVERE eye pain   Negative: Recent eye surgery and has increasing eye pain   Negative: Patient sounds very sick or weak to the triager   Negative: MODERATE eye pain or discomfort (e.g., interferes with normal activities or awakens from sleep; more than mild)   Negative: Looking at light causes MODERATE to SEVERE eye pain (i.e., photophobia)   Negative: New or worsening blurred vision   Negative: Cloudy spot or sore seen on the cornea (clear part of the eye)   Negative: Eyelids are very swollen (shut or almost)   Negative: Eyelid (outer) is very red   Negative: Vomiting   Negative: Foreign body sensation ('feels like something is in there')   Negative: Patient wants to be seen   Negative: Eye pain present > 24 hours   Negative: Bleeding on white of the eye and is taking Coumadin or known bleeding disorder (e.g., thrombocytopenia)   Negative: Only 1 eye is red, and persists > 48 hours   Negative: Red eyes present > 7 days    Answer Assessment - Initial Assessment Questions  1. LOCATION: Location: \"What's red, the eyeball or the outer eyelids?\" (Note: when callers say the eye is red, they usually mean the sclera is red)        Entire white of eye  2. REDNESS OF SCLERA: \"Is the redness in one or both eyes?\" \"When did the redness start?\"       Left eye  3. ONSET: \"When did the eye become red?\" (e.g., hours, days)       First noticed yesterday morning when she woke up  4. EYELIDS: \"Are the eyelids red or swollen?\" If Yes, ask: \"How much?\"       No  5. VISION: \"Is there any difficulty seeing clearly?\"       Normal vision  6. ITCHING: \"Does it feel itchy?\" If so ask: \"How bad is it\" (e.g., Scale 1-10; or " "mild, moderate, severe)      No  7. PAIN: \"Is there any pain? If Yes, ask: \"How bad is it?\" (e.g., Scale 1-10; or mild, moderate, severe)      No  8. CONTACT LENS: \"Do you wear contacts?\"      No  9. CAUSE: \"What do you think is causing the redness?\"      Was outside doing yard work yesterday, doesn't think she got anything in it. Was wearing safety glasses when she used wee whip  10. OTHER SYMPTOMS: \"Do you have any other symptoms?\" (e.g., fever, runny nose, cough, vomiting)        No, just allergy symptoms    Protocols used: Eye - Red Without Pus-A-OH    "

## 2024-05-23 NOTE — NURSING NOTE
Carolina Hernandez is a 59 year old year old patient who comes in today for a Blood Pressure check because of ongoing blood pressure monitoring. BP was elevated at last office visit on 5/10/24, PCP advised that pt return for RN BP check.  Vital Signs as repeated by RN: BP- 140/78, P- 76. Rechecked after 5 minutes: BP- 140/84.  Patient is taking medication as prescribed.  Patient is tolerating medications well.  Patient is monitoring Blood Pressure at home.  Average readings if yes are: Pt states 140/90.  Current complaints: none  Disposition:  Routing to PCP for review.    Luciana Salgado RN  [facility]

## 2024-05-23 NOTE — TELEPHONE ENCOUNTER
Carolina Hernandez is a 59 year old year old patient who comes in today for a Blood Pressure check because of ongoing blood pressure monitoring. BP was elevated at last office visit on 5/10/24, PCP advised that pt return for RN BP check.  Vital Signs as repeated by RN: BP- 140/78, P- 76. Rechecked after 5 minutes: BP- 140/84.  Patient is taking medication as prescribed.  Patient is tolerating medications well.  Patient is monitoring Blood Pressure at home.  Average readings if yes are: Pt states 140/90.  Current complaints: none  Disposition:  Routing to PCP for review.    Also see triage encounter below for more information re: redness in sclera of left eye. Triage protocol recommendation is to be seen in clinic today or tomorrow; pt would like to be seen today, since she's already here. No in-clinic appts remaining today; checked with [facility], and they're agreeable to seeing this pt. Pt escorted to waiting area for [facility].     Luciana Salgado RN  [facility]

## 2024-05-23 NOTE — PROGRESS NOTES
Patient stated she started Prednisone 5/10/2024. Advised patient to wait one month after taking steroid for any vaccines.   Linda Still, CMA

## 2024-05-28 ENCOUNTER — TELEPHONE (OUTPATIENT)
Dept: PULMONOLOGY | Facility: CLINIC | Age: 59
End: 2024-05-28
Payer: COMMERCIAL

## 2024-05-28 NOTE — TELEPHONE ENCOUNTER
Not Available, no VM box set up, unable to LVM    Appointment type: RPULM  Provider: ANJELICA  Return date: NEXT AVAILABLE  Specialty phone number: [phone]  Additional appointment(s) needed: N/A  Additonal Notes: per Dolly, schedule follow-up with Dr. Turner

## 2024-07-22 ASSESSMENT — KOOS JR
STRAIGHTENING KNEE FULLY: MODERATE
RISING FROM SITTING: SEVERE
GOING UP OR DOWN STAIRS: SEVERE
HOW SEVERE IS YOUR KNEE STIFFNESS AFTER FIRST WAKING IN MORNING: MODERATE
TWISING OR PIVOTING ON KNEE: MODERATE
STANDING UPRIGHT: MILD
BENDING TO THE FLOOR TO PICK UP OBJECT: EXTREME
KOOS JR SCORING: 44.91

## 2024-07-22 ASSESSMENT — ANXIETY QUESTIONNAIRES
GAD7 TOTAL SCORE: 19
6. BECOMING EASILY ANNOYED OR IRRITABLE: SEVERAL DAYS
7. FEELING AFRAID AS IF SOMETHING AWFUL MIGHT HAPPEN: NEARLY EVERY DAY
GAD7 TOTAL SCORE: 19
4. TROUBLE RELAXING: NEARLY EVERY DAY
IF YOU CHECKED OFF ANY PROBLEMS ON THIS QUESTIONNAIRE, HOW DIFFICULT HAVE THESE PROBLEMS MADE IT FOR YOU TO DO YOUR WORK, TAKE CARE OF THINGS AT HOME, OR GET ALONG WITH OTHER PEOPLE: VERY DIFFICULT
7. FEELING AFRAID AS IF SOMETHING AWFUL MIGHT HAPPEN: NEARLY EVERY DAY
3. WORRYING TOO MUCH ABOUT DIFFERENT THINGS: NEARLY EVERY DAY
8. IF YOU CHECKED OFF ANY PROBLEMS, HOW DIFFICULT HAVE THESE MADE IT FOR YOU TO DO YOUR WORK, TAKE CARE OF THINGS AT HOME, OR GET ALONG WITH OTHER PEOPLE?: VERY DIFFICULT
1. FEELING NERVOUS, ANXIOUS, OR ON EDGE: NEARLY EVERY DAY
2. NOT BEING ABLE TO STOP OR CONTROL WORRYING: NEARLY EVERY DAY
5. BEING SO RESTLESS THAT IT IS HARD TO SIT STILL: NEARLY EVERY DAY

## 2024-07-22 NOTE — PROGRESS NOTES
Chief Complaint   Patient presents with    Right Knee - Pain     Last injection: 4/18/24. Injection didn't really help. Her knee started hurting again almost right away. She is not sure why. She has increased pain whenever she bends, lifts things, or gets up from a seated position. If she turns a certain way she will get pain as well. She has been pushing through the pain and doing things. She would like to try another injection, possibly a different kind.          SURGERY: Total knee arthroplasty, left knee ([facility])  DATE OF SURGERY: 10/31/2023      HISTORY OF PRESENT ILLNESS: Carolina Hernandez is a 59 year old female seen for ongoing right knee pain. Her right knee has been hurting more now following her left total knee arthroplasty. Her last injection right knee was 4/18/2024, didn't really help. Pain returned almost right away. She wonders about a different injection today. She'd like to hold off on right knee replacement surgery.    Locates pain along the inner aspect and front of the knee, and can radiate down the leg to the ankle, up the thigh.  Pain is worse with work, lifting/carrying things, gardening. Treatment has been occasional use of over the counter pain medications without relief.      Pain at rest, pain at night better with injections. Sleeps with pillow between knees. Has tried tylenol arthritis and ibuprofen for pain but doesn't seem to really help.        Has chronic low back pain, denies numbness and tingling.    Her left knee replacement is doing well. She's thinking of right total knee arthroplasty 1/2025.        Patient has tried:     NSAIDS: Yes      Acetaminophen: yes     Physical Therapy: Yes      Home exercise program: yes.     Activity modification: Yes      Injections: Yes cortisone 4/2024, 1/2024, 10/2023, 5/15/2023, 2/2023, 11/2022, 8/15/2022, 8/20/2021 cortisone;    4/27/2022 with SynviscOne.     Ice: Yes      Assistive device:  Yes     Other: tylenol. Topical  ointments.      Past medical history:   Past Medical History:   Diagnosis Date    Asthma     C. difficile colitis     COPD (chronic obstructive pulmonary disease) (H)     Depression     Depressive disorder Have had it most of my life    Hypertension     Moderate persistent asthma with exacerbation 09/18/2007    Osteoarthritis     Sinusitis, chronic     Status post coronary angiogram 10/14/2022     Patient Active Problem List    Diagnosis Date Noted    Severe persistent asthma without complication (H28) 02/07/2024     Priority: Medium    Osteoarthritis of left knee 11/01/2023     Priority: Medium    S/P total knee arthroplasty, left 10/31/2023     Priority: Medium    Fatty liver 11/01/2022     Priority: Medium     ultrasound 10/29/22      COPD vs Asthma 10/30/2022     Priority: Medium     PFTS 5/2021 - FEV1- 1.21 (48%), ratio 0.50, no change with bronchodilators,  %, %, DLCO 94%       Transaminitis 10/29/2022     Priority: Medium    Acute pancreatitis 10/29/2022     Priority: Medium    Nonobstructive atherosclerosis of coronary artery 10/21/2022     Priority: Medium      H/o exertional chest pain leading to coronary angiogram 10/14/2022 which showed non-obstructing CAD      Hyperlipidemia LDL goal <70 10/21/2022     Priority: Medium    Low back pain due to bilateral sciatica 10/27/2021     Priority: Medium    Primary osteoarthritis of both knees 09/23/2021     Priority: Medium    Benign essential hypertension 07/26/2019     Priority: Medium     New diagnosis 7/26/2019        Recurrent major depressive disorder, in remission (H24) 01/17/2019     Priority: Medium    GERD (gastroesophageal reflux disease) 07/31/2012     Priority: Medium    Seasonal allergies 07/31/2012     Priority: Medium       Past Surgical History:   Past Surgical History:   Procedure Laterality Date    ARTHROPLASTY KNEE Left 10/31/2023    Procedure: ARTHROPLASTY, KNEE, TOTAL left;  Surgeon: Giovanny Chong MD;  Location: [facility] OR     COLONOSCOPY  2017    CV CORONARY ANGIOGRAM N/A 10/14/2022    Procedure: Coronary Angiogram;  Surgeon: Fidel Martin MD;  Location:  [facility]    ESOPHAGOSCOPY, GASTROSCOPY, DUODENOSCOPY (EGD), COMBINED N/A 05/05/2022    Procedure: ESOPHAGOGASTRODUODENOSCOPY, WITH BIOPSY;  Surgeon: Timothy Oliva DO;  Location: [facility] OR    LAPAROSCOPIC CHOLECYSTECTOMY WITH CHOLANGIOGRAMS N/A 11/21/2022    Procedure: CHOLECYSTECTOMY, LAPAROSCOPIC, WITH CHOLANGIOGRAM;  Surgeon: Eros Butt DO;  Location: [facility] OR       Medications:   Current Outpatient Medications:     albuterol (PROVENTIL) (2.5 MG/3ML) 0.083% neb solution, inhale 1 vial (2.5 mg) by nebulization every 4 hours as needed for shortness of breath / dyspnea or wheezing, Disp: 225 mL, Rfl: 11    albuterol (VENTOLIN HFA) 108 (90 Base) MCG/ACT inhaler, INHALE 1-2 PUFFS INTO THE LUNGS EVERY 4 HOURS AS NEEDED FOR SHORTNESS OF BREATH/DYSPNEA OR WHEEZING ., Disp: 18 g, Rfl: 4    amLODIPine (NORVASC) 2.5 MG tablet, Take 2 tablets (5 mg) by mouth once daily., Disp: 180 tablet, Rfl: 3    cetirizine (ZYRTEC) 10 MG tablet, Take 10 mg by mouth daily, Disp: , Rfl:     FLUoxetine (PROZAC) 20 MG capsule, Take 1 capsule (20 mg) by mouth daily, Disp: 90 capsule, Rfl: 3    FLUoxetine (PROZAC) 40 MG capsule, Take 1 capsule (40 mg) by mouth daily Take with 20 mg tab for a total of 60 mg daily, Disp: 90 capsule, Rfl: 3    fluticasone-salmeterol (ADVAIR) 500-50 MCG/ACT inhaler, Inhale 1 puff into the lungs every 12 hours, Disp: 1 each, Rfl: 11    montelukast (SINGULAIR) 10 MG tablet, Take 1 tablet (10 mg) by mouth At Bedtime, Disp: 90 tablet, Rfl: 3    multivitamin (CENTRUM SILVER) tablet, Take 1 tablet by mouth daily, Disp: , Rfl:     Omega-3 Fatty Acids (FISH OIL ULTRA) 1400 MG CAPS, Take 1 capsule by mouth daily, Disp: , Rfl:     omeprazole 20 MG tablet, Take 2 tablets (40 mg) by mouth daily, Disp: 180 tablet, Rfl: 2    tiotropium (SPIRIVA) 18 MCG inhaled  "capsule, Inhale 1 capsule (18 mcg) into the lungs daily, Disp: 30 capsule, Rfl: 11    sertraline (ZOLOFT) 25 MG tablet, Take 1 tablet (25 mg) by mouth daily (Patient not taking: Reported on 7/25/2024), Disp: 90 tablet, Rfl: 1    Allergies:   Allergies   Allergen Reactions    Doxycycline Difficulty breathing    Penicillins Itching    Sucralfate Rash         REVIEW OF SYSTEMS:  CONSTITUTIONAL:NEGATIVE for fever, chills, night sweats, change in weight  INTEGUMENTARY/SKIN: NEGATIVE for worrisome rashes, moles or lesions  MUSCULOSKELETAL:See HPI above  NEURO: NEGATIVE for weakness, dizziness or paresthesias  CARDIOVASCULAR: negative for chest pain, shortness of breath    PHYSICAL EXAM:  BP (!) 147/91   Pulse 79   Ht 1.575 m (5' 2\")   Wt 74.6 kg (164 lb 6.4 oz)   BMI 30.07 kg/m     GENERAL APPEARANCE: healthy, alert, no distress  SKIN: no suspicious lesions or rashes  NEURO: Normal strength and tone, mentation intact and speech normal  PSYCH:  mentation appears normal and affect normal  RESPIRATORY: No increased work of breathing.    BILATERAL LOWER EXTREMITIES:  Gait: favors the right.    Right lower extremity:  Intact sensation deep peroneal nerve, superficial peroneal nerve, med/lat tibial nerve, sural nerve, saphenous nerve  Intact EHL, EDL, TA, FHL, GS, quadriceps hamstrings and hip flexors   calf soft and nttp or squeeze.  Edema: trace      RIGHT KNEE EXAM:    Skin: intact, no ecchymosis or erythema  ROM: full extension to 125+ flexion  Tight hamstrings on straight leg raise.  Effusion: trace  Tender: medial joint line, lateral joint line.     MCL: stable, and non-painful at both 0 and 30 degrees knee flexion  Varus stress: stable, and non-painful at both 0 and 30 degrees knee flexion  Lachmans: neg, firm endpoint  Posterior Drawer stable  Patellofemoral joint:                Apprehension: negative              Crepitations: mild              Grind: positive.      X-RAY:  3 views bilateral knee from 7/26/2023 -- " " Bilateral medial compartment severe joint space narrowing, bone bone bone with articular flattening, subcondral sclerosis. Bilateral patellofemoral lateral facet mild joint space narrowing with medial  patello-femoral osteophytes.     2 views left knee from 1/24/2024 -- No obvious fractures or dislocations. Total knee arthroplasty components in place without evidence of failure or loosening.      Impression: Carolina Hernandez is a 59 year old female with chronic right knee pain, primary osteoarthritis.      Plan:   * reviewed imaging studies with patient, showing arthritic changes, or wearing of the cartilage in the knee. This can be caused by normal \"wear and tear\" over the years or following prior injury to the knee.  Treatment typically starts nonsurgically. Surgical indication for total knee arthroplasty  when nonsurgical management is no longer effective.    Non-surgical treatment for knee arthritis includes:    * rest, sitting  * Activity modification - avoid impact activities or activities that aggravate symptoms.  * NSAIDS (non-steroidal anti-inflammatory medications; e.g. Aleve, advil, motrin, ibuprofen) - regular use for inflammation ( twice daily or three times daily), with food, as long as no contra-indications Please discuss with primary care doctor if needed  * ice, 15-20 minutes at a time several times a day or as needed.  * Strengthening of quadriceps muscles  * Physical Therapy for strengthening, stretching and range of motion exercises of legs  * Tylenol as needed for pain, consider Tylenol arthritis or similar  * Weight loss: Weight loss:  Body mass index is 30.07 kg/m .. weight loss benefits, not only for the current pain symptoms, but also overall health. Recommend a good diet plan that works for the patient, with the assistance of a dietician or primary care doctor as needed. Also, a good, low-impact exercise program for at least 20 minutes per day, 3 times per week, such as exercise bike, " "elliptical , or pool.  * Exercise: low impact such as stationary bike, elliptical, pool.  * Injections: cortisone versus viscosupplementation (hyaluronic acid, \"rooster comb\", \"gel shots\"); risks and perceived benefits discussed today. Patient elects to proceed. Will try kenalog today.  * Bracing: bracing the knee may offer some relief of symptoms when worn and provide some stability.  * over the counter supplements such as glucosamine and chondroitin sulfate may help with joint pain.  * topical ointments may help as well    * return to clinic as needed. Will get updated xrays at a future visit.       Giovanny Chong M.D., M.S.  Dept. of Orthopaedic Surgery  [facility]       Large Joint Injection/Arthocentesis: R knee joint    Date/Time: 7/26/2024 7:21 AM    Performed by: Acosta Eng PA  Authorized by: Giovanny Chong MD    Indications:  Pain and osteoarthritis  Needle Size:  22 G  Guidance: landmark guided    Approach:  Anteromedial  Location:  Knee      Medications:  80 mg triamcinolone 40 MG/ML; 4 mL BUPivacaine 0.25 %  Outcome:  Tolerated well, no immediate complications  Procedure discussed: discussed risks, benefits, and alternatives    Consent Given by:  Patient  Prep: patient was prepped and draped in usual sterile fashion        "

## 2024-07-24 ENCOUNTER — VIRTUAL VISIT (OUTPATIENT)
Dept: FAMILY MEDICINE | Facility: CLINIC | Age: 59
End: 2024-07-24
Payer: COMMERCIAL

## 2024-07-24 DIAGNOSIS — F33.1 MODERATE EPISODE OF RECURRENT MAJOR DEPRESSIVE DISORDER (H): Primary | ICD-10-CM

## 2024-07-24 PROCEDURE — 99213 OFFICE O/P EST LOW 20 MIN: CPT | Mod: 95 | Performed by: FAMILY MEDICINE

## 2024-07-24 RX ORDER — SERTRALINE HYDROCHLORIDE 25 MG/1
25 TABLET, FILM COATED ORAL DAILY
Qty: 90 TABLET | Refills: 1 | Status: SHIPPED | OUTPATIENT
Start: 2024-07-24 | End: 2024-08-13

## 2024-07-24 ASSESSMENT — PATIENT HEALTH QUESTIONNAIRE - PHQ9: SUM OF ALL RESPONSES TO PHQ QUESTIONS 1-9: 23

## 2024-07-24 NOTE — PROGRESS NOTES
"Carolina is a 59 year old who is being evaluated via a billable video visit.    How would you like to obtain your AVS? MyChart  If the video visit is dropped, the invitation should be resent by: Logging into My Chart.  Will anyone else be joining your video visit? No      Assessment & Plan     (F33.1) Moderate episode of recurrent major depressive disorder (H)  (primary encounter diagnosis)  Comment: medication faxed. Patient to follow up in a month or two.  Plan: sertraline (ZOLOFT) 25 MG tablet                  BMI  Estimated body mass index is 30.54 kg/m  as calculated from the following:    Height as of 5/23/24: 1.575 m (5' 2\").    Weight as of 5/23/24: 75.8 kg (167 lb).       Depression Screening Follow Up               No data to display                      Follow Up Actions Taken  Crisis resource information provided in the After Visit Summary    Discussed the following ways the patient can remain in a safe environment:  remove alcohol and be around others    FUTURE APPOINTMENTS:       - Follow-up as needed    Subjective   Carolina is a 59 year old, presenting for the following health issues:  Patient is a 59-year-old presenting to the clinic with primary video visit for worsening depression.  She has been on fluoxetine for a part of 20 years and she feels like it is not working as well for her.  She is wondering if she can switch to sertraline.  Her daughter takes sertraline and appears to help her symptoms.  She has been under some stress she says she still got some really bad news a few weeks ago and she is contemplating divorce.  We spoke a bit about the sertraline and recommended that she start on a low-dose and gradually increase the dose.  She is open to this.  Medication was faxed to the pharmacy.  Depression (Wanting to discuss about changing antidepressants. No side effects.  Has been taking this for over 30 years at different doses.  Her Therapist stated to have her medication updated.  Her anxiety is " worse due to a traumatic event that happened.  Has been having issues with sleeping also since 7-13-24.  She is looking to file for a divorce.  She is looking for a medication that also would not cause weight gain.  )        7/24/2024     2:54 PM   Additional Questions   Roomed by Roxanne Morin CMA     Chief Complaint   Patient presents with    Depression     Wanting to discuss about changing antidepressants. No side effects.  Has been taking this for over 30 years at different doses.  Her Therapist stated to have her medication updated.  Her anxiety is worse due to a traumatic event that happened.  Has been having issues with sleeping also since 7-13-24.  She is looking to file for a divorce.  She is looking for a medication that also would not cause weight gain.           Video Start Time:  3:40PM    History of Present Illness       Mental Health Follow-up:  Patient presents to follow-up on Depression & Anxiety.Patient's depression since last visit has been:  Bad  The patient is having other symptoms associated with depression.  Patient's anxiety since last visit has been:  Worse  The patient is having other symptoms associated with anxiety.  Any significant life events: relationship concerns  Patient is feeling anxious or having panic attacks.  Patient has no concerns about alcohol or drug use.    Reason for visit:  Changing to a different anti depressant    She eats 4 or more servings of fruits and vegetables daily.She consumes 1 sweetened beverage(s) daily.She exercises with enough effort to increase her heart rate 20 to 29 minutes per day.  She exercises with enough effort to increase her heart rate 4 days per week.   She is taking medications regularly.               Review of Systems  CONSTITUTIONAL: NEGATIVE for fever, chills, change in weight  INTEGUMENTARY/SKIN: NEGATIVE for worrisome rashes, moles or lesions  ENT/MOUTH: NEGATIVE for ear, mouth and throat problems  RESP: NEGATIVE for significant cough  or SOB  CV: NEGATIVE for chest pain, palpitations or peripheral edema  GI: NEGATIVE for nausea, abdominal pain, heartburn, or change in bowel habits  MUSCULOSKELETAL: NEGATIVE for significant arthralgias or myalgia  NEURO: NEGATIVE for weakness, dizziness or paresthesias  ENDOCRINE: NEGATIVE for temperature intolerance, skin/hair changes  HEME/ALLERGY/IMMUNE: NEGATIVE for bleeding problems  PSYCHIATRIC: POSITIVE for depressed mood      Objective    Vitals - Patient Reported  Weight (Patient Reported): 73.5 kg (162 lb)  Pain Score: No Pain (0)  Pain Loc:  (Does have issues with her knee with movement.  Will be seeing Ortho on 7-25-24.)        Physical Exam   GENERAL: alert and no distress  EYES: Eyes grossly normal to inspection.  No discharge or erythema, or obvious scleral/conjunctival abnormalities.  RESP: No audible wheeze, cough, or visible cyanosis.    SKIN: Visible skin clear. No significant rash, abnormal pigmentation or lesions.  NEURO: Cranial nerves grossly intact.  Mentation and speech appropriate for age.  PSYCH: Appropriate affect, tone, and pace of words          Video-Visit Details    Type of service:  Video Visit   Video End Time: 3:49PM  Originating Location (pt. Location): Home  Distant Location (provider location):  Off-site  Platform used for Video Visit: Ro  Signed Electronically by: Julia Oneill MD

## 2024-07-25 ENCOUNTER — OFFICE VISIT (OUTPATIENT)
Dept: ORTHOPEDICS | Facility: CLINIC | Age: 59
End: 2024-07-25
Payer: COMMERCIAL

## 2024-07-25 VITALS
HEART RATE: 79 BPM | BODY MASS INDEX: 30.25 KG/M2 | SYSTOLIC BLOOD PRESSURE: 147 MMHG | HEIGHT: 62 IN | WEIGHT: 164.4 LBS | DIASTOLIC BLOOD PRESSURE: 91 MMHG

## 2024-07-25 DIAGNOSIS — M17.11 PRIMARY OSTEOARTHRITIS OF RIGHT KNEE: Primary | ICD-10-CM

## 2024-07-25 ASSESSMENT — PAIN SCALES - GENERAL: PAINLEVEL: MILD PAIN (3)

## 2024-07-25 NOTE — LETTER
7/25/2024      Carolina Hernandez  [address] [facility] [id number]      Dear Colleague,    Thank you for referring your patient, Carolina Hernandez, to the [facility]. Please see a copy of my visit note below.    Chief Complaint   Patient presents with     Right Knee - Pain     Last injection: 4/18/24. Injection didn't really help. Her knee started hurting again almost right away. She is not sure why. She has increased pain whenever she bends, lifts things, or gets up from a seated position. If she turns a certain way she will get pain as well. She has been pushing through the pain and doing things. She would like to try another injection, possibly a different kind.          SURGERY: Total knee arthroplasty, left knee ([facility])  DATE OF SURGERY: 10/31/2023      HISTORY OF PRESENT ILLNESS: Carolina Hernandez is a 59 year old female seen for ongoing right knee pain. Her right knee has been hurting more now following her left total knee arthroplasty. Her last injection right knee was 4/18/2024, didn't really help. Pain returned almost right away. She wonders about a different injection today. She'd like to hold off on right knee replacement surgery.    Locates pain along the inner aspect and front of the knee, and can radiate down the leg to the ankle, up the thigh.  Pain is worse with work, lifting/carrying things, gardening. Treatment has been occasional use of over the counter pain medications without relief.      Pain at rest, pain at night better with injections. Sleeps with pillow between knees. Has tried tylenol arthritis and ibuprofen for pain but doesn't seem to really help.        Has chronic low back pain, denies numbness and tingling.    Her left knee replacement is doing well. She's thinking of right total knee arthroplasty 1/2025.        Patient has tried:     NSAIDS: Yes      Acetaminophen: yes     Physical Therapy: Yes      Home exercise  program: yes.     Activity modification: Yes      Injections: Yes cortisone 4/2024, 1/2024, 10/2023, 5/15/2023, 2/2023, 11/2022, 8/15/2022, 8/20/2021 cortisone;    4/27/2022 with SynviscOne.     Ice: Yes      Assistive device:  Yes     Other: tylenol. Topical ointments.      Past medical history:   Past Medical History:   Diagnosis Date     Asthma      C. difficile colitis      COPD (chronic obstructive pulmonary disease) (H)      Depression      Depressive disorder Have had it most of my life     Hypertension      Moderate persistent asthma with exacerbation 09/18/2007     Osteoarthritis      Sinusitis, chronic      Status post coronary angiogram 10/14/2022     Patient Active Problem List    Diagnosis Date Noted     Severe persistent asthma without complication (H28) 02/07/2024     Priority: Medium     Osteoarthritis of left knee 11/01/2023     Priority: Medium     S/P total knee arthroplasty, left 10/31/2023     Priority: Medium     Fatty liver 11/01/2022     Priority: Medium     ultrasound 10/29/22       COPD vs Asthma 10/30/2022     Priority: Medium     PFTS 5/2021 - FEV1- 1.21 (48%), ratio 0.50, no change with bronchodilators,  %, %, DLCO 94%        Transaminitis 10/29/2022     Priority: Medium     Acute pancreatitis 10/29/2022     Priority: Medium     Nonobstructive atherosclerosis of coronary artery 10/21/2022     Priority: Medium      H/o exertional chest pain leading to coronary angiogram 10/14/2022 which showed non-obstructing CAD       Hyperlipidemia LDL goal <70 10/21/2022     Priority: Medium     Low back pain due to bilateral sciatica 10/27/2021     Priority: Medium     Primary osteoarthritis of both knees 09/23/2021     Priority: Medium     Benign essential hypertension 07/26/2019     Priority: Medium     New diagnosis 7/26/2019         Recurrent major depressive disorder, in remission (H24) 01/17/2019     Priority: Medium     GERD (gastroesophageal reflux disease) 07/31/2012      Priority: Medium     Seasonal allergies 07/31/2012     Priority: Medium       Past Surgical History:   Past Surgical History:   Procedure Laterality Date     ARTHROPLASTY KNEE Left 10/31/2023    Procedure: ARTHROPLASTY, KNEE, TOTAL left;  Surgeon: Giovanny Chong MD;  Location: [facility] OR     COLONOSCOPY  2017     CV CORONARY ANGIOGRAM N/A 10/14/2022    Procedure: Coronary Angiogram;  Surgeon: Fidel Martin MD;  Location:  HEART CARDIAC CATH LAB     ESOPHAGOSCOPY, GASTROSCOPY, DUODENOSCOPY (EGD), COMBINED N/A 05/05/2022    Procedure: ESOPHAGOGASTRODUODENOSCOPY, WITH BIOPSY;  Surgeon: Timothy Oliva DO;  Location: [facility] OR     LAPAROSCOPIC CHOLECYSTECTOMY WITH CHOLANGIOGRAMS N/A 11/21/2022    Procedure: CHOLECYSTECTOMY, LAPAROSCOPIC, WITH CHOLANGIOGRAM;  Surgeon: Eros Butt DO;  Location: [facility] OR       Medications:   Current Outpatient Medications:      albuterol (PROVENTIL) (2.5 MG/3ML) 0.083% neb solution, inhale 1 vial (2.5 mg) by nebulization every 4 hours as needed for shortness of breath / dyspnea or wheezing, Disp: 225 mL, Rfl: 11     albuterol (VENTOLIN HFA) 108 (90 Base) MCG/ACT inhaler, INHALE 1-2 PUFFS INTO THE LUNGS EVERY 4 HOURS AS NEEDED FOR SHORTNESS OF BREATH/DYSPNEA OR WHEEZING ., Disp: 18 g, Rfl: 4     amLODIPine (NORVASC) 2.5 MG tablet, Take 2 tablets (5 mg) by mouth once daily., Disp: 180 tablet, Rfl: 3     cetirizine (ZYRTEC) 10 MG tablet, Take 10 mg by mouth daily, Disp: , Rfl:      FLUoxetine (PROZAC) 20 MG capsule, Take 1 capsule (20 mg) by mouth daily, Disp: 90 capsule, Rfl: 3     FLUoxetine (PROZAC) 40 MG capsule, Take 1 capsule (40 mg) by mouth daily Take with 20 mg tab for a total of 60 mg daily, Disp: 90 capsule, Rfl: 3     fluticasone-salmeterol (ADVAIR) 500-50 MCG/ACT inhaler, Inhale 1 puff into the lungs every 12 hours, Disp: 1 each, Rfl: 11     montelukast (SINGULAIR) 10 MG tablet, Take 1 tablet (10 mg) by mouth At Bedtime, Disp: 90 tablet, Rfl: 3     multivitamin  "(CENTRUM SILVER) tablet, Take 1 tablet by mouth daily, Disp: , Rfl:      Omega-3 Fatty Acids (FISH OIL ULTRA) 1400 MG CAPS, Take 1 capsule by mouth daily, Disp: , Rfl:      omeprazole 20 MG tablet, Take 2 tablets (40 mg) by mouth daily, Disp: 180 tablet, Rfl: 2     tiotropium (SPIRIVA) 18 MCG inhaled capsule, Inhale 1 capsule (18 mcg) into the lungs daily, Disp: 30 capsule, Rfl: 11     sertraline (ZOLOFT) 25 MG tablet, Take 1 tablet (25 mg) by mouth daily (Patient not taking: Reported on 7/25/2024), Disp: 90 tablet, Rfl: 1    Allergies:   Allergies   Allergen Reactions     Doxycycline Difficulty breathing     Penicillins Itching     Sucralfate Rash         REVIEW OF SYSTEMS:  CONSTITUTIONAL:NEGATIVE for fever, chills, night sweats, change in weight  INTEGUMENTARY/SKIN: NEGATIVE for worrisome rashes, moles or lesions  MUSCULOSKELETAL:See HPI above  NEURO: NEGATIVE for weakness, dizziness or paresthesias  CARDIOVASCULAR: negative for chest pain, shortness of breath    PHYSICAL EXAM:  BP (!) 147/91   Pulse 79   Ht 1.575 m (5' 2\")   Wt 74.6 kg (164 lb 6.4 oz)   BMI 30.07 kg/m     GENERAL APPEARANCE: healthy, alert, no distress  SKIN: no suspicious lesions or rashes  NEURO: Normal strength and tone, mentation intact and speech normal  PSYCH:  mentation appears normal and affect normal  RESPIRATORY: No increased work of breathing.    BILATERAL LOWER EXTREMITIES:  Gait: favors the right.    Right lower extremity:  Intact sensation deep peroneal nerve, superficial peroneal nerve, med/lat tibial nerve, sural nerve, saphenous nerve  Intact EHL, EDL, TA, FHL, GS, quadriceps hamstrings and hip flexors   calf soft and nttp or squeeze.  Edema: trace      RIGHT KNEE EXAM:    Skin: intact, no ecchymosis or erythema  ROM: full extension to 125+ flexion  Tight hamstrings on straight leg raise.  Effusion: trace  Tender: medial joint line, lateral joint line.     MCL: stable, and non-painful at both 0 and 30 degrees knee " "flexion  Varus stress: stable, and non-painful at both 0 and 30 degrees knee flexion  Lachmans: neg, firm endpoint  Posterior Drawer stable  Patellofemoral joint:                Apprehension: negative              Crepitations: mild              Grind: positive.      X-RAY:  3 views bilateral knee from 7/26/2023 --  Bilateral medial compartment severe joint space narrowing, bone bone bone with articular flattening, subcondral sclerosis. Bilateral patellofemoral lateral facet mild joint space narrowing with medial  patello-femoral osteophytes.     2 views left knee from 1/24/2024 -- No obvious fractures or dislocations. Total knee arthroplasty components in place without evidence of failure or loosening.      Impression: Carolina Hernandez is a 59 year old female with chronic right knee pain, primary osteoarthritis.      Plan:   * reviewed imaging studies with patient, showing arthritic changes, or wearing of the cartilage in the knee. This can be caused by normal \"wear and tear\" over the years or following prior injury to the knee.  Treatment typically starts nonsurgically. Surgical indication for total knee arthroplasty  when nonsurgical management is no longer effective.    Non-surgical treatment for knee arthritis includes:    * rest, sitting  * Activity modification - avoid impact activities or activities that aggravate symptoms.  * NSAIDS (non-steroidal anti-inflammatory medications; e.g. Aleve, advil, motrin, ibuprofen) - regular use for inflammation ( twice daily or three times daily), with food, as long as no contra-indications Please discuss with primary care doctor if needed  * ice, 15-20 minutes at a time several times a day or as needed.  * Strengthening of quadriceps muscles  * Physical Therapy for strengthening, stretching and range of motion exercises of legs  * Tylenol as needed for pain, consider Tylenol arthritis or similar  * Weight loss: Weight loss:  Body mass index is 30.07 kg/m .. weight loss " "benefits, not only for the current pain symptoms, but also overall health. Recommend a good diet plan that works for the patient, with the assistance of a dietician or primary care doctor as needed. Also, a good, low-impact exercise program for at least 20 minutes per day, 3 times per week, such as exercise bike, elliptical , or pool.  * Exercise: low impact such as stationary bike, elliptical, pool.  * Injections: cortisone versus viscosupplementation (hyaluronic acid, \"rooster comb\", \"gel shots\"); risks and perceived benefits discussed today. Patient elects to proceed. Will try kenalog today.  * Bracing: bracing the knee may offer some relief of symptoms when worn and provide some stability.  * over the counter supplements such as glucosamine and chondroitin sulfate may help with joint pain.  * topical ointments may help as well    * return to clinic as needed. Will get updated xrays at a future visit.       Giovanny Chong M.D., M.S.  Dept. of Orthopaedic Surgery  [facility]       Large Joint Injection/Arthocentesis: R knee joint    Date/Time: 7/26/2024 7:21 AM    Performed by: Acosta Eng PA  Authorized by: Giovanny Chong MD    Indications:  Pain and osteoarthritis  Needle Size:  22 G  Guidance: landmark guided    Approach:  Anteromedial  Location:  Knee      Medications:  80 mg triamcinolone 40 MG/ML; 4 mL BUPivacaine 0.25 %  Outcome:  Tolerated well, no immediate complications  Procedure discussed: discussed risks, benefits, and alternatives    Consent Given by:  Patient  Prep: patient was prepped and draped in usual sterile fashion          Again, thank you for allowing me to participate in the care of your patient.        Sincerely,        Giovanny Chong MD  "

## 2024-07-26 PROCEDURE — 20610 DRAIN/INJ JOINT/BURSA W/O US: CPT | Mod: RT | Performed by: ORTHOPAEDIC SURGERY

## 2024-07-26 RX ADMIN — TRIAMCINOLONE ACETONIDE 80 MG: 40 INJECTION, SUSPENSION INTRA-ARTICULAR; INTRAMUSCULAR at 07:21

## 2024-07-26 RX ADMIN — BUPIVACAINE HYDROCHLORIDE 4 ML: 2.5 INJECTION, SOLUTION INFILTRATION; PERINEURAL at 07:21

## 2024-07-29 RX ORDER — TRIAMCINOLONE ACETONIDE 40 MG/ML
80 INJECTION, SUSPENSION INTRA-ARTICULAR; INTRAMUSCULAR
Status: DISCONTINUED | OUTPATIENT
Start: 2024-07-26 | End: 2024-08-28

## 2024-07-29 RX ORDER — BUPIVACAINE HYDROCHLORIDE 2.5 MG/ML
4 INJECTION, SOLUTION INFILTRATION; PERINEURAL
Status: DISCONTINUED | OUTPATIENT
Start: 2024-07-26 | End: 2024-08-28

## 2024-07-31 ENCOUNTER — PATIENT OUTREACH (OUTPATIENT)
Dept: CARE COORDINATION | Facility: CLINIC | Age: 59
End: 2024-07-31
Payer: COMMERCIAL

## 2024-08-05 DIAGNOSIS — J45.50 SEVERE PERSISTENT ASTHMA WITHOUT COMPLICATION (H): ICD-10-CM

## 2024-08-05 RX ORDER — FLUTICASONE PROPIONATE AND SALMETEROL 500; 50 UG/1; UG/1
1 POWDER RESPIRATORY (INHALATION) EVERY 12 HOURS
Qty: 1 EACH | Refills: 0 | Status: SHIPPED | OUTPATIENT
Start: 2024-08-05 | End: 2024-08-26

## 2024-08-06 ENCOUNTER — MYC MEDICAL ADVICE (OUTPATIENT)
Dept: FAMILY MEDICINE | Facility: CLINIC | Age: 59
End: 2024-08-06
Payer: COMMERCIAL

## 2024-08-06 NOTE — TELEPHONE ENCOUNTER
Called patient and scheduled appointment with PCP.     Carito Hutchison CMA on 8/6/2024 at 1:55 PM

## 2024-08-23 NOTE — PROGRESS NOTES
Chief Complaint   Patient presents with    Right Knee - Pain     Right knee, medial knee pain. Osteoarthritis. Last injection on 7/25/24 only helped for about 1 week. She is doing a lot of gardening which is contributory to her pain.          SURGERY: Total knee arthroplasty, left knee ([facility])  DATE OF SURGERY: 10/31/2023      HISTORY OF PRESENT ILLNESS: Carolina Hernandez is a 59 year old female seen for ongoing right knee pain. Her right knee has been hurting more now following her left total knee arthroplasty. She's had knee pain for years. Her last injection right knee was  7/25/2024 with kenalog, helped for a week. Injection 4/18/2024, didn't really help. Pain returned almost right away.  She'd like to hold off on right knee replacement surgery until 1/2025, but now thinking she can't make it.    Locates pain along the inner aspect and front of the knee, and can radiate down the leg to the ankle, up the thigh.  Pain is worse with work, lifting/carrying things, gardening. Treatment has been occasional use of over the counter pain medications without relief.      Pain at rest, pain at night better with injections. Sleeps with pillow between knees. Has tried tylenol arthritis and ibuprofen for pain but doesn't seem to really help.        Has chronic low back pain, denies numbness and tingling.    Her left knee replacement is doing well. She's thinking of right total knee arthroplasty 1/2025.        Patient has tried:     NSAIDS: Yes      Acetaminophen: yes     Physical Therapy: Yes      Home exercise program: yes.     Activity modification: Yes      Injections: Yes cortisone 7/2024, 4/2024, 1/2024, 10/2023, 5/15/2023, 2/2023, 11/2022, 8/15/2022, 8/20/2021 cortisone;    4/27/2022 with SynviscOne.     Ice: Yes      Assistive device:  Yes     Other: tylenol. Topical ointments.      Past medical history:   Past Medical History:   Diagnosis Date    Asthma     C. difficile colitis     COPD  (chronic obstructive pulmonary disease) (H)     Depression     Depressive disorder Have had it most of my life    Hypertension     Moderate persistent asthma with exacerbation 09/18/2007    Osteoarthritis     Sinusitis, chronic     Status post coronary angiogram 10/14/2022     Patient Active Problem List    Diagnosis Date Noted    Severe persistent asthma without complication (H28) 02/07/2024     Priority: Medium    Osteoarthritis of left knee 11/01/2023     Priority: Medium    S/P total knee arthroplasty, left 10/31/2023     Priority: Medium    Fatty liver 11/01/2022     Priority: Medium     ultrasound 10/29/22      COPD vs Asthma 10/30/2022     Priority: Medium     PFTS 5/2021 - FEV1- 1.21 (48%), ratio 0.50, no change with bronchodilators,  %, %, DLCO 94%       Transaminitis 10/29/2022     Priority: Medium    Acute pancreatitis 10/29/2022     Priority: Medium    Nonobstructive atherosclerosis of coronary artery 10/21/2022     Priority: Medium      H/o exertional chest pain leading to coronary angiogram 10/14/2022 which showed non-obstructing CAD      Hyperlipidemia LDL goal <70 10/21/2022     Priority: Medium    Low back pain due to bilateral sciatica 10/27/2021     Priority: Medium    Primary osteoarthritis of both knees 09/23/2021     Priority: Medium    Benign essential hypertension 07/26/2019     Priority: Medium     New diagnosis 7/26/2019        Recurrent major depressive disorder, in remission (H24) 01/17/2019     Priority: Medium    GERD (gastroesophageal reflux disease) 07/31/2012     Priority: Medium    Seasonal allergies 07/31/2012     Priority: Medium       Past Surgical History:   Past Surgical History:   Procedure Laterality Date    ARTHROPLASTY KNEE Left 10/31/2023    Procedure: ARTHROPLASTY, KNEE, TOTAL left;  Surgeon: Giovanny Chong MD;  Location: [facility] OR    COLONOSCOPY  2017    CV CORONARY ANGIOGRAM N/A 10/14/2022    Procedure: Coronary Angiogram;  Surgeon: Fidel Martin,  MD;  Location:  HEART CARDIAC CATH LAB    ESOPHAGOSCOPY, GASTROSCOPY, DUODENOSCOPY (EGD), COMBINED N/A 05/05/2022    Procedure: ESOPHAGOGASTRODUODENOSCOPY, WITH BIOPSY;  Surgeon: Timothy Oliva DO;  Location: [facility] OR    LAPAROSCOPIC CHOLECYSTECTOMY WITH CHOLANGIOGRAMS N/A 11/21/2022    Procedure: CHOLECYSTECTOMY, LAPAROSCOPIC, WITH CHOLANGIOGRAM;  Surgeon: Eros Butt DO;  Location: [facility] OR       Medications:   Current Outpatient Medications:     albuterol (PROVENTIL) (2.5 MG/3ML) 0.083% neb solution, inhale 1 vial (2.5 mg) by nebulization every 4 hours as needed for shortness of breath / dyspnea or wheezing, Disp: 270 mL, Rfl: 11    albuterol (VENTOLIN HFA) 108 (90 Base) MCG/ACT inhaler, INHALE 1-2 PUFFS INTO THE LUNGS EVERY 4 HOURS AS NEEDED FOR SHORTNESS OF BREATH/DYSPNEA OR WHEEZING ., Disp: 18 g, Rfl: 4    amLODIPine (NORVASC) 2.5 MG tablet, Take 2 tablets (5 mg) by mouth once daily., Disp: 180 tablet, Rfl: 3    cetirizine (ZYRTEC) 10 MG tablet, Take 10 mg by mouth daily, Disp: , Rfl:     fluticasone-salmeterol (ADVAIR) 500-50 MCG/ACT inhaler, Inhale 1 puff into the lungs every 12 hours., Disp: 1 each, Rfl: 11    montelukast (SINGULAIR) 10 MG tablet, Take 1 tablet (10 mg) by mouth At Bedtime, Disp: 90 tablet, Rfl: 3    multivitamin (CENTRUM SILVER) tablet, Take 1 tablet by mouth daily, Disp: , Rfl:     Omega-3 Fatty Acids (FISH OIL ULTRA) 1400 MG CAPS, Take 1 capsule by mouth daily, Disp: , Rfl:     omeprazole 20 MG tablet, Take 2 tablets (40 mg) by mouth daily, Disp: 180 tablet, Rfl: 2    sertraline (ZOLOFT) 25 MG tablet, Take 2 tablets (50 mg) by mouth daily for 90 days, Disp: 180 tablet, Rfl: 2    tiotropium (SPIRIVA) 18 MCG inhaled capsule, Inhale 1 capsule (18 mcg) into the lungs daily., Disp: 30 capsule, Rfl: 11    Allergies:   Allergies   Allergen Reactions    Doxycycline Difficulty breathing    Penicillins Itching    Sucralfate Rash         REVIEW OF SYSTEMS:  CONSTITUTIONAL:NEGATIVE for  "fever, chills, night sweats, change in weight  INTEGUMENTARY/SKIN: NEGATIVE for worrisome rashes, moles or lesions  MUSCULOSKELETAL:See HPI above  NEURO: NEGATIVE for weakness, dizziness or paresthesias  CARDIOVASCULAR: negative for chest pain, shortness of breath    PHYSICAL EXAM:  Ht 1.575 m (5' 2\")   Wt 74.8 kg (165 lb)   BMI 30.18 kg/m     GENERAL APPEARANCE: healthy, alert, no distress  SKIN: no suspicious lesions or rashes  NEURO: Normal strength and tone, mentation intact and speech normal  PSYCH:  mentation appears normal and affect normal  RESPIRATORY: No increased work of breathing.    BILATERAL LOWER EXTREMITIES:  Gait: antalgic, favors the right.    Right lower extremity:  Intact sensation deep peroneal nerve, superficial peroneal nerve, med/lat tibial nerve, sural nerve, saphenous nerve  Intact EHL, EDL, TA, FHL, GS, quadriceps hamstrings and hip flexors   calf soft and nttp or squeeze.  Edema: trace      RIGHT KNEE EXAM:    Skin: intact, no ecchymosis or erythema  KT taping in place anterior knee.  ROM: full extension to 125+ flexion  Tight hamstrings on straight leg raise.  Effusion: trace  Tender: medial joint line, lateral joint line.     MCL: stable, and non-painful at both 0 and 30 degrees knee flexion  Varus stress: stable, and non-painful at both 0 and 30 degrees knee flexion  Lachmans: neg, firm endpoint  Posterior Drawer stable  Patellofemoral joint:                Apprehension: negative              Crepitations: mild              Grind: positive.      X-RAY:  3 views right knee 8/28/2024 reviewed, medial articular flattening, essentially bone on bone. Marginal osteophytes. Subchondral sclerosis. patellofemoral lateral facet mild joint space narrowing with medial  patello-femoral osteophytes.     3 views bilateral knee from 7/26/2023 --  Bilateral medial compartment severe joint space narrowing, bone bone bone with articular flattening, subcondral sclerosis. Bilateral patellofemoral lateral " "facet mild joint space narrowing with medial  patello-femoral osteophytes.     2 views left knee from 1/24/2024 -- No obvious fractures or dislocations. Total knee arthroplasty components in place without evidence of failure or loosening.      Impression: Carolina Hernandez is a 59 year old female with chronic right knee pain, primary osteoarthritis.      Plan:       * reviewed imaging studies with patient, showing arthritic changes, or wearing of the cartilage in the knee. This can be caused by normal \"wear and tear\" over the years or following prior injury to the knee.  Treatment typically starts nonsurgically. Surgical indication for total knee arthroplasty  when nonsurgical management is no longer effective.  At this time, nonsurgical management is not providing them adequate relief of pain. Given the severity of osteoarthritis disease, I don't think that any further nonsurgical management, either injections, further therapy will alter the course of the osteoarthritis in a beneficial manner the patient is seeking at this time.        ** Risks of surgery include, but not limited to: bleeding (possibly requiring transfusion), infection, pain, scar, damage to adjacent structures (e.g. Nerves, blood vessels, bone, cartilage), temporary or permanent nerve damage, recurrence of symptoms, implant dislocation, instability, implant failure, implant infection, unequal limb lengths, malalignment, stiffness, need for further surgery, blood clots, pulmonary embolism, risks of anesthesia, and death.  * the knee will not feel \"normal\" as it won't be \"normal\" after knee replacement. It may feel \"clunky\" due to the nature of the hard metal and plastic implant.  * the expectation is for improved pain, not necessarily complete pain relief.  Also discussed that approximately 10% of patients that undergo knee arthroplasty are not happy following surgery and may have worse pain or no improvement in pain, contrary to their preoperative " expectations.  If you have surgery on your right knee, you will not be able to drive for likely 4-6 weeks, or until you have attained full knee function, range of motion, and ability to drive.    ** understanding these risks, as a quality of life decision, the patient would like to proceed with surgery: RIGHT total knee arthroplasty.    * will arrange RIGHT total knee arthroplasty at a mutual convenience in the near future  * discussed patient and their  will plan hospitalization overnight with discharge home the next morning, daily physical therapy starting either the day of or day after surgery, and then therapy on an outpatient basis after discharge.  * will plan postoperative, pharmacologic deep vein thrombosis prophylaxis x4 weeks.   * postoperative pain control will be oral medications upon discharge from hospital  * postoperative Physical Therapy to start upon discharge from the hospital.  * patient will need preoperative H+P prior to surgery from primary care provider   * will see patient 2 weeks postoperative, sooner as needed, for wound check and xrays. Will obtain AP, lateral and sunrise views of the knee at that time.    * patient encouraged to call in interim if any new questions or concerns arise.    * recommend no elective dental procedures for 4-6 months after surgery. Any emergency dental procedures, mouth infections, abscesses, etc must be taken care of immediately with preventive antibiotics.     * patient will need longterm (at least 1 years depending on risk factors) prophylactic antibiotics use with dental procedures or other invasive procedures (2 g amoxicilin or  2g Keflex or 500mg azithromycin or clarithromycin or 100mg doxycycline) 30-60 minutes prior to dental procedures.      Baseline KOOS/PROMIS today        Giovanny Chong M.D., M.S.  Dept. of Orthopaedic Surgery  [facility]

## 2024-08-26 ENCOUNTER — OFFICE VISIT (OUTPATIENT)
Dept: PULMONOLOGY | Facility: CLINIC | Age: 59
End: 2024-08-26
Payer: COMMERCIAL

## 2024-08-26 VITALS
OXYGEN SATURATION: 97 % | WEIGHT: 164.4 LBS | HEIGHT: 62 IN | BODY MASS INDEX: 30.25 KG/M2 | HEART RATE: 82 BPM | SYSTOLIC BLOOD PRESSURE: 144 MMHG | DIASTOLIC BLOOD PRESSURE: 97 MMHG

## 2024-08-26 DIAGNOSIS — J45.50 SEVERE PERSISTENT ASTHMA WITHOUT COMPLICATION (H): ICD-10-CM

## 2024-08-26 DIAGNOSIS — J44.9 STAGE 2 MODERATE COPD BY GOLD CLASSIFICATION (H): ICD-10-CM

## 2024-08-26 DIAGNOSIS — Z87.891 PERSONAL HISTORY OF TOBACCO USE: Primary | ICD-10-CM

## 2024-08-26 PROCEDURE — G0296 VISIT TO DETERM LDCT ELIG: HCPCS

## 2024-08-26 PROCEDURE — G0463 HOSPITAL OUTPT CLINIC VISIT: HCPCS | Mod: 25

## 2024-08-26 PROCEDURE — 99214 OFFICE O/P EST MOD 30 MIN: CPT | Mod: 25

## 2024-08-26 RX ORDER — ALBUTEROL SULFATE 90 UG/1
AEROSOL, METERED RESPIRATORY (INHALATION)
Qty: 18 G | Refills: 4 | Status: SHIPPED | OUTPATIENT
Start: 2024-08-26

## 2024-08-26 RX ORDER — TIOTROPIUM BROMIDE 18 UG/1
18 CAPSULE ORAL; RESPIRATORY (INHALATION) DAILY
Qty: 30 CAPSULE | Refills: 11 | Status: SHIPPED | OUTPATIENT
Start: 2024-08-26

## 2024-08-26 RX ORDER — FLUTICASONE PROPIONATE AND SALMETEROL 500; 50 UG/1; UG/1
1 POWDER RESPIRATORY (INHALATION) EVERY 12 HOURS
Qty: 1 EACH | Refills: 11 | Status: SHIPPED | OUTPATIENT
Start: 2024-08-26

## 2024-08-26 RX ORDER — ALBUTEROL SULFATE 0.83 MG/ML
SOLUTION RESPIRATORY (INHALATION)
Qty: 270 ML | Refills: 11 | Status: SHIPPED | OUTPATIENT
Start: 2024-08-26

## 2024-08-26 ASSESSMENT — PAIN SCALES - GENERAL: PAINLEVEL: MODERATE PAIN (5)

## 2024-08-26 NOTE — PATIENT INSTRUCTIONS
Lung Cancer Screening   Frequently Asked Questions  If you are at high-risk for lung cancer, getting screened with low-dose computed tomography (LDCT) every year can help save your life. This handout offers answers to some of the most common questions about lung cancer screening. If you have other questions, please call [phone] ([phone]).     What is it?  Lung cancer screening uses special X-ray technology to create an image of your lung tissue. The exam is quick and easy and takes less than 10 seconds. We don t give you any medicine or use any needles. You can eat before and after the exam. You don t need to change your clothes as long as the clothing on your chest doesn t contain metal. But, you do need to be able to hold your breath for at least 6 seconds during the exam.    What is the goal of lung cancer screening?  The goal of lung cancer screening is to save lives. Many times, lung cancer is not found until a person starts having physical symptoms. Lung cancer screening can help detect lung cancer in the earliest stages when it may be easier to treat.    Who should be screened for lung cancer?  We suggest lung cancer screening for anyone who is at high-risk for lung cancer. You are in the high-risk group if you:      are between the ages of 55 and 79, and    have smoked at least 1 pack of cigarettes a day for 20 or more years, and    still smoke or have quit within the past 15 years.    However, if you have a new cough or shortness of breath, you should talk to your doctor before being screened.    Why does it matter if I have symptoms?  Certain symptoms can be a sign that you have a condition in your lungs that should be checked and treated by your doctor. These symptoms include fever, chest pain, a new or changing cough, shortness of breath that you have never felt before, coughing up blood or unexplained weight loss. Having any of these symptoms can greatly affect the results of lung  cancer screening.       Should all smokers get an LDCT lung cancer screening exam?  It depends. Lung cancer screening is for a very specific group of men and women who have a history of heavy smoking over a long period of time (see  Who should be screened for lung cancer  above).  I am in the high-risk group, but have been diagnosed with cancer in the past. Is LDCT lung cancer screening right for me?  In some cases, you should not have LDCT lung screening, such as when your doctor is already following your cancer with CT scan studies. Your doctor will help you decide if LDCT lung screening is right for you.  Do I need to have a screening exam every year?  Yes. If you are in the high-risk group described earlier, you should get an LDCT lung cancer screening exam every year until you are 79, or are no longer willing or able to undergo screening and possible procedures to diagnose and treat lung cancer.  How effective is LDCT at preventing death from lung cancer?  Studies have shown that LDCT lung cancer screening can lower the risk of death from lung cancer by 20 percent in people who are at high-risk.  What are the risks?  There are some risks and limitations of LDCT lung cancer screening. We want to make sure you understand the risks and benefits, so please let us know if you have any questions. Your doctor may want to talk with you more about these risks.    Radiation exposure: As with any exam that uses radiation, there is a very small increased risk of cancer. The amount of radiation in LDCT is small--about the same amount a person would get from a mammogram. Your doctor orders the exam when he or she feels the potential benefits outweigh the risks.    False negatives: No test is perfect, including LDCT. It is possible that you may have a medical condition, including lung cancer, that is not found during your exam. This is called a false negative result.    False positives and more testing: LDCT very often finds  something in the lung that could be cancer, but in fact is not. This is called a false positive result. False positive tests often cause anxiety. To make sure these findings are not cancer, you may need to have more tests. These tests will be done only if you give us permission. Sometimes patients need a treatment that can have side effects, such as a biopsy. For more information on false positives, see  What can I expect from the results?     Findings not related to lung cancer: Your LDCT exam also takes pictures of areas of your body next to your lungs. In a very small number of cases, the CT scan will show an abnormal finding in one of these areas, such as your kidneys, adrenal glands, liver or thyroid. This finding may not be serious, but you may need more tests. Your doctor can help you decide what other tests you may need, if any.  What can I expect from the results?  About 1 out of 4 LDCT exams will find something that may need more tests. Most of the time, these findings are lung nodules. Lung nodules are very small collections of tissue in the lung. These nodules are very common, and the vast majority--more than 97 percent--are not cancer (benign). Most are normal lymph nodes or small areas of scarring from past infections.  But, if a small lung nodule is found to be cancer, the cancer can be cured more than 90 percent of the time. To know if the nodule is cancer, we may need to get more images before your next yearly screening exam. If the nodule has suspicious features (for example, it is large, has an odd shape or grows over time), we will refer you to a specialist for further testing.  Will my doctor also get the results?  Yes. Your doctor will get a copy of your results.  Is it okay to keep smoking now that there s a cancer screening exam?  No. Tobacco is one of the strongest cancer-causing agents. It causes not only lung cancer, but other cancers and cardiovascular (heart) diseases as well. The damage  caused by smoking builds over time. This means that the longer you smoke, the higher your risk of disease. While it is never too late to quit, the sooner you quit, the better.  Where can I find help to quit smoking?  The best way to prevent lung cancer is to stop smoking. If you have already quit smoking, congratulations and keep it up! For help on quitting smoking, please call [employer] at 3-716-QUITNOW (1-259.699.6651) or the American [employer] at 1-567.475.1731 to find local resources near you.  One-on-one health coaching:  If you d prefer to work individually with a health care provider on tobacco cessation, we offer:      Medication Therapy Management:  Our specially trained pharmacists work closely with you and your doctor to help you quit smoking.  Call [phone] or [phone] (toll free).

## 2024-08-26 NOTE — NURSING NOTE
Chief Complaint   Patient presents with    RECHECK     Return stage 2 moderate COPD, severe persistent asthma     Medications reviewed and vital signs taken.   Miya Macario CMA

## 2024-08-26 NOTE — LETTER
8/26/2024      Carolina Hernandez  [address] [facility] [address]      Dear Colleague,    Thank you for referring your patient, Carolina Hernandez, to the [facility]. Please see a copy of my visit note below.      [facility] LUNG SCIENCE AND [place] [address] [facility] [address]  Phone: [phone]  Fax: [phone]    Patient:  Carolina Hernandez, Date of birth 1965  Date of Visit:  08/26/2024  Referring Provider Maximino Turner      Assessment & Plan     Moderate COPD  Severe persistent asthma  Shortness of breath    Over the last year, she has had 1 exacerbation requiring prednisone and antibiotics.  She is currently on high-dose inhaled corticosteroid, long-acting beta agonist and long-acting muscarinic antagonist.  Sometimes she forgets to take her Advair in the evening.  I think her inhaler commendation is appropriate for the time being.  I encouraged her to get regular exercise, and she is working with her orthopedic surgeon and what to do with her right knee osteoarthritis.  Despite all of her significant psychosocial stressors, she has not started smoking again which is great.  She does meet criteria for lung cancer screening, so I talked to her about this and she agrees to participate.  I placed an order for her initial low radiation dose CT scan.  I refilled her inhalers for a year.    Follow-up in 1 year with pulmonary function testing    Medical Decision Making     I spent a total of 33 minutes on the day of the visit.   Time spent by me doing chart review, history and exam, documentation and further activities per the note.  This time was independent of time spent discussing lung cancer screening.       Maximino Turner MD              Today's visit note:     Chief Complaint: RECHECK (Return stage 2 moderate COPD, severe persistent asthma )      HISTORY OF PRESENT  ILLNESS:    This is a 59-year-old female with a history of COPD and asthma who is returning to clinic for regular follow-up.  At my last visit with her, I tried de-escalating her inhaled corticosteroid from 500 mcg of Advair which she was taking once daily, to 100 mcg of Advair twice a day.  Her symptoms got worse while on this dose, and so we increased it back to 500 mcg twice a day.  The way she is using her medications right now is that when she wakes up she will take an nebulizer of albuterol and follow this with Advair.  She will take another neb in the afternoon and take Spiriva, and then she will usually take another neb in the evening.  Sometimes she forgets her evening dose of Advair.  If she does forget it, she will usually wake up in the middle the night with some breathing issues.  She was treated for sinus infection and flare of her lung disease back in May with antibiotics and steroids.  This helped out.    She is trying to do a bit more exercise.  She has a bike, and just went on a 6 mile bike ride a few days ago.  A lot of her mobility is limited by arthritis in her right knee.  Her conditioning is also limited by some significant psychosocial events that have been going on at home.           Past Medical and Surgical History:     Past Medical History:   Diagnosis Date     Asthma      C. difficile colitis      COPD (chronic obstructive pulmonary disease) (H)      Depression      Depressive disorder Have had it most of my life     Hypertension      Moderate persistent asthma with exacerbation 09/18/2007     Osteoarthritis      Sinusitis, chronic      Status post coronary angiogram 10/14/2022     Past Surgical History:   Procedure Laterality Date     ARTHROPLASTY KNEE Left 10/31/2023    Procedure: ARTHROPLASTY, KNEE, TOTAL left;  Surgeon: Giovanny Chong MD;  Location: [facility] OR     COLONOSCOPY  2017     CV CORONARY ANGIOGRAM N/A 10/14/2022    Procedure: Coronary Angiogram;  Surgeon: Fidel Martin  MD [place];  Location:  [facility]     ESOPHAGOSCOPY, GASTROSCOPY, DUODENOSCOPY (EGD), COMBINED N/A 2022    Procedure: ESOPHAGOGASTRODUODENOSCOPY, WITH BIOPSY;  Surgeon: Timothy Oliva DO;  Location: [facility] OR     LAPAROSCOPIC CHOLECYSTECTOMY WITH CHOLANGIOGRAMS N/A 2022    Procedure: CHOLECYSTECTOMY, LAPAROSCOPIC, WITH CHOLANGIOGRAM;  Surgeon: Eros Butt DO;  Location: [facility] OR           Family History:     Family History   Problem Relation Age of Onset     Glaucoma Father      Coronary Artery Disease Father         stents     Cancer Maternal Grandfather      Coronary Artery Disease Paternal Grandfather      Schizophrenia Daughter      Diabetes Daughter      Cancer Daughter      Crohn's Disease No family hx of      Ulcerative Colitis No family hx of      GERD No family hx of      Stomach Cancer No family hx of               Social History:     Social History     Socioeconomic History     Marital status:      Spouse name: Not on file     Number of children: Not on file     Years of education: Not on file     Highest education level: Not on file   Occupational History     Not on file   Tobacco Use     Smoking status: Former     Current packs/day: 0.00     Types: Cigarettes     Quit date: 1998     Years since quittin.1     Smokeless tobacco: Never   Vaping Use     Vaping status: Never Used   Substance and Sexual Activity     Alcohol use: Yes     Comment: 4-6 beers multiple times weekly     Drug use: No     Sexual activity: Not Currently     Partners: Male     Birth control/protection: Female Surgical   Other Topics Concern     Not on file   Social History Narrative     Not on file     Social Determinants of Health     Financial Resource Strain: Low Risk  (10/23/2023)    Financial Resource Strain      Within the past 12 months, have you or your family members you live with been unable to get utilities (heat, electricity) when it was really needed?: No   Food  Insecurity: Low Risk  (10/23/2023)    Food Insecurity      Within the past 12 months, did you worry that your food would run out before you got money to buy more?: No      Within the past 12 months, did the food you bought just not last and you didn t have money to get more?: No   Transportation Needs: Low Risk  (10/23/2023)    Transportation Needs      Within the past 12 months, has lack of transportation kept you from medical appointments, getting your medicines, non-medical meetings or appointments, work, or from getting things that you need?: No   Physical Activity: Not on file   Stress: Not on file   Social Connections: Not on file   Interpersonal Safety: Low Risk  (5/10/2024)    Interpersonal Safety      Do you feel physically and emotionally safe where you currently live?: Yes      Within the past 12 months, have you been hit, slapped, kicked or otherwise physically hurt by someone?: No      Within the past 12 months, have you been humiliated or emotionally abused in other ways by your partner or ex-partner?: No   Housing Stability: Low Risk  (10/23/2023)    Housing Stability      Do you have housing? : Yes      Are you worried about losing your housing?: No            Medications:     Current Outpatient Medications   Medication Sig Dispense Refill     albuterol (PROVENTIL) (2.5 MG/3ML) 0.083% neb solution inhale 1 vial (2.5 mg) by nebulization every 4 hours as needed for shortness of breath / dyspnea or wheezing 225 mL 11     albuterol (VENTOLIN HFA) 108 (90 Base) MCG/ACT inhaler INHALE 1-2 PUFFS INTO THE LUNGS EVERY 4 HOURS AS NEEDED FOR SHORTNESS OF BREATH/DYSPNEA OR WHEEZING . 18 g 4     amLODIPine (NORVASC) 2.5 MG tablet Take 2 tablets (5 mg) by mouth once daily. 180 tablet 3     cetirizine (ZYRTEC) 10 MG tablet Take 10 mg by mouth daily       fluticasone-salmeterol (ADVAIR) 500-50 MCG/ACT inhaler Inhale 1 puff into the lungs every 12 hours 1 each 0     montelukast (SINGULAIR) 10 MG tablet Take 1 tablet  "(10 mg) by mouth At Bedtime 90 tablet 3     multivitamin (CENTRUM SILVER) tablet Take 1 tablet by mouth daily       Omega-3 Fatty Acids (FISH OIL ULTRA) 1400 MG CAPS Take 1 capsule by mouth daily       omeprazole 20 MG tablet Take 2 tablets (40 mg) by mouth daily 180 tablet 2     sertraline (ZOLOFT) 25 MG tablet Take 2 tablets (50 mg) by mouth daily for 90 days 180 tablet 2     tiotropium (SPIRIVA) 18 MCG inhaled capsule Inhale 1 capsule (18 mcg) into the lungs daily 30 capsule 11     Current Facility-Administered Medications   Medication Dose Route Frequency Provider Last Rate Last Admin     BUPivacaine (MARCAINE) 0.25 % injection 4 mL  4 mL      4 mL at 07/26/24 0721     triamcinolone (KENALOG-40) injection 80 mg  80 mg      80 mg at 07/26/24 0721         PHYSICAL EXAM:  BP (!) 144/97   Pulse 82   Ht 1.575 m (5' 2\")   Wt 74.6 kg (164 lb 6.4 oz)   SpO2 97%   BMI 30.07 kg/m       General: Comfortable, No apparent distress  Eyes: Anicteric   Ears: Hearing grossly normal  Neck: supple, no thyromegaly   Lymphatics: No cervical or supraclavicular nodes  Respiratory: Good air movement. No crackles. No rhonchi. No wheezes  Cardiac: RRR, normal S1, S2. No murmurs.   Musculoskeletal: Extremities normal. No clubbing. No cyanosis. No edema.  Skin: No rash on limited exam  Neuro: Normal mentation. Normal speech.  Psych:Normal affect           Data:   All laboratory and imaging data reviewed.      IgE 130  Hypersensitivity panel negative  ANCA antibody less than 1:10  JANET negative    PFTs April 24, 2023: FEV1/FVC ratio 0.56.  FVC is 2.85 L which is 101% predicted and the FEV1 is 1.60 L which is 70% predicted.  Her diffusion capacity is 106% predicted.    Interpretation: Moderate airflow obstruction.  Normal diffusion capacity.  Significant improvement in her spirometry compared to prior.      Chest CT dated July 5, 2021: I have reviewed the images and agree with radiologist interpretation below:  7 mm irregular partly " calcified nodule in the right upper lobe on  series 4 image 58, previously 7 mm. Elongated irregular partly  calcified nodule more inferiorly in the right upper lobe on image 68  measures 1.2 x 0.7 cm, previously 1.2 x 0.8 cm. Curvilinear  opacification interconnects these small nodules with components of  scarring and fibrosis. Tiny adjacent calcified satellite nodules are  unchanged as well. There are a few triangular nodules along the  fissures, presumably lymphatic. There are also a few scattered  calcified nodules present since in the right lung base on series 4  image 247. A tiny 3 mm subpleural nodule in the left lung base on  image 234 is new from prior. Central airways are clear. Mild apical  predominant emphysematous changes. No pneumothorax or pleural  effusion. No air trapping on expiratory views.     The heart is normal size. No pericardial effusion. The ascending aorta  and main pulmonary arteries are normal caliber. Moderate calcification  of the coronary arteries. No suspicious lymphadenopathy. Calcified  right hilar lymph nodes.     Calcified splenic granulomas, otherwise limited visualization of the  upper abdomen is unremarkable. No suspicious adrenal nodules.     Bones: Moderate degenerative changes no suspicious osseous lesions.                                                                      Impression:   1. Multiple adjacent irregular partly calcified pulmonary nodules in  the right upper lobe are unchanged from 12/10/2019 CT neck.  Potentially old granulomatous disease.  There is a single new small 3  mm pulmonary nodule in the left lower lobe. Attention on 6-12 month  follow-up is recommended.  2. Mild emphysematous changes. No focal airspace consolidations          No results found for this or any previous visit (from the past 168 hour(s)).                      Lung Cancer Screening Shared Decision Making Visit     Carolina Hernandez, a 59 year old female, is eligible for lung cancer  screening    History   Smoking Status     Former     Types: Cigarettes   Smokeless Tobacco     Never       I have discussed with patient the risks and benefits of screening for lung cancer with low-dose CT.     The risks include:    radiation exposure: one low dose chest CT has as much ionizing radiation as about 15 chest x-rays, or 6 months of background radiation living in [place]      false positives: most findings/nodules are NOT cancer, but some might still require additional diagnostic evaluation, including biopsy    over-diagnosis: some slow growing cancers that might never have been clinically significant will be detected and treated unnecessarily     The benefit of early detection of lung cancer is contingent upon adherence to annual screening or more frequent follow up if indicated.     Furthermore, to benefit from screening, Carolina must be willing and able to undergo diagnostic procedures, if indicated. Although no specific guide is available for determining severity of comorbidities, it is reasonable to withhold screening in patients who have greater mortality risk from other diseases.     We did discuss that the best way to prevent lung cancer is to not smoke.    Some patients may value a numeric estimation of lung cancer risk when evaluating if lung cancer screening is right for them, here is one calculator:    ShouldIScreen      Again, thank you for allowing me to participate in the care of your patient.        Sincerely,        Maximino Turner MD

## 2024-08-26 NOTE — PROGRESS NOTES
[facility] [address] [address]  Phone: [phone]  Fax: [phone]    Patient:  Caroilna Hernandez, Date of birth 1965  Date of Visit:  08/26/2024  Referring Provider Maximino Turner      Assessment & Plan      Moderate COPD  Severe persistent asthma  Shortness of breath    Over the last year, she has had 1 exacerbation requiring prednisone and antibiotics.  She is currently on high-dose inhaled corticosteroid, long-acting beta agonist and long-acting muscarinic antagonist.  Sometimes she forgets to take her Advair in the evening.  I think her inhaler commendation is appropriate for the time being.  I encouraged her to get regular exercise, and she is working with her orthopedic surgeon and what to do with her right knee osteoarthritis.  Despite all of her significant psychosocial stressors, she has not started smoking again which is great.  She does meet criteria for lung cancer screening, so I talked to her about this and she agrees to participate.  I placed an order for her initial low radiation dose CT scan.  I refilled her inhalers for a year.    Follow-up in 1 year with pulmonary function testing    Medical Decision Making      I spent a total of 33 minutes on the day of the visit.   Time spent by me doing chart review, history and exam, documentation and further activities per the note.  This time was independent of time spent discussing lung cancer screening.       Maximino Turner MD              Today's visit note:     Chief Complaint: RECHECK (Return stage 2 moderate COPD, severe persistent asthma )      HISTORY OF PRESENT ILLNESS:    This is a 59-year-old female with a history of COPD and asthma who is returning to clinic for regular follow-up.  At my last visit with her, I tried de-escalating her inhaled corticosteroid from 500 mcg of Advair which she was taking once daily, to 100 mcg of Advair  twice a day.  Her symptoms got worse while on this dose, and so we increased it back to 500 mcg twice a day.  The way she is using her medications right now is that when she wakes up she will take an nebulizer of albuterol and follow this with Advair.  She will take another neb in the afternoon and take Spiriva, and then she will usually take another neb in the evening.  Sometimes she forgets her evening dose of Advair.  If she does forget it, she will usually wake up in the middle the night with some breathing issues.  She was treated for sinus infection and flare of her lung disease back in May with antibiotics and steroids.  This helped out.    She is trying to do a bit more exercise.  She has a bike, and just went on a 6 mile bike ride a few days ago.  A lot of her mobility is limited by arthritis in her right knee.  Her conditioning is also limited by some significant psychosocial events that have been going on at home.           Past Medical and Surgical History:     Past Medical History:   Diagnosis Date    Asthma     C. difficile colitis     COPD (chronic obstructive pulmonary disease) (H)     Depression     Depressive disorder Have had it most of my life    Hypertension     Moderate persistent asthma with exacerbation 09/18/2007    Osteoarthritis     Sinusitis, chronic     Status post coronary angiogram 10/14/2022     Past Surgical History:   Procedure Laterality Date    ARTHROPLASTY KNEE Left 10/31/2023    Procedure: ARTHROPLASTY, KNEE, TOTAL left;  Surgeon: Giovanny Chong MD;  Location: [facility] OR    COLONOSCOPY  2017    CV CORONARY ANGIOGRAM N/A 10/14/2022    Procedure: Coronary Angiogram;  Surgeon: Fidel Martin MD;  Location: [facility] CARDIAC CATH LAB    ESOPHAGOSCOPY, GASTROSCOPY, DUODENOSCOPY (EGD), COMBINED N/A 05/05/2022    Procedure: ESOPHAGOGASTRODUODENOSCOPY, WITH BIOPSY;  Surgeon: Timothy Oliva DO;  Location: [facility] [place]    LAPAROSCOPIC CHOLECYSTECTOMY WITH CHOLANGIOGRAMS N/A 11/21/2022     Procedure: CHOLECYSTECTOMY, LAPAROSCOPIC, WITH CHOLANGIOGRAM;  Surgeon: Eros Butt DO;  Location: [facility] OR           Family History:     Family History   Problem Relation Age of Onset    Glaucoma Father     Coronary Artery Disease Father         stents    Cancer Maternal Grandfather     Coronary Artery Disease Paternal Grandfather     Schizophrenia Daughter     Diabetes Daughter     Cancer Daughter     Crohn's Disease No family hx of     Ulcerative Colitis No family hx of     GERD No family hx of     Stomach Cancer No family hx of               Social History:     Social History     Socioeconomic History    Marital status:      Spouse name: Not on file    Number of children: Not on file    Years of education: Not on file    Highest education level: Not on file   Occupational History    Not on file   Tobacco Use    Smoking status: Former     Current packs/day: 0.00     Types: Cigarettes     Quit date: 1998     Years since quittin.1    Smokeless tobacco: Never   Vaping Use    Vaping status: Never Used   Substance and Sexual Activity    Alcohol use: Yes     Comment: 4-6 beers multiple times weekly    Drug use: No    Sexual activity: Not Currently     Partners: Male     Birth control/protection: Female Surgical   Other Topics Concern    Not on file   Social History Narrative    Not on file     Social Determinants of Health     Financial Resource Strain: Low Risk  (10/23/2023)    Financial Resource Strain     Within the past 12 months, have you or your family members you live with been unable to get utilities (heat, electricity) when it was really needed?: No   Food Insecurity: Low Risk  (10/23/2023)    Food Insecurity     Within the past 12 months, did you worry that your food would run out before you got money to buy more?: No     Within the past 12 months, did the food you bought just not last and you didn t have money to get more?: No   Transportation Needs: Low Risk  (10/23/2023)     Transportation Needs     Within the past 12 months, has lack of transportation kept you from medical appointments, getting your medicines, non-medical meetings or appointments, work, or from getting things that you need?: No   Physical Activity: Not on file   Stress: Not on file   Social Connections: Not on file   Interpersonal Safety: Low Risk  (5/10/2024)    Interpersonal Safety     Do you feel physically and emotionally safe where you currently live?: Yes     Within the past 12 months, have you been hit, slapped, kicked or otherwise physically hurt by someone?: No     Within the past 12 months, have you been humiliated or emotionally abused in other ways by your partner or ex-partner?: No   Housing Stability: Low Risk  (10/23/2023)    Housing Stability     Do you have housing? : Yes     Are you worried about losing your housing?: No            Medications:     Current Outpatient Medications   Medication Sig Dispense Refill    albuterol (PROVENTIL) (2.5 MG/3ML) 0.083% neb solution inhale 1 vial (2.5 mg) by nebulization every 4 hours as needed for shortness of breath / dyspnea or wheezing 225 mL 11    albuterol (VENTOLIN HFA) 108 (90 Base) MCG/ACT inhaler INHALE 1-2 PUFFS INTO THE LUNGS EVERY 4 HOURS AS NEEDED FOR SHORTNESS OF BREATH/DYSPNEA OR WHEEZING . 18 g 4    amLODIPine (NORVASC) 2.5 MG tablet Take 2 tablets (5 mg) by mouth once daily. 180 tablet 3    cetirizine (ZYRTEC) 10 MG tablet Take 10 mg by mouth daily      fluticasone-salmeterol (ADVAIR) 500-50 MCG/ACT inhaler Inhale 1 puff into the lungs every 12 hours 1 each 0    montelukast (SINGULAIR) 10 MG tablet Take 1 tablet (10 mg) by mouth At Bedtime 90 tablet 3    multivitamin (CENTRUM SILVER) tablet Take 1 tablet by mouth daily      Omega-3 Fatty Acids (FISH OIL ULTRA) 1400 MG CAPS Take 1 capsule by mouth daily      omeprazole 20 MG tablet Take 2 tablets (40 mg) by mouth daily 180 tablet 2    sertraline (ZOLOFT) 25 MG tablet Take 2 tablets (50 mg) by mouth  "daily for 90 days 180 tablet 2    tiotropium (SPIRIVA) 18 MCG inhaled capsule Inhale 1 capsule (18 mcg) into the lungs daily 30 capsule 11     Current Facility-Administered Medications   Medication Dose Route Frequency Provider Last Rate Last Admin    BUPivacaine (MARCAINE) 0.25 % injection 4 mL  4 mL      4 mL at 07/26/24 0721    triamcinolone (KENALOG-40) injection 80 mg  80 mg      80 mg at 07/26/24 0721         PHYSICAL EXAM:  BP (!) 144/97   Pulse 82   Ht 1.575 m (5' 2\")   Wt 74.6 kg (164 lb 6.4 oz)   SpO2 97%   BMI 30.07 kg/m       General: Comfortable, No apparent distress  Eyes: Anicteric   Ears: Hearing grossly normal  Neck: supple, no thyromegaly   Lymphatics: No cervical or supraclavicular nodes  Respiratory: Good air movement. No crackles. No rhonchi. No wheezes  Cardiac: RRR, normal S1, S2. No murmurs.   Musculoskeletal: Extremities normal. No clubbing. No cyanosis. No edema.  Skin: No rash on limited exam  Neuro: Normal mentation. Normal speech.  Psych:Normal affect           Data:   All laboratory and imaging data reviewed.      IgE 130  Hypersensitivity panel negative  ANCA antibody less than 1:10  JANET negative    PFTs April 24, 2023: FEV1/FVC ratio 0.56.  FVC is 2.85 L which is 101% predicted and the FEV1 is 1.60 L which is 70% predicted.  Her diffusion capacity is 106% predicted.    Interpretation: Moderate airflow obstruction.  Normal diffusion capacity.  Significant improvement in her spirometry compared to prior.      Chest CT dated July 5, 2021: I have reviewed the images and agree with radiologist interpretation below:  7 mm irregular partly calcified nodule in the right upper lobe on  series 4 image 58, previously 7 mm. Elongated irregular partly  calcified nodule more inferiorly in the right upper lobe on image 68  measures 1.2 x 0.7 cm, previously 1.2 x 0.8 cm. Curvilinear  opacification interconnects these small nodules with components of  scarring and fibrosis. Tiny adjacent " calcified satellite nodules are  unchanged as well. There are a few triangular nodules along the  fissures, presumably lymphatic. There are also a few scattered  calcified nodules present since in the right lung base on series 4  image 247. A tiny 3 mm subpleural nodule in the left lung base on  image 234 is new from prior. Central airways are clear. Mild apical  predominant emphysematous changes. No pneumothorax or pleural  effusion. No air trapping on expiratory views.     The heart is normal size. No pericardial effusion. The ascending aorta  and main pulmonary arteries are normal caliber. Moderate calcification  of the coronary arteries. No suspicious lymphadenopathy. Calcified  right hilar lymph nodes.     Calcified splenic granulomas, otherwise limited visualization of the  upper abdomen is unremarkable. No suspicious adrenal nodules.     Bones: Moderate degenerative changes no suspicious osseous lesions.                                                                      Impression:   1. Multiple adjacent irregular partly calcified pulmonary nodules in  the right upper lobe are unchanged from 12/10/2019 CT neck.  Potentially old granulomatous disease.  There is a single new small 3  mm pulmonary nodule in the left lower lobe. Attention on 6-12 month  follow-up is recommended.  2. Mild emphysematous changes. No focal airspace consolidations          No results found for this or any previous visit (from the past 168 hour(s)).                      Lung Cancer Screening Shared Decision Making Visit     Carolina Hernandez, a 59 year old female, is eligible for lung cancer screening    History   Smoking Status    Former    Types: Cigarettes   Smokeless Tobacco    Never       I have discussed with patient the risks and benefits of screening for lung cancer with low-dose CT.     The risks include:    radiation exposure: one low dose chest CT has as much ionizing radiation as about 15 chest x-rays, or 6 months of  background radiation living in [place]      false positives: most findings/nodules are NOT cancer, but some might still require additional diagnostic evaluation, including biopsy    over-diagnosis: some slow growing cancers that might never have been clinically significant will be detected and treated unnecessarily     The benefit of early detection of lung cancer is contingent upon adherence to annual screening or more frequent follow up if indicated.     Furthermore, to benefit from screening, Carolina must be willing and able to undergo diagnostic procedures, if indicated. Although no specific guide is available for determining severity of comorbidities, it is reasonable to withhold screening in patients who have greater mortality risk from other diseases.     We did discuss that the best way to prevent lung cancer is to not smoke.    Some patients may value a numeric estimation of lung cancer risk when evaluating if lung cancer screening is right for them, here is one calculator:    ShouldIScreen

## 2024-08-28 ENCOUNTER — ANCILLARY PROCEDURE (OUTPATIENT)
Dept: GENERAL RADIOLOGY | Facility: CLINIC | Age: 59
End: 2024-08-28
Attending: PHYSICIAN ASSISTANT
Payer: COMMERCIAL

## 2024-08-28 ENCOUNTER — TELEPHONE (OUTPATIENT)
Dept: SURGERY | Facility: CLINIC | Age: 59
End: 2024-08-28

## 2024-08-28 ENCOUNTER — OFFICE VISIT (OUTPATIENT)
Dept: ORTHOPEDICS | Facility: CLINIC | Age: 59
End: 2024-08-28
Payer: COMMERCIAL

## 2024-08-28 VITALS — HEIGHT: 62 IN | BODY MASS INDEX: 30.36 KG/M2 | WEIGHT: 165 LBS

## 2024-08-28 DIAGNOSIS — M17.11 PRIMARY OSTEOARTHRITIS OF RIGHT KNEE: Primary | ICD-10-CM

## 2024-08-28 DIAGNOSIS — M17.11 PRIMARY OSTEOARTHRITIS OF RIGHT KNEE: ICD-10-CM

## 2024-08-28 DIAGNOSIS — M25.561 CHRONIC PAIN OF RIGHT KNEE: ICD-10-CM

## 2024-08-28 DIAGNOSIS — G89.29 CHRONIC PAIN OF RIGHT KNEE: ICD-10-CM

## 2024-08-28 PROCEDURE — 99213 OFFICE O/P EST LOW 20 MIN: CPT | Performed by: ORTHOPAEDIC SURGERY

## 2024-08-28 PROCEDURE — 73562 X-RAY EXAM OF KNEE 3: CPT | Mod: TC | Performed by: RADIOLOGY

## 2024-08-28 RX ORDER — TRANEXAMIC ACID 650 MG/1
1950 TABLET ORAL ONCE
OUTPATIENT
Start: 2024-08-28 | End: 2024-08-28

## 2024-08-28 ASSESSMENT — PAIN SCALES - GENERAL: PAINLEVEL: SEVERE PAIN (6)

## 2024-08-28 NOTE — NURSING NOTE
Antonio messer  Severe  Severe  Severe  Severe  Moderate   Severe  Severe    Mayra Osborne Certified Medical Assistant

## 2024-08-28 NOTE — LETTER
8/28/2024      Carolina Hernandez  [address] [facility] [id number]      Dear Colleague,    Thank you for referring your patient, Carolina Hernandez, to the [facility] [place]. Please see a copy of my visit note below.    Chief Complaint   Patient presents with     Right Knee - Pain     Right knee, medial knee pain. Osteoarthritis. Last injection on 7/25/24 only helped for about 1 week. She is doing a lot of gardening which is contributory to her pain.          SURGERY: Total knee arthroplasty, left knee ([facility])  DATE OF SURGERY: 10/31/2023      HISTORY OF PRESENT ILLNESS: Carolina Hernandez is a 59 year old female seen for ongoing right knee pain. Her right knee has been hurting more now following her left total knee arthroplasty. She's had knee pain for years. Her last injection right knee was  7/25/2024 with kenalog, helped for a week. Injection 4/18/2024, didn't really help. Pain returned almost right away.  She'd like to hold off on right knee replacement surgery until 1/2025, but now thinking she can't make it.    Locates pain along the inner aspect and front of the knee, and can radiate down the leg to the ankle, up the thigh.  Pain is worse with work, lifting/carrying things, gardening. Treatment has been occasional use of over the counter pain medications without relief.      Pain at rest, pain at night better with injections. Sleeps with pillow between knees. Has tried tylenol arthritis and ibuprofen for pain but doesn't seem to really help.        Has chronic low back pain, denies numbness and tingling.    Her left knee replacement is doing well. She's thinking of right total knee arthroplasty 1/2025.        Patient has tried:     NSAIDS: Yes      Acetaminophen: yes     Physical Therapy: Yes      Home exercise program: yes.     Activity modification: Yes      Injections: Yes cortisone 7/2024, 4/2024, 1/2024, 10/2023, 5/15/2023, 2/2023, 11/2022,  8/15/2022, 8/20/2021 cortisone;    4/27/2022 with SynviscOne.     Ice: Yes      Assistive device:  Yes     Other: tylenol. Topical ointments.      Past medical history:   Past Medical History:   Diagnosis Date     Asthma      C. difficile colitis      COPD (chronic obstructive pulmonary disease) (H)      Depression      Depressive disorder Have had it most of my life     Hypertension      Moderate persistent asthma with exacerbation 09/18/2007     Osteoarthritis      Sinusitis, chronic      Status post coronary angiogram 10/14/2022     Patient Active Problem List    Diagnosis Date Noted     Severe persistent asthma without complication (H28) 02/07/2024     Priority: Medium     Osteoarthritis of left knee 11/01/2023     Priority: Medium     S/P total knee arthroplasty, left 10/31/2023     Priority: Medium     Fatty liver 11/01/2022     Priority: Medium     ultrasound 10/29/22       COPD vs Asthma 10/30/2022     Priority: Medium     PFTS 5/2021 - FEV1- 1.21 (48%), ratio 0.50, no change with bronchodilators,  %, %, DLCO 94%        Transaminitis 10/29/2022     Priority: Medium     Acute pancreatitis 10/29/2022     Priority: Medium     Nonobstructive atherosclerosis of coronary artery 10/21/2022     Priority: Medium      H/o exertional chest pain leading to coronary angiogram 10/14/2022 which showed non-obstructing CAD       Hyperlipidemia LDL goal <70 10/21/2022     Priority: Medium     Low back pain due to bilateral sciatica 10/27/2021     Priority: Medium     Primary osteoarthritis of both knees 09/23/2021     Priority: Medium     Benign essential hypertension 07/26/2019     Priority: Medium     New diagnosis 7/26/2019         Recurrent major depressive disorder, in remission (H24) 01/17/2019     Priority: Medium     GERD (gastroesophageal reflux disease) 07/31/2012     Priority: Medium     Seasonal allergies 07/31/2012     Priority: Medium       Past Surgical History:   Past Surgical History:   Procedure  Laterality Date     ARTHROPLASTY KNEE Left 10/31/2023    Procedure: ARTHROPLASTY, KNEE, TOTAL left;  Surgeon: Giovanny Chong MD;  Location: [facility]     COLONOSCOPY  2017     CV CORONARY ANGIOGRAM N/A 10/14/2022    Procedure: Coronary Angiogram;  Surgeon: Fidel Martin MD;  Location:  [facility]     ESOPHAGOSCOPY, GASTROSCOPY, DUODENOSCOPY (EGD), COMBINED N/A 05/05/2022    Procedure: ESOPHAGOGASTRODUODENOSCOPY, WITH BIOPSY;  Surgeon: Timothy Oliva DO;  Location: [facility] OR     LAPAROSCOPIC CHOLECYSTECTOMY WITH CHOLANGIOGRAMS N/A 11/21/2022    Procedure: CHOLECYSTECTOMY, LAPAROSCOPIC, WITH CHOLANGIOGRAM;  Surgeon: Eros Butt DO;  Location: [facility] OR       Medications:   Current Outpatient Medications:      albuterol (PROVENTIL) (2.5 MG/3ML) 0.083% neb solution, inhale 1 vial (2.5 mg) by nebulization every 4 hours as needed for shortness of breath / dyspnea or wheezing, Disp: 270 mL, Rfl: 11     albuterol (VENTOLIN HFA) 108 (90 Base) MCG/ACT inhaler, INHALE 1-2 PUFFS INTO THE LUNGS EVERY 4 HOURS AS NEEDED FOR SHORTNESS OF BREATH/DYSPNEA OR WHEEZING ., Disp: 18 g, Rfl: 4     amLODIPine (NORVASC) 2.5 MG tablet, Take 2 tablets (5 mg) by mouth once daily., Disp: 180 tablet, Rfl: 3     cetirizine (ZYRTEC) 10 MG tablet, Take 10 mg by mouth daily, Disp: , Rfl:      fluticasone-salmeterol (ADVAIR) 500-50 MCG/ACT inhaler, Inhale 1 puff into the lungs every 12 hours., Disp: 1 each, Rfl: 11     montelukast (SINGULAIR) 10 MG tablet, Take 1 tablet (10 mg) by mouth At Bedtime, Disp: 90 tablet, Rfl: 3     multivitamin (CENTRUM SILVER) tablet, Take 1 tablet by mouth daily, Disp: , Rfl:      Omega-3 Fatty Acids (FISH OIL ULTRA) 1400 MG CAPS, Take 1 capsule by mouth daily, Disp: , Rfl:      omeprazole 20 MG tablet, Take 2 tablets (40 mg) by mouth daily, Disp: 180 tablet, Rfl: 2     sertraline (ZOLOFT) 25 MG tablet, Take 2 tablets (50 mg) by mouth daily for 90 days, Disp: 180 tablet, Rfl: 2      "tiotropium (SPIRIVA) 18 MCG inhaled capsule, Inhale 1 capsule (18 mcg) into the lungs daily., Disp: 30 capsule, Rfl: 11    Allergies:   Allergies   Allergen Reactions     Doxycycline Difficulty breathing     Penicillins Itching     Sucralfate Rash         REVIEW OF SYSTEMS:  CONSTITUTIONAL:NEGATIVE for fever, chills, night sweats, change in weight  INTEGUMENTARY/SKIN: NEGATIVE for worrisome rashes, moles or lesions  MUSCULOSKELETAL:See HPI above  NEURO: NEGATIVE for weakness, dizziness or paresthesias  CARDIOVASCULAR: negative for chest pain, shortness of breath    PHYSICAL EXAM:  Ht 1.575 m (5' 2\")   Wt 74.8 kg (165 lb)   BMI 30.18 kg/m     GENERAL APPEARANCE: healthy, alert, no distress  SKIN: no suspicious lesions or rashes  NEURO: Normal strength and tone, mentation intact and speech normal  PSYCH:  mentation appears normal and affect normal  RESPIRATORY: No increased work of breathing.    BILATERAL LOWER EXTREMITIES:  Gait: antalgic, favors the right.    Right lower extremity:  Intact sensation deep peroneal nerve, superficial peroneal nerve, med/lat tibial nerve, sural nerve, saphenous nerve  Intact EHL, EDL, TA, FHL, GS, quadriceps hamstrings and hip flexors   calf soft and nttp or squeeze.  Edema: trace      RIGHT KNEE EXAM:    Skin: intact, no ecchymosis or erythema  KT taping in place anterior knee.  ROM: full extension to 125+ flexion  Tight hamstrings on straight leg raise.  Effusion: trace  Tender: medial joint line, lateral joint line.     MCL: stable, and non-painful at both 0 and 30 degrees knee flexion  Varus stress: stable, and non-painful at both 0 and 30 degrees knee flexion  Lachmans: neg, firm endpoint  Posterior Drawer stable  Patellofemoral joint:                Apprehension: negative              Crepitations: mild              Grind: positive.      X-RAY:  3 views right knee 8/28/2024 reviewed, medial articular flattening, essentially bone on bone. Marginal osteophytes. Subchondral " "sclerosis. patellofemoral lateral facet mild joint space narrowing with medial  patello-femoral osteophytes.     3 views bilateral knee from 7/26/2023 --  Bilateral medial compartment severe joint space narrowing, bone bone bone with articular flattening, subcondral sclerosis. Bilateral patellofemoral lateral facet mild joint space narrowing with medial  patello-femoral osteophytes.     2 views left knee from 1/24/2024 -- No obvious fractures or dislocations. Total knee arthroplasty components in place without evidence of failure or loosening.      Impression: Carolina Hernandez is a 59 year old female with chronic right knee pain, primary osteoarthritis.      Plan:       * reviewed imaging studies with patient, showing arthritic changes, or wearing of the cartilage in the knee. This can be caused by normal \"wear and tear\" over the years or following prior injury to the knee.  Treatment typically starts nonsurgically. Surgical indication for total knee arthroplasty  when nonsurgical management is no longer effective.  At this time, nonsurgical management is not providing them adequate relief of pain. Given the severity of osteoarthritis disease, I don't think that any further nonsurgical management, either injections, further therapy will alter the course of the osteoarthritis in a beneficial manner the patient is seeking at this time.        ** Risks of surgery include, but not limited to: bleeding (possibly requiring transfusion), infection, pain, scar, damage to adjacent structures (e.g. Nerves, blood vessels, bone, cartilage), temporary or permanent nerve damage, recurrence of symptoms, implant dislocation, instability, implant failure, implant infection, unequal limb lengths, malalignment, stiffness, need for further surgery, blood clots, pulmonary embolism, risks of anesthesia, and death.  * the knee will not feel \"normal\" as it won't be \"normal\" after knee replacement. It may feel \"clunky\" due to the nature of " the hard metal and plastic implant.  * the expectation is for improved pain, not necessarily complete pain relief.  Also discussed that approximately 10% of patients that undergo knee arthroplasty are not happy following surgery and may have worse pain or no improvement in pain, contrary to their preoperative expectations.  If you have surgery on your right knee, you will not be able to drive for likely 4-6 weeks, or until you have attained full knee function, range of motion, and ability to drive.    ** understanding these risks, as a quality of life decision, the patient would like to proceed with surgery: RIGHT total knee arthroplasty.    * will arrange RIGHT total knee arthroplasty at a mutual convenience in the near future  * discussed patient and their  will plan hospitalization overnight with discharge home the next morning, daily physical therapy starting either the day of or day after surgery, and then therapy on an outpatient basis after discharge.  * will plan postoperative, pharmacologic deep vein thrombosis prophylaxis x4 weeks.   * postoperative pain control will be oral medications upon discharge from hospital  * postoperative Physical Therapy to start upon discharge from the hospital.  * patient will need preoperative H+P prior to surgery from primary care provider   * will see patient 2 weeks postoperative, sooner as needed, for wound check and xrays. Will obtain AP, lateral and sunrise views of the knee at that time.    * patient encouraged to call in interim if any new questions or concerns arise.    * recommend no elective dental procedures for 4-6 months after surgery. Any emergency dental procedures, mouth infections, abscesses, etc must be taken care of immediately with preventive antibiotics.     * patient will need longterm (at least 1 years depending on risk factors) prophylactic antibiotics use with dental procedures or other invasive procedures (2 g amoxicilin or  2g Keflex or 500mg  azithromycin or clarithromycin or 100mg doxycycline) 30-60 minutes prior to dental procedures.      Baseline KOOS/PROMIS today        Giovanny Chong M.D., M.S.  Dept. of Orthopaedic Surgery  [facility]      Again, thank you for allowing me to participate in the care of your patient.        Sincerely,        Giovanny Chong MD

## 2024-09-03 ENCOUNTER — OFFICE VISIT (OUTPATIENT)
Dept: FAMILY MEDICINE | Facility: CLINIC | Age: 59
End: 2024-09-03
Payer: COMMERCIAL

## 2024-09-03 VITALS
BODY MASS INDEX: 29.77 KG/M2 | SYSTOLIC BLOOD PRESSURE: 134 MMHG | DIASTOLIC BLOOD PRESSURE: 86 MMHG | HEART RATE: 79 BPM | TEMPERATURE: 98.4 F | OXYGEN SATURATION: 98 % | WEIGHT: 168 LBS | RESPIRATION RATE: 20 BRPM | HEIGHT: 63 IN

## 2024-09-03 DIAGNOSIS — L98.9 SKIN LESION: ICD-10-CM

## 2024-09-03 DIAGNOSIS — I10 BENIGN ESSENTIAL HYPERTENSION: Chronic | ICD-10-CM

## 2024-09-03 DIAGNOSIS — Z00.00 ENCOUNTER FOR ROUTINE ADULT HEALTH EXAMINATION WITHOUT ABNORMAL FINDINGS: Primary | ICD-10-CM

## 2024-09-03 DIAGNOSIS — D64.9 ANEMIA, UNSPECIFIED TYPE: ICD-10-CM

## 2024-09-03 DIAGNOSIS — I25.10 NONOBSTRUCTIVE ATHEROSCLEROSIS OF CORONARY ARTERY: ICD-10-CM

## 2024-09-03 DIAGNOSIS — N64.4 BREAST PAIN, LEFT: ICD-10-CM

## 2024-09-03 DIAGNOSIS — Z12.31 VISIT FOR SCREENING MAMMOGRAM: ICD-10-CM

## 2024-09-03 LAB
ANION GAP SERPL CALCULATED.3IONS-SCNC: 10 MMOL/L (ref 7–15)
BASOPHILS # BLD AUTO: 0.1 10E3/UL (ref 0–0.2)
BASOPHILS NFR BLD AUTO: 1 %
BUN SERPL-MCNC: 13.4 MG/DL (ref 8–23)
CALCIUM SERPL-MCNC: 9.3 MG/DL (ref 8.8–10.4)
CHLORIDE SERPL-SCNC: 102 MMOL/L (ref 98–107)
CHOLEST SERPL-MCNC: 250 MG/DL
CREAT SERPL-MCNC: 0.67 MG/DL (ref 0.51–0.95)
EGFRCR SERPLBLD CKD-EPI 2021: >90 ML/MIN/1.73M2
EOSINOPHIL # BLD AUTO: 0.3 10E3/UL (ref 0–0.7)
EOSINOPHIL NFR BLD AUTO: 3 %
ERYTHROCYTE [DISTWIDTH] IN BLOOD BY AUTOMATED COUNT: 13.7 % (ref 10–15)
FASTING STATUS PATIENT QL REPORTED: ABNORMAL
FASTING STATUS PATIENT QL REPORTED: ABNORMAL
GLUCOSE SERPL-MCNC: 101 MG/DL (ref 70–99)
HCO3 SERPL-SCNC: 28 MMOL/L (ref 22–29)
HCT VFR BLD AUTO: 40.8 % (ref 35–47)
HDLC SERPL-MCNC: 83 MG/DL
HGB BLD-MCNC: 13.1 G/DL (ref 11.7–15.7)
IMM GRANULOCYTES # BLD: 0 10E3/UL
IMM GRANULOCYTES NFR BLD: 0 %
LDLC SERPL CALC-MCNC: 139 MG/DL
LYMPHOCYTES # BLD AUTO: 2.4 10E3/UL (ref 0.8–5.3)
LYMPHOCYTES NFR BLD AUTO: 31 %
MCH RBC QN AUTO: 30 PG (ref 26.5–33)
MCHC RBC AUTO-ENTMCNC: 32.1 G/DL (ref 31.5–36.5)
MCV RBC AUTO: 94 FL (ref 78–100)
MONOCYTES # BLD AUTO: 0.9 10E3/UL (ref 0–1.3)
MONOCYTES NFR BLD AUTO: 11 %
NEUTROPHILS # BLD AUTO: 4.2 10E3/UL (ref 1.6–8.3)
NEUTROPHILS NFR BLD AUTO: 54 %
NONHDLC SERPL-MCNC: 167 MG/DL
PLATELET # BLD AUTO: 340 10E3/UL (ref 150–450)
POTASSIUM SERPL-SCNC: 4.2 MMOL/L (ref 3.4–5.3)
RBC # BLD AUTO: 4.36 10E6/UL (ref 3.8–5.2)
SODIUM SERPL-SCNC: 140 MMOL/L (ref 135–145)
TRIGL SERPL-MCNC: 141 MG/DL
WBC # BLD AUTO: 7.8 10E3/UL (ref 4–11)

## 2024-09-03 PROCEDURE — 80048 BASIC METABOLIC PNL TOTAL CA: CPT | Performed by: FAMILY MEDICINE

## 2024-09-03 PROCEDURE — 85025 COMPLETE CBC W/AUTO DIFF WBC: CPT | Performed by: FAMILY MEDICINE

## 2024-09-03 PROCEDURE — 80061 LIPID PANEL: CPT | Performed by: FAMILY MEDICINE

## 2024-09-03 PROCEDURE — 36415 COLL VENOUS BLD VENIPUNCTURE: CPT | Performed by: FAMILY MEDICINE

## 2024-09-03 PROCEDURE — 99396 PREV VISIT EST AGE 40-64: CPT | Performed by: FAMILY MEDICINE

## 2024-09-03 ASSESSMENT — ANXIETY QUESTIONNAIRES
2. NOT BEING ABLE TO STOP OR CONTROL WORRYING: NEARLY EVERY DAY
1. FEELING NERVOUS, ANXIOUS, OR ON EDGE: MORE THAN HALF THE DAYS
7. FEELING AFRAID AS IF SOMETHING AWFUL MIGHT HAPPEN: NEARLY EVERY DAY
6. BECOMING EASILY ANNOYED OR IRRITABLE: NEARLY EVERY DAY
GAD7 TOTAL SCORE: 20
IF YOU CHECKED OFF ANY PROBLEMS ON THIS QUESTIONNAIRE, HOW DIFFICULT HAVE THESE PROBLEMS MADE IT FOR YOU TO DO YOUR WORK, TAKE CARE OF THINGS AT HOME, OR GET ALONG WITH OTHER PEOPLE: EXTREMELY DIFFICULT
GAD7 TOTAL SCORE: 20
5. BEING SO RESTLESS THAT IT IS HARD TO SIT STILL: NEARLY EVERY DAY
3. WORRYING TOO MUCH ABOUT DIFFERENT THINGS: NEARLY EVERY DAY

## 2024-09-03 ASSESSMENT — PATIENT HEALTH QUESTIONNAIRE - PHQ9
5. POOR APPETITE OR OVEREATING: NEARLY EVERY DAY
SUM OF ALL RESPONSES TO PHQ QUESTIONS 1-9: 15

## 2024-09-03 ASSESSMENT — PAIN SCALES - GENERAL: PAINLEVEL: MODERATE PAIN (5)

## 2024-09-03 NOTE — PROGRESS NOTES
Preventive Care Visit  [facility]  Julia Oneill MD, Family Medicine  Sep 3, 2024        Carolina was seen today for physical, breast pain, referral, imm/inj and blood draw.    Diagnoses and all orders for this visit:    Encounter for routine adult health examination without abnormal findings        -    Patient is a 59 yr old female here for her annual physical. She is reminded of some vaccines she is due for.     Nonobstructive atherosclerosis of coronary artery  -     Lipid panel reflex to direct LDL Non-fasting        -     Patient will be notified of results.     Anemia, unspecified type  -     CBC with platelets and differential    Benign essential hypertension  -     Basic metabolic panel  (Ca, Cl, CO2, Creat, Gluc, K, Na, BUN)  -      Blood pressure is within normal limits.    Breast pain, left  -     US Breast Left Limited 1-3 Quadrants; Future  -     MA Diagnostic Digital Left; Future    Visit for screening mammogram  -     MA Screen Right w/Oren; Future         Subjective   Carolina is a 59 year old, presenting for the following:  Physical (General physical exam.), Breast Pain (Left breast pain in the same spot for over 3 months now.), Referral (Wanting to have a referral to Dermatology.  She has some moles on her chest that itch.  There is skin around the moles that seems like it has changed.  No bleeding.), Imm/Inj (She will wait to update her Shingles vaccine on a Friday as she didn't feel well after having that injection.  Will do the RSV at the same time.), and Blood Draw (Patient is fasting today.)        9/3/2024    10:49 AM   Additional Questions   Roomed by Roxanne Mroin CMA        Health Care Directive  Patient does not have a Health Care Directive or Living Will: Discussed advance care planning with patient; information given to patient to review.    HPI  Patient is a 59-year-old female with past medical history significant for hypertension, COPD, depression,  anxiety here for annual physical.  She reports the last few weeks have been kind of rough for her she had some family trauma going on and this has been very hard on her mental health.  She is in counseling at this time.  She needs refills on some of her medications.  She is due for blood work.  She also mentioned that she was having left breast pain this has been ongoing for a few months.  She reports the pain is a dull continuous ache.  No trauma to the breast.  Her last mammogram was July 2023 at that time was normal.  No systemic symptoms.  She is also having knee pain especially on her right knee.  She had her left knee replaced about a year ago and has done well.  She has been told by the orthopedic doctor that she will probably need her right knee replaced.  Patient also continues to struggle with her weight I recommend that she gets her knee replaced and then we will address her weight issues at the time.  She had no other complaints today.  Chief Complaint   Patient presents with    Physical     General physical exam.    Breast Pain     Left breast pain in the same spot for over 3 months now.    Referral     Wanting to have a referral to Dermatology.  She has some moles on her chest that itch.  There is skin around the moles that seems like it has changed.  No bleeding.    Imm/Inj     She will wait to update her Shingles vaccine on a Friday as she didn't feel well after having that injection.  Will do the RSV at the same time.    Blood Draw     Patient is fasting today.         Breast Concern  Onset/Duration: 3 months.  Also wanting to discuss about a breast reduction.  Description:   Location: lower outer quadrant  Pain or tenderness: YES- just a pain.  It's not always there but is present most of the time.  Redness:  no   Intensity: mild  Progression of Symptoms: same  Accompanying Signs & Symptoms:  Any lumps in axillary region:  no   Movable:  no   Nipple discharge: none  Changes in the skin or nipple:  none  On Hormone therapy: No  Does it change with menstrual cycle: N/A  Previous history of similar problem: None  First degree relative with breast cancer: a negative family history for breast cancer.  Precipitating factors:           Worsened by: None  Alleviating factors:            Improved by: None  Therapies tried and outcome: None  No LMP recorded. Patient is postmenopausal.          9/3/2024   General Health   How would you rate your overall physical health? (!) FAIR   Feel stress (tense, anxious, or unable to sleep) Very much      (!) STRESS CONCERN      9/3/2024   Nutrition   Three or more servings of calcium each day? Yes   Diet: I don't know   How many servings of fruit and vegetables per day? (!) 2-3   How many sweetened beverages each day? 0-1            9/3/2024   Exercise   Days per week of moderate/strenous exercise 3 days   Average minutes spent exercising at this level 60 min            9/3/2024   Social Factors   Frequency of gathering with friends or relatives Three times a week   Worry food won't last until get money to buy more No   Food not last or not have enough money for food? No   Do you have housing? (Housing is defined as stable permanent housing and does not include staying ouside in a car, in a tent, in an abandoned building, in an overnight shelter, or couch-surfing.) Yes   Are you worried about losing your housing? No   Lack of transportation? No   Unable to get utilities (heat,electricity)? No            9/3/2024   Fall Risk   Fallen 2 or more times in the past year? No   Trouble with walking or balance? No             9/3/2024   Dental   Dentist two times every year? (!) NO            9/3/2024   TB Screening   Were you born outside of the [place]? Yes            Today's PHQ-9 Score:       7/24/2024     3:02 PM   PHQ-9 SCORE   PHQ-9 Total Score 23           9/3/2024   Substance Use   Alcohol more than 3/day or more than 7/wk Yes   How often do you have a drink containing alcohol 2 to 3  times a week   How many alcohol drinks on typical day 3 or 4   How often do you have 5+ drinks at one occasion Monthly   Audit 2/3 Score 3   How often not able to stop drinking once started Never   How often failed to do what normally expected Never   How often needed first drink in am after a heavy drinking session Never   How often feeling of guilt or remorse after drinking Never   How often unable to remember what happened the night before Never   Have you or someone else been injured because of your drinking No   Has anyone been concerned or suggested you cut down on drinking No   TOTAL SCORE - AUDIT 6   Do you use any other substances recreationally? (!) CANNABIS PRODUCTS        Social History     Tobacco Use    Smoking status: Former     Current packs/day: 0.00     Types: Cigarettes     Quit date: 1998     Years since quittin.1    Smokeless tobacco: Never   Vaping Use    Vaping status: Never Used   Substance Use Topics    Alcohol use: Yes     Comment: 4-6 beers multiple times weekly    Drug use: No           2024   LAST FHS-7 RESULTS   1st degree relative breast or ovarian cancer No   Any relative bilateral breast cancer No   Any male have breast cancer No   Any ONE woman have BOTH breast AND ovarian cancer No   Any woman with breast cancer before 50yrs No   2 or more relatives with breast AND/OR ovarian cancer No   2 or more relatives with breast AND/OR bowel cancer No           Mammogram Screening - Mammogram every 1-2 years updated in Health Maintenance based on mutual decision making        9/3/2024   STI Screening   New sexual partner(s) since last STI/HIV test? No        History of abnormal Pap smear: No - age 30- 64 PAP with HPV every 5 years recommended        Latest Ref Rng & Units 2019    12:43 PM 2019    11:40 AM   PAP / HPV   PAP (Historical)  NIL     HPV 16 DNA NEG^Negative  Negative    HPV 18 DNA NEG^Negative  Negative    Other HR HPV NEG^Negative  Negative      ASCVD Risk    The 10-year ASCVD risk score (Amanda SUMMERS, et al., 2019) is: 4.7%    Values used to calculate the score:      Age: 59 years      Sex: Female      Is Non- : No      Diabetic: No      Tobacco smoker: No      Systolic Blood Pressure: 134 mmHg      Is BP treated: Yes      HDL Cholesterol: 99 mg/dL      Total Cholesterol: 272 mg/dL          Reviewed and updated as needed this visit by Provider                    Past Medical History:   Diagnosis Date    Asthma     C. difficile colitis     COPD (chronic obstructive pulmonary disease) (H)     Depression     Depressive disorder Have had it most of my life    Hypertension     Moderate persistent asthma with exacerbation 09/18/2007    Osteoarthritis     Sinusitis, chronic     Status post coronary angiogram 10/14/2022     Past Surgical History:   Procedure Laterality Date    ARTHROPLASTY KNEE Left 10/31/2023    Procedure: ARTHROPLASTY, KNEE, TOTAL left;  Surgeon: Giovanny Chong MD;  Location: [facility] OR    COLONOSCOPY  2017    CV CORONARY ANGIOGRAM N/A 10/14/2022    Procedure: Coronary Angiogram;  Surgeon: Fidel Martin MD;  Location: [facility] CARDIAC CATH LAB    ESOPHAGOSCOPY, GASTROSCOPY, DUODENOSCOPY (EGD), COMBINED N/A 05/05/2022    Procedure: ESOPHAGOGASTRODUODENOSCOPY, WITH BIOPSY;  Surgeon: Timothy Oliva DO;  Location: [facility] OR    LAPAROSCOPIC CHOLECYSTECTOMY WITH CHOLANGIOGRAMS N/A 11/21/2022    Procedure: CHOLECYSTECTOMY, LAPAROSCOPIC, WITH CHOLANGIOGRAM;  Surgeon: Eros Butt DO;  Location: [facility] OR     OB History   No obstetric history on file.     Lab work is in process  Labs reviewed in EPIC  BP Readings from Last 3 Encounters:   09/03/24 134/86   08/26/24 (!) 144/97   07/25/24 (!) 147/91    Wt Readings from Last 3 Encounters:   09/03/24 76.2 kg (168 lb)   08/28/24 74.8 kg (165 lb)   08/26/24 74.6 kg (164 lb 6.4 oz)                  Patient Active Problem List   Diagnosis    Recurrent major depressive disorder,  in remission (H24)    Benign essential hypertension    Primary osteoarthritis of both knees    Low back pain due to bilateral sciatica    Nonobstructive atherosclerosis of coronary artery    Hyperlipidemia LDL goal <70    Transaminitis    Acute pancreatitis    COPD vs Asthma    GERD (gastroesophageal reflux disease)    Seasonal allergies    Fatty liver    S/P total knee arthroplasty, left    Osteoarthritis of left knee    Severe persistent asthma without complication (H28)     Past Surgical History:   Procedure Laterality Date    ARTHROPLASTY KNEE Left 10/31/2023    Procedure: ARTHROPLASTY, KNEE, TOTAL left;  Surgeon: Giovanny Chong MD;  Location: [facility] OR    COLONOSCOPY  2017    CV CORONARY ANGIOGRAM N/A 10/14/2022    Procedure: Coronary Angiogram;  Surgeon: Fidel Martin MD;  [facility]    ESOPHAGOSCOPY, GASTROSCOPY, DUODENOSCOPY (EGD), COMBINED N/A 2022    Procedure: ESOPHAGOGASTRODUODENOSCOPY, WITH BIOPSY;  Surgeon: Timothy Oliva DO;  Location: [facility] OR    LAPAROSCOPIC CHOLECYSTECTOMY WITH CHOLANGIOGRAMS N/A 2022    Procedure: CHOLECYSTECTOMY, LAPAROSCOPIC, WITH CHOLANGIOGRAM;  Surgeon: Eros Butt DO;  Location: [facility] OR       Social History     Tobacco Use    Smoking status: Former     Current packs/day: 0.00     Types: Cigarettes     Quit date: 1998     Years since quittin.1    Smokeless tobacco: Never   Substance Use Topics    Alcohol use: Yes     Comment: 4-6 beers multiple times weekly     Family History   Problem Relation Age of Onset    Glaucoma Father     Coronary Artery Disease Father         stents    Cancer Maternal Grandfather     Coronary Artery Disease Paternal Grandfather     Schizophrenia Daughter     Diabetes Daughter     Cancer Daughter     Crohn's Disease No family hx of     Ulcerative Colitis No family hx of     GERD No family hx of     Stomach Cancer No family hx of          Current Outpatient Medications   Medication Sig  Dispense Refill    albuterol (PROVENTIL) (2.5 MG/3ML) 0.083% neb solution inhale 1 vial (2.5 mg) by nebulization every 4 hours as needed for shortness of breath / dyspnea or wheezing 270 mL 11    albuterol (VENTOLIN HFA) 108 (90 Base) MCG/ACT inhaler INHALE 1-2 PUFFS INTO THE LUNGS EVERY 4 HOURS AS NEEDED FOR SHORTNESS OF BREATH/DYSPNEA OR WHEEZING . 18 g 4    amLODIPine (NORVASC) 2.5 MG tablet Take 2 tablets (5 mg) by mouth once daily. 180 tablet 3    cetirizine (ZYRTEC) 10 MG tablet Take 10 mg by mouth daily      fluticasone-salmeterol (ADVAIR) 500-50 MCG/ACT inhaler Inhale 1 puff into the lungs every 12 hours. 1 each 11    montelukast (SINGULAIR) 10 MG tablet Take 1 tablet (10 mg) by mouth At Bedtime 90 tablet 3    multivitamin (CENTRUM SILVER) tablet Take 1 tablet by mouth daily      Omega-3 Fatty Acids (FISH OIL ULTRA) 1400 MG CAPS Take 1 capsule by mouth daily      omeprazole 20 MG tablet Take 2 tablets (40 mg) by mouth daily 180 tablet 2    sertraline (ZOLOFT) 25 MG tablet Take 2 tablets (50 mg) by mouth daily for 90 days 180 tablet 2    tiotropium (SPIRIVA) 18 MCG inhaled capsule Inhale 1 capsule (18 mcg) into the lungs daily. 30 capsule 11     Allergies   Allergen Reactions    Doxycycline Difficulty breathing    Penicillins Itching    Sucralfate Rash         Review of Systems  CONSTITUTIONAL: NEGATIVE for fever, chills, change in weight  INTEGUMENTARY/SKIN: NEGATIVE for worrisome rashes, moles or lesions  ENT/MOUTH: NEGATIVE for ear, mouth and throat problems  RESP: NEGATIVE for significant cough or SOB  BREAST: POSITIVE for left breast pain   CV: NEGATIVE for chest pain, palpitations or peripheral edema  GI: NEGATIVE for nausea, abdominal pain, heartburn, or change in bowel habits  : negative for, dysuria, frequency , and urgency  MUSCULOSKELETAL: NEGATIVE for significant arthralgias or myalgia  NEURO: NEGATIVE for weakness, dizziness or paresthesias  ENDOCRINE: NEGATIVE for temperature intolerance,  "skin/hair changes  HEME/ALLERGY/IMMUNE: NEGATIVE for bleeding problems  PSYCHIATRIC: NEGATIVE for changes in mood or affect     Objective    Exam  /86 (BP Location: Left arm, Patient Position: Chair, Cuff Size: Adult Regular)   Pulse 79   Temp 98.4  F (36.9  C) (Tympanic)   Resp 20   Ht 1.588 m (5' 2.5\")   Wt 76.2 kg (168 lb)   SpO2 98%   BMI 30.24 kg/m     Estimated body mass index is 30.24 kg/m  as calculated from the following:    Height as of this encounter: 1.588 m (5' 2.5\").    Weight as of this encounter: 76.2 kg (168 lb).    Physical Exam  GENERAL: alert and no distress  EYES: Eyes grossly normal to inspection, PERRL and conjunctivae and sclerae normal  HENT: ear canals and TM's normal, nose and mouth without ulcers or lesions  NECK: no adenopathy, no asymmetry, masses, or scars  RESP: lungs clear to auscultation - no rales, rhonchi or wheezes  CV: regular rate and rhythm, normal S1 S2, no S3 or S4, no murmur, click or rub, no peripheral edema  ABDOMEN: soft, nontender, no hepatosplenomegaly, no masses and bowel sounds normal  MS: no gross musculoskeletal defects noted, no edema  SKIN: no suspicious lesions or rashes  NEURO: Normal strength and tone, mentation intact and speech normal  PSYCH: mentation appears normal, affect flat, tearful, anxious, judgement and insight intact, and appearance well groomed        Signed Electronically by: Julia Oneill MD    "

## 2024-09-05 NOTE — TELEPHONE ENCOUNTER
Type of surgery: ARTHROPLASTY, KNEE, TOTAL  right   Location of surgery: [facility] OR  Date and time of surgery: 10/28/2024  Surgeon: Castillo   Pre-Op Appt Date: 10/15/2024  Post-Op Appt Date: 11/11/2024   Packet sent out: Yes  Pre-cert/Authorization completed:  No  Date:

## 2024-09-19 ENCOUNTER — HOSPITAL ENCOUNTER (OUTPATIENT)
Dept: MAMMOGRAPHY | Facility: CLINIC | Age: 59
Discharge: HOME | End: 2024-09-19
Attending: FAMILY MEDICINE
Payer: COMMERCIAL

## 2024-09-19 DIAGNOSIS — N64.4 BREAST PAIN, LEFT: ICD-10-CM

## 2024-09-19 PROCEDURE — 77062 BREAST TOMOSYNTHESIS BI: CPT

## 2024-10-02 ENCOUNTER — HOSPITAL ENCOUNTER (OUTPATIENT)
Dept: CT IMAGING | Facility: CLINIC | Age: 59
Discharge: HOME | End: 2024-10-02
Payer: COMMERCIAL

## 2024-10-02 DIAGNOSIS — Z87.891 PERSONAL HISTORY OF TOBACCO USE: ICD-10-CM

## 2024-10-02 PROCEDURE — 71271 CT THORAX LUNG CANCER SCR C-: CPT

## 2024-10-03 ENCOUNTER — TELEPHONE (OUTPATIENT)
Dept: PULMONOLOGY | Facility: CLINIC | Age: 59
End: 2024-10-03
Payer: COMMERCIAL

## 2024-10-03 DIAGNOSIS — Z87.891 PERSONAL HISTORY OF TOBACCO USE: Primary | ICD-10-CM

## 2024-10-15 ENCOUNTER — OFFICE VISIT (OUTPATIENT)
Dept: FAMILY MEDICINE | Facility: CLINIC | Age: 59
End: 2024-10-15
Payer: COMMERCIAL

## 2024-10-15 VITALS
BODY MASS INDEX: 30.12 KG/M2 | OXYGEN SATURATION: 97 % | DIASTOLIC BLOOD PRESSURE: 82 MMHG | SYSTOLIC BLOOD PRESSURE: 134 MMHG | HEART RATE: 77 BPM | TEMPERATURE: 98.2 F | WEIGHT: 170 LBS | HEIGHT: 63 IN | RESPIRATION RATE: 18 BRPM

## 2024-10-15 DIAGNOSIS — M17.11 ARTHRITIS OF RIGHT KNEE: ICD-10-CM

## 2024-10-15 DIAGNOSIS — Z01.818 PREOPERATIVE EXAMINATION: Primary | ICD-10-CM

## 2024-10-15 DIAGNOSIS — J45.20 MILD INTERMITTENT ASTHMA WITHOUT COMPLICATION: ICD-10-CM

## 2024-10-15 DIAGNOSIS — I25.10 CORONARY ARTERY DISEASE INVOLVING NATIVE CORONARY ARTERY WITHOUT ANGINA PECTORIS, UNSPECIFIED WHETHER NATIVE OR TRANSPLANTED HEART: ICD-10-CM

## 2024-10-15 PROCEDURE — 90471 IMMUNIZATION ADMIN: CPT | Performed by: FAMILY MEDICINE

## 2024-10-15 PROCEDURE — 90673 RIV3 VACCINE NO PRESERV IM: CPT | Performed by: FAMILY MEDICINE

## 2024-10-15 PROCEDURE — 99214 OFFICE O/P EST MOD 30 MIN: CPT | Mod: 25 | Performed by: FAMILY MEDICINE

## 2024-10-15 PROCEDURE — 93000 ELECTROCARDIOGRAM COMPLETE: CPT | Performed by: FAMILY MEDICINE

## 2024-10-15 RX ORDER — MONTELUKAST SODIUM 10 MG/1
10 TABLET ORAL AT BEDTIME
Qty: 90 TABLET | Refills: 3 | Status: SHIPPED | OUTPATIENT
Start: 2024-10-15

## 2024-10-15 ASSESSMENT — ANXIETY QUESTIONNAIRES
2. NOT BEING ABLE TO STOP OR CONTROL WORRYING: MORE THAN HALF THE DAYS
5. BEING SO RESTLESS THAT IT IS HARD TO SIT STILL: NEARLY EVERY DAY
IF YOU CHECKED OFF ANY PROBLEMS ON THIS QUESTIONNAIRE, HOW DIFFICULT HAVE THESE PROBLEMS MADE IT FOR YOU TO DO YOUR WORK, TAKE CARE OF THINGS AT HOME, OR GET ALONG WITH OTHER PEOPLE: EXTREMELY DIFFICULT
GAD7 TOTAL SCORE: 18
GAD7 TOTAL SCORE: 18
7. FEELING AFRAID AS IF SOMETHING AWFUL MIGHT HAPPEN: NEARLY EVERY DAY
6. BECOMING EASILY ANNOYED OR IRRITABLE: MORE THAN HALF THE DAYS
1. FEELING NERVOUS, ANXIOUS, OR ON EDGE: MORE THAN HALF THE DAYS
3. WORRYING TOO MUCH ABOUT DIFFERENT THINGS: NEARLY EVERY DAY

## 2024-10-15 ASSESSMENT — PATIENT HEALTH QUESTIONNAIRE - PHQ9
SUM OF ALL RESPONSES TO PHQ QUESTIONS 1-9: 17
5. POOR APPETITE OR OVEREATING: NEARLY EVERY DAY

## 2024-10-15 ASSESSMENT — PAIN SCALES - GENERAL: PAINLEVEL: MILD PAIN (2)

## 2024-10-15 NOTE — PROGRESS NOTES
Preoperative Evaluation  [facility]  [address] [facility] [address]  Phone: [phone]  Primary Provider: Julia Oneill MD  Pre-op Performing Provider: Julia Oneill MD  Oct 15, 2024             10/15/2024   Surgical Information   What procedure is being done? knee replacement, right side.   [place] [facility] [place]   Who [place] doing the procedure / surgery? Dr Chong   Date of surgery / procedure: 10 28 2024   Time of surgery / procedure: not known yet   Where do you plan to recover after surgery? at home with family        Fax number for surgical facility: Note does not need to be faxed, will be available electronically in Epic.    Assessment & Plan     The proposed surgical procedure is considered INTERMEDIATE risk.      Carolina was seen today for pre-op exam, medication problem, ct of chest and imm/inj.    Diagnoses and all orders for this visit:    Preoperative examination       -     Patient here for a pre op for right knee replacement       -     Patient cleared for surgery.     Coronary artery disease involving native coronary artery without angina pectoris, unspecified whether native or transplanted heart  -     EKG 12-lead complete w/read - Clinics  -     Adult Cardiology Eval  Referral; Future  -     EKG within normal limits.     Mild intermittent asthma without complication  -     montelukast (SINGULAIR) 10 MG tablet; Take 1 tablet (10 mg) by mouth at bedtime.    Arthritis of right knee       -     Patient cleared for surgery.  Other orders  -     INFLUENZA VACCINE TRIVALENT(FLUBLOK)           Depression Screening Follow Up        10/15/2024    11:54 AM   PHQ   PHQ-9 Total Score 17   Q9: Thoughts of better off dead/self-harm past 2 weeks More than half the days         10/15/2024    11:54 AM   Last PHQ-9   1.  Little interest or pleasure in doing things 2   2.  Feeling down,  depressed, or hopeless 2   3.  Trouble falling or staying asleep, or sleeping too much 2   4.  Feeling tired or having little energy 2   5.  Poor appetite or overeating 1   6.  Feeling bad about yourself 3   7.  Trouble concentrating 1   8.  Moving slowly or restless 2   Q9: Thoughts of better off dead/self-harm past 2 weeks 2   PHQ-9 Total Score 17   Difficulty at work, home, or with people Very difficult                No data to display                    Follow Up Actions Taken  Crisis resource information provided in the After Visit Summary    Discussed the following ways the patient can remain in a safe environment:  remove alcohol, remove drugs, and be around others       Risks and Recommendations  The patient has the following additional risks and recommendations for perioperative complications:   - No identified additional risk factors other than previously addressed         Recommendation  Approval given to proceed with proposed procedure, without further diagnostic evaluation.    Thor Figueroa is a 59 year old, presenting for the following:          Pre-Op Exam (Pre-op physical.), Medication Problem (She has been out of the Singular for about three days now.  She thought her Pulmonologist was going to refill this Rx.  Has noticed issues with her breathing since being out.  Has been having a tightness across the mid to upper back recently.  She is not sure why.  ), CT of chest (Wanting to review the results of her CT scan of the lungs done on 10-2-24.  She is wanting to have a referral to a different Cardiologist than the one she was previously. ), and Imm/Inj (She will do the Flu shot.  She will update the Shingles and other injections at a later time.)          10/15/2024    10:26 AM   Additional Questions   Roomed by Roxanne Morin CMA     Chief Complaint   Patient presents with    Pre-Op Exam     Pre-op physical.    Medication Problem     She has been out of the Singular for about three days now.   She thought her Pulmonologist was going to refill this Rx.  Has noticed issues with her breathing since being out.  Has been having a tightness across the mid to upper back recently.  She is not sure why.      CT of chest     Wanting to review the results of her CT scan of the lungs done on 10-2-24.  She is wanting to have a referral to a different Cardiologist than the one she was previously.     Imm/Inj     She will do the Flu shot.  She will update the Shingles and other injections at a later time.       HPI related to upcoming procedure:   Patient is a 59 yr old female here for a pre op evaluation. She is having a total knee replacement ( right knee)  Her past medical history is significant for hypertension, COPD, non obstructive CAD, anxiety and depression. She reports no acute symptoms today and feels well.   Patient states that she has no chest pain or shortness of breath. She has CAD but did not need stenting. This is being managed medically. She had recent labs and her cholesterol is in fair range. Patient is able to do her ADLS without any difficulties.       10/15/2024   Pre-Op Questionnaire   Have you ever had a heart attack or stroke? No   Have you ever had surgery on your heart or blood vessels, such as a stent placement, a coronary artery bypass, or surgery on an artery in your head, neck, heart, or legs? No   Do you have chest pain with activity? No   Do you have a history of heart failure? No   Do you currently have a cold, bronchitis or symptoms of other infection? No   Do you have a cough, shortness of breath, or wheezing? (!) YES Shortness of breath:  She has been out of the Singular for about three days now.  She thought her Pulmonologist was going to refill this Rx.  Has noticed issues with her breathing since being out.  Has been having a tightness across the mid to upper back recently.  She is not sure why.     Do you or anyone in your family have previous history of blood clots? No   Do you  or does anyone in your family have a serious bleeding problem such as prolonged bleeding following surgeries or cuts? No   Have you ever had problems with anemia or been told to take iron pills? No   Have you had any abnormal blood loss such as black, tarry or bloody stools, or abnormal vaginal bleeding? No   Have you ever had a blood transfusion? No   Are you willing to have a blood transfusion if it is medically needed before, during, or after your surgery? Yes   Have you or any of your relatives ever had problems with anesthesia? No   Do you have sleep apnea, excessive snoring or daytime drowsiness? No   Do you have any artifical heart valves or other implanted medical devices like a pacemaker, defibrillator, or continuous glucose monitor? No   Do you have artificial joints? (!) YES   Are you allergic to latex? No        Health Care Directive  Patient does not have a Health Care Directive or Living Will: Discussed advance care planning with patient; information given to patient to review.    Preoperative Review of    reviewed - no record of controlled substances prescribed.      Status of Chronic Conditions:  CAD - Patient has a longstanding history of moderate-severe CAD. Patient denies recent chest pain or NTG use, denies exercise induced dyspnea or PND. Last Stress test , EKG today ( normal ).     COPD/Asthma - Patient has a longstanding history of moderate-severe COPD . Patient has been doing well overall noting NO SYMPTOMS and continues on medication regimen consisting of albuterol and  without adverse reactions or side effects.    HYPERLIPIDEMIA - Patient has a long history of significant Hyperlipidemia requiring medication for treatment with recent good control. Patient reports no problems or side effects with the medication. Recommend taking her medication on the morning of surgery     HYPERTENSION - Patient has longstanding history of HTN , currently denies any symptoms referable to elevated blood  pressure. Specifically denies chest pain, palpitations, dyspnea, orthopnea, PND or peripheral edema. Blood pressure readings have been in normal range. Current medication regimen is as listed below. Patient denies any side effects of medication. Recommend taking her medication on morning of surgery.      Patient Active Problem List    Diagnosis Date Noted    Severe persistent asthma without complication (H) 02/07/2024     Priority: Medium    Osteoarthritis of left knee 11/01/2023     Priority: Medium    S/P total knee arthroplasty, left 10/31/2023     Priority: Medium    Fatty liver 11/01/2022     Priority: Medium     ultrasound 10/29/22      COPD vs Asthma 10/30/2022     Priority: Medium     PFTS 5/2021 - FEV1- 1.21 (48%), ratio 0.50, no change with bronchodilators,  %, %, DLCO 94%       Transaminitis 10/29/2022     Priority: Medium    Acute pancreatitis 10/29/2022     Priority: Medium    Nonobstructive atherosclerosis of coronary artery 10/21/2022     Priority: Medium      H/o exertional chest pain leading to coronary angiogram 10/14/2022 which showed non-obstructing CAD      Hyperlipidemia LDL goal <70 10/21/2022     Priority: Medium    Low back pain due to bilateral sciatica 10/27/2021     Priority: Medium    Primary osteoarthritis of both knees 09/23/2021     Priority: Medium    Benign essential hypertension 07/26/2019     Priority: Medium     New diagnosis 7/26/2019        Recurrent major depressive disorder, in remission (H) 01/17/2019     Priority: Medium    GERD (gastroesophageal reflux disease) 07/31/2012     Priority: Medium    Seasonal allergies 07/31/2012     Priority: Medium      Past Medical History:   Diagnosis Date    Asthma     C. difficile colitis     COPD (chronic obstructive pulmonary disease) (H)     Depression     Depressive disorder Have had it most of my life    Hypertension     Moderate persistent asthma with exacerbation 09/18/2007    Osteoarthritis     Sinusitis, chronic      Status post coronary angiogram 10/14/2022     Past Surgical History:   Procedure Laterality Date    ARTHROPLASTY KNEE Left 10/31/2023    Procedure: ARTHROPLASTY, KNEE, TOTAL left;  Surgeon: Giovanny Chong MD;  Location: [facility] OR    COLONOSCOPY  2017    CV CORONARY ANGIOGRAM N/A 10/14/2022    Procedure: Coronary Angiogram;  Surgeon: Fidel Martin MD;  Location:  [facility]    ESOPHAGOSCOPY, GASTROSCOPY, DUODENOSCOPY (EGD), COMBINED N/A 05/05/2022    Procedure: ESOPHAGOGASTRODUODENOSCOPY, WITH BIOPSY;  Surgeon: Timothy Oliva DO;  [place] [facility] [place]    LAPAROSCOPIC CHOLECYSTECTOMY WITH CHOLANGIOGRAMS N/A 11/21/2022    Procedure: CHOLECYSTECTOMY, LAPAROSCOPIC, WITH CHOLANGIOGRAM;  Surgeon: Eros Butt DO;  Location: [facility] OR     Current Outpatient Medications   Medication Sig Dispense Refill    albuterol (PROVENTIL) (2.5 MG/3ML) 0.083% neb solution inhale 1 vial (2.5 mg) by nebulization every 4 hours as needed for shortness of breath / dyspnea or wheezing 270 mL 11    albuterol (VENTOLIN HFA) 108 (90 Base) MCG/ACT inhaler INHALE 1-2 PUFFS INTO THE LUNGS EVERY 4 HOURS AS NEEDED FOR SHORTNESS OF BREATH/DYSPNEA OR WHEEZING . 18 g 4    amLODIPine (NORVASC) 2.5 MG tablet Take 2 tablets (5 mg) by mouth once daily. 180 tablet 3    cetirizine (ZYRTEC) 10 MG tablet Take 10 mg by mouth daily      fluticasone-salmeterol (ADVAIR) 500-50 MCG/ACT inhaler Inhale 1 puff into the lungs every 12 hours. 1 each 11    montelukast (SINGULAIR) 10 MG tablet Take 1 tablet (10 mg) by mouth at bedtime. 90 tablet 3    multivitamin (CENTRUM SILVER) tablet Take 1 tablet by mouth daily      Omega-3 Fatty Acids (FISH OIL ULTRA) 1400 MG CAPS Take 1 capsule by mouth daily      omeprazole 20 MG tablet Take 2 tablets (40 mg) by mouth daily 180 tablet 2    sertraline (ZOLOFT) 25 MG tablet Take 2 tablets (50 mg) by mouth daily for 90 days 180 tablet 2    tiotropium (SPIRIVA) 18 MCG inhaled capsule Inhale 1 capsule (18 mcg)  "into the lungs daily. 30 capsule 11       Allergies   Allergen Reactions    Doxycycline Difficulty breathing    Penicillins Itching    Sucralfate Rash        Social History     Tobacco Use    Smoking status: Former     Current packs/day: 0.00     Types: Cigarettes     Quit date: 1998     Years since quittin.2    Smokeless tobacco: Never   Substance Use Topics    Alcohol use: Yes     Comment: 4-6 beers multiple times weekly     Family History   Problem Relation Age of Onset    Glaucoma Father     Coronary Artery Disease Father         stents    Cancer Maternal Grandfather     Coronary Artery Disease Paternal Grandfather     Schizophrenia Daughter     Diabetes Daughter     Cancer Daughter     Crohn's Disease No family hx of     Ulcerative Colitis No family hx of     GERD No family hx of     Stomach Cancer No family hx of      History   Drug Use No             Review of Systems  CONSTITUTIONAL: NEGATIVE for fever, chills, change in weight  INTEGUMENTARY/SKIN: NEGATIVE for worrisome rashes, moles or lesions  EYES: NEGATIVE for vision changes or irritation  ENT/MOUTH: NEGATIVE for ear, mouth and throat problems  RESP: NEGATIVE for significant cough or SOB  CV: NEGATIVE for chest pain, palpitations or peripheral edema  GI: NEGATIVE for nausea, abdominal pain, heartburn, or change in bowel habits  : NEGATIVE for frequency, dysuria, or hematuria  MUSCULOSKELETAL:POSITIVE  for arthralgias right knee  and joint pain right knee  NEURO: NEGATIVE for weakness, dizziness or paresthesias  ENDOCRINE: NEGATIVE for temperature intolerance, skin/hair changes  HEME: NEGATIVE for bleeding problems  PSYCHIATRIC: NEGATIVE for changes in mood or affect    Objective    /82 (BP Location: Left arm, Patient Position: Chair, Cuff Size: Adult Regular)   Pulse 77   Temp 98.2  F (36.8  C) (Tympanic)   Resp 18   Ht 1.588 m (5' 2.5\")   Wt 77.1 kg (170 lb)   SpO2 97%   BMI 30.60 kg/m     Estimated body mass index is 30.6 " "kg/m  as calculated from the following:    Height as of this encounter: 1.588 m (5' 2.5\").    Weight as of this encounter: 77.1 kg (170 lb).  Physical Exam  GENERAL: alert and no distress  EYES: Eyes grossly normal to inspection, PERRL and conjunctivae and sclerae normal  HENT: ear canals and TM's normal, nose and mouth without ulcers or lesions  NECK: no adenopathy, no asymmetry, masses, or scars  RESP: lungs clear to auscultation - no rales, rhonchi or wheezes  CV: regular rate and rhythm, normal S1 S2, no S3 or S4, no murmur, click or rub, no peripheral edema  ABDOMEN: soft, nontender, no hepatosplenomegaly, no masses and bowel sounds normal  MS: no gross musculoskeletal defects noted, no edema    Recent Labs   Lab Test 09/03/24  1159 11/01/23  0447 10/25/23  1158   HGB 13.1 9.8* 12.8     --  368     --  136   POTASSIUM 4.2  --  4.2   CR 0.67  --  0.67        Diagnostics  No results found for this or any previous visit (from the past 720 hour(s)).   EKG: appears normal, NSR, normal axis, normal intervals, no acute ST/T changes c/w ischemia, no LVH by voltage criteria, unchanged from previous tracings    Revised Cardiac Risk Index (RCRI)  The patient has the following serious cardiovascular risks for perioperative complications:   - No serious cardiac risks = 0 points     RCRI Interpretation: 0 points: Class I (very low risk - 0.4% complication rate)         Signed Electronically by: Julia Oneill MD  A copy of this evaluation report is provided to the requesting physician.         "

## 2024-10-15 NOTE — PATIENT INSTRUCTIONS
You can take your amlodipine on the morning of surgery    Please take all your inhalers on the morning of surgery    You can take your Zoloft on the morning of surgery    Avoid Ibuprofen, Aspirin, Advil, Aleve a few days before surgery.

## 2024-10-17 DIAGNOSIS — M17.11 PRIMARY OSTEOARTHRITIS OF RIGHT KNEE: Primary | ICD-10-CM

## 2024-10-17 NOTE — PROGRESS NOTES
Ortho Navigator Note      Pre-op Date 10/15/24     Medical Clearance  CLEARED     Labs WNL     COVID Test Date No longer indicated      Skin  Intact      Activity: Ambulates independently      Equipment Need Patient will NOT likely need a walker for discharge. Defer to PT/OT for recs.       Meds to Hold Held all supplements 14 days prior to surgery  HOLD Aspirin 7 days prior to surgery  You can take your amlodipine on the morning of surgery     Please take all your inhalers on the morning of surgery     You can take your Zoloft on the morning of surgery     Avoid Ibuprofen, Aspirin, Advil, Aleve a few days before surgery.         * Medication recommendations are not intended to be exhaustive; they are limited to common medications that are potentially dangerous if incorrectly managed   NPO Instructions  Instructed to stop solids 8 hr prior to arrival and clears 2 hr prior to arrival.       Pre-op Joint Education Complete? Link sent Via MyChart, zoom class recommended   Discharge Plan Patient has plan to discharge home on morning of POD 1.   Spouse Adi  will arrive at hospital at 0800 to participate in therapy and discharge education. They will then transport patient home    /Transportation Spouse Adi will be  and transportation.  is physically able to care for patient.      Barriers to Discharge No barriers to discharge.      Additional Info/   Special Needs : COPD/Asthma - well controlled but update sent to anesthesia as an FYI.        Post Op PT Order placed 10/17/24              10/17/24 7233   Discharge Planning   Patient/Family Anticipates Transition to home with family   Concerns to be Addressed denies needs/concerns at this time   Living Arrangements   People in Home spouse   Type of Residence Private Residence   Is your private residence a single family home or apartment? Single family home   Number of Stairs, Within Home, Primary none   Stair Railings, Within Home, Primary none   Once home,  are you able to live on one level? Yes   Which level? Main Level   Bathroom Shower/Tub Walk-in shower   Equipment Currently Used at Home cane, straight;walker, standard;raised toilet seat;shower chair   Support System   Support Systems Spouse/Significant Other   Do you have someone available to stay with you one or two nights once you are home? Yes   Medical Clearance   Date of Physical 10/15/24   [facility] Name [facility]   It is recommended that you call and check with any specialty providers before surgery to see if you need surgical clearance.  Do you see any specialty providers outside of your primary care provider? No   Blood   Known Bleeding Disorder or Coagulopathy No   Does the patient have any Denominational/cultural preferences related to blood products? No   Education   Has the patient scheduled or completed pre-op total joint education, either in class or online, in the last 12 months? No   Patient attended total joint pre-op class/received pre-op teaching  online   Relationship/Living Environment   Name(s) of People in Home Spouse Adi

## 2024-10-21 RX ORDER — ASPIRIN 81 MG/1
81 TABLET ORAL DAILY
COMMUNITY

## 2024-10-25 ENCOUNTER — ANESTHESIA EVENT (OUTPATIENT)
Dept: SURGERY | Facility: CLINIC | Age: 59
End: 2024-10-25
Payer: COMMERCIAL

## 2024-10-25 ASSESSMENT — COPD QUESTIONNAIRES: COPD: 1

## 2024-10-25 NOTE — ANESTHESIA PREPROCEDURE EVALUATION
Anesthesia Pre-Procedure Evaluation    Patient: Carolina Hernandez   MRN: [id number] : 1965        Procedure : Procedure(s):  ARTHROPLASTY, KNEE, TOTAL          Past Medical History:   Diagnosis Date    Asthma     C. difficile colitis     COPD (chronic obstructive pulmonary disease) (H)     Depression     Depressive disorder Have had it most of my life    Hypertension     Moderate persistent asthma with exacerbation 2007    Osteoarthritis     Sinusitis, chronic     Status post coronary angiogram 10/14/2022      Past Surgical History:   Procedure Laterality Date    ARTHROPLASTY KNEE Left 10/31/2023    Procedure: ARTHROPLASTY, KNEE, TOTAL left;  Surgeon: Giovanny Chong MD;  Location: [facility] OR    COLONOSCOPY  2017    CV CORONARY ANGIOGRAM N/A 10/14/2022    Procedure: Coronary Angiogram;  Surgeon: Fidel Martin MD;  Location: [facility] CARDIAC CATH LAB    ESOPHAGOSCOPY, GASTROSCOPY, DUODENOSCOPY (EGD), COMBINED N/A 2022    Procedure: ESOPHAGOGASTRODUODENOSCOPY, WITH BIOPSY;  Surgeon: Timothy Oliva DO;  Location: [facility] [place]    LAPAROSCOPIC CHOLECYSTECTOMY WITH CHOLANGIOGRAMS N/A 2022    Procedure: CHOLECYSTECTOMY, LAPAROSCOPIC, WITH CHOLANGIOGRAM;  Surgeon: Eros Butt DO;  Location: [facility] OR      Allergies   Allergen Reactions    Doxycycline Difficulty breathing    Penicillins Itching    Sucralfate Rash      Social History     Tobacco Use    Smoking status: Former     Current packs/day: 0.00     Types: Cigarettes     Quit date: 1998     Years since quittin.2    Smokeless tobacco: Never   Substance Use Topics    Alcohol use: Yes     Comment: 4-6 beers multiple times weekly      Wt Readings from Last 1 Encounters:   10/15/24 77.1 kg (170 lb)        Anesthesia Evaluation   Pt has had prior anesthetic. Type: Regional, MAC and General.        ROS/MED HX  ENT/Pulmonary:     (+)                     Moderate Persistent, asthma    COPD,              Neurologic:  - neg  "neurologic ROS     Cardiovascular: Comment: 10/22  Echo:  The visual ejection fraction is 55-60%.  Left ventricular systolic function is normal.      (+) Dyslipidemia hypertension- -  CAD -  - -                                      METS/Exercise Tolerance:     Hematologic:  - neg hematologic  ROS     Musculoskeletal:   (+)  arthritis,             GI/Hepatic:     (+) GERD,            liver disease,       Renal/Genitourinary:       Endo:  - neg endo ROS     Psychiatric/Substance Use:     (+) psychiatric history depression       Infectious Disease:  - neg infectious disease ROS     Malignancy:  - neg malignancy ROS     Other:  - neg other ROS    (+)  , H/O Chronic Pain,         Physical Exam    Airway  airway exam normal      Mallampati: II   TM distance: > 3 FB   Neck ROM: full   Mouth opening: > 3 cm    Respiratory Devices and Support         Dental       (+) Minor Abnormalities - some fillings, tiny chips      Cardiovascular   cardiovascular exam normal          Pulmonary   pulmonary exam normal        breath sounds clear to auscultation       OUTSIDE LABS:  CBC:   Lab Results   Component Value Date    WBC 7.8 09/03/2024    WBC 8.3 10/25/2023    HGB 13.1 09/03/2024    HGB 9.8 (L) 11/01/2023    HCT 40.8 09/03/2024    HCT 39.0 10/25/2023     09/03/2024     10/25/2023     BMP:   Lab Results   Component Value Date     09/03/2024     10/25/2023    POTASSIUM 4.2 09/03/2024    POTASSIUM 4.2 10/25/2023    CHLORIDE 102 09/03/2024    CHLORIDE 99 10/25/2023    CO2 28 09/03/2024    CO2 26 10/25/2023    BUN 13.4 09/03/2024    BUN 9.0 10/25/2023    CR 0.67 09/03/2024    CR 0.67 10/25/2023     (H) 09/03/2024     (H) 11/01/2023     COAGS:   Lab Results   Component Value Date    PTT 31 10/14/2022    INR 0.97 10/14/2022     POC: No results found for: \"BGM\", \"HCG\", \"HCGS\"  HEPATIC:   Lab Results   Component Value Date    ALBUMIN 4.1 08/17/2023    PROTTOTAL 7.0 08/17/2023    ALT 25 10/25/2023    " "AST 20 08/17/2023    ALKPHOS 69 08/17/2023    BILITOTAL 0.3 08/17/2023     OTHER:   Lab Results   Component Value Date    LACT 1.1 10/29/2022    REILLY 9.3 09/03/2024    PHOS 3.9 10/31/2022    MAG 2.2 10/31/2022    LIPASE 356 (H) 11/04/2022    TSH 0.29 (L) 09/22/2005    T4 1.07 09/22/2005    SED 17 08/17/2023       Anesthesia Plan    ASA Status:  3    NPO Status:  NPO Appropriate    Anesthesia Type: Spinal.      Maintenance: TIVA.        Consents    Anesthesia Plan(s) and associated risks, benefits, and realistic alternatives discussed. Questions answered and patient/representative(s) expressed understanding.     - Discussed:     - Discussed with:  Patient            Postoperative Care    Pain management: Peripheral nerve block (Single Shot), Multi-modal analgesia.   PONV prophylaxis: Ondansetron (or other 5HT-3), Dexamethasone or Solumedrol     Comments:             VALERIA Peralta CRNA    I have reviewed the pertinent notes and labs in the chart from the past 30 days and (re)examined the patient.  Any updates or changes from those notes are reflected in this note.             # Drug Induced Platelet Defect: home medication list includes an antiplatelet medication   # Hypertension: Noted on problem list         # Obesity: Estimated body mass index is 30.6 kg/m  as calculated from the following:    Height as of 10/15/24: 1.588 m (5' 2.5\").    Weight as of 10/15/24: 77.1 kg (170 lb).       # Asthma: noted on problem list       "

## 2024-10-28 ENCOUNTER — APPOINTMENT (OUTPATIENT)
Dept: GENERAL RADIOLOGY | Facility: CLINIC | Age: 59
End: 2024-10-28
Attending: ORTHOPAEDIC SURGERY
Payer: COMMERCIAL

## 2024-10-28 ENCOUNTER — ANESTHESIA (OUTPATIENT)
Dept: SURGERY | Facility: CLINIC | Age: 59
End: 2024-10-28
Payer: COMMERCIAL

## 2024-10-28 ENCOUNTER — HOSPITAL ENCOUNTER (OUTPATIENT)
Facility: CLINIC | Age: 59
Discharge: HOME | End: 2024-10-29
Attending: ORTHOPAEDIC SURGERY | Admitting: ORTHOPAEDIC SURGERY
Payer: COMMERCIAL

## 2024-10-28 DIAGNOSIS — M17.11 PRIMARY OSTEOARTHRITIS OF RIGHT KNEE: Primary | ICD-10-CM

## 2024-10-28 LAB — GLUCOSE BLDC GLUCOMTR-MCNC: 101 MG/DL (ref 70–99)

## 2024-10-28 PROCEDURE — 258N000003 HC RX IP 258 OP 636

## 2024-10-28 PROCEDURE — 250N000011 HC RX IP 250 OP 636: Performed by: ORTHOPAEDIC SURGERY

## 2024-10-28 PROCEDURE — 258N000003 HC RX IP 258 OP 636: Performed by: NURSE ANESTHETIST, CERTIFIED REGISTERED

## 2024-10-28 PROCEDURE — 250N000009 HC RX 250: Performed by: ORTHOPAEDIC SURGERY

## 2024-10-28 PROCEDURE — 82962 GLUCOSE BLOOD TEST: CPT

## 2024-10-28 PROCEDURE — 73560 X-RAY EXAM OF KNEE 1 OR 2: CPT | Mod: RT

## 2024-10-28 PROCEDURE — 250N000013 HC RX MED GY IP 250 OP 250 PS 637: Performed by: ORTHOPAEDIC SURGERY

## 2024-10-28 PROCEDURE — 27447 TOTAL KNEE ARTHROPLASTY: CPT | Mod: RT | Performed by: ORTHOPAEDIC SURGERY

## 2024-10-28 PROCEDURE — 370N000017 HC ANESTHESIA TECHNICAL FEE, PER MIN: Performed by: ORTHOPAEDIC SURGERY

## 2024-10-28 PROCEDURE — 710N000009 HC RECOVERY PHASE 1, LEVEL 1, PER MIN: Performed by: ORTHOPAEDIC SURGERY

## 2024-10-28 PROCEDURE — 360N000077 HC SURGERY LEVEL 4, PER MIN: Performed by: ORTHOPAEDIC SURGERY

## 2024-10-28 PROCEDURE — 250N000013 HC RX MED GY IP 250 OP 250 PS 637

## 2024-10-28 PROCEDURE — 999N000141 HC STATISTIC PRE-PROCEDURE NURSING ASSESSMENT: Performed by: ORTHOPAEDIC SURGERY

## 2024-10-28 PROCEDURE — C1713 ANCHOR/SCREW BN/BN,TIS/BN: HCPCS | Performed by: ORTHOPAEDIC SURGERY

## 2024-10-28 PROCEDURE — C1776 JOINT DEVICE (IMPLANTABLE): HCPCS | Performed by: ORTHOPAEDIC SURGERY

## 2024-10-28 PROCEDURE — 271N000001 HC OR GENERAL SUPPLY NON-STERILE: Performed by: ORTHOPAEDIC SURGERY

## 2024-10-28 PROCEDURE — 258N000001 HC RX 258: Performed by: ORTHOPAEDIC SURGERY

## 2024-10-28 PROCEDURE — 272N000001 HC OR GENERAL SUPPLY STERILE: Performed by: ORTHOPAEDIC SURGERY

## 2024-10-28 PROCEDURE — 250N000011 HC RX IP 250 OP 636: Performed by: NURSE ANESTHETIST, CERTIFIED REGISTERED

## 2024-10-28 DEVICE — PATELLA
Type: IMPLANTABLE DEVICE | Site: KNEE | Status: FUNCTIONAL
Brand: TRIATHLON

## 2024-10-28 DEVICE — POSTERIOR STABILIZED FEMORAL
Type: IMPLANTABLE DEVICE | Site: KNEE | Status: FUNCTIONAL
Brand: TRIATHLON

## 2024-10-28 DEVICE — TIBIAL BEARING INSERT - PS
Type: IMPLANTABLE DEVICE | Site: KNEE | Status: FUNCTIONAL
Brand: TRIATHLON

## 2024-10-28 DEVICE — PIN FIXATION SS FLUTE SQUARE L3.5 IN OD1/8 IN 7650-2038A: Type: IMPLANTABLE DEVICE | Site: KNEE | Status: FUNCTIONAL

## 2024-10-28 DEVICE — TIBIAL COMPONENT
Type: IMPLANTABLE DEVICE | Site: KNEE | Status: FUNCTIONAL
Brand: TRIATHLON

## 2024-10-28 RX ORDER — ALBUTEROL SULFATE 90 UG/1
1-2 INHALANT RESPIRATORY (INHALATION) EVERY 4 HOURS PRN
Status: DISCONTINUED | OUTPATIENT
Start: 2024-10-28 | End: 2024-10-29 | Stop reason: HOSPADM

## 2024-10-28 RX ORDER — OXYCODONE HYDROCHLORIDE 5 MG/1
5-10 TABLET ORAL EVERY 4 HOURS PRN
Qty: 16 TABLET | Refills: 0 | Status: SHIPPED | OUTPATIENT
Start: 2024-10-28 | End: 2024-10-31

## 2024-10-28 RX ORDER — ONDANSETRON 4 MG/1
4 TABLET, ORALLY DISINTEGRATING ORAL EVERY 6 HOURS PRN
Status: DISCONTINUED | OUTPATIENT
Start: 2024-10-28 | End: 2024-10-29 | Stop reason: HOSPADM

## 2024-10-28 RX ORDER — SODIUM CHLORIDE, SODIUM LACTATE, POTASSIUM CHLORIDE, CALCIUM CHLORIDE 600; 310; 30; 20 MG/100ML; MG/100ML; MG/100ML; MG/100ML
INJECTION, SOLUTION INTRAVENOUS CONTINUOUS
Status: DISCONTINUED | OUTPATIENT
Start: 2024-10-28 | End: 2024-10-28 | Stop reason: HOSPADM

## 2024-10-28 RX ORDER — BISACODYL 10 MG
10 SUPPOSITORY, RECTAL RECTAL DAILY PRN
Status: DISCONTINUED | OUTPATIENT
Start: 2024-10-31 | End: 2024-10-29 | Stop reason: HOSPADM

## 2024-10-28 RX ORDER — HYDROXYZINE HYDROCHLORIDE 25 MG/1
25 TABLET, FILM COATED ORAL EVERY 6 HOURS PRN
Status: DISCONTINUED | OUTPATIENT
Start: 2024-10-28 | End: 2024-10-29 | Stop reason: HOSPADM

## 2024-10-28 RX ORDER — NALOXONE HYDROCHLORIDE 0.4 MG/ML
0.2 INJECTION, SOLUTION INTRAMUSCULAR; INTRAVENOUS; SUBCUTANEOUS
Status: DISCONTINUED | OUTPATIENT
Start: 2024-10-28 | End: 2024-10-29 | Stop reason: HOSPADM

## 2024-10-28 RX ORDER — MAGNESIUM SULFATE HEPTAHYDRATE 40 MG/ML
INJECTION, SOLUTION INTRAVENOUS PRN
Status: DISCONTINUED | OUTPATIENT
Start: 2024-10-28 | End: 2024-10-28

## 2024-10-28 RX ORDER — CEFAZOLIN SODIUM/WATER 2 G/20 ML
2 SYRINGE (ML) INTRAVENOUS SEE ADMIN INSTRUCTIONS
Status: DISCONTINUED | OUTPATIENT
Start: 2024-10-28 | End: 2024-10-28 | Stop reason: HOSPADM

## 2024-10-28 RX ORDER — LIDOCAINE 40 MG/G
CREAM TOPICAL
Status: DISCONTINUED | OUTPATIENT
Start: 2024-10-28 | End: 2024-10-28 | Stop reason: HOSPADM

## 2024-10-28 RX ORDER — SODIUM CHLORIDE, SODIUM LACTATE, POTASSIUM CHLORIDE, CALCIUM CHLORIDE 600; 310; 30; 20 MG/100ML; MG/100ML; MG/100ML; MG/100ML
INJECTION, SOLUTION INTRAVENOUS CONTINUOUS
Status: DISCONTINUED | OUTPATIENT
Start: 2024-10-28 | End: 2024-10-29 | Stop reason: HOSPADM

## 2024-10-28 RX ORDER — LIDOCAINE 40 MG/G
CREAM TOPICAL
Status: DISCONTINUED | OUTPATIENT
Start: 2024-10-28 | End: 2024-10-29 | Stop reason: HOSPADM

## 2024-10-28 RX ORDER — ROPIVACAINE HYDROCHLORIDE 5 MG/ML
INJECTION, SOLUTION EPIDURAL; INFILTRATION; PERINEURAL
Status: DISCONTINUED | OUTPATIENT
Start: 2024-10-28 | End: 2024-10-28

## 2024-10-28 RX ORDER — ACETAMINOPHEN 325 MG/1
975 TABLET ORAL ONCE
Status: COMPLETED | OUTPATIENT
Start: 2024-10-28 | End: 2024-10-28

## 2024-10-28 RX ORDER — GABAPENTIN 300 MG/1
300 CAPSULE ORAL
Status: COMPLETED | OUTPATIENT
Start: 2024-10-28 | End: 2024-10-28

## 2024-10-28 RX ORDER — DEXAMETHASONE SODIUM PHOSPHATE 10 MG/ML
INJECTION, SOLUTION INTRAMUSCULAR; INTRAVENOUS
Status: DISCONTINUED | OUTPATIENT
Start: 2024-10-28 | End: 2024-10-28

## 2024-10-28 RX ORDER — FENTANYL CITRATE-0.9 % NACL/PF 10 MCG/ML
PLASTIC BAG, INJECTION (ML) INTRAVENOUS CONTINUOUS PRN
Status: DISCONTINUED | OUTPATIENT
Start: 2024-10-28 | End: 2024-10-28

## 2024-10-28 RX ORDER — METHOCARBAMOL 750 MG/1
750 TABLET, FILM COATED ORAL EVERY 6 HOURS PRN
Status: DISCONTINUED | OUTPATIENT
Start: 2024-10-28 | End: 2024-10-29 | Stop reason: HOSPADM

## 2024-10-28 RX ORDER — HYDROXYZINE HYDROCHLORIDE 25 MG/1
25 TABLET, FILM COATED ORAL EVERY 6 HOURS PRN
Qty: 30 TABLET | Refills: 0 | Status: SHIPPED | OUTPATIENT
Start: 2024-10-28 | End: 2024-11-06

## 2024-10-28 RX ORDER — AMOXICILLIN 250 MG
1 CAPSULE ORAL 2 TIMES DAILY
Status: DISCONTINUED | OUTPATIENT
Start: 2024-10-28 | End: 2024-10-29 | Stop reason: HOSPADM

## 2024-10-28 RX ORDER — HYDROXYZINE HYDROCHLORIDE 25 MG/1
25 TABLET, FILM COATED ORAL EVERY 6 HOURS PRN
Status: DISCONTINUED | OUTPATIENT
Start: 2024-10-28 | End: 2024-10-28 | Stop reason: HOSPADM

## 2024-10-28 RX ORDER — AMOXICILLIN 250 MG
1-2 CAPSULE ORAL 2 TIMES DAILY
Qty: 30 TABLET | Refills: 0 | Status: SHIPPED | OUTPATIENT
Start: 2024-10-28

## 2024-10-28 RX ORDER — NALOXONE HYDROCHLORIDE 0.4 MG/ML
0.1 INJECTION, SOLUTION INTRAMUSCULAR; INTRAVENOUS; SUBCUTANEOUS
Status: DISCONTINUED | OUTPATIENT
Start: 2024-10-28 | End: 2024-10-28 | Stop reason: HOSPADM

## 2024-10-28 RX ORDER — DEXAMETHASONE SODIUM PHOSPHATE 4 MG/ML
INJECTION, SOLUTION INTRA-ARTICULAR; INTRALESIONAL; INTRAMUSCULAR; INTRAVENOUS; SOFT TISSUE PRN
Status: DISCONTINUED | OUTPATIENT
Start: 2024-10-28 | End: 2024-10-28

## 2024-10-28 RX ORDER — HYDROMORPHONE HCL IN WATER/PF 6 MG/30 ML
0.2 PATIENT CONTROLLED ANALGESIA SYRINGE INTRAVENOUS EVERY 5 MIN PRN
Status: DISCONTINUED | OUTPATIENT
Start: 2024-10-28 | End: 2024-10-28 | Stop reason: HOSPADM

## 2024-10-28 RX ORDER — FLUTICASONE FUROATE AND VILANTEROL 200; 25 UG/1; UG/1
1 POWDER RESPIRATORY (INHALATION) DAILY
Status: DISCONTINUED | OUTPATIENT
Start: 2024-10-29 | End: 2024-10-29 | Stop reason: HOSPADM

## 2024-10-28 RX ORDER — AMLODIPINE BESYLATE 5 MG/1
5 TABLET ORAL DAILY
Status: DISCONTINUED | OUTPATIENT
Start: 2024-10-29 | End: 2024-10-29 | Stop reason: HOSPADM

## 2024-10-28 RX ORDER — OXYCODONE HYDROCHLORIDE 5 MG/1
10 TABLET ORAL EVERY 4 HOURS PRN
Status: DISCONTINUED | OUTPATIENT
Start: 2024-10-28 | End: 2024-10-29 | Stop reason: HOSPADM

## 2024-10-28 RX ORDER — ACETAMINOPHEN 325 MG/1
650 TABLET ORAL EVERY 4 HOURS PRN
Status: DISCONTINUED | OUTPATIENT
Start: 2024-10-31 | End: 2024-10-29 | Stop reason: HOSPADM

## 2024-10-28 RX ORDER — POLYETHYLENE GLYCOL 3350 17 G/17G
17 POWDER, FOR SOLUTION ORAL DAILY
Status: DISCONTINUED | OUTPATIENT
Start: 2024-10-29 | End: 2024-10-29 | Stop reason: HOSPADM

## 2024-10-28 RX ORDER — ONDANSETRON 2 MG/ML
4 INJECTION INTRAMUSCULAR; INTRAVENOUS EVERY 6 HOURS PRN
Status: DISCONTINUED | OUTPATIENT
Start: 2024-10-28 | End: 2024-10-29 | Stop reason: HOSPADM

## 2024-10-28 RX ORDER — NALOXONE HYDROCHLORIDE 0.4 MG/ML
0.4 INJECTION, SOLUTION INTRAMUSCULAR; INTRAVENOUS; SUBCUTANEOUS
Status: DISCONTINUED | OUTPATIENT
Start: 2024-10-28 | End: 2024-10-29 | Stop reason: HOSPADM

## 2024-10-28 RX ORDER — HYDROMORPHONE HCL IN WATER/PF 6 MG/30 ML
0.4 PATIENT CONTROLLED ANALGESIA SYRINGE INTRAVENOUS EVERY 5 MIN PRN
Status: DISCONTINUED | OUTPATIENT
Start: 2024-10-28 | End: 2024-10-28 | Stop reason: HOSPADM

## 2024-10-28 RX ORDER — CEFAZOLIN SODIUM 1 G/3ML
1 INJECTION, POWDER, FOR SOLUTION INTRAMUSCULAR; INTRAVENOUS EVERY 8 HOURS
Status: ACTIVE | OUTPATIENT
Start: 2024-10-28 | End: 2024-10-28

## 2024-10-28 RX ORDER — ACETAMINOPHEN 325 MG/1
650 TABLET ORAL EVERY 4 HOURS PRN
Qty: 100 TABLET | Refills: 0 | Status: SHIPPED | OUTPATIENT
Start: 2024-10-28

## 2024-10-28 RX ORDER — BUPIVACAINE HYDROCHLORIDE 7.5 MG/ML
INJECTION, SOLUTION INTRASPINAL
Status: COMPLETED | OUTPATIENT
Start: 2024-10-28 | End: 2024-10-28

## 2024-10-28 RX ORDER — CEFAZOLIN SODIUM 1 G/3ML
1 INJECTION, POWDER, FOR SOLUTION INTRAMUSCULAR; INTRAVENOUS EVERY 8 HOURS
Status: COMPLETED | OUTPATIENT
Start: 2024-10-29 | End: 2024-10-29

## 2024-10-28 RX ORDER — ASPIRIN 325 MG
325 TABLET, DELAYED RELEASE (ENTERIC COATED) ORAL DAILY
Status: DISCONTINUED | OUTPATIENT
Start: 2024-10-29 | End: 2024-10-29 | Stop reason: HOSPADM

## 2024-10-28 RX ORDER — ACETAMINOPHEN 325 MG/1
975 TABLET ORAL EVERY 8 HOURS
Status: DISCONTINUED | OUTPATIENT
Start: 2024-10-28 | End: 2024-10-29 | Stop reason: HOSPADM

## 2024-10-28 RX ORDER — CEFAZOLIN SODIUM/WATER 2 G/20 ML
2 SYRINGE (ML) INTRAVENOUS
Status: COMPLETED | OUTPATIENT
Start: 2024-10-28 | End: 2024-10-28

## 2024-10-28 RX ORDER — ASPIRIN 325 MG
325 TABLET, DELAYED RELEASE (ENTERIC COATED) ORAL DAILY
Qty: 30 TABLET | Refills: 0 | Status: SHIPPED | OUTPATIENT
Start: 2024-10-28

## 2024-10-28 RX ORDER — TRANEXAMIC ACID 650 MG/1
1950 TABLET ORAL ONCE
Status: COMPLETED | OUTPATIENT
Start: 2024-10-28 | End: 2024-10-28

## 2024-10-28 RX ORDER — NICOTINE POLACRILEX 4 MG/1
40 GUM, CHEWING ORAL DAILY
Status: DISCONTINUED | OUTPATIENT
Start: 2024-10-28 | End: 2024-10-28

## 2024-10-28 RX ORDER — PROCHLORPERAZINE MALEATE 5 MG/1
10 TABLET ORAL EVERY 6 HOURS PRN
Status: DISCONTINUED | OUTPATIENT
Start: 2024-10-28 | End: 2024-10-29 | Stop reason: HOSPADM

## 2024-10-28 RX ORDER — ALBUTEROL SULFATE 0.83 MG/ML
2.5 SOLUTION RESPIRATORY (INHALATION) EVERY 4 HOURS PRN
Status: DISCONTINUED | OUTPATIENT
Start: 2024-10-28 | End: 2024-10-29 | Stop reason: HOSPADM

## 2024-10-28 RX ORDER — OXYCODONE HYDROCHLORIDE 5 MG/1
5 TABLET ORAL EVERY 4 HOURS PRN
Status: DISCONTINUED | OUTPATIENT
Start: 2024-10-28 | End: 2024-10-29 | Stop reason: HOSPADM

## 2024-10-28 RX ORDER — BUPIVACAINE HYDROCHLORIDE 5 MG/ML
INJECTION, SOLUTION PERINEURAL PRN
Status: DISCONTINUED | OUTPATIENT
Start: 2024-10-28 | End: 2024-10-28 | Stop reason: HOSPADM

## 2024-10-28 RX ORDER — ONDANSETRON 2 MG/ML
INJECTION INTRAMUSCULAR; INTRAVENOUS PRN
Status: DISCONTINUED | OUTPATIENT
Start: 2024-10-28 | End: 2024-10-28

## 2024-10-28 RX ORDER — CETIRIZINE HYDROCHLORIDE 10 MG/1
10 TABLET ORAL DAILY
Status: DISCONTINUED | OUTPATIENT
Start: 2024-10-29 | End: 2024-10-29 | Stop reason: HOSPADM

## 2024-10-28 RX ORDER — PROPOFOL 10 MG/ML
INJECTION, EMULSION INTRAVENOUS CONTINUOUS PRN
Status: DISCONTINUED | OUTPATIENT
Start: 2024-10-28 | End: 2024-10-28

## 2024-10-28 RX ORDER — PANTOPRAZOLE SODIUM 40 MG/1
40 TABLET, DELAYED RELEASE ORAL
Status: DISCONTINUED | OUTPATIENT
Start: 2024-10-28 | End: 2024-10-29 | Stop reason: HOSPADM

## 2024-10-28 RX ORDER — VANCOMYCIN HYDROCHLORIDE 1 G/20ML
INJECTION, POWDER, LYOPHILIZED, FOR SOLUTION INTRAVENOUS PRN
Status: DISCONTINUED | OUTPATIENT
Start: 2024-10-28 | End: 2024-10-28 | Stop reason: HOSPADM

## 2024-10-28 RX ADMIN — Medication 2 G: at 18:28

## 2024-10-28 RX ADMIN — Medication 2 G: at 16:49

## 2024-10-28 RX ADMIN — MAGNESIUM SULFATE HEPTAHYDRATE 2 G: 40 INJECTION, SOLUTION INTRAVENOUS at 17:14

## 2024-10-28 RX ADMIN — Medication 40 MCG/MIN: at 17:08

## 2024-10-28 RX ADMIN — GABAPENTIN 300 MG: 300 CAPSULE ORAL at 14:53

## 2024-10-28 RX ADMIN — DEXAMETHASONE SODIUM PHOSPHATE 4 MG: 10 INJECTION, SOLUTION INTRAMUSCULAR; INTRAVENOUS at 19:12

## 2024-10-28 RX ADMIN — PHENYLEPHRINE HYDROCHLORIDE 100 MCG: 10 INJECTION INTRAVENOUS at 17:08

## 2024-10-28 RX ADMIN — MIDAZOLAM 2 MG: 1 INJECTION INTRAMUSCULAR; INTRAVENOUS at 16:49

## 2024-10-28 RX ADMIN — ACETAMINOPHEN 975 MG: 325 TABLET ORAL at 14:52

## 2024-10-28 RX ADMIN — SENNOSIDES AND DOCUSATE SODIUM 1 TABLET: 50; 8.6 TABLET ORAL at 21:15

## 2024-10-28 RX ADMIN — DEXAMETHASONE SODIUM PHOSPHATE 4 MG: 4 INJECTION, SOLUTION INTRA-ARTICULAR; INTRALESIONAL; INTRAMUSCULAR; INTRAVENOUS; SOFT TISSUE at 17:38

## 2024-10-28 RX ADMIN — PROPOFOL 200 MCG/KG/MIN: 10 INJECTION, EMULSION INTRAVENOUS at 16:56

## 2024-10-28 RX ADMIN — SODIUM CHLORIDE, POTASSIUM CHLORIDE, SODIUM LACTATE AND CALCIUM CHLORIDE: 600; 310; 30; 20 INJECTION, SOLUTION INTRAVENOUS at 14:53

## 2024-10-28 RX ADMIN — BUPIVACAINE HYDROCHLORIDE IN DEXTROSE 1.6 ML: 7.5 INJECTION, SOLUTION SUBARACHNOID at 16:53

## 2024-10-28 RX ADMIN — TRANEXAMIC ACID 1950 MG: 650 TABLET ORAL at 14:53

## 2024-10-28 RX ADMIN — OXYCODONE 5 MG: 5 TABLET ORAL at 21:23

## 2024-10-28 RX ADMIN — SODIUM CHLORIDE, POTASSIUM CHLORIDE, SODIUM LACTATE AND CALCIUM CHLORIDE: 600; 310; 30; 20 INJECTION, SOLUTION INTRAVENOUS at 18:35

## 2024-10-28 RX ADMIN — ACETAMINOPHEN 975 MG: 325 TABLET ORAL at 23:00

## 2024-10-28 RX ADMIN — PANTOPRAZOLE SODIUM 40 MG: 40 TABLET, DELAYED RELEASE ORAL at 21:15

## 2024-10-28 RX ADMIN — ONDANSETRON 4 MG: 2 INJECTION INTRAMUSCULAR; INTRAVENOUS at 18:11

## 2024-10-28 RX ADMIN — ROPIVACAINE HYDROCHLORIDE 20 ML: 5 INJECTION, SOLUTION EPIDURAL; INFILTRATION; PERINEURAL at 19:12

## 2024-10-28 ASSESSMENT — ACTIVITIES OF DAILY LIVING (ADL)
FALL_HISTORY_WITHIN_LAST_SIX_MONTHS: NO
ADLS_ACUITY_SCORE: 0
DIFFICULTY_EATING/SWALLOWING: NO
HEARING_DIFFICULTY_OR_DEAF: NO
DIFFICULTY_COMMUNICATING: NO
ADLS_ACUITY_SCORE: 0
ADLS_ACUITY_SCORE: 0
TOILETING_ISSUES: NO
WEAR_GLASSES_OR_BLIND: NO
DRESSING/BATHING_DIFFICULTY: NO
ADLS_ACUITY_SCORE: 0
ADLS_ACUITY_SCORE: 0
EQUIPMENT_CURRENTLY_USED_AT_HOME: WALKER, ROLLING
CONCENTRATING,_REMEMBERING_OR_MAKING_DECISIONS_DIFFICULTY: NO
ADLS_ACUITY_SCORE: 0
WALKING_OR_CLIMBING_STAIRS_DIFFICULTY: NO
DOING_ERRANDS_INDEPENDENTLY_DIFFICULTY: NO
ADLS_ACUITY_SCORE: 0
CHANGE_IN_FUNCTIONAL_STATUS_SINCE_ONSET_OF_CURRENT_ILLNESS/INJURY: NO

## 2024-10-28 NOTE — H&P
"The History and Physical on patient's chart was personally reviewed today with the patient. there have been no interval changes in patient's history since H+P performed.    History:  Carolina Hernandez is a 59 year old female seen for ongoing right knee pain. Her right knee has been hurting more now following her left total knee arthroplasty. She's had knee pain for years. Her last injection right knee was  7/25/2024 with kenalog, helped for a week. Injection 4/18/2024, didn't really help. Pain returned almost right away.  S      Locates pain along the inner aspect and front of the knee, and can radiate down the leg to the ankle, up the thigh.  Pain is worse with work, lifting/carrying things, gardening. Treatment has been occasional use of over the counter pain medications without relief.      Pain at rest, pain at night better with injections. Sleeps with pillow between knees. Has tried tylenol arthritis and ibuprofen for pain but doesn't seem to really help.        Has chronic low back pain, denies numbness and tingling.     Her left knee replacement is doing well.        Impression: Carolina Hernandez is a 59 year old female with chronic right knee pain, primary osteoarthritis.        Plan:         * reviewed imaging studies with patient, showing arthritic changes, or wearing of the cartilage in the knee. This can be caused by normal \"wear and tear\" over the years or following prior injury to the knee.  Treatment typically starts nonsurgically. Surgical indication for total knee arthroplasty  when nonsurgical management is no longer effective.  At this time, nonsurgical management is not providing them adequate relief of pain. Given the severity of osteoarthritis disease, I don't think that any further nonsurgical management, either injections, further therapy will alter the course of the osteoarthritis in a beneficial manner the patient is seeking at this time.           ** Risks of surgery include, but not " "limited to: bleeding (possibly requiring transfusion), infection, pain, scar, damage to adjacent structures (e.g. Nerves, blood vessels, bone, cartilage), temporary or permanent nerve damage, recurrence of symptoms, implant dislocation, instability, implant failure, implant infection, unequal limb lengths, malalignment, stiffness, need for further surgery, blood clots, pulmonary embolism, risks of anesthesia, and death.  * the knee will not feel \"normal\" as it won't be \"normal\" after knee replacement. It may feel \"clunky\" due to the nature of the hard metal and plastic implant.  * the expectation is for improved pain, not necessarily complete pain relief.  Also discussed that approximately 10% of patients that undergo knee arthroplasty are not happy following surgery and may have worse pain or no improvement in pain, contrary to their preoperative expectations.  If you have surgery on your right knee, you will not be able to drive for likely 4-6 weeks, or until you have attained full knee function, range of motion, and ability to drive.     ** understanding these risks, as a quality of life decision, the patient would like to proceed with surgery: RIGHT total knee arthroplasty.    Patient elects to proceed with planned procedure. Right total knee arthroplasty.    Risks and perceived benefits of surgery again discussed with patient. Patient's questions addressed and answered. Written informed consent obtained and reviewed. Surgical site marked with indelible marker with patient's participation after confirming site with patient.      Giovanny Chong M.D., M.S.  Dept. of Orthopaedic Surgery  [facility]    "

## 2024-10-28 NOTE — PROGRESS NOTES
Reviewing chart due to high probability of admit to Med/Surg unit.     Ga Steiner RN on 10/28/2024 at 5:29 PM

## 2024-10-28 NOTE — OP NOTE
DATE OF SERVICE:  10/28/2024    PREOPERATIVE DIAGNOSIS:  Primary osteoarthritis, right knee.    POSTOPERATIVE DIAGNOSIS: Primary osteoarthritis, right knee.    OPERATION PERFORMED: Hybrid tricompartmental total knee arthroplasty ( press-fit tibial and femoral components for the medial, lateral compartments, and cemented patellofemoral component), right knee.    ATTENDING SURGEON: Giovanny Chong M.D., M.S.    ASSISTANT(S): BENTON Vyas    ANESTHESIA:  Spinal with MAC, in the supine position.    IV FLUIDS:  800 mL LR.    EBL:  50mL .    URINE OUTPUT: no singh    TOURNIQUET TIME: 44 minutes at 250mmHg.    IMPLANTS / GRAFTS:        #3 posterior stabilized Mary Ellen Triathlon press-fit beaded femur,      #3 Marenisco Triathlon Tritanium press-fit tibial tray     #29 Marenisco Triathlon X3 asymmetric medial off-set patella      10mm X3 PS polyethylene tibial liner    LATERAL RELEASE:   None required - good tracking.    APPROACH:   Medial Parapatellar    COMPLICATIONS:  NONE    ANTIBIOTICS:  Cefazolin 2 gm intravenously prior to tourniquet inflation and 2gm at release and closure.    Tranexamic Acid: 1950mg oral prior to procedure in preop.    SPECIMENS: none    DRAINS: none    FINDINGS: advanced tricompartmental degenerative osteoarthritis, eburnated bone on bone changes medially, with marginal osteophytes. Inflammed synovium, effusion. Popliteal cyst. Good bone quality.    INDICATIONS FOR PROCEDURE: Carolina Hernandez is a pleasant 59 year old female with ongoing knee pain.Xrays showed advanced degenerative arthrosis.The patient has unfortunately failed nonoperative attempts at knee pain relief with ongoing symptomatic arthropathy, including physical therapy, activity modification, weight loss, NSAIDS, and injections (cortisone and viscosupplementation). Symptoms have been interfering with activities of daily living and quality of life.  The risk and benefit analysis of knee arthroplasty was explained to include but not  be limited to wear, loosening, infection, bleeding, pain, scar, implant dislocation, fracture, failure to relieve symptoms, thromboembolic disease, neurovascular damage with possible temporary or permanent nerve damage, leg length inequality, need for further surgery, risks of anesthesia and death.  Despite these risks, as a quality of life decision, the patient elected to proceed.    And with informed and written consent the patient was taken to the operating room.    PROCEDURE AND FINDINGS:    The patient was identified in the preoperative holding area. The correct surgical procedure and site was confirmed with the patient. Again, the risks of surgery were reviewed. The patient elected to proceed understanding the risks. Written informed consent was obtained. The correct surgical site was marked with an indelible marker by myself, Giovanny Chong MD, the surgeon.     The patient was then taken to the operating room and placed supine on the operating table. After adequate anesthesia was obtained, a non-sterile tourniquet was placed to the upper thigh. All bony prominences were well padded. The arms were well positioned and padded to be comfortable. The right knee and lower extremity was prepped and draped in the usual sterile fashion.     A time-out was completed confirming the correct patient, the correct surgery and surgical site, positioning, the availability of implants, administration of prophylactic antibiotics, and known allergies by all surgical staff.    A midline incision was made and carried down through the peritenon over the patella tendon.  A medial-based arthrotomy was extended proximally using a medial parapatellar approach and distally using a subperiosteal medial collateral ligament elevation.  Accessible anterior menisci were incised.  The knee was flexed, and canal entry into both femur and tibia, using intramedullary guides, allowed a 5 degree 8 mm distal femoral cut across the distal aspect of  the medial and lateral femoral condyles, and a 3 degree 4 mm medial-based tibial cut across the medial and lateral tibial plateau.    Minimally, or less,  invasive techniques with limited incisons and reduced sized cutting blocks and minimal patellar eversion were utilized.    Attention was then turned to the femur.  This was sized to prevent notching and externally rotated to the epicondylar axis.  Necessary anterior and posterior as well as Chamfer cuts were made medial and lateral.   A trial femoral component was placed after removing remaining menisci.  The flexion and extension gap balancing was assured using an appropriate sized poly.  The tibial alignment pins were removed following flexion/extension balancing.  Tibial component size was selected and punched, externally rotating tibial component to the junction of the middle and medial thirds of the tibial tubercle.    Attention was then turned to the patella and the 23 mm patella was recessed to a 13 mm thick patella to accommodate a medial off-set component which was drilled.  A complete synovectomy was performed.    Patellar tracking was assured using a thicker poly insert. No lateral release was needed. After confirmation of such, 1 bag of cement was mixed at the back table.   Beginning with the tibia and then the femur, these components were press-fit into place. This was then followed by the patella, which was cemented in place.  Excess cement was removed from patella component.  The knee was taken to extension.  The tourniquet was released, and a second dose of Ancef was administered.    The wound was copiously irrigated during the cement cure with dilute betadine solution as well as antibiotic saline. A local joint block was administered through the wound into the surrounding tissues. The actual polyethylene, liner which allowed full extension and good flexion, was inserted. The wound was irrigated again. One gram of vancomycin powder was sprinkled  into the knee. The medial arthrotomy, medial collateral lateral elevation, and deep fascia of the vastus medialis obliquus was closed with 0 Ethibond.  0 Vicryl and 3-0 monocryl were used in the peritenon and subdermal tissue respectively.  A 3-0 monocryl subcuticular suture was used to maintain good skin eversion and apposition.  Dermabond was placed over the closed incision. A water-proof sterile dressing (Aquacel Ag)  was applied over adaptec gauze.    The patient was then taken to recovery in stable condition.    The postoperative plan will be weight bearing as tolerated, knee arthroplasty protocol with range of motion to full immediately, pharmacologic DVT prophylaxis for 4 weeks, and antibiotics for 23 hours.  We look forward to following the patient through the perioperative time period.    Giovanny Chong M.D., M.S.  Dept. of Orthopaedic Surgery  [facility]

## 2024-10-28 NOTE — ANESTHESIA PROCEDURE NOTES
"Intrathecal injection Procedure Note    Pre-Procedure   Staff -        CRNA: Jackie Condon APRN CRNA       Performed By: CRNA       Location: OR       Procedure Start/Stop Times: 10/28/2024 4:53 PM and 10/28/2024 4:56 PM       Pre-Anesthestic Checklist: patient identified, IV checked, risks and benefits discussed, informed consent, monitors and equipment checked, pre-op evaluation, at physician/surgeon's request and post-op pain management  Timeout:       Correct Patient: Yes        Correct Procedure: Yes        Correct Site: Yes        Correct Position: Yes   Procedure Documentation  Procedure: intrathecal injection       Patient Position: sitting       Skin prep: Betadine       Insertion Site: L3-4. (midline approach).       Needle Gauge: 25.        Needle Length (Inches): 3.5        Spinal Needle Type: Pencan       Introducer used       Introducer: 20 G       # of attempts: 1 and  # of redirects:  0    Assessment/Narrative         Paresthesias: No.       CSF fluid: clear.    Medication(s) Administered   0.75% Hyperbaric Bupivacaine (Intrathecal) - Intrathecal   1.6 mL - 10/28/2024 4:53:00 PM  Medication Administration Time: 10/28/2024 4:53 PM      FOR [facility] ([facility] [place]) ONLY:   Pain Team Contact information: please page the Pain Team Via [employer]. Search \"Pain\". During daytime hours, please page the attending first. At night please page the resident first.      "

## 2024-10-28 NOTE — MEDICATION SCRIBE - ADMISSION MEDICATION HISTORY
Medication Scribe Admission Medication History    Admission medication history is complete. The information provided in this note is only as accurate as the sources available at the time of the update.    Information Source(s): Patient and CareEverywhere/SureScripts via  with patient in room and finished at desk.    Pertinent Information:   She has her inhalers with her today.    Taking Montelukast in am instead of at bedtime.    Changes made to PTA medication list:  Added: None  Deleted: None  Changed: Montelukast to taking in am instead of at bedtime.    Allergies reviewed with patient and updates made in EHR: yes, no change.    Medication History Completed By: Aura Garvey 10/28/2024 4:13 PM    PTA Med List   Medication Sig Note Last Dose/Taking    albuterol (PROVENTIL) (2.5 MG/3ML) 0.083% neb solution inhale 1 vial (2.5 mg) by nebulization every 4 hours as needed for shortness of breath / dyspnea or wheezing  10/28/2024 Noon    albuterol (VENTOLIN HFA) 108 (90 Base) MCG/ACT inhaler INHALE 1-2 PUFFS INTO THE LUNGS EVERY 4 HOURS AS NEEDED FOR SHORTNESS OF BREATH/DYSPNEA OR WHEEZING . 10/28/2024: Has with today 10/28/2024 Morning    amLODIPine (NORVASC) 2.5 MG tablet Take 2 tablets (5 mg) by mouth once daily.  10/28/2024 Morning    aspirin 81 MG EC tablet Take 81 mg by mouth daily.  10/14/2024 Morning    cetirizine (ZYRTEC) 10 MG tablet Take 10 mg by mouth daily  10/27/2024 Morning    fluticasone-salmeterol (ADVAIR) 500-50 MCG/ACT inhaler Inhale 1 puff into the lungs every 12 hours. 10/28/2024: Has with today 10/28/2024 Morning    montelukast (SINGULAIR) 10 MG tablet Take 1 tablet (10 mg) by mouth at bedtime. (Patient taking differently: Take 10 mg by mouth every morning.)  10/27/2024 Morning    multivitamin (CENTRUM SILVER) tablet Take 1 tablet by mouth daily  10/14/2024 Morning    Omega-3 Fatty Acids (FISH OIL ULTRA) 1400 MG CAPS Take 1 capsule by mouth daily  10/14/2024 Morning    omeprazole 20 MG tablet Take 2  tablets (40 mg) by mouth daily  10/27/2024 Morning    sertraline (ZOLOFT) 25 MG tablet Take 2 tablets (50 mg) by mouth daily for 90 days  10/28/2024 Morning    tiotropium (SPIRIVA) 18 MCG inhaled capsule Inhale 1 capsule (18 mcg) into the lungs daily. 10/28/2024: Has with today 10/28/2024 at  1:30 PM

## 2024-10-28 NOTE — ANESTHESIA CARE TRANSFER NOTE
Patient: Carolina Hernandez    Procedure: Procedure(s):  ARTHROPLASTY, KNEE, TOTAL       Diagnosis: Primary osteoarthritis of right knee [M17.11]  Diagnosis Additional Information: No value filed.    Anesthesia Type:   Spinal     Note:    Oropharynx: spontaneously breathing  Level of Consciousness: drowsy  Oxygen Supplementation: room air    Independent Airway: airway patency satisfactory and stable  Dentition: dentition unchanged  Vital Signs Stable: post-procedure vital signs reviewed and stable  Report to RN Given: handoff report given  Patient transferred to: Phase II    Handoff Report: Identifed the Patient, Identified the Reponsible Provider, Reviewed the pertinent medical history, Discussed the surgical course, Reviewed Intra-OP anesthesia mangement and issues during anesthesia, Set expectations for post-procedure period and Allowed opportunity for questions and acknowledgement of understanding  Vitals:  Vitals Value Taken Time   BP     Temp     Pulse     Resp     SpO2         Electronically Signed By: VALERIA Cleveland CRNA  October 28, 2024  6:51 PM

## 2024-10-29 ENCOUNTER — APPOINTMENT (OUTPATIENT)
Dept: PHYSICAL THERAPY | Facility: CLINIC | Age: 59
End: 2024-10-29
Attending: ORTHOPAEDIC SURGERY
Payer: COMMERCIAL

## 2024-10-29 VITALS
WEIGHT: 170 LBS | SYSTOLIC BLOOD PRESSURE: 133 MMHG | TEMPERATURE: 97.2 F | BODY MASS INDEX: 31.28 KG/M2 | DIASTOLIC BLOOD PRESSURE: 70 MMHG | OXYGEN SATURATION: 97 % | HEIGHT: 62 IN | RESPIRATION RATE: 18 BRPM | HEART RATE: 67 BPM

## 2024-10-29 PROBLEM — I25.10 NONOBSTRUCTIVE ATHEROSCLEROSIS OF CORONARY ARTERY: Chronic | Status: ACTIVE | Noted: 2022-10-21

## 2024-10-29 PROBLEM — F33.40 RECURRENT MAJOR DEPRESSIVE DISORDER, IN REMISSION (H): Chronic | Status: ACTIVE | Noted: 2019-01-17

## 2024-10-29 PROBLEM — K76.0 FATTY LIVER: Chronic | Status: ACTIVE | Noted: 2022-11-01

## 2024-10-29 PROBLEM — I10 BENIGN ESSENTIAL HYPERTENSION: Chronic | Status: ACTIVE | Noted: 2019-07-26

## 2024-10-29 PROBLEM — E78.5 HYPERLIPIDEMIA LDL GOAL <70: Chronic | Status: ACTIVE | Noted: 2022-10-21

## 2024-10-29 PROBLEM — J44.9 COPD (CHRONIC OBSTRUCTIVE PULMONARY DISEASE) (H): Chronic | Status: ACTIVE | Noted: 2022-10-30

## 2024-10-29 LAB — HGB BLD-MCNC: 11.7 G/DL (ref 11.7–15.7)

## 2024-10-29 PROCEDURE — 999N000111 HC STATISTIC OT IP EVAL DEFER

## 2024-10-29 PROCEDURE — 97530 THERAPEUTIC ACTIVITIES: CPT | Mod: GP | Performed by: PHYSICAL THERAPIST

## 2024-10-29 PROCEDURE — 250N000013 HC RX MED GY IP 250 OP 250 PS 637: Performed by: ORTHOPAEDIC SURGERY

## 2024-10-29 PROCEDURE — 250N000013 HC RX MED GY IP 250 OP 250 PS 637

## 2024-10-29 PROCEDURE — 250N000009 HC RX 250: Performed by: ORTHOPAEDIC SURGERY

## 2024-10-29 PROCEDURE — 85018 HEMOGLOBIN: CPT | Performed by: ORTHOPAEDIC SURGERY

## 2024-10-29 PROCEDURE — 97161 PT EVAL LOW COMPLEX 20 MIN: CPT | Mod: GP | Performed by: PHYSICAL THERAPIST

## 2024-10-29 PROCEDURE — 36415 COLL VENOUS BLD VENIPUNCTURE: CPT | Performed by: ORTHOPAEDIC SURGERY

## 2024-10-29 PROCEDURE — 99214 OFFICE O/P EST MOD 30 MIN: CPT

## 2024-10-29 PROCEDURE — 97116 GAIT TRAINING THERAPY: CPT | Mod: GP | Performed by: PHYSICAL THERAPIST

## 2024-10-29 PROCEDURE — 250N000011 HC RX IP 250 OP 636: Performed by: ORTHOPAEDIC SURGERY

## 2024-10-29 RX ADMIN — CEFAZOLIN 1 G: 1 INJECTION, POWDER, FOR SOLUTION INTRAMUSCULAR; INTRAVENOUS at 01:53

## 2024-10-29 RX ADMIN — HYDROXYZINE HYDROCHLORIDE 25 MG: 25 TABLET ORAL at 07:40

## 2024-10-29 RX ADMIN — AMLODIPINE BESYLATE 5 MG: 5 TABLET ORAL at 07:41

## 2024-10-29 RX ADMIN — POLYETHYLENE GLYCOL 3350 17 G: 17 POWDER, FOR SOLUTION ORAL at 07:41

## 2024-10-29 RX ADMIN — SERTRALINE HYDROCHLORIDE 50 MG: 50 TABLET ORAL at 07:40

## 2024-10-29 RX ADMIN — ALBUTEROL SULFATE 2.5 MG: 2.5 SOLUTION RESPIRATORY (INHALATION) at 07:41

## 2024-10-29 RX ADMIN — ACETAMINOPHEN 975 MG: 325 TABLET ORAL at 06:12

## 2024-10-29 RX ADMIN — CETIRIZINE HYDROCHLORIDE 10 MG: 10 TABLET, FILM COATED ORAL at 07:40

## 2024-10-29 RX ADMIN — OXYCODONE 10 MG: 5 TABLET ORAL at 03:46

## 2024-10-29 RX ADMIN — Medication 3 MG: at 00:08

## 2024-10-29 RX ADMIN — FLUTICASONE FUROATE AND VILANTEROL TRIFENATATE 1 PUFF: 200; 25 POWDER RESPIRATORY (INHALATION) at 09:03

## 2024-10-29 RX ADMIN — CEFAZOLIN 1 G: 1 INJECTION, POWDER, FOR SOLUTION INTRAMUSCULAR; INTRAVENOUS at 09:02

## 2024-10-29 RX ADMIN — SENNOSIDES AND DOCUSATE SODIUM 1 TABLET: 50; 8.6 TABLET ORAL at 07:40

## 2024-10-29 RX ADMIN — OXYCODONE 10 MG: 5 TABLET ORAL at 00:41

## 2024-10-29 RX ADMIN — PANTOPRAZOLE SODIUM 40 MG: 40 TABLET, DELAYED RELEASE ORAL at 06:12

## 2024-10-29 RX ADMIN — OXYCODONE 10 MG: 5 TABLET ORAL at 11:26

## 2024-10-29 RX ADMIN — METHOCARBAMOL 750 MG: 750 TABLET ORAL at 07:41

## 2024-10-29 RX ADMIN — ASPIRIN 325 MG: 325 TABLET ORAL at 06:12

## 2024-10-29 RX ADMIN — OXYCODONE 10 MG: 5 TABLET ORAL at 07:40

## 2024-10-29 ASSESSMENT — ACTIVITIES OF DAILY LIVING (ADL)
ADLS_ACUITY_SCORE: 0

## 2024-10-29 NOTE — ANESTHESIA POSTPROCEDURE EVALUATION
Patient: Carolina Hernandez    Procedure: Procedure(s):  ARTHROPLASTY, KNEE, TOTAL       Anesthesia Type:  Spinal    Note:  Disposition: Admission   Postop Pain Control: Uneventful            Sign Out: Well controlled pain   PONV: No   Neuro/Psych: Uneventful            Sign Out: Acceptable/Baseline neuro status   Airway/Respiratory: Uneventful            Sign Out: Acceptable/Baseline resp. status   CV/Hemodynamics: Uneventful            Sign Out: Acceptable CV status; No obvious hypovolemia; No obvious fluid overload   Other NRE: NONE   DID A NON-ROUTINE EVENT OCCUR? No       Last vitals:  Vitals Value Taken Time   /68 10/28/24 1930   Temp 36.7  C (98.1  F) 10/28/24 1920   Pulse 68 10/28/24 1930   Resp 12 10/28/24 1931   SpO2 94 % 10/28/24 1922   Vitals shown include unfiled device data.    Electronically Signed By: VALERIA Woodruff CRNA  October 28, 2024  7:40 PM

## 2024-10-29 NOTE — ANESTHESIA PROCEDURE NOTES
Femoral (distal-adductor canal) Procedure Note    Pre-Procedure   Staff -        CRNA: Carolina Galvan APRN CRNA       Performed By: CRNA       Location: post-op       Procedure Start/Stop Times: 10/28/2024 7:05 PM and 10/28/2024 7:14 PM       Pre-Anesthestic Checklist: patient identified, IV checked, site marked, risks and benefits discussed, informed consent, monitors and equipment checked, pre-op evaluation, at physician/surgeon's request and post-op pain management  Timeout:       Correct Patient: Yes        Correct Procedure: Yes        Correct Site: Yes        Correct Position: Yes        Correct Laterality: Yes        Site Marked: Yes  Procedure Documentation  Procedure: Femoral (distal-adductor canal)       Diagnosis: PAIN       Laterality: right       Patient Position: supine       Patient Prep/Sterile Barriers: sterile gloves, mask, patient draped       Skin prep: Chloraprep       Needle Type: insulated       Needle Gauge: 22.        Needle Length (Inches): 4        Ultrasound guided       1. Ultrasound was used to identify targeted nerve, plexus, vascular marker, or fascial plane and place a needle adjacent to it in real-time.       2. Ultrasound was used to visualize the spread of anesthetic in close proximity to the above referenced structure.       3. A permanent image is entered into the patient's record.       4. The visualized anatomic structures appeared normal.       5. There were no apparent abnormal pathologic findings.    Assessment/Narrative         The placement was negative for: blood aspirated, painful injection and site bleeding       Paresthesias: No.       Bolus given via needle..        Secured via.        Insertion/Infusion Method: Single Shot       Complications: none    Medication(s) Administered   Ropivacaine 0.5% PF (Infiltration) - Infiltration   20 mL - 10/28/2024 7:12:00 PM  Dexamethasone 10 mg/mL PF (Perineural) - Perineural   4 mg - 10/28/2024 7:12:00 PM  Medication  "Administration Time: 10/28/2024 7:05 PM      FOR [facility] ([facility]) ONLY:   Pain Team Contact information: please page the Pain Team Via [employer]. Search \"Pain\". During daytime hours, please page the attending first. At night please page the resident first.      "

## 2024-10-29 NOTE — PROGRESS NOTES
"[place] [facility] ADMISSION NOTE    Patient admitted to room 2400 at approximately 1955 via cart from surgery. Patient was accompanied by nurse.     Verbal SBAR report received from Yani VAZ prior to patient arrival.     Patient trasferred to bed via air jony. Patient alert and oriented X 3. The patient is not having any pain.  . Admission vital signs: Blood pressure 128/71, pulse 65, temperature 97.9  F (36.6  C), temperature source Oral, resp. rate 18, height 1.575 m (5' 2\"), weight 77.1 kg (170 lb), SpO2 96%, not currently breastfeeding. Patient was oriented to plan of care, call light, bed controls, tv, telephone, bathroom, and visiting hours.     Risk Assessment    The following safety risks were identified during admission: none. Yellow risk band applied: NO.     Skin Initial Assessment    This writer admitted this patient and completed a full skin assessment and Tristan score in the Adult PCS flowsheet.   Photo documentation of skin problem and/or wound competed via [employer] application (located under Media):  N/A    Appropriate interventions initiated as needed.     Secondary skin check completed by Crista VAZ.    Tristan Risk Assessment  Sensory Perception: 3-->slightly limited  Moisture: 4-->rarely moist  Activity: 3-->walks occasionally  Mobility: 3-->slightly limited  Nutrition: 3-->adequate  Friction and Shear: 2-->potential problem  Tristan Score: 18  Friction/Shear Interventions: HOB 30 degrees or less  Mattress: Standard gel/foam mattress (IsoFlex, Atmos Air, etc.)  Bed Frame: Standard width and length    Education    Patient has a [place] to Observation order: Yes  Observation education completed and documented: Yes      Kourtney Jennings RN      "

## 2024-10-29 NOTE — PROGRESS NOTES
[facility] Medicine Progress Note  Date of Service: 10/29/2024    Assessment & Plan   Carolina Hernandez is a 59 year old female who presented on 10/28/2024 for scheduled Procedure(s):  ARTHROPLASTY, KNEE, TOTAL by Giovanny Chong MD and is being followed by the hospital medicine service for co-management of acute and/or chronic perioperative medical problems.      S/p Procedure(s):  ARTHROPLASTY, KNEE, TOTAL   1 Day Post-Op   Having some ongoing numbness of surgical leg. No signs concerning for DVT.    - pain control, wound cares, physical therapy, occupational therapy and DVT prophylaxis per orthopedic surgery service    Recurrent major depressive disorder  Stable.   - Continue PTA sertraline 50mg daily    Benign essential hypertension  Normotensive POD1.   - Continue PTA amlodipine 5mg daily    Nonobstructive atherosclerosis of coronary artery  Hyperlipidemia   Managed PTA with aspirin 81mg daily. History of exertional pain leading to angiogram which showed non-obstructive CAD.   - Continue aspirin with temporary dose increase per surgery for VTE prophylaxis    Gastroesophageal reflux disease  - Continue PTA omeprazole 40mg daily    Severe persistent asthma without complication  - Continue PTA albuterol neb & inhaler  - Continue PTA Advair inhaler per surgery  - Continue PTA montelukast at discharge  - Continue PTA tiotropium per surgery    DVT Prophylaxis: as per orthopedic surgery service - asa 325mg daily  Code Status: Prior    Lines: PIV   Weinberg catheter: no    Discussion: Medically, the patient appears to be recovering appropriately since surgery. She has not yet passed gas, and is having some ongoing impaired sensation of the surgical leg which will require eval by ortho. Vital signs stable. No medical barriers to discharge at this time.     Disposition: From a medical standpoint, anticipate discharge later today pending ability to pass gas & improvement in lower  extremity sensation (eval per ortho). The patient must be seen and cleared by orthopedics, physical therapy, and occupational therapy prior to discharge.       Attestation:  I have reviewed today's vital signs, notes, medications, labs and imaging.    I have discussed, or will be discussing, the patient with hospitalist physician, Dr. Albino Norman.    Lalitha Ray PA-C     Interval History   Doing ok, very fatigued. Concerned about some impaired sensation in her surgical leg that is making it difficult to ambulate & she cannot dorsiflex. No other pain, significant calf swelling. No abdominal pain or nausea with eating. Ambulating without lightheadedness or dizziness. Denies chest pain or dyspnea. Urinating without difficulty. Not passing gas yet.     Physical Exam   Temp:  [97.7  F (36.5  C)-98.4  F (36.9  C)] 97.9  F (36.6  C)  Pulse:  [65-91] 73  Resp:  [12-25] 18  BP: (109-168)/(62-95) 134/74  SpO2:  [91 %-98 %] 95 %    Weights:   Vitals:    10/28/24 1430   Weight: 77.1 kg (170 lb)    Body mass index is 31.09 kg/m .    Constitutional: alert and oriented x3, laying in bed, appears fatigued but comfortable, nontoxic  CV: regular rate & rhythm, no murmurs, rubs, or gallops. Radial pulse 2+. No pitting LE edema.   Respiratory: CTA bilaterally, respirations unlabored on room air.   GI: Bowel sounds present throughout. Abdomen soft, nontender to palpation, nondistended.   Skin: Warm and dry. Surgical site exam deferred to orthopedics.  Musculoskeletal: Normal muscle bulk. Cannot dorsiflex right foot ( surgical leg). Remainder of MSK exam deferred to orthopedics.   Neuro: speech intact, interacting appropriately. Impaired sensation to calf femoral / saphenous nerve region (medial calf from ankle to nearly knee)    Data   Recent Labs   Lab 10/29/24  0526 10/28/24  1445   HGB 11.7  --    GLC  --  101*     Imaging  Recent Results (from the past 24 hours)   POC US GUIDANCE NEEDLE PLACEMENT    Impression    normal   XR  Knee Port Right 1/2 Views    Narrative    EXAM: XR KNEE PORT RIGHT 1/2 VIEWS  LOCATION: [facility]  DATE: 10/28/2024    INDICATION: Post Op Total Knee  COMPARISON: None.      Impression    IMPRESSION: Right total knee arthroplasty. Negative for postoperative purposes.       I reviewed all new labs and imaging results over the last 24 hours.     Medications   Current Facility-Administered Medications   Medication Dose Route Frequency Provider Last Rate Last Admin    lactated ringers infusion   Intravenous Continuous Giovanny Chong MD         Current Facility-Administered Medications   Medication Dose Route Frequency Provider Last Rate Last Admin    acetaminophen (TYLENOL) tablet 975 mg  975 mg Oral Q8H Giovanny Chong MD   975 mg at 10/29/24 0612    amLODIPine (NORVASC) tablet 5 mg  5 mg Oral Daily Giovanny Chong MD        aspirin (ASA) EC tablet 325 mg  325 mg Oral Daily Giovanny Chong MD   325 mg at 10/29/24 0612    ceFAZolin (ANCEF) 1 g vial to attach to  ml bag for ADULT or 50 ml bag for PEDS  1 g Intravenous Q8H Giovanny Chong MD   1 g at 10/29/24 0153    cetirizine (zyrTEC) tablet 10 mg  10 mg Oral Daily Giovanny Chnog MD        fluticasone-vilanterol (BREO ELLIPTA) 200-25 MCG/ACT inhaler 1 puff  1 puff Inhalation Daily Giovanny Chong MD        pantoprazole (PROTONIX) EC tablet 40 mg  40 mg Oral BID AC Giovanny Chong MD   40 mg at 10/29/24 0612    polyethylene glycol (MIRALAX) Packet 17 g  17 g Oral Daily Giovanny Chong MD        senna-docusate (SENOKOT-S/PERICOLACE) 8.6-50 MG per tablet 1 tablet  1 tablet Oral BID Giovanny Chong MD   1 tablet at 10/28/24 2115    sertraline (ZOLOFT) tablet 50 mg  50 mg Oral Daily Giovanny Chong MD        sodium chloride (PF) 0.9% PF flush 3 mL  3 mL Intracatheter Q8H Giovanny Chong MD        umeclidinium (INCRUSE ELLIPTA) 62.5 MCG/ACT inhaler 1 puff  1 puff Inhalation Daily Giovanny Chong MD          Lalitha Ray PA-C

## 2024-10-29 NOTE — PROGRESS NOTES
Skin affirmation note    Admitting nurse completed full skin assessment, Tristan score and Tristan interventions. This writer agrees with the initial skin assessment findings.    Crista Dobbins RN on 10/28/2024 at 9:56 PM

## 2024-10-29 NOTE — PLAN OF CARE
Occupational Therapy: Orders received. Chart reviewed and discussed with care team.? Occupational Therapy not indicated as pt has no ADL concerns and family/friends to assist at discharge.? Defer discharge recommendations to Physical Therapy.? Will complete orders.

## 2024-10-29 NOTE — PLAN OF CARE
Problem: Adult Inpatient Plan of Care  Goal: Plan of Care Review  Outcome: Progressing  Flowsheets (Taken 10/28/2024 2202)  Plan of Care Reviewed With: patient  Overall Patient Progress: improving  Goal: Absence of Hospital-Acquired Illness or Injury  Outcome: Progressing  Intervention: Identify and Manage Fall Risk  Recent Flowsheet Documentation  Taken 10/28/2024 2014 by Kourtney Jennings RN  Safety Promotion/Fall Prevention:   activity supervised   assistive device/personal items within reach   room near nurse's station   clutter free environment maintained  Intervention: Prevent and Manage VTE (Venous Thromboembolism) Risk  Recent Flowsheet Documentation  Taken 10/28/2024 2011 by Kourtney Jennings RN  VTE Prevention/Management: SCDs on (sequential compression devices)  Goal: Optimal Comfort and Wellbeing  Outcome: Progressing  Intervention: Monitor Pain and Promote Comfort  Recent Flowsheet Documentation  Taken 10/28/2024 2120 by Kourtney Jennings RN  Pain Management Interventions:   cold applied   medication (see MAR)  Goal: Readiness for Transition of Care  Outcome: Progressing  Intervention: Mutually Develop Transition Plan  Recent Flowsheet Documentation  Taken 10/28/2024 2018 by Kourtney Jennings RN  Equipment Currently Used at Home: walker, rolling   Goal Outcome Evaluation:      Plan of Care Reviewed With: patient    Overall Patient Progress: improvingOverall Patient Progress: improving     Patient is A&O.  Patient is taking in food and fluid without difficulty.  Patient has sensation of right leg, ice and analgesics have been given for comfort.  Patient is up with assist of 1 to the restroom, voiding well.  Patient appears to have slept fair, wakes easily.  Patient is using call light to make needs known.

## 2024-10-29 NOTE — OR NURSING
Pt with rash on her breasts: bilat, after the surgery. Pt rash resolving with the daryl hugger off and cotton gown on.

## 2024-10-29 NOTE — PLAN OF CARE
Goal Outcome Evaluation:      Plan of Care Reviewed With: patient, spouse               [place] NSG DISCHARGE NOTE    Patient discharged to home at 1:12 PM via wheel chair. Accompanied by staff. Discharge instructions reviewed with patient and spouse, opportunity offered to ask questions. Prescriptions filled and sent with patient upon discharge. All belongings sent with patient.    Ga Steiner RN on 10/29/2024 at 1:46 PM

## 2024-10-29 NOTE — PROGRESS NOTES
"[facility] DAILY PROGRESS NOTE      History: Carolina Hernandez is a 59 year old female with advanced knee arthritis now POD # 1 s/p right total knee arthroplasty on 10/28/2024 with Dr Chong     Interval events: transient peroneal nerve palsy      SUBJECTIVE:  Patient states pain is well controlled with prescribed medications. Didn't sleep well. Tolerating diet. Voiding spontaneously.  Denies nausea or vomiting. Denies chest pain, shortness of breath.        OBJECTIVE:  /70   Pulse 67   Temp 97.2  F (36.2  C) (Axillary)   Resp 18   Ht 1.575 m (5' 2\")   Wt 77.1 kg (170 lb)   SpO2 97%   BMI 31.09 kg/m      Temp (24hrs), Av  F (36.7  C), Min:97.2  F (36.2  C), Max:98.4  F (36.9  C)      General:    Patient awake, alert, oriented. NAD  Breathing comfortably on room air.  Laying supine in bed.    right Lower Extremity:    Incision is covered, aquacel in place. 1cm shadow distally.  Mild swelling of knee, calf    Intact sensation deep peroneal nerve, superficial peroneal nerve, medial and lateral plantar nerve, sural nerve, saphenous nerve.  Decreased dorsiflexion, otherwise intact gs, quads, hamstrings, hip flexors  Toes warm and well perfused w/ brisk capillary refill, palpable dorsalis pedis pulse  Calves soft and nontender to squeeze.      Hemoglobin   Date Value Ref Range Status   10/29/2024 11.7 11.7 - 15.7 g/dL Final   2024 13.1 11.7 - 15.7 g/dL Final   2021 13.3 11.7 - 15.7 g/dL Final   12/10/2019 14.2 11.7 - 15.7 g/dL Final     Recent Labs   Lab Test 10/14/22  0847 12/10/19  1834   INR 0.97 1.09     Recent Labs   Lab Test 24  1159 10/25/23  1158 23  0822   POTASSIUM 4.2 4.2 4.0   CHLORIDE 102 99 101   ANIONGAP 10 11 11         PT:  50ft.           ASSESSMENT: Carolina Hernandez is a 59 year old female POD # 1 s/p right total knee arthroplasty on 10/28/2024, doing well. Transient peroneal nerve palsy    PLAN:    * nerve palsy likely from intra-operative joint " block to the posterolateral knee, should resolve over the next 1-2 days.  * Weightbearing: weight bear as tolerated   * no pillow or bump behind the knee, knee should be straight when at rest.  * elevation of leg, knee should be straight.  * Diet  * IVF, saline lock when tolerating good PO  * IV antibiotics x23 hours  * Consults: hospitalists, appreciate comanagement  * Pain control: PO pain meds w/ iv for break through pain  * DVT prophy:  4 weeks of 325mg aspirin  daily.  * Activity: with assist and assistive device.  * PT: daily  * Remove waterproof Aquacel AG dressing 10-14 days postoperative at home, cover with dry guaze as needed.    * Dispo: likely home today  * postop followup with Dr Chong upon discharge.    Giovanny Chong M.D., M.S.  Dept. of Orthopaedic Surgery  [facility]

## 2024-10-29 NOTE — CONSULTS
Care Management Note:    Care Management team received referral from Ortho team to assist pt with discharge planning services post surgical services.    Per IDT rounds, EMR review, and/or discussion with PT/OT staff, it has been determined that pt will discharge to home and attend outpatient therapy services.    Discussed directly with patient.  Patient in agreement with outpatient therapy plans.      [facility] provider group aware of plans.      Care Management will close referral at this time.      GAVINO Pang  [facility] [phone]   [facility]  [phone]

## 2024-10-29 NOTE — PROGRESS NOTES
[facility]  OUTPATIENT PHYSICAL THERAPY EVALUATION  PLAN OF TREATMENT FOR OUTPATIENT REHABILITATION  (COMPLETE FOR INITIAL CLAIMS ONLY)  Patient's Last Name, First Name, M.I.  YOB: 1965  Carolina Hernandez                        Provider's Name  [facility] Medical Record No.  [id number]                             Onset Date:  10/28/24   Start of Care Date:  10/29/24   Type:     _X_PT   ___OT   ___SLP Medical Diagnosis:  R TKA              PT Diagnosis:  impaired functional mobility Visits from SOC:  1     See note for plan of treatment, functional goals and certification details    I CERTIFY THE NEED FOR THESE SERVICES FURNISHED UNDER        THIS PLAN OF TREATMENT AND WHILE UNDER MY CARE     (Physician co-signature of this document indicates review and certification of the therapy plan).                10/29/24 0822   Appointment Info   Signing Clinician's Name / Credentials (PT) Rosalba Grant PT   Quick Adds   Quick Adds Certification   Living Environment   People in Home spouse   Current Living Arrangements house   Number of Stairs, Within Home, Primary none   Stair Railings, Within Home, Primary none   Living Environment Comments stays in basement of the home   Self-Care   Usual Activity Tolerance good   Fall history within last six months no   Activity/Exercise/Self-Care Comment owns a cane and walker, was not using prior to surgery, has been limited in how much she can do in a day, wearing copper fit sleeves on the knee, walking less lately but still doing what she needs to such as laundry and taking care of animals   General Information   Onset of Illness/Injury or Date of Surgery 10/28/24   Referring Physician Giovanny Chong MD   Patient/Family Therapy Goals Statement (PT) has OP PT scheduled for Fri this week   Pertinent History of Current Problem (include personal factors and/or comorbidities that impact the POC) R knee OA, s/p  R TKA, had L TKA last Oct, today is not able to DF or move her R foot or do R SLR   Existing Precautions/Restrictions no known precautions/restrictions   Weight-Bearing Status - RLE weight-bearing as tolerated   General Observations reports didn't sleep due to pain so very tired today   Cognition   Affect/Mental Status (Cognition) WNL   Orientation Status (Cognition) oriented x 4   Pain Assessment   Patient Currently in Pain Yes, see Vital Sign flowsheet   Integumentary/Edema   Integumentary/Edema Comments R medial leg near bottom on incision pocket of edema and bruising   Range of Motion (ROM)   ROM Comment AROM R knee tolerating 20 degrees knee flexion and not getting full knee ext   Strength (Manual Muscle Testing)   Strength (Manual Muscle Testing) Able to perform L SLR;Deficits observed during functional mobility   Bed Mobility   Comment, (Bed Mobility) sit to supine mod A support R LE, has leg  at home   Transfers   Comment, (Transfers) CGA sit<>stand with FWW   Gait/Stairs (Locomotion)   Arroyo Level (Gait) verbal cues;contact guard   Assistive Device (Gait) walker, front-wheeled   Distance in Feet (Gait) 10'   Pattern (Gait) step-to   Deviations/Abnormal Patterns (Gait) weight shifting decreased;stride length decreased   Maintains Weight-bearing Status (Gait) able to maintain   Comment, (Gait/Stairs) increased UE WB on walker, slides R foot due to decreased DF at this time   Sensory Examination   Sensory Perception Comments denies any numbness   Clinical Impression   Criteria for Skilled Therapeutic Intervention Yes, treatment indicated   PT Diagnosis (PT) impaired functional mobility   Influenced by the following impairments pain, decreased R knee ROM, decreased R LE strength   Functional limitations due to impairments impaired transfers and gait   Clinical Presentation (PT Evaluation Complexity) stable   Clinical Presentation Rationale clinical judgement   Clinical Decision Making (Complexity)  low complexity   Planned Therapy Interventions (PT) bed mobility training;gait training;home exercise program;patient/family education;ROM (range of motion);strengthening;transfer training;progressive activity/exercise;risk factor education;home program guidelines   Risk & Benefits of therapy have been explained evaluation/treatment results reviewed;care plan/treatment goals reviewed;risks/benefits reviewed;participants voiced agreement with care plan;current/potential barriers reviewed;patient   PT Total Evaluation Time   PT Eval, Low Complexity Minutes (21192) 10   Therapy Certification   Start of care date 10/29/24   Certification date from 10/29/24   Certification date to 10/30/24   Medical Diagnosis R TKA   Physical Therapy Goals   PT Frequency Daily  (if does not discharge today)   PT Predicted Duration/Target Date for Goal Attainment 10/30/24   PT Goals Transfers;Gait;Bed Mobility   PT: Bed Mobility Minimal assist;Supine to/from sit   PT: Transfers Supervision/stand-by assist;Sit to/from stand;Bed to/from chair;Assistive device   PT: Gait Supervision/stand-by assist;Rolling walker;50 feet   Interventions   Interventions Quick Adds Gait Training;Therapeutic Activity   Therapeutic Activity   Therapeutic Activities: dynamic activities to improve functional performance Minutes (76967) 15   Treatment Detail/Skilled Intervention went through HEP handout and gave copy for home with instruction to start AP's and knee flexion stretches today and the other exercises to start in 1-2 days as pain is tolerable. Edu about stiffness pain and today to try to change positions and walk a few mins in the house frequently. Edu about having legs up vs down. Edu about icing at home 20 mins at least 3x a day, edu about car transfer. Cues for R LE out with stand to sit and for hand placement. Edu on keeping trying to wiggle toes and do ankle dorsiflexion as foot wakes up, edu that leg  may be too painful as she is not  tolerating extension at this time, may need spouse to help lift leg with bed mobility   Gait Training   Gait Training Minutes (23010) 8   Treatment Detail/Skilled Intervention lowered walker, edu on WBAT, try for less UE WB as LEs can tolerate more WB, cues for increased R SLS time and for increased L step length smoothing out gait as pt limping, edu on walking program at home initially and walker use   Distance in Feet 40   Edwards Level (Gait Training) contact guard   Physical Assistance Level (Gait Training) verbal cues;supervision   Weight Bearing (Gait Training) weight-bearing as tolerated   Assistive Device (Gait Training) rolling walker   Pattern Analysis (Gait Training) swing-to gait   Gait Analysis Deviations increased time in double stance;decreased step length;decreased weight-shifting ability   Impairments (Gait Analysis/Training) pain;motor control impaired   PT Discharge Planning   PT Plan daily if here- gait, ankle DF, knee ROM   PT Discharge Recommendation (DC Rec) home with assist;home with outpatient physical therapy   PT Rationale for DC Rec able to mobilize to return home with , no steps needed to do initially at home, has OP PT scheduled, has her own walker   PT Brief overview of current status 50 ft SBA with FWW, not able to dorsiflex R foot this AM or raise R LE, increased pain   PT Equipment Needed at Discharge   (has FWW)   Physical Therapy Time and Intention   Timed Code Treatment Minutes 23   Total Session Time (sum of timed and untimed services) 33

## 2024-10-31 ENCOUNTER — TELEPHONE (OUTPATIENT)
Dept: SURGERY | Facility: CLINIC | Age: 59
End: 2024-10-31
Payer: COMMERCIAL

## 2024-10-31 DIAGNOSIS — M17.11 PRIMARY OSTEOARTHRITIS OF RIGHT KNEE: ICD-10-CM

## 2024-10-31 RX ORDER — OXYCODONE HYDROCHLORIDE 5 MG/1
5-10 TABLET ORAL EVERY 6 HOURS PRN
Qty: 16 TABLET | Refills: 0 | Status: SHIPPED | OUTPATIENT
Start: 2024-10-31 | End: 2024-11-06

## 2024-10-31 NOTE — TELEPHONE ENCOUNTER
Spoke to naz. New prescription oxycodone escribed today.    Giovanny Chong M.D., M.S.  Dept. of Orthopaedic Surgery  [facility]

## 2024-10-31 NOTE — TELEPHONE ENCOUNTER
Medication Question or Refill    Contacts       Contact Date/Time Type Contact Phone/Fax    10/31/2024 09:53 AM CDT Phone (Incoming) Carolina Hernandez (Self) [phone] (M)            What medication are you calling about (include dose and sig)?: OxyCODONE (ROXICODONE) 5 MG tablet    Preferred Pharmacy:  [id number] - [place], [place] - [address]  [address] [address]  Phone: [phone] Fax: [phone]      Controlled Substance Agreement on file:   CSA -- Patient Level:    CSA: None found at the patient level.       Who prescribed the medication?: Giovanny Chong MD    Do you need a refill? Yes    When did you use the medication last? 10/31/24 at 9:30am    Patient offered an appointment? No    Do you have any questions or concerns?  No      Could we send this information to you in [facility] or would you prefer to receive a phone call?:   Patient would prefer a phone call   Okay to leave a detailed message?: Yes at Cell number on file:    Telephone Information:   Mobile [phone]

## 2024-10-31 NOTE — TELEPHONE ENCOUNTER
Called and spoke with patient. Patient is requesting a refill of oxycodone.   Patient rates pain at an 8/10.   Patient has been icing and elevating.  Yesterday only took 1 oxycodone and 2 tylenol during the day and pain was manageable.  Last night at midnight took 2 oxycodone and 2 Tylenol and slept on recliner for 8 hours but when she woke up this morning her pain was an 8/10.  Took last 2 oxycodone this morning at 9:00 am and is now out of medication.  Patient will start PT tomorrow.     Right total knee arthoplasty on 10/28.    Forwarding to provider to send refill if appropriate. Order pended.    TYLOR ChinoN, RN, PHN

## 2024-11-01 ENCOUNTER — THERAPY VISIT (OUTPATIENT)
Dept: PHYSICAL THERAPY | Facility: CLINIC | Age: 59
End: 2024-11-01
Attending: ORTHOPAEDIC SURGERY
Payer: COMMERCIAL

## 2024-11-01 DIAGNOSIS — M17.11 PRIMARY OSTEOARTHRITIS OF RIGHT KNEE: ICD-10-CM

## 2024-11-01 PROCEDURE — 97110 THERAPEUTIC EXERCISES: CPT | Mod: GP | Performed by: PHYSICAL THERAPIST

## 2024-11-01 PROCEDURE — 97161 PT EVAL LOW COMPLEX 20 MIN: CPT | Mod: GP | Performed by: PHYSICAL THERAPIST

## 2024-11-01 ASSESSMENT — ACTIVITIES OF DAILY LIVING (ADL)
HOW_WOULD_YOU_RATE_THE_OVERALL_FUNCTION_OF_YOUR_KNEE_DURING_YOUR_USUAL_DAILY_ACTIVITIES?: SEVERELY ABNORMAL
RISE FROM A CHAIR: ACTIVITY IS VERY DIFFICULT
PAIN: THE SYMPTOM PREVENTS ME FROM ALL DAILY ACTIVITIES
RAW_SCORE: 4
STIFFNESS: THE SYMPTOM PREVENTS ME FROM ALL DAILY ACTIVITIES
GIVING WAY, BUCKLING OR SHIFTING OF KNEE: THE SYMPTOM PREVENTS ME FROM ALL DAILY ACTIVITIES
AS_A_RESULT_OF_YOUR_KNEE_INJURY,_HOW_WOULD_YOU_RATE_YOUR_CURRENT_LEVEL_OF_DAILY_ACTIVITY?: SEVERELY ABNORMAL
HOW_WOULD_YOU_RATE_THE_OVERALL_FUNCTION_OF_YOUR_KNEE_DURING_YOUR_USUAL_DAILY_ACTIVITIES?: SEVERELY ABNORMAL
GO UP STAIRS: I AM UNABLE TO DO THE ACTIVITY
GO DOWN STAIRS: I AM UNABLE TO DO THE ACTIVITY
GIVING WAY, BUCKLING OR SHIFTING OF KNEE: THE SYMPTOM PREVENTS ME FROM ALL DAILY ACTIVITIES
AS_A_RESULT_OF_YOUR_KNEE_INJURY,_HOW_WOULD_YOU_RATE_YOUR_CURRENT_LEVEL_OF_DAILY_ACTIVITY?: SEVERELY ABNORMAL
WALK: ACTIVITY IS VERY DIFFICULT
SQUAT: I AM UNABLE TO DO THE ACTIVITY
STAND: ACTIVITY IS FAIRLY DIFFICULT
STAND: ACTIVITY IS FAIRLY DIFFICULT
WEAKNESS: THE SYMPTOM PREVENTS ME FROM ALL DAILY ACTIVITIES
LIMPING: THE SYMPTOM PREVENTS ME FROM ALL DAILY ACTIVITIES
GO DOWN STAIRS: I AM UNABLE TO DO THE ACTIVITY
SWELLING: THE SYMPTOM PREVENTS ME FROM ALL DAILY ACTIVITIES
KNEE_ACTIVITY_OF_DAILY_LIVING_SCORE: 5.71
KNEEL ON THE FRONT OF YOUR KNEE: I AM UNABLE TO DO THE ACTIVITY
WALK: ACTIVITY IS VERY DIFFICULT
LIMPING: THE SYMPTOM PREVENTS ME FROM ALL DAILY ACTIVITIES
RISE FROM A CHAIR: ACTIVITY IS VERY DIFFICULT
SIT WITH YOUR KNEE BENT: I AM UNABLE TO DO THE ACTIVITY
PLEASE_INDICATE_YOR_PRIMARY_REASON_FOR_REFERRAL_TO_THERAPY:: KNEE
GO UP STAIRS: I AM UNABLE TO DO THE ACTIVITY
KNEEL ON THE FRONT OF YOUR KNEE: I AM UNABLE TO DO THE ACTIVITY
WEAKNESS: THE SYMPTOM PREVENTS ME FROM ALL DAILY ACTIVITIES
PAIN: THE SYMPTOM PREVENTS ME FROM ALL DAILY ACTIVITIES
STIFFNESS: THE SYMPTOM PREVENTS ME FROM ALL DAILY ACTIVITIES
SIT WITH YOUR KNEE BENT: I AM UNABLE TO DO THE ACTIVITY
SQUAT: I AM UNABLE TO DO THE ACTIVITY
KNEE_ACTIVITY_OF_DAILY_LIVING_SUM: 4
SWELLING: THE SYMPTOM PREVENTS ME FROM ALL DAILY ACTIVITIES

## 2024-11-01 NOTE — PROGRESS NOTES
PHYSICAL THERAPY EVALUATION  Type of Visit: Evaluation              Subjective   R TKA 10/28/24, painful. Using RW, has been painful, trying to take less during the day.       Presenting condition or subjective complaint: (Patient-Rptd) had total knee replacement on monday  Date of onset: 10/28/24    Relevant medical history: (Patient-Rptd) Arthritis; Asthma; COPD; Depression   Dates & types of surgery: (Patient-Rptd) oct 31 2023    Prior diagnostic imaging/testing results: (Patient-Rptd) X-ray     Prior therapy history for the same diagnosis, illness or injury: (Patient-Rptd) Yes      Prior Level of Function  Transfers:   Ambulation:   ADL:   IADL:     Living Environment  Social support: (Patient-Rptd) With a significant other or spouse   Type of home: (Patient-Rptd) House; Basement   Stairs to enter the home:         Ramp: (Patient-Rptd) No   Stairs inside the home: (Patient-Rptd) No       Help at home:    Equipment owned: (Patient-Rptd) Straight Cane; Walker; Grab bars; Lift chair     Employment: (Patient-Rptd) No    Hobbies/Interests: (Patient-Rptd) gardening cooking baking yard work cutting wood painting arts and crafts being a gramma to15    Patient goals for therapy: (Patient-Rptd) move without pain    Pain assessment:      Objective   KNEE EVALUATION  PAIN: 8/10  INTEGUMENTARY (edema, incisions): swelling mid patella R 44cm, L 36cm, signif swelling R thigh, knee, calf, light bruising prox medial lower leg, redness medial knee area - not concerning looking today, instructed to watch swelling, pain, redness, any fever  POSTURE:   GAIT: WBAT, antalgic, using RW,lot of pressure on hands    ROM: lacking 5* extension, 35* flex sitting in chair    STRENGTH: unable to lift leg onto mat or do SLR  FLEXIBILITY:       Assessment & Plan   CLINICAL IMPRESSIONS  Medical Diagnosis: TKA R    Treatment Diagnosis: decreased ROM and strength, dependent gait post TKA   Impression/Assessment: Patient is a 59 year old female with  knee complaints.  The following significant findings have been identified: Pain, Decreased ROM/flexibility, Decreased strength, Inflammation, Edema, and Impaired gait. These impairments interfere with their ability to perform self care tasks, recreational activities, household chores, driving , household mobility, and community mobility as compared to previous level of function.     Clinical Decision Making (Complexity):  Clinical Presentation: Evolving/Changing  Clinical Presentation Rationale: based on medical and personal factors listed in PT evaluation  Clinical Decision Making (Complexity): Low complexity    PLAN OF CARE  Treatment Interventions:  Interventions: Gait Training, Manual Therapy, Neuromuscular Re-education, Therapeutic Activity, Therapeutic Exercise, Self-Care/Home Management    Long Term Goals     PT Goal 1  Goal Identifier: 1  Goal Description: pt will be able to ambulate w/o device household distances in 3wks  Target Date: 11/22/24  PT Goal 2  Goal Identifier: 2  Goal Description: pt will be able to do reciprocol stairs in 8wks  Target Date: 12/27/24  PT Goal 3  Goal Identifier: 3  Goal Description: pt will be able to walk community distances in 10wks  Target Date: 01/10/25      Frequency of Treatment: 2x/wk  Duration of Treatment: 4wks, then 1x/wk x6wks    Recommended Referrals to Other Professionals:   Education Assessment:   Learner/Method: Patient;Listening;Demonstration;No Barriers to Learning    Risks and benefits of evaluation/treatment have been explained.   Patient/Family/caregiver agrees with Plan of Care.     Evaluation Time:             Signing Clinician: Kris Hoenk, PT        [facility]                                                                                   OUTPATIENT PHYSICAL THERAPY      PLAN OF TREATMENT FOR OUTPATIENT REHABILITATION   Patient's Last Name, First Name, Carolina Putnam YOB: 1965   Provider's Name   LEAH  [facility]   Medical Record No.  [id number]     Onset Date: 10/28/24  Start of Care Date: 11/01/24     Medical Diagnosis:  TKA R      PT Treatment Diagnosis:  decreased ROM and strength, dependent gait post TKA Plan of Treatment  Frequency/Duration: 2x/wk/ 4wks, then 1x/wk x6wks    Certification date from 11/01/24 to 01/10/25         See note for plan of treatment details and functional goals     Kris Hoenk, PT                         I CERTIFY THE NEED FOR THESE SERVICES FURNISHED UNDER        THIS PLAN OF TREATMENT AND WHILE UNDER MY CARE     (Physician attestation of this document indicates review and certification of the therapy plan).              Referring Provider:  Giovanny Chong    Initial Assessment  See Epic Evaluation- Start of Care Date: 11/01/24

## 2024-11-04 ENCOUNTER — THERAPY VISIT (OUTPATIENT)
Dept: PHYSICAL THERAPY | Facility: CLINIC | Age: 59
End: 2024-11-04
Attending: ORTHOPAEDIC SURGERY
Payer: COMMERCIAL

## 2024-11-04 ENCOUNTER — TELEPHONE (OUTPATIENT)
Dept: OTHER | Facility: CLINIC | Age: 59
End: 2024-11-04

## 2024-11-04 DIAGNOSIS — M17.11 PRIMARY OSTEOARTHRITIS OF RIGHT KNEE: Primary | ICD-10-CM

## 2024-11-04 PROCEDURE — 97110 THERAPEUTIC EXERCISES: CPT | Mod: GP

## 2024-11-05 NOTE — TELEPHONE ENCOUNTER
Brief Telephone Encounter Note    Received a phone call from Carolina Hernandez. Patient is a [email or web address] Last had a right total knee arthroplasty on 10/28/2024. Now with pain and redness and chills.    Discussed that Dr. Chong does not work the residents and we are not part of his care team. Therefore, I am unable to provide medical advice.    In general I discussed returning to that ED as the safest option if she is concerned versus calling back to see if they can route her to someone on Dr. Chong's care team or calling his office.    Carolina in agreement. All questions answered. Discussed with senior resident on call.    --  Julio Mckeon MD, PhD  Orthopaedic Surgery Resident  [facility]  11/04/24

## 2024-11-06 ENCOUNTER — MYC MEDICAL ADVICE (OUTPATIENT)
Dept: ORTHOPEDICS | Facility: CLINIC | Age: 59
End: 2024-11-06

## 2024-11-06 ENCOUNTER — THERAPY VISIT (OUTPATIENT)
Dept: PHYSICAL THERAPY | Facility: CLINIC | Age: 59
End: 2024-11-06
Attending: ORTHOPAEDIC SURGERY
Payer: COMMERCIAL

## 2024-11-06 DIAGNOSIS — M17.11 PRIMARY OSTEOARTHRITIS OF RIGHT KNEE: Primary | ICD-10-CM

## 2024-11-06 DIAGNOSIS — M17.11 PRIMARY OSTEOARTHRITIS OF RIGHT KNEE: ICD-10-CM

## 2024-11-06 DIAGNOSIS — Z96.651 S/P TOTAL KNEE REPLACEMENT, RIGHT: Primary | ICD-10-CM

## 2024-11-06 PROCEDURE — 97110 THERAPEUTIC EXERCISES: CPT | Mod: GP

## 2024-11-06 RX ORDER — METHOCARBAMOL 500 MG/1
1000 TABLET, FILM COATED ORAL 3 TIMES DAILY PRN
Qty: 60 TABLET | Refills: 1 | Status: SHIPPED | OUTPATIENT
Start: 2024-11-06 | End: 2024-11-14

## 2024-11-06 RX ORDER — OXYCODONE HYDROCHLORIDE 5 MG/1
5-10 TABLET ORAL EVERY 6 HOURS PRN
Qty: 16 TABLET | Refills: 0 | Status: SHIPPED | OUTPATIENT
Start: 2024-11-06 | End: 2024-11-14

## 2024-11-06 RX ORDER — HYDROXYZINE HYDROCHLORIDE 25 MG/1
25 TABLET, FILM COATED ORAL EVERY 6 HOURS PRN
Qty: 30 TABLET | Refills: 0 | Status: SHIPPED | OUTPATIENT
Start: 2024-11-06 | End: 2024-11-14

## 2024-11-07 ENCOUNTER — MYC MEDICAL ADVICE (OUTPATIENT)
Dept: FAMILY MEDICINE | Facility: CLINIC | Age: 59
End: 2024-11-07
Payer: COMMERCIAL

## 2024-11-11 ENCOUNTER — ANCILLARY PROCEDURE (OUTPATIENT)
Dept: GENERAL RADIOLOGY | Facility: CLINIC | Age: 59
End: 2024-11-11
Attending: ORTHOPAEDIC SURGERY
Payer: COMMERCIAL

## 2024-11-11 ENCOUNTER — OFFICE VISIT (OUTPATIENT)
Dept: ORTHOPEDICS | Facility: CLINIC | Age: 59
End: 2024-11-11
Payer: COMMERCIAL

## 2024-11-11 VITALS — HEART RATE: 87 BPM | SYSTOLIC BLOOD PRESSURE: 117 MMHG | DIASTOLIC BLOOD PRESSURE: 78 MMHG

## 2024-11-11 DIAGNOSIS — Z96.651 S/P TOTAL KNEE REPLACEMENT, RIGHT: ICD-10-CM

## 2024-11-11 DIAGNOSIS — Z96.651 S/P TOTAL KNEE REPLACEMENT, RIGHT: Primary | ICD-10-CM

## 2024-11-11 PROCEDURE — 99024 POSTOP FOLLOW-UP VISIT: CPT | Performed by: ORTHOPAEDIC SURGERY

## 2024-11-11 PROCEDURE — 73562 X-RAY EXAM OF KNEE 3: CPT | Mod: TC | Performed by: RADIOLOGY

## 2024-11-11 ASSESSMENT — KOOS JR
HOW SEVERE IS YOUR KNEE STIFFNESS AFTER FIRST WAKING IN MORNING: EXTREME
KOOS JR SCORING: 42.28
RISING FROM SITTING: MODERATE
GOING UP OR DOWN STAIRS: EXTREME
STRAIGHTENING KNEE FULLY: MODERATE
TWISING OR PIVOTING ON KNEE: MODERATE
STANDING UPRIGHT: MODERATE
BENDING TO THE FLOOR TO PICK UP OBJECT: MODERATE

## 2024-11-11 NOTE — LETTER
11/11/2024      Carolina Hernandez  [address] [place] [address]      Dear Colleague,    Thank you for referring your patient, Carolina Hernandez, to the [facility]. Please see a copy of my visit note below.    Chief Complaint   Patient presents with     Surgical Followup     2wk s/p RTKA 10/28/24         SURGERY: Total knee arthroplasty, right knee ([facility])  DATE OF SURGERY: 10/28/2024      HISTORY OF PRESENT ILLNESS: Carolina Hernandez is a 59 year old female seen for postoperative evaluation of right total knee arthroplasty. It has been 2 weeks since surgery. Returns today stating doing better than last week. Denies any wound problems. Denies numbness, tingling, or weakness in the affected extremity. Denies fevers chills night sweat, resolved. Continues to take aspirin for anti-coagulation for deep vein thrombosis prophylaxis. Use of pain medications has been tylenol, methocarbamol, oxydodone Has been doing Physical Therapy. Concerns today include: none.    Present symptoms: moderate pain, mild swelling.    Pain severity: 6/10      Past medical history:   Past Medical History:   Diagnosis Date     Asthma      C. difficile colitis      COPD (chronic obstructive pulmonary disease) (H)      Depression      Depressive disorder Have had it most of my life     Hypertension      Moderate persistent asthma with exacerbation 09/18/2007     Osteoarthritis      Sinusitis, chronic      Status post coronary angiogram 10/14/2022     Patient Active Problem List    Diagnosis Date Noted     Primary osteoarthritis of right knee 10/28/2024     Priority: Medium     Severe persistent asthma without complication (H) 02/07/2024     Priority: Medium     Osteoarthritis of left knee 11/01/2023     Priority: Medium     S/P total knee arthroplasty, left 10/31/2023     Priority: Medium     Fatty liver 11/01/2022     Priority: Medium     ultrasound 10/29/22       COPD vs Asthma  10/30/2022     Priority: Medium     PFTS 5/2021 - FEV1- 1.21 (48%), ratio 0.50, no change with bronchodilators,  %, %, DLCO 94%        Transaminitis 10/29/2022     Priority: Medium     Acute pancreatitis 10/29/2022     Priority: Medium     Nonobstructive atherosclerosis of coronary artery 10/21/2022     Priority: Medium      H/o exertional chest pain leading to coronary angiogram 10/14/2022 which showed non-obstructing CAD       Hyperlipidemia LDL goal <70 10/21/2022     Priority: Medium     Low back pain due to bilateral sciatica 10/27/2021     Priority: Medium     Primary osteoarthritis of both knees 09/23/2021     Priority: Medium     Benign essential hypertension 07/26/2019     Priority: Medium     New diagnosis 7/26/2019         Recurrent major depressive disorder, in remission (H) 01/17/2019     Priority: Medium     GERD (gastroesophageal reflux disease) 07/31/2012     Priority: Medium     Seasonal allergies 07/31/2012     Priority: Medium       Past Surgical History:   Past Surgical History:   Procedure Laterality Date     ARTHROPLASTY KNEE Left 10/31/2023    Procedure: ARTHROPLASTY, KNEE, TOTAL left;  Surgeon: Giovanny Chong MD;  Location: [facility] OR     ARTHROPLASTY KNEE Right 10/28/2024    Procedure: ARTHROPLASTY, KNEE, TOTAL;  Surgeon: Giovanny Chong MD;  Location: [facility] OR     COLONOSCOPY  2017     CV CORONARY ANGIOGRAM N/A 10/14/2022    Procedure: Coronary Angiogram;  Surgeon: Fidel Martin MD;  Location: [facility] CARDIAC CATH LAB     ESOPHAGOSCOPY, GASTROSCOPY, DUODENOSCOPY (EGD), COMBINED N/A 05/05/2022    Procedure: ESOPHAGOGASTRODUODENOSCOPY, WITH BIOPSY;  Surgeon: Timothy Oliva DO;  Location: [facility] OR     LAPAROSCOPIC CHOLECYSTECTOMY WITH CHOLANGIOGRAMS N/A 11/21/2022    Procedure: CHOLECYSTECTOMY, LAPAROSCOPIC, WITH CHOLANGIOGRAM;  Surgeon: Eros Butt DO;  Location: [facility] OR       Medications:   Current Outpatient Medications:      acetaminophen (TYLENOL) 325 MG  tablet, Take 2 tablets (650 mg) by mouth every 4 hours as needed for other (mild pain)., Disp: 100 tablet, Rfl: 0     albuterol (PROVENTIL) (2.5 MG/3ML) 0.083% neb solution, inhale 1 vial (2.5 mg) by nebulization every 4 hours as needed for shortness of breath / dyspnea or wheezing, Disp: 270 mL, Rfl: 11     albuterol (VENTOLIN HFA) 108 (90 Base) MCG/ACT inhaler, INHALE 1-2 PUFFS INTO THE LUNGS EVERY 4 HOURS AS NEEDED FOR SHORTNESS OF BREATH/DYSPNEA OR WHEEZING ., Disp: 18 g, Rfl: 4     amLODIPine (NORVASC) 2.5 MG tablet, Take 2 tablets (5 mg) by mouth once daily., Disp: 180 tablet, Rfl: 3     aspirin (ASA) 325 MG EC tablet, Take 1 tablet (325 mg) by mouth daily., Disp: 30 tablet, Rfl: 0     aspirin 81 MG EC tablet, Take 81 mg by mouth daily., Disp: , Rfl:      cetirizine (ZYRTEC) 10 MG tablet, Take 10 mg by mouth daily, Disp: , Rfl:      fluticasone-salmeterol (ADVAIR) 500-50 MCG/ACT inhaler, Inhale 1 puff into the lungs every 12 hours., Disp: 1 each, Rfl: 11     hydrOXYzine HCl (ATARAX) 25 MG tablet, Take 1 tablet (25 mg) by mouth every 6 hours as needed for itching or anxiety (with pain, moderate pain)., Disp: 30 tablet, Rfl: 0     methocarbamol (ROBAXIN) 500 MG tablet, Take 2 tablets (1,000 mg) by mouth 3 times daily as needed for muscle spasms or other (pain)., Disp: 60 tablet, Rfl: 1     montelukast (SINGULAIR) 10 MG tablet, Take 1 tablet (10 mg) by mouth at bedtime. (Patient taking differently: Take 10 mg by mouth every morning.), Disp: 90 tablet, Rfl: 3     multivitamin (CENTRUM SILVER) tablet, Take 1 tablet by mouth daily, Disp: , Rfl:      Omega-3 Fatty Acids (FISH OIL ULTRA) 1400 MG CAPS, Take 1 capsule by mouth daily, Disp: , Rfl:      omeprazole 20 MG tablet, Take 2 tablets (40 mg) by mouth daily, Disp: 180 tablet, Rfl: 2     oxyCODONE (ROXICODONE) 5 MG tablet, Take 1-2 tablets (5-10 mg) by mouth every 6 hours as needed for moderate to severe pain., Disp: 16 tablet, Rfl: 0     senna-docusate  (SENOKOT-S/PERICOLACE) 8.6-50 MG tablet, Take 1-2 tablets by mouth 2 times daily. Take while on oral narcotics to prevent or treat constipation., Disp: 30 tablet, Rfl: 0     sertraline (ZOLOFT) 25 MG tablet, Take 2 tablets (50 mg) by mouth daily for 90 days, Disp: 180 tablet, Rfl: 2     tiotropium (SPIRIVA) 18 MCG inhaled capsule, Inhale 1 capsule (18 mcg) into the lungs daily., Disp: 30 capsule, Rfl: 11    Allergies:   Allergies   Allergen Reactions     Doxycycline Difficulty breathing     Penicillins Itching     Sucralfate Rash         REVIEW OF SYSTEMS:  CONSTITUTIONAL:NEGATIVE for fever, chills, night sweats, change in weight  INTEGUMENTARY/SKIN: NEGATIVE for worrisome rashes, moles or lesions  MUSCULOSKELETAL:See HPI above  NEURO: NEGATIVE for weakness, dizziness or paresthesias  CARDIOVASCULAR: negative for chest pain, shortness of breath    PHYSICAL EXAM:  /78 (BP Location: Left arm)   Pulse 87    GENERAL APPEARANCE: healthy, alert, no distress  SKIN: no suspicious lesions or rashes  NEURO: Normal strength and tone, mentation intact and speech normal  PSYCH:  mentation appears normal and affect normal  RESPIRATORY: No increased work of breathing.    BILATERAL LOWER EXTREMITIES:  Gait: quadriceps avoidance right.    Intact sensation deep peroneal nerve, superficial peroneal nerve, med/lat tibial nerve, sural nerve, saphenous nerve  Intact EHL, EDL, TA, FHL, GS, quadriceps hamstrings and hip flexors  Toes warm and well perfused, brisk capillary refill. Palpable 2+ dp pulses.  Bilateral calf soft and nttp or squeeze.  Edema: trace    right  KNEE EXAM:    Incision: healing well. Dried skin glue in place.   Skin: intact, no erythema, resolving diffuse ecchymosis  ROM: near full extension to 50 flexion  Effusion: moderate   Tender: diffuse      X-RAY:  3 views right knee from 11/11/2024 were reviewed in clinic today. No obvious fractures or dislocations. Total knee arthroplasty components in place without  evidence of failure or loosening.      Impression: Carolina Hernandez is a 59 year old female status post Total knee arthroplasty, right knee, doing well, 2 weeks out from surgery.      Plan:   * reviewed xrays with patient, showing total knee arthroplasty implants.  * continue DVT prophylaxis for a total of 4 weeks postoperative  * continue with knee range of motion exercises, focusing on extension  * wean off all pain medications as tolerated  * xrays next visit: 2 views of the knee  * return to clinic 4 weeks  * Physical Therapy, home exercise program.  * all questions addressed and answered prior to discharge from clinic today to patient's satisfaction.     * patient will need longterm (at least 1 years depending on risk factors) prophylactic antibiotics use with dental procedures or other invasive procedures (2 g amoxicilin or  2g Keflex or 500mg azithromycin or clarithromycin or 100mg doxycycline) 30-60 minutes prior to dental procedures.    * KOOS Jr/Promis Knee score: NOT to be done at next visit; NOT completed during todays visit; to be completed at 12 weeks and 12 months postoperative.      Givoanny Chong M.D., M.S.  Dept. of Orthopaedic Surgery  [facility]             Again, thank you for allowing me to participate in the care of your patient.        Sincerely,        Giovanny Chong MD   Mirvaso Counseling: Mirvaso is a topical medication which can decrease superficial blood flow where applied. Side effects are uncommon and include stinging, redness and allergic reactions.

## 2024-11-11 NOTE — PROGRESS NOTES
Chief Complaint   Patient presents with    Surgical Followup     2wk s/p RTKA 10/28/24         SURGERY: Total knee arthroplasty, right knee ([facility])  DATE OF SURGERY: 10/28/2024      HISTORY OF PRESENT ILLNESS: Carolina Hernandez is a 59 year old female seen for postoperative evaluation of right total knee arthroplasty. It has been 2 weeks since surgery. Returns today stating doing better than last week. Denies any wound problems. Denies numbness, tingling, or weakness in the affected extremity. Denies fevers chills night sweat, resolved. Continues to take aspirin for anti-coagulation for deep vein thrombosis prophylaxis. Use of pain medications has been tylenol, methocarbamol, oxydodone Has been doing Physical Therapy. Concerns today include: none.    Present symptoms: moderate pain, mild swelling.    Pain severity: 6/10      Past medical history:   Past Medical History:   Diagnosis Date    Asthma     C. difficile colitis     COPD (chronic obstructive pulmonary disease) (H)     Depression     Depressive disorder Have had it most of my life    Hypertension     Moderate persistent asthma with exacerbation 09/18/2007    Osteoarthritis     Sinusitis, chronic     Status post coronary angiogram 10/14/2022     Patient Active Problem List    Diagnosis Date Noted    Primary osteoarthritis of right knee 10/28/2024     Priority: Medium    Severe persistent asthma without complication (H) 02/07/2024     Priority: Medium    Osteoarthritis of left knee 11/01/2023     Priority: Medium    S/P total knee arthroplasty, left 10/31/2023     Priority: Medium    Fatty liver 11/01/2022     Priority: Medium     ultrasound 10/29/22      COPD vs Asthma 10/30/2022     Priority: Medium     PFTS 5/2021 - FEV1- 1.21 (48%), ratio 0.50, no change with bronchodilators,  %, %, DLCO 94%       Transaminitis 10/29/2022     Priority: Medium    Acute pancreatitis 10/29/2022     Priority: Medium    Nonobstructive  atherosclerosis of coronary artery 10/21/2022     Priority: Medium      H/o exertional chest pain leading to coronary angiogram 10/14/2022 which showed non-obstructing CAD      Hyperlipidemia LDL goal <70 10/21/2022     Priority: Medium    Low back pain due to bilateral sciatica 10/27/2021     Priority: Medium    Primary osteoarthritis of both knees 09/23/2021     Priority: Medium    Benign essential hypertension 07/26/2019     Priority: Medium     New diagnosis 7/26/2019        Recurrent major depressive disorder, in remission (H) 01/17/2019     Priority: Medium    GERD (gastroesophageal reflux disease) 07/31/2012     Priority: Medium    Seasonal allergies 07/31/2012     Priority: Medium       Past Surgical History:   Past Surgical History:   Procedure Laterality Date    ARTHROPLASTY KNEE Left 10/31/2023    Procedure: ARTHROPLASTY, KNEE, TOTAL left;  Surgeon: Giovanny Chong MD;  Location: [facility] OR    ARTHROPLASTY KNEE Right 10/28/2024    Procedure: ARTHROPLASTY, KNEE, TOTAL;  Surgeon: Giovanny Chong MD;  Location: [facility] OR    COLONOSCOPY  2017    CV CORONARY ANGIOGRAM N/A 10/14/2022    Procedure: Coronary Angiogram;  Surgeon: Fidel Martin MD;  Location: [facility] CARDIAC CATH LAB    ESOPHAGOSCOPY, GASTROSCOPY, DUODENOSCOPY (EGD), COMBINED N/A 05/05/2022    Procedure: ESOPHAGOGASTRODUODENOSCOPY, WITH BIOPSY;  Surgeon: Timothy Oliva DO;  Location: [facility] OR    LAPAROSCOPIC CHOLECYSTECTOMY WITH CHOLANGIOGRAMS N/A 11/21/2022    Procedure: CHOLECYSTECTOMY, LAPAROSCOPIC, WITH CHOLANGIOGRAM;  Surgeon: Eros Butt DO;  Location: [facility] OR       Medications:   Current Outpatient Medications:     acetaminophen (TYLENOL) 325 MG tablet, Take 2 tablets (650 mg) by mouth every 4 hours as needed for other (mild pain)., Disp: 100 tablet, Rfl: 0    albuterol (PROVENTIL) (2.5 MG/3ML) 0.083% neb solution, inhale 1 vial (2.5 mg) by nebulization every 4 hours as needed for shortness of breath / dyspnea or wheezing,  Disp: 270 mL, Rfl: 11    albuterol (VENTOLIN HFA) 108 (90 Base) MCG/ACT inhaler, INHALE 1-2 PUFFS INTO THE LUNGS EVERY 4 HOURS AS NEEDED FOR SHORTNESS OF BREATH/DYSPNEA OR WHEEZING ., Disp: 18 g, Rfl: 4    amLODIPine (NORVASC) 2.5 MG tablet, Take 2 tablets (5 mg) by mouth once daily., Disp: 180 tablet, Rfl: 3    aspirin (ASA) 325 MG EC tablet, Take 1 tablet (325 mg) by mouth daily., Disp: 30 tablet, Rfl: 0    aspirin 81 MG EC tablet, Take 81 mg by mouth daily., Disp: , Rfl:     cetirizine (ZYRTEC) 10 MG tablet, Take 10 mg by mouth daily, Disp: , Rfl:     fluticasone-salmeterol (ADVAIR) 500-50 MCG/ACT inhaler, Inhale 1 puff into the lungs every 12 hours., Disp: 1 each, Rfl: 11    hydrOXYzine HCl (ATARAX) 25 MG tablet, Take 1 tablet (25 mg) by mouth every 6 hours as needed for itching or anxiety (with pain, moderate pain)., Disp: 30 tablet, Rfl: 0    methocarbamol (ROBAXIN) 500 MG tablet, Take 2 tablets (1,000 mg) by mouth 3 times daily as needed for muscle spasms or other (pain)., Disp: 60 tablet, Rfl: 1    montelukast (SINGULAIR) 10 MG tablet, Take 1 tablet (10 mg) by mouth at bedtime. (Patient taking differently: Take 10 mg by mouth every morning.), Disp: 90 tablet, Rfl: 3    multivitamin (CENTRUM SILVER) tablet, Take 1 tablet by mouth daily, Disp: , Rfl:     Omega-3 Fatty Acids (FISH OIL ULTRA) 1400 MG CAPS, Take 1 capsule by mouth daily, Disp: , Rfl:     omeprazole 20 MG tablet, Take 2 tablets (40 mg) by mouth daily, Disp: 180 tablet, Rfl: 2    oxyCODONE (ROXICODONE) 5 MG tablet, Take 1-2 tablets (5-10 mg) by mouth every 6 hours as needed for moderate to severe pain., Disp: 16 tablet, Rfl: 0    senna-docusate (SENOKOT-S/PERICOLACE) 8.6-50 MG tablet, Take 1-2 tablets by mouth 2 times daily. Take while on oral narcotics to prevent or treat constipation., Disp: 30 tablet, Rfl: 0    sertraline (ZOLOFT) 25 MG tablet, Take 2 tablets (50 mg) by mouth daily for 90 days, Disp: 180 tablet, Rfl: 2    tiotropium (SPIRIVA) 18  MCG inhaled capsule, Inhale 1 capsule (18 mcg) into the lungs daily., Disp: 30 capsule, Rfl: 11    Allergies:   Allergies   Allergen Reactions    Doxycycline Difficulty breathing    Penicillins Itching    Sucralfate Rash         REVIEW OF SYSTEMS:  CONSTITUTIONAL:NEGATIVE for fever, chills, night sweats, change in weight  INTEGUMENTARY/SKIN: NEGATIVE for worrisome rashes, moles or lesions  MUSCULOSKELETAL:See HPI above  NEURO: NEGATIVE for weakness, dizziness or paresthesias  CARDIOVASCULAR: negative for chest pain, shortness of breath    PHYSICAL EXAM:  /78 (BP Location: Left arm)   Pulse 87    GENERAL APPEARANCE: healthy, alert, no distress  SKIN: no suspicious lesions or rashes  NEURO: Normal strength and tone, mentation intact and speech normal  PSYCH:  mentation appears normal and affect normal  RESPIRATORY: No increased work of breathing.    BILATERAL LOWER EXTREMITIES:  Gait: quadriceps avoidance right.    Intact sensation deep peroneal nerve, superficial peroneal nerve, med/lat tibial nerve, sural nerve, saphenous nerve  Intact EHL, EDL, TA, FHL, GS, quadriceps hamstrings and hip flexors  Toes warm and well perfused, brisk capillary refill. Palpable 2+ dp pulses.  Bilateral calf soft and nttp or squeeze.  Edema: trace    right  KNEE EXAM:    Incision: healing well. Dried skin glue in place.   Skin: intact, no erythema, resolving diffuse ecchymosis  ROM: near full extension to 50 flexion  Effusion: moderate   Tender: diffuse      X-RAY:  3 views right knee from 11/11/2024 were reviewed in clinic today. No obvious fractures or dislocations. Total knee arthroplasty components in place without evidence of failure or loosening.      Impression: Carolina Hernandez is a 59 year old female status post Total knee arthroplasty, right knee, doing well, 2 weeks out from surgery.      Plan:   * reviewed xrays with patient, showing total knee arthroplasty implants.  * continue DVT prophylaxis for a total of 4 weeks  postoperative  * continue with knee range of motion exercises, focusing on extension  * wean off all pain medications as tolerated  * xrays next visit: 2 views of the knee  * return to clinic 4 weeks  * Physical Therapy, home exercise program.  * all questions addressed and answered prior to discharge from clinic today to patient's satisfaction.     * patient will need longterm (at least 1 years depending on risk factors) prophylactic antibiotics use with dental procedures or other invasive procedures (2 g amoxicilin or  2g Keflex or 500mg azithromycin or clarithromycin or 100mg doxycycline) 30-60 minutes prior to dental procedures.    * KOOS Jr/Promis Knee score: NOT to be done at next visit; NOT completed during todays visit; to be completed at 12 weeks and 12 months postoperative.      Giovanny Chong M.D., M.S.  Dept. of Orthopaedic Surgery  [facility]

## 2024-11-12 ENCOUNTER — THERAPY VISIT (OUTPATIENT)
Dept: PHYSICAL THERAPY | Facility: CLINIC | Age: 59
End: 2024-11-12
Attending: ORTHOPAEDIC SURGERY
Payer: COMMERCIAL

## 2024-11-12 DIAGNOSIS — M17.11 PRIMARY OSTEOARTHRITIS OF RIGHT KNEE: Primary | ICD-10-CM

## 2024-11-12 PROCEDURE — 97110 THERAPEUTIC EXERCISES: CPT | Mod: GP | Performed by: PHYSICAL THERAPIST

## 2024-11-13 ENCOUNTER — MYC MEDICAL ADVICE (OUTPATIENT)
Dept: ORTHOPEDICS | Facility: CLINIC | Age: 59
End: 2024-11-13
Payer: COMMERCIAL

## 2024-11-13 DIAGNOSIS — Z96.651 S/P TOTAL KNEE REPLACEMENT, RIGHT: ICD-10-CM

## 2024-11-14 ENCOUNTER — THERAPY VISIT (OUTPATIENT)
Dept: PHYSICAL THERAPY | Facility: CLINIC | Age: 59
End: 2024-11-14
Attending: ORTHOPAEDIC SURGERY
Payer: COMMERCIAL

## 2024-11-14 DIAGNOSIS — M17.11 PRIMARY OSTEOARTHRITIS OF RIGHT KNEE: Primary | ICD-10-CM

## 2024-11-14 PROCEDURE — 97110 THERAPEUTIC EXERCISES: CPT | Mod: GP

## 2024-11-14 RX ORDER — METHOCARBAMOL 500 MG/1
1000 TABLET, FILM COATED ORAL 3 TIMES DAILY PRN
Qty: 60 TABLET | Refills: 1 | Status: SHIPPED | OUTPATIENT
Start: 2024-11-14

## 2024-11-14 RX ORDER — OXYCODONE HYDROCHLORIDE 5 MG/1
5-10 TABLET ORAL EVERY 6 HOURS PRN
Qty: 12 TABLET | Refills: 0 | Status: SHIPPED | OUTPATIENT
Start: 2024-11-14

## 2024-11-14 RX ORDER — HYDROXYZINE HYDROCHLORIDE 25 MG/1
25 TABLET, FILM COATED ORAL EVERY 6 HOURS PRN
Qty: 30 TABLET | Refills: 0 | Status: SHIPPED | OUTPATIENT
Start: 2024-11-14

## 2024-11-19 ENCOUNTER — THERAPY VISIT (OUTPATIENT)
Dept: PHYSICAL THERAPY | Facility: CLINIC | Age: 59
End: 2024-11-19
Attending: ORTHOPAEDIC SURGERY
Payer: COMMERCIAL

## 2024-11-19 DIAGNOSIS — M17.11 PRIMARY OSTEOARTHRITIS OF RIGHT KNEE: Primary | ICD-10-CM

## 2024-11-19 PROCEDURE — 97110 THERAPEUTIC EXERCISES: CPT | Mod: GP | Performed by: PHYSICAL THERAPIST

## 2024-11-20 ENCOUNTER — MYC MEDICAL ADVICE (OUTPATIENT)
Dept: ORTHOPEDICS | Facility: CLINIC | Age: 59
End: 2024-11-20
Payer: COMMERCIAL

## 2024-11-20 DIAGNOSIS — Z96.651 S/P TOTAL KNEE REPLACEMENT, RIGHT: ICD-10-CM

## 2024-11-20 RX ORDER — OXYCODONE HYDROCHLORIDE 5 MG/1
5-10 TABLET ORAL EVERY 6 HOURS PRN
Qty: 12 TABLET | Refills: 0 | Status: SHIPPED | OUTPATIENT
Start: 2024-11-20

## 2024-11-21 ENCOUNTER — THERAPY VISIT (OUTPATIENT)
Dept: PHYSICAL THERAPY | Facility: CLINIC | Age: 59
End: 2024-11-21
Attending: ORTHOPAEDIC SURGERY
Payer: COMMERCIAL

## 2024-11-21 DIAGNOSIS — M17.11 PRIMARY OSTEOARTHRITIS OF RIGHT KNEE: Primary | ICD-10-CM

## 2024-11-21 PROCEDURE — 97110 THERAPEUTIC EXERCISES: CPT | Mod: GP | Performed by: PHYSICAL THERAPIST

## 2024-11-27 ENCOUNTER — E-VISIT (OUTPATIENT)
Dept: URGENT CARE | Facility: CLINIC | Age: 59
End: 2024-11-27
Payer: COMMERCIAL

## 2024-11-27 ENCOUNTER — MYC MEDICAL ADVICE (OUTPATIENT)
Dept: FAMILY MEDICINE | Facility: CLINIC | Age: 59
End: 2024-11-27
Payer: COMMERCIAL

## 2024-11-27 DIAGNOSIS — J06.9 ACUTE UPPER RESPIRATORY INFECTION, UNSPECIFIED: Primary | ICD-10-CM

## 2024-11-27 PROCEDURE — 99207 PR NON-BILLABLE SERV PER CHARTING: CPT | Performed by: NURSE PRACTITIONER

## 2024-11-27 NOTE — PATIENT INSTRUCTIONS
Dear Carolina Hernandez,    We are sorry you are not feeling well. Based on the responses you provided, it is recommended that you be seen in-person in urgent care so we can better evaluate your symptoms. Please click here to find the nearest urgent care location to you.   You will not be charged for this Visit. Thank you for trusting us with your care.    Latrice Henderson, CNP

## 2024-12-03 ENCOUNTER — OFFICE VISIT (OUTPATIENT)
Dept: PEDIATRICS | Facility: CLINIC | Age: 59
End: 2024-12-03
Payer: COMMERCIAL

## 2024-12-03 ENCOUNTER — ANCILLARY PROCEDURE (OUTPATIENT)
Dept: GENERAL RADIOLOGY | Facility: CLINIC | Age: 59
End: 2024-12-03
Payer: COMMERCIAL

## 2024-12-03 ENCOUNTER — NURSE TRIAGE (OUTPATIENT)
Dept: FAMILY MEDICINE | Facility: CLINIC | Age: 59
End: 2024-12-03

## 2024-12-03 VITALS
HEIGHT: 63 IN | OXYGEN SATURATION: 98 % | SYSTOLIC BLOOD PRESSURE: 124 MMHG | BODY MASS INDEX: 30.12 KG/M2 | TEMPERATURE: 97.9 F | WEIGHT: 170 LBS | RESPIRATION RATE: 20 BRPM | HEART RATE: 85 BPM | DIASTOLIC BLOOD PRESSURE: 82 MMHG

## 2024-12-03 DIAGNOSIS — R05.8 COUGH PRODUCTIVE OF PURULENT SPUTUM: Primary | ICD-10-CM

## 2024-12-03 DIAGNOSIS — R50.9 FEVER AND CHILLS: ICD-10-CM

## 2024-12-03 DIAGNOSIS — J44.9 CHRONIC OBSTRUCTIVE PULMONARY DISEASE, UNSPECIFIED COPD TYPE (H): ICD-10-CM

## 2024-12-03 DIAGNOSIS — R05.8 COUGH PRODUCTIVE OF PURULENT SPUTUM: ICD-10-CM

## 2024-12-03 LAB
ANION GAP SERPL CALCULATED.3IONS-SCNC: 10 MMOL/L (ref 7–15)
BUN SERPL-MCNC: 9.1 MG/DL (ref 8–23)
CALCIUM SERPL-MCNC: 9.6 MG/DL (ref 8.8–10.4)
CHLORIDE SERPL-SCNC: 99 MMOL/L (ref 98–107)
CREAT SERPL-MCNC: 0.78 MG/DL (ref 0.51–0.95)
EGFRCR SERPLBLD CKD-EPI 2021: 86 ML/MIN/1.73M2
ERYTHROCYTE [DISTWIDTH] IN BLOOD BY AUTOMATED COUNT: 13.1 % (ref 10–15)
GLUCOSE SERPL-MCNC: 79 MG/DL (ref 70–99)
HCO3 SERPL-SCNC: 26 MMOL/L (ref 22–29)
HCT VFR BLD AUTO: 35.4 % (ref 35–47)
HGB BLD-MCNC: 11.1 G/DL (ref 11.7–15.7)
MCH RBC QN AUTO: 28.1 PG (ref 26.5–33)
MCHC RBC AUTO-ENTMCNC: 31.4 G/DL (ref 31.5–36.5)
MCV RBC AUTO: 90 FL (ref 78–100)
PLATELET # BLD AUTO: 361 10E3/UL (ref 150–450)
POTASSIUM SERPL-SCNC: 4.4 MMOL/L (ref 3.4–5.3)
RBC # BLD AUTO: 3.95 10E6/UL (ref 3.8–5.2)
SODIUM SERPL-SCNC: 135 MMOL/L (ref 135–145)
WBC # BLD AUTO: 10.7 10E3/UL (ref 4–11)

## 2024-12-03 PROCEDURE — 71046 X-RAY EXAM CHEST 2 VIEWS: CPT | Mod: TC | Performed by: RADIOLOGY

## 2024-12-03 PROCEDURE — 99215 OFFICE O/P EST HI 40 MIN: CPT

## 2024-12-03 PROCEDURE — 36415 COLL VENOUS BLD VENIPUNCTURE: CPT

## 2024-12-03 PROCEDURE — 80048 BASIC METABOLIC PNL TOTAL CA: CPT

## 2024-12-03 PROCEDURE — 85027 COMPLETE CBC AUTOMATED: CPT

## 2024-12-03 RX ORDER — AZITHROMYCIN 250 MG/1
TABLET, FILM COATED ORAL
Qty: 6 TABLET | Refills: 0 | Status: SHIPPED | OUTPATIENT
Start: 2024-12-03 | End: 2024-12-08

## 2024-12-03 ASSESSMENT — PAIN SCALES - GENERAL: PAINLEVEL_OUTOF10: NO PAIN (0)

## 2024-12-03 NOTE — TELEPHONE ENCOUNTER
Patient has been ill for about a week now with a cough. She now feels like it is reaching her sinuses.   Fever on and off. Using her albuterol Neb 3-4x a day. Will also use albuterol inhaler on top of that. No signs of respiratory distress while on the phone.  History of COPD.   Coughing up thick green phlegm.   Thanksgiving morning she woke up throwing up, chills, and a temp of 102. Emesis has since resolved. Fevers are still on and off.   Last fever was yesterday morning, she woke up soaked.   She has not tested for COVID.   No chest pains.     Reviewed the [facility] clinic with patient. Reviewed concern for pneumonia given the increase in the community.     Call placed to [facility] and report given. They will reach out to patient.       Reason for Disposition   MILD difficulty breathing (e.g., minimal/no SOB at rest, SOB with walking, pulse <100) and still present when not coughing   Fever > 101 F (38.3 C) and over 60 years of age    Protocols used: Cough-A-OH    Amy Desai RN on 12/3/2024 at 1:38 PM

## 2024-12-03 NOTE — PROGRESS NOTES
Acute and Diagnostic Services Clinic Visit    Assessment & Plan     (R05.8) Cough productive of purulent sputum  (primary encounter diagnosis)  (R50.9) Fever and chills  Comment: Has been ill for one week, with cough productive of green sputum, fever with a maximal temperature of 102.2, sweats, shaking chills, and nausea with a single episode of emesis.  At a minimum, she clinically has a bacterial bronchitis.  However, I think imaging is warranted, as demonstration of a consolidated pneumonia might warrant the use of two drug antibiotic therapy.  Plan:   XR Chest 2 Views ordered.  CBC with platelets.   Basic metabolic panel ordered to assess renal function for antibiotic therapy.  After giving it some thought, we have decided not to test for influenza or COVID-19.  She has already been symptomatic for 7 days, and would therefore not be a candidate for Paxlovid or oseltamivir therapy.  I discussed this with Carolina; she agrees.    (J44.9) Chronic obstructive pulmonary disease, unspecified COPD type (H)  Comment: Most recent pulmonary function testing performed 4/24/2023 demonstrated moderate airflow obstruction with a preserved diffusing capacity.  Of note: The 4/24/2023 results are significantly better than they were on 5/17/2021, probably due to smoking cessation.  FEV1 in that time interval improved by about 25%.  There is no clinical evidence of COPD exacerbation today.  Plan:  I let Carolina know about this 25% improvement in lung function over 3 years after she stopped smoking, and congratulated her in terms of her smoking cessation efforts.  She was pleased to hear it.  I do not think that systemic steroids are going to be necessary treatment at the present time.    DISCUSSION/MEDICAL DECISION MAKING:  CBC was notable for a normal WBC count, along with mild anemia, hemoglobin 11.1.  Hemoglobin was 11.7 one month ago.  Chest x-ray (reviewed personally) shows a linear scar at the left lung base, and is otherwise  "clear.  There is no radiographic evidence of pneumonia.    I told Carolina that she appears to be suffering from an acute bacterial bronchitis that should respond to antibiotic therapy.  Her allergy list describes either allergy or intolerance to the penicillins as well as doxycycline.  She tells me that she has tolerated azithromycin in the past; I think this would be a good choice.  I prescribed azithromycin for a 5-day course.  She agrees to contact Dr. Oneill should her symptoms fail to respond to antibiotic therapy.    42 minutes were spent doing chart review, history and exam, documentation and further activities per the note.       BMI  Estimated body mass index is 30.6 kg/m  as calculated from the following:    Height as of this encounter: 1.588 m (5' 2.5\").    Weight as of this encounter: 77.1 kg (170 lb).     Subjective   Carolina is a 59 year old, presenting for the following health issues:  Cough    HPI     Acute Illness  Acute illness concerns: Productive cough. Had high fevers  Onset/Duration: 1 week  Symptoms:  Fever: YES- not today  Chills/Sweats: YES  Headache (location?): YES  at forehead  Sinus Pressure: YES           Decreased energy level: YES  Conjunctivitis:  No  Ear Pain: no  Rhinorrhea: YES  Congestion: YES  Sore Throat: YES  Cough: YES-productive of green sputum  Wheeze: YES           Breathing fast: YES  Decreased Appetite/Intake: YES  Nausea: yes - resolved  Vomiting: YES- resolved  Diarrhea: No  Genitourinary symptoms: No  Progression of symptoms: same  Sick/Strep Exposure: No  Therapies tried and outcome: tylenol/ibuprofen    Carolina Hernandez is a 59-year-old woman seen today in the acute diagnostic services ([facility]) clinic at [facility] regarding productive cough and fever.    Carolina has known chronic obstructive pulmonary disease.  Most recent PFT was performed 4/24/2023, which showed moderate airflow obstruction, associated with a preserved diffusing capacity.  She had earlier PFTs done " "5/17/2021; at that time she had severe airflow obstruction, hyperinflation, gas trapping, though again a preserved diffusing capacity.  Shortly after this 5/17/2021 PFT was done, Carolina quit smoking, which probably accounts for the significant improvement in measured lung function over 3 years.    Carolina became ill 1 week ago today, 11/26/2024.  Initially, she noticed sinus pressure and postnasal drainage of greenish mucus.  Soon after, she began to cough up green, purulent sputum, without blood.  She then developed fever, as high as \"102 point something\", after which her thermometer broke.  She has been having intermittent sweats and shaking chills.  She feels globally weak.    She did not initially seek medical attention, thinking her symptoms would gradually resolved.  She treated at home with fluids, acetaminophen, and ibuprofen.  However, symptoms have persisted, particularly cough and sputum production, prompting her to contact her primary care provider.  She was subsequently referred here to [facility].    Carolina denies rest dyspnea, though any exertion causes some dyspnea and fatigue.  She has no respiratory chest pain.    Poor appetite persists.  She had a single episode of emesis on Thanksgiving day, but none since.  She has not had diarrhea.  There are no ill family members at home.  She has not tested for COVID-19.      Review of Systems  As per the history of present illness.  No additional positives or negatives are obtained.      Objective    /82 (BP Location: Left arm, Patient Position: Sitting, Cuff Size: Adult Regular)   Pulse 85   Temp 97.9  F (36.6  C) (Oral)   Resp 20   Ht 1.588 m (5' 2.5\")   Wt 77.1 kg (170 lb)   SpO2 98%   BMI 30.60 kg/m    Body mass index is 30.6 kg/m .  Physical Exam   GENERAL: Pleasant woman, who appears at least mildly ill.  RESP: No accessory muscle use.  Lungs clear throughout on inspiration.  Expiration may be mildly prolonged, but is clear, no wheeze.  CV: Regular " rate and rhythm, non-tachycardic.  Normal S1 S2, no murmur or extra sound.    ABDOMEN: Soft, non-tender, no guarding.  Bowel sounds positive.  MS: No bony deformities noted.  No red or inflamed joints.  SKIN: Warm and dry, no rashes.  NEURO: Alert, oriented, conversant.  Cranial nerves III - XII grossly intact.  No gross motor or sensory deficits.    PSYCH: Calm, alert, conversant.  Able to articulate logical thoughts, no tangential thoughts, no hallucinations or delusions.  Affect normal.    XR CHEST 2 VIEWS 12/3/2024 3:15 PM     HISTORY: Cough productive of purulent sputum with fever, please  evaluate for pneumonia.; Cough productive of purulent sputum; Fever  and chills     COMPARISON: Chest x-rays, most recently 5/24/2022. CT dated 10/2/2024.     FINDINGS: The cardiomediastinal silhouette and pulmonary vasculature  are within normal limits. Unchanged mild left basilar platelike  atelectasis/scarring. No acute infiltrate. Unchanged lateral right  midlung calcified granuloma. No pleural effusion or pneumothorax.  Surgical clips overlie the upper abdomen.                                                                      IMPRESSION: Left basilar platelike atelectasis/scarring. No acute  infiltrate.     SELENE CANALES MD     Results for orders placed or performed in visit on 12/03/24 (from the past 24 hours)   CBC with platelets   Result Value Ref Range    WBC Count 10.7 4.0 - 11.0 10e3/uL    RBC Count 3.95 3.80 - 5.20 10e6/uL    Hemoglobin 11.1 (L) 11.7 - 15.7 g/dL    Hematocrit 35.4 35.0 - 47.0 %    MCV 90 78 - 100 fL    MCH 28.1 26.5 - 33.0 pg    MCHC 31.4 (L) 31.5 - 36.5 g/dL    RDW 13.1 10.0 - 15.0 %    Platelet Count 361 150 - 450 10e3/uL         In 1445  Out 1517  In 1532  Out 1542    Signed Electronically by: Jean-Claude Jordan MD

## 2024-12-03 NOTE — Clinical Note
I had the pleasure of seeing Carolina today in the [place] [facility] regarding cough productive of purulent sputum, fever, and chills.  Chest x-ray showed no pneumonia.  Therefore, I think that she is suffering from a bacterial bronchitis which should respond to antibiotics.  I have prescribed a 5-day course of azithromycin.  She agrees to contact you should her symptoms fail to respond to antibiotic therapy.  Thank you.

## 2024-12-03 NOTE — PATIENT INSTRUCTIONS
Your symptoms appear to be due to an acute bacterial bronchitis, which should respond to antibiotic therapy fairly promptly.  I have prescribed azithromycin 250 mg tablets.  Please take 2 tablets at the same time this evening for a dose of 500 mg today, then 1 tablet daily for the next 4 days, after which antibiotic therapy will be completed.  Please keep in touch with Dr. Oneill and let her know if your symptoms should fail to respond to antibiotics.    It was a pleasure meeting you today.  I hope you feel better soon.

## 2024-12-03 NOTE — PROGRESS NOTES
Chief Complaint   Patient presents with    Right Knee - Total Joint Post-op     DOS 10/28/24, 6 wks s/p. Patient notes her knee is doing good considering she was sick for 10 days and couldn't do her exercises. She has continued with physical therapy, last visit was last Thursday. No issues or concerns. It still bothers her when laying in bed.         SURGERY: Total knee arthroplasty, right knee ([facility])  DATE OF SURGERY: 10/28/2024      HISTORY OF PRESENT ILLNESS: Carolina Hernandez is a 59 year old female seen for postoperative evaluation of right total knee arthroplasty. It has been 6 weeks since surgery. Returns today stating doing well, despite being sick for 10 days not couldn't do her exercises and therapy during that time. She was at therapy last week. No issues or concerns. Sore laying in bed.    She was in last week for her illness, diagnosed with bronchitis and sinus infection. On antibiotics for that, improving.       Present symptoms: mild pain, mild swelling.    Pain severity: 3/10      Past medical history:   Past Medical History:   Diagnosis Date    Asthma     C. difficile colitis     COPD (chronic obstructive pulmonary disease) (H)     Depression     Depressive disorder Have had it most of my life    Hypertension     Moderate persistent asthma with exacerbation 09/18/2007    Osteoarthritis     Sinusitis, chronic     Status post coronary angiogram 10/14/2022     Patient Active Problem List    Diagnosis Date Noted    Primary osteoarthritis of right knee 10/28/2024     Priority: Medium    Severe persistent asthma without complication (H) 02/07/2024     Priority: Medium    Osteoarthritis of left knee 11/01/2023     Priority: Medium    S/P total knee arthroplasty, left 10/31/2023     Priority: Medium    Fatty liver 11/01/2022     Priority: Medium     ultrasound 10/29/22      COPD vs Asthma 10/30/2022     Priority: Medium     PFTS 5/2021 - FEV1- 1.21 (48%), ratio 0.50, no change with  bronchodilators,  %, %, DLCO 94%       Transaminitis 10/29/2022     Priority: Medium    Acute pancreatitis 10/29/2022     Priority: Medium    Nonobstructive atherosclerosis of coronary artery 10/21/2022     Priority: Medium      H/o exertional chest pain leading to coronary angiogram 10/14/2022 which showed non-obstructing CAD      Hyperlipidemia LDL goal <70 10/21/2022     Priority: Medium    Low back pain due to bilateral sciatica 10/27/2021     Priority: Medium    Primary osteoarthritis of both knees 09/23/2021     Priority: Medium    Benign essential hypertension 07/26/2019     Priority: Medium     New diagnosis 7/26/2019        Recurrent major depressive disorder, in remission (H) 01/17/2019     Priority: Medium    GERD (gastroesophageal reflux disease) 07/31/2012     Priority: Medium    Seasonal allergies 07/31/2012     Priority: Medium       Past Surgical History:   Past Surgical History:   Procedure Laterality Date    ARTHROPLASTY KNEE Left 10/31/2023    Procedure: ARTHROPLASTY, KNEE, TOTAL left;  Surgeon: Giovanny Chong MD;  Location: [facility] OR    ARTHROPLASTY KNEE Right 10/28/2024    Procedure: ARTHROPLASTY, KNEE, TOTAL;  Surgeon: Giovanny Chong MD;  Location: [facility] OR    COLONOSCOPY  2017    CV CORONARY ANGIOGRAM N/A 10/14/2022    Procedure: Coronary Angiogram;  Surgeon: Fidel Martin MD;  Location: [facility] CARDIAC CATH LAB    ESOPHAGOSCOPY, GASTROSCOPY, DUODENOSCOPY (EGD), COMBINED N/A 05/05/2022    Procedure: ESOPHAGOGASTRODUODENOSCOPY, WITH BIOPSY;  Surgeon: Timothy Oliva DO;  Location: [facility] OR    LAPAROSCOPIC CHOLECYSTECTOMY WITH CHOLANGIOGRAMS N/A 11/21/2022    Procedure: CHOLECYSTECTOMY, LAPAROSCOPIC, WITH CHOLANGIOGRAM;  Surgeon: Eros Butt DO;  Location: [facility] OR       Medications:   Current Outpatient Medications:     acetaminophen (TYLENOL) 325 MG tablet, Take 2 tablets (650 mg) by mouth every 4 hours as needed for other (mild pain)., Disp: 100 tablet, Rfl:  0    albuterol (PROVENTIL) (2.5 MG/3ML) 0.083% neb solution, inhale 1 vial (2.5 mg) by nebulization every 4 hours as needed for shortness of breath / dyspnea or wheezing, Disp: 270 mL, Rfl: 11    albuterol (VENTOLIN HFA) 108 (90 Base) MCG/ACT inhaler, INHALE 1-2 PUFFS INTO THE LUNGS EVERY 4 HOURS AS NEEDED FOR SHORTNESS OF BREATH/DYSPNEA OR WHEEZING ., Disp: 18 g, Rfl: 4    amLODIPine (NORVASC) 2.5 MG tablet, Take 2 tablets (5 mg) by mouth once daily., Disp: 180 tablet, Rfl: 3    aspirin (ASA) 325 MG EC tablet, Take 1 tablet (325 mg) by mouth daily., Disp: 30 tablet, Rfl: 0    aspirin 81 MG EC tablet, Take 81 mg by mouth daily., Disp: , Rfl:     cetirizine (ZYRTEC) 10 MG tablet, Take 10 mg by mouth daily, Disp: , Rfl:     fluticasone-salmeterol (ADVAIR) 500-50 MCG/ACT inhaler, Inhale 1 puff into the lungs every 12 hours., Disp: 1 each, Rfl: 11    hydrOXYzine HCl (ATARAX) 25 MG tablet, Take 1 tablet (25 mg) by mouth every 6 hours as needed for itching or anxiety (with pain, moderate pain)., Disp: 30 tablet, Rfl: 0    methocarbamol (ROBAXIN) 500 MG tablet, Take 2 tablets (1,000 mg) by mouth 3 times daily as needed for muscle spasms or other (pain)., Disp: 60 tablet, Rfl: 1    montelukast (SINGULAIR) 10 MG tablet, Take 1 tablet (10 mg) by mouth at bedtime. (Patient taking differently: Take 10 mg by mouth every morning.), Disp: 90 tablet, Rfl: 3    multivitamin (CENTRUM SILVER) tablet, Take 1 tablet by mouth daily, Disp: , Rfl:     Omega-3 Fatty Acids (FISH OIL ULTRA) 1400 MG CAPS, Take 1 capsule by mouth daily, Disp: , Rfl:     omeprazole 20 MG tablet, Take 2 tablets (40 mg) by mouth daily, Disp: 180 tablet, Rfl: 2    oxyCODONE (ROXICODONE) 5 MG tablet, Take 1-2 tablets (5-10 mg) by mouth every 6 hours as needed for moderate to severe pain., Disp: 12 tablet, Rfl: 0    senna-docusate (SENOKOT-S/PERICOLACE) 8.6-50 MG tablet, Take 1-2 tablets by mouth 2 times daily. Take while on oral narcotics to prevent or treat  "constipation., Disp: 30 tablet, Rfl: 0    sertraline (ZOLOFT) 25 MG tablet, Take 2 tablets (50 mg) by mouth daily for 90 days, Disp: 180 tablet, Rfl: 2    tiotropium (SPIRIVA) 18 MCG inhaled capsule, Inhale 1 capsule (18 mcg) into the lungs daily., Disp: 30 capsule, Rfl: 11    Allergies:   Allergies   Allergen Reactions    Doxycycline Difficulty breathing    Penicillins Itching    Sucralfate Rash         REVIEW OF SYSTEMS:  CONSTITUTIONAL:NEGATIVE for fever, chills, night sweats, change in weight  INTEGUMENTARY/SKIN: NEGATIVE for worrisome rashes, moles or lesions  MUSCULOSKELETAL:See HPI above  NEURO: NEGATIVE for weakness, dizziness or paresthesias  CARDIOVASCULAR: negative for chest pain, shortness of breath    PHYSICAL EXAM:  /83   Pulse 83   Ht 1.588 m (5' 2.5\")   Wt 78 kg (172 lb)   BMI 30.96 kg/m     GENERAL APPEARANCE: healthy, alert, no distress  SKIN: no suspicious lesions or rashes  NEURO: Normal strength and tone, mentation intact and speech normal  PSYCH:  mentation appears normal and affect normal  RESPIRATORY: No increased work of breathing.    BILATERAL LOWER EXTREMITIES:  Gait: quadriceps avoidance right.    Intact sensation deep peroneal nerve, superficial peroneal nerve, med/lat tibial nerve, sural nerve, saphenous nerve  Intact EHL, EDL, TA, FHL, GS, quadriceps hamstrings and hip flexors  Toes warm and well perfused, brisk capillary refill. Palpable 2+ dp pulses.  Bilateral calf soft and nttp or squeeze.  Edema: trace    right  KNEE EXAM:    Incision: healed well.    Anterior soft tissue swelling.  Skin: intact, no erythema, no ecchymosis  ROM: near full extension to 110 flexion  Effusion: trace  Tender: diffuse      X-RAY:  2 views right knee from 12/9/2024 were reviewed in clinic today. No obvious fractures or dislocations. Total knee arthroplasty components in place without evidence of failure or loosening.      Impression: Carolina Hernandez is a 59 year old female status post Total " knee arthroplasty, right knee, doing well, 6 weeks out from surgery.      Plan:   * reviewed xrays with patient, showing total knee arthroplasty implants.   * continue with knee range of motion exercises, focusing on extension  * wean off all pain medications as tolerated  * xrays next visit: 2 views of the knee  * return to clinic 6 weeks  * Physical Therapy, home exercise program.  * all questions addressed and answered prior to discharge from clinic today to patient's satisfaction.     * patient will need longterm (at least 1 years depending on risk factors) prophylactic antibiotics use with dental procedures or other invasive procedures (2 g amoxicilin or  2g Keflex or 500mg azithromycin or clarithromycin or 100mg doxycycline) 30-60 minutes prior to dental procedures.    * KOOS Jr/Promis Knee score: IS to be done at next visit; NOT completed during todays visit; to be completed at 12 weeks and 12 months postoperative.      Giovanny Chong M.D., M.S.  Dept. of Orthopaedic Surgery  [facility]

## 2024-12-05 ENCOUNTER — THERAPY VISIT (OUTPATIENT)
Dept: PHYSICAL THERAPY | Facility: CLINIC | Age: 59
End: 2024-12-05
Attending: ORTHOPAEDIC SURGERY
Payer: COMMERCIAL

## 2024-12-05 DIAGNOSIS — M17.11 PRIMARY OSTEOARTHRITIS OF RIGHT KNEE: Primary | ICD-10-CM

## 2024-12-05 PROCEDURE — 97110 THERAPEUTIC EXERCISES: CPT | Mod: GP

## 2024-12-08 ASSESSMENT — KOOS JR
KOOS JR SCORING: 63.78
TWISING OR PIVOTING ON KNEE: MODERATE
BENDING TO THE FLOOR TO PICK UP OBJECT: MODERATE
RISING FROM SITTING: MODERATE
GOING UP OR DOWN STAIRS: MILD
HOW SEVERE IS YOUR KNEE STIFFNESS AFTER FIRST WAKING IN MORNING: MODERATE

## 2024-12-09 ENCOUNTER — OFFICE VISIT (OUTPATIENT)
Dept: ORTHOPEDICS | Facility: CLINIC | Age: 59
End: 2024-12-09
Payer: COMMERCIAL

## 2024-12-09 ENCOUNTER — ANCILLARY PROCEDURE (OUTPATIENT)
Dept: GENERAL RADIOLOGY | Facility: CLINIC | Age: 59
End: 2024-12-09
Attending: ORTHOPAEDIC SURGERY
Payer: COMMERCIAL

## 2024-12-09 VITALS
DIASTOLIC BLOOD PRESSURE: 83 MMHG | SYSTOLIC BLOOD PRESSURE: 121 MMHG | HEART RATE: 83 BPM | BODY MASS INDEX: 30.48 KG/M2 | WEIGHT: 172 LBS | HEIGHT: 63 IN

## 2024-12-09 DIAGNOSIS — Z96.651 S/P TOTAL KNEE REPLACEMENT, RIGHT: Primary | ICD-10-CM

## 2024-12-09 DIAGNOSIS — Z96.651 S/P TOTAL KNEE REPLACEMENT, RIGHT: ICD-10-CM

## 2024-12-09 PROCEDURE — 99024 POSTOP FOLLOW-UP VISIT: CPT | Performed by: ORTHOPAEDIC SURGERY

## 2024-12-09 PROCEDURE — 73560 X-RAY EXAM OF KNEE 1 OR 2: CPT | Mod: TC | Performed by: RADIOLOGY

## 2024-12-09 ASSESSMENT — PAIN SCALES - GENERAL: PAINLEVEL_OUTOF10: MILD PAIN (3)

## 2024-12-09 NOTE — LETTER
12/9/2024      Carolina Hernandez  [address] [facility] [id number]      Dear Colleague,    Thank you for referring your patient, Carolina Hernandez, to the [facility]. Please see a copy of my visit note below.    Chief Complaint   Patient presents with     Right Knee - Total Joint Post-op     DOS 10/28/24, 6 wks s/p. Patient notes her knee is doing good considering she was sick for 10 days and couldn't do her exercises. She has continued with physical therapy, last visit was last Thursday. No issues or concerns. It still bothers her when laying in bed.         SURGERY: Total knee arthroplasty, right knee ([facility])  DATE OF SURGERY: 10/28/2024      HISTORY OF PRESENT ILLNESS: Carolina Hernandez is a 59 year old female seen for postoperative evaluation of right total knee arthroplasty. It has been 6 weeks since surgery. Returns today stating doing well, despite being sick for 10 days not couldn't do her exercises and therapy during that time. She was at therapy last week. No issues or concerns. Sore laying in bed.    She was in last week for her illness, diagnosed with bronchitis and sinus infection. On antibiotics for that, improving.       Present symptoms: mild pain, mild swelling.    Pain severity: 3/10      Past medical history:   Past Medical History:   Diagnosis Date     Asthma      C. difficile colitis      COPD (chronic obstructive pulmonary disease) (H)      Depression      Depressive disorder Have had it most of my life     Hypertension      Moderate persistent asthma with exacerbation 09/18/2007     Osteoarthritis      Sinusitis, chronic      Status post coronary angiogram 10/14/2022     Patient Active Problem List    Diagnosis Date Noted     Primary osteoarthritis of right knee 10/28/2024     Priority: Medium     Severe persistent asthma without complication (H) 02/07/2024     Priority: Medium     Osteoarthritis of left knee 11/01/2023      Priority: Medium     S/P total knee arthroplasty, left 10/31/2023     Priority: Medium     Fatty liver 11/01/2022     Priority: Medium     ultrasound 10/29/22       COPD vs Asthma 10/30/2022     Priority: Medium     PFTS 5/2021 - FEV1- 1.21 (48%), ratio 0.50, no change with bronchodilators,  %, %, DLCO 94%        Transaminitis 10/29/2022     Priority: Medium     Acute pancreatitis 10/29/2022     Priority: Medium     Nonobstructive atherosclerosis of coronary artery 10/21/2022     Priority: Medium      H/o exertional chest pain leading to coronary angiogram 10/14/2022 which showed non-obstructing CAD       Hyperlipidemia LDL goal <70 10/21/2022     Priority: Medium     Low back pain due to bilateral sciatica 10/27/2021     Priority: Medium     Primary osteoarthritis of both knees 09/23/2021     Priority: Medium     Benign essential hypertension 07/26/2019     Priority: Medium     New diagnosis 7/26/2019         Recurrent major depressive disorder, in remission (H) 01/17/2019     Priority: Medium     GERD (gastroesophageal reflux disease) 07/31/2012     Priority: Medium     Seasonal allergies 07/31/2012     Priority: Medium       Past Surgical History:   Past Surgical History:   Procedure Laterality Date     ARTHROPLASTY KNEE Left 10/31/2023    Procedure: ARTHROPLASTY, KNEE, TOTAL left;  Surgeon: Giovanny Chong MD;  Location: [facility] OR     ARTHROPLASTY KNEE Right 10/28/2024    Procedure: ARTHROPLASTY, KNEE, TOTAL;  Surgeon: Giovanny Chong MD;  Location: [facility] OR     COLONOSCOPY  2017     CV CORONARY ANGIOGRAM N/A 10/14/2022    Procedure: Coronary Angiogram;  Surgeon: Fidel Martin MD;  Location: [facility] CARDIAC C[facility] LAB     ESOPHAGOSCOPY, GASTROSCOPY, DUODENOSCOPY (EGD), COMBINED N/A 05/05/2022    Procedure: ESOPHAGOGASTRODUODENOSCOPY, WITH BIOPSY;  Surgeon: Timothy Oliva DO;  Location: [facility] OR     LAPAROSCOPIC CHOLECYSTECTOMY WITH CHOLANGIOGRAMS N/A 11/21/2022    Procedure: CHOLECYSTECTOMY,  LAPAROSCOPIC, WITH CHOLANGIOGRAM;  Surgeon: Eros Butt DO;  Location: [facility] OR       Medications:   Current Outpatient Medications:      acetaminophen (TYLENOL) 325 MG tablet, Take 2 tablets (650 mg) by mouth every 4 hours as needed for other (mild pain)., Disp: 100 tablet, Rfl: 0     albuterol (PROVENTIL) (2.5 MG/3ML) 0.083% neb solution, inhale 1 vial (2.5 mg) by nebulization every 4 hours as needed for shortness of breath / dyspnea or wheezing, Disp: 270 mL, Rfl: 11     albuterol (VENTOLIN HFA) 108 (90 Base) MCG/ACT inhaler, INHALE 1-2 PUFFS INTO THE LUNGS EVERY 4 HOURS AS NEEDED FOR SHORTNESS OF BREATH/DYSPNEA OR WHEEZING ., Disp: 18 g, Rfl: 4     amLODIPine (NORVASC) 2.5 MG tablet, Take 2 tablets (5 mg) by mouth once daily., Disp: 180 tablet, Rfl: 3     aspirin (ASA) 325 MG EC tablet, Take 1 tablet (325 mg) by mouth daily., Disp: 30 tablet, Rfl: 0     aspirin 81 MG EC tablet, Take 81 mg by mouth daily., Disp: , Rfl:      cetirizine (ZYRTEC) 10 MG tablet, Take 10 mg by mouth daily, Disp: , Rfl:      fluticasone-salmeterol (ADVAIR) 500-50 MCG/ACT inhaler, Inhale 1 puff into the lungs every 12 hours., Disp: 1 each, Rfl: 11     hydrOXYzine HCl (ATARAX) 25 MG tablet, Take 1 tablet (25 mg) by mouth every 6 hours as needed for itching or anxiety (with pain, moderate pain)., Disp: 30 tablet, Rfl: 0     methocarbamol (ROBAXIN) 500 MG tablet, Take 2 tablets (1,000 mg) by mouth 3 times daily as needed for muscle spasms or other (pain)., Disp: 60 tablet, Rfl: 1     montelukast (SINGULAIR) 10 MG tablet, Take 1 tablet (10 mg) by mouth at bedtime. (Patient taking differently: Take 10 mg by mouth every morning.), Disp: 90 tablet, Rfl: 3     multivitamin (CENTRUM SILVER) tablet, Take 1 tablet by mouth daily, Disp: , Rfl:      Omega-3 Fatty Acids (FISH OIL ULTRA) 1400 MG CAPS, Take 1 capsule by mouth daily, Disp: , Rfl:      omeprazole 20 MG tablet, Take 2 tablets (40 mg) by mouth daily, Disp: 180 tablet, Rfl: 2      "oxyCODONE (ROXICODONE) 5 MG tablet, Take 1-2 tablets (5-10 mg) by mouth every 6 hours as needed for moderate to severe pain., Disp: 12 tablet, Rfl: 0     senna-docusate (SENOKOT-S/PERICOLACE) 8.6-50 MG tablet, Take 1-2 tablets by mouth 2 times daily. Take while on oral narcotics to prevent or treat constipation., Disp: 30 tablet, Rfl: 0     sertraline (ZOLOFT) 25 MG tablet, Take 2 tablets (50 mg) by mouth daily for 90 days, Disp: 180 tablet, Rfl: 2     tiotropium (SPIRIVA) 18 MCG inhaled capsule, Inhale 1 capsule (18 mcg) into the lungs daily., Disp: 30 capsule, Rfl: 11    Allergies:   Allergies   Allergen Reactions     Doxycycline Difficulty breathing     Penicillins Itching     Sucralfate Rash         REVIEW OF SYSTEMS:  CONSTITUTIONAL:NEGATIVE for fever, chills, night sweats, change in weight  INTEGUMENTARY/SKIN: NEGATIVE for worrisome rashes, moles or lesions  MUSCULOSKELETAL:See HPI above  NEURO: NEGATIVE for weakness, dizziness or paresthesias  CARDIOVASCULAR: negative for chest pain, shortness of breath    PHYSICAL EXAM:  /83   Pulse 83   Ht 1.588 m (5' 2.5\")   Wt 78 kg (172 lb)   BMI 30.96 kg/m     GENERAL APPEARANCE: healthy, alert, no distress  SKIN: no suspicious lesions or rashes  NEURO: Normal strength and tone, mentation intact and speech normal  PSYCH:  mentation appears normal and affect normal  RESPIRATORY: No increased work of breathing.    BILATERAL LOWER EXTREMITIES:  Gait: quadriceps avoidance right.    Intact sensation deep peroneal nerve, superficial peroneal nerve, med/lat tibial nerve, sural nerve, saphenous nerve  Intact EHL, EDL, TA, FHL, GS, quadriceps hamstrings and hip flexors  Toes warm and well perfused, brisk capillary refill. Palpable 2+ dp pulses.  Bilateral calf soft and nttp or squeeze.  Edema: trace    right  KNEE EXAM:    Incision: healed well.    Anterior soft tissue swelling.  Skin: intact, no erythema, no ecchymosis  ROM: near full extension to 110 " flexion  Effusion: trace  Tender: diffuse      X-RAY:  2 views right knee from 12/9/2024 were reviewed in clinic today. No obvious fractures or dislocations. Total knee arthroplasty components in place without evidence of failure or loosening.      Impression: Carolina Hernandez is a 59 year old female status post Total knee arthroplasty, right knee, doing well, 6 weeks out from surgery.      Plan:   * reviewed xrays with patient, showing total knee arthroplasty implants.   * continue with knee range of motion exercises, focusing on extension  * wean off all pain medications as tolerated  * xrays next visit: 2 views of the knee  * return to clinic 6 weeks  * Physical Therapy, home exercise program.  * all questions addressed and answered prior to discharge from clinic today to patient's satisfaction.     * patient will need longterm (at least 1 years depending on risk factors) prophylactic antibiotics use with dental procedures or other invasive procedures (2 g amoxicilin or  2g Keflex or 500mg azithromycin or clarithromycin or 100mg doxycycline) 30-60 minutes prior to dental procedures.    * KOOS Jr/Promis Knee score: IS to be done at next visit; NOT completed during todays visit; to be completed at 12 weeks and 12 months postoperative.      Giovanny Chong M.D., M.S.  Dept. of Orthopaedic Surgery  [facility]             Again, thank you for allowing me to participate in the care of your patient.        Sincerely,        Giovanny Chong MD

## 2024-12-10 ENCOUNTER — THERAPY VISIT (OUTPATIENT)
Dept: PHYSICAL THERAPY | Facility: CLINIC | Age: 59
End: 2024-12-10
Attending: ORTHOPAEDIC SURGERY
Payer: COMMERCIAL

## 2024-12-10 DIAGNOSIS — M17.11 PRIMARY OSTEOARTHRITIS OF RIGHT KNEE: Primary | ICD-10-CM

## 2024-12-10 PROCEDURE — 97110 THERAPEUTIC EXERCISES: CPT | Mod: GP | Performed by: PHYSICAL THERAPIST

## 2024-12-10 NOTE — PROGRESS NOTES
"PHYSICAL THERAPY PROGRESS NOTE      PLAN  Continue therapy per current plan of care.  See below    Beginning/End Dates of Progress Note Reporting Period:  (P) 11/01/24 to 12/10/2024    Referring Provider:  Giovanny Chong     12/10/24 [id number]   Appointment Info   Signing clinician's name / credentials Kris Hoenk, PT   Visits Used 9   Medical Diagnosis TKA R   PT Tx Diagnosis decreased ROM and strength, dependent gait post TKA   Progress Note/Certification   Start of Care Date 11/01/24   Onset of illness/injury or Date of Surgery 10/28/24   Therapy Frequency 2x/wk   Predicted Duration 4wks, then 1x/wk x6wks   Certification date from 11/01/24   Certification date to 01/10/25   Progress Note Due Date 12/10/24   Progress Note Completed Date 11/01/24   GOALS   PT Goals 2;3   PT Goal 1   Goal Identifier 1   Goal Description pt will be able to ambulate w/o device household distances in 3wks   Goal Progress MET   Target Date 11/22/24   Date Met 12/05/24   PT Goal 2   Goal Identifier 2   Goal Description pt will be able to do reciprocol stairs in 8wks   Goal Progress MET   Target Date 12/27/24   Date Met 12/05/24   PT Goal 3   Goal Identifier 3   Goal Description pt will be able to walk community distances in 10wks   Goal Progress Progressing - has not yet attempted   Target Date 01/10/25   Subjective Report   Subjective Report walking better, saw  yesterday, happy with progress   Objective Measures   Objective Measures Objective Measure 1;Objective Measure 2;Objective Measure 3   Objective Measure 1   Objective Measure R knee extension AROM   Objective Measure 2   Objective Measure quad and ham 5/5, mild swelling still present   Details 0-122* on step, prone KF 90*   Treatment Interventions (PT)   Interventions Therapeutic Procedure/Exercise   Therapeutic Procedure/Exercise   Therapeutic Procedures: strength, endurance, ROM, flexibility minutes (26794) 25   Ther Proc 1 - Details 6\" fwd step up x10R, up/down 4 steps using 2 " rails x2sets, apurva sqaut x15, side lunge x8B, bridge x10 - tight pressure R, prone hip ext x15 R, prone KF assisted by other leg x10, SLR w/ QS x20   Skilled Intervention ROM and strength progression to improve walking, stairs   Patient Response/Progress ROM good, quad tight, using ankle wts at home   Education   Learner/Method Patient;Listening;Demonstration;No Barriers to Learning   Plan   Home program PTRX - phone   Updates to plan of care 1x/wk   Plan for next session 1x/wk x4wk   Total Session Time   Timed Code Treatment Minutes 25   Total Treatment Time (sum of timed and untimed services) 25     [facility]  Kris Hoenk  PT  [facility]  [address] [place] [address]  [email or web address]  [email or web address]   Office: [phone]  Voicemail: [phone]

## 2025-01-07 ENCOUNTER — TELEPHONE (OUTPATIENT)
Dept: SURGERY | Facility: CLINIC | Age: 60
End: 2025-01-07
Payer: COMMERCIAL

## 2025-01-07 DIAGNOSIS — Z96.652 S/P TOTAL KNEE ARTHROPLASTY, LEFT: Primary | ICD-10-CM

## 2025-01-07 RX ORDER — AZITHROMYCIN 250 MG/1
TABLET, FILM COATED ORAL
Qty: 2 TABLET | Refills: 4 | Status: SHIPPED | OUTPATIENT
Start: 2025-01-07

## 2025-01-07 NOTE — TELEPHONE ENCOUNTER
Other: Patient is at the dentist. She is wondering if a pre med is needed before her appointment. Please call her back.       Could we send this information to you in [employer] or would you prefer to receive a phone call?:   Patient would prefer a phone call   Okay to leave a detailed message?: No at Cell number on file:    Telephone Information:   Mobile [phone]

## 2025-01-07 NOTE — TELEPHONE ENCOUNTER
Returned call and let her know yes for at least 1 year per MD note.    ISRRAEL Smith RN 1/7/2025 1:56 PM

## 2025-02-12 ENCOUNTER — VIRTUAL VISIT (OUTPATIENT)
Dept: FAMILY MEDICINE | Facility: CLINIC | Age: 60
End: 2025-02-12
Payer: COMMERCIAL

## 2025-02-12 ENCOUNTER — MYC MEDICAL ADVICE (OUTPATIENT)
Dept: FAMILY MEDICINE | Facility: CLINIC | Age: 60
End: 2025-02-12

## 2025-02-12 DIAGNOSIS — F33.1 MODERATE EPISODE OF RECURRENT MAJOR DEPRESSIVE DISORDER (H): ICD-10-CM

## 2025-02-12 DIAGNOSIS — J45.50 SEVERE PERSISTENT ASTHMA WITHOUT COMPLICATION (H): ICD-10-CM

## 2025-02-12 PROCEDURE — 98006 SYNCH AUDIO-VIDEO EST MOD 30: CPT | Performed by: FAMILY MEDICINE

## 2025-02-12 RX ORDER — SERTRALINE HYDROCHLORIDE 100 MG/1
100 TABLET, FILM COATED ORAL DAILY
Qty: 90 TABLET | Refills: 1 | Status: SHIPPED | OUTPATIENT
Start: 2025-02-12

## 2025-02-12 ASSESSMENT — PATIENT HEALTH QUESTIONNAIRE - PHQ9: SUM OF ALL RESPONSES TO PHQ QUESTIONS 1-9: 18

## 2025-02-12 NOTE — PROGRESS NOTES
"Carolina is a 59 year old who is being evaluated via a billable video visit.    How would you like to obtain your AVS? MyChart  If the video visit is dropped, the invitation should be resent by: Text to cell phone: [phone]  Will anyone else be joining your video visit? No      Assessment & Plan   Problem List Items Addressed This Visit          Respiratory    Severe persistent asthma without complication (H)- no current symptoms     Other Visit Diagnoses       Moderate episode of recurrent major depressive disorder (H) - increased dose to 100 mg daily     Relevant Medications    sertraline (ZOLOFT) 100 MG tablet                  BMI  Estimated body mass index is 31.14 kg/m  as calculated from the following:    Height as of 25: 1.588 m (5' 2.5\").    Weight as of 25: 78.5 kg (173 lb).       FUTURE APPOINTMENTS:       - Follow-up visit as needed      Subjective   Carolina is a 59 year old, presenting for the following health issues:  Depression (Recheck on depression.  Wanting to discuss about increasing the dose of the Zoloft.)        2025     4:47 PM   Additional Questions   Roomed by jacki   Accompanied by self         2025     4:47 PM   Patient Reported Additional Medications   Patient reports taking the following new medications none       Video Start Time:  5:03PM    HPI     Patient is a 59 yr old female with history of depression, she reports that the last couple of weeks her depression seems worse. She thinks this is related to the weather. She is presently taking 50 mg of Zoloft. She wants to see if an increase will help. We discussed this in details. I recommended increasing this to 100 mg.   Medication was faxed to the pharmacy.       Depression   How are you doing with your depression since your last visit? Worsened   Are you having other symptoms that might be associated with depression? Cold weather   Have you had a significant life event?  OTHER: pet goat      Are you feeling " anxious or having panic attacks?   No  Do you have any concerns with your use of alcohol or other drugs? No    Social History     Tobacco Use    Smoking status: Former     Current packs/day: 0.00     Types: Cigarettes     Quit date: 1998     Years since quittin.6    Smokeless tobacco: Never   Vaping Use    Vaping status: Never Used   Substance Use Topics    Alcohol use: Yes     Comment: 4-6 beers multiple times weekly    Drug use: No         9/3/2024     2:00 PM 10/15/2024    11:54 AM 2025     4:49 PM   PHQ   PHQ-9 Total Score 15 17 18   Q9: Thoughts of better off dead/self-harm past 2 weeks More than half the days More than half the days Not at all         2024     1:38 PM 9/3/2024     2:00 PM 10/15/2024    11:54 AM   JAK-7 SCORE   Total Score 19 (severe anxiety)     Total Score 19 20 18         2025     4:49 PM   Last PHQ-9   1.  Little interest or pleasure in doing things 1   2.  Feeling down, depressed, or hopeless 1   3.  Trouble falling or staying asleep, or sleeping too much 3   4.  Feeling tired or having little energy 3   5.  Poor appetite or overeating 3   6.  Feeling bad about yourself 3   7.  Trouble concentrating 3   8.  Moving slowly or restless 1   Q9: Thoughts of better off dead/self-harm past 2 weeks 0   PHQ-9 Total Score 18   Difficulty at work, home, or with people Very difficult         10/15/2024    11:54 AM   JAK-7    1. Feeling nervous, anxious, or on edge 2   2. Not being able to stop or control worrying 2   3. Worrying too much about different things 3   4. Trouble relaxing 3   5. Being so restless that it is hard to sit still 3   6. Becoming easily annoyed or irritable 2   7. Feeling afraid, as if something awful might happen 3   JAK-7 Total Score 18   If you checked any problems, how difficult have they made it for you to do your work, take care of things at home, or get along with other people? Extremely difficult       Suicide Assessment Five-step Evaluation and  Treatment (SAFE-T)        Review of Systems  CONSTITUTIONAL: NEGATIVE for fever, chills, change in weight  ENT/MOUTH: NEGATIVE for ear, mouth and throat problems  RESP: NEGATIVE for significant cough or SOB  CV: NEGATIVE for chest pain, palpitations or peripheral edema  MUSCULOSKELETAL: NEGATIVE for significant arthralgias or myalgia  NEURO: NEGATIVE for weakness, dizziness or paresthesias  ENDOCRINE: NEGATIVE for temperature intolerance, skin/hair changes  HEME/ALLERGY/IMMUNE: NEGATIVE for bleeding problems  PSYCHIATRIC: POSITIVE for and depressed mood      Objective           Vitals:  No vitals were obtained today due to virtual visit.    Physical Exam   GENERAL: alert and no distress  EYES: Eyes grossly normal to inspection.  No discharge or erythema, or obvious scleral/conjunctival abnormalities.  RESP: No audible wheeze, cough, or visible cyanosis.    SKIN: Visible skin clear. No significant rash, abnormal pigmentation or lesions.  NEURO: Cranial nerves grossly intact.  Mentation and speech appropriate for age.  PSYCH: Appropriate affect, tone, and pace of words          Video-Visit Details    Type of service:  Video Visit   Video End Time: 5:08PM  Originating Location (pt. Location): Home  Distant Location (provider location):  On-site  Platform used for Video Visit: Sherrie  Signed Electronically by: Julia Oneill MD

## 2025-02-12 NOTE — TELEPHONE ENCOUNTER
Called pt regarding mychart message and offered pt a video visit today to discuss increasing zoloft. Pt denies SI.         Chun Gandara RN

## 2025-02-17 ENCOUNTER — TELEPHONE (OUTPATIENT)
Dept: CARDIOLOGY | Facility: CLINIC | Age: 60
End: 2025-02-17
Payer: COMMERCIAL

## 2025-02-17 NOTE — TELEPHONE ENCOUNTER
Pt responded to [employer] message dated 2/17/25.     Will close encounter.     Roxanne Shabazz RN

## 2025-02-17 NOTE — TELEPHONE ENCOUNTER
[id number] msg dated 2/17/25 sent to patient with recommendations. Will keep open to make sure she gets message     Roxanne Shabazz RN

## 2025-02-17 NOTE — TELEPHONE ENCOUNTER
[facility] Call Center    Phone Message    May a detailed message be left on voicemail: yes     Reason for Call: Other: Patient calling to report that she stopped taking her medication, atorvastatin (LIPITOR) 10 MG tablet. She stated it was causing all over joint pain. Would like a call back to discuss next steps - Mychart communication is ok too if she cannot be reached on the phone.      Action Taken: Message routed to:  Other: Cardio    Travel Screening: Not Applicable     Date of Service:                                                 Thank you!  [facility]

## 2025-02-24 ENCOUNTER — MYC MEDICAL ADVICE (OUTPATIENT)
Dept: FAMILY MEDICINE | Facility: CLINIC | Age: 60
End: 2025-02-24
Payer: COMMERCIAL

## 2025-02-25 ENCOUNTER — TELEPHONE (OUTPATIENT)
Dept: PULMONOLOGY | Facility: CLINIC | Age: 60
End: 2025-02-25

## 2025-02-25 ENCOUNTER — OFFICE VISIT (OUTPATIENT)
Dept: DERMATOLOGY | Facility: CLINIC | Age: 60
End: 2025-02-25
Attending: FAMILY MEDICINE
Payer: COMMERCIAL

## 2025-02-25 DIAGNOSIS — D22.9 MULTIPLE BENIGN NEVI: ICD-10-CM

## 2025-02-25 DIAGNOSIS — Z80.8 FAMILY HISTORY OF MELANOMA: ICD-10-CM

## 2025-02-25 DIAGNOSIS — D23.9 DERMATOFIBROMA: ICD-10-CM

## 2025-02-25 DIAGNOSIS — D23.9 DERMAL NEVUS: Primary | ICD-10-CM

## 2025-02-25 DIAGNOSIS — L81.4 LENTIGO: ICD-10-CM

## 2025-02-25 DIAGNOSIS — D18.01 ANGIOMA OF SKIN: ICD-10-CM

## 2025-02-25 DIAGNOSIS — L82.1 SEBORRHEIC KERATOSES: ICD-10-CM

## 2025-02-25 PROCEDURE — 99243 OFF/OP CNSLTJ NEW/EST LOW 30: CPT | Performed by: DERMATOLOGY

## 2025-02-25 NOTE — PROGRESS NOTES
Carolina Hernandez , a 59 year old year old female patient, I was asked to see by Dr. Oneill for spots on skin.  Patient has no other skin complaints today.  Remainder of the HPI, Meds, PMH, Allergies, FH, and SH was reviewed in chart.      Past Medical History:   Diagnosis Date    Asthma     C. difficile colitis     COPD (chronic obstructive pulmonary disease) (H)     Depression     Depressive disorder Have had it most of my life    Hypertension     Moderate persistent asthma with exacerbation 09/18/2007    Osteoarthritis     Sinusitis, chronic     Status post coronary angiogram 10/14/2022       Past Surgical History:   Procedure Laterality Date    ARTHROPLASTY KNEE Left 10/31/2023    Procedure: ARTHROPLASTY, KNEE, TOTAL left;  Surgeon: Giovanny Chong MD;  Location: [facility]    ARTHROPLASTY KNEE Right 10/28/2024    Procedure: ARTHROPLASTY, KNEE, TOTAL;  Surgeon: Giovanny Chong MD;  Location: [facility]    COLONOSCOPY  2017    CV CORONARY ANGIOGRAM N/A 10/14/2022    Procedure: Coronary Angiogram;  Surgeon: Fidel Martin MD;  Location:  [facility]    ESOPHAGOSCOPY, GASTROSCOPY, DUODENOSCOPY (EGD), COMBINED N/A 05/05/2022    Procedure: ESOPHAGOGASTRODUODENOSCOPY, WITH BIOPSY;  Surgeon: Timothy Oliva DO;  [facility]    LAPAROSCOPIC CHOLECYSTECTOMY WITH CHOLANGIOGRAMS N/A 11/21/2022    Procedure: CHOLECYSTECTOMY, LAPAROSCOPIC, WITH CHOLANGIOGRAM;  Surgeon: Eros Butt DO;  Location: [facility]        Family History   Problem Relation Age of Onset    Melanoma Father     Glaucoma Father     Coronary Artery Disease Father         stents    Cancer Maternal Grandfather     Coronary Artery Disease Paternal Grandfather     Schizophrenia Daughter     Diabetes Daughter     Cancer Daughter     Crohn's Disease No family hx of     Ulcerative Colitis No family hx of     GERD No family hx of     Stomach Cancer No family hx of        Social History     Socioeconomic History    Marital status:       Spouse name: Not on file    Number of children: Not on file    Years of education: Not on file    Highest education level: Not on file   Occupational History    Not on file   Tobacco Use    Smoking status: Former     Current packs/day: 0.00     Types: Cigarettes     Quit date: 1998     Years since quittin.6    Smokeless tobacco: Never   Vaping Use    Vaping status: Never Used   Substance and Sexual Activity    Alcohol use: Yes     Comment: 4-6 beers multiple times weekly    Drug use: No    Sexual activity: Not Currently     Partners: Male     Birth control/protection: Female Surgical   Other Topics Concern    Not on file   Social History Narrative    Not on file     Social Drivers of Health     Financial Resource Strain: Low Risk  (10/28/2024)    Financial Resource Strain     Within the past 12 months, have you or your family members you live with been unable to get utilities (heat, electricity) when it was really needed?: No   Food Insecurity: Low Risk  (10/28/2024)    Food Insecurity     Within the past 12 months, did you worry that your food would run out before you got money to buy more?: No     Within the past 12 months, did the food you bought just not last and you didn t have money to get more?: No   Transportation Needs: Low Risk  (10/28/2024)    Transportation Needs     Within the past 12 months, has lack of transportation kept you from medical appointments, getting your medicines, non-medical meetings or appointments, work, or from getting things that you need?: No   Physical Activity: Sufficiently Active (9/3/2024)    Exercise Vital Sign     Days of Exercise per Week: 3 days     Minutes of Exercise per Session: 60 min   Stress: Stress Concern Present (9/3/2024)    [employer] - Occupational Stress Questionnaire     Feeling of Stress : Very much   Social Connections: Unknown (9/3/2024)    Social Connection and Isolation Panel [NHANES]     Frequency of  Communication with Friends and Family: Not on file     Frequency of Social Gatherings with Friends and Family: Three times a week     Attends Scientologist Services: Not on file     Active Member of Clubs or Organizations: Not on file     Attends Club or Organization Meetings: Not on file     Marital Status: Not on file   Interpersonal Safety: Low Risk  (10/28/2024)    Interpersonal Safety     Do you feel physically and emotionally safe where you currently live?: Yes     Within the past 12 months, have you been hit, slapped, kicked or otherwise physically hurt by someone?: No     Within the past 12 months, have you been humiliated or emotionally abused in other ways by your partner or ex-partner?: No   Housing Stability: Low Risk  (10/28/2024)    Housing Stability     Do you have housing? : Yes     Are you worried about losing your housing?: No       Outpatient Encounter Medications as of 2/25/2025   Medication Sig Dispense Refill    albuterol (PROVENTIL) (2.5 MG/3ML) 0.083% neb solution inhale 1 vial (2.5 mg) by nebulization every 4 hours as needed for shortness of breath / dyspnea or wheezing 270 mL 11    albuterol (VENTOLIN HFA) 108 (90 Base) MCG/ACT inhaler INHALE 1-2 PUFFS INTO THE LUNGS EVERY 4 HOURS AS NEEDED FOR SHORTNESS OF BREATH/DYSPNEA OR WHEEZING . 18 g 4    amLODIPine (NORVASC) 2.5 MG tablet Take 2 tablets (5 mg) by mouth once daily. 180 tablet 3    aspirin 81 MG EC tablet Take 81 mg by mouth daily.      atorvastatin (LIPITOR) 10 MG tablet Take 1 tablet (10 mg) by mouth daily. 90 tablet 3    azithromycin (ZITHROMAX) 250 MG tablet Take two tabs by mouth 45 mins to an hour before dental procedure (Patient not taking: Reported on 2/12/2025) 2 tablet 4    cetirizine (ZYRTEC) 10 MG tablet Take 10 mg by mouth daily      fluticasone-salmeterol (ADVAIR) 500-50 MCG/ACT inhaler Inhale 1 puff into the lungs every 12 hours. 1 each 11    montelukast (SINGULAIR) 10 MG tablet Take 1 tablet (10 mg) by mouth at bedtime. 90  tablet 3    multivitamin (CENTRUM SILVER) tablet Take 1 tablet by mouth daily.      Omega-3 Fatty Acids (FISH OIL ULTRA) 1400 MG CAPS Take 1 capsule by mouth daily.      omeprazole 20 MG tablet Take 2 tablets (40 mg) by mouth daily 180 tablet 2    sertraline (ZOLOFT) 100 MG tablet Take 1 tablet (100 mg) by mouth daily. 90 tablet 1    sertraline (ZOLOFT) 25 MG tablet Take 2 tablets (50 mg) by mouth daily for 90 days 180 tablet 2    tiotropium (SPIRIVA) 18 MCG inhaled capsule Inhale 1 capsule (18 mcg) into the lungs daily. 30 capsule 11     No facility-administered encounter medications on file as of 2/25/2025.             Review Of Systems  Skin: As above  Eyes: negative  Ears/Nose/Throat: negative  Respiratory: No shortness of breath, dyspnea on exertion, cough, or hemoptysis  Cardiovascular: negative  Gastrointestinal: negative  Genitourinary: negative  Musculoskeletal: negative  Neurologic: negative  Psychiatric: negative  Hematologic/Lymphatic/Immunologic: negative  Endocrine: negative      O:   NAD, WDWN, Alert & Oriented, Mood & Affect wnl, Vitals stable   General appearance missy ii   Vitals stable   Alert, oriented and in no acute distress     pigmented macules on trunk and ext with regular borders and pigment networks   L leg firm papule   Stuck on papules and brown macules on trunk and ext    Red papules on trunk   Flesh colored papules on trunk      The remainder of the full exam was normal; the following areas were examined:  conjunctiva/lids, , neck, peripheral vascular system, abdomen, lymph nodes, digits/nails, eccrine and apocrine glands, scalp/hair, face, neck, chest, abdomen, buttocks, back, RUE, LUE, RLE, LLE       Eyes: Conjunctivae/lids:Normal     ENT: Lips, mucosa: normal    MSK:Normal    Cardiovascular: peripheral edema none    Pulm: Breathing Normal    Lymph Nodes: No Head and Neck Lymphadenopathy     Neuro/Psych: Orientation:Normal; Mood/Affect:Normal      A/P:  1. Seborrheic keratosis,  lentigo, angioma, dermal nevus, nevi, dermatofibroma, fmhx of melanoma dad   It was a pleasure speaking to Carolina Hernandez today.  Previous clinic  notes and pertinent laboratory tests were reviewed prior to Carolina Hernandez's visit.  Nature and genetics of benign skin lesions dicussed with patient.  Signs and Symptoms of skin cancer discussed with patient.  Patient encouraged to perform monthly skin exams.  UV precautions reviewed with patient.  Skin care regimen reviewed with patient: Eliminate harsh soaps, i.e. Dial, zest, irsih spring; Mild soaps such as Cetaphil or Dove sensitive skin, avoid hot or cold showers, aggressive use of emollients including vanicream, cetaphil or cerave discussed with patient.    Return to clinic 12 months

## 2025-02-25 NOTE — PATIENT INSTRUCTIONS
Proper skin care from [facility]:    -Eliminate harsh soaps as they strip the natural oils from the skin, often resulting in dry itchy skin ( i.e. Dial, Zest, [place] Spring)  -Use mild soaps such as Cetaphil or Dove Sensitive Skin in the shower. You do not need to use soap on arms, legs, and trunk every time you shower unless visibly soiled.   -Avoid hot or cold showers.  -After showering, lightly dry off and apply moisturizing within 2-3 minutes. This will help trap moisture in the skin.   -Aggressive use of a moisturizer at least 1-2 times a day to the entire body (including -Vanicream, Cetaphil, Aquaphor or Cerave) and moisturize hands after every washing.  -We recommend using moisturizers that come in a tub that needs to be scooped out, not a pump. This has more of an oil base. It will hold moisture in your skin much better than a water base moisturizer. The above recommended are non-pore clogging.      Wear a sunscreen with at least SPF 30 on your face, ears, neck and V of the chest daily. Wear sunscreen on other areas of the body if those areas are exposed to the sun throughout the day. Sunscreens can contain physical and/or chemical blockers. Physical blockers are less likely to clog pores, these include zinc oxide and titanium dioxide. Reapply every two hour and after swimming.     Sunscreen examples: https://www.ewg.org/sunscreen/    UV radiation  UVA radiation remains constant throughout the day and throughout the year. It is a longer wavelength than UVB and therefore penetrates deeper into the skin leading to immediate and delayed tanning, photoaging, and skin cancer. 70-80% of UVA and UVB radiation occurs between the hours of 10am-2pm.  UVB radiation  UVB radiation causes the most harmful effects and is more significant during the summer months. However, snow and ice can reflect UVB radiation leading to skin damage during the winter months as well. UVB radiation is responsible for tanning,  burning, inflammation, delayed erythema (pinkness), pigmentation (brown spots), and skin cancer.     I recommend self monthly full body exams and yearly full body exams with a dermatology provider. If you develop a new or changing lesion please follow up for examination. Most skin cancers are pink and scaly or pink and pearly. However, we do see blue/brown/black skin cancers.  Consider the ABCDEs of melanoma when giving yourself your monthly full body exam ( don't forget the groin, buttocks, feet, toes, etc). A-asymmetry, B-borders, C-color, D-diameter, E-elevation or evolving. If you see any of these changes please follow up in clinic. If you cannot see your back I recommend purchasing a hand held mirror to use with a larger wall mirror.       Checking for Skin Cancer  You can find cancer early by checking your skin each month. There are 3 kinds of skin cancer. They are melanoma, basal cell carcinoma, and squamous cell carcinoma. Doing monthly skin checks is the best way to find new marks or skin changes. Follow the instructions below for checking your skin.   The ABCDEs of checking moles for melanoma   Check your moles or growths for signs of melanoma using ABCDE:   Asymmetry: the sides of the mole or growth don t match  Border: the edges are ragged, notched, or blurred  Color: the color within the mole or growth varies  Diameter: the mole or growth is larger than 6 mm (size of a pencil eraser)  Evolving: the size, shape, or color of the mole or growth is changing (evolving is not shown in the images below)    Checking for other types of skin cancer  Basal cell carcinoma or squamous cell carcinoma have symptoms such as:     A spot or mole that looks different from all other marks on your skin  Changes in how an area feels, such as itching, tenderness, or pain  Changes in the skin's surface, such as oozing, bleeding, or scaliness  A sore that does not heal  New swelling or redness beyond the border of a  mole    Who s at risk?  Anyone can get skin cancer. But you are at greater risk if you have:   Fair skin, light-colored hair, or light-colored eyes  Many moles or abnormal moles on your skin  A history of sunburns from sunlight or tanning beds  A family history of skin cancer  A history of exposure to radiation or chemicals  A weakened immune system  If you have had skin cancer in the past, you are at risk for recurring skin cancer.   How to check your skin  Do your monthly skin checkups in front of a full-length mirror. Check all parts of your body, including your:   Head (ears, face, neck, and scalp)  Torso (front, back, and sides)  Arms (tops, undersides, upper, and lower armpits)  Hands (palms, backs, and fingers, including under the nails)  Buttocks and genitals  Legs (front, back, and sides)  Feet (tops, soles, toes, including under the nails, and between toes)  If you have a lot of moles, take digital photos of them each month. Make sure to take photos both up close and from a distance. These can help you see if any moles change over time.   Most skin changes are not cancer. But if you see any changes in your skin, call your doctor right away. Only he or she can diagnose a problem. If you have skin cancer, seeing your doctor can be the first step toward getting the treatment that could save your life.   [employer] last reviewed this educational content on 4/1/2019 2000-2020 [employer]. [address] [place] [address], [place] [address]. All rights reserved. This information is not intended as a substitute for professional medical care. Always follow your healthcare professional's instructions.       When should I call my doctor?  If you are worsening or not improving, please, contact us or seek urgent care as noted below.     Who should I call with questions (adults)?    [facility] [phone]  For urgent needs outside of business hours call the [facility] at  [phone] and ask for the dermatology resident on call to be paged  If this is a medical emergency and you are unable to reach an ER, Call [phone]      If you need a prescription refill, please contact your pharmacy. Refills are approved or denied by our Physicians during normal business hours, Monday through Friday.  Per office policy, refills will not be granted if you have not been seen within the past year (or sooner depending on the condition).

## 2025-02-25 NOTE — TELEPHONE ENCOUNTER
Patient Contacted for the patient to call back and schedule the following:    Appointment type: Return Pulm  Provider: Johnny  Return date: 8/26/2025  Specialty phone number: [phone]  Additional appointment(s) needed: loops and dlco  Additonal Notes: na

## 2025-02-25 NOTE — LETTER
2/25/2025      Carolina Hernandez  [address] [facility] [address]      Dear Colleague,    Thank you for referring your patient, Carolina Hernandez, to the [facility]. Please see a copy of my visit note below.    Carolina Hernandez , a 59 year old year old female patient, I was asked to see by Dr. Oneill for spots on skin.  Patient has no other skin complaints today.  Remainder of the HPI, Meds, PMH, Allergies, FH, and SH was reviewed in chart.      Past Medical History:   Diagnosis Date     Asthma      C. difficile colitis      COPD (chronic obstructive pulmonary disease) (H)      Depression      Depressive disorder Have had it most of my life     Hypertension      Moderate persistent asthma with exacerbation 09/18/2007     Osteoarthritis      Sinusitis, chronic      Status post coronary angiogram 10/14/2022       Past Surgical History:   Procedure Laterality Date     ARTHROPLASTY KNEE Left 10/31/2023    Procedure: ARTHROPLASTY, KNEE, TOTAL left;  Surgeon: Giovanny Chong MD;  Location: [facility] OR     ARTHROPLASTY KNEE Right 10/28/2024    Procedure: ARTHROPLASTY, KNEE, TOTAL;  Surgeon: Giovanny Chong MD;  Location: [facility] OR     COLONOSCOPY  2017     CV CORONARY ANGIOGRAM N/A 10/14/2022    Procedure: Coronary Angiogram;  Surgeon: Fidel Martin MD;  Location: [facility] CARDIAC CATH LAB     ESOPHAGOSCOPY, GASTROSCOPY, DUODENOSCOPY (EGD), COMBINED N/A 05/05/2022    Procedure: ESOPHAGOGASTRODUODENOSCOPY, WITH BIOPSY;  Surgeon: Timothy Oliva DO;  Location: [facility]     LAPAROSCOPIC CHOLECYSTECTOMY WITH CHOLANGIOGRAMS N/A 11/21/2022    Procedure: CHOLECYSTECTOMY, LAPAROSCOPIC, WITH CHOLANGIOGRAM;  Surgeon: Eros Butt DO;  Location: [facility] OR        Family History   Problem Relation Age of Onset     Melanoma Father      Glaucoma Father      Coronary Artery Disease Father         stents     Cancer Maternal Grandfather      Coronary Artery Disease Paternal Grandfather       Schizophrenia Daughter      Diabetes Daughter      Cancer Daughter      Crohn's Disease No family hx of      Ulcerative Colitis No family hx of      GERD No family hx of      Stomach Cancer No family hx of        Social History     Socioeconomic History     Marital status:      Spouse name: Not on file     Number of children: Not on file     Years of education: Not on file     Highest education level: Not on file   Occupational History     Not on file   Tobacco Use     Smoking status: Former     Current packs/day: 0.00     Types: Cigarettes     Quit date: 1998     Years since quittin.6     Smokeless tobacco: Never   Vaping Use     Vaping status: Never Used   Substance and Sexual Activity     Alcohol use: Yes     Comment: 4-6 beers multiple times weekly     Drug use: No     Sexual activity: Not Currently     Partners: Male     Birth control/protection: Female Surgical   Other Topics Concern     Not on file   Social History Narrative     Not on file     Social Drivers of Health     Financial Resource Strain: Low Risk  (10/28/2024)    Financial Resource Strain      Within the past 12 months, have you or your family members you live with been unable to get utilities (heat, electricity) when it was really needed?: No   Food Insecurity: Low Risk  (10/28/2024)    Food Insecurity      Within the past 12 months, did you worry that your food would run out before you got money to buy more?: No      Within the past 12 months, did the food you bought just not last and you didn t have money to get more?: No   Transportation Needs: Low Risk  (10/28/2024)    Transportation Needs      Within the past 12 months, has lack of transportation kept you from medical appointments, getting your medicines, non-medical meetings or appointments, work, or from getting things that you need?: No   Physical Activity: Sufficiently Active (9/3/2024)    Exercise Vital Sign      Days of Exercise per Week: 3 days      Minutes of  Exercise per Session: 60 min   Stress: Stress Concern Present (9/3/2024)    [employer] - Occupational Stress Questionnaire      Feeling of Stress : Very much   Social Connections: Unknown (9/3/2024)    Social Connection and Isolation Panel [NHANES]      Frequency of Communication with Friends and Family: Not on file      Frequency of Social Gatherings with Friends and Family: Three times a week      Attends Jain Services: Not on file      Active Member of Clubs or Organizations: Not on file      Attends Club or Organization Meetings: Not on file      Marital Status: Not on file   Interpersonal Safety: Low Risk  (10/28/2024)    Interpersonal Safety      Do you feel physically and emotionally safe where you currently live?: Yes      Within the past 12 months, have you been hit, slapped, kicked or otherwise physically hurt by someone?: No      Within the past 12 months, have you been humiliated or emotionally abused in other ways by your partner or ex-partner?: No   Housing Stability: Low Risk  (10/28/2024)    Housing Stability      Do you have housing? : Yes      Are you worried about losing your housing?: No       Outpatient Encounter Medications as of 2/25/2025   Medication Sig Dispense Refill     albuterol (PROVENTIL) (2.5 MG/3ML) 0.083% neb solution inhale 1 vial (2.5 mg) by nebulization every 4 hours as needed for shortness of breath / dyspnea or wheezing 270 mL 11     albuterol (VENTOLIN HFA) 108 (90 Base) MCG/ACT inhaler INHALE 1-2 PUFFS INTO THE LUNGS EVERY 4 HOURS AS NEEDED FOR SHORTNESS OF BREATH/DYSPNEA OR WHEEZING . 18 g 4     amLODIPine (NORVASC) 2.5 MG tablet Take 2 tablets (5 mg) by mouth once daily. 180 tablet 3     aspirin 81 MG EC tablet Take 81 mg by mouth daily.       atorvastatin (LIPITOR) 10 MG tablet Take 1 tablet (10 mg) by mouth daily. 90 tablet 3     azithromycin (ZITHROMAX) 250 MG tablet Take two tabs by mouth 45 mins to an hour before dental procedure  (Patient not taking: Reported on 2/12/2025) 2 tablet 4     cetirizine (ZYRTEC) 10 MG tablet Take 10 mg by mouth daily       fluticasone-salmeterol (ADVAIR) 500-50 MCG/ACT inhaler Inhale 1 puff into the lungs every 12 hours. 1 each 11     montelukast (SINGULAIR) 10 MG tablet Take 1 tablet (10 mg) by mouth at bedtime. 90 tablet 3     multivitamin (CENTRUM SILVER) tablet Take 1 tablet by mouth daily.       Omega-3 Fatty Acids (FISH OIL ULTRA) 1400 MG CAPS Take 1 capsule by mouth daily.       omeprazole 20 MG tablet Take 2 tablets (40 mg) by mouth daily 180 tablet 2     sertraline (ZOLOFT) 100 MG tablet Take 1 tablet (100 mg) by mouth daily. 90 tablet 1     sertraline (ZOLOFT) 25 MG tablet Take 2 tablets (50 mg) by mouth daily for 90 days 180 tablet 2     tiotropium (SPIRIVA) 18 MCG inhaled capsule Inhale 1 capsule (18 mcg) into the lungs daily. 30 capsule 11     No facility-administered encounter medications on file as of 2/25/2025.             Review Of Systems  Skin: As above  Eyes: negative  Ears/Nose/Throat: negative  Respiratory: No shortness of breath, dyspnea on exertion, cough, or hemoptysis  Cardiovascular: negative  Gastrointestinal: negative  Genitourinary: negative  Musculoskeletal: negative  Neurologic: negative  Psychiatric: negative  Hematologic/Lymphatic/Immunologic: negative  Endocrine: negative      O:   NAD, WDWN, Alert & Oriented, Mood & Affect wnl, Vitals stable   General appearance missy ii   Vitals stable   Alert, oriented and in no acute distress     pigmented macules on trunk and ext with regular borders and pigment networks   L leg firm papule   Stuck on papules and brown macules on trunk and ext    Red papules on trunk   Flesh colored papules on trunk      The remainder of the full exam was normal; the following areas were examined:  conjunctiva/lids, , neck, peripheral vascular system, abdomen, lymph nodes, digits/nails, eccrine and apocrine glands, scalp/hair, face, neck, chest, abdomen,  buttocks, back, RUE, LUE, RLE, LLE       Eyes: Conjunctivae/lids:Normal     ENT: Lips, mucosa: normal    MSK:Normal    Cardiovascular: peripheral edema none    Pulm: Breathing Normal    Lymph Nodes: No Head and Neck Lymphadenopathy     Neuro/Psych: Orientation:Normal; Mood/Affect:Normal      A/P:  1. Seborrheic keratosis, lentigo, angioma, dermal nevus, nevi, dermatofibroma, fmhx of melanoma dad   It was a pleasure speaking to Carolina Hernandez today.  Previous clinic  notes and pertinent laboratory tests were reviewed prior to Carolina Hernandez's visit.  Nature and genetics of benign skin lesions dicussed with patient.  Signs and Symptoms of skin cancer discussed with patient.  Patient encouraged to perform monthly skin exams.  UV precautions reviewed with patient.  Skin care regimen reviewed with patient: Eliminate harsh soaps, i.e. Dial, zest, irsih spring; Mild soaps such as Cetaphil or Dove sensitive skin, avoid hot or cold showers, aggressive use of emollients including vanicream, cetaphil or cerave discussed with patient.    Return to clinic 12 months      Again, thank you for allowing me to participate in the care of your patient.        Sincerely,        Nick Farley MD    Electronically signed

## 2025-03-24 DIAGNOSIS — J44.9 STAGE 2 MODERATE COPD BY GOLD CLASSIFICATION (H): ICD-10-CM

## 2025-03-24 RX ORDER — ALBUTEROL SULFATE 90 UG/1
INHALANT RESPIRATORY (INHALATION)
Qty: 18 G | Refills: 4 | Status: SHIPPED | OUTPATIENT
Start: 2025-03-24

## 2025-04-08 ENCOUNTER — TELEPHONE (OUTPATIENT)
Dept: FAMILY MEDICINE | Facility: CLINIC | Age: 60
End: 2025-04-08
Payer: COMMERCIAL

## 2025-04-08 NOTE — TELEPHONE ENCOUNTER
Reason for Call:  Appointment Request    Patient requesting this type of appt:  EST CARE    Requested provider:  Lakia Casiano    Reason patient unable to be scheduled: Needs to be scheduled by clinic    When does patient want to be seen/preferred time:  as soon as possible    Comments: patient is requesting to EST CARE with provider not taking new pt. Please advise at the number provided.    Could we send this information to you in [facility] or would you prefer to receive a phone call?:   Patient would prefer a phone call   Okay to leave a detailed message?: Yes at Cell number on file:    Telephone Information:   Mobile [phone]       Call taken on 4/8/2025 at 1:32 PM by Nydia Hamm

## 2025-04-11 ENCOUNTER — TELEPHONE (OUTPATIENT)
Dept: FAMILY MEDICINE | Facility: CLINIC | Age: 60
End: 2025-04-11

## 2025-04-11 ENCOUNTER — OFFICE VISIT (OUTPATIENT)
Dept: FAMILY MEDICINE | Facility: CLINIC | Age: 60
End: 2025-04-11
Payer: COMMERCIAL

## 2025-04-11 VITALS
TEMPERATURE: 98.2 F | HEIGHT: 62 IN | BODY MASS INDEX: 32.41 KG/M2 | HEART RATE: 81 BPM | DIASTOLIC BLOOD PRESSURE: 70 MMHG | WEIGHT: 176.1 LBS | RESPIRATION RATE: 18 BRPM | OXYGEN SATURATION: 96 % | SYSTOLIC BLOOD PRESSURE: 120 MMHG

## 2025-04-11 DIAGNOSIS — B37.31 YEAST INFECTION OF THE VAGINA: Primary | ICD-10-CM

## 2025-04-11 DIAGNOSIS — E66.811 CLASS 1 OBESITY WITH SERIOUS COMORBIDITY AND BODY MASS INDEX (BMI) OF 31.0 TO 31.9 IN ADULT, UNSPECIFIED OBESITY TYPE: ICD-10-CM

## 2025-04-11 DIAGNOSIS — R10.2 PELVIC PAIN IN FEMALE: ICD-10-CM

## 2025-04-11 DIAGNOSIS — R73.09 ELEVATED GLUCOSE LEVEL: ICD-10-CM

## 2025-04-11 LAB
ALBUMIN UR-MCNC: NEGATIVE MG/DL
ALT SERPL W P-5'-P-CCNC: 19 U/L (ref 0–50)
APPEARANCE UR: CLEAR
BASOPHILS # BLD AUTO: 0.1 10E3/UL (ref 0–0.2)
BASOPHILS NFR BLD AUTO: 1 %
BILIRUB UR QL STRIP: NEGATIVE
COLOR UR AUTO: YELLOW
EOSINOPHIL # BLD AUTO: 0.3 10E3/UL (ref 0–0.7)
EOSINOPHIL NFR BLD AUTO: 4 %
ERYTHROCYTE [DISTWIDTH] IN BLOOD BY AUTOMATED COUNT: 14.4 % (ref 10–15)
EST. AVERAGE GLUCOSE BLD GHB EST-MCNC: 120 MG/DL
GLUCOSE UR STRIP-MCNC: NEGATIVE MG/DL
HBA1C MFR BLD: 5.8 % (ref 0–5.6)
HCT VFR BLD AUTO: 40.1 % (ref 35–47)
HGB BLD-MCNC: 13.3 G/DL (ref 11.7–15.7)
HGB UR QL STRIP: NEGATIVE
IMM GRANULOCYTES # BLD: 0 10E3/UL
IMM GRANULOCYTES NFR BLD: 0 %
KETONES UR STRIP-MCNC: NEGATIVE MG/DL
LEUKOCYTE ESTERASE UR QL STRIP: NEGATIVE
LYMPHOCYTES # BLD AUTO: 2.5 10E3/UL (ref 0.8–5.3)
LYMPHOCYTES NFR BLD AUTO: 35 %
MCH RBC QN AUTO: 29.4 PG (ref 26.5–33)
MCHC RBC AUTO-ENTMCNC: 33.2 G/DL (ref 31.5–36.5)
MCV RBC AUTO: 89 FL (ref 78–100)
MONOCYTES # BLD AUTO: 0.8 10E3/UL (ref 0–1.3)
MONOCYTES NFR BLD AUTO: 11 %
NEUTROPHILS # BLD AUTO: 3.6 10E3/UL (ref 1.6–8.3)
NEUTROPHILS NFR BLD AUTO: 50 %
NITRATE UR QL: NEGATIVE
PH UR STRIP: 6 [PH] (ref 5–7)
PLATELET # BLD AUTO: 370 10E3/UL (ref 150–450)
RBC # BLD AUTO: 4.53 10E6/UL (ref 3.8–5.2)
RBC #/AREA URNS AUTO: ABNORMAL /HPF
SP GR UR STRIP: 1.01 (ref 1–1.03)
SQUAMOUS #/AREA URNS AUTO: ABNORMAL /LPF
UROBILINOGEN UR STRIP-ACNC: 0.2 E.U./DL
WBC # BLD AUTO: 7.2 10E3/UL (ref 4–11)
WBC #/AREA URNS AUTO: ABNORMAL /HPF

## 2025-04-11 PROCEDURE — 84460 ALANINE AMINO (ALT) (SGPT): CPT | Performed by: NURSE PRACTITIONER

## 2025-04-11 PROCEDURE — 83036 HEMOGLOBIN GLYCOSYLATED A1C: CPT | Performed by: NURSE PRACTITIONER

## 2025-04-11 PROCEDURE — 85025 COMPLETE CBC W/AUTO DIFF WBC: CPT | Performed by: NURSE PRACTITIONER

## 2025-04-11 PROCEDURE — 3078F DIAST BP <80 MM HG: CPT | Performed by: NURSE PRACTITIONER

## 2025-04-11 PROCEDURE — 1125F AMNT PAIN NOTED PAIN PRSNT: CPT | Performed by: NURSE PRACTITIONER

## 2025-04-11 PROCEDURE — 3074F SYST BP LT 130 MM HG: CPT | Performed by: NURSE PRACTITIONER

## 2025-04-11 PROCEDURE — 99214 OFFICE O/P EST MOD 30 MIN: CPT | Performed by: NURSE PRACTITIONER

## 2025-04-11 PROCEDURE — 81001 URINALYSIS AUTO W/SCOPE: CPT | Performed by: NURSE PRACTITIONER

## 2025-04-11 RX ORDER — FLUCONAZOLE 150 MG/1
150 TABLET ORAL ONCE
Qty: 1 TABLET | Refills: 0 | Status: SHIPPED | OUTPATIENT
Start: 2025-04-11 | End: 2025-04-11

## 2025-04-11 ASSESSMENT — PATIENT HEALTH QUESTIONNAIRE - PHQ9
SUM OF ALL RESPONSES TO PHQ QUESTIONS 1-9: 6
SUM OF ALL RESPONSES TO PHQ QUESTIONS 1-9: 6
10. IF YOU CHECKED OFF ANY PROBLEMS, HOW DIFFICULT HAVE THESE PROBLEMS MADE IT FOR YOU TO DO YOUR WORK, TAKE CARE OF THINGS AT HOME, OR GET ALONG WITH OTHER PEOPLE: VERY DIFFICULT

## 2025-04-11 ASSESSMENT — PAIN SCALES - GENERAL: PAINLEVEL_OUTOF10: MILD PAIN (3)

## 2025-04-11 NOTE — PATIENT INSTRUCTIONS
Start Diflucan, take one tablet for one dose to treat vaginal yeats infection    Schedule pelvic US    Labs today     Start Wegovy, if covered by insurance, to help with weight loss

## 2025-04-11 NOTE — TELEPHONE ENCOUNTER
Prior Authorization Retail Medication Request    Medication/Dose: Wegovy 0.25 mg  Diagnosis and ICD code (if different than what is on RX):    New/renewal/insurance change PA/secondary ins. PA:  Previously Tried and Failed:    Rationale:      Insurance   Primary: not provided  Insurance ID:  not provided  CM Key; [id number]    Secondary (if applicable):  Insurance ID:      Pharmacy Information (if different than what is on RX)  Name:    Phone:    Fax:    Clinic Information  Preferred routing pool for dept communication: p [phone]

## 2025-04-11 NOTE — PROGRESS NOTES
"  Assessment & Plan     Yeast infection of the vagina  -she tested positive for vaginal yeast infection-she went to see her OB GYN, Dr Katie Hudson yesterday and had wet prep test done, but patient states she still did not receive her results.   - REVIEW OF HEALTH MAINTENANCE PROTOCOL ORDERS  - start fluconazole (DIFLUCAN) 150 MG tablet; Take 1 tablet (150 mg) by mouth once for 1 dose.    Pelvic pain in female  -patient states she is on HRT, she gest mixed pellets with Testosterone, Estrogen and also takes Progesterone. She states she does not feel any benefits from HRT. She states she started HRT because she was hoping it will help her to loose weight, she never had vasomotor symptoms of menopause. I advised patient that there is no evidence to support that HRT can help postmenopausal patients with weight loss and advised her to discuss this with her OB GYN provider who is currently managing her HRT therapy    - UA with Microscopic reflex to Culture - lab collect; Future  - US Pelvic Complete with Transvaginal; Future  - CBC with platelets and differential; Future  - UA with Microscopic reflex to Culture - lab collect  - CBC with platelets and differential  - UA Microscopic with Reflex to Culture    Class 1 obesity with serious comorbidity and body mass index (BMI) of 31.0 to 31.9 in adult, unspecified obesity type    - will try semaglutide-weight management (WEGOVY) 0.25 MG/0.5ML pen; Inject 0.5 mLs (0.25 mg) subcutaneously once a week.    Elevated glucose level    - Hemoglobin A1c; Future  - Hemoglobin A1c      BMI  Estimated body mass index is 31.9 kg/m  as calculated from the following:    Height as of this encounter: 1.582 m (5' 2.3\").    Weight as of this encounter: 79.9 kg (176 lb 1.6 oz).   Weight management plan: Discussed healthy diet and exercise guidelines        Thor Figueroa is a 60 year old, presenting for the following health issues:  Establish Care and Abdominal Pain (Started 04/08/25)        " "4/11/2025    10:51 AM   Additional Questions   Roomed by Akanksha VELASQUEZ   Accompanied by Self     History of Present Illness       Reason for visit:  Establish Primary Care   She is taking medications regularly.    Patient is here to establish primary care. She also wants to talk about her weight gain and what she can do for that.       Pain History:  When did you first notice your pain? 04/08/25   Have you seen anyone else for your pain? Yes - Becca Wilson at the [facility]  How has your pain affected your ability to work? Not currently working - unrelated to pain  Where in your body do you have pain? Abdominal/Flank Pain  Onset/Duration: 04/08/25  Description:   Character: Cramping  Location: right lower quadrant left lower quadrant  Radiation: Pelvic region  Intensity: mild  Progression of Symptoms:  waxing and waning  Accompanying Signs & Symptoms:  Fever/Chills: No  Gas/Bloating: No  Nausea: No  Vomitting: No  Diarrhea: No  Constipation: No  Dysuria or Hematuria: No  History:   Trauma: No  Previous similar pain: No  Previous tests done: none  Precipitating factors:   Does the pain change with:     Food: No    Bowel Movement: No    Urination: No   Other factors:  No  Therapies tried and outcome: None  No LMP recorded. Patient is postmenopausal.        Review of Systems  Constitutional, HEENT, cardiovascular, pulmonary, gi and gu systems are negative, except as otherwise noted.      Objective    /70 (BP Location: Left arm, Patient Position: Sitting, Cuff Size: Adult Large)   Pulse 81   Temp 98.2  F (36.8  C) (Tympanic)   Resp 18   Ht 1.582 m (5' 2.3\")   Wt 79.9 kg (176 lb 1.6 oz)   SpO2 96%   BMI 31.90 kg/m    Body mass index is 31.9 kg/m .  Physical Exam   GENERAL: alert and no distress  EYES: Eyes grossly normal to inspection, PERRL and conjunctivae and sclerae normal  RESP: lungs clear to auscultation - no rales, rhonchi or wheezes  CV: regular rate and rhythm, normal S1 S2, no S3 or S4, no " murmur, click or rub, no peripheral edema   MS: no gross musculoskeletal defects noted, no edema  NEURO: Normal strength and tone, mentation intact and speech normal  PSYCH: mentation appears normal, affect normal/bright    Results for orders placed or performed in visit on 04/11/25 (from the past 24 hours)   ALT   Result Value Ref Range    ALT 19 0 - 50 U/L   UA with Microscopic reflex to Culture - lab collect    Specimen: Urine, Clean Catch   Result Value Ref Range    Color Urine Yellow Colorless, Straw, Light Yellow, Yellow    Appearance Urine Clear Clear    Glucose Urine Negative Negative mg/dL    Bilirubin Urine Negative Negative    Ketones Urine Negative Negative mg/dL    Specific Gravity Urine 1.010 1.003 - 1.035    Blood Urine Negative Negative    pH Urine 6.0 5.0 - 7.0    Protein Albumin Urine Negative Negative mg/dL    Urobilinogen Urine 0.2 0.2, 1.0 E.U./dL    Nitrite Urine Negative Negative    Leukocyte Esterase Urine Negative Negative   CBC with platelets and differential    Narrative    The following orders were created for panel order CBC with platelets and differential.  Procedure                               Abnormality         Status                     ---------                               -----------         ------                     CBC with platelets and ...[[id number]]                      Final result                 Please view results for these tests on the individual orders.   Hemoglobin A1c   Result Value Ref Range    Estimated Average Glucose 120 (H) <117 mg/dL    Hemoglobin A1C 5.8 (H) 0.0 - 5.6 %   CBC with platelets and differential   Result Value Ref Range    WBC Count 7.2 4.0 - 11.0 10e3/uL    RBC Count 4.53 3.80 - 5.20 10e6/uL    Hemoglobin 13.3 11.7 - 15.7 g/dL    Hematocrit 40.1 35.0 - 47.0 %    MCV 89 78 - 100 fL    MCH 29.4 26.5 - 33.0 pg    MCHC 33.2 31.5 - 36.5 g/dL    RDW 14.4 10.0 - 15.0 %    Platelet Count 370 150 - 450 10e3/uL    % Neutrophils 50 %    % Lymphocytes 35  %    % Monocytes 11 %    % Eosinophils 4 %    % Basophils 1 %    % Immature Granulocytes 0 %    Absolute Neutrophils 3.6 1.6 - 8.3 10e3/uL    Absolute Lymphocytes 2.5 0.8 - 5.3 10e3/uL    Absolute Monocytes 0.8 0.0 - 1.3 10e3/uL    Absolute Eosinophils 0.3 0.0 - 0.7 10e3/uL    Absolute Basophils 0.1 0.0 - 0.2 10e3/uL    Absolute Immature Granulocytes 0.0 <=0.4 10e3/uL   UA Microscopic with Reflex to Culture   Result Value Ref Range    RBC Urine None Seen 0-2 /HPF /HPF    WBC Urine None Seen 0-5 /HPF /HPF    Squamous Epithelials Urine Few (A) None Seen /LPF    Narrative    Urine Culture not indicated           Signed Electronically by: VALERIA Langford CNP

## 2025-04-15 NOTE — TELEPHONE ENCOUNTER
PA Initiation    Medication: WEGOVY 0.25 MG/0.5ML SC SOAJ  Insurance Company: [employer] - Phone [phone] Fax [phone]  Pharmacy Filling the Rx: [id number] - [place], [place] - [address] [place] [address]  Filling Pharmacy Phone: [phone]  Filling Pharmacy Fax:    Start Date: 4/15/2025  Retail Pharmacy Prior Authorization Team   Phone: [phone]

## 2025-04-16 NOTE — TELEPHONE ENCOUNTER
PRIOR AUTHORIZATION DENIED    Medication: WEGOVY 0.25 MG/0.5ML SC SOAJ  Insurance Company: [employer] - Phone [phone] Fax [phone]  Denial Date: 4/16/2025  Denial Reason(s):     Appeal Information:     Patient Notified: The clinic should notify the patient of the denial and discuss possible change in medication.

## 2025-04-17 RX ORDER — PHENTERMINE HYDROCHLORIDE 15 MG/1
15 CAPSULE ORAL EVERY MORNING
Qty: 30 CAPSULE | Refills: 1 | Status: SHIPPED | OUTPATIENT
Start: 2025-04-17

## 2025-04-17 NOTE — TELEPHONE ENCOUNTER
Lakia,    Please see TE on denial of Wegovy. Would you like to appeal or try a different med?    Teresa Gonzáles RN

## 2025-04-17 NOTE — TELEPHONE ENCOUNTER
Patient notified, agreeable to plan.      Gissel Howard, [facility] Luis Eat

## 2025-04-17 NOTE — TELEPHONE ENCOUNTER
Isamar Miranda  [facility] Pool13 hours ago (5:02 PM)     SW  Hi,  This medication was denied. If the provider would like to appeal please provide a letter of medical necessity and route back to the team. Otherwise please notify the patient and close the encounter.  Thank you,  Central PA Team  Retail Pharmacy Prior Authorization Team  Phone: [phone]

## 2025-04-18 ENCOUNTER — HOSPITAL ENCOUNTER (OUTPATIENT)
Dept: ULTRASOUND IMAGING | Facility: CLINIC | Age: 60
Discharge: HOME | End: 2025-04-18
Attending: NURSE PRACTITIONER | Admitting: NURSE PRACTITIONER
Payer: COMMERCIAL

## 2025-04-18 DIAGNOSIS — R10.2 PELVIC PAIN IN FEMALE: ICD-10-CM

## 2025-04-18 PROCEDURE — 76830 TRANSVAGINAL US NON-OB: CPT

## 2025-04-23 ENCOUNTER — OFFICE VISIT (OUTPATIENT)
Dept: FAMILY MEDICINE | Facility: CLINIC | Age: 60
End: 2025-04-23
Payer: COMMERCIAL

## 2025-04-23 ENCOUNTER — LAB (OUTPATIENT)
Dept: LAB | Facility: CLINIC | Age: 60
End: 2025-04-23
Payer: COMMERCIAL

## 2025-04-23 VITALS
HEART RATE: 86 BPM | BODY MASS INDEX: 32.48 KG/M2 | DIASTOLIC BLOOD PRESSURE: 72 MMHG | HEIGHT: 62 IN | TEMPERATURE: 97 F | OXYGEN SATURATION: 98 % | WEIGHT: 176.5 LBS | RESPIRATION RATE: 16 BRPM | SYSTOLIC BLOOD PRESSURE: 130 MMHG

## 2025-04-23 DIAGNOSIS — E78.5 HYPERLIPIDEMIA LDL GOAL <100: ICD-10-CM

## 2025-04-23 DIAGNOSIS — E66.811 CLASS 1 OBESITY WITHOUT SERIOUS COMORBIDITY WITH BODY MASS INDEX (BMI) OF 31.0 TO 31.9 IN ADULT, UNSPECIFIED OBESITY TYPE: ICD-10-CM

## 2025-04-23 DIAGNOSIS — I25.10 CORONARY ARTERY DISEASE INVOLVING NATIVE CORONARY ARTERY WITHOUT ANGINA PECTORIS, UNSPECIFIED WHETHER NATIVE OR TRANSPLANTED HEART: ICD-10-CM

## 2025-04-23 DIAGNOSIS — Z12.4 CERVICAL CANCER SCREENING: Primary | ICD-10-CM

## 2025-04-23 LAB
CHOLEST SERPL-MCNC: 243 MG/DL
FASTING STATUS PATIENT QL REPORTED: YES
HDLC SERPL-MCNC: 71 MG/DL
LDLC SERPL CALC-MCNC: 151 MG/DL
NONHDLC SERPL-MCNC: 172 MG/DL
TRIGL SERPL-MCNC: 104 MG/DL

## 2025-04-23 PROCEDURE — 3078F DIAST BP <80 MM HG: CPT | Performed by: NURSE PRACTITIONER

## 2025-04-23 PROCEDURE — 3075F SYST BP GE 130 - 139MM HG: CPT | Performed by: NURSE PRACTITIONER

## 2025-04-23 PROCEDURE — 87624 HPV HI-RISK TYP POOLED RSLT: CPT | Performed by: NURSE PRACTITIONER

## 2025-04-23 PROCEDURE — 99214 OFFICE O/P EST MOD 30 MIN: CPT | Performed by: NURSE PRACTITIONER

## 2025-04-23 NOTE — PROGRESS NOTES
"  Assessment & Plan     Cervical cancer screening    - HPV and Gynecologic Cytology Panel - Recommended Age 30-65 Years    Hyperlipidemia LDL goal <100  -discussed recent lipid panel  -elevated LDL. Patient would like to try diet and exercise plan first, if no improvement in 2-3 months and if her LDL level still will be elevated she agreed to start Zetia 10 mg daily-this was discussed today   - Lipid panel reflex to direct LDL Fasting; Future    Class 1 obesity without serious comorbidity with body mass index (BMI) of 31.0 to 31.9 in adult, unspecified obesity type  -patient will try Phentermine, discussed possible side effects   -discussed healthy diet and exercise plan-she will try to walk more and will exercise at home on her stationary bicycle        Subjective   Carolina is a 60 year old, presenting for the following health issues:  Recheck Medication (Phentermine- Wants to make sure its okay if she takes this while taking Sertraline. ) and Lipids (Wants to discuss getting on a medication for her cholesterol )        4/23/2025     9:48 AM   Additional Questions   Roomed by jacki   Accompanied by self         4/23/2025     9:48 AM   Patient Reported Additional Medications   Patient reports taking the following new medications none     HPI          Review of Systems  Constitutional, HEENT, cardiovascular, pulmonary, gi and gu systems are negative, except as otherwise noted.      Objective    /72   Pulse 86   Temp 97  F (36.1  C) (Tympanic)   Resp 16   Ht 1.582 m (5' 2.3\")   Wt 80.1 kg (176 lb 8 oz)   SpO2 98%   BMI 31.97 kg/m    Body mass index is 31.97 kg/m .  Physical Exam   GENERAL: alert and no distress  EYES: Eyes grossly normal to inspection, PERRL and conjunctivae and sclerae normal  CV: regular rate and rhythm, normal S1 S2, no S3 or S4, no murmur, click or rub, no peripheral edema    (female): normal female external genitalia, normal urethral meatus, normal vaginal mucosa  NEURO: Normal " strength and tone, mentation intact and speech normal  PSYCH: mentation appears normal, affect normal/bright    Recent Labs   Lab Test 04/23/25  0912 09/03/24  1159   CHOL 243* 250*   HDL 71 83   * 139*   TRIG 104 141      The 10-year ASCVD risk score (Amanda SUMMERS, et al., 2019) is: 5.5%    Values used to calculate the score:      Age: 60 years      Sex: Female      Is Non- : No      Diabetic: No      Tobacco smoker: No      Systolic Blood Pressure: 130 mmHg      Is BP treated: Yes      HDL Cholesterol: 71 mg/dL      Total Cholesterol: 243 mg/dL         Signed Electronically by: VALERIA Langford CNP

## 2025-04-23 NOTE — PATIENT INSTRUCTIONS
The 10-year ASCVD risk score (Amanda SUMMERS, et al., 2019) is: 5.5%    Values used to calculate the score:      Age: 60 years      Sex: Female      Is Non- : No      Diabetic: No      Tobacco smoker: No      Systolic Blood Pressure: 130 mmHg      Is BP treated: Yes      HDL Cholesterol: 71 mg/dL      Total Cholesterol: 243 mg/dL

## 2025-04-24 LAB
HPV HR 12 DNA CVX QL NAA+PROBE: NEGATIVE
HPV16 DNA CVX QL NAA+PROBE: NEGATIVE
HPV18 DNA CVX QL NAA+PROBE: NEGATIVE
HUMAN PAPILLOMA VIRUS FINAL DIAGNOSIS: NORMAL

## 2025-04-26 DIAGNOSIS — K21.00 GASTROESOPHAGEAL REFLUX DISEASE WITH ESOPHAGITIS WITHOUT HEMORRHAGE: ICD-10-CM

## 2025-04-27 LAB
BKR AP ASSOCIATED HPV REPORT: NORMAL
BKR LAB AP GYN ADEQUACY: NORMAL
BKR LAB AP GYN INTERPRETATION: NORMAL
BKR LAB AP PREVIOUS ABNORMAL: NORMAL
PATH REPORT.COMMENTS IMP SPEC: NORMAL
PATH REPORT.COMMENTS IMP SPEC: NORMAL
PATH REPORT.RELEVANT HX SPEC: NORMAL

## 2025-05-21 DIAGNOSIS — I10 ESSENTIAL HYPERTENSION: ICD-10-CM

## 2025-05-21 RX ORDER — AMLODIPINE BESYLATE 2.5 MG/1
5 TABLET ORAL
Qty: 180 TABLET | Refills: 1 | Status: SHIPPED | OUTPATIENT
Start: 2025-05-21

## 2025-06-23 ENCOUNTER — TELEPHONE (OUTPATIENT)
Dept: FAMILY MEDICINE | Facility: CLINIC | Age: 60
End: 2025-06-23
Payer: COMMERCIAL

## 2025-06-23 NOTE — TELEPHONE ENCOUNTER
Patient calls with request for a different medication.   She started phentermine 15 mg for weight loss a couple months ago, states it worked well for the first month and she lost 10 lbs, but now hasn't lost any the 2nd month.  Current weight is 165 lbs.  Appears last clinic weight on 4/23/25 was 176 lbs. Patient is wondering about an alternative medication, states Wegovy was discussed at one point, but insurance wouldn't cover it.   RN recommended a follow-up visit with PCP in clinic for weight, discussion of progress, medications, etc.  Patient is agreeable and is scheduled for later this week.    Kassi Ovalle RN  [facility]

## 2025-06-26 ENCOUNTER — OFFICE VISIT (OUTPATIENT)
Dept: FAMILY MEDICINE | Facility: CLINIC | Age: 60
End: 2025-06-26
Payer: COMMERCIAL

## 2025-06-26 VITALS
WEIGHT: 167.6 LBS | HEART RATE: 98 BPM | BODY MASS INDEX: 30.84 KG/M2 | RESPIRATION RATE: 20 BRPM | OXYGEN SATURATION: 92 % | SYSTOLIC BLOOD PRESSURE: 120 MMHG | DIASTOLIC BLOOD PRESSURE: 80 MMHG | TEMPERATURE: 97.5 F | HEIGHT: 62 IN

## 2025-06-26 DIAGNOSIS — E66.811 CLASS 1 OBESITY WITHOUT SERIOUS COMORBIDITY WITH BODY MASS INDEX (BMI) OF 30.0 TO 30.9 IN ADULT, UNSPECIFIED OBESITY TYPE: Primary | ICD-10-CM

## 2025-06-26 PROBLEM — J45.50 SEVERE PERSISTENT ASTHMA WITHOUT COMPLICATION (H): Status: RESOLVED | Noted: 2024-02-07 | Resolved: 2025-06-26

## 2025-06-26 ASSESSMENT — ANXIETY QUESTIONNAIRES
1. FEELING NERVOUS, ANXIOUS, OR ON EDGE: SEVERAL DAYS
GAD7 TOTAL SCORE: 10
7. FEELING AFRAID AS IF SOMETHING AWFUL MIGHT HAPPEN: MORE THAN HALF THE DAYS
4. TROUBLE RELAXING: NOT AT ALL
GAD7 TOTAL SCORE: 10
3. WORRYING TOO MUCH ABOUT DIFFERENT THINGS: MORE THAN HALF THE DAYS
2. NOT BEING ABLE TO STOP OR CONTROL WORRYING: SEVERAL DAYS
6. BECOMING EASILY ANNOYED OR IRRITABLE: MORE THAN HALF THE DAYS
5. BEING SO RESTLESS THAT IT IS HARD TO SIT STILL: MORE THAN HALF THE DAYS

## 2025-06-26 ASSESSMENT — PAIN SCALES - GENERAL: PAINLEVEL_OUTOF10: NO PAIN (0)

## 2025-06-26 ASSESSMENT — PATIENT HEALTH QUESTIONNAIRE - PHQ9: SUM OF ALL RESPONSES TO PHQ QUESTIONS 1-9: 3

## 2025-06-26 NOTE — PROGRESS NOTES
{PROVIDER CHARTING PREFERENCE:282806}    Thor Figueroa is a 60 year old, presenting for the following health issues:  Recheck Medication        6/26/2025     3:21 PM   Additional Questions   Roomed by Lakia SIBLEY LPN   Accompanied by self     HPI        {SUPERLIST (Optional):614237}  {additonal problems for provider to add (Optional):830433}    {ROS Picklists (Optional):183613}      Objective    There were no vitals taken for this visit.  There is no height or weight on file to calculate BMI.  Physical Exam   {Exam List (Optional):652606}    {Diagnostic Test Results (Optional):230980}        Signed Electronically by: VALERIA Langford CNP  {Email feedback regarding this note to [email or web address]   :825519}

## 2025-06-26 NOTE — PATIENT INSTRUCTIONS
Wt Readings from Last 2 Encounters:   06/26/25 76 kg (167 lb 9.6 oz)   04/23/25 80.1 kg (176 lb 8 oz)

## 2025-06-27 ENCOUNTER — TELEPHONE (OUTPATIENT)
Dept: FAMILY MEDICINE | Facility: CLINIC | Age: 60
End: 2025-06-27
Payer: COMMERCIAL

## 2025-06-27 DIAGNOSIS — E66.811 CLASS 1 OBESITY WITHOUT SERIOUS COMORBIDITY WITH BODY MASS INDEX (BMI) OF 30.0 TO 30.9 IN ADULT, UNSPECIFIED OBESITY TYPE: ICD-10-CM

## 2025-06-27 NOTE — TELEPHONE ENCOUNTER
Prior Authorization Retail Medication Request    Medication/Dose: zepbound  Diagnosis and ICD code (if different than what is on RX):    New/renewal/insurance change PA/secondary ins. PA:  Previously Tried and Failed:  phentermine; wegovy  Rationale:      Insurance   Primary: [phone]  Insurance ID:  [id number]  [facility] Key: [id number]    Secondary (if applicable):  Insurance ID:      Pharmacy Information (if different than what is on RX)  Name:    Phone:    Fax:    Clinic Information  Preferred routing pool for dept communication: p [phone]

## 2025-06-30 NOTE — TELEPHONE ENCOUNTER
PA Initiation    Medication: ZEPBOUND 2.5 MG/0.5ML SC SOAJ  Insurance Company: [employer] - Phone [phone] Fax [phone]  Pharmacy Filling the Rx: [id number] - [place], [place] - [address] [place] [address]  Filling Pharmacy Phone:    Filling Pharmacy Fax: [phone]  Start Date: 6/30/2025

## 2025-07-01 NOTE — TELEPHONE ENCOUNTER
PRIOR AUTHORIZATION DENIED    Medication: ZEPBOUND 2.5 MG/0.5ML SC SOAJ  Insurance Company: [employer] - Phone [phone] Fax [phone]  Denial Date: 7/1/2025  Denial Reason(s): patient must try and fail Saxanda and Wegovy  Appeal Information: see attached  Patient Notified: no

## 2025-07-02 ENCOUNTER — TELEPHONE (OUTPATIENT)
Dept: FAMILY MEDICINE | Facility: CLINIC | Age: 60
End: 2025-07-02
Payer: COMMERCIAL

## 2025-07-02 RX ORDER — PHENTERMINE AND TOPIRAMATE 7.5; 46 MG/1; MG/1
1 CAPSULE, EXTENDED RELEASE ORAL EVERY MORNING
Qty: 30 CAPSULE | Refills: 0 | Status: SHIPPED | OUTPATIENT
Start: 2025-07-02

## 2025-07-02 NOTE — TELEPHONE ENCOUNTER
Zepbound was denied.     I sent prescription for Phentermine-Topamax (which has lower dose of Phentermine), this was discussed during clinic visit as alternative option.    VALERIA Langford CNP

## 2025-07-02 NOTE — TELEPHONE ENCOUNTER
Prior Authorization Retail Medication Request    Medication/Dose: Phentermine-topiramate   Diagnosis and ICD code (if different than what is on RX):    New/renewal/insurance change PA/secondary ins. PA:  Previously Tried and Failed:  zepbound; wegovy; phentermine  Rationale:  p    Insurance   Primary: not provided  Insurance ID:  not provided  CMM Key: [id number]    Secondary (if applicable):  Insurance ID:      Pharmacy Information (if different than what is on RX)  Name:    Phone:    Fax:    Clinic Information  Preferred routing pool for dept communication: [phone] [id number]

## 2025-07-03 NOTE — TELEPHONE ENCOUNTER
Retail Pharmacy Prior Authorization Team   Phone: [phone]      [facility] Key: STEPH TELLES Initiation    Medication: PHENTERMINE-TOPIRAMATE ER 7.5-46 MG PO CP24  Insurance Company: [employer] - Phone [phone] Fax [phone]  Pharmacy Filling the Rx: [id number] - [place], [place] - [address] [place] [address]  Filling Pharmacy Phone: [phone]  Filling Pharmacy Fax: [phone]  Start Date: 7/3/2025

## 2025-07-07 NOTE — TELEPHONE ENCOUNTER
Attempted to reach patient to: Relay a message    Regarding: Medication denial  Action to take: OK to relay     Mychart sent.     ROBERT Man

## 2025-07-07 NOTE — TELEPHONE ENCOUNTER
PRIOR AUTHORIZATION DENIED - COVERING FOR JIE RUTLEDGECIERRA 7/7/2025    Medication: PHENTERMINE-TOPIRAMATE ER 7.5-46 MG PO CP24  Insurance Company: [employer] - Phone [phone] Fax [phone]  Denial Date: 7/7/2025  Denial Reason(s): NOT COVERED-EXCLUDED  Appeal Information: NO  Patient Notified: NO

## 2025-07-07 NOTE — TELEPHONE ENCOUNTER
PRIOR AUTHORIZATION DENIED -DOC FOR JIE LYNN CALLUM 7/7/2025    Medication: PHENTERMINE-TOPIRAMATE ER 7.5-46 MG PO CP24  Insurance Company: [employer] - Phone [phone] Fax [phone]  Denial Date:    Denial Reason(s): SEE ATTACHED  Appeal Information: EXCLUDED  Patient Notified: NO

## 2025-07-08 ENCOUNTER — MYC MEDICAL ADVICE (OUTPATIENT)
Dept: FAMILY MEDICINE | Facility: CLINIC | Age: 60
End: 2025-07-08
Payer: COMMERCIAL

## 2025-07-09 NOTE — TELEPHONE ENCOUNTER
[employer] sent to patient for more information.    Kassi Ovalle RN  [facility]

## 2025-07-10 NOTE — TELEPHONE ENCOUNTER
Fax received from pharmacy:    Medicaid plan. Pharmacy can not cash out Rx, please sent alt. Rx.     Gissel Howard, [facility]

## 2025-07-21 ENCOUNTER — PATIENT OUTREACH (OUTPATIENT)
Dept: CARE COORDINATION | Facility: CLINIC | Age: 60
End: 2025-07-21
Payer: COMMERCIAL

## 2025-07-23 ENCOUNTER — PATIENT OUTREACH (OUTPATIENT)
Dept: CARE COORDINATION | Facility: CLINIC | Age: 60
End: 2025-07-23
Payer: COMMERCIAL

## 2025-08-04 ENCOUNTER — PATIENT OUTREACH (OUTPATIENT)
Dept: CARE COORDINATION | Facility: CLINIC | Age: 60
End: 2025-08-04
Payer: COMMERCIAL

## 2025-08-12 DIAGNOSIS — F33.1 MODERATE EPISODE OF RECURRENT MAJOR DEPRESSIVE DISORDER (H): ICD-10-CM

## 2025-08-12 RX ORDER — SERTRALINE HYDROCHLORIDE 100 MG/1
100 TABLET, FILM COATED ORAL DAILY
Qty: 90 TABLET | Refills: 1 | Status: SHIPPED | OUTPATIENT
Start: 2025-08-12

## 2025-08-18 ENCOUNTER — OFFICE VISIT (OUTPATIENT)
Dept: PULMONOLOGY | Facility: CLINIC | Age: 60
End: 2025-08-18
Payer: COMMERCIAL

## 2025-08-18 VITALS
HEART RATE: 94 BPM | SYSTOLIC BLOOD PRESSURE: 139 MMHG | BODY MASS INDEX: 30.79 KG/M2 | OXYGEN SATURATION: 95 % | HEIGHT: 62 IN | DIASTOLIC BLOOD PRESSURE: 78 MMHG | WEIGHT: 167.3 LBS

## 2025-08-18 DIAGNOSIS — J44.9 STAGE 2 MODERATE COPD BY GOLD CLASSIFICATION (H): ICD-10-CM

## 2025-08-18 DIAGNOSIS — J45.50 SEVERE PERSISTENT ASTHMA WITHOUT COMPLICATION (H): ICD-10-CM

## 2025-08-18 LAB
DLCOUNC-%PRED-PRE: 96 %
DLCOUNC-PRE: 18.15 ML/MIN/MMHG
DLCOUNC-PRED: 18.83 ML/MIN/MMHG
ERV-PRED: 0.75 L
EXPTIME-PRE: 8.9 SEC
FEF2575-%PRED-PRE: 19 %
FEF2575-PRE: 0.38 L/SEC
FEF2575-PRED: 1.99 L/SEC
FEFMAX-%PRED-PRE: 72 %
FEFMAX-PRE: 4.28 L/SEC
FEFMAX-PRED: 5.93 L/SEC
FEV1-%PRED-PRE: 56 %
FEV1-PRE: 1.24 L
FEV1FEV6-PRE: 61 %
FEV1FEV6-PRED: 80 %
FEV1FVC-PRE: 54 %
FEV1FVC-PRED: 80 %
FEV1SVC-PRED: 70 L
FIFMAX-PRE: 2.17 L/SEC
FVC-%PRED-PRE: 83 %
FVC-PRE: 2.3 L
FVC-PRED: 2.75 L
IC-PRED: 2.31 L
Lab: 67 %
VA-%PRED-PRE: 107 %
VA-PRE: 4.74 L
VC-PRED: 3.15 L

## 2025-08-18 PROCEDURE — 99214 OFFICE O/P EST MOD 30 MIN: CPT | Mod: 25

## 2025-08-18 PROCEDURE — 3075F SYST BP GE 130 - 139MM HG: CPT

## 2025-08-18 PROCEDURE — G0463 HOSPITAL OUTPT CLINIC VISIT: HCPCS

## 2025-08-18 PROCEDURE — 94375 RESPIRATORY FLOW VOLUME LOOP: CPT | Performed by: INTERNAL MEDICINE

## 2025-08-18 PROCEDURE — 3078F DIAST BP <80 MM HG: CPT

## 2025-08-18 PROCEDURE — 1126F AMNT PAIN NOTED NONE PRSNT: CPT

## 2025-08-18 PROCEDURE — 94729 DIFFUSING CAPACITY: CPT | Performed by: INTERNAL MEDICINE

## 2025-08-18 RX ORDER — ALBUTEROL SULFATE 0.83 MG/ML
SOLUTION RESPIRATORY (INHALATION)
Qty: 270 ML | Refills: 11 | Status: SHIPPED | OUTPATIENT
Start: 2025-08-18

## 2025-08-18 RX ORDER — FLUTICASONE PROPIONATE AND SALMETEROL 500; 50 UG/1; UG/1
1 POWDER RESPIRATORY (INHALATION) EVERY 12 HOURS
Qty: 1 EACH | Refills: 11 | Status: SHIPPED | OUTPATIENT
Start: 2025-08-18

## 2025-08-18 RX ORDER — ALBUTEROL SULFATE 90 UG/1
INHALANT RESPIRATORY (INHALATION)
Qty: 18 G | Refills: 4 | Status: SHIPPED | OUTPATIENT
Start: 2025-08-18

## 2025-08-18 RX ORDER — MONTELUKAST SODIUM 10 MG/1
10 TABLET ORAL AT BEDTIME
Qty: 90 TABLET | Refills: 3 | Status: SHIPPED | OUTPATIENT
Start: 2025-08-18

## 2025-08-18 ASSESSMENT — PAIN SCALES - GENERAL: PAINLEVEL_OUTOF10: NO PAIN (0)

## 2025-08-19 DIAGNOSIS — Z78.0 MENOPAUSE: Primary | ICD-10-CM

## 2025-08-28 ENCOUNTER — OFFICE VISIT (OUTPATIENT)
Dept: FAMILY MEDICINE | Facility: CLINIC | Age: 60
End: 2025-08-28
Payer: COMMERCIAL

## 2025-08-28 ENCOUNTER — RESULTS FOLLOW-UP (OUTPATIENT)
Dept: FAMILY MEDICINE | Facility: CLINIC | Age: 60
End: 2025-08-28

## 2025-08-28 VITALS
DIASTOLIC BLOOD PRESSURE: 66 MMHG | OXYGEN SATURATION: 98 % | HEART RATE: 78 BPM | BODY MASS INDEX: 30.91 KG/M2 | TEMPERATURE: 97.6 F | HEIGHT: 62 IN | WEIGHT: 168 LBS | RESPIRATION RATE: 16 BRPM | SYSTOLIC BLOOD PRESSURE: 110 MMHG

## 2025-08-28 DIAGNOSIS — Z00.00 ANNUAL PHYSICAL EXAM: Primary | ICD-10-CM

## 2025-08-28 DIAGNOSIS — E78.5 HYPERLIPIDEMIA LDL GOAL <100: Primary | ICD-10-CM

## 2025-08-28 DIAGNOSIS — K21.00 GASTROESOPHAGEAL REFLUX DISEASE WITH ESOPHAGITIS WITHOUT HEMORRHAGE: ICD-10-CM

## 2025-08-28 DIAGNOSIS — I10 ESSENTIAL HYPERTENSION: ICD-10-CM

## 2025-08-28 DIAGNOSIS — M79.641 BILATERAL HAND PAIN: ICD-10-CM

## 2025-08-28 DIAGNOSIS — F33.1 MODERATE EPISODE OF RECURRENT MAJOR DEPRESSIVE DISORDER (H): ICD-10-CM

## 2025-08-28 DIAGNOSIS — M79.642 BILATERAL HAND PAIN: ICD-10-CM

## 2025-08-28 LAB
ANION GAP SERPL CALCULATED.3IONS-SCNC: 10 MMOL/L (ref 7–15)
BUN SERPL-MCNC: 11.3 MG/DL (ref 8–23)
CALCIUM SERPL-MCNC: 9.5 MG/DL (ref 8.8–10.4)
CHLORIDE SERPL-SCNC: 102 MMOL/L (ref 98–107)
CHOLEST SERPL-MCNC: 269 MG/DL
CREAT SERPL-MCNC: 0.68 MG/DL (ref 0.51–0.95)
EGFRCR SERPLBLD CKD-EPI 2021: >90 ML/MIN/1.73M2
FASTING STATUS PATIENT QL REPORTED: NO
FASTING STATUS PATIENT QL REPORTED: NO
GLUCOSE SERPL-MCNC: 100 MG/DL (ref 70–99)
HCO3 SERPL-SCNC: 26 MMOL/L (ref 22–29)
HDLC SERPL-MCNC: 66 MG/DL
LDLC SERPL CALC-MCNC: 161 MG/DL
NONHDLC SERPL-MCNC: 203 MG/DL
POTASSIUM SERPL-SCNC: 4.4 MMOL/L (ref 3.4–5.3)
SODIUM SERPL-SCNC: 138 MMOL/L (ref 135–145)
TRIGL SERPL-MCNC: 212 MG/DL
URATE SERPL-MCNC: 4.3 MG/DL (ref 2.4–5.7)

## 2025-08-28 RX ORDER — PREDNISONE 20 MG/1
20 TABLET ORAL 2 TIMES DAILY
Qty: 10 TABLET | Refills: 0 | Status: SHIPPED | OUTPATIENT
Start: 2025-08-28 | End: 2025-09-02

## 2025-08-28 RX ORDER — PROGESTERONE 200 MG/1
200 CAPSULE ORAL DAILY
COMMUNITY

## 2025-08-28 RX ORDER — AMLODIPINE BESYLATE 2.5 MG/1
5 TABLET ORAL
Qty: 180 TABLET | Refills: 3 | Status: SHIPPED | OUTPATIENT
Start: 2025-08-28

## 2025-08-28 RX ORDER — SERTRALINE HYDROCHLORIDE 100 MG/1
100 TABLET, FILM COATED ORAL DAILY
Qty: 90 TABLET | Refills: 3 | Status: SHIPPED | OUTPATIENT
Start: 2025-08-28

## 2025-08-28 RX ORDER — CYCLOSPORINE 0.5 MG/ML
1 EMULSION OPHTHALMIC 2 TIMES DAILY
COMMUNITY
Start: 2025-05-28

## 2025-08-28 RX ORDER — ATORVASTATIN CALCIUM 10 MG/1
10 TABLET, FILM COATED ORAL DAILY
Qty: 90 TABLET | Refills: 3 | Status: SHIPPED | OUTPATIENT
Start: 2025-08-28 | End: 2025-09-04

## 2025-08-28 SDOH — HEALTH STABILITY: PHYSICAL HEALTH: ON AVERAGE, HOW MANY MINUTES DO YOU ENGAGE IN EXERCISE AT THIS LEVEL?: 30 MIN

## 2025-08-28 SDOH — HEALTH STABILITY: PHYSICAL HEALTH: ON AVERAGE, HOW MANY DAYS PER WEEK DO YOU ENGAGE IN MODERATE TO STRENUOUS EXERCISE (LIKE A BRISK WALK)?: 4 DAYS

## 2025-08-28 ASSESSMENT — PATIENT HEALTH QUESTIONNAIRE - PHQ9: SUM OF ALL RESPONSES TO PHQ QUESTIONS 1-9: 4

## 2025-08-28 ASSESSMENT — PAIN SCALES - GENERAL: PAINLEVEL_OUTOF10: NO PAIN (0)

## 2025-08-29 ENCOUNTER — PATIENT OUTREACH (OUTPATIENT)
Dept: CARE COORDINATION | Facility: CLINIC | Age: 60
End: 2025-08-29
Payer: COMMERCIAL

## 2025-09-01 ENCOUNTER — PATIENT OUTREACH (OUTPATIENT)
Dept: CARE COORDINATION | Facility: CLINIC | Age: 60
End: 2025-09-01
Payer: COMMERCIAL

## 2025-09-04 RX ORDER — ATORVASTATIN CALCIUM 10 MG/1
10 TABLET, FILM COATED ORAL DAILY
Qty: 90 TABLET | Refills: 3 | Status: SHIPPED | OUTPATIENT
Start: 2025-09-04

## (undated) DEVICE — CATH ANGIO JUDKINS R4 6FRX100CM INFINITI 534621T

## (undated) DEVICE — SOL WATER IRRIG 500ML BOTTLE 2F7113

## (undated) DEVICE — GLOVE BIOGEL PI MICRO SZ 7.5 48575

## (undated) DEVICE — SURGICEL HEMOSTAT 4X8" 1952

## (undated) DEVICE — BONE CEMENT KIT BOWL AND SPATULA STRK 6201-3-410

## (undated) DEVICE — ELECTRODE MEDITRACE MULT FUNC AED ADULT 20770

## (undated) DEVICE — DECANTER VIAL 2006S

## (undated) DEVICE — STOCKING SLEEVE COMPRESSION CALF MED

## (undated) DEVICE — SUCTION MANIFOLD NEPTUNE 2 SYS 1 PORT 702-025-000

## (undated) DEVICE — INTRO GLIDESHEATH SLENDER 6FR 10X45CM 60-1060

## (undated) DEVICE — SU MONOCRYL 4-0 PS-2 18" UND Y496G

## (undated) DEVICE — DRSG AQUACEL AG 3.5X9.75" HYDROFIBER 412011

## (undated) DEVICE — CUFF TOURN 30IN STRL DISP 5921030235

## (undated) DEVICE — SOL NACL 0.9% IRRIG 1000ML BOTTLE 07138-09

## (undated) DEVICE — GOWN XLG DISP 9545

## (undated) DEVICE — NDL SPINAL 18GA 3.5" 405184

## (undated) DEVICE — SPECIMEN CONTAINER 3OZ W/FORMALIN 59901

## (undated) DEVICE — GLOVE BIOGEL PI MICRO SZ 8.0 48580

## (undated) DEVICE — GOWN IMPERVIOUS 2XL BLUE

## (undated) DEVICE — SU MONOCRYL 3-0 PS-2 27" Y427H

## (undated) DEVICE — DRAPE IOBAN LG .375X23.5" 6648EZ

## (undated) DEVICE — SUCTION CATH AIRLIFE TRI-FLO W/CONTROL PORT 14FR  T60C

## (undated) DEVICE — ESU PENCIL SMOKE EVAC W/ROCKER SWITCH 0703-047-000

## (undated) DEVICE — DRAPE CONVERTORS U-DRAPE 60X72" 8476

## (undated) DEVICE — SU DERMABOND ADVANCED .7ML DNX12

## (undated) DEVICE — SOL NACL 0.9% IRRIG 3000ML BAG 07972-08

## (undated) DEVICE — DRAPE STERI U 1015

## (undated) DEVICE — Device

## (undated) DEVICE — TOTE ANGIO CORP PC15AT SAN32CC83O

## (undated) DEVICE — SYR BULB IRRIG 50ML LATEX FREE 0035280

## (undated) DEVICE — SU ETHIBOND 0 CT-1 CR 8X18" CX21D

## (undated) DEVICE — PREP CHLORAPREP 26ML TINTED ORANGE  260815

## (undated) DEVICE — SUCTION TIP YANKAUER STR K87

## (undated) DEVICE — BNDG ELASTIC 6" DBL LENGTH UNSTERILE 6611-16

## (undated) DEVICE — SYR 30ML SLIP TIP W/O NDL 302833

## (undated) DEVICE — ESU HOLSTER PLASTIC DISP E2400

## (undated) DEVICE — KIT ENDO TURNOVER/PROCEDURE CARRY-ON 101822

## (undated) DEVICE — SUCTION IRRIGATION STRYKFLOW II W/TIP DISP 250-070-520

## (undated) DEVICE — ENDO TROCAR SLEEVE KII ADV FIXATION 05X100MM CFS02

## (undated) DEVICE — SU PDO 1 STRATAFIX 36X36CM CTX TAPERPOINT SXPD2B405

## (undated) DEVICE — DRAPE SHEET REV FOLD 3/4 9349

## (undated) DEVICE — SU VICRYL 0 CT-1 36" J946H

## (undated) DEVICE — ENDO BITE BLOCK ADULT OMNI-BLOC

## (undated) DEVICE — CATH ANGIO INFINITI IM 4FRX100CM 538460

## (undated) DEVICE — GLOVE EXAM NITRILE LG PF LATEX FREE 5064

## (undated) DEVICE — DRAPE POUCH INSTRUMENT 3 POCKET 1018L

## (undated) DEVICE — DEFIB PRO-PADZ LVP LQD GEL ADULT 8900-2105-01

## (undated) DEVICE — GOWN IMPERVIOUS SPECIALTY XLG/XLONG 32474

## (undated) DEVICE — SYSTEM LAPAROVUE VISIBILITY LAPVUE10

## (undated) DEVICE — GUIDEWIRE VASC 0.035INX150CM INQWIRE J TIP IQ35F150J3F/A

## (undated) DEVICE — HOOD T4 PROTECTIVE STERI FACE SHIELD 400-800

## (undated) DEVICE — TUBING SUCTION 12"X1/4" N612

## (undated) DEVICE — SUCTION IRR SYSTEM W/O TIP INTERPULSE HANDPIECE 0210-100-000

## (undated) DEVICE — BLADE SAW SAGITTAL STRK 18X90X1.27MM HD SYS 6 6118-127-090

## (undated) DEVICE — GLOVE BIOGEL PI MICRO INDICATOR UNDERGLOVE SZ 7.5 48975

## (undated) DEVICE — CATH ANGIO JUDKINS JL4 6FRX100CM INFINITI 534620T

## (undated) DEVICE — GLOVE PROTEXIS BLUE W/NEU-THERA 8.0  2D73EB80

## (undated) DEVICE — GLOVE BIOGEL PI MICRO INDICATOR UNDERGLOVE SZ 8.0 48980

## (undated) DEVICE — ENDO POUCH UNIV RETRIEVAL SYSTEM INZII 10MM CD001

## (undated) DEVICE — WIRE GUIDE 0.035"X260CM SAFE-T-J EXCHANGE G00517

## (undated) DEVICE — ESU ELEC CLEANCOAT LAP FLAT L-HOOK 36CM E3774-36C

## (undated) DEVICE — ESU ENDO SCISSORS 5MM CVD 5DCS

## (undated) DEVICE — SOL WATER IRRIG 1000ML BOTTLE 07139-09

## (undated) DEVICE — SU MONOCRYL 3-0 SH 27" Y316H

## (undated) DEVICE — ESU PENCIL W/COATED BLADE E2450H

## (undated) DEVICE — CATH DIAGNOSTIC RADIAL 5FR TIG 4.0

## (undated) DEVICE — BLADE SAW SAGITTAL STRK 25X90X1.37MM 4H SYS 6 6125-137-090

## (undated) DEVICE — BONE CLEANING TIP INTERPULSE  0210-010-000

## (undated) DEVICE — STOCKING SLEEVE COMPRESSION CALF LG

## (undated) DEVICE — MANIFOLD KIT ANGIO AUTOMATED 014613

## (undated) DEVICE — SU VICRYL 0 UR-6 27" J603H

## (undated) DEVICE — ENDO TROCAR FIRST ENTRY KII FIOS ADV FIX 05X100MM CFF03

## (undated) DEVICE — SLEEVE TR BAND RADIAL COMPRESSION DEVICE 24CM TRB24-REG

## (undated) DEVICE — GLIDEWIRE TERUMO .035X180CM 1.5,, J-TIP GR3525

## (undated) DEVICE — SUCTION MANIFOLD NEPTUNE 2 SYS 4 PORT 0702-020-000

## (undated) DEVICE — GLOVE PROTEXIS W/NEU-THERA 7.5  2D73TE75

## (undated) DEVICE — SYR 20ML LL W/O NDL

## (undated) DEVICE — CLIP APPLIER ENDO ROTATING 10MM MED/LG ER320

## (undated) DEVICE — ADH SKIN CLOSURE PREMIERPRO EXOFIN 1.0ML 3470

## (undated) DEVICE — ENDO TROCAR FIRST ENTRY KII FIOS ADV FIX 11X100MM CFF33

## (undated) DEVICE — CATH CHOLANGIOGRAM 4.5FR TAUT METAL TIP 20018-M55

## (undated) DEVICE — KIT HAND CONTROL ANGIOTOUCH ACIST 65CM AT-P65

## (undated) DEVICE — ENDO FORCEP BX CAPTURA PRO SPIKE G50696

## (undated) RX ORDER — FENTANYL CITRATE 50 UG/ML
INJECTION, SOLUTION INTRAMUSCULAR; INTRAVENOUS
Status: DISPENSED
Start: 2022-10-14

## (undated) RX ORDER — FENTANYL CITRATE 50 UG/ML
INJECTION, SOLUTION INTRAMUSCULAR; INTRAVENOUS
Status: DISPENSED
Start: 2022-11-21

## (undated) RX ORDER — GABAPENTIN 300 MG/1
CAPSULE ORAL
Status: DISPENSED
Start: 2022-11-21

## (undated) RX ORDER — PROPOFOL 10 MG/ML
INJECTION, EMULSION INTRAVENOUS
Status: DISPENSED
Start: 2024-10-28

## (undated) RX ORDER — CEFAZOLIN SODIUM/WATER 2 G/20 ML
SYRINGE (ML) INTRAVENOUS
Status: DISPENSED
Start: 2024-10-28

## (undated) RX ORDER — FENTANYL CITRATE-0.9 % NACL/PF 10 MCG/ML
PLASTIC BAG, INJECTION (ML) INTRAVENOUS
Status: DISPENSED
Start: 2024-10-28

## (undated) RX ORDER — BUPIVACAINE HYDROCHLORIDE 5 MG/ML
INJECTION, SOLUTION EPIDURAL; INTRACAUDAL
Status: DISPENSED
Start: 2022-11-21

## (undated) RX ORDER — FENTANYL CITRATE-0.9 % NACL/PF 10 MCG/ML
PLASTIC BAG, INJECTION (ML) INTRAVENOUS
Status: DISPENSED
Start: 2022-11-21

## (undated) RX ORDER — DEXAMETHASONE SODIUM PHOSPHATE 4 MG/ML
INJECTION, SOLUTION INTRA-ARTICULAR; INTRALESIONAL; INTRAMUSCULAR; INTRAVENOUS; SOFT TISSUE
Status: DISPENSED
Start: 2024-10-28

## (undated) RX ORDER — MEPERIDINE HYDROCHLORIDE 25 MG/ML
INJECTION INTRAMUSCULAR; INTRAVENOUS; SUBCUTANEOUS
Status: DISPENSED
Start: 2022-11-21

## (undated) RX ORDER — ONDANSETRON 2 MG/ML
INJECTION INTRAMUSCULAR; INTRAVENOUS
Status: DISPENSED
Start: 2023-10-31

## (undated) RX ORDER — CEFAZOLIN SODIUM/WATER 2 G/20 ML
SYRINGE (ML) INTRAVENOUS
Status: DISPENSED
Start: 2023-10-31

## (undated) RX ORDER — KETOROLAC TROMETHAMINE 30 MG/ML
INJECTION, SOLUTION INTRAMUSCULAR; INTRAVENOUS
Status: DISPENSED
Start: 2022-11-21

## (undated) RX ORDER — FENTANYL CITRATE 50 UG/ML
INJECTION, SOLUTION INTRAMUSCULAR; INTRAVENOUS
Status: DISPENSED
Start: 2023-10-31

## (undated) RX ORDER — LIDOCAINE HYDROCHLORIDE 10 MG/ML
INJECTION, SOLUTION EPIDURAL; INFILTRATION; INTRACAUDAL; PERINEURAL
Status: DISPENSED
Start: 2022-11-21

## (undated) RX ORDER — LIDOCAINE HYDROCHLORIDE 10 MG/ML
INJECTION, SOLUTION EPIDURAL; INFILTRATION; INTRACAUDAL; PERINEURAL
Status: DISPENSED
Start: 2022-10-14

## (undated) RX ORDER — HEPARIN SODIUM 1000 [USP'U]/ML
INJECTION, SOLUTION INTRAVENOUS; SUBCUTANEOUS
Status: DISPENSED
Start: 2022-10-14

## (undated) RX ORDER — ONDANSETRON 2 MG/ML
INJECTION INTRAMUSCULAR; INTRAVENOUS
Status: DISPENSED
Start: 2024-10-28

## (undated) RX ORDER — OXYCODONE HYDROCHLORIDE 5 MG/1
TABLET ORAL
Status: DISPENSED
Start: 2022-11-21

## (undated) RX ORDER — DEXAMETHASONE SODIUM PHOSPHATE 4 MG/ML
INJECTION, SOLUTION INTRA-ARTICULAR; INTRALESIONAL; INTRAMUSCULAR; INTRAVENOUS; SOFT TISSUE
Status: DISPENSED
Start: 2022-11-21

## (undated) RX ORDER — ACETAMINOPHEN 325 MG/1
TABLET ORAL
Status: DISPENSED
Start: 2024-10-28

## (undated) RX ORDER — HEPARIN SODIUM 200 [USP'U]/100ML
INJECTION, SOLUTION INTRAVENOUS
Status: DISPENSED
Start: 2022-10-14

## (undated) RX ORDER — VANCOMYCIN HYDROCHLORIDE 1 G/20ML
INJECTION, POWDER, LYOPHILIZED, FOR SOLUTION INTRAVENOUS
Status: DISPENSED
Start: 2023-10-31

## (undated) RX ORDER — VERAPAMIL HYDROCHLORIDE 2.5 MG/ML
INJECTION, SOLUTION INTRAVENOUS
Status: DISPENSED
Start: 2022-10-14

## (undated) RX ORDER — ACETAMINOPHEN 325 MG/1
TABLET ORAL
Status: DISPENSED
Start: 2023-10-31

## (undated) RX ORDER — DEXAMETHASONE SODIUM PHOSPHATE 4 MG/ML
INJECTION, SOLUTION INTRA-ARTICULAR; INTRALESIONAL; INTRAMUSCULAR; INTRAVENOUS; SOFT TISSUE
Status: DISPENSED
Start: 2023-10-31

## (undated) RX ORDER — ALBUTEROL SULFATE 0.83 MG/ML
SOLUTION RESPIRATORY (INHALATION)
Status: DISPENSED
Start: 2022-11-21

## (undated) RX ORDER — ONDANSETRON 2 MG/ML
INJECTION INTRAMUSCULAR; INTRAVENOUS
Status: DISPENSED
Start: 2022-11-21

## (undated) RX ORDER — PROPOFOL 10 MG/ML
INJECTION, EMULSION INTRAVENOUS
Status: DISPENSED
Start: 2023-10-31

## (undated) RX ORDER — NITROGLYCERIN 5 MG/ML
VIAL (ML) INTRAVENOUS
Status: DISPENSED
Start: 2022-10-14

## (undated) RX ORDER — GABAPENTIN 300 MG/1
CAPSULE ORAL
Status: DISPENSED
Start: 2024-10-28

## (undated) RX ORDER — FENTANYL CITRATE-0.9 % NACL/PF 10 MCG/ML
PLASTIC BAG, INJECTION (ML) INTRAVENOUS
Status: DISPENSED
Start: 2023-10-31

## (undated) RX ORDER — VANCOMYCIN HYDROCHLORIDE 1 G/20ML
INJECTION, POWDER, LYOPHILIZED, FOR SOLUTION INTRAVENOUS
Status: DISPENSED
Start: 2024-10-28

## (undated) RX ORDER — ACETAMINOPHEN 325 MG/1
TABLET ORAL
Status: DISPENSED
Start: 2022-11-21

## (undated) RX ORDER — LIDOCAINE HYDROCHLORIDE AND EPINEPHRINE 10; 10 MG/ML; UG/ML
INJECTION, SOLUTION INFILTRATION; PERINEURAL
Status: DISPENSED
Start: 2022-11-21

## (undated) RX ORDER — BUPIVACAINE HYDROCHLORIDE 5 MG/ML
INJECTION, SOLUTION EPIDURAL; INTRACAUDAL
Status: DISPENSED
Start: 2024-10-28

## (undated) RX ORDER — TRANEXAMIC ACID 650 MG/1
TABLET ORAL
Status: DISPENSED
Start: 2024-10-28

## (undated) RX ORDER — BUPIVACAINE HYDROCHLORIDE 5 MG/ML
INJECTION, SOLUTION EPIDURAL; INTRACAUDAL
Status: DISPENSED
Start: 2023-10-31

## (undated) RX ORDER — GABAPENTIN 300 MG/1
CAPSULE ORAL
Status: DISPENSED
Start: 2023-10-31